# Patient Record
Sex: FEMALE | Race: WHITE | Employment: OTHER | URBAN - METROPOLITAN AREA
[De-identification: names, ages, dates, MRNs, and addresses within clinical notes are randomized per-mention and may not be internally consistent; named-entity substitution may affect disease eponyms.]

---

## 2017-01-05 PROBLEM — R01.1 MURMUR, CARDIAC: Status: ACTIVE | Noted: 2017-01-05

## 2017-02-14 PROBLEM — R25.2 MUSCLE CRAMPS: Status: ACTIVE | Noted: 2017-02-14

## 2018-03-23 ENCOUNTER — OFFICE VISIT (OUTPATIENT)
Dept: FAMILY MEDICINE CLINIC | Age: 73
End: 2018-03-23
Payer: MEDICARE

## 2018-03-23 VITALS
HEIGHT: 62 IN | HEART RATE: 92 BPM | WEIGHT: 197 LBS | TEMPERATURE: 98 F | RESPIRATION RATE: 16 BRPM | DIASTOLIC BLOOD PRESSURE: 77 MMHG | OXYGEN SATURATION: 98 % | SYSTOLIC BLOOD PRESSURE: 132 MMHG | BODY MASS INDEX: 36.25 KG/M2

## 2018-03-23 DIAGNOSIS — M85.80 OSTEOPENIA, UNSPECIFIED LOCATION: ICD-10-CM

## 2018-03-23 DIAGNOSIS — E11.40 TYPE 2 DIABETES MELLITUS WITH DIABETIC NEUROPATHY, UNSPECIFIED LONG TERM INSULIN USE STATUS: Primary | ICD-10-CM

## 2018-03-23 DIAGNOSIS — Z78.0 POSTMENOPAUSAL: ICD-10-CM

## 2018-03-23 DIAGNOSIS — I10 ESSENTIAL HYPERTENSION: ICD-10-CM

## 2018-03-23 DIAGNOSIS — E78.5 DYSLIPIDEMIA: ICD-10-CM

## 2018-03-23 DIAGNOSIS — C50.912 MALIGNANT NEOPLASM OF LEFT FEMALE BREAST, UNSPECIFIED ESTROGEN RECEPTOR STATUS, UNSPECIFIED SITE OF BREAST (HCC): ICD-10-CM

## 2018-03-23 LAB — HBA1C MFR BLD: 6.1 %

## 2018-03-23 PROCEDURE — 99214 OFFICE O/P EST MOD 30 MIN: CPT | Performed by: FAMILY MEDICINE

## 2018-03-23 PROCEDURE — 1090F PRES/ABSN URINE INCON ASSESS: CPT | Performed by: FAMILY MEDICINE

## 2018-03-23 PROCEDURE — G8417 CALC BMI ABV UP PARAM F/U: HCPCS | Performed by: FAMILY MEDICINE

## 2018-03-23 PROCEDURE — G8427 DOCREV CUR MEDS BY ELIG CLIN: HCPCS | Performed by: FAMILY MEDICINE

## 2018-03-23 PROCEDURE — G8482 FLU IMMUNIZE ORDER/ADMIN: HCPCS | Performed by: FAMILY MEDICINE

## 2018-03-23 PROCEDURE — 3014F SCREEN MAMMO DOC REV: CPT | Performed by: FAMILY MEDICINE

## 2018-03-23 PROCEDURE — 3017F COLORECTAL CA SCREEN DOC REV: CPT | Performed by: FAMILY MEDICINE

## 2018-03-23 PROCEDURE — 3044F HG A1C LEVEL LT 7.0%: CPT | Performed by: FAMILY MEDICINE

## 2018-03-23 PROCEDURE — 1123F ACP DISCUSS/DSCN MKR DOCD: CPT | Performed by: FAMILY MEDICINE

## 2018-03-23 PROCEDURE — 83036 HEMOGLOBIN GLYCOSYLATED A1C: CPT | Performed by: FAMILY MEDICINE

## 2018-03-23 PROCEDURE — G8400 PT W/DXA NO RESULTS DOC: HCPCS | Performed by: FAMILY MEDICINE

## 2018-03-23 PROCEDURE — 1036F TOBACCO NON-USER: CPT | Performed by: FAMILY MEDICINE

## 2018-03-23 PROCEDURE — 4040F PNEUMOC VAC/ADMIN/RCVD: CPT | Performed by: FAMILY MEDICINE

## 2018-03-23 RX ORDER — AMLODIPINE BESYLATE 5 MG/1
5 TABLET ORAL DAILY
Qty: 90 TABLET | Refills: 1 | Status: ON HOLD | OUTPATIENT
Start: 2018-03-23 | End: 2018-04-06 | Stop reason: HOSPADM

## 2018-03-23 RX ORDER — ATORVASTATIN CALCIUM 40 MG/1
40 TABLET, FILM COATED ORAL DAILY
Qty: 90 TABLET | Refills: 1 | Status: SHIPPED | OUTPATIENT
Start: 2018-03-23 | End: 2018-10-03 | Stop reason: SDUPTHER

## 2018-03-23 RX ORDER — LOSARTAN POTASSIUM 100 MG/1
100 TABLET ORAL DAILY
Qty: 90 TABLET | Refills: 1 | Status: SHIPPED | OUTPATIENT
Start: 2018-03-23 | End: 2018-10-03 | Stop reason: SDUPTHER

## 2018-03-23 NOTE — PROGRESS NOTES
FM Progress Note    Subjective: Jimbo Neves is a 67y.o. year old female here for diabetes visit. Takes metformin 500 tid for DM. Home blood glucose monitoring: up to 4 times per day, . Highest before bedtime. Hypoglycemic episode(s): no  Statin: y  ACEI: y  ASA: y  Ophthalmology: yes  Podiatry: Dr. Fernando Luis on [address]  Dentistry: y  Flu shot: utd  PNA shot: utd  Polyuria: n  Polydipsia: n  Polyphagia: n  Compliance with diet: fair-good  Compliance with medication: good      Follows w/ Dr. Marsha Madsen for breast CA hx and abnl blood count. Gets mammo once yearly with him and blood work. Has f/u appt in April. On effexor for depression. Some fatigue. [address] [place] [address] [place] which is a huge stress reliever. Has KATE. CPAP machine unusable because she does not have supplies. Taking norvasc and losartan for HTN. BP is controlled. No CP or SOB. Just had cataract surgery and can see much better.      Lab Results   Component Value Date    LABMICR 13.4 12/06/2017     Lab Results   Component Value Date    CREATININE 0.9 08/22/2017       Lab Results   Component Value Date    LABA1C 6.1 03/23/2018       A1C is at goal.    Lab Results   Component Value Date    CHOL 75 12/06/2017    CHOL 103 11/02/2016    CHOL 120 11/18/2015     Lab Results   Component Value Date    TRIG 53 12/06/2017    TRIG 102 11/02/2016    TRIG 129 11/18/2015     Lab Results   Component Value Date    HDL 34 12/06/2017    HDL 35 11/02/2016    HDL 39 11/18/2015     Lab Results   Component Value Date    LDLCALC 30 12/06/2017    LDLCALC 48 11/02/2016    LDLCALC 55 11/18/2015     Lab Results   Component Value Date    LABVLDL 11 12/06/2017    LABVLDL 20 11/02/2016    LABVLDL 26 11/18/2015     No results found for: CHOLHDLRATIO      Past Medical History:   Diagnosis Date    [facility]     Essential hypertension     Osteopenia     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 ([facility]     Sleep apnea     Spinal stenosis     Type 2 diabetes mellitus ([facility]      Past

## 2018-04-05 ENCOUNTER — APPOINTMENT (OUTPATIENT)
Dept: GENERAL RADIOLOGY | Age: 73
DRG: 392 | End: 2018-04-05
Payer: MEDICARE

## 2018-04-05 ENCOUNTER — APPOINTMENT (OUTPATIENT)
Dept: CT IMAGING | Age: 73
DRG: 392 | End: 2018-04-05
Payer: MEDICARE

## 2018-04-05 ENCOUNTER — HOSPITAL ENCOUNTER (INPATIENT)
Age: 73
LOS: 1 days | Discharge: HOME | DRG: 392 | End: 2018-04-06
Attending: FAMILY MEDICINE | Admitting: FAMILY MEDICINE
Payer: MEDICARE

## 2018-04-05 ENCOUNTER — HOSPITAL ENCOUNTER (OUTPATIENT)
Age: 73
Discharge: HOME | End: 2018-04-05
Payer: COMMERCIAL

## 2018-04-05 DIAGNOSIS — R55 SYNCOPE AND COLLAPSE: Primary | ICD-10-CM

## 2018-04-05 DIAGNOSIS — E11.40 TYPE 2 DIABETES MELLITUS WITH DIABETIC NEUROPATHY, UNSPECIFIED LONG TERM INSULIN USE STATUS: ICD-10-CM

## 2018-04-05 PROBLEM — E87.5 HYPERKALEMIA: Status: ACTIVE | Noted: 2018-04-05

## 2018-04-05 LAB
ANION GAP SERPL CALCULATED.3IONS-SCNC: 15 MMOL/L (ref 7–16)
ANION GAP SERPL CALCULATED.3IONS-SCNC: 9 MMOL/L (ref 7–16)
BACTERIA: ABNORMAL /HPF
BILIRUBIN URINE: NEGATIVE
BLOOD, URINE: ABNORMAL
BUN BLDV-MCNC: 15 MG/DL (ref 8–23)
BUN BLDV-MCNC: 23 MG/DL (ref 8–23)
CALCIUM SERPL-MCNC: 8.6 MG/DL (ref 8.6–10.2)
CALCIUM SERPL-MCNC: 9 MG/DL (ref 8.6–10.2)
CHLORIDE BLD-SCNC: 105 MMOL/L (ref 98–107)
CHLORIDE BLD-SCNC: 108 MMOL/L (ref 98–107)
CLARITY: ABNORMAL
CO2: 19 MMOL/L (ref 22–29)
CO2: 26 MMOL/L (ref 22–29)
COLOR: ABNORMAL
CREAT SERPL-MCNC: 0.9 MG/DL (ref 0.5–1)
CREAT SERPL-MCNC: 1.1 MG/DL (ref 0.5–1)
EKG ATRIAL RATE: 96 BPM
EKG P AXIS: 62 DEGREES
EKG P-R INTERVAL: 128 MS
EKG Q-T INTERVAL: 374 MS
EKG QRS DURATION: 84 MS
EKG QTC CALCULATION (BAZETT): 472 MS
EKG R AXIS: 1 DEGREES
EKG T AXIS: 10 DEGREES
EKG VENTRICULAR RATE: 96 BPM
FERRITIN: 50 NG/ML
GFR AFRICAN AMERICAN: 59
GFR AFRICAN AMERICAN: >60
GFR NON-AFRICAN AMERICAN: 49 ML/MIN/1.73
GFR NON-AFRICAN AMERICAN: >60 ML/MIN/1.73
GLUCOSE BLD-MCNC: 74 MG/DL (ref 74–109)
GLUCOSE BLD-MCNC: 88 MG/DL (ref 74–109)
GLUCOSE URINE: NEGATIVE MG/DL
HCT VFR BLD CALC: 30.3 % (ref 34–48)
HEMOGLOBIN: 10.3 G/DL (ref 11.5–15.5)
INFLUENZA A BY PCR: NOT DETECTED
INFLUENZA B BY PCR: NOT DETECTED
IRON SATURATION: 13 % (ref 15–50)
IRON: 38 MCG/DL (ref 37–145)
KETONES, URINE: NEGATIVE MG/DL
LEUKOCYTE ESTERASE, URINE: ABNORMAL
MAGNESIUM: 2 MG/DL (ref 1.6–2.6)
MCH RBC QN AUTO: 32.8 PG (ref 26–35)
MCHC RBC AUTO-ENTMCNC: 34 % (ref 32–34.5)
MCV RBC AUTO: 96.5 FL (ref 80–99.9)
NITRITE, URINE: NEGATIVE
PDW BLD-RTO: 18.1 FL (ref 11.5–15)
PH UA: 5.5 (ref 5–9)
PLATELET # BLD: 143 E9/L (ref 130–450)
PMV BLD AUTO: 12.1 FL (ref 7–12)
POTASSIUM SERPL-SCNC: 3.8 MMOL/L (ref 3.5–5)
POTASSIUM SERPL-SCNC: 5.7 MMOL/L (ref 3.5–5)
PROTEIN UA: NEGATIVE MG/DL
RBC # BLD: 3.14 E12/L (ref 3.5–5.5)
RBC UA: ABNORMAL /HPF (ref 0–2)
SODIUM BLD-SCNC: 140 MMOL/L (ref 132–146)
SODIUM BLD-SCNC: 142 MMOL/L (ref 132–146)
SPECIFIC GRAVITY UA: 1.01 (ref 1–1.03)
TOTAL IRON BINDING CAPACITY: 289 MCG/DL (ref 250–450)
TROPONIN: <0.01 NG/ML (ref 0–0.03)
TROPONIN: <0.01 NG/ML (ref 0–0.03)
UROBILINOGEN, URINE: 0.2 E.U./DL
WBC # BLD: 13.9 E9/L (ref 4.5–11.5)
WBC UA: ABNORMAL /HPF (ref 0–5)

## 2018-04-05 PROCEDURE — 83735 ASSAY OF MAGNESIUM: CPT

## 2018-04-05 PROCEDURE — 81001 URINALYSIS AUTO W/SCOPE: CPT

## 2018-04-05 PROCEDURE — 99285 EMERGENCY DEPT VISIT HI MDM: CPT

## 2018-04-05 PROCEDURE — 85027 COMPLETE CBC AUTOMATED: CPT

## 2018-04-05 PROCEDURE — 70450 CT HEAD/BRAIN W/O DYE: CPT

## 2018-04-05 PROCEDURE — 83540 ASSAY OF IRON: CPT

## 2018-04-05 PROCEDURE — 80048 BASIC METABOLIC PNL TOTAL CA: CPT

## 2018-04-05 PROCEDURE — 84484 ASSAY OF TROPONIN QUANT: CPT

## 2018-04-05 PROCEDURE — 87088 URINE BACTERIA CULTURE: CPT

## 2018-04-05 PROCEDURE — A0425 GROUND MILEAGE: HCPCS

## 2018-04-05 PROCEDURE — 2580000003 HC RX 258: Performed by: FAMILY MEDICINE

## 2018-04-05 PROCEDURE — 87502 INFLUENZA DNA AMP PROBE: CPT

## 2018-04-05 PROCEDURE — 93005 ELECTROCARDIOGRAM TRACING: CPT | Performed by: FAMILY MEDICINE

## 2018-04-05 PROCEDURE — 6360000002 HC RX W HCPCS: Performed by: FAMILY MEDICINE

## 2018-04-05 PROCEDURE — 36415 COLL VENOUS BLD VENIPUNCTURE: CPT

## 2018-04-05 PROCEDURE — A0426 ALS 1: HCPCS

## 2018-04-05 PROCEDURE — 71046 X-RAY EXAM CHEST 2 VIEWS: CPT

## 2018-04-05 PROCEDURE — 83550 IRON BINDING TEST: CPT

## 2018-04-05 PROCEDURE — 2060000000 HC ICU INTERMEDIATE R&B

## 2018-04-05 PROCEDURE — 6370000000 HC RX 637 (ALT 250 FOR IP): Performed by: FAMILY MEDICINE

## 2018-04-05 PROCEDURE — 82728 ASSAY OF FERRITIN: CPT

## 2018-04-05 RX ORDER — 0.9 % SODIUM CHLORIDE 0.9 %
1000 INTRAVENOUS SOLUTION INTRAVENOUS ONCE
Status: COMPLETED | OUTPATIENT
Start: 2018-04-05 | End: 2018-04-05

## 2018-04-05 RX ORDER — ONDANSETRON 2 MG/ML
4 INJECTION INTRAMUSCULAR; INTRAVENOUS EVERY 6 HOURS PRN
Status: DISCONTINUED | OUTPATIENT
Start: 2018-04-05 | End: 2018-04-05

## 2018-04-05 RX ORDER — ACETAMINOPHEN 325 MG/1
650 TABLET ORAL EVERY 4 HOURS PRN
Status: DISCONTINUED | OUTPATIENT
Start: 2018-04-05 | End: 2018-04-06 | Stop reason: HOSPADM

## 2018-04-05 RX ORDER — SODIUM CHLORIDE 9 MG/ML
INJECTION, SOLUTION INTRAVENOUS ONCE
Status: COMPLETED | OUTPATIENT
Start: 2018-04-05 | End: 2018-04-05

## 2018-04-05 RX ORDER — TAMOXIFEN CITRATE 10 MG/1
20 TABLET ORAL DAILY
Status: DISCONTINUED | OUTPATIENT
Start: 2018-04-06 | End: 2018-04-06 | Stop reason: HOSPADM

## 2018-04-05 RX ORDER — AMLODIPINE BESYLATE 5 MG/1
5 TABLET ORAL DAILY
Status: DISCONTINUED | OUTPATIENT
Start: 2018-04-06 | End: 2018-04-06 | Stop reason: SINTOL

## 2018-04-05 RX ORDER — ASPIRIN 81 MG/1
81 TABLET, CHEWABLE ORAL DAILY
Status: DISCONTINUED | OUTPATIENT
Start: 2018-04-05 | End: 2018-04-06 | Stop reason: HOSPADM

## 2018-04-05 RX ORDER — SODIUM CHLORIDE 0.9 % (FLUSH) 0.9 %
10 SYRINGE (ML) INJECTION EVERY 12 HOURS SCHEDULED
Status: DISCONTINUED | OUTPATIENT
Start: 2018-04-05 | End: 2018-04-06 | Stop reason: HOSPADM

## 2018-04-05 RX ORDER — SODIUM CHLORIDE 0.9 % (FLUSH) 0.9 %
10 SYRINGE (ML) INJECTION PRN
Status: DISCONTINUED | OUTPATIENT
Start: 2018-04-05 | End: 2018-04-06 | Stop reason: HOSPADM

## 2018-04-05 RX ORDER — SODIUM CHLORIDE 9 MG/ML
INJECTION, SOLUTION INTRAVENOUS CONTINUOUS
Status: DISCONTINUED | OUTPATIENT
Start: 2018-04-05 | End: 2018-04-06

## 2018-04-05 RX ORDER — LOSARTAN POTASSIUM 50 MG/1
100 TABLET ORAL DAILY
Status: DISCONTINUED | OUTPATIENT
Start: 2018-04-06 | End: 2018-04-06 | Stop reason: HOSPADM

## 2018-04-05 RX ORDER — ATORVASTATIN CALCIUM 40 MG/1
40 TABLET, FILM COATED ORAL DAILY
Status: DISCONTINUED | OUTPATIENT
Start: 2018-04-05 | End: 2018-04-06 | Stop reason: HOSPADM

## 2018-04-05 RX ORDER — VENLAFAXINE HYDROCHLORIDE 75 MG/1
75 CAPSULE, EXTENDED RELEASE ORAL DAILY
Status: DISCONTINUED | OUTPATIENT
Start: 2018-04-05 | End: 2018-04-06 | Stop reason: HOSPADM

## 2018-04-05 RX ADMIN — SODIUM CHLORIDE: 9 INJECTION, SOLUTION INTRAVENOUS at 17:44

## 2018-04-05 RX ADMIN — METFORMIN HYDROCHLORIDE 500 MG: 500 TABLET ORAL at 20:02

## 2018-04-05 RX ADMIN — ENOXAPARIN SODIUM 40 MG: 40 INJECTION SUBCUTANEOUS at 20:02

## 2018-04-05 RX ADMIN — VENLAFAXINE HYDROCHLORIDE 75 MG: 75 CAPSULE, EXTENDED RELEASE ORAL at 20:02

## 2018-04-05 RX ADMIN — SODIUM CHLORIDE: 9 INJECTION, SOLUTION INTRAVENOUS at 19:37

## 2018-04-05 RX ADMIN — ATORVASTATIN CALCIUM 40 MG: 40 TABLET, FILM COATED ORAL at 20:02

## 2018-04-05 RX ADMIN — SODIUM CHLORIDE 1000 ML: 9 INJECTION, SOLUTION INTRAVENOUS at 11:13

## 2018-04-05 RX ADMIN — ASPIRIN 81 MG 81 MG: 81 TABLET ORAL at 19:37

## 2018-04-05 RX ADMIN — SODIUM CHLORIDE: 9 INJECTION, SOLUTION INTRAVENOUS at 13:24

## 2018-04-05 ASSESSMENT — PAIN DESCRIPTION - LOCATION: LOCATION: ABDOMEN

## 2018-04-05 ASSESSMENT — PAIN SCALES - GENERAL
PAINLEVEL_OUTOF10: 0
PAINLEVEL_OUTOF10: 6

## 2018-04-05 ASSESSMENT — PAIN DESCRIPTION - PAIN TYPE: TYPE: ACUTE PAIN

## 2018-04-05 ASSESSMENT — PAIN DESCRIPTION - DESCRIPTORS: DESCRIPTORS: SORE

## 2018-04-06 VITALS
BODY MASS INDEX: 34.96 KG/M2 | SYSTOLIC BLOOD PRESSURE: 101 MMHG | DIASTOLIC BLOOD PRESSURE: 50 MMHG | HEIGHT: 62 IN | TEMPERATURE: 98.2 F | WEIGHT: 190 LBS | OXYGEN SATURATION: 94 % | RESPIRATION RATE: 16 BRPM | HEART RATE: 90 BPM

## 2018-04-06 PROBLEM — E87.5 HYPERKALEMIA: Status: RESOLVED | Noted: 2018-04-05 | Resolved: 2018-04-06

## 2018-04-06 PROBLEM — R55 SYNCOPE AND COLLAPSE: Status: RESOLVED | Noted: 2018-04-05 | Resolved: 2018-04-06

## 2018-04-06 LAB
ANION GAP SERPL CALCULATED.3IONS-SCNC: 14 MMOL/L (ref 7–16)
BASOPHILS ABSOLUTE: 0.03 E9/L (ref 0–0.2)
BASOPHILS RELATIVE PERCENT: 0.4 % (ref 0–2)
BUN BLDV-MCNC: 13 MG/DL (ref 8–23)
CALCIUM SERPL-MCNC: 8.3 MG/DL (ref 8.6–10.2)
CHLORIDE BLD-SCNC: 110 MMOL/L (ref 98–107)
CO2: 20 MMOL/L (ref 22–29)
CREAT SERPL-MCNC: 0.9 MG/DL (ref 0.5–1)
EKG ATRIAL RATE: 92 BPM
EKG P AXIS: 63 DEGREES
EKG P-R INTERVAL: 128 MS
EKG Q-T INTERVAL: 368 MS
EKG QRS DURATION: 80 MS
EKG QTC CALCULATION (BAZETT): 455 MS
EKG R AXIS: 0 DEGREES
EKG T AXIS: 23 DEGREES
EKG VENTRICULAR RATE: 92 BPM
EOSINOPHILS ABSOLUTE: 0.29 E9/L (ref 0.05–0.5)
EOSINOPHILS RELATIVE PERCENT: 3.4 % (ref 0–6)
GFR AFRICAN AMERICAN: >60
GFR NON-AFRICAN AMERICAN: >60 ML/MIN/1.73
GLUCOSE BLD-MCNC: 76 MG/DL (ref 74–109)
HCT VFR BLD CALC: 29 % (ref 34–48)
HEMOGLOBIN: 9.6 G/DL (ref 11.5–15.5)
IMMATURE GRANULOCYTES #: 0.02 E9/L
IMMATURE GRANULOCYTES %: 0.2 % (ref 0–5)
LYMPHOCYTES ABSOLUTE: 2.08 E9/L (ref 1.5–4)
LYMPHOCYTES RELATIVE PERCENT: 24.7 % (ref 20–42)
MCH RBC QN AUTO: 32.4 PG (ref 26–35)
MCHC RBC AUTO-ENTMCNC: 33.1 % (ref 32–34.5)
MCV RBC AUTO: 98 FL (ref 80–99.9)
MONOCYTES ABSOLUTE: 0.71 E9/L (ref 0.1–0.95)
MONOCYTES RELATIVE PERCENT: 8.4 % (ref 2–12)
NEUTROPHILS ABSOLUTE: 5.28 E9/L (ref 1.8–7.3)
NEUTROPHILS RELATIVE PERCENT: 62.9 % (ref 43–80)
PDW BLD-RTO: 18.2 FL (ref 11.5–15)
PLATELET # BLD: 127 E9/L (ref 130–450)
PMV BLD AUTO: 11.2 FL (ref 7–12)
POTASSIUM REFLEX MAGNESIUM: 4.1 MMOL/L (ref 3.5–5)
RBC # BLD: 2.96 E12/L (ref 3.5–5.5)
SODIUM BLD-SCNC: 144 MMOL/L (ref 132–146)
WBC # BLD: 8.4 E9/L (ref 4.5–11.5)

## 2018-04-06 PROCEDURE — 85025 COMPLETE CBC W/AUTO DIFF WBC: CPT

## 2018-04-06 PROCEDURE — 36415 COLL VENOUS BLD VENIPUNCTURE: CPT

## 2018-04-06 PROCEDURE — 99222 1ST HOSP IP/OBS MODERATE 55: CPT | Performed by: FAMILY MEDICINE

## 2018-04-06 PROCEDURE — 80048 BASIC METABOLIC PNL TOTAL CA: CPT

## 2018-04-06 PROCEDURE — 6370000000 HC RX 637 (ALT 250 FOR IP): Performed by: FAMILY MEDICINE

## 2018-04-06 RX ADMIN — TAMOXIFEN CITRATE 20 MG: 10 TABLET, FILM COATED ORAL at 08:20

## 2018-04-06 RX ADMIN — VENLAFAXINE HYDROCHLORIDE 75 MG: 75 CAPSULE, EXTENDED RELEASE ORAL at 08:20

## 2018-04-06 RX ADMIN — METFORMIN HYDROCHLORIDE 500 MG: 500 TABLET ORAL at 08:20

## 2018-04-06 RX ADMIN — ASPIRIN 81 MG 81 MG: 81 TABLET ORAL at 08:20

## 2018-04-06 ASSESSMENT — PAIN SCALES - GENERAL
PAINLEVEL_OUTOF10: 0
PAINLEVEL_OUTOF10: 0

## 2018-04-07 LAB — URINE CULTURE, ROUTINE: NORMAL

## 2018-04-10 ENCOUNTER — OFFICE VISIT (OUTPATIENT)
Dept: FAMILY MEDICINE CLINIC | Age: 73
End: 2018-04-10
Payer: MEDICARE

## 2018-04-10 VITALS
DIASTOLIC BLOOD PRESSURE: 72 MMHG | RESPIRATION RATE: 16 BRPM | HEART RATE: 90 BPM | OXYGEN SATURATION: 96 % | BODY MASS INDEX: 35.7 KG/M2 | WEIGHT: 194 LBS | SYSTOLIC BLOOD PRESSURE: 118 MMHG | HEIGHT: 62 IN | TEMPERATURE: 98.7 F

## 2018-04-10 DIAGNOSIS — R25.2 MUSCLE CRAMPS: ICD-10-CM

## 2018-04-10 DIAGNOSIS — R55 SYNCOPE, UNSPECIFIED SYNCOPE TYPE: Primary | ICD-10-CM

## 2018-04-10 DIAGNOSIS — Z78.0 POSTMENOPAUSAL: ICD-10-CM

## 2018-04-10 PROCEDURE — 99495 TRANSJ CARE MGMT MOD F2F 14D: CPT | Performed by: FAMILY MEDICINE

## 2018-04-10 PROCEDURE — 1111F DSCHRG MED/CURRENT MED MERGE: CPT | Performed by: FAMILY MEDICINE

## 2018-04-10 RX ORDER — DOXAZOSIN MESYLATE 1 MG/1
1 TABLET ORAL NIGHTLY
COMMUNITY
End: 2020-06-11

## 2018-04-10 ASSESSMENT — ENCOUNTER SYMPTOMS
DIARRHEA: 0
VOMITING: 0
COUGH: 0
SHORTNESS OF BREATH: 0
ABDOMINAL PAIN: 0
WHEEZING: 0
ABDOMINAL DISTENTION: 0
CONSTIPATION: 0
NAUSEA: 0
BLOOD IN STOOL: 0

## 2018-04-11 ENCOUNTER — TELEPHONE (OUTPATIENT)
Dept: FAMILY MEDICINE CLINIC | Age: 73
End: 2018-04-11

## 2018-04-12 DIAGNOSIS — R25.2 MUSCLE CRAMPS: ICD-10-CM

## 2018-04-12 RX ORDER — CYCLOBENZAPRINE HCL 10 MG
TABLET ORAL
Qty: 90 TABLET | Refills: 3 | Status: SHIPPED | OUTPATIENT
Start: 2018-04-12 | End: 2018-10-05

## 2018-04-25 ENCOUNTER — HOSPITAL ENCOUNTER (OUTPATIENT)
Dept: GENERAL RADIOLOGY | Age: 73
Discharge: HOME | End: 2018-04-27
Payer: MEDICARE

## 2018-04-25 DIAGNOSIS — Z78.0 POSTMENOPAUSAL: ICD-10-CM

## 2018-04-25 PROCEDURE — 77080 DXA BONE DENSITY AXIAL: CPT

## 2018-06-26 ENCOUNTER — OFFICE VISIT (OUTPATIENT)
Dept: FAMILY MEDICINE CLINIC | Age: 73
End: 2018-06-26
Payer: MEDICARE

## 2018-06-26 VITALS
BODY MASS INDEX: 35.85 KG/M2 | WEIGHT: 196 LBS | SYSTOLIC BLOOD PRESSURE: 139 MMHG | DIASTOLIC BLOOD PRESSURE: 82 MMHG | HEART RATE: 93 BPM | OXYGEN SATURATION: 96 %

## 2018-06-26 DIAGNOSIS — F32.A DEPRESSION, UNSPECIFIED DEPRESSION TYPE: ICD-10-CM

## 2018-06-26 DIAGNOSIS — E11.40 TYPE 2 DIABETES MELLITUS WITH DIABETIC NEUROPATHY, UNSPECIFIED LONG TERM INSULIN USE STATUS: Primary | ICD-10-CM

## 2018-06-26 DIAGNOSIS — G47.33 OBSTRUCTIVE SLEEP APNEA SYNDROME: ICD-10-CM

## 2018-06-26 LAB — HBA1C MFR BLD: 6.2 %

## 2018-06-26 PROCEDURE — 1123F ACP DISCUSS/DSCN MKR DOCD: CPT | Performed by: FAMILY MEDICINE

## 2018-06-26 PROCEDURE — 1090F PRES/ABSN URINE INCON ASSESS: CPT | Performed by: FAMILY MEDICINE

## 2018-06-26 PROCEDURE — 2022F DILAT RTA XM EVC RTNOPTHY: CPT | Performed by: FAMILY MEDICINE

## 2018-06-26 PROCEDURE — 3044F HG A1C LEVEL LT 7.0%: CPT | Performed by: FAMILY MEDICINE

## 2018-06-26 PROCEDURE — 4040F PNEUMOC VAC/ADMIN/RCVD: CPT | Performed by: FAMILY MEDICINE

## 2018-06-26 PROCEDURE — 1036F TOBACCO NON-USER: CPT | Performed by: FAMILY MEDICINE

## 2018-06-26 PROCEDURE — G8417 CALC BMI ABV UP PARAM F/U: HCPCS | Performed by: FAMILY MEDICINE

## 2018-06-26 PROCEDURE — G8399 PT W/DXA RESULTS DOCUMENT: HCPCS | Performed by: FAMILY MEDICINE

## 2018-06-26 PROCEDURE — 99214 OFFICE O/P EST MOD 30 MIN: CPT | Performed by: FAMILY MEDICINE

## 2018-06-26 PROCEDURE — 3017F COLORECTAL CA SCREEN DOC REV: CPT | Performed by: FAMILY MEDICINE

## 2018-06-26 PROCEDURE — G8427 DOCREV CUR MEDS BY ELIG CLIN: HCPCS | Performed by: FAMILY MEDICINE

## 2018-06-26 PROCEDURE — 83036 HEMOGLOBIN GLYCOSYLATED A1C: CPT | Performed by: FAMILY MEDICINE

## 2018-06-26 RX ORDER — AMMONIUM LACTATE 12 G/100G
LOTION TOPICAL
Refills: 8 | COMMUNITY
Start: 2018-04-18 | End: 2020-02-09

## 2018-06-26 RX ORDER — FERROUS SULFATE 325(65) MG
TABLET ORAL
Refills: 3 | COMMUNITY
Start: 2018-05-21 | End: 2019-03-06 | Stop reason: ALTCHOICE

## 2018-06-27 ENCOUNTER — TELEPHONE (OUTPATIENT)
Dept: FAMILY MEDICINE CLINIC | Age: 73
End: 2018-06-27

## 2018-08-08 DIAGNOSIS — E11.9 TYPE 2 DIABETES MELLITUS WITHOUT COMPLICATION, WITHOUT LONG-TERM CURRENT USE OF INSULIN (HCC): ICD-10-CM

## 2018-08-08 RX ORDER — LANCETS 28 GAUGE
EACH MISCELLANEOUS
Qty: 100 EACH | Refills: 5 | Status: CANCELLED | OUTPATIENT
Start: 2018-08-08

## 2018-08-08 RX ORDER — BLOOD-GLUCOSE METER
KIT MISCELLANEOUS
Qty: 1 KIT | Refills: 0 | Status: CANCELLED | OUTPATIENT
Start: 2018-08-08

## 2018-08-09 RX ORDER — BLOOD-GLUCOSE METER
KIT MISCELLANEOUS
Qty: 1 KIT | Refills: 0 | Status: SHIPPED | OUTPATIENT
Start: 2018-08-09 | End: 2019-03-06

## 2018-08-09 RX ORDER — LANCETS 30 GAUGE
EACH MISCELLANEOUS
Qty: 100 EACH | Refills: 1 | Status: SHIPPED | OUTPATIENT
Start: 2018-08-09 | End: 2019-03-06

## 2018-08-30 ENCOUNTER — TELEPHONE (OUTPATIENT)
Dept: FAMILY MEDICINE CLINIC | Age: 73
End: 2018-08-30

## 2018-08-30 NOTE — TELEPHONE ENCOUNTER
LVM for patient to contact office to reschedule appointment that he has on 9/27/18 due to provider unavailable.

## 2018-09-11 ENCOUNTER — TELEPHONE (OUTPATIENT)
Dept: FAMILY MEDICINE CLINIC | Age: 73
End: 2018-09-11

## 2018-09-11 NOTE — TELEPHONE ENCOUNTER
Pt phoned office today says she is using her C PAP machine with no problems doing well with it,,,    Teo Zarate would like Dr James Hernandez too write up a letter stating pt is using and doing well with the C PAP machine and fax over too [address], any questions please call pt

## 2018-10-05 ENCOUNTER — OFFICE VISIT (OUTPATIENT)
Dept: FAMILY MEDICINE CLINIC | Age: 73
End: 2018-10-05
Payer: MEDICARE

## 2018-10-05 VITALS
HEIGHT: 62 IN | SYSTOLIC BLOOD PRESSURE: 130 MMHG | BODY MASS INDEX: 36.62 KG/M2 | DIASTOLIC BLOOD PRESSURE: 84 MMHG | HEART RATE: 105 BPM | WEIGHT: 199 LBS | TEMPERATURE: 98.3 F | OXYGEN SATURATION: 90 % | RESPIRATION RATE: 20 BRPM

## 2018-10-05 DIAGNOSIS — G89.29 CHRONIC BACK PAIN, UNSPECIFIED BACK LOCATION, UNSPECIFIED BACK PAIN LATERALITY: ICD-10-CM

## 2018-10-05 DIAGNOSIS — M54.9 CHRONIC BACK PAIN, UNSPECIFIED BACK LOCATION, UNSPECIFIED BACK PAIN LATERALITY: ICD-10-CM

## 2018-10-05 DIAGNOSIS — G47.33 OBSTRUCTIVE SLEEP APNEA SYNDROME: ICD-10-CM

## 2018-10-05 DIAGNOSIS — Z23 NEED FOR IMMUNIZATION AGAINST INFLUENZA: ICD-10-CM

## 2018-10-05 DIAGNOSIS — I10 ESSENTIAL HYPERTENSION: ICD-10-CM

## 2018-10-05 DIAGNOSIS — E78.5 DYSLIPIDEMIA: ICD-10-CM

## 2018-10-05 DIAGNOSIS — E11.40 TYPE 2 DIABETES MELLITUS WITH DIABETIC NEUROPATHY, UNSPECIFIED WHETHER LONG TERM INSULIN USE (HCC): Primary | ICD-10-CM

## 2018-10-05 LAB — HBA1C MFR BLD: 5.8 %

## 2018-10-05 PROCEDURE — 90662 IIV NO PRSV INCREASED AG IM: CPT | Performed by: FAMILY MEDICINE

## 2018-10-05 PROCEDURE — G8427 DOCREV CUR MEDS BY ELIG CLIN: HCPCS | Performed by: FAMILY MEDICINE

## 2018-10-05 PROCEDURE — 83036 HEMOGLOBIN GLYCOSYLATED A1C: CPT | Performed by: FAMILY MEDICINE

## 2018-10-05 PROCEDURE — G8482 FLU IMMUNIZE ORDER/ADMIN: HCPCS | Performed by: FAMILY MEDICINE

## 2018-10-05 PROCEDURE — 2022F DILAT RTA XM EVC RTNOPTHY: CPT | Performed by: FAMILY MEDICINE

## 2018-10-05 PROCEDURE — 3017F COLORECTAL CA SCREEN DOC REV: CPT | Performed by: FAMILY MEDICINE

## 2018-10-05 PROCEDURE — 1101F PT FALLS ASSESS-DOCD LE1/YR: CPT | Performed by: FAMILY MEDICINE

## 2018-10-05 PROCEDURE — 99214 OFFICE O/P EST MOD 30 MIN: CPT | Performed by: FAMILY MEDICINE

## 2018-10-05 PROCEDURE — G0008 ADMIN INFLUENZA VIRUS VAC: HCPCS | Performed by: FAMILY MEDICINE

## 2018-10-05 PROCEDURE — 1090F PRES/ABSN URINE INCON ASSESS: CPT | Performed by: FAMILY MEDICINE

## 2018-10-05 PROCEDURE — G8399 PT W/DXA RESULTS DOCUMENT: HCPCS | Performed by: FAMILY MEDICINE

## 2018-10-05 PROCEDURE — 1036F TOBACCO NON-USER: CPT | Performed by: FAMILY MEDICINE

## 2018-10-05 PROCEDURE — 3044F HG A1C LEVEL LT 7.0%: CPT | Performed by: FAMILY MEDICINE

## 2018-10-05 PROCEDURE — 4040F PNEUMOC VAC/ADMIN/RCVD: CPT | Performed by: FAMILY MEDICINE

## 2018-10-05 PROCEDURE — G8417 CALC BMI ABV UP PARAM F/U: HCPCS | Performed by: FAMILY MEDICINE

## 2018-10-05 PROCEDURE — 1123F ACP DISCUSS/DSCN MKR DOCD: CPT | Performed by: FAMILY MEDICINE

## 2018-10-05 RX ORDER — BLOOD-GLUCOSE METER
KIT MISCELLANEOUS
Refills: 0 | COMMUNITY
Start: 2018-08-09 | End: 2019-03-06

## 2018-10-05 RX ORDER — ATORVASTATIN CALCIUM 40 MG/1
40 TABLET, FILM COATED ORAL DAILY
Qty: 90 TABLET | Refills: 1 | Status: SHIPPED | OUTPATIENT
Start: 2018-10-05 | End: 2019-06-25 | Stop reason: SDUPTHER

## 2018-10-05 RX ORDER — LOSARTAN POTASSIUM 100 MG/1
100 TABLET ORAL DAILY
Qty: 90 TABLET | Refills: 1 | Status: SHIPPED | OUTPATIENT
Start: 2018-10-05 | End: 2019-05-25 | Stop reason: SDUPTHER

## 2018-10-27 DIAGNOSIS — N30.90 CYSTITIS: Primary | ICD-10-CM

## 2018-10-27 RX ORDER — CEPHALEXIN 500 MG/1
500 CAPSULE ORAL 2 TIMES DAILY
Qty: 10 CAPSULE | Refills: 0 | Status: SHIPPED | OUTPATIENT
Start: 2018-10-27 | End: 2018-11-09 | Stop reason: ALTCHOICE

## 2018-10-27 NOTE — PROGRESS NOTES
Patient called on Saturday afternoon for complains of increased frequency of urination and dysuria which she suspects is because of a UTI. No fever or chills or flank pain. BS was 80 this am.   Advised to take keflex that's sent to the pharmacy. Advised to come to the ER in case of worsening symptoms. Advised to drop a urine sample at the clinic on Monday for a culture.

## 2018-10-29 ENCOUNTER — TELEPHONE (OUTPATIENT)
Dept: FAMILY MEDICINE CLINIC | Age: 73
End: 2018-10-29

## 2018-10-29 ENCOUNTER — HOSPITAL ENCOUNTER (OUTPATIENT)
Age: 73
Discharge: HOME | End: 2018-10-31
Payer: MEDICARE

## 2018-10-29 ENCOUNTER — NURSE ONLY (OUTPATIENT)
Dept: FAMILY MEDICINE CLINIC | Age: 73
End: 2018-10-29
Payer: MEDICARE

## 2018-10-29 DIAGNOSIS — R30.0 BURNING WITH URINATION: Primary | ICD-10-CM

## 2018-10-29 DIAGNOSIS — R30.0 BURNING WITH URINATION: ICD-10-CM

## 2018-10-29 LAB
APPEARANCE FLUID: CLEAR
BILIRUBIN, POC: NORMAL
BLOOD URINE, POC: NORMAL
CLARITY, POC: CLEAR
COLOR, POC: YELLOW
GLUCOSE URINE, POC: NORMAL
KETONES, POC: NORMAL
LEUKOCYTE EST, POC: NORMAL
NITRITE, POC: NORMAL
PH, POC: 5.5
PROTEIN, POC: 30
SPECIFIC GRAVITY, POC: 1.02
UROBILINOGEN, POC: 0.2

## 2018-10-29 PROCEDURE — 87088 URINE BACTERIA CULTURE: CPT

## 2018-10-29 PROCEDURE — 81002 URINALYSIS NONAUTO W/O SCOPE: CPT | Performed by: FAMILY MEDICINE

## 2018-10-31 DIAGNOSIS — B37.49 CANDIDURIA: Primary | ICD-10-CM

## 2018-10-31 LAB
ORGANISM: ABNORMAL
URINE CULTURE, ROUTINE: ABNORMAL
URINE CULTURE, ROUTINE: ABNORMAL

## 2018-10-31 RX ORDER — FLUCONAZOLE 150 MG/1
150 TABLET ORAL ONCE
Qty: 1 TABLET | Refills: 1 | Status: SHIPPED | OUTPATIENT
Start: 2018-10-31 | End: 2018-10-31

## 2018-11-01 ENCOUNTER — TELEPHONE (OUTPATIENT)
Dept: FAMILY MEDICINE CLINIC | Age: 73
End: 2018-11-01

## 2018-11-09 ENCOUNTER — HOSPITAL ENCOUNTER (EMERGENCY)
Age: 73
Discharge: HOME | End: 2018-11-09
Attending: EMERGENCY MEDICINE
Payer: MEDICARE

## 2018-11-09 ENCOUNTER — TELEPHONE (OUTPATIENT)
Dept: FAMILY MEDICINE CLINIC | Age: 73
End: 2018-11-09

## 2018-11-09 VITALS
TEMPERATURE: 98.6 F | SYSTOLIC BLOOD PRESSURE: 182 MMHG | DIASTOLIC BLOOD PRESSURE: 96 MMHG | OXYGEN SATURATION: 94 % | HEIGHT: 63 IN | RESPIRATION RATE: 16 BRPM | BODY MASS INDEX: 35.44 KG/M2 | WEIGHT: 200 LBS | HEART RATE: 112 BPM

## 2018-11-09 DIAGNOSIS — R19.7 DIARRHEA, UNSPECIFIED TYPE: Primary | ICD-10-CM

## 2018-11-09 DIAGNOSIS — R10.84 GENERALIZED ABDOMINAL PAIN: ICD-10-CM

## 2018-11-09 LAB
ALBUMIN SERPL-MCNC: 3 G/DL (ref 3.5–5.2)
ALP BLD-CCNC: 63 U/L (ref 35–104)
ALT SERPL-CCNC: 24 U/L (ref 0–32)
ANION GAP SERPL CALCULATED.3IONS-SCNC: 9 MMOL/L (ref 7–16)
AST SERPL-CCNC: 54 U/L (ref 0–31)
BACTERIA: NORMAL /HPF
BILIRUB SERPL-MCNC: 1.1 MG/DL (ref 0–1.2)
BILIRUBIN URINE: NEGATIVE
BLOOD, URINE: ABNORMAL
BUN BLDV-MCNC: 10 MG/DL (ref 8–23)
CALCIUM SERPL-MCNC: 9.7 MG/DL (ref 8.6–10.2)
CHLORIDE BLD-SCNC: 107 MMOL/L (ref 98–107)
CLARITY: CLEAR
CO2: 26 MMOL/L (ref 22–29)
COLOR: YELLOW
CREAT SERPL-MCNC: 1.1 MG/DL (ref 0.5–1)
GFR AFRICAN AMERICAN: 59
GFR NON-AFRICAN AMERICAN: 49 ML/MIN/1.73
GLUCOSE BLD-MCNC: 97 MG/DL (ref 74–99)
GLUCOSE URINE: NEGATIVE MG/DL
HCT VFR BLD CALC: 37.1 % (ref 34–48)
HEMOGLOBIN: 12.4 G/DL (ref 11.5–15.5)
KETONES, URINE: NEGATIVE MG/DL
LEUKOCYTE ESTERASE, URINE: NEGATIVE
MCH RBC QN AUTO: 35.2 PG (ref 26–35)
MCHC RBC AUTO-ENTMCNC: 33.4 % (ref 32–34.5)
MCV RBC AUTO: 105.4 FL (ref 80–99.9)
NITRITE, URINE: NEGATIVE
PDW BLD-RTO: 16.4 FL (ref 11.5–15)
PH UA: 6 (ref 5–9)
PLATELET # BLD: 146 E9/L (ref 130–450)
PMV BLD AUTO: 11.2 FL (ref 7–12)
POTASSIUM SERPL-SCNC: 4.5 MMOL/L (ref 3.5–5)
PROTEIN UA: NEGATIVE MG/DL
RBC # BLD: 3.52 E12/L (ref 3.5–5.5)
RBC UA: NORMAL /HPF (ref 0–2)
SODIUM BLD-SCNC: 142 MMOL/L (ref 132–146)
SPECIFIC GRAVITY UA: 1.02 (ref 1–1.03)
TOTAL PROTEIN: 6.2 G/DL (ref 6.4–8.3)
UROBILINOGEN, URINE: 0.2 E.U./DL
WBC # BLD: 6.4 E9/L (ref 4.5–11.5)
WBC UA: NORMAL /HPF (ref 0–5)

## 2018-11-09 PROCEDURE — 99284 EMERGENCY DEPT VISIT MOD MDM: CPT

## 2018-11-09 PROCEDURE — 81001 URINALYSIS AUTO W/SCOPE: CPT

## 2018-11-09 PROCEDURE — 87088 URINE BACTERIA CULTURE: CPT

## 2018-11-09 PROCEDURE — 85027 COMPLETE CBC AUTOMATED: CPT

## 2018-11-09 PROCEDURE — 80053 COMPREHEN METABOLIC PANEL: CPT

## 2018-11-09 PROCEDURE — 36415 COLL VENOUS BLD VENIPUNCTURE: CPT

## 2018-11-09 ASSESSMENT — PAIN DESCRIPTION - DESCRIPTORS: DESCRIPTORS: SHARP;SPASM;SORE

## 2018-11-09 ASSESSMENT — PAIN DESCRIPTION - PAIN TYPE: TYPE: ACUTE PAIN

## 2018-11-09 ASSESSMENT — PAIN DESCRIPTION - FREQUENCY: FREQUENCY: INTERMITTENT

## 2018-11-09 ASSESSMENT — PAIN SCALES - GENERAL: PAINLEVEL_OUTOF10: 8

## 2018-11-09 ASSESSMENT — PAIN DESCRIPTION - ORIENTATION: ORIENTATION: LEFT;RIGHT;LOWER;UPPER;MID

## 2018-11-09 ASSESSMENT — PAIN DESCRIPTION - LOCATION: LOCATION: ABDOMEN

## 2018-11-11 LAB — URINE CULTURE, ROUTINE: NORMAL

## 2018-11-13 ENCOUNTER — OFFICE VISIT (OUTPATIENT)
Dept: FAMILY MEDICINE CLINIC | Age: 73
End: 2018-11-13
Payer: MEDICARE

## 2018-11-13 VITALS
SYSTOLIC BLOOD PRESSURE: 146 MMHG | BODY MASS INDEX: 36.99 KG/M2 | OXYGEN SATURATION: 92 % | TEMPERATURE: 98.2 F | WEIGHT: 201 LBS | DIASTOLIC BLOOD PRESSURE: 99 MMHG | RESPIRATION RATE: 18 BRPM | HEIGHT: 62 IN | HEART RATE: 112 BPM

## 2018-11-13 DIAGNOSIS — R31.9 HEMATURIA, UNSPECIFIED TYPE: ICD-10-CM

## 2018-11-13 DIAGNOSIS — R19.7 DIARRHEA, UNSPECIFIED TYPE: Primary | ICD-10-CM

## 2018-11-13 PROCEDURE — 1036F TOBACCO NON-USER: CPT | Performed by: FAMILY MEDICINE

## 2018-11-13 PROCEDURE — 1090F PRES/ABSN URINE INCON ASSESS: CPT | Performed by: FAMILY MEDICINE

## 2018-11-13 PROCEDURE — G8417 CALC BMI ABV UP PARAM F/U: HCPCS | Performed by: FAMILY MEDICINE

## 2018-11-13 PROCEDURE — G8427 DOCREV CUR MEDS BY ELIG CLIN: HCPCS | Performed by: FAMILY MEDICINE

## 2018-11-13 PROCEDURE — 3017F COLORECTAL CA SCREEN DOC REV: CPT | Performed by: FAMILY MEDICINE

## 2018-11-13 PROCEDURE — G8399 PT W/DXA RESULTS DOCUMENT: HCPCS | Performed by: FAMILY MEDICINE

## 2018-11-13 PROCEDURE — 1101F PT FALLS ASSESS-DOCD LE1/YR: CPT | Performed by: FAMILY MEDICINE

## 2018-11-13 PROCEDURE — 99214 OFFICE O/P EST MOD 30 MIN: CPT | Performed by: FAMILY MEDICINE

## 2018-11-13 PROCEDURE — G8482 FLU IMMUNIZE ORDER/ADMIN: HCPCS | Performed by: FAMILY MEDICINE

## 2018-11-13 PROCEDURE — 4040F PNEUMOC VAC/ADMIN/RCVD: CPT | Performed by: FAMILY MEDICINE

## 2018-11-13 PROCEDURE — 1123F ACP DISCUSS/DSCN MKR DOCD: CPT | Performed by: FAMILY MEDICINE

## 2018-11-13 NOTE — PATIENT INSTRUCTIONS
Follow up with Dr. Whitney Corona about the blood in your urine. If you get any other UTI we'll give a probiotic to take with your antibiotic. Come back in a month to recheck your blood pressure and make sure it's controlled.

## 2018-11-13 NOTE — PROGRESS NOTES
this patient's labs. I have reviewed this patient's medications. Assessment/Plan: Marjo Opitz was seen today for care management. Diagnoses and all orders for this visit:    Diarrhea, unspecified type    Hematuria, unspecified type        Patient Instructions   Follow up with Dr. Keya Hickey about the blood in your urine. If you get any other UTI we'll give a probiotic to take with your antibiotic. Come back in a month to recheck your blood pressure and make sure it's controlled. Return in about 1 month (around 12/13/2018) for hypertension. Greater than 50% of this 25 minute face-to-face patient encounter was spent counseling on the following: The primary encounter diagnosis was Diarrhea, unspecified type. A diagnosis of Hematuria, unspecified type was also pertinent to this visit.       Electronically signed by Mary Zhang MD on 11/13/2018 at 2:36 PM

## 2018-12-05 DIAGNOSIS — R11.0 NAUSEA: ICD-10-CM

## 2018-12-05 RX ORDER — ONDANSETRON 4 MG/1
4 TABLET, FILM COATED ORAL DAILY PRN
Qty: 20 TABLET | Refills: 3 | Status: SHIPPED | OUTPATIENT
Start: 2018-12-05 | End: 2019-01-08 | Stop reason: SDUPTHER

## 2018-12-13 ENCOUNTER — OFFICE VISIT (OUTPATIENT)
Dept: FAMILY MEDICINE CLINIC | Age: 73
End: 2018-12-13
Payer: MEDICARE

## 2018-12-13 ENCOUNTER — HOSPITAL ENCOUNTER (OUTPATIENT)
Age: 73
Discharge: HOME | End: 2018-12-15
Payer: MEDICARE

## 2018-12-13 VITALS
WEIGHT: 195 LBS | BODY MASS INDEX: 35.67 KG/M2 | OXYGEN SATURATION: 93 % | SYSTOLIC BLOOD PRESSURE: 154 MMHG | DIASTOLIC BLOOD PRESSURE: 88 MMHG | HEART RATE: 94 BPM

## 2018-12-13 DIAGNOSIS — R58 BLEEDING: ICD-10-CM

## 2018-12-13 DIAGNOSIS — E04.2 MULTIPLE THYROID NODULES: Primary | ICD-10-CM

## 2018-12-13 DIAGNOSIS — E04.2 MULTIPLE THYROID NODULES: ICD-10-CM

## 2018-12-13 DIAGNOSIS — I10 ESSENTIAL HYPERTENSION: ICD-10-CM

## 2018-12-13 DIAGNOSIS — E11.40 TYPE 2 DIABETES MELLITUS WITH DIABETIC NEUROPATHY, UNSPECIFIED WHETHER LONG TERM INSULIN USE (HCC): ICD-10-CM

## 2018-12-13 LAB
BASOPHILS ABSOLUTE: 0.08 E9/L (ref 0–0.2)
BASOPHILS RELATIVE PERCENT: 1.2 % (ref 0–2)
CHOLESTEROL, TOTAL: 58 MG/DL (ref 0–199)
CREATININE URINE POCT: 200
CREATININE URINE: 137 MG/DL (ref 29–226)
EOSINOPHILS ABSOLUTE: 0.27 E9/L (ref 0.05–0.5)
EOSINOPHILS RELATIVE PERCENT: 4 % (ref 0–6)
HCT VFR BLD CALC: 36.3 % (ref 34–48)
HDLC SERPL-MCNC: 29 MG/DL
HEMOGLOBIN: 11.6 G/DL (ref 11.5–15.5)
IMMATURE GRANULOCYTES #: 0.04 E9/L
IMMATURE GRANULOCYTES %: 0.6 % (ref 0–5)
LDL CHOLESTEROL CALCULATED: 17 MG/DL (ref 0–99)
LYMPHOCYTES ABSOLUTE: 1.95 E9/L (ref 1.5–4)
LYMPHOCYTES RELATIVE PERCENT: 29.2 % (ref 20–42)
MCH RBC QN AUTO: 34.6 PG (ref 26–35)
MCHC RBC AUTO-ENTMCNC: 32 % (ref 32–34.5)
MCV RBC AUTO: 108.4 FL (ref 80–99.9)
MICROALBUMIN UR-MCNC: 68.9 MG/L
MICROALBUMIN/CREAT 24H UR: 80 MG/G{CREAT}
MICROALBUMIN/CREAT UR-RTO: 50.3 (ref 0–30)
MICROALBUMIN/CREAT UR-RTO: NORMAL
MONOCYTES ABSOLUTE: 0.69 E9/L (ref 0.1–0.95)
MONOCYTES RELATIVE PERCENT: 10.3 % (ref 2–12)
NEUTROPHILS ABSOLUTE: 3.65 E9/L (ref 1.8–7.3)
NEUTROPHILS RELATIVE PERCENT: 54.7 % (ref 43–80)
PDW BLD-RTO: 16.2 FL (ref 11.5–15)
PLATELET # BLD: 132 E9/L (ref 130–450)
PMV BLD AUTO: 12.5 FL (ref 7–12)
RBC # BLD: 3.35 E12/L (ref 3.5–5.5)
TRIGL SERPL-MCNC: 60 MG/DL (ref 0–149)
TSH SERPL DL<=0.05 MIU/L-ACNC: 2.42 UIU/ML (ref 0.27–4.2)
VLDLC SERPL CALC-MCNC: 12 MG/DL
WBC # BLD: 6.7 E9/L (ref 4.5–11.5)

## 2018-12-13 PROCEDURE — 80061 LIPID PANEL: CPT

## 2018-12-13 PROCEDURE — 4040F PNEUMOC VAC/ADMIN/RCVD: CPT | Performed by: FAMILY MEDICINE

## 2018-12-13 PROCEDURE — 82570 ASSAY OF URINE CREATININE: CPT

## 2018-12-13 PROCEDURE — 1090F PRES/ABSN URINE INCON ASSESS: CPT | Performed by: FAMILY MEDICINE

## 2018-12-13 PROCEDURE — 3017F COLORECTAL CA SCREEN DOC REV: CPT | Performed by: FAMILY MEDICINE

## 2018-12-13 PROCEDURE — 1123F ACP DISCUSS/DSCN MKR DOCD: CPT | Performed by: FAMILY MEDICINE

## 2018-12-13 PROCEDURE — G8482 FLU IMMUNIZE ORDER/ADMIN: HCPCS | Performed by: FAMILY MEDICINE

## 2018-12-13 PROCEDURE — G8417 CALC BMI ABV UP PARAM F/U: HCPCS | Performed by: FAMILY MEDICINE

## 2018-12-13 PROCEDURE — 82044 UR ALBUMIN SEMIQUANTITATIVE: CPT | Performed by: FAMILY MEDICINE

## 2018-12-13 PROCEDURE — 99214 OFFICE O/P EST MOD 30 MIN: CPT | Performed by: FAMILY MEDICINE

## 2018-12-13 PROCEDURE — G8399 PT W/DXA RESULTS DOCUMENT: HCPCS | Performed by: FAMILY MEDICINE

## 2018-12-13 PROCEDURE — 1101F PT FALLS ASSESS-DOCD LE1/YR: CPT | Performed by: FAMILY MEDICINE

## 2018-12-13 PROCEDURE — 84443 ASSAY THYROID STIM HORMONE: CPT

## 2018-12-13 PROCEDURE — 2022F DILAT RTA XM EVC RTNOPTHY: CPT | Performed by: FAMILY MEDICINE

## 2018-12-13 PROCEDURE — 3044F HG A1C LEVEL LT 7.0%: CPT | Performed by: FAMILY MEDICINE

## 2018-12-13 PROCEDURE — 1036F TOBACCO NON-USER: CPT | Performed by: FAMILY MEDICINE

## 2018-12-13 PROCEDURE — G8427 DOCREV CUR MEDS BY ELIG CLIN: HCPCS | Performed by: FAMILY MEDICINE

## 2018-12-13 PROCEDURE — 82044 UR ALBUMIN SEMIQUANTITATIVE: CPT

## 2018-12-13 PROCEDURE — 85025 COMPLETE CBC W/AUTO DIFF WBC: CPT

## 2018-12-13 RX ORDER — FELODIPINE 2.5 MG/1
2.5 TABLET, EXTENDED RELEASE ORAL DAILY
Qty: 30 TABLET | Refills: 3 | Status: SHIPPED | OUTPATIENT
Start: 2018-12-13 | End: 2018-12-14 | Stop reason: SDUPTHER

## 2018-12-13 NOTE — PROGRESS NOTES
Progress Note    Subjective: Humaira Kolb is a 68y.o. year old female here for bleeding and HTN. Health Maintenance Due   Topic Date Due    DTaP/Tdap/Td vaccine (1 - Tdap) 06/03/1964    Breast cancer screen  06/03/1995    Diabetic retinal exam  01/06/2018    Shingles Vaccine (2 of 2 - 2 Dose Series) 10/12/2018    Diabetic microalbuminuria test  12/06/2018    Lipid screen  12/06/2018     Had some hematuria, not gross. No further dysuria. Saw Dr. Marleen Vallejo who ordered MRI which is pending. Still having some blood in underwear. Follows w/ Hem/Onc for h/o breast cancer. No longer taking iron due to some abd pain. Feels the bleeding may be from vagina or rectum. Bleeds every couple days. Goes through a few pads per day, but mostly because of the urine. S/p hysterectomy. utd on cscope. Since last OV having cough and chest congestion. No SOB. No sinus pressure. No rhinorrhea or nasal congestion. Nausea keeping pt from eating much, but she does force herself to eat a little. zofran helps. No vomiting. Scheduled for mammo in April. BP high. On losartan 100 mg for HTN. No CP. No HA. Blurred vision since a few months after cataract surgery.      Past Medical History:   Diagnosis Date    Breast cancer [facility])     Essential hypertension     Osteopenia     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 ([facility]     Sleep apnea     Spinal stenosis     Type 2 diabetes mellitus ([facility]      Past Surgical History:   Procedure Laterality Date    BREAST LUMPECTOMY      CARPAL TUNNEL RELEASE      COLONOSCOPY  01/31/2017    multiple polyps; diverticula--jerod    HYSTERECTOMY      LITHOTRIPSY Left 05/04/2016    C-R STENT PLACEMENT     Family History   Problem Relation Age of Onset    COPD Father     Heart Attack Father     Arthritis Mother     Cancer Other 48        colon     Social History     Social History    Marital status:      Spouse name: N/A    Number of children: N/A    Years of education: N/A

## 2018-12-14 DIAGNOSIS — I10 ESSENTIAL HYPERTENSION: ICD-10-CM

## 2018-12-17 RX ORDER — FELODIPINE 2.5 MG/1
2.5 TABLET, EXTENDED RELEASE ORAL DAILY
Qty: 90 TABLET | Refills: 3 | Status: SHIPPED | OUTPATIENT
Start: 2018-12-17 | End: 2019-03-26

## 2018-12-20 ENCOUNTER — HOSPITAL ENCOUNTER (OUTPATIENT)
Age: 73
Discharge: HOME | End: 2018-12-20
Payer: MEDICARE

## 2018-12-20 ENCOUNTER — HOSPITAL ENCOUNTER (OUTPATIENT)
Dept: CT IMAGING | Age: 73
Discharge: HOME | End: 2018-12-22
Payer: MEDICARE

## 2018-12-20 ENCOUNTER — HOSPITAL ENCOUNTER (OUTPATIENT)
Age: 73
Discharge: HOME | End: 2018-12-22
Payer: MEDICARE

## 2018-12-20 DIAGNOSIS — R31.1 BENIGN MICROSCOPIC HEMATURIA: ICD-10-CM

## 2018-12-20 DIAGNOSIS — R10.9 ABDOMINAL PAIN, UNSPECIFIED ABDOMINAL LOCATION: ICD-10-CM

## 2018-12-20 LAB
BUN BLDV-MCNC: 11 MG/DL (ref 8–23)
CREAT SERPL-MCNC: 0.9 MG/DL (ref 0.5–1)
GFR AFRICAN AMERICAN: >60
GFR NON-AFRICAN AMERICAN: >60 ML/MIN/1.73

## 2018-12-20 PROCEDURE — 82565 ASSAY OF CREATININE: CPT

## 2018-12-20 PROCEDURE — 74177 CT ABD & PELVIS W/CONTRAST: CPT

## 2018-12-20 PROCEDURE — 84520 ASSAY OF UREA NITROGEN: CPT

## 2018-12-20 PROCEDURE — 36415 COLL VENOUS BLD VENIPUNCTURE: CPT

## 2018-12-20 PROCEDURE — 6360000004 HC RX CONTRAST MEDICATION: Performed by: PHYSICIAN ASSISTANT

## 2018-12-20 PROCEDURE — 2580000003 HC RX 258: Performed by: UROLOGY

## 2018-12-20 RX ORDER — SODIUM CHLORIDE 0.9 % (FLUSH) 0.9 %
10 SYRINGE (ML) INJECTION PRN
Status: DISCONTINUED | OUTPATIENT
Start: 2018-12-20 | End: 2018-12-23 | Stop reason: HOSPADM

## 2018-12-20 RX ADMIN — IOHEXOL 50 ML: 240 INJECTION, SOLUTION INTRATHECAL; INTRAVASCULAR; INTRAVENOUS; ORAL at 14:13

## 2018-12-20 RX ADMIN — IOPAMIDOL 100 ML: 755 INJECTION, SOLUTION INTRAVENOUS at 14:13

## 2018-12-20 RX ADMIN — Medication 10 ML: at 14:13

## 2018-12-21 ENCOUNTER — TELEPHONE (OUTPATIENT)
Dept: FAMILY MEDICINE CLINIC | Age: 73
End: 2018-12-21

## 2018-12-21 DIAGNOSIS — K74.60 CIRRHOSIS OF LIVER WITH ASCITES, UNSPECIFIED HEPATIC CIRRHOSIS TYPE (HCC): Primary | ICD-10-CM

## 2018-12-21 DIAGNOSIS — M89.9 BONE LESION: ICD-10-CM

## 2018-12-21 DIAGNOSIS — R18.8 CIRRHOSIS OF LIVER WITH ASCITES, UNSPECIFIED HEPATIC CIRRHOSIS TYPE (HCC): Primary | ICD-10-CM

## 2018-12-21 NOTE — TELEPHONE ENCOUNTER
Please have pt come in to discuss results, thanks.      Electronically signed by Pooja Freeman MD on 12/21/2018 at 4:23 PM

## 2018-12-26 ENCOUNTER — TELEPHONE (OUTPATIENT)
Dept: FAMILY MEDICINE CLINIC | Age: 73
End: 2018-12-26

## 2018-12-26 DIAGNOSIS — R58 BLEEDING: ICD-10-CM

## 2018-12-26 LAB
CONTROL: POSITIVE
HEMOCCULT STL QL: POSITIVE

## 2018-12-26 PROCEDURE — 82274 ASSAY TEST FOR BLOOD FECAL: CPT | Performed by: FAMILY MEDICINE

## 2018-12-28 ENCOUNTER — NURSE ONLY (OUTPATIENT)
Dept: FAMILY MEDICINE CLINIC | Age: 73
End: 2018-12-28

## 2018-12-28 ENCOUNTER — TELEPHONE (OUTPATIENT)
Dept: FAMILY MEDICINE CLINIC | Age: 73
End: 2018-12-28

## 2018-12-28 VITALS — DIASTOLIC BLOOD PRESSURE: 82 MMHG | OXYGEN SATURATION: 93 % | HEART RATE: 88 BPM | SYSTOLIC BLOOD PRESSURE: 128 MMHG

## 2018-12-28 DIAGNOSIS — I10 ESSENTIAL HYPERTENSION: Primary | ICD-10-CM

## 2018-12-28 PROCEDURE — 99999 PR OFFICE/OUTPT VISIT,PROCEDURE ONLY: CPT | Performed by: FAMILY MEDICINE

## 2018-12-28 NOTE — TELEPHONE ENCOUNTER
Patient here for BP check     Right arm  - 128/82 HR - 88    Patient takes Plendil ER 2.5 1 qd, losartan 100 mg 1 qd, doxazosin 1 mg qhs     She has an appointment with you next week.

## 2019-01-08 ENCOUNTER — OFFICE VISIT (OUTPATIENT)
Dept: FAMILY MEDICINE CLINIC | Age: 74
End: 2019-01-08
Payer: MEDICARE

## 2019-01-08 ENCOUNTER — HOSPITAL ENCOUNTER (OUTPATIENT)
Age: 74
Discharge: HOME | End: 2019-01-08
Payer: MEDICARE

## 2019-01-08 VITALS
DIASTOLIC BLOOD PRESSURE: 82 MMHG | OXYGEN SATURATION: 91 % | RESPIRATION RATE: 16 BRPM | HEART RATE: 99 BPM | WEIGHT: 190 LBS | SYSTOLIC BLOOD PRESSURE: 134 MMHG | BODY MASS INDEX: 34.75 KG/M2

## 2019-01-08 DIAGNOSIS — K74.60 HEPATIC CIRRHOSIS, UNSPECIFIED HEPATIC CIRRHOSIS TYPE, UNSPECIFIED WHETHER ASCITES PRESENT (HCC): ICD-10-CM

## 2019-01-08 DIAGNOSIS — K74.60 HEPATIC CIRRHOSIS, UNSPECIFIED HEPATIC CIRRHOSIS TYPE, UNSPECIFIED WHETHER ASCITES PRESENT (HCC): Primary | ICD-10-CM

## 2019-01-08 DIAGNOSIS — R93.89 ABNORMAL FINDINGS ON DIAGNOSTIC IMAGING OF OTHER SPECIFIED BODY STRUCTURES: ICD-10-CM

## 2019-01-08 DIAGNOSIS — K62.5 RECTAL BLEED: ICD-10-CM

## 2019-01-08 DIAGNOSIS — M79.89 LEG SWELLING: ICD-10-CM

## 2019-01-08 DIAGNOSIS — R31.9 HEMATURIA, UNSPECIFIED TYPE: ICD-10-CM

## 2019-01-08 DIAGNOSIS — D73.89 LESION OF SPLEEN: ICD-10-CM

## 2019-01-08 DIAGNOSIS — R04.0 EPISTAXIS: ICD-10-CM

## 2019-01-08 DIAGNOSIS — K76.9 HEPATIC LESION: ICD-10-CM

## 2019-01-08 DIAGNOSIS — R11.0 NAUSEA: ICD-10-CM

## 2019-01-08 LAB
ALBUMIN SERPL-MCNC: 3.1 G/DL (ref 3.5–5.2)
ALP BLD-CCNC: 66 U/L (ref 35–104)
ALT SERPL-CCNC: 13 U/L (ref 0–32)
AST SERPL-CCNC: 25 U/L (ref 0–31)
BASOPHILS ABSOLUTE: 0.07 E9/L (ref 0–0.2)
BASOPHILS RELATIVE PERCENT: 1 % (ref 0–2)
BILIRUB SERPL-MCNC: 0.9 MG/DL (ref 0–1.2)
BILIRUBIN DIRECT: 0.4 MG/DL (ref 0–0.3)
BILIRUBIN, INDIRECT: 0.5 MG/DL (ref 0–1)
EOSINOPHILS ABSOLUTE: 0.32 E9/L (ref 0.05–0.5)
EOSINOPHILS RELATIVE PERCENT: 4.6 % (ref 0–6)
HCT VFR BLD CALC: 33.1 % (ref 34–48)
HEMOGLOBIN: 10.8 G/DL (ref 11.5–15.5)
IMMATURE GRANULOCYTES #: 0.02 E9/L
IMMATURE GRANULOCYTES %: 0.3 % (ref 0–5)
INR BLD: 1.5
LYMPHOCYTES ABSOLUTE: 1.89 E9/L (ref 1.5–4)
LYMPHOCYTES RELATIVE PERCENT: 26.9 % (ref 20–42)
MCH RBC QN AUTO: 33.8 PG (ref 26–35)
MCHC RBC AUTO-ENTMCNC: 32.6 % (ref 32–34.5)
MCV RBC AUTO: 103.4 FL (ref 80–99.9)
MONOCYTES ABSOLUTE: 0.7 E9/L (ref 0.1–0.95)
MONOCYTES RELATIVE PERCENT: 10 % (ref 2–12)
NEUTROPHILS ABSOLUTE: 4.03 E9/L (ref 1.8–7.3)
NEUTROPHILS RELATIVE PERCENT: 57.2 % (ref 43–80)
PDW BLD-RTO: 16.2 FL (ref 11.5–15)
PLATELET # BLD: 179 E9/L (ref 130–450)
PMV BLD AUTO: 11.2 FL (ref 7–12)
PROTHROMBIN TIME: 16.9 SEC (ref 9.3–12.4)
RBC # BLD: 3.2 E12/L (ref 3.5–5.5)
TOTAL PROTEIN: 6.6 G/DL (ref 6.4–8.3)
WBC # BLD: 7 E9/L (ref 4.5–11.5)

## 2019-01-08 PROCEDURE — G8427 DOCREV CUR MEDS BY ELIG CLIN: HCPCS | Performed by: FAMILY MEDICINE

## 2019-01-08 PROCEDURE — 85245 CLOT FACTOR VIII VW RISTOCTN: CPT

## 2019-01-08 PROCEDURE — 4040F PNEUMOC VAC/ADMIN/RCVD: CPT | Performed by: FAMILY MEDICINE

## 2019-01-08 PROCEDURE — 86255 FLUORESCENT ANTIBODY SCREEN: CPT

## 2019-01-08 PROCEDURE — 36415 COLL VENOUS BLD VENIPUNCTURE: CPT

## 2019-01-08 PROCEDURE — G8399 PT W/DXA RESULTS DOCUMENT: HCPCS | Performed by: FAMILY MEDICINE

## 2019-01-08 PROCEDURE — 85610 PROTHROMBIN TIME: CPT

## 2019-01-08 PROCEDURE — 80074 ACUTE HEPATITIS PANEL: CPT

## 2019-01-08 PROCEDURE — 82103 ALPHA-1-ANTITRYPSIN TOTAL: CPT

## 2019-01-08 PROCEDURE — 85246 CLOT FACTOR VIII VW ANTIGEN: CPT

## 2019-01-08 PROCEDURE — G8482 FLU IMMUNIZE ORDER/ADMIN: HCPCS | Performed by: FAMILY MEDICINE

## 2019-01-08 PROCEDURE — 99214 OFFICE O/P EST MOD 30 MIN: CPT | Performed by: FAMILY MEDICINE

## 2019-01-08 PROCEDURE — 80076 HEPATIC FUNCTION PANEL: CPT

## 2019-01-08 PROCEDURE — 1036F TOBACCO NON-USER: CPT | Performed by: FAMILY MEDICINE

## 2019-01-08 PROCEDURE — 1123F ACP DISCUSS/DSCN MKR DOCD: CPT | Performed by: FAMILY MEDICINE

## 2019-01-08 PROCEDURE — 86376 MICROSOMAL ANTIBODY EACH: CPT

## 2019-01-08 PROCEDURE — 1101F PT FALLS ASSESS-DOCD LE1/YR: CPT | Performed by: FAMILY MEDICINE

## 2019-01-08 PROCEDURE — 1090F PRES/ABSN URINE INCON ASSESS: CPT | Performed by: FAMILY MEDICINE

## 2019-01-08 PROCEDURE — 85025 COMPLETE CBC W/AUTO DIFF WBC: CPT

## 2019-01-08 PROCEDURE — 3017F COLORECTAL CA SCREEN DOC REV: CPT | Performed by: FAMILY MEDICINE

## 2019-01-08 PROCEDURE — G8417 CALC BMI ABV UP PARAM F/U: HCPCS | Performed by: FAMILY MEDICINE

## 2019-01-08 RX ORDER — ONDANSETRON 4 MG/1
4 TABLET, FILM COATED ORAL DAILY PRN
Qty: 30 TABLET | Refills: 3 | Status: SHIPPED | OUTPATIENT
Start: 2019-01-08 | End: 2019-03-21 | Stop reason: SDUPTHER

## 2019-01-08 RX ORDER — FUROSEMIDE 20 MG/1
20 TABLET ORAL DAILY
Qty: 7 TABLET | Refills: 0 | Status: SHIPPED | OUTPATIENT
Start: 2019-01-08 | End: 2019-01-08 | Stop reason: SDUPTHER

## 2019-01-09 LAB
HAV IGM SER IA-ACNC: NORMAL
HEPATITIS B CORE IGM ANTIBODY: NORMAL
HEPATITIS B SURFACE ANTIGEN INTERPRETATION: NORMAL
HEPATITIS C ANTIBODY INTERPRETATION: NORMAL
SMOOTH MUSCLE ANTIBODY: NEGATIVE

## 2019-01-09 RX ORDER — FUROSEMIDE 20 MG/1
20 TABLET ORAL DAILY
Qty: 90 TABLET | Refills: 0 | Status: SHIPPED | OUTPATIENT
Start: 2019-01-09 | End: 2019-03-06 | Stop reason: ALTCHOICE

## 2019-01-11 LAB
ALPHA-1 ANTITRYPSIN: 198 MG/DL (ref 90–200)
ANCA IFA: NORMAL
LIVER-KIDNEY MICROSOME-1 AB IGG: 1.2 U (ref 0–24.9)

## 2019-01-12 LAB
VON WILLEBRAND ACTIVITY RCF: 214 % (ref 51–215)
VON WILLEBRAND AG: 276 % (ref 52–214)

## 2019-01-24 ENCOUNTER — HOSPITAL ENCOUNTER (OUTPATIENT)
Dept: NUCLEAR MEDICINE | Age: 74
Discharge: HOME | End: 2019-01-24
Payer: MEDICARE

## 2019-01-24 DIAGNOSIS — D73.89 LESION OF SPLEEN: ICD-10-CM

## 2019-01-24 DIAGNOSIS — K74.60 HEPATIC CIRRHOSIS, UNSPECIFIED HEPATIC CIRRHOSIS TYPE, UNSPECIFIED WHETHER ASCITES PRESENT (HCC): ICD-10-CM

## 2019-01-24 DIAGNOSIS — K76.9 HEPATIC LESION: ICD-10-CM

## 2019-01-24 PROCEDURE — 78300 BONE IMAGING LIMITED AREA: CPT

## 2019-01-24 PROCEDURE — 3430000000 HC RX DIAGNOSTIC RADIOPHARMACEUTICAL: Performed by: RADIOLOGY

## 2019-01-24 PROCEDURE — A9503 TC99M MEDRONATE: HCPCS | Performed by: RADIOLOGY

## 2019-01-24 RX ORDER — TC 99M MEDRONATE 20 MG/10ML
25 INJECTION, POWDER, LYOPHILIZED, FOR SOLUTION INTRAVENOUS
Status: COMPLETED | OUTPATIENT
Start: 2019-01-24 | End: 2019-01-24

## 2019-01-24 RX ADMIN — Medication 25 MILLICURIE: at 14:04

## 2019-02-21 ENCOUNTER — OFFICE VISIT (OUTPATIENT)
Dept: FAMILY MEDICINE CLINIC | Age: 74
End: 2019-02-21
Payer: MEDICARE

## 2019-02-21 VITALS
WEIGHT: 184 LBS | TEMPERATURE: 99.4 F | RESPIRATION RATE: 16 BRPM | HEIGHT: 63 IN | DIASTOLIC BLOOD PRESSURE: 87 MMHG | SYSTOLIC BLOOD PRESSURE: 147 MMHG | OXYGEN SATURATION: 92 % | HEART RATE: 96 BPM | BODY MASS INDEX: 32.6 KG/M2

## 2019-02-21 DIAGNOSIS — K74.60 CIRRHOSIS OF LIVER WITHOUT ASCITES, UNSPECIFIED HEPATIC CIRRHOSIS TYPE (HCC): ICD-10-CM

## 2019-02-21 DIAGNOSIS — R11.0 NAUSEA: ICD-10-CM

## 2019-02-21 DIAGNOSIS — E11.40 TYPE 2 DIABETES MELLITUS WITH DIABETIC NEUROPATHY, UNSPECIFIED WHETHER LONG TERM INSULIN USE (HCC): ICD-10-CM

## 2019-02-21 DIAGNOSIS — R10.9 ABDOMINAL PAIN, UNSPECIFIED ABDOMINAL LOCATION: Primary | ICD-10-CM

## 2019-02-21 DIAGNOSIS — R06.02 SHORTNESS OF BREATH: ICD-10-CM

## 2019-02-21 DIAGNOSIS — R63.4 WEIGHT LOSS: ICD-10-CM

## 2019-02-21 DIAGNOSIS — R10.9 ABDOMINAL PAIN, UNSPECIFIED ABDOMINAL LOCATION: ICD-10-CM

## 2019-02-21 DIAGNOSIS — R01.1 MURMUR, CARDIAC: ICD-10-CM

## 2019-02-21 DIAGNOSIS — I10 ESSENTIAL HYPERTENSION: ICD-10-CM

## 2019-02-21 PROCEDURE — 1090F PRES/ABSN URINE INCON ASSESS: CPT | Performed by: FAMILY MEDICINE

## 2019-02-21 PROCEDURE — 1101F PT FALLS ASSESS-DOCD LE1/YR: CPT | Performed by: FAMILY MEDICINE

## 2019-02-21 PROCEDURE — 2022F DILAT RTA XM EVC RTNOPTHY: CPT | Performed by: FAMILY MEDICINE

## 2019-02-21 PROCEDURE — G8417 CALC BMI ABV UP PARAM F/U: HCPCS | Performed by: FAMILY MEDICINE

## 2019-02-21 PROCEDURE — 3046F HEMOGLOBIN A1C LEVEL >9.0%: CPT | Performed by: FAMILY MEDICINE

## 2019-02-21 PROCEDURE — G8482 FLU IMMUNIZE ORDER/ADMIN: HCPCS | Performed by: FAMILY MEDICINE

## 2019-02-21 PROCEDURE — G8399 PT W/DXA RESULTS DOCUMENT: HCPCS | Performed by: FAMILY MEDICINE

## 2019-02-21 PROCEDURE — G8427 DOCREV CUR MEDS BY ELIG CLIN: HCPCS | Performed by: FAMILY MEDICINE

## 2019-02-21 PROCEDURE — 99214 OFFICE O/P EST MOD 30 MIN: CPT | Performed by: FAMILY MEDICINE

## 2019-02-21 PROCEDURE — 1123F ACP DISCUSS/DSCN MKR DOCD: CPT | Performed by: FAMILY MEDICINE

## 2019-02-21 PROCEDURE — 4040F PNEUMOC VAC/ADMIN/RCVD: CPT | Performed by: FAMILY MEDICINE

## 2019-02-21 PROCEDURE — 1036F TOBACCO NON-USER: CPT | Performed by: FAMILY MEDICINE

## 2019-02-21 PROCEDURE — 3017F COLORECTAL CA SCREEN DOC REV: CPT | Performed by: FAMILY MEDICINE

## 2019-02-21 RX ORDER — EXEMESTANE 25 MG/1
25 TABLET ORAL DAILY
COMMUNITY
Start: 2019-02-09 | End: 2020-02-09

## 2019-02-21 RX ORDER — OMEPRAZOLE 40 MG/1
40 CAPSULE, DELAYED RELEASE ORAL
Qty: 30 CAPSULE | Refills: 1 | Status: SHIPPED | OUTPATIENT
Start: 2019-02-21 | End: 2019-02-21 | Stop reason: SDUPTHER

## 2019-02-22 ENCOUNTER — HOSPITAL ENCOUNTER (OUTPATIENT)
Dept: GENERAL RADIOLOGY | Age: 74
Discharge: HOME | End: 2019-02-24
Payer: MEDICARE

## 2019-02-22 ENCOUNTER — HOSPITAL ENCOUNTER (OUTPATIENT)
Age: 74
Discharge: HOME | End: 2019-02-24
Payer: MEDICARE

## 2019-02-22 DIAGNOSIS — R06.02 SHORTNESS OF BREATH: ICD-10-CM

## 2019-02-22 PROCEDURE — 71046 X-RAY EXAM CHEST 2 VIEWS: CPT

## 2019-02-22 RX ORDER — OMEPRAZOLE 40 MG/1
CAPSULE, DELAYED RELEASE ORAL
Qty: 90 CAPSULE | Refills: 1 | Status: SHIPPED | OUTPATIENT
Start: 2019-02-22 | End: 2019-03-06

## 2019-02-25 ENCOUNTER — TELEPHONE (OUTPATIENT)
Dept: SURGERY | Age: 74
End: 2019-02-25

## 2019-03-06 ENCOUNTER — OFFICE VISIT (OUTPATIENT)
Dept: SURGERY | Age: 74
End: 2019-03-06
Payer: MEDICARE

## 2019-03-06 VITALS
SYSTOLIC BLOOD PRESSURE: 122 MMHG | BODY MASS INDEX: 31.01 KG/M2 | HEIGHT: 63 IN | TEMPERATURE: 98.2 F | RESPIRATION RATE: 18 BRPM | HEART RATE: 88 BPM | DIASTOLIC BLOOD PRESSURE: 78 MMHG | WEIGHT: 175 LBS | OXYGEN SATURATION: 96 %

## 2019-03-06 DIAGNOSIS — R12 HEARTBURN: ICD-10-CM

## 2019-03-06 DIAGNOSIS — K62.5 BRBPR (BRIGHT RED BLOOD PER RECTUM): ICD-10-CM

## 2019-03-06 DIAGNOSIS — K30 INDIGESTION: ICD-10-CM

## 2019-03-06 DIAGNOSIS — K59.00 CONSTIPATION, UNSPECIFIED CONSTIPATION TYPE: ICD-10-CM

## 2019-03-06 DIAGNOSIS — R11.0 NAUSEA: ICD-10-CM

## 2019-03-06 DIAGNOSIS — R68.81 EARLY SATIETY: ICD-10-CM

## 2019-03-06 DIAGNOSIS — Z86.010 HX OF ADENOMATOUS COLONIC POLYPS: ICD-10-CM

## 2019-03-06 DIAGNOSIS — R11.2 NON-INTRACTABLE VOMITING WITH NAUSEA, UNSPECIFIED VOMITING TYPE: ICD-10-CM

## 2019-03-06 DIAGNOSIS — R10.84 GENERALIZED ABDOMINAL PAIN: Primary | ICD-10-CM

## 2019-03-06 PROCEDURE — 99214 OFFICE O/P EST MOD 30 MIN: CPT | Performed by: SURGERY

## 2019-03-06 PROCEDURE — G8427 DOCREV CUR MEDS BY ELIG CLIN: HCPCS | Performed by: SURGERY

## 2019-03-06 PROCEDURE — G8482 FLU IMMUNIZE ORDER/ADMIN: HCPCS | Performed by: SURGERY

## 2019-03-06 PROCEDURE — 1101F PT FALLS ASSESS-DOCD LE1/YR: CPT | Performed by: SURGERY

## 2019-03-06 PROCEDURE — 1090F PRES/ABSN URINE INCON ASSESS: CPT | Performed by: SURGERY

## 2019-03-06 PROCEDURE — G8417 CALC BMI ABV UP PARAM F/U: HCPCS | Performed by: SURGERY

## 2019-03-06 PROCEDURE — 3017F COLORECTAL CA SCREEN DOC REV: CPT | Performed by: SURGERY

## 2019-03-06 ASSESSMENT — ENCOUNTER SYMPTOMS
VOMITING: 1
COUGH: 1
EYE DISCHARGE: 0
NAUSEA: 1
EYE ITCHING: 0
SHORTNESS OF BREATH: 1
SINUS PAIN: 1
RHINORRHEA: 0
EYE REDNESS: 0
CONSTIPATION: 1
BLOOD IN STOOL: 1
CHEST TIGHTNESS: 1
CHOKING: 1
SINUS PRESSURE: 1
DIARRHEA: 0
ABDOMINAL PAIN: 1
COLOR CHANGE: 0
EYE PAIN: 0
ANAL BLEEDING: 1
WHEEZING: 0
ABDOMINAL DISTENTION: 1
BACK PAIN: 1

## 2019-03-11 ENCOUNTER — TELEPHONE (OUTPATIENT)
Dept: SURGERY | Age: 74
End: 2019-03-11

## 2019-03-21 ENCOUNTER — OFFICE VISIT (OUTPATIENT)
Dept: FAMILY MEDICINE CLINIC | Age: 74
End: 2019-03-21
Payer: MEDICARE

## 2019-03-21 ENCOUNTER — HOSPITAL ENCOUNTER (OUTPATIENT)
Age: 74
Discharge: HOME | End: 2019-03-23
Payer: MEDICARE

## 2019-03-21 VITALS
DIASTOLIC BLOOD PRESSURE: 77 MMHG | WEIGHT: 176 LBS | SYSTOLIC BLOOD PRESSURE: 130 MMHG | BODY MASS INDEX: 31.18 KG/M2 | HEART RATE: 94 BPM | RESPIRATION RATE: 16 BRPM | TEMPERATURE: 100 F | HEIGHT: 63 IN | OXYGEN SATURATION: 96 %

## 2019-03-21 DIAGNOSIS — R11.0 NAUSEA: ICD-10-CM

## 2019-03-21 DIAGNOSIS — Z12.39 SCREENING FOR BREAST CANCER: ICD-10-CM

## 2019-03-21 DIAGNOSIS — K74.60 CIRRHOSIS OF LIVER WITHOUT ASCITES, UNSPECIFIED HEPATIC CIRRHOSIS TYPE (HCC): ICD-10-CM

## 2019-03-21 DIAGNOSIS — R10.9 ABDOMINAL CRAMPING: Primary | ICD-10-CM

## 2019-03-21 DIAGNOSIS — L29.9 ITCHING: ICD-10-CM

## 2019-03-21 PROCEDURE — G8399 PT W/DXA RESULTS DOCUMENT: HCPCS | Performed by: FAMILY MEDICINE

## 2019-03-21 PROCEDURE — 1036F TOBACCO NON-USER: CPT | Performed by: FAMILY MEDICINE

## 2019-03-21 PROCEDURE — 1123F ACP DISCUSS/DSCN MKR DOCD: CPT | Performed by: FAMILY MEDICINE

## 2019-03-21 PROCEDURE — 1101F PT FALLS ASSESS-DOCD LE1/YR: CPT | Performed by: FAMILY MEDICINE

## 2019-03-21 PROCEDURE — G8482 FLU IMMUNIZE ORDER/ADMIN: HCPCS | Performed by: FAMILY MEDICINE

## 2019-03-21 PROCEDURE — G8417 CALC BMI ABV UP PARAM F/U: HCPCS | Performed by: FAMILY MEDICINE

## 2019-03-21 PROCEDURE — 1090F PRES/ABSN URINE INCON ASSESS: CPT | Performed by: FAMILY MEDICINE

## 2019-03-21 PROCEDURE — 99214 OFFICE O/P EST MOD 30 MIN: CPT | Performed by: FAMILY MEDICINE

## 2019-03-21 PROCEDURE — 3017F COLORECTAL CA SCREEN DOC REV: CPT | Performed by: FAMILY MEDICINE

## 2019-03-21 PROCEDURE — G8427 DOCREV CUR MEDS BY ELIG CLIN: HCPCS | Performed by: FAMILY MEDICINE

## 2019-03-21 PROCEDURE — 4040F PNEUMOC VAC/ADMIN/RCVD: CPT | Performed by: FAMILY MEDICINE

## 2019-03-21 RX ORDER — HYDROXYZINE HYDROCHLORIDE 25 MG/1
25 TABLET, FILM COATED ORAL NIGHTLY
Qty: 30 TABLET | Refills: 1 | Status: SHIPPED | OUTPATIENT
Start: 2019-03-21 | End: 2019-09-26 | Stop reason: SDUPTHER

## 2019-03-21 RX ORDER — ONDANSETRON 4 MG/1
4 TABLET, FILM COATED ORAL DAILY PRN
Qty: 30 TABLET | Refills: 3 | Status: SHIPPED | OUTPATIENT
Start: 2019-03-21 | End: 2019-12-10

## 2019-03-22 DIAGNOSIS — K74.60 CIRRHOSIS OF LIVER WITHOUT ASCITES, UNSPECIFIED HEPATIC CIRRHOSIS TYPE (HCC): ICD-10-CM

## 2019-03-22 DIAGNOSIS — L29.9 ITCHING: ICD-10-CM

## 2019-03-22 DIAGNOSIS — R10.9 ABDOMINAL CRAMPING: ICD-10-CM

## 2019-03-22 DIAGNOSIS — R11.0 NAUSEA: ICD-10-CM

## 2019-03-22 LAB
ALBUMIN SERPL-MCNC: 3.2 G/DL (ref 3.5–5.2)
ALP BLD-CCNC: 73 U/L (ref 35–104)
ALT SERPL-CCNC: 16 U/L (ref 0–32)
AST SERPL-CCNC: 34 U/L (ref 0–31)
BILIRUB SERPL-MCNC: 1 MG/DL (ref 0–1.2)
BILIRUBIN DIRECT: 0.3 MG/DL (ref 0–0.3)
BILIRUBIN, INDIRECT: 0.7 MG/DL (ref 0–1)
TOTAL PROTEIN: 6.8 G/DL (ref 6.4–8.3)

## 2019-03-22 PROCEDURE — 80076 HEPATIC FUNCTION PANEL: CPT

## 2019-03-25 ENCOUNTER — TELEPHONE (OUTPATIENT)
Dept: FAMILY MEDICINE CLINIC | Age: 74
End: 2019-03-25

## 2019-03-26 DIAGNOSIS — I10 ESSENTIAL HYPERTENSION: ICD-10-CM

## 2019-03-26 RX ORDER — FELODIPINE 2.5 MG/1
2.5 TABLET, EXTENDED RELEASE ORAL DAILY
Qty: 30 TABLET | Refills: 0 | Status: SHIPPED | OUTPATIENT
Start: 2019-03-26 | End: 2019-07-07 | Stop reason: SDUPTHER

## 2019-04-04 ENCOUNTER — TELEPHONE (OUTPATIENT)
Dept: SURGERY | Age: 74
End: 2019-04-04

## 2019-04-04 NOTE — TELEPHONE ENCOUNTER
MA contacted Medicare via [email or web address], per the physician fee schedule outpatient EGD and Colonoscopy are coverable procedures.       Electronically signed by Brigitte Aiken on 4/4/19 at 11:23 AM

## 2019-04-05 ENCOUNTER — TELEPHONE (OUTPATIENT)
Dept: FAMILY MEDICINE CLINIC | Age: 74
End: 2019-04-05

## 2019-04-05 DIAGNOSIS — G47.33 OBSTRUCTIVE SLEEP APNEA SYNDROME: Primary | ICD-10-CM

## 2019-04-18 RX ORDER — LANOLIN ALCOHOL/MO/W.PET/CERES
1000 CREAM (GRAM) TOPICAL DAILY
COMMUNITY
End: 2020-03-27

## 2019-04-18 NOTE — PROGRESS NOTES
surgery. [] Follow physician instructions regarding any blood thinners you may be taking. WHAT TO EXPECT:  [x] The day of your procedure you will be greeted and checked in by the [facility] [employer] [facility].  In addition, you will be registered in the [facility] by a Patient Access Representative. Please bring your photo ID and insurance card. A nurse will greet you in accordance to the time you are needed in the pre-op area to prepare you for surgery. Please do not be discouraged if you are not greeted in the order you arrive as there are many variables that are involved in patient preparation. Your patience is greatly appreciated as you wait for your nurse. Please bring in items such as: books, magazines, newspapers, electronics, or any other items  to occupy your time in the waiting area. []  Delays may occur. Staff will make a sincere effort to keep you informed of delays. If any delays occur with your procedure, we apologize ahead of time for your inconvenience as we recognize the value of your time.

## 2019-04-19 ENCOUNTER — HOSPITAL ENCOUNTER (EMERGENCY)
Age: 74
Discharge: HOME | End: 2019-04-19
Attending: EMERGENCY MEDICINE
Payer: COMMERCIAL

## 2019-04-19 ENCOUNTER — APPOINTMENT (OUTPATIENT)
Dept: CT IMAGING | Age: 74
End: 2019-04-19
Payer: COMMERCIAL

## 2019-04-19 ENCOUNTER — APPOINTMENT (OUTPATIENT)
Dept: GENERAL RADIOLOGY | Age: 74
End: 2019-04-19
Payer: COMMERCIAL

## 2019-04-19 VITALS
TEMPERATURE: 98.7 F | WEIGHT: 170 LBS | DIASTOLIC BLOOD PRESSURE: 86 MMHG | HEIGHT: 63 IN | RESPIRATION RATE: 14 BRPM | HEART RATE: 88 BPM | SYSTOLIC BLOOD PRESSURE: 140 MMHG | OXYGEN SATURATION: 94 % | BODY MASS INDEX: 30.12 KG/M2
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
## 2019-04-19 DIAGNOSIS — S39.012A STRAIN OF LUMBAR REGION, INITIAL ENCOUNTER: ICD-10-CM

## 2019-04-19 DIAGNOSIS — S16.1XXA STRAIN OF NECK MUSCLE, INITIAL ENCOUNTER: ICD-10-CM

## 2019-04-19 PROCEDURE — 72125 CT NECK SPINE W/O DYE: CPT

## 2019-04-19 PROCEDURE — 72100 X-RAY EXAM L-S SPINE 2/3 VWS: CPT

## 2019-04-19 PROCEDURE — 99284 EMERGENCY DEPT VISIT MOD MDM: CPT

## 2019-04-19 RX ORDER — CYCLOBENZAPRINE HCL 5 MG
5 TABLET ORAL 3 TIMES DAILY PRN
Qty: 12 TABLET | Refills: 0 | Status: SHIPPED | OUTPATIENT
Start: 2019-04-19 | End: 2020-02-09

## 2019-04-19 RX ORDER — NAPROXEN 500 MG/1
500 TABLET ORAL 2 TIMES DAILY
Qty: 30 TABLET | Refills: 0 | Status: SHIPPED | OUTPATIENT
Start: 2019-04-19 | End: 2020-02-09

## 2019-04-19 RX ORDER — HYDROCODONE BITARTRATE AND ACETAMINOPHEN 5; 325 MG/1; MG/1
1 TABLET ORAL EVERY 6 HOURS PRN
Qty: 12 TABLET | Refills: 0 | Status: SHIPPED | OUTPATIENT
Start: 2019-04-19 | End: 2019-04-22

## 2019-04-19 ASSESSMENT — PAIN DESCRIPTION - PROGRESSION: CLINICAL_PROGRESSION: GRADUALLY WORSENING

## 2019-04-19 ASSESSMENT — PAIN DESCRIPTION - DESCRIPTORS: DESCRIPTORS: ACHING;BURNING;CONSTANT

## 2019-04-19 ASSESSMENT — PAIN DESCRIPTION - FREQUENCY: FREQUENCY: CONTINUOUS

## 2019-04-19 ASSESSMENT — PAIN DESCRIPTION - ONSET: ONSET: SUDDEN

## 2019-04-19 ASSESSMENT — PAIN DESCRIPTION - LOCATION: LOCATION: NECK;BACK

## 2019-04-19 ASSESSMENT — PAIN SCALES - GENERAL: PAINLEVEL_OUTOF10: 9

## 2019-04-19 ASSESSMENT — PAIN DESCRIPTION - PAIN TYPE: TYPE: ACUTE PAIN

## 2019-04-19 NOTE — ED NOTES
c-collar removed as CT no fracture. Moves all extremities, denies chest or abd pain. Pt rolled to back side and has no bruising or redness. Mid line lumbar tenderness. Ice jessie to low back.        Mary Kay Chapa RN  04/19/19 1699
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
## 2019-04-23 ENCOUNTER — HOSPITAL ENCOUNTER (OUTPATIENT)
Age: 74
Setting detail: OUTPATIENT SURGERY
Discharge: HOME | End: 2019-04-23
Attending: SURGERY | Admitting: SURGERY
Payer: MEDICARE

## 2019-04-23 ENCOUNTER — ANESTHESIA (OUTPATIENT)
Dept: ENDOSCOPY | Age: 74
End: 2019-04-23
Payer: MEDICARE

## 2019-04-23 ENCOUNTER — ANESTHESIA EVENT (OUTPATIENT)
Dept: ENDOSCOPY | Age: 74
End: 2019-04-23
Payer: MEDICARE

## 2019-04-23 VITALS
TEMPERATURE: 97.4 F | WEIGHT: 170 LBS | DIASTOLIC BLOOD PRESSURE: 81 MMHG | SYSTOLIC BLOOD PRESSURE: 136 MMHG | BODY MASS INDEX: 30.12 KG/M2 | RESPIRATION RATE: 17 BRPM | OXYGEN SATURATION: 93 % | HEART RATE: 88 BPM | HEIGHT: 63 IN

## 2019-04-23 VITALS
SYSTOLIC BLOOD PRESSURE: 116 MMHG | OXYGEN SATURATION: 97 % | DIASTOLIC BLOOD PRESSURE: 69 MMHG | RESPIRATION RATE: 32 BRPM

## 2019-04-23 DIAGNOSIS — Z01.812 PRE-OPERATIVE LABORATORY EXAMINATION: Primary | ICD-10-CM

## 2019-04-23 LAB
CHP ED QC CHECK: NORMAL
GLUCOSE BLD-MCNC: 61 MG/DL
METER GLUCOSE: 61 MG/DL (ref 74–99)
METER GLUCOSE: 75 MG/DL (ref 74–99)

## 2019-04-23 PROCEDURE — 7100000010 HC PHASE II RECOVERY - FIRST 15 MIN: Performed by: SURGERY

## 2019-04-23 PROCEDURE — 2580000003 HC RX 258: Performed by: ANESTHESIOLOGY

## 2019-04-23 PROCEDURE — 3609010300 HC COLONOSCOPY W/BIOPSY SINGLE/MULTIPLE: Performed by: SURGERY

## 2019-04-23 PROCEDURE — 3609012400 HC EGD TRANSORAL BIOPSY SINGLE/MULTIPLE: Performed by: SURGERY

## 2019-04-23 PROCEDURE — 3609010600 HC COLONOSCOPY POLYPECTOMY SNARE/COLD BIOPSY: Performed by: SURGERY

## 2019-04-23 PROCEDURE — 2580000003 HC RX 258: Performed by: SURGERY

## 2019-04-23 PROCEDURE — 6360000002 HC RX W HCPCS: Performed by: NURSE ANESTHETIST, CERTIFIED REGISTERED

## 2019-04-23 PROCEDURE — 88342 IMHCHEM/IMCYTCHM 1ST ANTB: CPT

## 2019-04-23 PROCEDURE — 3700000001 HC ADD 15 MINUTES (ANESTHESIA): Performed by: SURGERY

## 2019-04-23 PROCEDURE — 88305 TISSUE EXAM BY PATHOLOGIST: CPT

## 2019-04-23 PROCEDURE — 82962 GLUCOSE BLOOD TEST: CPT

## 2019-04-23 PROCEDURE — 2709999900 HC NON-CHARGEABLE SUPPLY: Performed by: SURGERY

## 2019-04-23 PROCEDURE — 43239 EGD BIOPSY SINGLE/MULTIPLE: CPT | Performed by: SURGERY

## 2019-04-23 PROCEDURE — 7100000011 HC PHASE II RECOVERY - ADDTL 15 MIN: Performed by: SURGERY

## 2019-04-23 PROCEDURE — 45380 COLONOSCOPY AND BIOPSY: CPT | Performed by: SURGERY

## 2019-04-23 PROCEDURE — 3700000000 HC ANESTHESIA ATTENDED CARE: Performed by: SURGERY

## 2019-04-23 PROCEDURE — 45385 COLONOSCOPY W/LESION REMOVAL: CPT | Performed by: SURGERY

## 2019-04-23 RX ORDER — 0.9 % SODIUM CHLORIDE 0.9 %
10 VIAL (ML) INJECTION PRN
Status: DISCONTINUED | OUTPATIENT
Start: 2019-04-23 | End: 2019-04-23 | Stop reason: HOSPADM

## 2019-04-23 RX ORDER — PROPOFOL 10 MG/ML
INJECTION, EMULSION INTRAVENOUS PRN
Status: DISCONTINUED | OUTPATIENT
Start: 2019-04-23 | End: 2019-04-23 | Stop reason: SDUPTHER

## 2019-04-23 RX ORDER — SODIUM CHLORIDE 9 MG/ML
INJECTION, SOLUTION INTRAVENOUS CONTINUOUS
Status: DISCONTINUED | OUTPATIENT
Start: 2019-04-23 | End: 2019-04-23 | Stop reason: HOSPADM

## 2019-04-23 RX ORDER — SODIUM CHLORIDE 0.9 % (FLUSH) 0.9 %
10 SYRINGE (ML) INJECTION EVERY 12 HOURS SCHEDULED
Status: DISCONTINUED | OUTPATIENT
Start: 2019-04-23 | End: 2019-04-23 | Stop reason: HOSPADM

## 2019-04-23 RX ORDER — OMEPRAZOLE 20 MG/1
20 CAPSULE, DELAYED RELEASE ORAL 2 TIMES DAILY
Qty: 60 CAPSULE | Refills: 3 | Status: SHIPPED | OUTPATIENT
Start: 2019-04-23 | End: 2020-06-11 | Stop reason: SDUPTHER

## 2019-04-23 RX ORDER — DEXTROSE MONOHYDRATE 50 MG/ML
INJECTION, SOLUTION INTRAVENOUS CONTINUOUS
Status: DISCONTINUED | OUTPATIENT
Start: 2019-04-23 | End: 2019-04-23 | Stop reason: HOSPADM

## 2019-04-23 RX ADMIN — DEXTROSE MONOHYDRATE: 50 INJECTION, SOLUTION INTRAVENOUS at 11:18

## 2019-04-23 RX ADMIN — SODIUM CHLORIDE: 9 INJECTION, SOLUTION INTRAVENOUS at 12:41

## 2019-04-23 RX ADMIN — PROPOFOL 400 MG: 10 INJECTION, EMULSION INTRAVENOUS at 13:15

## 2019-04-23 RX ADMIN — SODIUM CHLORIDE: 9 INJECTION, SOLUTION INTRAVENOUS at 11:01

## 2019-04-23 ASSESSMENT — PAIN DESCRIPTION - PAIN TYPE
TYPE: ACUTE PAIN

## 2019-04-23 ASSESSMENT — PAIN DESCRIPTION - ORIENTATION
ORIENTATION: LOWER;MID

## 2019-04-23 ASSESSMENT — PAIN DESCRIPTION - LOCATION
LOCATION: ABDOMEN

## 2019-04-23 ASSESSMENT — PAIN DESCRIPTION - DESCRIPTORS
DESCRIPTORS: CRAMPING;DISCOMFORT

## 2019-04-23 ASSESSMENT — PAIN - FUNCTIONAL ASSESSMENT: PAIN_FUNCTIONAL_ASSESSMENT: 0-10

## 2019-04-23 ASSESSMENT — PAIN SCALES - GENERAL
PAINLEVEL_OUTOF10: 4
PAINLEVEL_OUTOF10: 3
PAINLEVEL_OUTOF10: 2
PAINLEVEL_OUTOF10: 4
PAINLEVEL_OUTOF10: 3

## 2019-04-23 NOTE — H&P
Reactions    Lisinopril           Family History         Family History   Problem Relation Age of Onset    COPD Father      Heart Attack Father      Arthritis Mother      Cancer Other 48         colon            Social History               Socioeconomic History    Marital status:        Spouse name: Not on file    Number of children: Not on file    Years of education: Not on file    Highest education level: Not on file   Occupational History    Not on file   Social Needs    Financial resource strain: Not on file    Food insecurity:       Worry: Not on file       Inability: Not on file    Transportation needs:       Medical: Not on file       Non-medical: Not on file   Tobacco Use    Smoking status: Never Smoker    Smokeless tobacco: Never Used   Substance and Sexual Activity    Alcohol use: No       Alcohol/week: 0.0 oz    Drug use: No    Sexual activity: Never       Partners: Male       Comment: last in 2013   Lifestyle    Physical activity:       Days per week: Not on file       Minutes per session: Not on file    Stress: Not on file   Relationships    Social connections:       Talks on phone: Not on file       Gets together: Not on file       Attends Latter-day service: Not on file       Active member of club or organization: Not on file       Attends meetings of clubs or organizations: Not on file       Relationship status: Not on file    Intimate partner violence:       Fear of current or ex partner: Not on file       Emotionally abused: Not on file       Physically abused: Not on file       Forced sexual activity: Not on file   Other Topics Concern    Not on file   Social History Narrative    Not on file         Review of Systems   Constitutional: Positive for appetite change, chills, fever and unexpected weight change. Negative for activity change. HENT: Positive for nosebleeds, sinus pressure and sinus pain.  Negative for ear discharge, ear pain, postnasal drip, rhinorrhea and tinnitus. Eyes: Positive for visual disturbance. Negative for pain, discharge, redness and itching. Respiratory: Positive for cough, choking, chest tightness and shortness of breath. Negative for wheezing. Cardiovascular: Positive for leg swelling. Negative for chest pain and palpitations. Gastrointestinal: Positive for abdominal distention, abdominal pain, anal bleeding, blood in stool, constipation, nausea and vomiting. Negative for diarrhea. Endocrine: Positive for cold intolerance, polydipsia and polyuria. Negative for heat intolerance. Genitourinary: Positive for frequency, hematuria and urgency. Negative for dysuria, vaginal bleeding, vaginal discharge and vaginal pain. Musculoskeletal: Positive for arthralgias, back pain, gait problem, joint swelling, myalgias, neck pain and neck stiffness. Skin: Positive for rash. Negative for color change, pallor and wound. Allergic/Immunologic: Negative for environmental allergies and food allergies. Neurological: Positive for dizziness, weakness, light-headedness and numbness. Negative for seizures, syncope and headaches. Hematological: Positive for adenopathy. Bruises/bleeds easily. Psychiatric/Behavioral: Positive for agitation, confusion and decreased concentration. Negative for hallucinations, self-injury and suicidal ideas. The patient is nervous/anxious and is hyperactive. Objective:   Physical Exam   Constitutional: She is oriented to person, place, and time. She appears well-developed and well-nourished. No distress. HENT:   Head: Normocephalic and atraumatic. Right Ear: External ear normal.   Left Ear: External ear normal.   Nose: Nose normal.   Eyes: Pupils are equal, round, and reactive to light. Conjunctivae and EOM are normal. Right eye exhibits no discharge. Left eye exhibits no discharge. No scleral icterus. Neck: Normal range of motion. Neck supple.    Cardiovascular: Normal rate, regular rhythm and normal heart

## 2019-04-23 NOTE — OP NOTE
COLONOSCOPY PROCEDURE NOTE    DATE OF PROCEDURE: 4/23/2019    PREOPERATIVE DIAGNOSIS:  Hx of adenomatous colon polyps; BRBPR; constipation    POSTOPERATIVE DIAGNOSIS/FINDINGS:  Same + 2 mm polyp at hepatic flexure + 6 mm polyp at proximal transverse colon + 3 mm polyp at 20 cm + scattered diverticula starting at 50 cm + external/internal hemorrhoids    SURGEON: Army Reginaldo MD    ASSISTANT: None    OPERATION: Total Colonoscopy with polypectomy and biopsy    ANESTHESIA: Local monitored anesthesia. CONDITION: Stable    COMPLICATIONS: None. EBL:  None    SPECIMEN:  polyps    BRIEF HISTORY:  This is a 68 y.o. female who presents with the complaint of hx of adenomatous colon polyps; BRBPR; constipation. It was recommended the patient undergo a colonoscopy. The risks/benefits/alternatives/expected outcomes were explained the the patient. The patient verbalized understanding and agreed to proceed. PROCEDURE:  The patient was brought into the endoscopy suite and placed in the left lateral decubitus position. A digital rectal exam was performed after the initiation of LMAC anesthesia and failed to reveal any obstructing masses or lesions. A colonoscope was inserted into the patient's anus and passed through the rectum, sigmoid, descending, transverse, and ascending colon all the way to the level of the cecum. Visualization of the cecum was confirmed by visualization of the ileo-cecal valve, confluence of the tinea, and by visualization of the light in the RLQ on the anterior abdominal wall. The scope was then withdrawn the entire length of the colon. There were no masses, polyps, or lesions noted until reaching hepatic flexure where a 2 mm polyp was seen and biopsied. In proximal transverse colon a 6 mm polyp was seen and removed with snare connected to electrocautery. Scattered diverticula were seen starting at 50 cm. At 20 cm a 3 mm polyp was seen and biopsied.   There were significant hemorrhoids noted.  The scope was straightened and withdrawn entirely. The patient tolerated the procedure well and there were no complications. Prep was adequate; repeat colonoscopy in 3 years pending pathology.       Nicci Palma MD  4/23/2019

## 2019-04-23 NOTE — OP NOTE
ESOPHAGOGASTRODUODENOSCOPY PROCEDURE NOTE    DATE OF PROCEDURE: 4/23/2019    PREOPERATIVE DIAGNOSIS:  Heartburn; nausea; vomiting; epigastric pain; early satiety    POSTOPERATIVE DIAGNOSIS/FINDINGS:  Same + moderate gastritis    SURGEON: Roland Cheadle, MD    ASSISTANT: None    OPERATION: Esophagogastroduodenoscopy with biopsies    ANESTHESIA: Local monitored anesthesia. CONDITION: Stable    COMPLICATIONS: None. EBL:  None. SPECIMEN:  Antral/fundal biopsies    BRIEF HISTORY:  This is a 68 y.o. female who presents with the complaint of abd pain; nausea; vomiting; early satiety. It was recommended the patient undergo an esophagogastroduodenoscopy. The risks/benefits/alternatives/expected outcomes were explained the the patient. The patient verbalized understanding and agreed to proceed. PROCEDURE:  The patient was brought into the endoscopy suite and placed in the left lateral decubitus position. A bite block was placed in the patients mouth. After the initiation of LMAC anesthesia, a gastroscope was inserted into the patient's mouth and passed into the esophagus and into the stomach. Immediately upon entering the stomach the note of moderate antral/fundal gastritis was made. The scope was advanced through the pylorus into the first and second portion of the duodenum making the note of normal duodenum. The scope was then withdrawn back into the stomach where it was retroflexed. There was no hiatal hernia noted. The scope was then straightened and antral and fundal biopsies were taken. The scope was then withdrawn the entire length of the esophagus, making the note of a normal esophagus without masses, ulcerations, or lesions noted. The scope was withdrawn entirely. The patient tolerated the procedure well and there were no complications.       Esmer Marshall MD  4/23/2019

## 2019-04-23 NOTE — ANESTHESIA POSTPROCEDURE EVALUATION
Department of Anesthesiology  Postprocedure Note    Patient: Svetlana Irving  MRN: [id number]  YOB: 1945  Date of evaluation: 4/23/2019  Time:  7:13 PM     Procedure Summary     Date:  04/23/19 Room / Location:  [facility] ENDO 01 / [facility]    Anesthesia Start:  7265 Anesthesia Stop:  2458    Procedures:       EGD BIOPSY (N/A )      COLONOSCOPY POLYPECTOMY SNARE/COLD BIOPSY (N/A )      COLONOSCOPY WITH BIOPSY Diagnosis:  (ABDOMINAL PAIN, EARLY SATIETY, HEARTBURN,NAUSEA, BRIGHT RED BLOOD PER RECTUM, CONSTIPATION, HX OF COLONIC POLYPS)    Surgeon:  Brenden Katz MD Responsible Provider:  Kolby Tabor MD    Anesthesia Type:  MAC ASA Status:  3          Anesthesia Type: MAC    Peter Phase I: Peter Score: 10    Peter Phase II: Peter Score: 10    Last vitals: Reviewed and per EMR flowsheets.        Anesthesia Post Evaluation    Patient location during evaluation: PACU  Patient participation: complete - patient participated  Level of consciousness: awake and alert  Airway patency: patent  Nausea & Vomiting: no vomiting and no nausea  Complications: no  Cardiovascular status: hemodynamically stable  Respiratory status: acceptable  Hydration status: stable

## 2019-04-23 NOTE — ANESTHESIA PRE PROCEDURE
use: No     Alcohol/week: 0.0 oz                                Counseling given: Not Answered      Vital Signs (Current):   Vitals:    04/18/19 1203 04/23/19 1045   BP:  (!) 151/77   Pulse:  106   Resp:  20   Temp:  99.5 °F (37.5 °C)   TempSrc:  Temporal   SpO2:  92%   Weight: 170 lb (77.1 kg) 170 lb (77.1 kg)   Height: 5' 2.5\" (1.588 m) 5' 2.5\" (1.588 m)                                              BP Readings from Last 3 Encounters:   04/23/19 (!) 151/77   04/19/19 (!) 140/86   03/21/19 130/77       NPO Status: Time of last liquid consumption: 2300                        Time of last solid consumption: 2300                        Date of last liquid consumption: 04/22/19                        Date of last solid food consumption: 04/21/19    BMI:   Wt Readings from Last 3 Encounters:   04/23/19 170 lb (77.1 kg)   04/19/19 170 lb (77.1 kg)   03/21/19 176 lb (79.8 kg)     Body mass index is 30.6 kg/m². CBC:   Lab Results   Component Value Date    WBC 7.0 01/08/2019    RBC 3.20 01/08/2019    HGB 10.8 01/08/2019    HCT 33.1 01/08/2019    .4 01/08/2019    RDW 16.2 01/08/2019     01/08/2019       CMP:   Lab Results   Component Value Date     11/09/2018    K 4.5 11/09/2018    K 4.1 04/06/2018     11/09/2018    CO2 26 11/09/2018    BUN 11 12/20/2018    CREATININE 0.9 12/20/2018    GFRAA >60 12/20/2018    LABGLOM >60 12/20/2018    GLUCOSE 61 04/23/2019    PROT 6.8 03/22/2019    CALCIUM 9.7 11/09/2018    BILITOT 1.0 03/22/2019    ALKPHOS 73 03/22/2019    AST 34 03/22/2019    ALT 16 03/22/2019       POC Tests: No results for input(s): POCGLU, POCNA, POCK, POCCL, POCBUN, POCHEMO, POCHCT in the last 72 hours.     Coags:   Lab Results   Component Value Date    PROTIME 16.9 01/08/2019    INR 1.5 01/08/2019       HCG (If Applicable): No results found for: PREGTESTUR, PREGSERUM, HCG, HCGQUANT     ABGs: No results found for: PHART, PO2ART, XZH2IXU, COG3BLN, BEART, T6YZJQAZ     Type & Screen (If Applicable):  No results found for: LABABO, [address]    Anesthesia Evaluation  Patient summary reviewed  Airway: Mallampati: II  TM distance: >3 FB   Neck ROM: full  Mouth opening: > = 3 FB Dental:      Comment: Grossly intact    Pulmonary: breath sounds clear to auscultation  (+) sleep apnea: on CPAP,                             Cardiovascular:  Exercise tolerance: good (>4 METS),   (+) hypertension: moderate, hyperlipidemia        Rhythm: regular  Rate: normal                    Neuro/Psych:   (+) neuromuscular disease:, psychiatric history: stable with treatmentdepression/anxiety             GI/Hepatic/Renal:   (+) liver disease (Cirrhosis):,           Endo/Other:    (+) DiabetesType II DM, , malignancy/cancer (Colon). Abdominal:         (-) obese     Vascular: negative vascular ROS. Anesthesia Plan      MAC     ASA 3       Induction: intravenous. Anesthetic plan and risks discussed with patient and spouse. Plan discussed with CRNA.                   Hector Pappas MD   4/23/2019

## 2019-04-30 ENCOUNTER — TELEPHONE (OUTPATIENT)
Dept: FAMILY MEDICINE CLINIC | Age: 74
End: 2019-04-30

## 2019-04-30 DIAGNOSIS — R11.10 VOMITING, INTRACTABILITY OF VOMITING NOT SPECIFIED, PRESENCE OF NAUSEA NOT SPECIFIED, UNSPECIFIED VOMITING TYPE: ICD-10-CM

## 2019-04-30 DIAGNOSIS — E04.9 GOITER: Primary | ICD-10-CM

## 2019-04-30 DIAGNOSIS — K74.60 CIRRHOSIS OF LIVER WITHOUT ASCITES, UNSPECIFIED HEPATIC CIRRHOSIS TYPE (HCC): Primary | ICD-10-CM

## 2019-05-16 ENCOUNTER — HOSPITAL ENCOUNTER (OUTPATIENT)
Age: 74
Discharge: HOME | End: 2019-05-16
Payer: MEDICARE

## 2019-05-16 LAB
ALBUMIN SERPL-MCNC: 3.1 G/DL (ref 3.5–5.2)
ALP BLD-CCNC: 109 U/L (ref 35–104)
ALT SERPL-CCNC: 20 U/L (ref 0–32)
AMMONIA: 39 UMOL/L (ref 11–51)
ANION GAP SERPL CALCULATED.3IONS-SCNC: 10 MMOL/L (ref 7–16)
APTT: 35.6 SEC (ref 25.7–34.7)
AST SERPL-CCNC: 41 U/L (ref 0–31)
BASOPHILS ABSOLUTE: 0.06 E9/L (ref 0–0.2)
BASOPHILS RELATIVE PERCENT: 0.8 % (ref 0–2)
BILIRUB SERPL-MCNC: 1.5 MG/DL (ref 0–1.2)
BUN BLDV-MCNC: 11 MG/DL (ref 8–23)
CALCIUM SERPL-MCNC: 9.9 MG/DL (ref 8.6–10.2)
CHLORIDE BLD-SCNC: 107 MMOL/L (ref 98–107)
CO2: 28 MMOL/L (ref 22–29)
CREAT SERPL-MCNC: 1 MG/DL (ref 0.5–1)
EOSINOPHILS ABSOLUTE: 0.23 E9/L (ref 0.05–0.5)
EOSINOPHILS RELATIVE PERCENT: 3.1 % (ref 0–6)
FERRITIN: 408 NG/ML
GFR AFRICAN AMERICAN: >60
GFR NON-AFRICAN AMERICAN: 54 ML/MIN/1.73
GLUCOSE BLD-MCNC: 67 MG/DL (ref 74–99)
HCT VFR BLD CALC: 40.1 % (ref 34–48)
HEMOGLOBIN: 13.4 G/DL (ref 11.5–15.5)
IMMATURE GRANULOCYTES #: 0.02 E9/L
IMMATURE GRANULOCYTES %: 0.3 % (ref 0–5)
INR BLD: 1.4
IRON SATURATION: 42 % (ref 15–50)
IRON: 75 MCG/DL (ref 37–145)
LYMPHOCYTES ABSOLUTE: 1.69 E9/L (ref 1.5–4)
LYMPHOCYTES RELATIVE PERCENT: 22.6 % (ref 20–42)
MCH RBC QN AUTO: 33.5 PG (ref 26–35)
MCHC RBC AUTO-ENTMCNC: 33.4 % (ref 32–34.5)
MCV RBC AUTO: 100.3 FL (ref 80–99.9)
MONOCYTES ABSOLUTE: 1.01 E9/L (ref 0.1–0.95)
MONOCYTES RELATIVE PERCENT: 13.5 % (ref 2–12)
NEUTROPHILS ABSOLUTE: 4.48 E9/L (ref 1.8–7.3)
NEUTROPHILS RELATIVE PERCENT: 59.7 % (ref 43–80)
PDW BLD-RTO: 16.5 FL (ref 11.5–15)
PLATELET # BLD: 178 E9/L (ref 130–450)
PMV BLD AUTO: 11.5 FL (ref 7–12)
POTASSIUM SERPL-SCNC: 3.9 MMOL/L (ref 3.5–5)
PROTHROMBIN TIME: 16.3 SEC (ref 9.3–12.4)
RBC # BLD: 4 E12/L (ref 3.5–5.5)
SODIUM BLD-SCNC: 145 MMOL/L (ref 132–146)
TOTAL IRON BINDING CAPACITY: 179 MCG/DL (ref 250–450)
TOTAL PROTEIN: 7.3 G/DL (ref 6.4–8.3)
TSH SERPL DL<=0.05 MIU/L-ACNC: 2.85 UIU/ML (ref 0.27–4.2)
WBC # BLD: 7.5 E9/L (ref 4.5–11.5)

## 2019-05-16 PROCEDURE — 83516 IMMUNOASSAY NONANTIBODY: CPT

## 2019-05-16 PROCEDURE — 86038 ANTINUCLEAR ANTIBODIES: CPT

## 2019-05-16 PROCEDURE — 86039 ANTINUCLEAR ANTIBODIES (ANA): CPT

## 2019-05-16 PROCEDURE — 82784 ASSAY IGA/IGD/IGG/IGM EACH: CPT

## 2019-05-16 PROCEDURE — 85025 COMPLETE CBC W/AUTO DIFF WBC: CPT

## 2019-05-16 PROCEDURE — 81596 NFCT DS CHRNC HCV 6 ASSAYS: CPT

## 2019-05-16 PROCEDURE — 82140 ASSAY OF AMMONIA: CPT

## 2019-05-16 PROCEDURE — 36415 COLL VENOUS BLD VENIPUNCTURE: CPT

## 2019-05-16 PROCEDURE — 82728 ASSAY OF FERRITIN: CPT

## 2019-05-16 PROCEDURE — 86376 MICROSOMAL ANTIBODY EACH: CPT

## 2019-05-16 PROCEDURE — 80053 COMPREHEN METABOLIC PANEL: CPT

## 2019-05-16 PROCEDURE — 86255 FLUORESCENT ANTIBODY SCREEN: CPT

## 2019-05-16 PROCEDURE — 85730 THROMBOPLASTIN TIME PARTIAL: CPT

## 2019-05-16 PROCEDURE — 83540 ASSAY OF IRON: CPT

## 2019-05-16 PROCEDURE — 84443 ASSAY THYROID STIM HORMONE: CPT

## 2019-05-16 PROCEDURE — 83550 IRON BINDING TEST: CPT

## 2019-05-16 PROCEDURE — 82105 ALPHA-FETOPROTEIN SERUM: CPT

## 2019-05-16 PROCEDURE — 82390 ASSAY OF CERULOPLASMIN: CPT

## 2019-05-16 PROCEDURE — 85610 PROTHROMBIN TIME: CPT

## 2019-05-17 LAB
ANA PATTERN: ABNORMAL
ANA TITER: 1.32
ANTI-MITOCHONDRIAL AB, IFA: NEGATIVE
ANTI-NUCLEAR ANTIBODY (ANA): POSITIVE
IGA: 1071 MG/DL (ref 70–400)
SMOOTH MUSCLE ANTIBODY: NEGATIVE

## 2019-05-18 LAB
AFP-TUMOR MARKER: 5 NG/ML (ref 0–9)
CERULOPLASMIN: 26 MG/DL (ref 16–45)
TISSUE TRANSGLUTAMINASE ANTIBODY: 5 U/ML (ref 0–5)

## 2019-05-19 LAB
GLIADIN ANTIBODIES IGA: 9 UNITS (ref 0–19)
GLIADIN ANTIBODIES IGG: 3 UNITS (ref 0–19)
LIVER-KIDNEY MICROSOME-1 AB IGG: 1.6 U (ref 0–24.9)
TISSUE TRANSGLUTAMINASE IGA: 2 U/ML (ref 0–3)

## 2019-05-22 ENCOUNTER — TELEPHONE (OUTPATIENT)
Dept: FAMILY MEDICINE CLINIC | Age: 74
End: 2019-05-22

## 2019-05-23 ENCOUNTER — HOSPITAL ENCOUNTER (OUTPATIENT)
Dept: ULTRASOUND IMAGING | Age: 74
Discharge: HOME | End: 2019-05-25
Payer: MEDICARE

## 2019-05-23 DIAGNOSIS — E04.9 GOITER: ICD-10-CM

## 2019-05-23 PROCEDURE — 76536 US EXAM OF HEAD AND NECK: CPT

## 2019-05-25 DIAGNOSIS — I10 ESSENTIAL HYPERTENSION: ICD-10-CM

## 2019-05-26 LAB
Lab: NORMAL
REPORT: NORMAL
THIS TEST SENT TO: NORMAL

## 2019-05-28 RX ORDER — LOSARTAN POTASSIUM 100 MG/1
100 TABLET ORAL DAILY
Qty: 90 TABLET | Refills: 0 | Status: SHIPPED | OUTPATIENT
Start: 2019-05-28 | End: 2019-09-05 | Stop reason: SDUPTHER

## 2019-06-14 ENCOUNTER — OFFICE VISIT (OUTPATIENT)
Dept: FAMILY MEDICINE CLINIC | Age: 74
End: 2019-06-14
Payer: MEDICARE

## 2019-06-14 VITALS
BODY MASS INDEX: 33.66 KG/M2 | SYSTOLIC BLOOD PRESSURE: 132 MMHG | DIASTOLIC BLOOD PRESSURE: 84 MMHG | OXYGEN SATURATION: 93 % | WEIGHT: 187 LBS | RESPIRATION RATE: 18 BRPM | HEART RATE: 106 BPM

## 2019-06-14 DIAGNOSIS — M79.89 LEG SWELLING: ICD-10-CM

## 2019-06-14 DIAGNOSIS — I87.2 VENOUS INSUFFICIENCY: ICD-10-CM

## 2019-06-14 DIAGNOSIS — R18.8 OTHER ASCITES: ICD-10-CM

## 2019-06-14 DIAGNOSIS — K74.60 CIRRHOSIS OF LIVER WITHOUT ASCITES, UNSPECIFIED HEPATIC CIRRHOSIS TYPE (HCC): ICD-10-CM

## 2019-06-14 DIAGNOSIS — N18.30 STAGE 3 CHRONIC KIDNEY DISEASE (HCC): ICD-10-CM

## 2019-06-14 DIAGNOSIS — M79.89 LEG SWELLING: Primary | ICD-10-CM

## 2019-06-14 PROCEDURE — 99214 OFFICE O/P EST MOD 30 MIN: CPT | Performed by: FAMILY MEDICINE

## 2019-06-14 PROCEDURE — 1036F TOBACCO NON-USER: CPT | Performed by: FAMILY MEDICINE

## 2019-06-14 PROCEDURE — 4040F PNEUMOC VAC/ADMIN/RCVD: CPT | Performed by: FAMILY MEDICINE

## 2019-06-14 PROCEDURE — 3017F COLORECTAL CA SCREEN DOC REV: CPT | Performed by: FAMILY MEDICINE

## 2019-06-14 PROCEDURE — 1090F PRES/ABSN URINE INCON ASSESS: CPT | Performed by: FAMILY MEDICINE

## 2019-06-14 PROCEDURE — 1123F ACP DISCUSS/DSCN MKR DOCD: CPT | Performed by: FAMILY MEDICINE

## 2019-06-14 PROCEDURE — G8417 CALC BMI ABV UP PARAM F/U: HCPCS | Performed by: FAMILY MEDICINE

## 2019-06-14 PROCEDURE — G8428 CUR MEDS NOT DOCUMENT: HCPCS | Performed by: FAMILY MEDICINE

## 2019-06-14 PROCEDURE — G8399 PT W/DXA RESULTS DOCUMENT: HCPCS | Performed by: FAMILY MEDICINE

## 2019-06-14 RX ORDER — PANTOPRAZOLE SODIUM 40 MG/1
TABLET, DELAYED RELEASE ORAL
COMMUNITY
Start: 2019-05-16 | End: 2020-02-09

## 2019-06-14 RX ORDER — SPIRONOLACTONE 25 MG/1
25 TABLET ORAL DAILY
Qty: 90 TABLET | Refills: 1 | Status: SHIPPED
Start: 2019-06-14 | End: 2020-03-26 | Stop reason: SDUPTHER

## 2019-06-14 RX ORDER — LANOLIN ALCOHOL/MO/W.PET/CERES
CREAM (GRAM) TOPICAL
COMMUNITY
End: 2020-02-09

## 2019-06-14 RX ORDER — FUROSEMIDE 40 MG/1
40 TABLET ORAL DAILY
Refills: 2 | Status: ON HOLD | COMMUNITY
Start: 2019-05-15 | End: 2020-02-11 | Stop reason: HOSPADM

## 2019-06-14 RX ORDER — SPIRONOLACTONE 25 MG/1
25 TABLET ORAL DAILY
Qty: 30 TABLET | Refills: 1 | Status: SHIPPED | OUTPATIENT
Start: 2019-06-14 | End: 2019-06-14 | Stop reason: SDUPTHER

## 2019-06-14 RX ORDER — LACTULOSE 10 G/15ML
SOLUTION ORAL; RECTAL
COMMUNITY
Start: 2019-05-15 | End: 2020-02-09

## 2019-06-14 RX ORDER — BLOOD-GLUCOSE METER
KIT MISCELLANEOUS
Refills: 11 | COMMUNITY
Start: 2019-05-14 | End: 2019-10-11

## 2019-06-14 RX ORDER — TAMOXIFEN CITRATE 20 MG/1
TABLET ORAL
COMMUNITY
End: 2020-02-09

## 2019-06-14 NOTE — PATIENT INSTRUCTIONS
Today add the spironolactone 25 mg daily to the lasix 20 mg daily. If you get more short of breath or swell up more over the weekend then take an extra 20 mg dose of lasix per day. Call  Cardiology lab and ask them where and when you can get your Echocardiogram (it was ordered on February 21st, 2019). Wear the compression stockings, keep your legs elevated when you're not walking, and walk as much as you can to get the fluid out of your legs. If you can, keep your sodium less than 2 g per day. Follow up with the liver doctor on Monday as planned. One day before coming back to see me, go to the lab and get bloodwork (BMP) done to check on your kidney.

## 2019-06-14 NOTE — PROGRESS NOTES
Progress Note    Subjective: Christelle Christine is a 76y.o. year old female here for abdominal distention. Health Maintenance Due   Topic Date Due    Hepatitis A vaccine (1 of 2 - Risk 2-dose series) [birth year]/1946    Hepatitis B Vaccine (1 of 3 - Risk 3-dose series) 06/03/1964    DTaP/Tdap/Td vaccine (1 - Tdap) 06/03/1964    Breast cancer screen  06/03/1995    Shingles Vaccine (3 of 3) 06/07/2018     Abdominal swelling gradually for months. Cirrhosis found on imaging. Labs showed pos TERRELL 1:320 and elevated IgA, but otherwise neg. AST in 40s, normal ALT. Some Itching on chest. Shortness of breath. Has not yet gotten Echo. Leg swelling bilateral. Hard to fit in shoes. Started on lasix by hepatologist, with whom she has appt coming up on Monday at noon. Contacted hepatologist today and spoke with them. No tap needed at this time, will assess soon. Follows with Heme/Onc who at this time has found no signs concerning for cancer. Elevated temp to 100 degrees frequently still. Has CKD III. 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        Past Medical History:   Diagnosis Date    Breast cancer ([address]     Cirrhosis ([address]     Essential hypertension     Hyperlipidemia     Osteopenia     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 ([address]     Sleep apnea     Spinal stenosis     Type 2 diabetes mellitus ([address]      Past Surgical History:   Procedure Laterality Date    BREAST LUMPECTOMY      lumpectomy [id number]    CARPAL TUNNEL RELEASE      COLONOSCOPY  01/31/2017    multiple polyps; diverticula--jerod    COLONOSCOPY  04/23/2019    polyps; diverticula; hemorrhoids--jerod    COLONOSCOPY N/A 4/23/2019    COLONOSCOPY POLYPECTOMY SNARE/COLD BIOPSY performed by Nicci Palma MD at [place] [address]  4/23/2019    COLONOSCOPY WITH BIOPSY performed by Nicci Palma MD at [address] [place] [address] LITHOTRIPSY Left 05/04/2016    C-R STENT PLACEMENT    UPPER GASTROINTESTINAL ENDOSCOPY  04/23/2019 gastritis--jerod    UPPER GASTROINTESTINAL ENDOSCOPY N/A 4/23/2019    EGD BIOPSY performed by Rc Karimi MD at [address] History   Problem Relation Age of Onset    COPD Father     Heart Attack Father     Arthritis Mother     Cancer Other 48        colon     Social History     Socioeconomic History    Marital status:      Spouse name: Not on file    Number of children: Not on file    Years of education: Not on file    Highest education level: Not on file   Occupational History    Not on file   Social Needs    Financial resource strain: Not on file    Food insecurity:     Worry: Not on file     Inability: Not on file    Transportation needs:     Medical: Not on file     Non-medical: Not on file   Tobacco Use    Smoking status: Never Smoker    Smokeless tobacco: Never Used   Substance and Sexual Activity    Alcohol use: No     Alcohol/week: 0.0 oz    Drug use: No    Sexual activity: Never     Partners: Male     Comment: last in 2013   Lifestyle    Physical activity:     Days per week: Not on file     Minutes per session: Not on file    Stress: Not on file   Relationships    Social connections:     Talks on phone: Not on file     Gets together: Not on file     Attends [employer] service: Not on file     Active member of club or organization: Not on file     Attends meetings of clubs or organizations: Not on file     Relationship status: Not on file    Intimate partner violence:     Fear of current or ex partner: Not on file     Emotionally abused: Not on file     Physically abused: Not on file     Forced sexual activity: Not on file   Other Topics Concern    Not on file   Social History Narrative    Not on file       Review Of Systems (bold are positive, all else are negative)  Gen: No fevers of \"100\", sweats, chills  No fatigue  No unintentional weight changes  Skin: No rash, no lesion  Resp: No cough, shortness of breath, wheeze, chest congestion  CV: No chest pain, palpitation, edema      Objective:  /84   Pulse 106   Resp 18   Wt 187 lb (84.8 kg)   SpO2 93%   BMI 33.66 kg/m²   General appearance: NAD, alert and interacting appropriately  HEENT: NCAT, PERRLA, EOMI   Resp: bibasilar crackles  CVS: RRR, IVAN 3/6 RSB  Abdomen: BS +, SNT. Abdomen distended   Extremities: No clubbing, cyanosis. 1+ ble edema. Warm. Dry. Skin: no jaundice. Faint erythematous rash over chest.       I have reviewed this patient's previous records. I have reviewed this patient's labs. I have reviewed this patient's imaging reports. I have reviewed this patient's medications. Assessment/Plan: John Ramirez was seen today for swelling. Diagnoses and all orders for this visit:    Leg swelling  -     spironolactone (ALDACTONE) 25 MG tablet; Take 1 tablet by mouth daily  -     Compression Stockings MISC; by Does not apply route 20-30 mmHg. Below the knee. Venous insufficiency. -     BASIC METABOLIC PANEL; Future    Other ascites  -     spironolactone (ALDACTONE) 25 MG tablet; Take 1 tablet by mouth daily    Cirrhosis of liver without ascites, unspecified hepatic cirrhosis type (HCC)  -     spironolactone (ALDACTONE) 25 MG tablet; Take 1 tablet by mouth daily  -     BASIC METABOLIC PANEL; Future    Venous insufficiency  -     Compression Stockings MISC; by Does not apply route 20-30 mmHg. Below the knee. Venous insufficiency. Stage 3 chronic kidney disease (HCC)          DDx: cirrhosis 2/2 HOLDEN > autoimmune > HFrEF > HFpEF vs CKD > hepatic mets      Patient Instructions   Today add the spironolactone 25 mg daily to the lasix 20 mg daily. If you get more short of breath or swell up more over the weekend then take an extra 20 mg dose of lasix per day. Call  Cardiology lab and ask them where and when you can get your Echocardiogram (it was ordered on February 21st, 2019).      Wear the compression stockings, keep your legs elevated when you're not walking,

## 2019-06-18 RX ORDER — ALBUMIN (HUMAN) 12.5 G/50ML
25 SOLUTION INTRAVENOUS ONCE
Status: CANCELLED | OUTPATIENT
Start: 2019-06-18 | End: 2019-06-18

## 2019-06-18 RX ORDER — SODIUM CHLORIDE 0.9 % (FLUSH) 0.9 %
10 SYRINGE (ML) INJECTION PRN
Status: CANCELLED | OUTPATIENT
Start: 2019-06-18

## 2019-06-19 ENCOUNTER — HOSPITAL ENCOUNTER (OUTPATIENT)
Dept: INTERVENTIONAL RADIOLOGY/VASCULAR | Age: 74
Discharge: HOME | End: 2019-06-21
Payer: MEDICARE

## 2019-06-19 VITALS
SYSTOLIC BLOOD PRESSURE: 130 MMHG | RESPIRATION RATE: 18 BRPM | TEMPERATURE: 97.8 F | BODY MASS INDEX: 32.2 KG/M2 | OXYGEN SATURATION: 95 % | DIASTOLIC BLOOD PRESSURE: 62 MMHG | HEART RATE: 98 BPM | HEIGHT: 62 IN | WEIGHT: 175 LBS

## 2019-06-19 DIAGNOSIS — R18.8 CIRRHOSIS OF LIVER WITH ASCITES, UNSPECIFIED HEPATIC CIRRHOSIS TYPE (HCC): ICD-10-CM

## 2019-06-19 DIAGNOSIS — K74.60 CIRRHOSIS OF LIVER WITH ASCITES, UNSPECIFIED HEPATIC CIRRHOSIS TYPE (HCC): ICD-10-CM

## 2019-06-19 LAB
ALBUMIN FLUID: 0.5 G/DL
APPEARANCE FLUID: NORMAL
BASO FLUID: 0 %
CELL COUNT FLUID TYPE: NORMAL
COLOR FLUID: NORMAL
EOSINOPHIL FLUID: 0 %
FLUID TYPE: NORMAL
INR BLD: 1.4
LYMPHOCYTES, BODY FLUID: 0 %
MONOCYTE, FLUID: 100 %
NEUTROPHIL, FLUID: 0 %
NUCLEATED CELLS FLUID: 66 /UL
PLATELET # BLD: 177 E9/L (ref 130–450)
PROTEIN FLUID: 1 G/DL
PROTHROMBIN TIME: 15.8 SEC (ref 9.3–12.4)
RBC FLUID: <2000 /UL

## 2019-06-19 PROCEDURE — 84157 ASSAY OF PROTEIN OTHER: CPT

## 2019-06-19 PROCEDURE — 96365 THER/PROPH/DIAG IV INF INIT: CPT

## 2019-06-19 PROCEDURE — 89051 BODY FLUID CELL COUNT: CPT

## 2019-06-19 PROCEDURE — 87070 CULTURE OTHR SPECIMN AEROBIC: CPT

## 2019-06-19 PROCEDURE — 49083 ABD PARACENTESIS W/IMAGING: CPT

## 2019-06-19 PROCEDURE — P9047 ALBUMIN (HUMAN), 25%, 50ML: HCPCS | Performed by: INTERNAL MEDICINE

## 2019-06-19 PROCEDURE — 2500000003 HC RX 250 WO HCPCS: Performed by: PHYSICIAN ASSISTANT

## 2019-06-19 PROCEDURE — 87205 SMEAR GRAM STAIN: CPT

## 2019-06-19 PROCEDURE — 85049 AUTOMATED PLATELET COUNT: CPT

## 2019-06-19 PROCEDURE — 85610 PROTHROMBIN TIME: CPT

## 2019-06-19 PROCEDURE — 7100000010 HC PHASE II RECOVERY - FIRST 15 MIN

## 2019-06-19 PROCEDURE — 7100000011 HC PHASE II RECOVERY - ADDTL 15 MIN

## 2019-06-19 PROCEDURE — 36415 COLL VENOUS BLD VENIPUNCTURE: CPT

## 2019-06-19 PROCEDURE — C1729 CATH, DRAINAGE: HCPCS

## 2019-06-19 PROCEDURE — 82042 OTHER SOURCE ALBUMIN QUAN EA: CPT

## 2019-06-19 PROCEDURE — 6360000002 HC RX W HCPCS: Performed by: INTERNAL MEDICINE

## 2019-06-19 RX ORDER — LIDOCAINE HYDROCHLORIDE 20 MG/ML
5 INJECTION, SOLUTION INFILTRATION; PERINEURAL ONCE
Status: COMPLETED | OUTPATIENT
Start: 2019-06-19 | End: 2019-06-19

## 2019-06-19 RX ORDER — SODIUM CHLORIDE 0.9 % (FLUSH) 0.9 %
10 SYRINGE (ML) INJECTION PRN
Status: DISCONTINUED | OUTPATIENT
Start: 2019-06-19 | End: 2019-06-22 | Stop reason: HOSPADM

## 2019-06-19 RX ORDER — ALBUMIN (HUMAN) 12.5 G/50ML
25 SOLUTION INTRAVENOUS ONCE
Status: COMPLETED | OUTPATIENT
Start: 2019-06-19 | End: 2019-06-19

## 2019-06-19 RX ADMIN — LIDOCAINE HYDROCHLORIDE 5 ML: 20 INJECTION, SOLUTION INFILTRATION; PERINEURAL at 08:30

## 2019-06-19 RX ADMIN — ALBUMIN (HUMAN) 25 G: 0.25 INJECTION, SOLUTION INTRAVENOUS at 08:41

## 2019-06-19 NOTE — PROGRESS NOTES
0710-Patient here for ultrasound guided paracentesis. Allergies, home medications, H&P and fasting instructions reviewed with patient. Vital signs taken. IV placed, blood obtained, IV flushed and prn adapter attached. Blood sample sent to lab for ordered tests. 3126-  Procedural instructions given, questions answered, understanding expressed and consent signed. 4129- Time out done. Abdomen scanned, prepped and centesis catheter inserted LLQ with ultrasound guidance by Herbie BROWN. 0905-Patient tolerated well. 8.3 Liters drained of clear yellow colored ascites fluid. Centesis catheter removed. Puncture site cleansed and dry dressing applied. No bleeding, swelling or complications noted. 2228- Taking juice and cookies well. 1832- Discharge instructions reviewed and understands well. Son called for . 0930- Up to bathroom. 4245- Discharged via wheelchair and released to son.

## 2019-06-19 NOTE — H&P
Interventional Radiology  PRE-OPERATIVE H&P FOR PARACENTESIS    DIAGNOSIS:    Patient Active Problem List   Diagnosis    Malignant neoplasm of left breast (HCC)    Type 2 diabetes mellitus (HCC)    Obstructive sleep apnea syndrome    Chronic back pain    Essential hypertension    Multiple thyroid nodules    Balance problem    Depression    At risk for colon cancer    Hx of adenomatous colonic polyps    Dyslipidemia    Murmur, cardiac    Muscle cramps    Cirrhosis (HCC)    Epigastric abdominal pain    Early satiety    Nausea and vomiting    Heartburn    BRBPR (bright red blood per rectum)    Constipation       CHIEF COMPLAINT: Abdominal Ascites      Current Outpatient Medications:     furosemide (LASIX) 20 MG tablet, TK 1 T PO QD, Disp: , Rfl: 2    tamoxifen (NOLVADEX) 20 MG tablet, , Disp: , Rfl:     pantoprazole (PROTONIX) 40 MG tablet, , Disp: , Rfl:     magnesium oxide (MAG-OX) 400 (240 Mg) MG tablet, Take by mouth, Disp: , Rfl:     GENERLAC 10 GM/15ML SOLN solution, , Disp: , Rfl:     FREESTYLE LITE strip, CHECK BLOOD SUGAR D, Disp: , Rfl: 11    Compression Stockings MISC, by Does not apply route 20-30 mmHg. Below the knee. Venous insufficiency. , Disp: 1 each, Rfl: 0    spironolactone (ALDACTONE) 25 MG tablet, TAKE 1 TABLET BY MOUTH DAILY, Disp: 90 tablet, Rfl: 1    losartan (COZAAR) 100 MG tablet, TAKE 1 TABLET BY MOUTH DAILY, Disp: 90 tablet, Rfl: 0    omeprazole (PRILOSEC) 20 MG delayed release capsule, Take 1 capsule by mouth 2 times daily, Disp: 60 capsule, Rfl: 3    naproxen (NAPROSYN) 500 MG tablet, Take 1 tablet by mouth 2 times daily for 30 doses, Disp: 30 tablet, Rfl: 0    cyclobenzaprine (FLEXERIL) 5 MG tablet, Take 1 tablet by mouth 3 times daily as needed for Muscle spasms, Disp: 12 tablet, Rfl: 0    vitamin B-12 (CYANOCOBALAMIN) 1000 MCG tablet, Take 1,000 mcg by mouth daily, Disp: , Rfl:     felodipine (PLENDIL) 2.5 MG extended release tablet, TAKE 1 TABLET BY MOUTH DAILY (Patient taking differently: Take 2.5 mg by mouth nightly ), Disp: 30 tablet, Rfl: 0    ondansetron (ZOFRAN) 4 MG tablet, Take 1 tablet by mouth daily as needed for Nausea or Vomiting, Disp: 30 tablet, Rfl: 3    venlafaxine (EFFEXOR XR) 75 MG extended release capsule, TAKE 1 CAPSULE BY MOUTH DAILY (Patient taking differently: Take 75 mg by mouth nightly ), Disp: 90 capsule, Rfl: 1    exemestane (AROMASIN) 25 MG tablet, Take 25 mg by mouth daily , Disp: , Rfl:     metFORMIN (GLUCOPHAGE) 500 MG tablet, Take 1 tablet by mouth 2 times daily (with meals) TAKE 1 TABLET BY MOUTH daily in AM (Patient taking differently: Take 500 mg by mouth daily (with breakfast) TAKE 1 TABLET BY MOUTH daily in AM), Disp: 180 tablet, Rfl: 5    atorvastatin (LIPITOR) 40 MG tablet, Take 1 tablet by mouth daily, Disp: 90 tablet, Rfl: 1    ammonium lactate (LAC-HYDRIN) 12 % lotion, BOBBY BID, Disp: , Rfl: 8    doxazosin (CARDURA) 1 MG tablet, Take 1 mg by mouth nightly, Disp: , Rfl:     vitamin D (CHOLECALCIFEROL) 1000 UNIT TABS tablet, Take 1,000 Units by mouth daily, Disp: , Rfl:     Current Facility-Administered Medications:     sodium chloride flush 0.9 % injection 10 mL, 10 mL, Intravenous, PRN, Mac Patch, PA    albumin human 25 % solution 25 g, 25 g, Intravenous, Once, Job Dire, DO    Allergies   Allergen Reactions    Lisinopril        Past Medical History:   Diagnosis Date    [facility]     Cirrhosis [facility]     Essential hypertension     Hyperlipidemia     Osteopenia     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 ([facility]     Sleep apnea     Spinal stenosis     Type 2 diabetes mellitus ([facility]        Past Surgical History:   Procedure Laterality Date    BREAST LUMPECTOMY      lumpectomy [id number]    CARPAL TUNNEL RELEASE      COLONOSCOPY  01/31/2017    multiple polyps; diverticula--jerod    COLONOSCOPY  04/23/2019    polyps; diverticula; hemorrhoids--jerod    COLONOSCOPY N/A 4/23/2019    COLONOSCOPY POLYPECTOMY SNARE/COLD BIOPSY performed by Chencho Shell MD at [address] [facility] COLONOSCOPY  4/23/2019    COLONOSCOPY WITH BIOPSY performed by Chencho Shell MD at [address] [facility] [address] LITHOTRIPSY Left 05/04/2016    C-R STENT PLACEMENT    UPPER GASTROINTESTINAL ENDOSCOPY  04/23/2019    gastritis--jerod    UPPER GASTROINTESTINAL ENDOSCOPY N/A 4/23/2019    EGD BIOPSY performed by Chencho Shell MD at [facility] History   Problem Relation Age of Onset    COPD Father     Heart Attack Father     Arthritis Mother     Cancer Other 48        colon       Social History     Socioeconomic History    Marital status:      Spouse name: Not on file    Number of children: Not on file    Years of education: Not on file    Highest education level: Not on file   Occupational History    Not on file   Social Needs    Financial resource strain: Not on file    Food insecurity:     Worry: Not on file     Inability: Not on file    Transportation needs:     Medical: Not on file     Non-medical: Not on file   Tobacco Use    Smoking status: Never Smoker    Smokeless tobacco: Never Used   Substance and Sexual Activity    Alcohol use: No     Alcohol/week: 0.0 oz    Drug use: No    Sexual activity: Never     Partners: Male     Comment: last in 2013   Lifestyle    Physical activity:     Days per week: Not on file     Minutes per session: Not on file    Stress: Not on file   Relationships    Social connections:     Talks on phone: Not on file     Gets together: Not on file     Attends [employer] service: Not on file     Active member of club or organization: Not on file     Attends meetings of clubs or organizations: Not on file     Relationship status: Not on file    Intimate partner violence:     Fear of current or ex partner: Not on file     Emotionally abused: Not on file     Physically abused: Not on file     Forced sexual activity: Not on file   Other Topics Concern    Not on

## 2019-06-19 NOTE — BRIEF OP NOTE
Brief Postoperative Note  ______________________________________________________________      PARACENTESIS BRIEF PROCEDURE NOTE    Patient Name: Rashmi Mccollum   Medical Record Number: [id number]  Date: 6/19/19   Time: 8:26 AM   Room/Bed: Room/bed info not found    Pre-operative Diagnosis: Ascites    Post-operative Diagnosis: Ascites    H and P reviewed prior to the procedure without change. Sonographic images were saved and stored in PACS. See radiology dictation in PACs for image review and additional procedural information. Performed by: STEPHANIE Hairston under on-site supervision by Stormy Silva II, MD.    Procedure: Prior to start of procedure, routine scanning of all four abdominal quadrants was performed using real-time ultrasound and revealed sufficient amount of ascites fluid present. Decision was made to proceed with procedure. After obtaining consent, the patient was placed in the supine position with the head of the bed slightly elevated and the appropriate landmarks were identified. The skin over the puncture site in the right lower quadrant region was prepped with betadine and draped in a sterile fashion. Local anesthesia was obtained by infiltration using 2% Lidocaine without epinephrine. A paracentesis catheter was then advanced into the abdominal cavity over a needle and the needle was withdrawn. Fluid return was clear yellow colored. A total volume of 8300 cc was withdrawn and was processed in accordance with the ordering provider(s) requests (see Epic Orders--> Labs for laboratory order details). The catheter was then withdrawn and a sterile dressing was placed over the site. The patient tolerated the procedure well.     Complications: None    Estimated Blood Loss: < 10 cc      Electronically signed by STEPHANIE Hairston   DD: 6/19/19  8:26 AM

## 2019-06-20 LAB — GRAM STAIN ORDERABLE: NORMAL

## 2019-06-22 LAB
BODY FLUID CULTURE, STERILE: NORMAL
GRAM STAIN RESULT: NORMAL

## 2019-06-24 ENCOUNTER — HOSPITAL ENCOUNTER (OUTPATIENT)
Age: 74
Discharge: HOME | End: 2019-06-24
Payer: MEDICARE

## 2019-06-24 DIAGNOSIS — M79.89 LEG SWELLING: ICD-10-CM

## 2019-06-24 DIAGNOSIS — K74.60 CIRRHOSIS OF LIVER WITHOUT ASCITES, UNSPECIFIED HEPATIC CIRRHOSIS TYPE (HCC): ICD-10-CM

## 2019-06-24 LAB
ANION GAP SERPL CALCULATED.3IONS-SCNC: 11 MMOL/L (ref 7–16)
BUN BLDV-MCNC: 17 MG/DL (ref 8–23)
CALCIUM SERPL-MCNC: 10.5 MG/DL (ref 8.6–10.2)
CHLORIDE BLD-SCNC: 103 MMOL/L (ref 98–107)
CO2: 26 MMOL/L (ref 22–29)
CREAT SERPL-MCNC: 1.1 MG/DL (ref 0.5–1)
GFR AFRICAN AMERICAN: 59
GFR NON-AFRICAN AMERICAN: 49 ML/MIN/1.73
GLUCOSE BLD-MCNC: 82 MG/DL (ref 74–99)
POTASSIUM SERPL-SCNC: 4.9 MMOL/L (ref 3.5–5)
SODIUM BLD-SCNC: 140 MMOL/L (ref 132–146)

## 2019-06-24 PROCEDURE — 36415 COLL VENOUS BLD VENIPUNCTURE: CPT

## 2019-06-24 PROCEDURE — 80048 BASIC METABOLIC PNL TOTAL CA: CPT

## 2019-06-25 ENCOUNTER — OFFICE VISIT (OUTPATIENT)
Dept: FAMILY MEDICINE CLINIC | Age: 74
End: 2019-06-25
Payer: MEDICARE

## 2019-06-25 VITALS
SYSTOLIC BLOOD PRESSURE: 124 MMHG | WEIGHT: 174 LBS | HEIGHT: 63 IN | TEMPERATURE: 100.2 F | RESPIRATION RATE: 16 BRPM | DIASTOLIC BLOOD PRESSURE: 74 MMHG | HEART RATE: 106 BPM | OXYGEN SATURATION: 96 % | BODY MASS INDEX: 30.83 KG/M2

## 2019-06-25 DIAGNOSIS — R11.0 NAUSEA: ICD-10-CM

## 2019-06-25 DIAGNOSIS — E78.5 DYSLIPIDEMIA: ICD-10-CM

## 2019-06-25 DIAGNOSIS — K74.60 CIRRHOSIS OF LIVER WITHOUT ASCITES, UNSPECIFIED HEPATIC CIRRHOSIS TYPE (HCC): ICD-10-CM

## 2019-06-25 DIAGNOSIS — R18.8 OTHER ASCITES: ICD-10-CM

## 2019-06-25 DIAGNOSIS — N18.30 STAGE 3 CHRONIC KIDNEY DISEASE (HCC): Primary | ICD-10-CM

## 2019-06-25 DIAGNOSIS — M79.89 LEG SWELLING: ICD-10-CM

## 2019-06-25 PROCEDURE — G8427 DOCREV CUR MEDS BY ELIG CLIN: HCPCS | Performed by: FAMILY MEDICINE

## 2019-06-25 PROCEDURE — 1090F PRES/ABSN URINE INCON ASSESS: CPT | Performed by: FAMILY MEDICINE

## 2019-06-25 PROCEDURE — G8417 CALC BMI ABV UP PARAM F/U: HCPCS | Performed by: FAMILY MEDICINE

## 2019-06-25 PROCEDURE — 99214 OFFICE O/P EST MOD 30 MIN: CPT | Performed by: FAMILY MEDICINE

## 2019-06-25 PROCEDURE — 4040F PNEUMOC VAC/ADMIN/RCVD: CPT | Performed by: FAMILY MEDICINE

## 2019-06-25 PROCEDURE — 1036F TOBACCO NON-USER: CPT | Performed by: FAMILY MEDICINE

## 2019-06-25 PROCEDURE — G8399 PT W/DXA RESULTS DOCUMENT: HCPCS | Performed by: FAMILY MEDICINE

## 2019-06-25 PROCEDURE — 1123F ACP DISCUSS/DSCN MKR DOCD: CPT | Performed by: FAMILY MEDICINE

## 2019-06-25 PROCEDURE — 3017F COLORECTAL CA SCREEN DOC REV: CPT | Performed by: FAMILY MEDICINE

## 2019-06-25 RX ORDER — ATORVASTATIN CALCIUM 40 MG/1
40 TABLET, FILM COATED ORAL DAILY
Qty: 90 TABLET | Refills: 0 | Status: SHIPPED | OUTPATIENT
Start: 2019-06-25 | End: 2019-07-09

## 2019-06-25 NOTE — PATIENT INSTRUCTIONS
Follow up with the liver doctor on the 1st.   We will await the results of the fluid they took out. Continue the spironolactone and the lasix for now. In one week go to the [facility]. E's lab next to the [employer] and get your kidney function and potassium rechecked once more.

## 2019-06-25 NOTE — PROGRESS NOTES
[facility] Progress Note    Subjective: Christelle Christine is a 76y.o. year old female here for abdominal distention. Health Maintenance Due   Topic Date Due    Hepatitis A vaccine (1 of 2 - Risk 2-dose series) 06[birth year]/1946    Hepatitis B Vaccine (1 of 3 - Risk 3-dose series) 06/03/1964    DTaP/Tdap/Td vaccine (1 - Tdap) 06/03/1964    Breast cancer screen  06/03/1995    Shingles Vaccine (3 of 3) 06/07/2018       Wear the compression stockings, keep your legs elevated when you're not walking, and walk as much as you can to get the fluid out of your legs. If you can, keep your sodium less than 2 g per day. - has been doing all these things. Stocking on today. Will be seeing liver doc on the 1st. Just had paracentesis and \"2 gallons\" taken out. The abdominal cramping decreased greatly at first, but now getting the cramping again and the nausea she's had for a while. Breathing got better as well. On aldactone 25, added to lasix last OV. F/u BW stable so far.        Past Medical History:   Diagnosis Date    Breast cancer ([address]     Cirrhosis ([address]     Essential hypertension     Hyperlipidemia     Osteopenia     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 ([address]     Sleep apnea     Spinal stenosis     Type 2 diabetes mellitus ([address]      Past Surgical History:   Procedure Laterality Date    BREAST LUMPECTOMY      lumpectomy [id number]    CARPAL TUNNEL RELEASE      COLONOSCOPY  01/31/2017    multiple polyps; diverticula--jerod    COLONOSCOPY  04/23/2019    polyps; diverticula; hemorrhoids--jerod    COLONOSCOPY N/A 4/23/2019    COLONOSCOPY POLYPECTOMY SNARE/COLD BIOPSY performed by Nicci Palma MD at [place] [address]  4/23/2019    COLONOSCOPY WITH BIOPSY performed by Nicci Palma MD at [address] [place] [address] LITHOTRIPSY Left 05/04/2016    C-R STENT PLACEMENT    UPPER GASTROINTESTINAL ENDOSCOPY  04/23/2019    gastritis--jerod    UPPER GASTROINTESTINAL ENDOSCOPY N/A 4/23/2019    EGD BIOPSY performed by Esmer Marshall MD at [address] History   Problem Relation Age of Onset    COPD Father     Heart Attack Father     Arthritis Mother     Cancer Other 48        colon     Social History     Socioeconomic History    Marital status:      Spouse name: Not on file    Number of children: Not on file    Years of education: Not on file    Highest education level: Not on file   Occupational History    Not on file   Social Needs    Financial resource strain: Not on file    Food insecurity:     Worry: Not on file     Inability: Not on file    Transportation needs:     Medical: Not on file     Non-medical: Not on file   Tobacco Use    Smoking status: Never Smoker    Smokeless tobacco: Never Used   Substance and Sexual Activity    Alcohol use: No     Alcohol/week: 0.0 oz    Drug use: No    Sexual activity: Never     Partners: Male     Comment: last in 2013   Lifestyle    Physical activity:     Days per week: Not on file     Minutes per session: Not on file    Stress: Not on file   Relationships    Social connections:     Talks on phone: Not on file     Gets together: Not on file     Attends [employer] service: Not on file     Active member of club or organization: Not on file     Attends meetings of clubs or organizations: Not on file     Relationship status: Not on file    Intimate partner violence:     Fear of current or ex partner: Not on file     Emotionally abused: Not on file     Physically abused: Not on file     Forced sexual activity: Not on file   Other Topics Concern    Not on file   Social History Narrative    Not on file       Review Of Systems (bold are positive, all else are negative)  Gen: No fevers of \"100\", sweats, chills  No fatigue  No unintentional weight changes  Skin: No rash, no lesion  Resp: No cough, shortness of breath, wheeze, chest congestion  CV: No chest pain, palpitation, edema      Objective:  /74 (Site: Right Upper Arm, Position: Sitting, Cuff Size: Large Adult)   Pulse 106   Temp 100.2 °F (37.9 °C) (Temporal)   Resp 16   Ht 5' 2.5\" (1.588 m)   Wt 174 lb (78.9 kg)   SpO2 96%   BMI 31.32 kg/m²   General appearance: NAD, alert and interacting appropriately  HEENT: NCAT, PERRLA, EOMI   Resp: bibasilar crackles  CVS: RRR, IVAN 3/6 RSB  Abdomen: BS +, SNT. Abdomen distended   Extremities: No clubbing, cyanosis. trace ble edema. Warm. Dry. Skin: no jaundice. Faint erythematous rash over chest.       I have reviewed this patient's previous records. I have reviewed this patient's labs. I have reviewed this patient's medications. Assessment/Plan: Madhuri King was seen today for cirrhosis and leg swelling. Diagnoses and all orders for this visit:    Stage 3 chronic kidney disease ([facility]  -     BASIC METABOLIC PANEL; Future    Cirrhosis of liver without ascites, unspecified hepatic cirrhosis type (HCC)    Other ascites    Leg swelling    Nausea      CKD not controlled  Cirrhosis not controlled  Nausea not controlled. Need to work w/ liver doc to decrease ascites treat underlying cause. Leg swelling marginally controlled. Cont aldactone and lasix     DDx: cirrhosis 2/2 HOLDEN > autoimmune > HFrEF > HFpEF vs CKD > hepatic mets      Patient Instructions   Follow up with the liver doctor on the 1st.   We will await the results of the fluid they took out. Continue the spironolactone and the lasix for now. In one week go to the [address] [place] [address]. E's lab next to the [employer] and get your kidney function and potassium rechecked once more. Return in about 1 month (around 7/25/2019) for cirrhosis.       Electronically signed by Alicia Quionnes MD on 6/25/2019 at 11:46 AM

## 2019-07-01 ENCOUNTER — TELEPHONE (OUTPATIENT)
Dept: FAMILY MEDICINE CLINIC | Age: 74
End: 2019-07-01

## 2019-07-07 DIAGNOSIS — I10 ESSENTIAL HYPERTENSION: ICD-10-CM

## 2019-07-08 ENCOUNTER — TELEPHONE (OUTPATIENT)
Dept: FAMILY MEDICINE CLINIC | Age: 74
End: 2019-07-08

## 2019-07-08 ENCOUNTER — HOSPITAL ENCOUNTER (OUTPATIENT)
Age: 74
Discharge: HOME | End: 2019-07-08
Payer: MEDICARE

## 2019-07-08 DIAGNOSIS — N18.30 STAGE 3 CHRONIC KIDNEY DISEASE (HCC): ICD-10-CM

## 2019-07-08 LAB
ANION GAP SERPL CALCULATED.3IONS-SCNC: 10 MMOL/L (ref 7–16)
BUN BLDV-MCNC: 12 MG/DL (ref 8–23)
CALCIUM SERPL-MCNC: 9.7 MG/DL (ref 8.6–10.2)
CHLORIDE BLD-SCNC: 108 MMOL/L (ref 98–107)
CO2: 26 MMOL/L (ref 22–29)
CREAT SERPL-MCNC: 1 MG/DL (ref 0.5–1)
GFR AFRICAN AMERICAN: >60
GFR NON-AFRICAN AMERICAN: 54 ML/MIN/1.73
GLUCOSE BLD-MCNC: 89 MG/DL (ref 74–99)
POTASSIUM SERPL-SCNC: 4 MMOL/L (ref 3.5–5)
SODIUM BLD-SCNC: 144 MMOL/L (ref 132–146)

## 2019-07-08 PROCEDURE — 80048 BASIC METABOLIC PNL TOTAL CA: CPT

## 2019-07-08 PROCEDURE — 36415 COLL VENOUS BLD VENIPUNCTURE: CPT

## 2019-07-09 DIAGNOSIS — K74.60 CIRRHOSIS OF LIVER WITHOUT ASCITES, UNSPECIFIED HEPATIC CIRRHOSIS TYPE (HCC): ICD-10-CM

## 2019-07-09 DIAGNOSIS — R18.8 OTHER ASCITES: ICD-10-CM

## 2019-07-09 DIAGNOSIS — R26.89 BALANCE PROBLEM: Primary | ICD-10-CM

## 2019-07-09 DIAGNOSIS — R25.2 MUSCLE CRAMPS: ICD-10-CM

## 2019-07-09 DIAGNOSIS — M79.89 LEG SWELLING: ICD-10-CM

## 2019-07-09 RX ORDER — FELODIPINE 2.5 MG/1
TABLET, EXTENDED RELEASE ORAL
Qty: 30 TABLET | Refills: 0 | Status: SHIPPED | OUTPATIENT
Start: 2019-07-09 | End: 2019-08-08 | Stop reason: SDUPTHER

## 2019-07-10 ENCOUNTER — TELEPHONE (OUTPATIENT)
Dept: NON INVASIVE DIAGNOSTICS | Age: 74
End: 2019-07-10

## 2019-07-12 ENCOUNTER — HOSPITAL ENCOUNTER (OUTPATIENT)
Dept: NON INVASIVE DIAGNOSTICS | Age: 74
Discharge: HOME | End: 2019-07-12
Payer: MEDICARE

## 2019-07-12 LAB
LV EF: 60 %
LVEF MODALITY: NORMAL

## 2019-07-12 PROCEDURE — 93306 TTE W/DOPPLER COMPLETE: CPT

## 2019-07-22 ENCOUNTER — HOSPITAL ENCOUNTER (OUTPATIENT)
Age: 74
Discharge: HOME | End: 2019-07-22
Payer: MEDICARE

## 2019-07-22 LAB
ALBUMIN SERPL-MCNC: 2.9 G/DL (ref 3.5–5.2)
ALP BLD-CCNC: 113 U/L (ref 35–104)
ALT SERPL-CCNC: 13 U/L (ref 0–32)
ANION GAP SERPL CALCULATED.3IONS-SCNC: 8 MMOL/L (ref 7–16)
AST SERPL-CCNC: 28 U/L (ref 0–31)
BASOPHILS ABSOLUTE: 0.05 E9/L (ref 0–0.2)
BASOPHILS RELATIVE PERCENT: 0.8 % (ref 0–2)
BILIRUB SERPL-MCNC: 1 MG/DL (ref 0–1.2)
BILIRUBIN DIRECT: 0.4 MG/DL (ref 0–0.3)
BILIRUBIN, INDIRECT: 0.6 MG/DL (ref 0–1)
BUN BLDV-MCNC: 11 MG/DL (ref 8–23)
CALCIUM SERPL-MCNC: 9.9 MG/DL (ref 8.6–10.2)
CHLORIDE BLD-SCNC: 107 MMOL/L (ref 98–107)
CO2: 28 MMOL/L (ref 22–29)
CREAT SERPL-MCNC: 1 MG/DL (ref 0.5–1)
EOSINOPHILS ABSOLUTE: 0.16 E9/L (ref 0.05–0.5)
EOSINOPHILS RELATIVE PERCENT: 2.7 % (ref 0–6)
GFR AFRICAN AMERICAN: >60
GFR NON-AFRICAN AMERICAN: 54 ML/MIN/1.73
GLUCOSE BLD-MCNC: 107 MG/DL (ref 74–99)
HCT VFR BLD CALC: 36.7 % (ref 34–48)
HEMOGLOBIN: 12.1 G/DL (ref 11.5–15.5)
IMMATURE GRANULOCYTES #: 0.01 E9/L
IMMATURE GRANULOCYTES %: 0.2 % (ref 0–5)
LYMPHOCYTES ABSOLUTE: 1.32 E9/L (ref 1.5–4)
LYMPHOCYTES RELATIVE PERCENT: 22 % (ref 20–42)
MCH RBC QN AUTO: 34.1 PG (ref 26–35)
MCHC RBC AUTO-ENTMCNC: 33 % (ref 32–34.5)
MCV RBC AUTO: 103.4 FL (ref 80–99.9)
MONOCYTES ABSOLUTE: 0.72 E9/L (ref 0.1–0.95)
MONOCYTES RELATIVE PERCENT: 12 % (ref 2–12)
NEUTROPHILS ABSOLUTE: 3.75 E9/L (ref 1.8–7.3)
NEUTROPHILS RELATIVE PERCENT: 62.3 % (ref 43–80)
PDW BLD-RTO: 17.7 FL (ref 11.5–15)
PLATELET # BLD: 210 E9/L (ref 130–450)
PMV BLD AUTO: 10.2 FL (ref 7–12)
POTASSIUM SERPL-SCNC: 4.6 MMOL/L (ref 3.5–5)
RBC # BLD: 3.55 E12/L (ref 3.5–5.5)
SODIUM BLD-SCNC: 143 MMOL/L (ref 132–146)
TOTAL PROTEIN: 6.9 G/DL (ref 6.4–8.3)
WBC # BLD: 6 E9/L (ref 4.5–11.5)

## 2019-07-22 PROCEDURE — 82248 BILIRUBIN DIRECT: CPT

## 2019-07-22 PROCEDURE — 82247 BILIRUBIN TOTAL: CPT

## 2019-07-22 PROCEDURE — 36415 COLL VENOUS BLD VENIPUNCTURE: CPT

## 2019-07-22 PROCEDURE — 80053 COMPREHEN METABOLIC PANEL: CPT

## 2019-07-22 PROCEDURE — 85025 COMPLETE CBC W/AUTO DIFF WBC: CPT

## 2019-07-25 ENCOUNTER — OFFICE VISIT (OUTPATIENT)
Dept: FAMILY MEDICINE CLINIC | Age: 74
End: 2019-07-25
Payer: MEDICARE

## 2019-07-25 VITALS
RESPIRATION RATE: 16 BRPM | TEMPERATURE: 98.8 F | HEIGHT: 63 IN | HEART RATE: 116 BPM | WEIGHT: 175 LBS | SYSTOLIC BLOOD PRESSURE: 116 MMHG | DIASTOLIC BLOOD PRESSURE: 74 MMHG | OXYGEN SATURATION: 95 % | BODY MASS INDEX: 31.01 KG/M2

## 2019-07-25 DIAGNOSIS — F32.9 MAJOR DEPRESSIVE DISORDER, REMISSION STATUS UNSPECIFIED, UNSPECIFIED WHETHER RECURRENT: ICD-10-CM

## 2019-07-25 DIAGNOSIS — K74.60 CIRRHOSIS OF LIVER WITHOUT ASCITES, UNSPECIFIED HEPATIC CIRRHOSIS TYPE (HCC): ICD-10-CM

## 2019-07-25 DIAGNOSIS — E78.5 DYSLIPIDEMIA: ICD-10-CM

## 2019-07-25 DIAGNOSIS — F32.A DEPRESSION, UNSPECIFIED DEPRESSION TYPE: ICD-10-CM

## 2019-07-25 DIAGNOSIS — E11.40 TYPE 2 DIABETES MELLITUS WITH DIABETIC NEUROPATHY, UNSPECIFIED WHETHER LONG TERM INSULIN USE (HCC): Primary | ICD-10-CM

## 2019-07-25 DIAGNOSIS — M79.89 LEG SWELLING: ICD-10-CM

## 2019-07-25 LAB — HBA1C MFR BLD: 4.9 %

## 2019-07-25 PROCEDURE — 3017F COLORECTAL CA SCREEN DOC REV: CPT | Performed by: FAMILY MEDICINE

## 2019-07-25 PROCEDURE — 2022F DILAT RTA XM EVC RTNOPTHY: CPT | Performed by: FAMILY MEDICINE

## 2019-07-25 PROCEDURE — G8417 CALC BMI ABV UP PARAM F/U: HCPCS | Performed by: FAMILY MEDICINE

## 2019-07-25 PROCEDURE — G8427 DOCREV CUR MEDS BY ELIG CLIN: HCPCS | Performed by: FAMILY MEDICINE

## 2019-07-25 PROCEDURE — 4040F PNEUMOC VAC/ADMIN/RCVD: CPT | Performed by: FAMILY MEDICINE

## 2019-07-25 PROCEDURE — 99214 OFFICE O/P EST MOD 30 MIN: CPT | Performed by: FAMILY MEDICINE

## 2019-07-25 PROCEDURE — 1090F PRES/ABSN URINE INCON ASSESS: CPT | Performed by: FAMILY MEDICINE

## 2019-07-25 PROCEDURE — 3044F HG A1C LEVEL LT 7.0%: CPT | Performed by: FAMILY MEDICINE

## 2019-07-25 PROCEDURE — 83036 HEMOGLOBIN GLYCOSYLATED A1C: CPT | Performed by: FAMILY MEDICINE

## 2019-07-25 PROCEDURE — G8399 PT W/DXA RESULTS DOCUMENT: HCPCS | Performed by: FAMILY MEDICINE

## 2019-07-25 PROCEDURE — 1123F ACP DISCUSS/DSCN MKR DOCD: CPT | Performed by: FAMILY MEDICINE

## 2019-07-25 PROCEDURE — 1036F TOBACCO NON-USER: CPT | Performed by: FAMILY MEDICINE

## 2019-07-25 NOTE — PATIENT INSTRUCTIONS
Please call every pharmacy around and ask if they provide the new shingles vaccine and how much it costs. If it's affordable please get it and let me know when you've received it. Follow up with the liver doctor for the ultrasound of your abdomen as planned. Stop the metformin. Wear the compression stockings, keep your legs elevated when you're not walking, and walk as much as you can to get the fluid out of your legs. If you can, keep your sodium less than 2 g per day. Do your best to keep walking every day. Continue the effexor, and we will recheck your mood in 3 months. We can increase it if needed.

## 2019-07-25 NOTE — PROGRESS NOTES
[facility] Progress Note    Subjective: Don Villasenor is a 76y.o. year old female here for DM. Health Maintenance Due   Topic Date Due    Hepatitis A vaccine (1 of 2 - Risk 2-dose series) 06/03/1946    Hepatitis B Vaccine (1 of 3 - Risk 3-dose series) 06/03/1964    DTaP/Tdap/Td vaccine (1 - Tdap) 06/03/1964    Breast cancer screen  06/03/1995    Shingles Vaccine (3 of 3) 06/07/2018    Diabetic foot exam  07/10/2019     Leg swelling has improved greatly. On aldactone and lasix. cmp showing low albumin and ckd III. Does not follow w/ Nephro. Cr stable. SOB not related to exertion has improved over the last few weeks. Had Echo on 7/12/19 showing:  Christos Needles fraction is visually estimated at 60%.   No regional wall motion abnormalities seen   There is doppler evidence of stage I diastolic dysfunction.   Normal right ventricle structure and function.   Left atrial volume index of 38 ml per meters squared BSA.   Mild mitral regurgitation is present.   Mild to moderate tricuspid regurgitation.     Will be seeing liver doc on the 6st for cirrhosis, HOLDEN.  paracentesis planned after abd US. The abdominal cramping is controlled. Taking metformin for DM. Lab Results   Component Value Date    LABA1C 4.9 07/25/2019   no polyuria, no polydipsia. Ophtho: y  Hypoglycemic episodes: n     Stopped the arimidex for h/o breast cancer for which she follows w/ Onc. Having bone pain since stopping the medication. Having fatigue. Chronic. Mood slightly down since this all started, but no SI or HI. Wants to do activities, but having trouble getting motivated due to low energy. On effexor for depression.        Past Medical History:   Diagnosis Date    Breast cancer ([address]     Cirrhosis ([address]     Essential hypertension     Hyperlipidemia     Osteopenia     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 ([address]     Sleep apnea     Spinal stenosis     Type 2 diabetes mellitus ([address]      Past Surgical History:   Procedure

## 2019-07-29 ENCOUNTER — HOSPITAL ENCOUNTER (OUTPATIENT)
Dept: ULTRASOUND IMAGING | Age: 74
Discharge: HOME | End: 2019-07-31
Payer: MEDICARE

## 2019-07-29 DIAGNOSIS — K74.60 HEPATIC CIRRHOSIS, UNSPECIFIED HEPATIC CIRRHOSIS TYPE, UNSPECIFIED WHETHER ASCITES PRESENT (HCC): ICD-10-CM

## 2019-07-29 PROCEDURE — 76705 ECHO EXAM OF ABDOMEN: CPT

## 2019-07-31 RX ORDER — SODIUM CHLORIDE 0.9 % (FLUSH) 0.9 %
10 SYRINGE (ML) INJECTION PRN
Status: CANCELLED | OUTPATIENT
Start: 2019-07-31

## 2019-07-31 RX ORDER — ALBUMIN (HUMAN) 12.5 G/50ML
50 SOLUTION INTRAVENOUS ONCE
Status: CANCELLED | OUTPATIENT
Start: 2019-07-31 | End: 2019-07-31

## 2019-08-01 ENCOUNTER — HOSPITAL ENCOUNTER (OUTPATIENT)
Dept: INTERVENTIONAL RADIOLOGY/VASCULAR | Age: 74
Discharge: HOME | End: 2019-08-03
Payer: MEDICARE

## 2019-08-01 VITALS
RESPIRATION RATE: 18 BRPM | TEMPERATURE: 97.9 F | SYSTOLIC BLOOD PRESSURE: 117 MMHG | HEART RATE: 87 BPM | DIASTOLIC BLOOD PRESSURE: 58 MMHG | OXYGEN SATURATION: 96 %

## 2019-08-01 DIAGNOSIS — R18.8 OTHER ASCITES: ICD-10-CM

## 2019-08-01 LAB
ALBUMIN FLUID: 1 G/DL
APPEARANCE FLUID: NORMAL
CELL COUNT FLUID TYPE: NORMAL
COLOR FLUID: YELLOW
FLUID TYPE: NORMAL
INR BLD: 1.4
MONOCYTE, FLUID: 90 %
NEUTROPHIL, FLUID: 10 %
NUCLEATED CELLS FLUID: 147 /UL
PROTEIN FLUID: 2 G/DL
PROTHROMBIN TIME: 15.7 SEC (ref 9.3–12.4)
RBC FLUID: <2000 /UL

## 2019-08-01 PROCEDURE — 36415 COLL VENOUS BLD VENIPUNCTURE: CPT

## 2019-08-01 PROCEDURE — 49083 ABD PARACENTESIS W/IMAGING: CPT

## 2019-08-01 PROCEDURE — 87205 SMEAR GRAM STAIN: CPT

## 2019-08-01 PROCEDURE — 88305 TISSUE EXAM BY PATHOLOGIST: CPT

## 2019-08-01 PROCEDURE — 87070 CULTURE OTHR SPECIMN AEROBIC: CPT

## 2019-08-01 PROCEDURE — C1729 CATH, DRAINAGE: HCPCS

## 2019-08-01 PROCEDURE — 89051 BODY FLUID CELL COUNT: CPT

## 2019-08-01 PROCEDURE — 84157 ASSAY OF PROTEIN OTHER: CPT

## 2019-08-01 PROCEDURE — 2500000003 HC RX 250 WO HCPCS: Performed by: PHYSICIAN ASSISTANT

## 2019-08-01 PROCEDURE — 88112 CYTOPATH CELL ENHANCE TECH: CPT

## 2019-08-01 PROCEDURE — 7100000010 HC PHASE II RECOVERY - FIRST 15 MIN

## 2019-08-01 PROCEDURE — 85610 PROTHROMBIN TIME: CPT

## 2019-08-01 PROCEDURE — 82042 OTHER SOURCE ALBUMIN QUAN EA: CPT

## 2019-08-01 RX ORDER — LIDOCAINE HYDROCHLORIDE 20 MG/ML
5 INJECTION, SOLUTION INFILTRATION; PERINEURAL ONCE
Status: COMPLETED | OUTPATIENT
Start: 2019-08-01 | End: 2019-08-01

## 2019-08-01 RX ORDER — SODIUM CHLORIDE 0.9 % (FLUSH) 0.9 %
10 SYRINGE (ML) INJECTION PRN
Status: DISCONTINUED | OUTPATIENT
Start: 2019-08-01 | End: 2019-08-04 | Stop reason: HOSPADM

## 2019-08-01 RX ORDER — ALBUMIN (HUMAN) 12.5 G/50ML
50 SOLUTION INTRAVENOUS ONCE
Status: DISCONTINUED | OUTPATIENT
Start: 2019-08-01 | End: 2019-08-04 | Stop reason: HOSPADM

## 2019-08-01 RX ADMIN — LIDOCAINE HYDROCHLORIDE 5 ML: 20 INJECTION, SOLUTION EPIDURAL; INFILTRATION; INTRACAUDAL; PERINEURAL at 08:24

## 2019-08-01 ASSESSMENT — PAIN - FUNCTIONAL ASSESSMENT: PAIN_FUNCTIONAL_ASSESSMENT: 0-10

## 2019-08-01 NOTE — PROGRESS NOTES
1913 - Time out done. Abdomen scanned, prepped and centesis catheter inserted LLQ with ultrasound guidance by Vicky BROWN. Patient tolerated well. 2800 mL's drained of hazy charity colored ascites fluid. 9955 - Centesis catheter removed. Puncture site cleansed and dry dressing applied. No bleeding, swelling or complications noted. Vitals taken.

## 2019-08-02 LAB — GRAM STAIN ORDERABLE: NORMAL

## 2019-08-03 LAB
BODY FLUID CULTURE, STERILE: NORMAL
GRAM STAIN RESULT: NORMAL

## 2019-08-08 DIAGNOSIS — I10 ESSENTIAL HYPERTENSION: ICD-10-CM

## 2019-08-08 RX ORDER — FELODIPINE 2.5 MG/1
TABLET, EXTENDED RELEASE ORAL
Qty: 30 TABLET | Refills: 0 | Status: SHIPPED | OUTPATIENT
Start: 2019-08-08 | End: 2019-08-08 | Stop reason: SDUPTHER

## 2019-08-11 RX ORDER — FELODIPINE 2.5 MG/1
TABLET, EXTENDED RELEASE ORAL
Qty: 90 TABLET | Refills: 0 | Status: SHIPPED | OUTPATIENT
Start: 2019-08-11 | End: 2019-10-11 | Stop reason: SDUPTHER

## 2019-09-05 DIAGNOSIS — I10 ESSENTIAL HYPERTENSION: ICD-10-CM

## 2019-09-05 RX ORDER — LOSARTAN POTASSIUM 100 MG/1
100 TABLET ORAL DAILY
Qty: 90 TABLET | Refills: 0 | Status: SHIPPED | OUTPATIENT
Start: 2019-09-05 | End: 2019-09-06 | Stop reason: SDUPTHER

## 2019-09-06 DIAGNOSIS — I10 ESSENTIAL HYPERTENSION: ICD-10-CM

## 2019-09-06 RX ORDER — LOSARTAN POTASSIUM 100 MG/1
100 TABLET ORAL DAILY
Qty: 90 TABLET | Refills: 0 | Status: SHIPPED
Start: 2019-09-06 | End: 2020-02-20

## 2019-09-26 DIAGNOSIS — L29.9 ITCHING: ICD-10-CM

## 2019-09-26 RX ORDER — HYDROXYZINE HYDROCHLORIDE 25 MG/1
TABLET, FILM COATED ORAL
Qty: 30 TABLET | Refills: 1 | Status: SHIPPED | OUTPATIENT
Start: 2019-09-26 | End: 2019-10-24

## 2019-10-16 ENCOUNTER — TELEPHONE (OUTPATIENT)
Dept: FAMILY MEDICINE CLINIC | Age: 74
End: 2019-10-16

## 2019-10-24 ENCOUNTER — OFFICE VISIT (OUTPATIENT)
Dept: FAMILY MEDICINE CLINIC | Age: 74
End: 2019-10-24
Payer: MEDICARE

## 2019-10-24 ENCOUNTER — HOSPITAL ENCOUNTER (OUTPATIENT)
Age: 74
Discharge: HOME | End: 2019-10-26
Payer: MEDICARE

## 2019-10-24 VITALS
WEIGHT: 173 LBS | HEART RATE: 87 BPM | SYSTOLIC BLOOD PRESSURE: 122 MMHG | BODY MASS INDEX: 31.14 KG/M2 | RESPIRATION RATE: 14 BRPM | DIASTOLIC BLOOD PRESSURE: 80 MMHG | OXYGEN SATURATION: 98 %

## 2019-10-24 DIAGNOSIS — R18.8 OTHER ASCITES: ICD-10-CM

## 2019-10-24 DIAGNOSIS — R26.89 LOSS OF BALANCE: ICD-10-CM

## 2019-10-24 DIAGNOSIS — E16.2 HYPOGLYCEMIA: ICD-10-CM

## 2019-10-24 DIAGNOSIS — N18.30 STAGE 3 CHRONIC KIDNEY DISEASE (HCC): ICD-10-CM

## 2019-10-24 DIAGNOSIS — K74.60 CIRRHOSIS OF LIVER WITHOUT ASCITES, UNSPECIFIED HEPATIC CIRRHOSIS TYPE (HCC): ICD-10-CM

## 2019-10-24 DIAGNOSIS — F32.A DEPRESSION, UNSPECIFIED DEPRESSION TYPE: ICD-10-CM

## 2019-10-24 DIAGNOSIS — F32.9 MAJOR DEPRESSIVE DISORDER, REMISSION STATUS UNSPECIFIED, UNSPECIFIED WHETHER RECURRENT: ICD-10-CM

## 2019-10-24 DIAGNOSIS — M54.50 LOW BACK PAIN, UNSPECIFIED BACK PAIN LATERALITY, UNSPECIFIED CHRONICITY, UNSPECIFIED WHETHER SCIATICA PRESENT: ICD-10-CM

## 2019-10-24 DIAGNOSIS — L29.9 ITCHING: ICD-10-CM

## 2019-10-24 DIAGNOSIS — E46 PROTEIN-CALORIE MALNUTRITION, UNSPECIFIED SEVERITY (HCC): ICD-10-CM

## 2019-10-24 DIAGNOSIS — Q60.2 CONGENITAL ABSENCE OF KIDNEY: ICD-10-CM

## 2019-10-24 DIAGNOSIS — E11.40 TYPE 2 DIABETES MELLITUS WITH DIABETIC NEUROPATHY, UNSPECIFIED WHETHER LONG TERM INSULIN USE (HCC): Primary | ICD-10-CM

## 2019-10-24 LAB
CHP ED QC CHECK: NORMAL
GLUCOSE BLD-MCNC: 104 MG/DL
GLUCOSE BLD-MCNC: 53 MG/DL
GLUCOSE BLD-MCNC: 60 MG/DL
HBA1C MFR BLD: 5 %

## 2019-10-24 PROCEDURE — 1036F TOBACCO NON-USER: CPT | Performed by: FAMILY MEDICINE

## 2019-10-24 PROCEDURE — G9899 SCRN MAM PERF RSLTS DOC: HCPCS | Performed by: FAMILY MEDICINE

## 2019-10-24 PROCEDURE — G8482 FLU IMMUNIZE ORDER/ADMIN: HCPCS | Performed by: FAMILY MEDICINE

## 2019-10-24 PROCEDURE — G8427 DOCREV CUR MEDS BY ELIG CLIN: HCPCS | Performed by: FAMILY MEDICINE

## 2019-10-24 PROCEDURE — 3044F HG A1C LEVEL LT 7.0%: CPT | Performed by: FAMILY MEDICINE

## 2019-10-24 PROCEDURE — 82962 GLUCOSE BLOOD TEST: CPT | Performed by: FAMILY MEDICINE

## 2019-10-24 PROCEDURE — 4040F PNEUMOC VAC/ADMIN/RCVD: CPT | Performed by: FAMILY MEDICINE

## 2019-10-24 PROCEDURE — 1090F PRES/ABSN URINE INCON ASSESS: CPT | Performed by: FAMILY MEDICINE

## 2019-10-24 PROCEDURE — 3017F COLORECTAL CA SCREEN DOC REV: CPT | Performed by: FAMILY MEDICINE

## 2019-10-24 PROCEDURE — 82607 VITAMIN B-12: CPT

## 2019-10-24 PROCEDURE — G8417 CALC BMI ABV UP PARAM F/U: HCPCS | Performed by: FAMILY MEDICINE

## 2019-10-24 PROCEDURE — 1123F ACP DISCUSS/DSCN MKR DOCD: CPT | Performed by: FAMILY MEDICINE

## 2019-10-24 PROCEDURE — G8399 PT W/DXA RESULTS DOCUMENT: HCPCS | Performed by: FAMILY MEDICINE

## 2019-10-24 PROCEDURE — 99215 OFFICE O/P EST HI 40 MIN: CPT | Performed by: FAMILY MEDICINE

## 2019-10-24 PROCEDURE — 2022F DILAT RTA XM EVC RTNOPTHY: CPT | Performed by: FAMILY MEDICINE

## 2019-10-24 PROCEDURE — 83036 HEMOGLOBIN GLYCOSYLATED A1C: CPT | Performed by: FAMILY MEDICINE

## 2019-10-24 RX ORDER — LACTOSE-REDUCED FOOD
1 LIQUID (ML) ORAL DAILY
Qty: 30 CAN | Refills: 3 | Status: SHIPPED
Start: 2019-10-24 | End: 2020-03-27

## 2019-10-24 RX ORDER — MIRTAZAPINE 7.5 MG/1
7.5 TABLET, FILM COATED ORAL NIGHTLY
Qty: 30 TABLET | Refills: 1 | Status: SHIPPED | OUTPATIENT
Start: 2019-10-24 | End: 2019-12-10 | Stop reason: SDUPTHER

## 2019-10-24 ASSESSMENT — PATIENT HEALTH QUESTIONNAIRE - PHQ9
1. LITTLE INTEREST OR PLEASURE IN DOING THINGS: 0
2. FEELING DOWN, DEPRESSED OR HOPELESS: 0
SUM OF ALL RESPONSES TO PHQ QUESTIONS 1-9: 0
SUM OF ALL RESPONSES TO PHQ9 QUESTIONS 1 & 2: 0
SUM OF ALL RESPONSES TO PHQ QUESTIONS 1-9: 0

## 2019-10-25 DIAGNOSIS — R26.89 LOSS OF BALANCE: ICD-10-CM

## 2019-10-25 LAB — VITAMIN B-12: 1713 PG/ML (ref 211–946)

## 2019-11-08 ENCOUNTER — TELEPHONE (OUTPATIENT)
Dept: FAMILY MEDICINE CLINIC | Age: 74
End: 2019-11-08

## 2019-11-11 ENCOUNTER — HOSPITAL ENCOUNTER (OUTPATIENT)
Age: 74
Discharge: HOME | End: 2019-11-11
Payer: MEDICARE

## 2019-11-11 LAB
ALBUMIN SERPL-MCNC: 3.3 G/DL (ref 3.5–5.2)
ALP BLD-CCNC: 123 U/L (ref 35–104)
ALT SERPL-CCNC: 17 U/L (ref 0–32)
ANION GAP SERPL CALCULATED.3IONS-SCNC: 10 MMOL/L (ref 7–16)
APTT: 37.4 SEC (ref 25.7–34.7)
AST SERPL-CCNC: 32 U/L (ref 0–31)
BASOPHILS ABSOLUTE: 0.05 E9/L (ref 0–0.2)
BASOPHILS RELATIVE PERCENT: 0.7 % (ref 0–2)
BILIRUB SERPL-MCNC: 0.8 MG/DL (ref 0–1.2)
BUN BLDV-MCNC: 20 MG/DL (ref 8–23)
CALCIUM SERPL-MCNC: 10.5 MG/DL (ref 8.6–10.2)
CHLORIDE BLD-SCNC: 108 MMOL/L (ref 98–107)
CO2: 24 MMOL/L (ref 22–29)
CREAT SERPL-MCNC: 0.9 MG/DL (ref 0.5–1)
EOSINOPHILS ABSOLUTE: 0.23 E9/L (ref 0.05–0.5)
EOSINOPHILS RELATIVE PERCENT: 3.1 % (ref 0–6)
GFR AFRICAN AMERICAN: >60
GFR NON-AFRICAN AMERICAN: >60 ML/MIN/1.73
GLUCOSE BLD-MCNC: 156 MG/DL (ref 74–99)
HCT VFR BLD CALC: 37.6 % (ref 34–48)
HEMOGLOBIN: 12.4 G/DL (ref 11.5–15.5)
IMMATURE GRANULOCYTES #: 0.04 E9/L
IMMATURE GRANULOCYTES %: 0.5 % (ref 0–5)
INR BLD: 1.3
LYMPHOCYTES ABSOLUTE: 1.46 E9/L (ref 1.5–4)
LYMPHOCYTES RELATIVE PERCENT: 19.4 % (ref 20–42)
MCH RBC QN AUTO: 34.3 PG (ref 26–35)
MCHC RBC AUTO-ENTMCNC: 33 % (ref 32–34.5)
MCV RBC AUTO: 104.2 FL (ref 80–99.9)
MONOCYTES ABSOLUTE: 0.91 E9/L (ref 0.1–0.95)
MONOCYTES RELATIVE PERCENT: 12.1 % (ref 2–12)
NEUTROPHILS ABSOLUTE: 4.84 E9/L (ref 1.8–7.3)
NEUTROPHILS RELATIVE PERCENT: 64.2 % (ref 43–80)
PDW BLD-RTO: 16.3 FL (ref 11.5–15)
PLATELET # BLD: 156 E9/L (ref 130–450)
PMV BLD AUTO: 9.9 FL (ref 7–12)
POTASSIUM SERPL-SCNC: 4.5 MMOL/L (ref 3.5–5)
PROTHROMBIN TIME: 14.7 SEC (ref 9.3–12.4)
RBC # BLD: 3.61 E12/L (ref 3.5–5.5)
SODIUM BLD-SCNC: 142 MMOL/L (ref 132–146)
TOTAL PROTEIN: 6.8 G/DL (ref 6.4–8.3)
WBC # BLD: 7.5 E9/L (ref 4.5–11.5)

## 2019-11-11 PROCEDURE — 80053 COMPREHEN METABOLIC PANEL: CPT

## 2019-11-11 PROCEDURE — 85730 THROMBOPLASTIN TIME PARTIAL: CPT

## 2019-11-11 PROCEDURE — 85025 COMPLETE CBC W/AUTO DIFF WBC: CPT

## 2019-11-11 PROCEDURE — 82105 ALPHA-FETOPROTEIN SERUM: CPT

## 2019-11-11 PROCEDURE — 85610 PROTHROMBIN TIME: CPT

## 2019-11-11 PROCEDURE — 36415 COLL VENOUS BLD VENIPUNCTURE: CPT

## 2019-11-13 LAB — AFP-TUMOR MARKER: 8 NG/ML (ref 0–9)

## 2019-11-14 ENCOUNTER — HOSPITAL ENCOUNTER (OUTPATIENT)
Dept: PHYSICAL THERAPY | Age: 74
Setting detail: THERAPIES SERIES
Discharge: HOME | End: 2019-11-14
Payer: MEDICARE

## 2019-11-14 PROCEDURE — 97162 PT EVAL MOD COMPLEX 30 MIN: CPT | Performed by: PHYSICAL THERAPIST

## 2019-11-14 ASSESSMENT — PAIN SCALES - GENERAL: PAINLEVEL_OUTOF10: 9

## 2019-11-14 ASSESSMENT — PAIN DESCRIPTION - PAIN TYPE: TYPE: CHRONIC PAIN

## 2019-11-19 ENCOUNTER — HOSPITAL ENCOUNTER (OUTPATIENT)
Dept: PHYSICAL THERAPY | Age: 74
Setting detail: THERAPIES SERIES
Discharge: HOME | End: 2019-11-19
Payer: MEDICARE

## 2019-11-19 PROCEDURE — 97110 THERAPEUTIC EXERCISES: CPT

## 2019-11-21 ENCOUNTER — OFFICE VISIT (OUTPATIENT)
Dept: FAMILY MEDICINE CLINIC | Age: 74
End: 2019-11-21
Payer: MEDICARE

## 2019-11-21 VITALS
SYSTOLIC BLOOD PRESSURE: 131 MMHG | HEIGHT: 63 IN | DIASTOLIC BLOOD PRESSURE: 74 MMHG | RESPIRATION RATE: 20 BRPM | HEART RATE: 93 BPM | WEIGHT: 192 LBS | TEMPERATURE: 98.8 F | BODY MASS INDEX: 34.02 KG/M2

## 2019-11-21 DIAGNOSIS — E46 PROTEIN-CALORIE MALNUTRITION, UNSPECIFIED SEVERITY (HCC): ICD-10-CM

## 2019-11-21 DIAGNOSIS — L29.9 ITCHING: ICD-10-CM

## 2019-11-21 DIAGNOSIS — R52 DIFFUSE PAIN: Primary | ICD-10-CM

## 2019-11-21 DIAGNOSIS — E11.40 TYPE 2 DIABETES MELLITUS WITH DIABETIC NEUROPATHY, UNSPECIFIED WHETHER LONG TERM INSULIN USE (HCC): ICD-10-CM

## 2019-11-21 DIAGNOSIS — K74.60 CIRRHOSIS OF LIVER WITHOUT ASCITES, UNSPECIFIED HEPATIC CIRRHOSIS TYPE (HCC): ICD-10-CM

## 2019-11-21 PROCEDURE — 99214 OFFICE O/P EST MOD 30 MIN: CPT | Performed by: FAMILY MEDICINE

## 2019-11-21 PROCEDURE — 1036F TOBACCO NON-USER: CPT | Performed by: FAMILY MEDICINE

## 2019-11-21 PROCEDURE — G8399 PT W/DXA RESULTS DOCUMENT: HCPCS | Performed by: FAMILY MEDICINE

## 2019-11-21 PROCEDURE — G8427 DOCREV CUR MEDS BY ELIG CLIN: HCPCS | Performed by: FAMILY MEDICINE

## 2019-11-21 PROCEDURE — 3017F COLORECTAL CA SCREEN DOC REV: CPT | Performed by: FAMILY MEDICINE

## 2019-11-21 PROCEDURE — 1123F ACP DISCUSS/DSCN MKR DOCD: CPT | Performed by: FAMILY MEDICINE

## 2019-11-21 PROCEDURE — G9899 SCRN MAM PERF RSLTS DOC: HCPCS | Performed by: FAMILY MEDICINE

## 2019-11-21 PROCEDURE — 2022F DILAT RTA XM EVC RTNOPTHY: CPT | Performed by: FAMILY MEDICINE

## 2019-11-21 PROCEDURE — G8417 CALC BMI ABV UP PARAM F/U: HCPCS | Performed by: FAMILY MEDICINE

## 2019-11-21 PROCEDURE — 1090F PRES/ABSN URINE INCON ASSESS: CPT | Performed by: FAMILY MEDICINE

## 2019-11-21 PROCEDURE — 4040F PNEUMOC VAC/ADMIN/RCVD: CPT | Performed by: FAMILY MEDICINE

## 2019-11-21 PROCEDURE — G8482 FLU IMMUNIZE ORDER/ADMIN: HCPCS | Performed by: FAMILY MEDICINE

## 2019-11-21 PROCEDURE — 3044F HG A1C LEVEL LT 7.0%: CPT | Performed by: FAMILY MEDICINE

## 2019-11-21 RX ORDER — NAPROXEN 500 MG/1
500 TABLET ORAL 2 TIMES DAILY WITH MEALS
Qty: 14 TABLET | Refills: 0 | Status: SHIPPED
Start: 2019-11-21 | End: 2020-02-28

## 2019-11-29 ENCOUNTER — HOSPITAL ENCOUNTER (OUTPATIENT)
Age: 74
Discharge: HOME | End: 2019-11-29
Payer: MEDICARE

## 2019-11-29 LAB
ALBUMIN SERPL-MCNC: 3.2 G/DL (ref 3.5–5.2)
ALP BLD-CCNC: 136 U/L (ref 35–104)
ALT SERPL-CCNC: 18 U/L (ref 0–32)
ANION GAP SERPL CALCULATED.3IONS-SCNC: 9 MMOL/L (ref 7–16)
AST SERPL-CCNC: 40 U/L (ref 0–31)
BILIRUB SERPL-MCNC: 0.7 MG/DL (ref 0–1.2)
BUN BLDV-MCNC: 21 MG/DL (ref 8–23)
CALCIUM SERPL-MCNC: 10.5 MG/DL (ref 8.6–10.2)
CHLORIDE BLD-SCNC: 109 MMOL/L (ref 98–107)
CO2: 24 MMOL/L (ref 22–29)
CREAT SERPL-MCNC: 1.1 MG/DL (ref 0.5–1)
GFR AFRICAN AMERICAN: 59
GFR NON-AFRICAN AMERICAN: 48 ML/MIN/1.73
GLUCOSE BLD-MCNC: 124 MG/DL (ref 74–99)
POTASSIUM SERPL-SCNC: 5.1 MMOL/L (ref 3.5–5)
SODIUM BLD-SCNC: 142 MMOL/L (ref 132–146)
TOTAL PROTEIN: 7.3 G/DL (ref 6.4–8.3)

## 2019-11-29 PROCEDURE — 80053 COMPREHEN METABOLIC PANEL: CPT

## 2019-11-29 PROCEDURE — 36415 COLL VENOUS BLD VENIPUNCTURE: CPT

## 2019-12-10 DIAGNOSIS — E46 PROTEIN-CALORIE MALNUTRITION, UNSPECIFIED SEVERITY (HCC): ICD-10-CM

## 2019-12-10 DIAGNOSIS — L29.9 ITCHING: ICD-10-CM

## 2019-12-10 DIAGNOSIS — R11.0 NAUSEA: ICD-10-CM

## 2019-12-10 DIAGNOSIS — F32.9 MAJOR DEPRESSIVE DISORDER, REMISSION STATUS UNSPECIFIED, UNSPECIFIED WHETHER RECURRENT: ICD-10-CM

## 2019-12-10 RX ORDER — ONDANSETRON 4 MG/1
4 TABLET, FILM COATED ORAL DAILY PRN
Qty: 30 TABLET | Refills: 0 | Status: SHIPPED | OUTPATIENT
Start: 2019-12-10 | End: 2020-01-06

## 2019-12-10 RX ORDER — MIRTAZAPINE 7.5 MG/1
TABLET, FILM COATED ORAL
Qty: 30 TABLET | Refills: 0 | Status: SHIPPED | OUTPATIENT
Start: 2019-12-10 | End: 2020-01-06

## 2020-01-06 RX ORDER — VENLAFAXINE HYDROCHLORIDE 75 MG/1
CAPSULE, EXTENDED RELEASE ORAL
Qty: 90 CAPSULE | Refills: 1 | Status: SHIPPED | OUTPATIENT
Start: 2020-01-06 | End: 2020-06-11 | Stop reason: SDUPTHER

## 2020-01-06 RX ORDER — ONDANSETRON 4 MG/1
4 TABLET, FILM COATED ORAL DAILY PRN
Qty: 30 TABLET | Refills: 0 | Status: SHIPPED | OUTPATIENT
Start: 2020-01-06 | End: 2020-02-03

## 2020-01-06 RX ORDER — MIRTAZAPINE 7.5 MG/1
TABLET, FILM COATED ORAL
Qty: 30 TABLET | Refills: 0 | Status: SHIPPED | OUTPATIENT
Start: 2020-01-06 | End: 2020-02-03

## 2020-01-06 RX ORDER — HYDROXYZINE HYDROCHLORIDE 25 MG/1
TABLET, FILM COATED ORAL
Qty: 30 TABLET | Refills: 1 | Status: SHIPPED
Start: 2020-01-06 | End: 2020-03-12 | Stop reason: SDUPTHER

## 2020-01-10 ENCOUNTER — HOSPITAL ENCOUNTER (OUTPATIENT)
Dept: ULTRASOUND IMAGING | Age: 75
Discharge: HOME | End: 2020-01-12
Payer: MEDICARE

## 2020-01-10 ENCOUNTER — HOSPITAL ENCOUNTER (OUTPATIENT)
Age: 75
Discharge: HOME | End: 2020-01-10
Payer: MEDICARE

## 2020-01-10 LAB
ANION GAP SERPL CALCULATED.3IONS-SCNC: 8 MMOL/L (ref 7–16)
BUN BLDV-MCNC: 17 MG/DL (ref 8–23)
CALCIUM IONIZED: 1.5 MMOL/L (ref 1.15–1.33)
CALCIUM SERPL-MCNC: 10.7 MG/DL (ref 8.6–10.2)
CHLORIDE BLD-SCNC: 110 MMOL/L (ref 98–107)
CO2: 26 MMOL/L (ref 22–29)
CREAT SERPL-MCNC: 1.1 MG/DL (ref 0.5–1)
GFR AFRICAN AMERICAN: 59
GFR NON-AFRICAN AMERICAN: 48 ML/MIN/1.73
GLUCOSE BLD-MCNC: 127 MG/DL (ref 74–99)
PARATHYROID HORMONE INTACT: 55 PG/ML (ref 15–65)
POTASSIUM SERPL-SCNC: 4.5 MMOL/L (ref 3.5–5)
SODIUM BLD-SCNC: 144 MMOL/L (ref 132–146)

## 2020-01-10 PROCEDURE — 36415 COLL VENOUS BLD VENIPUNCTURE: CPT

## 2020-01-10 PROCEDURE — 80048 BASIC METABOLIC PNL TOTAL CA: CPT

## 2020-01-10 PROCEDURE — 76857 US EXAM PELVIC LIMITED: CPT

## 2020-01-10 PROCEDURE — 76770 US EXAM ABDO BACK WALL COMP: CPT

## 2020-01-10 PROCEDURE — 83970 ASSAY OF PARATHORMONE: CPT

## 2020-01-10 PROCEDURE — 82330 ASSAY OF CALCIUM: CPT

## 2020-01-29 ENCOUNTER — HOSPITAL ENCOUNTER (OUTPATIENT)
Age: 75
Discharge: HOME | End: 2020-01-29
Payer: MEDICARE

## 2020-01-29 LAB
CALCIUM IONIZED: 1.37 MMOL/L (ref 1.15–1.33)
PARATHYROID HORMONE INTACT: 57 PG/ML (ref 15–65)
VITAMIN D 25-HYDROXY: 41 NG/ML (ref 30–100)

## 2020-01-29 PROCEDURE — 82306 VITAMIN D 25 HYDROXY: CPT

## 2020-01-29 PROCEDURE — 83970 ASSAY OF PARATHORMONE: CPT

## 2020-01-29 PROCEDURE — 36415 COLL VENOUS BLD VENIPUNCTURE: CPT

## 2020-01-29 PROCEDURE — 82330 ASSAY OF CALCIUM: CPT

## 2020-02-09 ENCOUNTER — APPOINTMENT (OUTPATIENT)
Dept: GENERAL RADIOLOGY | Age: 75
End: 2020-02-09
Payer: MEDICARE

## 2020-02-09 ENCOUNTER — HOSPITAL ENCOUNTER (OUTPATIENT)
Age: 75
Setting detail: OBSERVATION
Discharge: NURSING, REHAB OR LONG-TERM CARE | End: 2020-02-11
Attending: EMERGENCY MEDICINE | Admitting: FAMILY MEDICINE
Payer: MEDICARE

## 2020-02-09 ENCOUNTER — APPOINTMENT (OUTPATIENT)
Dept: CT IMAGING | Age: 75
End: 2020-02-09
Payer: MEDICARE

## 2020-02-09 PROBLEM — S49.002A: Status: ACTIVE | Noted: 2020-02-09

## 2020-02-09 LAB
ALBUMIN SERPL-MCNC: 3.1 G/DL (ref 3.5–5.2)
ALP BLD-CCNC: 143 U/L (ref 35–104)
ALT SERPL-CCNC: 18 U/L (ref 0–32)
ANION GAP SERPL CALCULATED.3IONS-SCNC: 7 MMOL/L (ref 7–16)
APTT: 32.4 SEC (ref 24.5–35.1)
AST SERPL-CCNC: 41 U/L (ref 0–31)
BILIRUB SERPL-MCNC: 0.7 MG/DL (ref 0–1.2)
BUN BLDV-MCNC: 18 MG/DL (ref 8–23)
CALCIUM SERPL-MCNC: 10.6 MG/DL (ref 8.6–10.2)
CHLORIDE BLD-SCNC: 109 MMOL/L (ref 98–107)
CO2: 24 MMOL/L (ref 22–29)
CREAT SERPL-MCNC: 0.9 MG/DL (ref 0.5–1)
GFR AFRICAN AMERICAN: >60
GFR NON-AFRICAN AMERICAN: >60 ML/MIN/1.73
GLUCOSE BLD-MCNC: 261 MG/DL (ref 74–99)
HCT VFR BLD CALC: 33.9 % (ref 34–48)
HEMOGLOBIN: 10.6 G/DL (ref 11.5–15.5)
INR BLD: 1.4
MCH RBC QN AUTO: 32.8 PG (ref 26–35)
MCHC RBC AUTO-ENTMCNC: 31.3 % (ref 32–34.5)
MCV RBC AUTO: 105 FL (ref 80–99.9)
PDW BLD-RTO: 15.7 FL (ref 11.5–15)
PLATELET # BLD: 181 E9/L (ref 130–450)
PMV BLD AUTO: 11.6 FL (ref 7–12)
POTASSIUM SERPL-SCNC: 5.3 MMOL/L (ref 3.5–5)
PROTHROMBIN TIME: 15.6 SEC (ref 9.3–12.4)
RBC # BLD: 3.23 E12/L (ref 3.5–5.5)
SODIUM BLD-SCNC: 140 MMOL/L (ref 132–146)
TOTAL PROTEIN: 6.8 G/DL (ref 6.4–8.3)
WBC # BLD: 6.1 E9/L (ref 4.5–11.5)

## 2020-02-09 PROCEDURE — 85610 PROTHROMBIN TIME: CPT

## 2020-02-09 PROCEDURE — G0378 HOSPITAL OBSERVATION PER HR: HCPCS

## 2020-02-09 PROCEDURE — 73060 X-RAY EXAM OF HUMERUS: CPT

## 2020-02-09 PROCEDURE — 85027 COMPLETE CBC AUTOMATED: CPT

## 2020-02-09 PROCEDURE — 94660 CPAP INITIATION&MGMT: CPT

## 2020-02-09 PROCEDURE — 6360000002 HC RX W HCPCS: Performed by: STUDENT IN AN ORGANIZED HEALTH CARE EDUCATION/TRAINING PROGRAM

## 2020-02-09 PROCEDURE — 73030 X-RAY EXAM OF SHOULDER: CPT

## 2020-02-09 PROCEDURE — 99218 PR INITIAL OBSERVATION CARE/DAY 30 MINUTES: CPT | Performed by: ORTHOPAEDIC SURGERY

## 2020-02-09 PROCEDURE — 70450 CT HEAD/BRAIN W/O DYE: CPT

## 2020-02-09 PROCEDURE — 2580000003 HC RX 258: Performed by: STUDENT IN AN ORGANIZED HEALTH CARE EDUCATION/TRAINING PROGRAM

## 2020-02-09 PROCEDURE — 96374 THER/PROPH/DIAG INJ IV PUSH: CPT

## 2020-02-09 PROCEDURE — 36415 COLL VENOUS BLD VENIPUNCTURE: CPT

## 2020-02-09 PROCEDURE — 6370000000 HC RX 637 (ALT 250 FOR IP): Performed by: STUDENT IN AN ORGANIZED HEALTH CARE EDUCATION/TRAINING PROGRAM

## 2020-02-09 PROCEDURE — 80053 COMPREHEN METABOLIC PANEL: CPT

## 2020-02-09 PROCEDURE — 73502 X-RAY EXAM HIP UNI 2-3 VIEWS: CPT

## 2020-02-09 PROCEDURE — 23600 CLTX PROX HUMRL FX W/O MNPJ: CPT | Performed by: ORTHOPAEDIC SURGERY

## 2020-02-09 PROCEDURE — 6360000002 HC RX W HCPCS: Performed by: NURSE PRACTITIONER

## 2020-02-09 PROCEDURE — 96376 TX/PRO/DX INJ SAME DRUG ADON: CPT

## 2020-02-09 PROCEDURE — 72125 CT NECK SPINE W/O DYE: CPT

## 2020-02-09 PROCEDURE — 96375 TX/PRO/DX INJ NEW DRUG ADDON: CPT

## 2020-02-09 PROCEDURE — 99285 EMERGENCY DEPT VISIT HI MDM: CPT

## 2020-02-09 PROCEDURE — 85730 THROMBOPLASTIN TIME PARTIAL: CPT

## 2020-02-09 PROCEDURE — 6360000002 HC RX W HCPCS: Performed by: EMERGENCY MEDICINE

## 2020-02-09 RX ORDER — ONDANSETRON 2 MG/ML
4 INJECTION INTRAMUSCULAR; INTRAVENOUS EVERY 6 HOURS PRN
Status: DISCONTINUED | OUTPATIENT
Start: 2020-02-09 | End: 2020-02-11 | Stop reason: HOSPADM

## 2020-02-09 RX ORDER — FENTANYL CITRATE 50 UG/ML
25 INJECTION, SOLUTION INTRAMUSCULAR; INTRAVENOUS ONCE
Status: COMPLETED | OUTPATIENT
Start: 2020-02-09 | End: 2020-02-09

## 2020-02-09 RX ORDER — SPIRONOLACTONE 25 MG/1
25 TABLET ORAL DAILY
Status: DISCONTINUED | OUTPATIENT
Start: 2020-02-09 | End: 2020-02-11 | Stop reason: HOSPADM

## 2020-02-09 RX ORDER — MIRTAZAPINE 15 MG/1
7.5 TABLET, FILM COATED ORAL NIGHTLY
Status: DISCONTINUED | OUTPATIENT
Start: 2020-02-09 | End: 2020-02-11 | Stop reason: HOSPADM

## 2020-02-09 RX ORDER — SODIUM CHLORIDE 0.9 % (FLUSH) 0.9 %
10 SYRINGE (ML) INJECTION PRN
Status: DISCONTINUED | OUTPATIENT
Start: 2020-02-09 | End: 2020-02-11 | Stop reason: HOSPADM

## 2020-02-09 RX ORDER — EXEMESTANE 25 MG/1
25 TABLET ORAL DAILY
Status: DISCONTINUED | OUTPATIENT
Start: 2020-02-09 | End: 2020-02-11 | Stop reason: HOSPADM

## 2020-02-09 RX ORDER — ONDANSETRON 2 MG/ML
4 INJECTION INTRAMUSCULAR; INTRAVENOUS ONCE
Status: DISCONTINUED | OUTPATIENT
Start: 2020-02-09 | End: 2020-02-11 | Stop reason: HOSPADM

## 2020-02-09 RX ORDER — LIDOCAINE 4 G/G
2 PATCH TOPICAL DAILY
Status: DISCONTINUED | OUTPATIENT
Start: 2020-02-09 | End: 2020-02-11 | Stop reason: HOSPADM

## 2020-02-09 RX ORDER — LACTULOSE 10 G/15ML
10 SOLUTION ORAL
Status: ON HOLD | COMMUNITY
End: 2021-02-03 | Stop reason: HOSPADM

## 2020-02-09 RX ORDER — MORPHINE SULFATE 4 MG/ML
6 INJECTION, SOLUTION INTRAMUSCULAR; INTRAVENOUS ONCE
Status: DISCONTINUED | OUTPATIENT
Start: 2020-02-09 | End: 2020-02-09

## 2020-02-09 RX ORDER — MORPHINE SULFATE 4 MG/ML
4 INJECTION, SOLUTION INTRAMUSCULAR; INTRAVENOUS ONCE
Status: COMPLETED | OUTPATIENT
Start: 2020-02-09 | End: 2020-02-09

## 2020-02-09 RX ORDER — VENLAFAXINE HYDROCHLORIDE 75 MG/1
75 CAPSULE, EXTENDED RELEASE ORAL
Status: DISCONTINUED | OUTPATIENT
Start: 2020-02-10 | End: 2020-02-11 | Stop reason: HOSPADM

## 2020-02-09 RX ORDER — AMLODIPINE BESYLATE 2.5 MG/1
2.5 TABLET ORAL DAILY
Status: DISCONTINUED | OUTPATIENT
Start: 2020-02-09 | End: 2020-02-11 | Stop reason: HOSPADM

## 2020-02-09 RX ORDER — DOXAZOSIN MESYLATE 1 MG/1
1 TABLET ORAL NIGHTLY
Status: DISCONTINUED | OUTPATIENT
Start: 2020-02-09 | End: 2020-02-11 | Stop reason: HOSPADM

## 2020-02-09 RX ORDER — KETOROLAC TROMETHAMINE 30 MG/ML
15 INJECTION, SOLUTION INTRAMUSCULAR; INTRAVENOUS EVERY 6 HOURS PRN
Status: DISCONTINUED | OUTPATIENT
Start: 2020-02-09 | End: 2020-02-11 | Stop reason: HOSPADM

## 2020-02-09 RX ORDER — ACETAMINOPHEN 325 MG/1
650 TABLET ORAL EVERY 6 HOURS PRN
Status: DISCONTINUED | OUTPATIENT
Start: 2020-02-09 | End: 2020-02-11 | Stop reason: HOSPADM

## 2020-02-09 RX ORDER — SODIUM CHLORIDE 0.9 % (FLUSH) 0.9 %
10 SYRINGE (ML) INJECTION EVERY 12 HOURS SCHEDULED
Status: DISCONTINUED | OUTPATIENT
Start: 2020-02-09 | End: 2020-02-11 | Stop reason: HOSPADM

## 2020-02-09 RX ORDER — PANTOPRAZOLE SODIUM 40 MG/1
40 TABLET, DELAYED RELEASE ORAL
Status: DISCONTINUED | OUTPATIENT
Start: 2020-02-10 | End: 2020-02-11 | Stop reason: HOSPADM

## 2020-02-09 RX ORDER — ONDANSETRON 2 MG/ML
4 INJECTION INTRAMUSCULAR; INTRAVENOUS ONCE
Status: COMPLETED | OUTPATIENT
Start: 2020-02-09 | End: 2020-02-09

## 2020-02-09 RX ORDER — LOSARTAN POTASSIUM 50 MG/1
100 TABLET ORAL DAILY
Status: DISCONTINUED | OUTPATIENT
Start: 2020-02-10 | End: 2020-02-11 | Stop reason: HOSPADM

## 2020-02-09 RX ORDER — HYDROXYZINE PAMOATE 25 MG/1
25 CAPSULE ORAL NIGHTLY
Status: DISCONTINUED | OUTPATIENT
Start: 2020-02-09 | End: 2020-02-11 | Stop reason: HOSPADM

## 2020-02-09 RX ORDER — FUROSEMIDE 20 MG/1
20 TABLET ORAL DAILY
Status: DISCONTINUED | OUTPATIENT
Start: 2020-02-09 | End: 2020-02-11 | Stop reason: HOSPADM

## 2020-02-09 RX ADMIN — AMLODIPINE BESYLATE 2.5 MG: 2.5 TABLET ORAL at 18:12

## 2020-02-09 RX ADMIN — MORPHINE SULFATE 4 MG: 4 INJECTION, SOLUTION INTRAMUSCULAR; INTRAVENOUS at 11:09

## 2020-02-09 RX ADMIN — DOXAZOSIN 1 MG: 1 TABLET ORAL at 20:23

## 2020-02-09 RX ADMIN — ONDANSETRON 4 MG: 2 INJECTION INTRAMUSCULAR; INTRAVENOUS at 13:07

## 2020-02-09 RX ADMIN — ENOXAPARIN SODIUM 40 MG: 40 INJECTION SUBCUTANEOUS at 18:13

## 2020-02-09 RX ADMIN — SODIUM CHLORIDE, PRESERVATIVE FREE 10 ML: 5 INJECTION INTRAVENOUS at 20:23

## 2020-02-09 RX ADMIN — HYDROXYZINE PAMOATE 25 MG: 25 CAPSULE ORAL at 20:23

## 2020-02-09 RX ADMIN — MIRTAZAPINE 7.5 MG: 15 TABLET, FILM COATED ORAL at 20:23

## 2020-02-09 RX ADMIN — ACETAMINOPHEN 650 MG: 325 TABLET, FILM COATED ORAL at 19:46

## 2020-02-09 RX ADMIN — MORPHINE SULFATE 4 MG: 4 INJECTION, SOLUTION INTRAMUSCULAR; INTRAVENOUS at 10:32

## 2020-02-09 RX ADMIN — FENTANYL CITRATE 25 MCG: 50 INJECTION, SOLUTION INTRAMUSCULAR; INTRAVENOUS at 13:07

## 2020-02-09 ASSESSMENT — PAIN SCALES - GENERAL
PAINLEVEL_OUTOF10: 10
PAINLEVEL_OUTOF10: 7
PAINLEVEL_OUTOF10: 3
PAINLEVEL_OUTOF10: 3
PAINLEVEL_OUTOF10: 7
PAINLEVEL_OUTOF10: 10
PAINLEVEL_OUTOF10: 5

## 2020-02-09 ASSESSMENT — PAIN DESCRIPTION - LOCATION
LOCATION: SHOULDER
LOCATION: ARM

## 2020-02-09 ASSESSMENT — PAIN DESCRIPTION - ORIENTATION
ORIENTATION: LEFT
ORIENTATION: LEFT

## 2020-02-09 ASSESSMENT — PAIN DESCRIPTION - PAIN TYPE
TYPE: ACUTE PAIN
TYPE: ACUTE PAIN

## 2020-02-09 ASSESSMENT — PAIN DESCRIPTION - DESCRIPTORS
DESCRIPTORS: ACHING;SORE
DESCRIPTORS: TENDER;SORE

## 2020-02-09 NOTE — H&P
[facility] - [facility] Resident Inpatient  History and Physical      CC: Fall (mechanical fall, c/o left arm pain)      HPI: History obtained from patient. Patient is oriented to time, place, person, situation. Terris Peabody is a 76 y.o. female with a PMH of breast cancer, T2 DM, KATE on night CPAP, HLD, HTN, liver cirrhosis who presents to ED for mechanical fall that happened this morning, she was eating breakfast with her son when suddenly her legs gave way and she fell she does not recall how she exactly fell does not recall how she landed. However she remained alert and oriented throughout the event she denies loss of consciousness. She denies chest pain, shortness of breath, headache, blurred/double vision, focal neurological weakness, aura before the event, palpitations. Patient admits to occasional nausea without vomiting. She denies fevers, chills, rigors, cough. ED Course: The patient remained hemodynamically stable. Labs elevated alk phos, hyperglycemia, elevated INR, elevated potassium but specimen hemolyzed  Imaging was positive for comminuted fracture of the left humeral head with impaction  Pt admitted for left humeral fracture       PMH:  has a past medical history of Breast cancer ([address] Cirrhosis ([address] Essential hypertension, Hyperlipidemia, Osteopenia, 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 ([address] Sleep apnea, Spinal stenosis, and Type 2 diabetes mellitus ([address] PSH:  has a past surgical history that includes Hysterectomy; Carpal tunnel release; Lithotripsy (Left, 05/04/2016); Colonoscopy (01/31/2017); Breast lumpectomy; Upper gastrointestinal endoscopy (04/23/2019); Colonoscopy (04/23/2019); Upper gastrointestinal endoscopy (N/A, 4/23/2019); Colonoscopy (N/A, 4/23/2019); and Colonoscopy (4/23/2019). FH: family history includes Arthritis in her mother; COPD in her father; Cancer (age of onset: 48) in an other family member; Heart Attack in her father.      Social:  reports that she has never smoked. She has never used smokeless tobacco. She reports that she does not drink alcohol or use drugs. Allergies: Allergies   Allergen Reactions    Lisinopril         Home Medications:   No current facility-administered medications on file prior to encounter. Current Outpatient Medications on File Prior to Encounter   Medication Sig Dispense Refill    felodipine (PLENDIL) 2.5 MG extended release tablet TAKE 1 TABLET BY MOUTH ONCE DAILY 90 tablet 0    ondansetron (ZOFRAN) 4 MG tablet TAKE 1 TABLET BY MOUTH DAILY AS NEEDED FOR NAUSEA OR VOMITING 30 tablet 0    mirtazapine (REMERON) 7.5 MG tablet TAKE 1 TABLET BY MOUTH EVERY NIGHT 90 tablet 0    blood glucose test strips (FREESTYLE LITE) strip CHECK BLOOD SUGAR DAILY 100 strip 11    venlafaxine (EFFEXOR XR) 75 MG extended release capsule TAKE 1 CAPSULE BY MOUTH EVERY NIGHT 90 capsule 1    hydrOXYzine (ATARAX) 25 MG tablet TAKE 1 TABLET BY MOUTH EVERY NIGHT 30 tablet 1    naproxen (NAPROXEN) 500 MG EC tablet Take 1 tablet by mouth 2 times daily (with meals) 14 tablet 0    Nutritional Supplements (ENSURE ACTIVE HIGH PROTEIN) LIQD Take 1 each by mouth daily 30 Can 3    losartan (COZAAR) 100 MG tablet Take 1 tablet by mouth daily 90 tablet 0    Handicap Placard MISC by Does not apply route Cannot walk 200 feet without great difficulty. 5 years please 1 each 0    furosemide (LASIX) 20 MG tablet TK 1 T PO QD  2    tamoxifen (NOLVADEX) 20 MG tablet       pantoprazole (PROTONIX) 40 MG tablet       magnesium oxide (MAG-OX) 400 (240 Mg) MG tablet Take by mouth      GENERLAC 10 GM/15ML SOLN solution       Compression Stockings MISC by Does not apply route 20-30 mmHg. Below the knee. Venous insufficiency.  1 each 0    spironolactone (ALDACTONE) 25 MG tablet TAKE 1 TABLET BY MOUTH DAILY 90 tablet 1    omeprazole (PRILOSEC) 20 MG delayed release capsule Take 1 capsule by mouth 2 times daily 60 capsule 3    naproxen (NAPROSYN) 500 MG tablet pallor or icterus. Neck: Supple, symmetrical, trachea midline, no JVD  Chest: No tenderness or deformity, full & symmetric excursion  Lung: Clear to auscultation bilaterally,  respirations unlabored. No rales/wheezing/rubs  Heart: RRR, S1 and S2 normal, no murmur, rub or gallop. DP pulses 2/4  Abdomen: SNTND, no masses, no organomegaly, no guarding, rebound or rigidity. Genital/Rectal: deferred  Extremities:  Extremities normal, atraumatic, no cyanosis or edema. Distal pulses equal bilaterally  Skin: Skin color, texture, turgor normal, no rashes or lesions  Musculoskeletal: No joint swelling, no muscle tenderness. Normal ROM in extremities except for left shoulder which is in sling unable to move due to pain. Neurologic: Alert & Oriented; CNII-XII intact;  Normal and symmetric strength in UEs and LEs, unable to assess power and left upper extremity; DTRs 2+ and symmetric; Sensation intact    Labs:   Results for orders placed or performed during the hospital encounter of 02/09/20   CBC   Result Value Ref Range    WBC 6.1 4.5 - 11.5 E9/L    RBC 3.23 (L) 3.50 - 5.50 E12/L    Hemoglobin 10.6 (L) 11.5 - 15.5 g/dL    Hematocrit 33.9 (L) 34.0 - 48.0 %    .0 (H) 80.0 - 99.9 fL    MCH 32.8 26.0 - 35.0 pg    MCHC 31.3 (L) 32.0 - 34.5 %    RDW 15.7 (H) 11.5 - 15.0 fL    Platelets 732 437 - 142 E9/L    MPV 11.6 7.0 - 12.0 fL   Comprehensive Metabolic Panel   Result Value Ref Range    Sodium 140 132 - 146 mmol/L    Potassium 5.3 (H) 3.5 - 5.0 mmol/L    Chloride 109 (H) 98 - 107 mmol/L    CO2 24 22 - 29 mmol/L    Anion Gap 7 7 - 16 mmol/L    Glucose 261 (H) 74 - 99 mg/dL    BUN 18 8 - 23 mg/dL    CREATININE 0.9 0.5 - 1.0 mg/dL    GFR Non-African American >60 >=60 mL/min/1.73    GFR African American >60     Calcium 10.6 (H) 8.6 - 10.2 mg/dL    Total Protein 6.8 6.4 - 8.3 g/dL    Alb 3.1 (L) 3.5 - 5.2 g/dL    Total Bilirubin 0.7 0.0 - 1.2 mg/dL    Alkaline Phosphatase 143 (H) 35 - 104 U/L    ALT 18 0 - 32 U/L    AST 41 (H) 0 - 31 U/L   APTT   Result Value Ref Range    aPTT 32.4 24.5 - 35.1 sec   Protime-INR   Result Value Ref Range    Protime 15.6 (H) 9.3 - 12.4 sec    INR 1.4        Imaging:  Xr Humerus Left (min 2 Views)    Result Date: 2020  Patient MRN: [id number] : 1945 Age:  76 years Gender: Female Order Date: 2020 9:15 AM Exam: XR HUMERUS LEFT (MIN 2 VIEWS) Number of Images: 2 views Indication:   pain, fall pain, fall Comparison: None. Findings: There is comminuted fracture of the humeral head with impacted left humeral neck. There is associated inferior subluxation of the humeral head in relation to the glenoid fossa. The remaining osseous structures appears to be normal.. The elbow joint appear to be normal.     Comminuted fracture of the left humeral head with impaction. Sabina Maldonado Hip Right (2-3 Views)    Result Date: 2020  Patient MRN: [id number] : 1945 Age:  76 years Gender: Female Order Date: 2020 9:15 AM Exam: XR HIP RIGHT (2-3 VIEWS) Number of Images: 2 views Indication:   fall fall Comparison: None. Findings: The right femoral head is well conjugated within the acetabulum. There is no evidence of dislocation. No significant degenerative changes or remarkable soft tissue abnormalities are noted. NO ACUTE FRACTURE OR DISLOCATION RIGHT HIP. Ct Head Wo Contrast    Result Date: 2020  Patient MRN: [id number] : 1945 Age:  76 years Gender: Female Order Date: 2020 9:15 AM Exam: CT HEAD WO CONTRAST Number of Images: 186 views Indication:   fall fall Comparison: None. Technique: Sequential axial CT of the head was obtained from the base of the skull to the vertex without IV contrast.  Radiation Output: CTDIvol 60.40 (mGy); DLP 1132.0 (mGy-cm) Findings: The study demonstrates the fourth ventricle to be midline. The posterior fossa appears to be normal. There is global atrophy which is consistent with patient's chronological age. There is no mass, mass effect or midline shift.  The secondary to the fracture of the humeral head in relation to the glenoid fossa. The [facility] joint appears to be normal.     Comminuted fracture of the left humeral head with impacted distal humerus. .       Assessment and Plan  Active Problems:    Closed fracture of proximal epiphysis of left humerus  Resolved Problems:    * No resolved hospital problems.  *    Close fracture of the proximal epiphysis of the left humerus  Status post fall mechanical  Put in sling in the ED  Ortho following planning intervention  PT/OT consulted  Social work consulted for dispel planning possible subacute rehab      Type II DM  Liver cirrhosis, nonalcoholic liver disease  Breast cancer  KATE on CPAP  Essential hypertension  Continue present management   We will monitor blood pressure slightly elevated could be due to pain  Last A1c in 10/24/2019 5%, will monitor blood sugars  Child Kc: Child class a, INR: 1.4, meld score 10          DVT / GI prophylaxis: lovenox 40mg SC daily and Protonix    Dispo -social work, Ortho      Electronically signed by Lizzette Coffey MD on 2/9/20 at 12:16 PM  This case was discussed with attending physician: Dr. Shayla Burns

## 2020-02-09 NOTE — ED PROVIDER NOTES
26.0 - 35.0 pg    MCHC 31.3 (L) 32.0 - 34.5 %    RDW 15.7 (H) 11.5 - 15.0 fL    Platelets 233 300 - 588 E9/L    MPV 11.6 7.0 - 12.0 fL   Comprehensive Metabolic Panel   Result Value Ref Range    Sodium 140 132 - 146 mmol/L    Potassium 5.3 (H) 3.5 - 5.0 mmol/L    Chloride 109 (H) 98 - 107 mmol/L    CO2 24 22 - 29 mmol/L    Anion Gap 7 7 - 16 mmol/L    Glucose 261 (H) 74 - 99 mg/dL    BUN 18 8 - 23 mg/dL    CREATININE 0.9 0.5 - 1.0 mg/dL    GFR Non-African American >60 >=60 mL/min/1.73    GFR African American >60     Calcium 10.6 (H) 8.6 - 10.2 mg/dL    Total Protein 6.8 6.4 - 8.3 g/dL    Alb 3.1 (L) 3.5 - 5.2 g/dL    Total Bilirubin 0.7 0.0 - 1.2 mg/dL    Alkaline Phosphatase 143 (H) 35 - 104 U/L    ALT 18 0 - 32 U/L    AST 41 (H) 0 - 31 U/L   APTT   Result Value Ref Range    aPTT 32.4 24.5 - 35.1 sec   Protime-INR   Result Value Ref Range    Protime 15.6 (H) 9.3 - 12.4 sec    INR 1.4        Imaging: All Radiology results interpreted by Radiologist unless otherwise noted. CT Head WO Contrast   Final Result      NO ACUTE INTRACRANIAL PROCESS         CT Cervical Spine WO Contrast   Final Result      NO ACUTE FRACTURE OR SIGNIFICANT SUBLUXATION IS IDENTIFIED      Osteoarthritic changes and disc disease involving the lower cervical   spine from C4 to C7. [facility] XR SHOULDER LEFT (MIN 2 VIEWS)   Final Result      Comminuted fracture of the left humeral head with impacted distal   humerus. .       XR HUMERUS LEFT (MIN 2 VIEWS)   Final Result      Comminuted fracture of the left humeral head with impaction. .      XR HIP RIGHT (2-3 VIEWS)   Final Result      NO ACUTE FRACTURE OR DISLOCATION RIGHT HIP.         ED Course / Medical Decision Making     Medications   ondansetron [facility]) injection 4 mg (4 mg Intravenous Not Given 2/9/20 0924)   morphine sulfate (PF) injection 4 mg (4 mg Intravenous Given 2/9/20 1032)   morphine sulfate (PF) injection 4 mg (4 mg Intravenous Given 2/9/20 1106)   ondansetron (ZOFRAN) injection 4

## 2020-02-09 NOTE — CONSULTS
Department of Orthopedic Surgery  Resident Consult Note          CHIEF COMPLAINT: Left shoulder pain    HISTORY OF PRESENT ILLNESS:                The patient is a 76 y.o. female who presents with left shoulder pain after reported fall from standing height. Patient denies loss of consciousness. States she is right-hand dominant. Lives at home without the use of assist device for ambulation. Denies any numbness or paresthesias. Denies any other orthopedic complaints at this time. .. Past Medical History:        Diagnosis Date    [facility] cancer ([facility]     Cirrhosis ([facility]     Essential hypertension     Hyperlipidemia     Osteopenia     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 ([facility]     Sleep apnea     Spinal stenosis     Type 2 diabetes mellitus ([facility]      Past Surgical History:        Procedure Laterality Date    BREAST LUMPECTOMY      lumpectomy [id number]    CARPAL TUNNEL RELEASE      COLONOSCOPY  01/31/2017    multiple polyps; diverticula--jerod    COLONOSCOPY  04/23/2019    polyps; diverticula; hemorrhoids--jerod    COLONOSCOPY N/A 4/23/2019    COLONOSCOPY POLYPECTOMY SNARE/COLD BIOPSY performed by Melinda Mejia MD at [place] [address]  4/23/2019    COLONOSCOPY WITH BIOPSY performed by Melinda Mejia MD at [address] [place] [address] LITHOTRIPSY Left 05/04/2016    [facility] [address] UPPER GASTROINTESTINAL ENDOSCOPY  04/23/2019    gastritis--jerod    UPPER GASTROINTESTINAL ENDOSCOPY N/A 4/23/2019    EGD BIOPSY performed by Melinda Mejia MD at [facility] ENDOSCOPY     Current Medications:   Current Facility-Administered Medications: ondansetron (ZOFRAN) injection 4 mg, 4 mg, Intravenous, Once  Allergies:  Lisinopril    Social History:   TOBACCO:   reports that she has never smoked. She has never used smokeless tobacco.  ETOH:   reports no history of alcohol use. DRUGS:   reports no history of drug use.   ACTIVITIES OF DAILY LIVING:    OCCUPATION:    Family History:       Problem Relation Age of Onset    COPD Father     Heart Attack Father     Arthritis Mother     Cancer Other 48        colon       General ROS: negative  Cardiovascular ROS: no chest pain or dyspnea on exertion  Respiratory ROS: no cough, shortness of breath, or wheezing  Gastrointestinal ROS: no abdominal pain, change in bowel habits, or black or bloody stools  Neurological ROS: no TIA or stroke symptoms  Musculoskeletal ROS: Left shoulder pain    PHYSICAL EXAM:    VITALS:  BP (!) 152/80   Pulse 88   Temp 96.8 °F (36 °C) (Temporal)   Resp 16   Wt 192 lb (87.1 kg)   SpO2 92%   BMI 34.56 kg/m²   CONSTITUTIONAL:  awake, alert, cooperative, no apparent distress, and appears stated age  MUSCULOSKELETAL:  LUE  · Skin intact  · Compartment soft and compressible  · +2 radial pulse  · Sensation intact light touch to median, ulnar, radial, axillary nerves  · Positive AIN, PIN, ulnar nerve function  · No tenderness palpation about the wrist or elbow      SECONDARY EXAM    RUE: skin intact, -TTP, Radial pulses +2, cap refill <2 seconds, +sensation to radial/ulnar/median nerves sensation, +motor to AIN/PIN/ulnar nerves, compartments soft and compressible    RLE: skin intack, -TTP, DP/PT pulses +2, cap refill < 2 seconds, + sensation to sp/dp/pt/s/s nerves intact, demonstrates active plantar and dorsiflexion of ankle, compartments soft and compressible    LLE:skin intack, -TTP, DP/PT pulses +2, cap refill < 2 seconds, + sensation to sp/dp/pt/s/s nerves intact, demonstrates active plantar and dorsiflexion of ankle, compartments soft and compressible        DATA:    CBC:   Lab Results   Component Value Date    WBC 6.1 02/09/2020    RBC 3.23 02/09/2020    HGB 10.6 02/09/2020    HCT 33.9 02/09/2020    .0 02/09/2020    MCH 32.8 02/09/2020    MCHC 31.3 02/09/2020    RDW 15.7 02/09/2020     02/09/2020    MPV 11.6 02/09/2020     PT/INR:    Lab Results   Component Value Date    PROTIME 15.6 02/09/2020    INR 1.4 02/09/2020

## 2020-02-10 ENCOUNTER — TELEPHONE (OUTPATIENT)
Dept: ORTHOPEDIC SURGERY | Age: 75
End: 2020-02-10

## 2020-02-10 PROBLEM — R25.2 MUSCLE CRAMPS: Status: RESOLVED | Noted: 2017-02-14 | Resolved: 2020-02-10

## 2020-02-10 LAB
ANION GAP SERPL CALCULATED.3IONS-SCNC: 11 MMOL/L (ref 7–16)
BASOPHILS ABSOLUTE: 0.05 E9/L (ref 0–0.2)
BASOPHILS RELATIVE PERCENT: 0.7 % (ref 0–2)
BUN BLDV-MCNC: 19 MG/DL (ref 8–23)
CALCIUM SERPL-MCNC: 9.7 MG/DL (ref 8.6–10.2)
CHLORIDE BLD-SCNC: 109 MMOL/L (ref 98–107)
CO2: 20 MMOL/L (ref 22–29)
CREAT SERPL-MCNC: 1.1 MG/DL (ref 0.5–1)
EOSINOPHILS ABSOLUTE: 0.16 E9/L (ref 0.05–0.5)
EOSINOPHILS RELATIVE PERCENT: 2.2 % (ref 0–6)
GFR AFRICAN AMERICAN: 59
GFR NON-AFRICAN AMERICAN: 48 ML/MIN/1.73
GLUCOSE BLD-MCNC: 101 MG/DL (ref 74–99)
HBA1C MFR BLD: 6.2 % (ref 4–5.6)
HCT VFR BLD CALC: 33.4 % (ref 34–48)
HEMOGLOBIN: 10.7 G/DL (ref 11.5–15.5)
IMMATURE GRANULOCYTES #: 0.03 E9/L
IMMATURE GRANULOCYTES %: 0.4 % (ref 0–5)
LYMPHOCYTES ABSOLUTE: 1.33 E9/L (ref 1.5–4)
LYMPHOCYTES RELATIVE PERCENT: 18.5 % (ref 20–42)
MCH RBC QN AUTO: 34.3 PG (ref 26–35)
MCHC RBC AUTO-ENTMCNC: 32 % (ref 32–34.5)
MCV RBC AUTO: 107.1 FL (ref 80–99.9)
MONOCYTES ABSOLUTE: 0.7 E9/L (ref 0.1–0.95)
MONOCYTES RELATIVE PERCENT: 9.7 % (ref 2–12)
NEUTROPHILS ABSOLUTE: 4.93 E9/L (ref 1.8–7.3)
NEUTROPHILS RELATIVE PERCENT: 68.5 % (ref 43–80)
PDW BLD-RTO: 15.6 FL (ref 11.5–15)
PLATELET # BLD: 166 E9/L (ref 130–450)
PMV BLD AUTO: 11.4 FL (ref 7–12)
POTASSIUM REFLEX MAGNESIUM: 4.7 MMOL/L (ref 3.5–5)
RBC # BLD: 3.12 E12/L (ref 3.5–5.5)
SODIUM BLD-SCNC: 140 MMOL/L (ref 132–146)
WBC # BLD: 7.2 E9/L (ref 4.5–11.5)

## 2020-02-10 PROCEDURE — 36415 COLL VENOUS BLD VENIPUNCTURE: CPT

## 2020-02-10 PROCEDURE — G0378 HOSPITAL OBSERVATION PER HR: HCPCS

## 2020-02-10 PROCEDURE — 6370000000 HC RX 637 (ALT 250 FOR IP): Performed by: STUDENT IN AN ORGANIZED HEALTH CARE EDUCATION/TRAINING PROGRAM

## 2020-02-10 PROCEDURE — 94660 CPAP INITIATION&MGMT: CPT

## 2020-02-10 PROCEDURE — 6360000002 HC RX W HCPCS: Performed by: STUDENT IN AN ORGANIZED HEALTH CARE EDUCATION/TRAINING PROGRAM

## 2020-02-10 PROCEDURE — 99219 PR INITIAL OBSERVATION CARE/DAY 50 MINUTES: CPT | Performed by: FAMILY MEDICINE

## 2020-02-10 PROCEDURE — 96376 TX/PRO/DX INJ SAME DRUG ADON: CPT

## 2020-02-10 PROCEDURE — 83036 HEMOGLOBIN GLYCOSYLATED A1C: CPT

## 2020-02-10 PROCEDURE — 80048 BASIC METABOLIC PNL TOTAL CA: CPT

## 2020-02-10 PROCEDURE — 97161 PT EVAL LOW COMPLEX 20 MIN: CPT

## 2020-02-10 PROCEDURE — 97166 OT EVAL MOD COMPLEX 45 MIN: CPT

## 2020-02-10 PROCEDURE — 2580000003 HC RX 258: Performed by: STUDENT IN AN ORGANIZED HEALTH CARE EDUCATION/TRAINING PROGRAM

## 2020-02-10 PROCEDURE — 97530 THERAPEUTIC ACTIVITIES: CPT

## 2020-02-10 PROCEDURE — 96375 TX/PRO/DX INJ NEW DRUG ADDON: CPT

## 2020-02-10 PROCEDURE — 85025 COMPLETE CBC W/AUTO DIFF WBC: CPT

## 2020-02-10 RX ADMIN — HYDROXYZINE PAMOATE 25 MG: 25 CAPSULE ORAL at 20:32

## 2020-02-10 RX ADMIN — FUROSEMIDE 20 MG: 20 TABLET ORAL at 08:27

## 2020-02-10 RX ADMIN — KETOROLAC TROMETHAMINE 15 MG: 30 INJECTION, SOLUTION INTRAMUSCULAR; INTRAVENOUS at 06:36

## 2020-02-10 RX ADMIN — VENLAFAXINE HYDROCHLORIDE 75 MG: 75 CAPSULE, EXTENDED RELEASE ORAL at 08:28

## 2020-02-10 RX ADMIN — KETOROLAC TROMETHAMINE 15 MG: 30 INJECTION, SOLUTION INTRAMUSCULAR; INTRAVENOUS at 20:32

## 2020-02-10 RX ADMIN — MIRTAZAPINE 7.5 MG: 15 TABLET, FILM COATED ORAL at 21:14

## 2020-02-10 RX ADMIN — SODIUM CHLORIDE, PRESERVATIVE FREE 10 ML: 5 INJECTION INTRAVENOUS at 20:32

## 2020-02-10 RX ADMIN — KETOROLAC TROMETHAMINE 15 MG: 30 INJECTION, SOLUTION INTRAMUSCULAR; INTRAVENOUS at 13:52

## 2020-02-10 RX ADMIN — SODIUM CHLORIDE, PRESERVATIVE FREE 10 ML: 5 INJECTION INTRAVENOUS at 08:29

## 2020-02-10 RX ADMIN — DOXAZOSIN 1 MG: 1 TABLET ORAL at 20:32

## 2020-02-10 RX ADMIN — ENOXAPARIN SODIUM 40 MG: 40 INJECTION SUBCUTANEOUS at 08:28

## 2020-02-10 RX ADMIN — LOSARTAN POTASSIUM 100 MG: 50 TABLET ORAL at 08:27

## 2020-02-10 RX ADMIN — AMLODIPINE BESYLATE 2.5 MG: 2.5 TABLET ORAL at 08:28

## 2020-02-10 RX ADMIN — PANTOPRAZOLE SODIUM 40 MG: 40 TABLET, DELAYED RELEASE ORAL at 06:36

## 2020-02-10 RX ADMIN — SPIRONOLACTONE 25 MG: 25 TABLET ORAL at 08:28

## 2020-02-10 RX ADMIN — METFORMIN HYDROCHLORIDE 500 MG: 500 TABLET ORAL at 08:27

## 2020-02-10 ASSESSMENT — PAIN DESCRIPTION - PAIN TYPE
TYPE: ACUTE PAIN
TYPE: ACUTE PAIN

## 2020-02-10 ASSESSMENT — PAIN SCALES - GENERAL
PAINLEVEL_OUTOF10: 3
PAINLEVEL_OUTOF10: 4
PAINLEVEL_OUTOF10: 5
PAINLEVEL_OUTOF10: 5
PAINLEVEL_OUTOF10: 8
PAINLEVEL_OUTOF10: 0

## 2020-02-10 ASSESSMENT — PAIN DESCRIPTION - DESCRIPTORS: DESCRIPTORS: ACHING;DISCOMFORT;SORE

## 2020-02-10 ASSESSMENT — PAIN DESCRIPTION - ONSET: ONSET: ON-GOING

## 2020-02-10 ASSESSMENT — PAIN DESCRIPTION - LOCATION: LOCATION: SHOULDER

## 2020-02-10 ASSESSMENT — PAIN DESCRIPTION - FREQUENCY: FREQUENCY: CONTINUOUS

## 2020-02-10 ASSESSMENT — PAIN DESCRIPTION - ORIENTATION: ORIENTATION: LEFT

## 2020-02-10 NOTE — PROGRESS NOTES
[facility]  Family Medicine Attending    S: 76 y.o. female with HTN, DM who presents with fall from standing height and pain in left arm. Imaging demonstrates fracture of head of humerus. Ortho indicates non-operative management appropriate. Today, doing well, still has pain. Lives alone, and has been having unsteadiness and weakness for a while. O: VS- Blood pressure (!) 111/55, pulse 104, temperature 97.8 °F (36.6 °C), temperature source Temporal, resp. rate 16, weight 192 lb (87.1 kg), SpO2 92 %. Exam is as noted by resident with the following changes, additions or corrections:  Awake, alert  Heart - RRR  Lungs- clear  Left arm in sling    Impressions:   Principal Problem:    Closed fracture of proximal epiphysis of left humerus  Active Problems:    Type 2 diabetes mellitus ([facility]    Essential hypertension    Fall  Resolved Problems:    * No resolved hospital problems. *      Plan:   Appreciate ortho recommendations - non-operative management   Will likely need AMAN - high fall risk and unable to care for herself at this time   Sounds like she has been having weakness and instability for some time. Macrocytosis noted - previously on B12 - will check levels   Check A1C - increase metformin if high     Attending Physician Statement  I have reviewed the chart and seen the patient with the resident(s). I personally reviewed images, EKG's and similar tests, if present. I personally reviewed and performed key elements of the history and exam.  I have reviewed and confirmed student and/or resident history and exam with changes as indicated above. I agree with the assessment, plan and orders as documented by the resident. Please refer to the resident and/or student note for additional information.       Douglas Phan

## 2020-02-10 NOTE — PROGRESS NOTES
Physical Therapy    Physical Therapy Initial Assessment     Name: Deondre Acevedo  : 1945  MRN: [id number]    Referring Provider:  Dr. Maciel Kaye MD    Date of Service: 2/10/2020    Evaluating PT:  Kyung Calderon PT, DPT PT [id number]    Room #:  3007/2534-Z  Diagnosis:  Fall:  Comminuted 4 part proximal humerus fracture  PMHx/PSHx:  DM II, HTN, Osteopenia, Cirrhosis, HLD, Spinal Stenosis  Procedure/Surgery:  None  Precautions:  NWB LUE, Sling, Falls  Equipment Needs:      SUBJECTIVE:    Pt lives alone  in a 1 floor apartment 0 stairs to enter. Pt has laundry on 2nd floor. Pt ambulated with no device independently PTA. Equipment Owned: None    OBJECTIVE:   Initial Evaluation  Date: 2/10/2020 Treatment Short Term/ Long Term   Goals   AM-PAC 6 Clicks 83/25     Was pt agreeable to Eval/treatment? Yes     Does pt have pain? Mild pain L shoulder     Bed Mobility  Rolling: SBA  Supine to sit: Chika  Sit to supine: NT  Scooting: SBA  Rolling: Independent    Supine to sit:  Independent    Sit to supine: Independent    Scooting: Independent     Transfers Sit to stand: Chika  Stand to sit: Chika  Stand pivot: Chika no AD  Sit to stand: Modified Independent    Stand to sit: Modified Independent    Stand pivot: Modified Independent  AAD   Ambulation    45 feet x 2 with Chika no AD  >150 feet with Modified Independent  AAD   Stair negotiation: ascended and descended  NT  >4 steps with single rail Modified Independent  AAD   ROM BUE:  See OT Note  BLE:  WFL     Strength BUE:  See OT Note  BLE:  4/5 grossly  Improve Strength 1/3 MMT Grade   Balance Sitting EOB:  Supervision  Dynamic Standing:  Chika no AD  Sitting EOB:  Independent    Dynamic Standing:  Modified Independent  AAD     Pt is A & O x 4  Sensation:  Pt denies numbness and tingling to extremities  Edema:  Mild LUE    Patient education  Pt educated on role of PT    Patient response to education:   Pt verbalized understanding Pt demonstrated skill Pt requires further education in this area   x x x     ASSESSMENT:    Comments:  Pt received in supine agreeable to PT evaluation. Pt requiring assistance with trunk for bed mobility. Pt requiring adjustment of sling at edge of bed. Pt requiring intermittent steadying assistance with functional transfers and ambulation. Pt ambulating with decreased speed, shuffled gait pattern. Pt activity limited by fatigue. Patient would benefit from continued skilled PT to maximize functional mobility independence. Treatment:  Patient practiced and was instructed in the following treatment:     Functional transfers- verbal cues for positioning and sequencing using [employer]   Gait training- verbal and tactile cues for increased step length and step height    Pt's/ family goals   1. Get better    Patient and or family understand(s) diagnosis, prognosis, and plan of care. yes    PLAN:    PT care will be provided in accordance with the objectives noted above. Exercises and functional mobility practice will be used as well as appropriate assistive devices or modalities to obtain goals. Patient and family education will also be administered as needed. Frequency of treatments: 5-7x/week x 1-2 weeks. Time in  1339  Time out  1402    Total Treatment Time  8 minutes     Evaluation Time includes thorough review of current medical information, gathering information on past medical history/social history and prior level of function, completion of standardized testing/informal observation of tasks, assessment of data and education on plan of care and goals.     CPT codes:  [x] Low Complexity PT evaluation 92013  [] Moderate Complexity PT evaluation 67293  [] High Complexity PT evaluation 44414  [] PT Re-evaluation 97139  [] Gait training 12189 0 minutes  [] Manual therapy 25538 0 minutes  [x] Therapeutic activities 02864 8 minutes  [] Therapeutic exercises 89280 0 minutes  [] Neuromuscular reeducation 15667 0 minutes       Marcin Ulrich PT, DPT   [id number]

## 2020-02-10 NOTE — PROGRESS NOTES
[facility] - Family Medicine Inpatient   Resident Progress Note    S:  Hospital day: 0   Brief Synopsis: Marti Willis is a 76 y. o. woman with breast cancer, KATE on CPAP, htn, type 2 diabetes who presented after a fall. No LOC. Found to have a left humeral fracture with impaction. Overnight/interim:    Pain is 7/10 left upper extremity, with movement 10/10.  Denies paresthesias, burning, numbness in left upper extremity   Reports some leg cramping    Cont meds:   Scheduled meds:    ondansetron  4 mg Intravenous Once    exemestane  25 mg Oral Daily    doxazosin  1 mg Oral Nightly    amLODIPine  2.5 mg Oral Daily    furosemide  20 mg Oral Daily    hydrOXYzine  25 mg Oral Nightly    losartan  100 mg Oral Daily    metFORMIN  500 mg Oral Daily with breakfast    mirtazapine  7.5 mg Oral Nightly    pantoprazole  40 mg Oral QAM AC    spironolactone  25 mg Oral Daily    venlafaxine  75 mg Oral Daily with breakfast    sodium chloride flush  10 mL Intravenous 2 times per day    enoxaparin  40 mg Subcutaneous Daily    lidocaine  2 patch Transdermal Daily     PRN meds: sodium chloride flush, magnesium hydroxide, ondansetron, ketorolac, acetaminophen     I reviewed the patient's past medical and surgical history, Medications and Allergies. O:  BP (!) 111/55   Pulse 104   Temp 97.8 °F (36.6 °C) (Temporal)   Resp 16   Wt 192 lb (87.1 kg)   SpO2 92%   BMI 34.56 kg/m²   24 hour I&O: I/O last 3 completed shifts: In: 10 [I.V.:10]  Out: -   No intake/output data recorded. Physical Exam   Constitutional: She is oriented to person, place, and time. She appears well-developed and well-nourished. HENT:   Head: Normocephalic and atraumatic. Eyes: EOM are normal.   Cardiovascular: Normal rate and regular rhythm. Exam reveals no gallop and no friction rub. No murmur heard. Pulmonary/Chest: Breath sounds normal. She has no wheezes. She has no rales. Abdominal: Soft.  Bowel sounds are normal. She exhibits no distension. Musculoskeletal:         General: No deformity or edema. Comments: Left Upper Extremity in a Sling   Lymphadenopathy:     She has no cervical adenopathy. Neurological: She is alert and oriented to person, place, and time. Skin: Skin is warm and dry. Psychiatric: She has a normal mood and affect. Her behavior is normal.         Labs:  Na/K/Cl/CO2:  140/5.3/109/24 (02/09 1041)  BUN/Cr/glu/ALT/AST/amyl/lip:  18/0.9/--/18/41/--/-- (02/09 1041)  WBC/Hgb/Hct/Plts:  6.1/10.6/33.9/181 (02/09 1041)  CrCl cannot be calculated (Unknown ideal weight.). Other pertinent labs as noted below    Radiology:  CT Head WO Contrast   Final Result      NO ACUTE INTRACRANIAL PROCESS         CT Cervical Spine WO Contrast   Final Result      NO ACUTE FRACTURE OR SIGNIFICANT SUBLUXATION IS IDENTIFIED      Osteoarthritic changes and disc disease involving the lower cervical   spine from C4 to C7. Kelvin [place] XR SHOULDER LEFT (MIN 2 VIEWS)   Final Result      Comminuted fracture of the left humeral head with impacted distal   humerus. .       XR HUMERUS LEFT (MIN 2 VIEWS)   Final Result      Comminuted fracture of the left humeral head with impaction. .      XR HIP RIGHT (2-3 VIEWS)   Final Result      NO ACUTE FRACTURE OR DISLOCATION RIGHT HIP. A/P:  Active Problems:    Closed fracture of proximal epiphysis of left humerus    Fall  Resolved Problems:    * No resolved hospital problems. *    Closed fracture of proximal epiphysis of left humerus  Ortho has evaluated pt- okay for o/p follow-up, no operative mx considered at this time  Awaiting SW consult- possible AMAN placement  PT/OT consulted  Pain control 15 mg toradol 15 mg q 6 PRN, Tylenol    S/p Fall  Pt mentation nl s/p fall.  No signs NCG decline  CT Head negative IC bleed, CT C-Spine: mild osteoarthritic changes C4-C7, otherwise no acute process  X Ray R/L hip: negative    Non-Alcoholic Liver Cirrhosis  PT: 15.6- elevated, likely continue f/u out-patient  INR nl., Alkaline phosphatase: 143 elevated  CHILD Class 3    Breast Cancer   Continued home meds- Aromasin    HTN- controlled  Norvasc 2.5 mg    Diabetes Mellitus Type 2  Last A1C: 5.0, glucose 251 mg/dl (elevated)  Pt started on metformin 500 mg  Consider d/c on metformin.        GI/DVT ppx: Lovenox  Diet: Carb Control      Electronically signed by Ramiro Gonzalez  PGY-1 on 2/10/2020 at 6:57 AM  This case was discussed with attending physician: Dr. Rashaad Bonilla

## 2020-02-10 NOTE — PROGRESS NOTES
Occupational Therapy  OCCUPATIONAL THERAPY INITIAL EVALUATION      Date:2/10/2020  Patient Name: Deondre Acevedo  MRN: [id number]  : 1945  Room: [address] [place], [place] [address]      Evaluating OT: Spring Reece OTR/L #[id number]    AM-PAC Daily Activity Raw Score:     Recommended Adaptive Equipment: AE for LB dressing and bathing as needed, elevated commode w/ handrails, TBD for additional equipment     Diagnosis: Closed fracture of proximal epiphysis of left humerus, initial encounter [S49.002A]  Referring Provider: Bee Soto MD  Patient presented to ED for evaluation of fall (eating breakfast when her legs gave out, son in different room at the time of fall)    Pertinent Medical History: Breat cancer, cirrhosis, HTN, HLD, osteopenia, sleep apnea, spinal stenosis, DM type 2     Precautions:  Falls, NWB LUE (sling)     Home Living: Pt lives alone in an apt on first floor, 1 [address] (threshold), 2nd floor laundry   Bathroom setup: tub/shower with shower chair, standard commode  Equipment owned: shower chair    Prior Level of Function: Independent with ADLs, Independent with light IADLs (prepared easy meals)--has 3 sons in area for assistance if needed; ambulated with no AD  Driving: Yes    Pain Level: Pt had no c/o of pain at rest and minimal pain in L arm with movement during session  Cognition: A&O: 4/4; Follows 1-2 step directions   Memory:  good    Sequencing:  fair    Problem solving: fair    Judgement/safety: fair      Functional Assessment:   Initial Eval Status  Date: 2/10/20 Treatment Status  Date: STGs/LTGs  Treatment frequency: 1-4x/wk   Feeding Supervision/Set-up  Independent     Grooming Supervision/set-up (in bedside chair)  Independent    UB Dressing Maximal Assist (gown)  Minimal Assist    LB Dressing Dependent   (socks)  Moderate Assist    Bathing Mod.  A (simulated)  Stand by Assist    Toileting NT (ambulated from EOB>bedside chair)   Stand by Assist    Bed Mobility  Supine to sit: Minimal Assist   Sit to supine:NT (in bedside chair at end of session)  Supine to sit: Supervision   Sit to supine: Supervision    Functional Transfers Minimal Assist   Supervision    Functional Mobility Minimal Assist w/o AD  Supervision    Balance Sitting:     Static:  SBA    Dynamic: SBA  Standing: w/o AD    Static: SBA    Dynamic: Min. A     Activity Tolerance fair  good   Visual/  Perceptual Glasses: Yes                  Hand dominance: R     Strength ROM Additional Info:    RUE  WFL noted during ADL tasks WFL good  and wfl FMC/dexterity noted during ADL tasks       LUE NT d/t NWB LUE  NT d/t sling of LUE good  and wfl FMC/dexterity noted during ADL tasks         Hearing: WFL   Sensation:  No c/o numbness or tingling   Tone: WFL   Edema: none noted            Treatment: Upon arrival, patient lying in bed with bedpan with nurse present and agreeable to OT session at this time. RN approved. Educated pt on role of OT and NWB LUE precautions. Therapist facilitated bed mobility (supine>sit to R side d/t NWB LUE), functional transfers (EOB, sit>stand; bedside chair, stand>sit), standing tolerance tasks and functional ambulation task (light household distance) with no AD - skilled cuing on hand placement, posture, body mechanics and safety. Therapist facilitated self-care retraining: UB/LB self-care tasks(donned socks, gown--required increase assistance d/t limited LUE ROM/NWB LUE precaution) and seated grooming task(in bedside chair(combed hair/washed face) while educating pt on modified techniques, posture, safety and energy conservation techniques. Educated pt on AROM of wrist/fingers of LUE--Will check w/ physician for AROM of LUE elbow. Skilled monitoring of HR, O2 sats and pts response to treatment. At end of session, patient seated in bedside chair with call light and phone within reach, all lines and tubes intact.                       Comments:  Overall pt demonstrated decreased independence and safety during completion of

## 2020-02-10 NOTE — PLAN OF CARE
Problem: Falls - Risk of:  Goal: Will remain free from falls  Description  Will remain free from falls  Outcome: Met This Shift     Problem: Pain:  Description  Pain management should include both nonpharmacologic and pharmacologic interventions.   Goal: Pain level will decrease  Description  Pain level will decrease  Outcome: Met This Shift

## 2020-02-11 VITALS
BODY MASS INDEX: 35.33 KG/M2 | HEIGHT: 62 IN | HEART RATE: 84 BPM | WEIGHT: 192 LBS | RESPIRATION RATE: 16 BRPM | OXYGEN SATURATION: 96 % | TEMPERATURE: 98.1 F | SYSTOLIC BLOOD PRESSURE: 125 MMHG | DIASTOLIC BLOOD PRESSURE: 63 MMHG

## 2020-02-11 LAB
ANION GAP SERPL CALCULATED.3IONS-SCNC: 13 MMOL/L (ref 7–16)
BASOPHILS ABSOLUTE: 0.04 E9/L (ref 0–0.2)
BASOPHILS RELATIVE PERCENT: 0.8 % (ref 0–2)
BUN BLDV-MCNC: 25 MG/DL (ref 8–23)
CALCIUM SERPL-MCNC: 9.9 MG/DL (ref 8.6–10.2)
CHLORIDE BLD-SCNC: 112 MMOL/L (ref 98–107)
CO2: 18 MMOL/L (ref 22–29)
CREAT SERPL-MCNC: 1.3 MG/DL (ref 0.5–1)
EOSINOPHILS ABSOLUTE: 0.22 E9/L (ref 0.05–0.5)
EOSINOPHILS RELATIVE PERCENT: 4.4 % (ref 0–6)
GFR AFRICAN AMERICAN: 48
GFR NON-AFRICAN AMERICAN: 40 ML/MIN/1.73
GLUCOSE BLD-MCNC: 135 MG/DL (ref 74–99)
HCT VFR BLD CALC: 32.2 % (ref 34–48)
HEMOGLOBIN: 10.2 G/DL (ref 11.5–15.5)
IMMATURE GRANULOCYTES #: 0.02 E9/L
IMMATURE GRANULOCYTES %: 0.4 % (ref 0–5)
LYMPHOCYTES ABSOLUTE: 1.13 E9/L (ref 1.5–4)
LYMPHOCYTES RELATIVE PERCENT: 22.4 % (ref 20–42)
MCH RBC QN AUTO: 33.4 PG (ref 26–35)
MCHC RBC AUTO-ENTMCNC: 31.7 % (ref 32–34.5)
MCV RBC AUTO: 105.6 FL (ref 80–99.9)
MONOCYTES ABSOLUTE: 0.64 E9/L (ref 0.1–0.95)
MONOCYTES RELATIVE PERCENT: 12.7 % (ref 2–12)
NEUTROPHILS ABSOLUTE: 2.99 E9/L (ref 1.8–7.3)
NEUTROPHILS RELATIVE PERCENT: 59.3 % (ref 43–80)
PDW BLD-RTO: 15.2 FL (ref 11.5–15)
PLATELET # BLD: 157 E9/L (ref 130–450)
PMV BLD AUTO: 10.8 FL (ref 7–12)
POTASSIUM REFLEX MAGNESIUM: 4.9 MMOL/L (ref 3.5–5)
RBC # BLD: 3.05 E12/L (ref 3.5–5.5)
SODIUM BLD-SCNC: 143 MMOL/L (ref 132–146)
WBC # BLD: 5 E9/L (ref 4.5–11.5)

## 2020-02-11 PROCEDURE — 80048 BASIC METABOLIC PNL TOTAL CA: CPT

## 2020-02-11 PROCEDURE — 36415 COLL VENOUS BLD VENIPUNCTURE: CPT

## 2020-02-11 PROCEDURE — 99217 PR OBSERVATION CARE DISCHARGE MANAGEMENT: CPT | Performed by: FAMILY MEDICINE

## 2020-02-11 PROCEDURE — 96376 TX/PRO/DX INJ SAME DRUG ADON: CPT

## 2020-02-11 PROCEDURE — G0378 HOSPITAL OBSERVATION PER HR: HCPCS

## 2020-02-11 PROCEDURE — 6370000000 HC RX 637 (ALT 250 FOR IP): Performed by: STUDENT IN AN ORGANIZED HEALTH CARE EDUCATION/TRAINING PROGRAM

## 2020-02-11 PROCEDURE — 85025 COMPLETE CBC W/AUTO DIFF WBC: CPT

## 2020-02-11 PROCEDURE — 94660 CPAP INITIATION&MGMT: CPT

## 2020-02-11 PROCEDURE — 6360000002 HC RX W HCPCS: Performed by: STUDENT IN AN ORGANIZED HEALTH CARE EDUCATION/TRAINING PROGRAM

## 2020-02-11 PROCEDURE — 96372 THER/PROPH/DIAG INJ SC/IM: CPT

## 2020-02-11 PROCEDURE — 2580000003 HC RX 258: Performed by: STUDENT IN AN ORGANIZED HEALTH CARE EDUCATION/TRAINING PROGRAM

## 2020-02-11 RX ORDER — KETOROLAC TROMETHAMINE 30 MG/ML
15 INJECTION, SOLUTION INTRAMUSCULAR; INTRAVENOUS EVERY 6 HOURS PRN
Qty: 7 ML | Refills: 0 | Status: SHIPPED | OUTPATIENT
Start: 2020-02-11 | End: 2020-02-11 | Stop reason: HOSPADM

## 2020-02-11 RX ORDER — SENNA AND DOCUSATE SODIUM 50; 8.6 MG/1; MG/1
2 TABLET, FILM COATED ORAL 2 TIMES DAILY PRN
Qty: 30 TABLET | Refills: 0 | Status: SHIPPED | OUTPATIENT
Start: 2020-02-11 | End: 2020-03-12

## 2020-02-11 RX ORDER — LIDOCAINE 4 G/G
2 PATCH TOPICAL DAILY
Qty: 30 PATCH | Refills: 0 | Status: SHIPPED | OUTPATIENT
Start: 2020-02-12 | End: 2020-03-27

## 2020-02-11 RX ORDER — FUROSEMIDE 20 MG/1
20 TABLET ORAL DAILY
Qty: 60 TABLET | Refills: 3 | Status: SHIPPED
Start: 2020-02-12 | End: 2020-06-11

## 2020-02-11 RX ORDER — EXEMESTANE 25 MG/1
25 TABLET ORAL DAILY
Qty: 30 TABLET | Refills: 3 | Status: SHIPPED | OUTPATIENT
Start: 2020-02-12 | End: 2020-06-11

## 2020-02-11 RX ORDER — SENNA AND DOCUSATE SODIUM 50; 8.6 MG/1; MG/1
2 TABLET, FILM COATED ORAL 2 TIMES DAILY
Status: DISCONTINUED | OUTPATIENT
Start: 2020-02-11 | End: 2020-02-11 | Stop reason: HOSPADM

## 2020-02-11 RX ORDER — TRAMADOL HYDROCHLORIDE 50 MG/1
50 TABLET ORAL EVERY 6 HOURS PRN
Qty: 12 TABLET | Refills: 0 | Status: SHIPPED | OUTPATIENT
Start: 2020-02-11 | End: 2020-02-14

## 2020-02-11 RX ORDER — KETOROLAC TROMETHAMINE 30 MG/ML
15 INJECTION, SOLUTION INTRAMUSCULAR; INTRAVENOUS EVERY 6 HOURS PRN
Qty: 7 ML | Refills: 0 | Status: SHIPPED | OUTPATIENT
Start: 2020-02-11 | End: 2020-02-11

## 2020-02-11 RX ADMIN — SPIRONOLACTONE 25 MG: 25 TABLET ORAL at 09:20

## 2020-02-11 RX ADMIN — VENLAFAXINE HYDROCHLORIDE 75 MG: 75 CAPSULE, EXTENDED RELEASE ORAL at 09:20

## 2020-02-11 RX ADMIN — SODIUM CHLORIDE, PRESERVATIVE FREE 10 ML: 5 INJECTION INTRAVENOUS at 09:20

## 2020-02-11 RX ADMIN — FUROSEMIDE 20 MG: 20 TABLET ORAL at 09:20

## 2020-02-11 RX ADMIN — KETOROLAC TROMETHAMINE 15 MG: 30 INJECTION, SOLUTION INTRAMUSCULAR; INTRAVENOUS at 04:50

## 2020-02-11 RX ADMIN — LOSARTAN POTASSIUM 100 MG: 50 TABLET ORAL at 09:20

## 2020-02-11 RX ADMIN — PANTOPRAZOLE SODIUM 40 MG: 40 TABLET, DELAYED RELEASE ORAL at 06:15

## 2020-02-11 RX ADMIN — ENOXAPARIN SODIUM 40 MG: 40 INJECTION SUBCUTANEOUS at 09:20

## 2020-02-11 RX ADMIN — AMLODIPINE BESYLATE 2.5 MG: 2.5 TABLET ORAL at 09:20

## 2020-02-11 RX ADMIN — SENNOSIDES AND DOCUSATE SODIUM 2 TABLET: 8.6; 5 TABLET ORAL at 11:02

## 2020-02-11 ASSESSMENT — PAIN SCALES - GENERAL: PAINLEVEL_OUTOF10: 6

## 2020-02-11 NOTE — DISCHARGE INSTR - COC
2/11/2020 0915  Gross per 24 hour   Intake 380 ml   Output --   Net 380 ml     I/O last 3 completed shifts:   In: 320 [P.O.:320]  Out: -     Safety Concerns:     None    Impairments/Disabilities:      None    Nutrition Therapy:  Current Nutrition Therapy:   - Oral Diet:  Carb Control 4 carbs/meal (1800kcals/day)    Routes of Feeding: Oral  Liquids: No Restrictions  Daily Fluid Restriction: no  Last Modified Barium Swallow with Video (Video Swallowing Test): not done    Treatments at the Time of Hospital Discharge:   Respiratory Treatments: none  Oxygen Therapy:  Bipap at night   Ventilator:    - BiPAP    , CPAP/EPAP: 10 cmH2O only when sleeping    Rehab Therapies: Physical Therapy and Occupational Therapy  Weight Bearing Status/Restrictions: No weight bearing left arm   Other Medical Equipment (for information only, NOT a DME order):  Sling to left upper arm   Other Treatments: none    Patient's personal belongings (please select all that are sent with patient):  Glasses, Dentures partial, cell phone     RN SIGNATURE:  Electronically signed by Nusrat Chaudhari RN on 2/11/20 at 3:05 PM    CASE MANAGEMENT/SOCIAL WORK SECTION    Inpatient Status Date: ***    Readmission Risk Assessment Score:  Readmission Risk              Risk of Unplanned Readmission:        11           Discharging to Facility/ Agency   · Name: [facility]  · Address:  · Phone:  · Fax:    Dialysis Facility (if applicable)   · Name:  · Address:  · Dialysis Schedule:  · Phone:  · Fax:    / signature: Electronically signed by Palmer Hernandez on 2/11/20 at 12:35 PM    PHYSICIAN SECTION    Prognosis: Good    Condition at Discharge: Stable    Rehab Potential (if transferring to Rehab): Good    Recommended Labs or Other Treatments After Discharge: n/a    Physician Certification: I certify the above information and transfer of Mario Honour  is necessary for the continuing treatment of the diagnosis listed and that she requires [facility] for less 30 days. Update Admission H&P: Changes in H&P as follows - patient presented following a fall resulting in closed left proximal humerus fracture. Ortho was consulted and recommending no surgical intervention. Patient should keep left arm in a sling and should undergo PT for both upper and lower extremities.     PHYSICIAN SIGNATURE:  Electronically signed by Dm Hudson MD on 2/11/20 at 11:35 AM

## 2020-02-11 NOTE — PROGRESS NOTES
normal. She exhibits no distension. Musculoskeletal:         General: No deformity or edema. Comments: Left Upper Extremity in a Sling   Lymphadenopathy:     She has no cervical adenopathy. Neurological: She is alert and oriented to person, place, and time. Skin: Skin is warm and dry. Psychiatric: She has a normal mood and affect. Her behavior is normal.         Labs:  Na/K/Cl/CO2:  140/4.7/109/20 (02/10 0617)  BUN/Cr/glu/ALT/AST/amyl/lip:  19/1.1/--/--/--/--/-- (02/10 0617)  WBC/Hgb/Hct/Plts:  7.2/10.7/33.4/166 (02/10 0617)  CrCl cannot be calculated (Unknown ideal weight.). Other pertinent labs as noted below    Radiology:  CT Head WO Contrast   Final Result      NO ACUTE INTRACRANIAL PROCESS         CT Cervical Spine WO Contrast   Final Result      NO ACUTE FRACTURE OR SIGNIFICANT SUBLUXATION IS IDENTIFIED      Osteoarthritic changes and disc disease involving the lower cervical   spine from C4 to C7. Mart Algona XR SHOULDER LEFT (MIN 2 VIEWS)   Final Result      Comminuted fracture of the left humeral head with impacted distal   humerus. .       XR HUMERUS LEFT (MIN 2 VIEWS)   Final Result      Comminuted fracture of the left humeral head with impaction. .      XR HIP RIGHT (2-3 VIEWS)   Final Result      NO ACUTE FRACTURE OR DISLOCATION RIGHT HIP. A/P:  Principal Problem:    Closed fracture of proximal epiphysis of left humerus  Active Problems:    Type 2 diabetes mellitus ([address]    Essential hypertension    Fall  Resolved Problems:    * No resolved hospital problems. *    Closed fracture of proximal epiphysis of left humerus  Ortho has evaluated pt- okay for o/p follow-up, no operative mx considered at this time  Awaiting SW consult- possible AMAN placement  PT/OT consulted  Pain control 15 mg toradol 15 mg q 6 PRN, Tylenol    S/p Fall  Pt mentation nl s/p fall.  No signs NCG decline  CT Head negative IC bleed, CT C-Spine: mild osteoarthritic changes C4-C7, otherwise no acute process  X Ray R/L hip: negative    Non-Alcoholic Liver Cirrhosis  PT: 15.6- elevated, likely continue f/u out-patient  INR nl., Alkaline phosphatase: 143 elevated  CHILD Class 3    Breast Cancer   Continued home meds- Aromasin    HTN- controlled  Norvasc 2.5 mg    Diabetes Mellitus Type 2  Last A1C: 6.2 (2/10/2020) Continue with metformin 500 mg  Monitor glucose  Consider d/c on metformin.      MDD  Effexor 75 mg q daily    GI/DVT ppx: Lovenox/ Protonix  Diet: Carb Control      Electronically signed by Nicolas Bennett  PGY-1 on 2/11/2020 at 6:38 AM  This case was discussed with attending physician: Dr. Clary Valdes

## 2020-02-11 NOTE — PROGRESS NOTES
Called nurse to nurse to [address] at [facility]. Discharge instructions, script, H&P and MAR report faxed.

## 2020-02-11 NOTE — CARE COORDINATION
2/11, SW spoke with Caleb Age from [facility] has auth and can go to facility once medically cleared for discharge. Charge Nurse-Christina and Nurse-Marylin updated on auth. Transportation is tentatively set up for 430pm later today. PAS/RR, face sheet and envelope on soft chart. SW to follow for any further needs.

## 2020-02-12 NOTE — DISCHARGE SUMMARY
Discharge Summary    Madhavi Avila  :  1945  MRN:  [id number]    Admit date:  2020  Discharge date:  2020    Admitting Physician:  Jefferson Jeong MD    Discharge Diagnoses:    Patient Active Problem List   Diagnosis    [facility] of left [facility]    Type 2 diabetes mellitus ([facility]    Obstructive sleep apnea syndrome    Chronic back pain    Essential hypertension    Multiple thyroid nodules    Balance problem    Depression    At risk for colon cancer    Hx of adenomatous colonic polyps    Dyslipidemia    Murmur, cardiac    Cirrhosis ([facility]    Closed fracture of proximal epiphysis of left humerus        Admission Condition:  fair    Discharged Condition:  fair    Hospital Course:   76year old woman who presented after a mechanical fall that happened early in the morning, legs gave away while eating breakfast. Denied LOC. Denied head trauma, chest pain, sob. Workup revealed left-humeral fracture with impaction. Ortho was consulted and placed pt in a left-arm sling, did not recommend operative management. Pain was managed with 15 mg toradol q6 PRN, tylenol. SW was consulted and pt was deemed a good candidate for [facility]. Pt was agreeable and chose [facility] course was unremarkable and pt progressed well. Discharge Medications:         Medication List      START taking these medications    lidocaine 4 % external patch  Place 2 patches onto the skin daily     sennosides-docusate sodium 8.6-50 MG tablet  Commonly known as:  SENOKOT-S  Take 2 tablets by mouth 2 times daily as needed for Constipation     traMADol 50 MG tablet  Commonly known as:  ULTRAM  Take 1 tablet by mouth every 6 hours as needed for Pain for up to 3 days.         CHANGE how you take these medications    furosemide 20 MG tablet  Commonly known as:  LASIX  Take 1 tablet by mouth daily  What changed:    · medication strength  · how much to take     omeprazole 20 MG delayed release capsule  Commonly known as:  PriLOSEC  Take 1 capsule by mouth 2 times daily  What changed:    · how much to take  · when to take this        CONTINUE taking these medications    blood glucose test strips strip  Commonly known as:  FREESTYLE LITE  CHECK BLOOD SUGAR DAILY     doxazosin 1 MG tablet  Commonly known as:  CARDURA     Ensure Active High Protein Liqd  Take 1 each by mouth daily     exemestane 25 MG tablet  Commonly known as:  AROMASIN  Take 1 tablet by mouth daily     felodipine 2.5 MG extended release tablet  Commonly known as:  PLENDIL  TAKE 1 TABLET BY MOUTH ONCE DAILY     hydrOXYzine 25 MG tablet  Commonly known as:  ATARAX  TAKE 1 TABLET BY MOUTH EVERY NIGHT     lactulose 10 GM/15ML solution  Commonly known as:  CHRONULAC     losartan 100 MG tablet  Commonly known as:  COZAAR  Take 1 tablet by mouth daily     mirtazapine 7.5 MG tablet  Commonly known as:  REMERON  TAKE 1 TABLET BY MOUTH EVERY NIGHT     naproxen 500 MG EC tablet  Commonly known as:  naproxen  Take 1 tablet by mouth 2 times daily (with meals)     ondansetron 4 MG tablet  Commonly known as:  ZOFRAN  TAKE 1 TABLET BY MOUTH DAILY AS NEEDED FOR NAUSEA OR VOMITING     spironolactone 25 MG tablet  Commonly known as:  ALDACTONE  TAKE 1 TABLET BY MOUTH DAILY     venlafaxine 75 MG extended release capsule  Commonly known as:  EFFEXOR XR  TAKE 1 CAPSULE BY MOUTH EVERY NIGHT     vitamin B-12 1000 MCG tablet  Commonly known as:  CYANOCOBALAMIN     vitamin D 1000 UNIT Tabs tablet  Commonly known as:  CHOLECALCIFEROL     Xifaxan 550 MG tablet  Generic drug:  rifaximin        STOP taking these medications    metFORMIN 500 MG tablet  Commonly known as:  GLUCOPHAGE           Where to Get Your Medications      These medications were sent to [facility] [address] [address]    Phone:  [phone]   · exemestane 25 MG tablet  · furosemide 20 MG focal extracranial soft tissue swelling. NO ACUTE INTRACRANIAL PROCESS     Ct Cervical Spine Wo Contrast    Result Date: 2/9/2020  Findings: The study demonstrates there is normal vertebral body heights and alignment. There are multilevel osteoarthritic changes and disc disease most severe at C4-C5 C5-C6 and C6-C7. This is causing mild spinal canal stenosis. Diaz Pander Posterior elements are normally visualized. . Paravertebral soft tissues are within normal limits. The occiput, C1 and C2 alignment is maintained. The craniovertebral junction is maintained. NO ACUTE FRACTURE OR SIGNIFICANT SUBLUXATION IS IDENTIFIED Osteoarthritic changes and disc disease involving the lower cervical spine from C4 to C7. Viktoria Hubbard Shoulder Left (min 2 Views)    Result Date: 2/9/2020  Findings: There is there is comminuted fracture of the humeral head with impaction of the distal humerus. . There is suggestion of inferior subluxation secondary to the fracture of the humeral head in relation to the glenoid fossa. The [facility] joint appears to be normal.     Comminuted fracture of the left humeral head with impacted distal humerus. .       Treatments:   analgesia: acetaminophen    Discharge Exam:  Physical Exam   Constitutional: She is oriented to person, place, and time. She appears well-developed and well-nourished. HENT:   Head: Normocephalic and atraumatic. Eyes: EOM are normal.   Cardiovascular: Normal rate and regular rhythm. Exam reveals no gallop and no friction rub. No murmur heard. Pulmonary/Chest: Breath sounds normal. She has no wheezes. She has no rales. Abdominal: Soft. Bowel sounds are normal. She exhibits no distension. Musculoskeletal:         General: No deformity or edema. Comments: Left Upper Extremity in a Sling   Lymphadenopathy:     She has no cervical adenopathy. Neurological: She is alert and oriented to person, place, and time. Skin: Skin is warm and dry. Psychiatric: She has a normal mood and affect. Her behavior is normal.     Disposition:   Sub Acute Rehab    Future Appointments   Date Time Provider Geraldine Pizanoi   2/25/2020 11:15 AM James Call MD [facility]   2/28/2020  9:00 AM SCHEDULE, [facility]   3/27/2020  8:45 AM SCHEDULE, [facility]   5/6/2020 11:00 AM Hector Suarez MD [facility]       More than 30 minutes was spent in preparation of this patient's discharge including, but not limited to, examination, preparation of documents, prescription preparation, counseling and coordination.     Signed:  Belgica Martin MD  2/12/2020, 6:18 PM

## 2020-02-20 RX ORDER — LOSARTAN POTASSIUM 100 MG/1
100 TABLET ORAL DAILY
Qty: 90 TABLET | Refills: 0 | Status: SHIPPED
Start: 2020-02-20 | End: 2020-06-04 | Stop reason: SDUPTHER

## 2020-02-28 ENCOUNTER — HOSPITAL ENCOUNTER (OUTPATIENT)
Dept: GENERAL RADIOLOGY | Age: 75
Discharge: HOME | End: 2020-03-01
Payer: MEDICARE

## 2020-02-28 ENCOUNTER — OFFICE VISIT (OUTPATIENT)
Dept: ORTHOPEDIC SURGERY | Age: 75
End: 2020-02-28
Payer: MEDICARE

## 2020-02-28 VITALS
WEIGHT: 200 LBS | SYSTOLIC BLOOD PRESSURE: 105 MMHG | BODY MASS INDEX: 36.8 KG/M2 | HEIGHT: 62 IN | DIASTOLIC BLOOD PRESSURE: 56 MMHG | HEART RATE: 80 BPM

## 2020-02-28 PROCEDURE — G8427 DOCREV CUR MEDS BY ELIG CLIN: HCPCS | Performed by: ORTHOPAEDIC SURGERY

## 2020-02-28 PROCEDURE — 3017F COLORECTAL CA SCREEN DOC REV: CPT | Performed by: ORTHOPAEDIC SURGERY

## 2020-02-28 PROCEDURE — G8482 FLU IMMUNIZE ORDER/ADMIN: HCPCS | Performed by: ORTHOPAEDIC SURGERY

## 2020-02-28 PROCEDURE — 4040F PNEUMOC VAC/ADMIN/RCVD: CPT | Performed by: ORTHOPAEDIC SURGERY

## 2020-02-28 PROCEDURE — 1123F ACP DISCUSS/DSCN MKR DOCD: CPT | Performed by: ORTHOPAEDIC SURGERY

## 2020-02-28 PROCEDURE — G8399 PT W/DXA RESULTS DOCUMENT: HCPCS | Performed by: ORTHOPAEDIC SURGERY

## 2020-02-28 PROCEDURE — G9899 SCRN MAM PERF RSLTS DOC: HCPCS | Performed by: ORTHOPAEDIC SURGERY

## 2020-02-28 PROCEDURE — 99024 POSTOP FOLLOW-UP VISIT: CPT | Performed by: ORTHOPAEDIC SURGERY

## 2020-02-28 PROCEDURE — 73030 X-RAY EXAM OF SHOULDER: CPT

## 2020-02-28 PROCEDURE — 1036F TOBACCO NON-USER: CPT | Performed by: ORTHOPAEDIC SURGERY

## 2020-02-28 PROCEDURE — 1090F PRES/ABSN URINE INCON ASSESS: CPT | Performed by: ORTHOPAEDIC SURGERY

## 2020-02-28 PROCEDURE — 99212 OFFICE O/P EST SF 10 MIN: CPT | Performed by: ORTHOPAEDIC SURGERY

## 2020-02-28 PROCEDURE — G8417 CALC BMI ABV UP PARAM F/U: HCPCS | Performed by: ORTHOPAEDIC SURGERY

## 2020-02-28 NOTE — PATIENT INSTRUCTIONS
[facility] [address]   [phone]    Physical therapy  Take over the counter pain medicine  Work on ROM to the elbow and hand daily. Follow up in 4 weeks.

## 2020-02-28 NOTE — PROGRESS NOTES
Department of Orthopedic Surgery  Resident H&P Note          CHIEF COMPLAINT: Left shoulder    HISTORY OF PRESENT ILLNESS:                The patient is a 76 y.o. female who presents with left shoulder pain after a fall on 2/9/2020. Patient states that she was walking when she fell on her left side and had immediate pain. She went to the ED that night and was found to have a proximal humerus fracture. 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. She says that she was driving and living alone prior to this. Patient states she does have some medical comorbidities including cirrhosis, diabetes, and one kidney. Patient states that she has a history of breast cancer which is resolved. Is right-hand dominant. .  Denies numbness/tingling/paresthesias. Denies pain elsewhere. Past Medical History:        Diagnosis Date    Breast cancer ([address]     [facility]     Essential hypertension     Hyperlipidemia     Osteopenia     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 ([facility]     Sleep apnea     Spinal stenosis     Type 2 diabetes mellitus ([facility] [facility]      Past Surgical History:        Procedure Laterality Date    BREAST LUMPECTOMY      lumpectomy [id number]    CARPAL TUNNEL RELEASE      COLONOSCOPY  01/31/2017    multiple polyps; diverticula--jerod    COLONOSCOPY  04/23/2019    polyps; diverticula; hemorrhoids--jerod    COLONOSCOPY N/A 4/23/2019    COLONOSCOPY POLYPECTOMY SNARE/COLD BIOPSY performed by Jyoti Pollard MD at [place] [address]  4/23/2019    COLONOSCOPY WITH BIOPSY performed by Jyoti Pollard MD at [address] [place] [address] LITHOTRIPSY Left 05/04/2016    [address] UPPER GASTROINTESTINAL ENDOSCOPY  04/23/2019    gastritis--jerod    UPPER GASTROINTESTINAL ENDOSCOPY N/A 4/23/2019    EGD BIOPSY performed by Jyoti Pollard MD at [address] [place] [address]     Current Medications:   No current facility-administered medications for this visit.    Allergies: Lisinopril    Social History:   TOBACCO:   reports that she has never smoked. She has never used smokeless tobacco.  ETOH:   reports no history of alcohol use. DRUGS:   reports no history of drug use. ACTIVITIES OF DAILY LIVING:    OCCUPATION:    Family History:       Problem Relation Age of Onset    COPD Father     Heart Attack Father     Arthritis Mother     Cancer Other 48        colon       REVIEW OF SYSTEMS:  CONSTITUTIONAL:  negative for  fevers, chills  EYES:  negative for blurred vision, visual disturbance  HEENT:  negative for  hearing loss, voice change  RESPIRATORY:  negative for  dyspnea, wheezing  CARDIOVASCULAR:  negative for  chest pain, palpitations  GASTROINTESTINAL:  negative for nausea, vomiting  GENITOURINARY:  negative for frequency, urinary incontinence  HEMATOLOGIC/LYMPHATIC:  negative for bleeding and petechiae  MUSCULOSKELETAL:  positive for  pain left shoulder  NEUROLOGICAL:  negative for headaches, dizziness  BEHAVIOR/PSYCH:  negative for increased agitation and anxiety    PHYSICAL EXAM:    VITALS:  BP (!) 105/56   Pulse 80   Ht 5' 2\" (1.575 m)   Wt 200 lb (90.7 kg)   BMI 36.58 kg/m²   CONSTITUTIONAL:  Awake, alert, answers questions appropriately  EYES:  Lids and lashes normal, pupils equal, round and reactive to light, extra ocular muscles intact  HENT:  Normocephalic, without obvious abnormality, atraumatic, neck supple, symmetric  LUNGS:  No increased work of breathing  CARDIOVASCULAR:  Brisk vascular capillary refill to all extremities  ABDOMEN:  Non-tender, Non-distended  NEUROLOGIC:  Awake, alert, oriented to name, place and time. Cranial nerves II-XII are grossly intact.   MUSCULOSKELETAL:  Left upper Extremity:   · Skin is intact circumferentially  · + TTP about the left shoulder  · Unable to move the shoulder secondary to pain  · Full flexion-extension of the elbow, wrist, fingers  · Sensations intact to light touch in the median, ulnar, radial, axillary nerve distributions  · Patient is able to shrug the shoulders  · + 2/4 radial pulse  · Positive AIN, PIN, ulnar motor function    Secondary Exam:   · rightUE: -TTP to fingers, hand, wrist, forearm, elbow, humerus, shoulder or clavicle, patient able to flex/extend fingers, wrist, elbow and shoulder with active and passive ROM without pain, +2/4 Radial & Ulnar pulses, cap refill <3sec, +AIN/PIN/Radial/Ulanr/Median N, distal sensation grossly intact to C4-T1 dermatomes, compartments soft and compressible  · bilateralLE:  -TTP to foot, ankle, leg, knee, thigh, hip; patient able to flex/extend toes, ankle, knee and hip with active and passive ROM without pain,+2/4 DP & PT pulses, cap refill <3sec, +5/5 PF/DF/EHL, distal sensation grossly intact to L3-S1 dermatomes, compartments soft and compressible  · Pelvis: -TTP, -Log roll, -Heel strike     DATA:    CBC:   Lab Results   Component Value Date    WBC 5.0 02/11/2020    RBC 3.05 02/11/2020    HGB 10.2 02/11/2020    HCT 32.2 02/11/2020    .6 02/11/2020    MCH 33.4 02/11/2020    MCHC 31.7 02/11/2020    RDW 15.2 02/11/2020     02/11/2020    MPV 10.8 02/11/2020     PT/INR:    Lab Results   Component Value Date    PROTIME 15.6 02/09/2020    INR 1.4 02/09/2020     Radiology Review:    X-ray left shoulder obtained and reviewed in the office today demonstrating a displaced four-part proximal humerus fracture with varus angulation and displacement of the head. No change in alignment from prior x-rays on 2/9/2020. Impression: Four-part proximal humerus fracture    IMPRESSION:  Left four-part proximal humerus fracture    PLAN:  Ice  Will schedule patient with home therapy at 4 weeks  Did discuss with patient possibility of surgical treatment for her fracture however due to her multiple comorbidities it was discussed a trial of nonoperative treatment first to see if she regains function is is satisfied with her results.   Did discuss possibility of malunion and need for further surgical treatment in the future if it does not heal properly. Patient is in agreement and would like to trial nonoperative treatment before proceeding with any surgery. Follow-up in 2 weeks for repeat x-rays  Discussed with Dr. Chelsea Cervantes Attending    I have seen and evaluated the patient with the resident and agree with the above assessments on today's visit. I have performed the key components of the history and physical examination and concur completely with the findings and plans as documented above. Patient presents for initial evaluation for left proximal humerus fracture which happened approximately 3 weeks ago. Patient is active and independent prior to injury. She does have significant medical comorbidities including underlying diabetes which is relatively well controlled, cirrhosis and he has only 1 kidney. Patient's fracture is closed fracture, limb is grossly neurovascular intact with sensation preserved to the axillary nerve distribution. Reviewed the x-rays in detail with patient and her  today, discussed fracture does have displacement and angulation which could continue to progress. We had lengthy discussion regarding treatment options to include nonoperative closed treatment with close observation, formal open reduction internal fixation, versus arthroplasty for her fracture. We discussed at length the risk and benefits of each. Patient has elected to treat her fracture conservatively at this time and does not wish to pursue surgical intervention. Did discuss that she would likely have loss of function and motion to the shoulder with this treatment plan however she is willing to accept this. I did discuss that I would like to repeat x-rays in 2 weeks and if there were further displacement would strongly consider surgical intervention to try and maximize her function of this arm. Patient understands and is in agreement with plan.   She will continue with her

## 2020-03-05 ENCOUNTER — TELEPHONE (OUTPATIENT)
Dept: FAMILY MEDICINE CLINIC | Age: 75
End: 2020-03-05

## 2020-03-12 ENCOUNTER — HOSPITAL ENCOUNTER (OUTPATIENT)
Age: 75
Discharge: HOME | End: 2020-03-14
Payer: MEDICARE

## 2020-03-12 ENCOUNTER — OFFICE VISIT (OUTPATIENT)
Dept: FAMILY MEDICINE CLINIC | Age: 75
End: 2020-03-12
Payer: MEDICARE

## 2020-03-12 VITALS
WEIGHT: 198 LBS | HEIGHT: 63 IN | SYSTOLIC BLOOD PRESSURE: 126 MMHG | TEMPERATURE: 98.8 F | HEART RATE: 95 BPM | OXYGEN SATURATION: 95 % | DIASTOLIC BLOOD PRESSURE: 68 MMHG | RESPIRATION RATE: 20 BRPM | BODY MASS INDEX: 35.08 KG/M2

## 2020-03-12 LAB
CHOLESTEROL, TOTAL: 124 MG/DL (ref 0–199)
HDLC SERPL-MCNC: 38 MG/DL
LDL CHOLESTEROL CALCULATED: 67 MG/DL (ref 0–99)
TRIGL SERPL-MCNC: 95 MG/DL (ref 0–149)
VLDLC SERPL CALC-MCNC: 19 MG/DL

## 2020-03-12 PROCEDURE — G8427 DOCREV CUR MEDS BY ELIG CLIN: HCPCS | Performed by: FAMILY MEDICINE

## 2020-03-12 PROCEDURE — G8482 FLU IMMUNIZE ORDER/ADMIN: HCPCS | Performed by: FAMILY MEDICINE

## 2020-03-12 PROCEDURE — G8399 PT W/DXA RESULTS DOCUMENT: HCPCS | Performed by: FAMILY MEDICINE

## 2020-03-12 PROCEDURE — 1090F PRES/ABSN URINE INCON ASSESS: CPT | Performed by: FAMILY MEDICINE

## 2020-03-12 PROCEDURE — 99214 OFFICE O/P EST MOD 30 MIN: CPT | Performed by: FAMILY MEDICINE

## 2020-03-12 PROCEDURE — 1123F ACP DISCUSS/DSCN MKR DOCD: CPT | Performed by: FAMILY MEDICINE

## 2020-03-12 PROCEDURE — 3017F COLORECTAL CA SCREEN DOC REV: CPT | Performed by: FAMILY MEDICINE

## 2020-03-12 PROCEDURE — G8417 CALC BMI ABV UP PARAM F/U: HCPCS | Performed by: FAMILY MEDICINE

## 2020-03-12 PROCEDURE — G9899 SCRN MAM PERF RSLTS DOC: HCPCS | Performed by: FAMILY MEDICINE

## 2020-03-12 PROCEDURE — 2022F DILAT RTA XM EVC RTNOPTHY: CPT | Performed by: FAMILY MEDICINE

## 2020-03-12 PROCEDURE — 80061 LIPID PANEL: CPT

## 2020-03-12 PROCEDURE — G0010 ADMIN HEPATITIS B VACCINE: HCPCS | Performed by: FAMILY MEDICINE

## 2020-03-12 PROCEDURE — 90746 HEPB VACCINE 3 DOSE ADULT IM: CPT | Performed by: FAMILY MEDICINE

## 2020-03-12 PROCEDURE — 1036F TOBACCO NON-USER: CPT | Performed by: FAMILY MEDICINE

## 2020-03-12 PROCEDURE — 4040F PNEUMOC VAC/ADMIN/RCVD: CPT | Performed by: FAMILY MEDICINE

## 2020-03-12 PROCEDURE — 3044F HG A1C LEVEL LT 7.0%: CPT | Performed by: FAMILY MEDICINE

## 2020-03-12 RX ORDER — HYDROXYZINE HYDROCHLORIDE 25 MG/1
TABLET, FILM COATED ORAL
Qty: 30 TABLET | Refills: 1 | Status: SHIPPED
Start: 2020-03-12 | End: 2020-05-05

## 2020-03-12 NOTE — PROGRESS NOTES
Shannen  CVS: RRR, no MRG  Abdomen: BS +, SNDNT  Extremities: No clubbing, cyanosis, or edema. Warm. Dry. Sling strapped on in position for left upper extremity. I have reviewed this patient's previous records. I have reviewed this patient's labs. I have reviewed this patient's medications. Assessment/Plan: [facility] was seen today for diabetes. Diagnoses and all orders for this visit:    Type 2 diabetes mellitus with diabetic neuropathy, unspecified whether long term insulin use (HCC)    Itching  -     hydrOXYzine (ATARAX) 25 MG tablet; TAKE 1 TABLET BY MOUTH EVERY NIGHT    Closed fracture of proximal epiphysis of left humerus, sequela    Cirrhosis of liver with ascites, unspecified hepatic cirrhosis type (HCC)    Depression, unspecified depression type    Dyslipidemia  -     Lipid Panel; Future    Need for hepatitis B booster vaccination  -     Hep B Vaccine Adult (RECOMBIVAX HB)        Continue to monitor diabetes. Recheck A1c at next office visit. Continue hydroxyzine for itching. Follow-up with orthopedics as planned on the 23rd. Continue venlafaxine for depression. Continue to monitor cholesterol. Continue spironolactone and Lasix for cirrhosis. Lactulose as needed. Continue rifaximin. Patient Instructions   Please call every pharmacy around and ask if they provide the new shingles vaccine and how much it costs. If it's affordable please get it and let me know when you've received it. Return in about 3 months (around 6/12/2020) for diabetes.       Electronically signed by Star Lopez MD on 3/12/2020 at 5:15 PM

## 2020-03-12 NOTE — LETTER
To whom it may concern:    Please allow Ms. Aster Dasilva access to [employer], a [facility] for bathing and light cleaning, physical therapy at home and transportation to office visits. Thank you, and please call our office with questions.     Her  is [birth year].  [address]  [address]  [place] [address]  Phone: [phone]  Fax: [phone]            Electronically signed by Dmitry Dobbins MD on 3/12/2020 at 4:42 PM

## 2020-03-24 ENCOUNTER — TELEPHONE (OUTPATIENT)
Dept: ADMINISTRATIVE | Age: 75
End: 2020-03-24

## 2020-03-24 NOTE — TELEPHONE ENCOUNTER
Patients insurance company called and stated Wili Siddiqi needed an appt as she has had a low grade fever of 99.9 for a few days now. I asked to speak to Wili Siddiqi and she stated yes she has had this and has not taken it yet today. She has some Mucous and also stated she has pain in her Left broken shoulder. She would like to see what she is advised to do.  Please call Wili Siddiqi back at [phone]

## 2020-03-24 NOTE — TELEPHONE ENCOUNTER
Patient notified of flu clinic hours and advised to be seen there. She is going to wait for her PT to come today and take her temperature before deciding to go or not.

## 2020-03-26 RX ORDER — SPIRONOLACTONE 25 MG/1
25 TABLET ORAL DAILY
Qty: 90 TABLET | Refills: 1 | Status: SHIPPED
Start: 2020-03-26 | End: 2020-06-11

## 2020-03-27 ENCOUNTER — OFFICE VISIT (OUTPATIENT)
Dept: ORTHOPEDIC SURGERY | Age: 75
End: 2020-03-27
Payer: MEDICARE

## 2020-03-27 ENCOUNTER — HOSPITAL ENCOUNTER (OUTPATIENT)
Dept: GENERAL RADIOLOGY | Age: 75
Discharge: HOME | End: 2020-03-29
Payer: MEDICARE

## 2020-03-27 VITALS
WEIGHT: 190 LBS | HEART RATE: 96 BPM | BODY MASS INDEX: 34.96 KG/M2 | DIASTOLIC BLOOD PRESSURE: 70 MMHG | HEIGHT: 62 IN | SYSTOLIC BLOOD PRESSURE: 131 MMHG

## 2020-03-27 PROCEDURE — 1090F PRES/ABSN URINE INCON ASSESS: CPT | Performed by: ORTHOPAEDIC SURGERY

## 2020-03-27 PROCEDURE — 99212 OFFICE O/P EST SF 10 MIN: CPT

## 2020-03-27 PROCEDURE — G8482 FLU IMMUNIZE ORDER/ADMIN: HCPCS | Performed by: ORTHOPAEDIC SURGERY

## 2020-03-27 PROCEDURE — 3017F COLORECTAL CA SCREEN DOC REV: CPT | Performed by: ORTHOPAEDIC SURGERY

## 2020-03-27 PROCEDURE — G8399 PT W/DXA RESULTS DOCUMENT: HCPCS | Performed by: ORTHOPAEDIC SURGERY

## 2020-03-27 PROCEDURE — 99024 POSTOP FOLLOW-UP VISIT: CPT | Performed by: ORTHOPAEDIC SURGERY

## 2020-03-27 PROCEDURE — G8417 CALC BMI ABV UP PARAM F/U: HCPCS | Performed by: ORTHOPAEDIC SURGERY

## 2020-03-27 PROCEDURE — 1036F TOBACCO NON-USER: CPT | Performed by: ORTHOPAEDIC SURGERY

## 2020-03-27 PROCEDURE — 4040F PNEUMOC VAC/ADMIN/RCVD: CPT | Performed by: ORTHOPAEDIC SURGERY

## 2020-03-27 PROCEDURE — 1123F ACP DISCUSS/DSCN MKR DOCD: CPT | Performed by: ORTHOPAEDIC SURGERY

## 2020-03-27 PROCEDURE — G9899 SCRN MAM PERF RSLTS DOC: HCPCS | Performed by: ORTHOPAEDIC SURGERY

## 2020-03-27 PROCEDURE — 73030 X-RAY EXAM OF SHOULDER: CPT

## 2020-03-27 PROCEDURE — G8427 DOCREV CUR MEDS BY ELIG CLIN: HCPCS | Performed by: ORTHOPAEDIC SURGERY

## 2020-03-27 RX ORDER — ERGOCALCIFEROL 1.25 MG/1
50000 CAPSULE ORAL WEEKLY
Qty: 12 CAPSULE | Refills: 1 | Status: ON HOLD
Start: 2020-03-27 | End: 2021-11-03 | Stop reason: HOSPADM

## 2020-03-27 RX ORDER — CALCIUM CARBONATE 200(500)MG
1 TABLET,CHEWABLE ORAL DAILY
Qty: 30 TABLET | Refills: 0 | Status: SHIPPED | OUTPATIENT
Start: 2020-03-27 | End: 2020-04-26

## 2020-03-27 NOTE — PROGRESS NOTES
Department of Orthopedic Surgery  Resident H&P Note          CHIEF COMPLAINT: Left shoulder    HISTORY OF PRESENT ILLNESS:                The patient is a 76 y.o. female who presents with left shoulder pain after a fall on 2/9/2020. She has been treating her proximal humerus fracture non-operatively due to her having multiple comorbidity's. She says she is still having a lot of pain in the shoulder. She has been doing physical therapy but has not done any motion to the shoulder yet. Past Medical History:        Diagnosis Date    [facility]     Cirrhosis ([facility]     Essential hypertension     Hyperlipidemia     Osteopenia     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 ([facility]     Sleep apnea     Spinal stenosis     Type 2 diabetes mellitus ([facility] [facility]      Past Surgical History:        Procedure Laterality Date    BREAST LUMPECTOMY      lumpectomy [id number]    CARPAL TUNNEL RELEASE      COLONOSCOPY  01/31/2017    multiple polyps; diverticula--jerod    COLONOSCOPY  04/23/2019    polyps; diverticula; hemorrhoids--jerod    COLONOSCOPY N/A 4/23/2019    COLONOSCOPY POLYPECTOMY SNARE/COLD BIOPSY performed by Carmen Dash MD at [facility]  4/23/2019    COLONOSCOPY WITH BIOPSY performed by Carmen Dash MD at [address] [facility] LITHOTRIPSY Left 05/04/2016    [address] UPPER GASTROINTESTINAL ENDOSCOPY  04/23/2019    gastritis--jerod    UPPER GASTROINTESTINAL ENDOSCOPY N/A 4/23/2019    EGD BIOPSY performed by Carmen Dash MD at [address]     Current Medications:   No current facility-administered medications for this visit. Allergies:  Lisinopril    Social History:   TOBACCO:   reports that she has never smoked. She has never used smokeless tobacco.  ETOH:   reports no history of alcohol use. DRUGS:   reports no history of drug use.   ACTIVITIES OF DAILY LIVING:    OCCUPATION:    Family History:       Problem Relation Age of Onset    COPD Father    [facility] Heart Attack Father     Arthritis Mother     Cancer Other 48        colon       REVIEW OF SYSTEMS:  CONSTITUTIONAL:  negative for  fevers, chills  EYES:  negative for blurred vision, visual disturbance  HEENT:  negative for  hearing loss, voice change  RESPIRATORY:  negative for  dyspnea, wheezing  CARDIOVASCULAR:  negative for  chest pain, palpitations  GASTROINTESTINAL:  negative for nausea, vomiting  GENITOURINARY:  negative for frequency, urinary incontinence  HEMATOLOGIC/LYMPHATIC:  negative for bleeding and petechiae  MUSCULOSKELETAL:  positive for  pain left shoulder  NEUROLOGICAL:  negative for headaches, dizziness  BEHAVIOR/PSYCH:  negative for increased agitation and anxiety    PHYSICAL EXAM:    VITALS:  /70   Pulse 96   Ht 5' 2\" (1.575 m)   Wt 190 lb (86.2 kg)   BMI 34.75 kg/m²   CONSTITUTIONAL:  Awake, alert, answers questions appropriately  EYES:  Lids and lashes normal, pupils equal, round and reactive to light, extra ocular muscles intact  HENT:  Normocephalic, without obvious abnormality, atraumatic, neck supple, symmetric  LUNGS:  No increased work of breathing  CARDIOVASCULAR:  Brisk vascular capillary refill to all extremities  ABDOMEN:  Non-tender, Non-distended  NEUROLOGIC:  Awake, alert, oriented to name, place and time. Cranial nerves II-XII are grossly intact.   MUSCULOSKELETAL:  Left upper Extremity:   · Skin is intact circumferentially  · + TTP about the left shoulder  · Passive ROM to 45 degrees FF and 30 degrees ABduction  · Full flexion-extension of the elbow, wrist, fingers  · Sensations intact to light touch in the median, ulnar, radial, axillary nerve distributions  · Patient is able to shrug the shoulders  · + 2/4 radial pulse  · Positive AIN, PIN, ulnar motor function    Secondary Exam:   · rightUE: -TTP to fingers, hand, wrist, forearm, elbow, humerus, shoulder or clavicle, patient able to flex/extend fingers, wrist, elbow and shoulder with active and passive ROM the resident and agree with the above assessments on today's visit. I have performed the key components of the history and physical examination and concur completely with the findings and plans as documented above. Patient approximate 6 weeks out from left proximal humerus fracture which we have been treating conservatively. Again x-rays reviewed discussed with patient that she does not have anatomic alignment will likely have permanent limitations with her shoulder ROM which she is agreeable to. Discussed there is no significant healing today however fracture alignment has not changed. Recommend patient starts calcium and vitamin D supplementation, prescription provided to her pharmacy, she can continue with Tylenol for pain. Will also start her in physical therapy for passive range of motion to the shoulder and active ROM through elbow wrist and hand. Like to see her back in 4 weeks for repeat evaluation. Did discuss if she has poor outcome from this could consider arthroplasty down the road.     Electronically signed by   El Lawton DO  3/27/2020

## 2020-03-27 NOTE — PATIENT INSTRUCTIONS
May begin passive ROM to the should with PT  Take tylenol as needed for pain  Ice to shoulder   Follow up in 4 weeks for xrays

## 2020-04-01 RX ORDER — TRAMADOL HYDROCHLORIDE 50 MG/1
50 TABLET ORAL EVERY 8 HOURS PRN
Qty: 21 TABLET | Refills: 0 | Status: SHIPPED
Start: 2020-04-01 | End: 2020-04-01 | Stop reason: SINTOL

## 2020-04-01 RX ORDER — HYDROCODONE BITARTRATE AND ACETAMINOPHEN 5; 325 MG/1; MG/1
1 TABLET ORAL EVERY 8 HOURS PRN
Qty: 21 TABLET | Refills: 0 | Status: SHIPPED | OUTPATIENT
Start: 2020-04-01 | End: 2020-04-08

## 2020-04-06 RX ORDER — ONDANSETRON 4 MG/1
4 TABLET, FILM COATED ORAL DAILY PRN
Qty: 30 TABLET | Refills: 0 | OUTPATIENT
Start: 2020-04-06

## 2020-04-22 ENCOUNTER — OFFICE VISIT (OUTPATIENT)
Dept: ORTHOPEDIC SURGERY | Age: 75
End: 2020-04-22
Payer: MEDICARE

## 2020-04-22 ENCOUNTER — HOSPITAL ENCOUNTER (OUTPATIENT)
Dept: GENERAL RADIOLOGY | Age: 75
Discharge: HOME | End: 2020-04-24
Payer: MEDICARE

## 2020-04-22 VITALS — HEART RATE: 93 BPM | DIASTOLIC BLOOD PRESSURE: 80 MMHG | SYSTOLIC BLOOD PRESSURE: 141 MMHG | TEMPERATURE: 98.3 F

## 2020-04-22 PROCEDURE — 73030 X-RAY EXAM OF SHOULDER: CPT

## 2020-04-22 PROCEDURE — 99212 OFFICE O/P EST SF 10 MIN: CPT

## 2020-04-22 PROCEDURE — 1036F TOBACCO NON-USER: CPT | Performed by: ORTHOPAEDIC SURGERY

## 2020-04-22 PROCEDURE — G8399 PT W/DXA RESULTS DOCUMENT: HCPCS | Performed by: ORTHOPAEDIC SURGERY

## 2020-04-22 PROCEDURE — 1090F PRES/ABSN URINE INCON ASSESS: CPT | Performed by: ORTHOPAEDIC SURGERY

## 2020-04-22 PROCEDURE — G9899 SCRN MAM PERF RSLTS DOC: HCPCS | Performed by: ORTHOPAEDIC SURGERY

## 2020-04-22 PROCEDURE — 1123F ACP DISCUSS/DSCN MKR DOCD: CPT | Performed by: ORTHOPAEDIC SURGERY

## 2020-04-22 PROCEDURE — 4040F PNEUMOC VAC/ADMIN/RCVD: CPT | Performed by: ORTHOPAEDIC SURGERY

## 2020-04-22 PROCEDURE — G8417 CALC BMI ABV UP PARAM F/U: HCPCS | Performed by: ORTHOPAEDIC SURGERY

## 2020-04-22 PROCEDURE — 99024 POSTOP FOLLOW-UP VISIT: CPT | Performed by: ORTHOPAEDIC SURGERY

## 2020-04-22 PROCEDURE — G8427 DOCREV CUR MEDS BY ELIG CLIN: HCPCS | Performed by: ORTHOPAEDIC SURGERY

## 2020-04-22 PROCEDURE — 3017F COLORECTAL CA SCREEN DOC REV: CPT | Performed by: ORTHOPAEDIC SURGERY

## 2020-04-22 NOTE — PROGRESS NOTES
touch in the median, ulnar, radial, axillary nerve distributions  · Patient is able to shrug the shoulders  · + 2/4 radial pulse  · Positive AIN, PIN, ulnar motor function  ·     DATA:    CBC:   Lab Results   Component Value Date    WBC 5.0 02/11/2020    RBC 3.05 02/11/2020    HGB 10.2 02/11/2020    HCT 32.2 02/11/2020    .6 02/11/2020    MCH 33.4 02/11/2020    MCHC 31.7 02/11/2020    RDW 15.2 02/11/2020     02/11/2020    MPV 10.8 02/11/2020     PT/INR:    Lab Results   Component Value Date    PROTIME 15.6 02/09/2020    INR 1.4 02/09/2020     Radiology Review:    X-ray left shoulder obtained and reviewed in the office today demonstrating a highly impacted proximal humerus fracture with the shaft telescoped within the humeral head and varus angulation. There is no significant interval change in overall fracture alignment, no significant periosteal callus noted.       IMPRESSION:  Left proximal humerus fracture    PLAN:  Patient to continue with physical therapy, new prescription provided to recommend for more aggressive ROM and to start strengthening 3-5 pound lifting restriction  Patient can discontinue using her sling and immobilizer  Continue with calcium and vitamin D supplementation  Discussed starting an HEP when she is unable to get home therapy  Follow-up in 6 weeks for repeat evaluation with repeat x-rays or sooner if needed    Electronically signed by   Alex Kruger DO  4/22/2020

## 2020-05-05 RX ORDER — HYDROXYZINE HYDROCHLORIDE 25 MG/1
TABLET, FILM COATED ORAL
Qty: 30 TABLET | Refills: 1 | Status: SHIPPED
Start: 2020-05-05 | End: 2020-06-11

## 2020-06-04 RX ORDER — LOSARTAN POTASSIUM 100 MG/1
100 TABLET ORAL DAILY
Qty: 90 TABLET | Refills: 0 | Status: SHIPPED
Start: 2020-06-04 | End: 2020-06-11

## 2020-06-10 ENCOUNTER — OFFICE VISIT (OUTPATIENT)
Dept: ORTHOPEDIC SURGERY | Age: 75
End: 2020-06-10
Payer: MEDICARE

## 2020-06-10 ENCOUNTER — HOSPITAL ENCOUNTER (OUTPATIENT)
Dept: GENERAL RADIOLOGY | Age: 75
Discharge: HOME | End: 2020-06-12
Payer: MEDICARE

## 2020-06-10 VITALS — HEART RATE: 94 BPM | DIASTOLIC BLOOD PRESSURE: 76 MMHG | SYSTOLIC BLOOD PRESSURE: 132 MMHG

## 2020-06-10 PROCEDURE — G8399 PT W/DXA RESULTS DOCUMENT: HCPCS | Performed by: ORTHOPAEDIC SURGERY

## 2020-06-10 PROCEDURE — 99213 OFFICE O/P EST LOW 20 MIN: CPT | Performed by: ORTHOPAEDIC SURGERY

## 2020-06-10 PROCEDURE — 73030 X-RAY EXAM OF SHOULDER: CPT

## 2020-06-10 PROCEDURE — 1036F TOBACCO NON-USER: CPT | Performed by: ORTHOPAEDIC SURGERY

## 2020-06-10 PROCEDURE — 1090F PRES/ABSN URINE INCON ASSESS: CPT | Performed by: ORTHOPAEDIC SURGERY

## 2020-06-10 PROCEDURE — 3017F COLORECTAL CA SCREEN DOC REV: CPT | Performed by: ORTHOPAEDIC SURGERY

## 2020-06-10 PROCEDURE — G8417 CALC BMI ABV UP PARAM F/U: HCPCS | Performed by: ORTHOPAEDIC SURGERY

## 2020-06-10 PROCEDURE — 99212 OFFICE O/P EST SF 10 MIN: CPT

## 2020-06-10 PROCEDURE — 1123F ACP DISCUSS/DSCN MKR DOCD: CPT | Performed by: ORTHOPAEDIC SURGERY

## 2020-06-10 PROCEDURE — G8427 DOCREV CUR MEDS BY ELIG CLIN: HCPCS | Performed by: ORTHOPAEDIC SURGERY

## 2020-06-10 PROCEDURE — 4040F PNEUMOC VAC/ADMIN/RCVD: CPT | Performed by: ORTHOPAEDIC SURGERY

## 2020-06-10 NOTE — PROGRESS NOTES
Department of Orthopedic Surgery  Clinic Note        CHIEF COMPLAINT: Left shoulder    HISTORY OF PRESENT ILLNESS:                The patient is a 76 y.o. female who presents with left shoulder pain after a fall on 2/9/2020. She has been treating her proximal humerus fracture non-operatively due to her having multiple comorbidity's. Complains of pain in her biceps. She did therapy at her home but would like outpatient therapy. She has been out of her immobilizer since her last visit. Past Medical History:        Diagnosis Date    [facility]     Cirrhosis ([facility]     Essential hypertension     Hyperlipidemia     Osteopenia     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 ([facility]     Sleep apnea     Spinal stenosis     Type 2 diabetes mellitus ([facility]      Past Surgical History:        Procedure Laterality Date    BREAST LUMPECTOMY      lumpectomy [id number]    CARPAL TUNNEL RELEASE      COLONOSCOPY  01/31/2017    multiple polyps; diverticula--jerod    COLONOSCOPY  04/23/2019    polyps; diverticula; hemorrhoids--jerod    COLONOSCOPY N/A 4/23/2019    COLONOSCOPY POLYPECTOMY SNARE/COLD BIOPSY performed by Traci Martinez MD at [facility] 9  4/23/2019    COLONOSCOPY WITH BIOPSY performed by Traci Martinez MD at [address] [place] [address] LITHOTRIPSY Left 05/04/2016    [address] UPPER GASTROINTESTINAL ENDOSCOPY  04/23/2019    gastritis--jerod    UPPER GASTROINTESTINAL ENDOSCOPY N/A 4/23/2019    EGD BIOPSY performed by Traci Martinez MD at [address] [place] [address]     Current Medications:   No current facility-administered medications for this visit. Allergies:  Lisinopril    Social History:   TOBACCO:   reports that she has never smoked. She has never used smokeless tobacco.  ETOH:   reports no history of alcohol use. DRUGS:   reports no history of drug use.   ACTIVITIES OF DAILY LIVING:    OCCUPATION:    Family History:       Problem Relation Age of Onset    COPD Father    [facility] 02/11/2020    MCHC 31.7 02/11/2020    RDW 15.2 02/11/2020     02/11/2020    MPV 10.8 02/11/2020     PT/INR:    Lab Results   Component Value Date    PROTIME 15.6 02/09/2020    INR 1.4 02/09/2020     Radiology Review:    X-ray left shoulder obtained and reviewed in the office today demonstrating a highly impacted proximal humerus fracture with varus angulation. There is no significant interval change in overall fracture alignment, minimal periosteal callus     IMPRESSION:  Left proximal humerus fracture    PLAN:  Patient to continue with physical therapy, new prescription provided to recommend for more aggressive ROM and to start strengthening for outpatient therapy  Continue with calcium and vitamin D supplementation  Follow-up in 6 weeks for repeat evaluation with repeat x-rays         I have seen and evaluated the patient and agree with the above assessment on today's visit. I have performed the key components of the history and physical examination and concur completely with the findings and plans as documented. Agree with ROS, examination, FMH, PMH, PSH, SocHx, and allergies as above. Physically seen and examined today. Patient's fracture is healing well. Needs further work with occupational physical therapy. Below her to continue working on this see her back with final x-rays and 6 weeks. She is agreeable with the plan. Physical Examination:   General appearance: alert, well appearing, and in no distress,  normal appearing weight.  No visible signs of trauma   Mental status: alert, oriented to person, place, and time, normal mood, behavior, speech, dress, motor activity, and thought processes  Abdomen: soft, nondistended  Resp:   resp easy and unlabored, no audible wheezes note, normal symmetrical expansion of both hemithoraces  Cardiac: distal pulses palpable, skin and extremities well perfused  Neurological: alert, oriented X3, normal speech, no focal findings or movement disorder

## 2020-06-11 ENCOUNTER — OFFICE VISIT (OUTPATIENT)
Dept: FAMILY MEDICINE CLINIC | Age: 75
End: 2020-06-11
Payer: MEDICARE

## 2020-06-11 VITALS
HEART RATE: 94 BPM | RESPIRATION RATE: 20 BRPM | BODY MASS INDEX: 37.03 KG/M2 | WEIGHT: 209 LBS | SYSTOLIC BLOOD PRESSURE: 123 MMHG | DIASTOLIC BLOOD PRESSURE: 67 MMHG | HEIGHT: 63 IN | TEMPERATURE: 96.3 F

## 2020-06-11 LAB — HBA1C MFR BLD: 9.7 %

## 2020-06-11 PROCEDURE — 1123F ACP DISCUSS/DSCN MKR DOCD: CPT | Performed by: FAMILY MEDICINE

## 2020-06-11 PROCEDURE — G8417 CALC BMI ABV UP PARAM F/U: HCPCS | Performed by: FAMILY MEDICINE

## 2020-06-11 PROCEDURE — 3046F HEMOGLOBIN A1C LEVEL >9.0%: CPT | Performed by: FAMILY MEDICINE

## 2020-06-11 PROCEDURE — 83036 HEMOGLOBIN GLYCOSYLATED A1C: CPT | Performed by: FAMILY MEDICINE

## 2020-06-11 PROCEDURE — 99214 OFFICE O/P EST MOD 30 MIN: CPT | Performed by: FAMILY MEDICINE

## 2020-06-11 PROCEDURE — 1036F TOBACCO NON-USER: CPT | Performed by: FAMILY MEDICINE

## 2020-06-11 PROCEDURE — 3017F COLORECTAL CA SCREEN DOC REV: CPT | Performed by: FAMILY MEDICINE

## 2020-06-11 PROCEDURE — G8399 PT W/DXA RESULTS DOCUMENT: HCPCS | Performed by: FAMILY MEDICINE

## 2020-06-11 PROCEDURE — 2022F DILAT RTA XM EVC RTNOPTHY: CPT | Performed by: FAMILY MEDICINE

## 2020-06-11 PROCEDURE — 1090F PRES/ABSN URINE INCON ASSESS: CPT | Performed by: FAMILY MEDICINE

## 2020-06-11 PROCEDURE — G8427 DOCREV CUR MEDS BY ELIG CLIN: HCPCS | Performed by: FAMILY MEDICINE

## 2020-06-11 PROCEDURE — 4040F PNEUMOC VAC/ADMIN/RCVD: CPT | Performed by: FAMILY MEDICINE

## 2020-06-11 RX ORDER — FELODIPINE 2.5 MG/1
2.5 TABLET, EXTENDED RELEASE ORAL DAILY
Qty: 90 TABLET | Refills: 1 | Status: ON HOLD
Start: 2020-06-11 | End: 2021-02-02 | Stop reason: SDUPTHER

## 2020-06-11 RX ORDER — OMEPRAZOLE 20 MG/1
20 CAPSULE, DELAYED RELEASE ORAL 2 TIMES DAILY
Qty: 180 CAPSULE | Refills: 1 | Status: SHIPPED
Start: 2020-06-11 | End: 2020-09-15

## 2020-06-11 RX ORDER — ONDANSETRON 4 MG/1
4 TABLET, FILM COATED ORAL DAILY PRN
Qty: 90 TABLET | Refills: 1 | Status: SHIPPED
Start: 2020-06-11 | End: 2021-03-04

## 2020-06-11 RX ORDER — FUROSEMIDE 40 MG/1
40 TABLET ORAL DAILY
Status: ON HOLD | COMMUNITY
End: 2021-02-03 | Stop reason: SDUPTHER

## 2020-06-11 RX ORDER — LOSARTAN POTASSIUM 100 MG/1
100 TABLET ORAL DAILY
Qty: 90 TABLET | Refills: 1 | Status: SHIPPED
Start: 2020-06-11 | End: 2020-11-24 | Stop reason: SDUPTHER

## 2020-06-11 RX ORDER — SPIRONOLACTONE 25 MG/1
50 TABLET ORAL DAILY
Status: ON HOLD | COMMUNITY
End: 2021-02-03 | Stop reason: SDUPTHER

## 2020-06-11 RX ORDER — FELODIPINE 2.5 MG/1
2.5 TABLET, EXTENDED RELEASE ORAL DAILY
COMMUNITY
End: 2020-06-11 | Stop reason: SDUPTHER

## 2020-06-11 RX ORDER — LOSARTAN POTASSIUM 100 MG/1
100 TABLET ORAL DAILY
COMMUNITY
End: 2020-06-11 | Stop reason: SDUPTHER

## 2020-06-11 RX ORDER — MIRTAZAPINE 7.5 MG/1
7.5 TABLET, FILM COATED ORAL NIGHTLY
Qty: 90 TABLET | Refills: 1 | Status: ON HOLD
Start: 2020-06-11 | End: 2020-12-31 | Stop reason: HOSPADM

## 2020-06-11 RX ORDER — VENLAFAXINE HYDROCHLORIDE 75 MG/1
75 CAPSULE, EXTENDED RELEASE ORAL DAILY
Qty: 90 CAPSULE | Refills: 1 | Status: SHIPPED
Start: 2020-06-11 | End: 2020-11-24 | Stop reason: SDUPTHER

## 2020-06-11 RX ORDER — DOXAZOSIN MESYLATE 1 MG/1
1 TABLET ORAL NIGHTLY
COMMUNITY
Start: 2020-04-04 | End: 2021-06-30 | Stop reason: SDUPTHER

## 2020-07-13 ENCOUNTER — HOSPITAL ENCOUNTER (OUTPATIENT)
Dept: ULTRASOUND IMAGING | Age: 75
Discharge: HOME | End: 2020-07-15
Payer: MEDICARE

## 2020-07-13 PROCEDURE — 76705 ECHO EXAM OF ABDOMEN: CPT

## 2020-07-25 ENCOUNTER — HOSPITAL ENCOUNTER (OUTPATIENT)
Age: 75
Discharge: HOME | End: 2020-07-25
Payer: MEDICARE

## 2020-07-25 LAB
ALBUMIN SERPL-MCNC: 3.8 G/DL (ref 3.5–5.2)
ALP BLD-CCNC: 135 U/L (ref 35–104)
ALT SERPL-CCNC: 18 U/L (ref 0–32)
ANION GAP SERPL CALCULATED.3IONS-SCNC: 10 MMOL/L (ref 7–16)
APTT: 35.7 SEC (ref 24.5–35.1)
AST SERPL-CCNC: 39 U/L (ref 0–31)
BASOPHILS ABSOLUTE: 0.06 E9/L (ref 0–0.2)
BASOPHILS RELATIVE PERCENT: 1.1 % (ref 0–2)
BILIRUB SERPL-MCNC: 0.8 MG/DL (ref 0–1.2)
BUN BLDV-MCNC: 19 MG/DL (ref 8–23)
CALCIUM SERPL-MCNC: 10.4 MG/DL (ref 8.6–10.2)
CHLORIDE BLD-SCNC: 109 MMOL/L (ref 98–107)
CO2: 25 MMOL/L (ref 22–29)
CREAT SERPL-MCNC: 1 MG/DL (ref 0.5–1)
EOSINOPHILS ABSOLUTE: 0.18 E9/L (ref 0.05–0.5)
EOSINOPHILS RELATIVE PERCENT: 3.3 % (ref 0–6)
GFR AFRICAN AMERICAN: >60
GFR NON-AFRICAN AMERICAN: 54 ML/MIN/1.73
GLUCOSE BLD-MCNC: 105 MG/DL (ref 74–99)
HCT VFR BLD CALC: 35.1 % (ref 34–48)
HEMOGLOBIN: 11.7 G/DL (ref 11.5–15.5)
IMMATURE GRANULOCYTES #: 0.02 E9/L
IMMATURE GRANULOCYTES %: 0.4 % (ref 0–5)
INR BLD: 1.3
LYMPHOCYTES ABSOLUTE: 1.31 E9/L (ref 1.5–4)
LYMPHOCYTES RELATIVE PERCENT: 23.8 % (ref 20–42)
MCH RBC QN AUTO: 32.5 PG (ref 26–35)
MCHC RBC AUTO-ENTMCNC: 33.3 % (ref 32–34.5)
MCV RBC AUTO: 97.5 FL (ref 80–99.9)
MONOCYTES ABSOLUTE: 0.6 E9/L (ref 0.1–0.95)
MONOCYTES RELATIVE PERCENT: 10.9 % (ref 2–12)
NEUTROPHILS ABSOLUTE: 3.33 E9/L (ref 1.8–7.3)
NEUTROPHILS RELATIVE PERCENT: 60.5 % (ref 43–80)
PDW BLD-RTO: 18.5 FL (ref 11.5–15)
PLATELET # BLD: 177 E9/L (ref 130–450)
PMV BLD AUTO: 10.7 FL (ref 7–12)
POTASSIUM SERPL-SCNC: 4.7 MMOL/L (ref 3.5–5)
PROTHROMBIN TIME: 14.5 SEC (ref 9.3–12.4)
RBC # BLD: 3.6 E12/L (ref 3.5–5.5)
SODIUM BLD-SCNC: 144 MMOL/L (ref 132–146)
TOTAL PROTEIN: 7.5 G/DL (ref 6.4–8.3)
WBC # BLD: 5.5 E9/L (ref 4.5–11.5)

## 2020-07-25 PROCEDURE — 80053 COMPREHEN METABOLIC PANEL: CPT

## 2020-07-25 PROCEDURE — 85730 THROMBOPLASTIN TIME PARTIAL: CPT

## 2020-07-25 PROCEDURE — 82105 ALPHA-FETOPROTEIN SERUM: CPT

## 2020-07-25 PROCEDURE — 36415 COLL VENOUS BLD VENIPUNCTURE: CPT

## 2020-07-25 PROCEDURE — 85025 COMPLETE CBC W/AUTO DIFF WBC: CPT

## 2020-07-25 PROCEDURE — 85610 PROTHROMBIN TIME: CPT

## 2020-07-27 LAB — AFP-TUMOR MARKER: 6 NG/ML (ref 0–9)

## 2020-07-29 ENCOUNTER — HOSPITAL ENCOUNTER (OUTPATIENT)
Dept: GENERAL RADIOLOGY | Age: 75
Discharge: HOME | End: 2020-07-31
Payer: MEDICARE

## 2020-07-29 ENCOUNTER — OFFICE VISIT (OUTPATIENT)
Dept: ORTHOPEDIC SURGERY | Age: 75
End: 2020-07-29
Payer: MEDICARE

## 2020-07-29 VITALS
DIASTOLIC BLOOD PRESSURE: 64 MMHG | HEIGHT: 63 IN | TEMPERATURE: 99.2 F | BODY MASS INDEX: 35.44 KG/M2 | WEIGHT: 200 LBS | SYSTOLIC BLOOD PRESSURE: 117 MMHG | HEART RATE: 96 BPM

## 2020-07-29 PROCEDURE — G8417 CALC BMI ABV UP PARAM F/U: HCPCS | Performed by: NURSE PRACTITIONER

## 2020-07-29 PROCEDURE — 1090F PRES/ABSN URINE INCON ASSESS: CPT | Performed by: NURSE PRACTITIONER

## 2020-07-29 PROCEDURE — 1123F ACP DISCUSS/DSCN MKR DOCD: CPT | Performed by: NURSE PRACTITIONER

## 2020-07-29 PROCEDURE — G8399 PT W/DXA RESULTS DOCUMENT: HCPCS | Performed by: NURSE PRACTITIONER

## 2020-07-29 PROCEDURE — 73030 X-RAY EXAM OF SHOULDER: CPT

## 2020-07-29 PROCEDURE — 99213 OFFICE O/P EST LOW 20 MIN: CPT | Performed by: NURSE PRACTITIONER

## 2020-07-29 PROCEDURE — 1036F TOBACCO NON-USER: CPT | Performed by: NURSE PRACTITIONER

## 2020-07-29 PROCEDURE — 99212 OFFICE O/P EST SF 10 MIN: CPT | Performed by: ORTHOPAEDIC SURGERY

## 2020-07-29 PROCEDURE — G8427 DOCREV CUR MEDS BY ELIG CLIN: HCPCS | Performed by: NURSE PRACTITIONER

## 2020-07-29 PROCEDURE — 3017F COLORECTAL CA SCREEN DOC REV: CPT | Performed by: NURSE PRACTITIONER

## 2020-07-29 PROCEDURE — 4040F PNEUMOC VAC/ADMIN/RCVD: CPT | Performed by: NURSE PRACTITIONER

## 2020-07-29 NOTE — PATIENT INSTRUCTIONS
Updated therapy order sent to Kelsey in [facility]  Will work on ROM, strengthening, gait training, balance of bilateral lower extremities as well as continuing to work on aggressive ROM and strengthening of the left upper extremity  Continue to work on progressive weight bearing to the left upper extremity  OTC analgesics as needed for pain control  Follow up in 3 months   Call sooner with any problems or concerns

## 2020-07-29 NOTE — PROGRESS NOTES
DOI: 2-9-2020 Non-op left proximal humerus fracture    Subjective: Amie Lopez is approximately 5 months follow-up from the above surgery. Patient is partial weightbearing on that extremity. She reports she fell about a week ago when her left knee gave out on her. She has some pain over the left knee and left hip. She would like to have this checked today as well. She denies calf pain. She continues to do physical therapy. She is moving forward with her range of motion. She is right-hand dominant. She continues to take calcium and vitamin D supplementation as well. She denies any numbness or tingling. Just continued weakness over the left arm. Her pain is controlled with over-the-counter analgesics. She is no longer wearing any kind of brace to the left arm. Review of Systems -    General ROS: negative for - chills, fatigue, fever or night sweats  Respiratory ROS: no cough, shortness of breath, or wheezing  Cardiovascular ROS: no chest pain or dyspnea on exertion  Gastrointestinal ROS: no abdominal pain, nausea, vomiting, diarrhea, constipation,or black or bloody stools  Genitourinary: no hematuria, dysuria, or incontinence   Musculoskeletal ROS: negative for -back or neck pain or stiffness, also see HPI  Neurological ROS: no TIA or stroke symptoms     Objective:    General: Alert and oriented X 3, normocephalic atraumatic, external ears and eye normal, sclera clear, no acute distress, respirations easy and unlabored with no audible wheezes, skin warm and dry, speech and dress appropriate for noted age, affect euthymic.     Extremity:  Left Upper Extremity  Skin is clean dry and intact  No edema noted  Mild tenderness to palpation over the left shoulder  Radial pulse palpable, fingers warm with BCR  Able to passively forward flex 90 degrees   Flex/extension intact to wrist, thumb and fingers  Finger opposition intact  Finger adduction/abduction intact  Finger crossover intact  Subjectively states sensation intact to radial/medial/ulnar distribution      Left Lower Extremity  Skin clean dry and intact, without signs of infection  No erythema or ecchymosis  Mild edema noted, no obvious effusion  Mild pain over the medial joint compartment  Negative posterior drawer. Negative Lockman's. Knee is stable to varus and valgus stress at 30 degrees  Compartments supple throughout thigh and leg,   Calf supple and nontender to palpation  No crepitus on active or passive range of motion of the left knee  No pain on palpation over the left hip or thigh  Negative logroll  Demonstrates active knee flexion/extension 0 to 105 degrees, ankle plantar/dorsiflexion/great toe extension. States sensation intact to touch in sural/deep peroneal/superficial peroneal/saphenous/posterior tibial nerve distributions to foot/ankle. Palpable dorsalis pedis and posterior tibialis pulses, cap refill brisk in toes, foot warm/perfused. /64   Pulse 96   Temp 99.2 °F (37.3 °C)   Ht 5' 2.5\" (1.588 m)   Wt 200 lb (90.7 kg)   BMI 36.00 kg/m²     XR: X-rays of the left shoulder demonstrating a proximal humerus fracture with no change in alignment. No acute fractures or dislocations. No other osseous abnormalities. Assessment:   Diagnosis Orders   1.  Other closed displaced fracture of proximal end of left humerus with routine healing, subsequent encounter  Amb External Referral To Physical Therapy       Plan:  Updated therapy order sent to [facility] in [facility]  Will work on ROM, strengthening, gait training, balance of bilateral lower extremities as well as continuing to work on aggressive ROM and strengthening of the left upper extremity  Continue to work on progressive weight bearing to the left upper extremity  OTC analgesics as needed for pain control  Follow up in 3 months   Call sooner with any problems or concerns    Electronically signed by XENIA Amaya CNP on 7/29/2020 at 1:49 PM    Note: This report was

## 2020-08-06 ENCOUNTER — HOSPITAL ENCOUNTER (OUTPATIENT)
Dept: ULTRASOUND IMAGING | Age: 75
Discharge: HOME | End: 2020-08-08
Payer: MEDICARE

## 2020-08-06 PROCEDURE — 76705 ECHO EXAM OF ABDOMEN: CPT

## 2020-09-15 ENCOUNTER — OFFICE VISIT (OUTPATIENT)
Dept: FAMILY MEDICINE CLINIC | Age: 75
End: 2020-09-15
Payer: COMMERCIAL

## 2020-09-15 VITALS
DIASTOLIC BLOOD PRESSURE: 74 MMHG | HEIGHT: 63 IN | SYSTOLIC BLOOD PRESSURE: 117 MMHG | TEMPERATURE: 99.6 F | BODY MASS INDEX: 37.74 KG/M2 | RESPIRATION RATE: 20 BRPM | WEIGHT: 213 LBS | HEART RATE: 98 BPM

## 2020-09-15 PROCEDURE — 99214 OFFICE O/P EST MOD 30 MIN: CPT | Performed by: FAMILY MEDICINE

## 2020-09-15 PROCEDURE — 1036F TOBACCO NON-USER: CPT | Performed by: FAMILY MEDICINE

## 2020-09-15 PROCEDURE — 83036 HEMOGLOBIN GLYCOSYLATED A1C: CPT | Performed by: FAMILY MEDICINE

## 2020-09-15 PROCEDURE — 1090F PRES/ABSN URINE INCON ASSESS: CPT | Performed by: FAMILY MEDICINE

## 2020-09-15 PROCEDURE — G8399 PT W/DXA RESULTS DOCUMENT: HCPCS | Performed by: FAMILY MEDICINE

## 2020-09-15 PROCEDURE — G8427 DOCREV CUR MEDS BY ELIG CLIN: HCPCS | Performed by: FAMILY MEDICINE

## 2020-09-15 PROCEDURE — 3046F HEMOGLOBIN A1C LEVEL >9.0%: CPT | Performed by: FAMILY MEDICINE

## 2020-09-15 PROCEDURE — 3017F COLORECTAL CA SCREEN DOC REV: CPT | Performed by: FAMILY MEDICINE

## 2020-09-15 PROCEDURE — 2022F DILAT RTA XM EVC RTNOPTHY: CPT | Performed by: FAMILY MEDICINE

## 2020-09-15 PROCEDURE — G8417 CALC BMI ABV UP PARAM F/U: HCPCS | Performed by: FAMILY MEDICINE

## 2020-09-15 PROCEDURE — 1123F ACP DISCUSS/DSCN MKR DOCD: CPT | Performed by: FAMILY MEDICINE

## 2020-09-15 PROCEDURE — 4040F PNEUMOC VAC/ADMIN/RCVD: CPT | Performed by: FAMILY MEDICINE

## 2020-09-15 RX ORDER — TRIAMCINOLONE ACETONIDE 1 MG/G
CREAM TOPICAL
Qty: 453.6 G | Refills: 1 | Status: ON HOLD
Start: 2020-09-15 | End: 2020-12-29 | Stop reason: ALTCHOICE

## 2020-09-15 RX ORDER — HYDROXYZINE HYDROCHLORIDE 25 MG/1
25 TABLET, FILM COATED ORAL NIGHTLY PRN
Qty: 30 TABLET | Refills: 1 | Status: SHIPPED
Start: 2020-09-15 | End: 2020-11-02 | Stop reason: SDUPTHER

## 2020-09-15 RX ORDER — OMEPRAZOLE 40 MG/1
40 CAPSULE, DELAYED RELEASE ORAL DAILY
COMMUNITY
Start: 2020-09-01 | End: 2021-01-12

## 2020-09-15 NOTE — PROGRESS NOTES
[facility] Progress Note    Subjective: Luisito Nicole is a 76y.o. year old female here for DM. Health Maintenance Due   Topic Date Due    Hepatitis A vaccine (1 of 2 - Risk 2-dose series) 06/03/1946    DTaP/Tdap/Td vaccine (1 - Tdap) 06/03/1964    Shingles Vaccine (3 of 3) 06/07/2018    Diabetic retinal exam  05/01/2020    Diabetic foot exam  07/25/2020    Annual Wellness Visit (AWV)  07/25/2020    Flu vaccine (1) 09/01/2020    A1C test (Diabetic or Prediabetic)  09/11/2020     Leg swelling controlled. Wears compression stockings. On aldactone and lasix but does not take them if going out anywhere. seeing liver doc for cirrhosis, HOLDEN.  paracentesis not planned for any time soon. Latest LFTs stable. abd pain seems improved. CT a/p 12/2018:  LIVER:    The liver is diffusely decreased in density, and shows a more    sclerotic appearance than on the prior exam with decreasing contours    and lobulated contours. There is now diffuse ascites in the abdomen.      A hypodense finding in the anterior left lobe of the liver is stable    in appearance at about 13 mm diameter, and remains low density. A    second hypodense finding in the inferolateral right lobe of the liver    measuring about 7 mm diameter on the prior study, now measures 11 mm    diameter and is more distinct. There is a third indistinct hypodense    focus which appears slightly more distinct than prominent at nearly 9    mm diameter in the inferior right lobe of the liver medially adjacent    to caudate. Varices are noted in the splenorenal distribution of the    left upper abdomen, and a few varices are noted in the gastrohepatic    distribution.         Abd US 8/2020: Impression    Heterogeneous and irregular echotexture with slight macro lobular    contours of the liver are consistent with cirrhosis, but there is no    evidence of ascites.  A 1.6 cm diameter cyst in the lateral segment    left lobe of liver is noted.         The pancreatic duct in the body is very minimally prominent, but no    pancreatic acute pathologic findings are noted.         There is no evidence of right renal obstruction.         The patient is postcholecystectomy with normal caliber of the biliary    tree. Following w/ hepatology. Next appt in November. On aldactone and lasix. No significant abd distention. No leg swelling, just slightly in the ankles. Multiple hepatic cysts. No weight loss. Does not have a dog. Itching severe. Rash over chest and arms > legs. Trouble sleeping, possibly 2/2 itching. Denies bed bugs since got a new hospital bed. Taking metformin 500 bid for DM. Lab Results   Component Value Date    LABA1C 9.7 06/11/2020   sugars controlled at home  no polyuria, no polydipsia. Ophtho: y  Hypoglycemic episodes: n     MENDEZ. Chronic. Not much leg swelling. Has cirrhosis.        Past Medical History:   Diagnosis Date    Breast cancer ([address]     Cirrhosis ([address]     Essential hypertension     Hyperlipidemia     Osteopenia     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     Sleep apnea     Spinal stenosis     Type 2 diabetes mellitus ([facility]      Past Surgical History:   Procedure Laterality Date    BREAST LUMPECTOMY      lumpectomy [id number]    CARPAL TUNNEL RELEASE      COLONOSCOPY  01/31/2017    multiple polyps; diverticula--jerod    COLONOSCOPY  04/23/2019    polyps; diverticula; hemorrhoids--jerod    COLONOSCOPY N/A 4/23/2019    COLONOSCOPY POLYPECTOMY SNARE/COLD BIOPSY performed by Havery Peabody, MD at [address]  4/23/2019    COLONOSCOPY WITH BIOPSY performed by Havery Peabody, MD at [address] [facility] LITHOTRIPSY Left 05/04/2016    C-R STENT PLACEMENT    UPPER GASTROINTESTINAL ENDOSCOPY  04/23/2019    gastritis--jerod    UPPER GASTROINTESTINAL ENDOSCOPY N/A 4/23/2019    EGD BIOPSY performed by Havery Peabody, MD at [facility] ENDOSCOPY     Family History   Problem Relation Age of Onset    COPD Father    [employer] Heart Attack Father     Arthritis Mother     Cancer Other 48        colon     Social History     Socioeconomic History    Marital status:      Spouse name: Not on file    Number of children: Not on file    Years of education: Not on file    Highest education level: Not on file   Occupational History    Not on file   Social Needs    Financial resource strain: Not on file    Food insecurity     Worry: Not on file     Inability: Not on file    Transportation needs     Medical: Not on file     Non-medical: Not on file   Tobacco Use    Smoking status: Never Smoker    Smokeless tobacco: Never Used   Substance and Sexual Activity    Alcohol use: No     Alcohol/week: 0.0 standard drinks    Drug use: No    Sexual activity: Never     Partners: Male     Comment: last in 2013   Lifestyle    Physical activity     Days per week: Not on file     Minutes per session: Not on file    Stress: Not on file   Relationships    Social connections     Talks on phone: Not on file     Gets together: Not on file     Attends [employer] service: Not on file     Active member of club or organization: Not on file     Attends meetings of clubs or organizations: Not on file     Relationship status: Not on file    Intimate partner violence     Fear of current or ex partner: Not on file     Emotionally abused: Not on file     Physically abused: Not on file     Forced sexual activity: Not on file   Other Topics Concern    Not on file   Social History Narrative    Not on file       Review Of Systems (unless otherwise specified)  Gen: No fevers, sweats, chills   No fatigue   No weight unintentional weight changes  Skin:  No rash, no lesion  Resp: No cough, shortness of breath, wheeze, chest congestion  CV: No chest pain, palpitation, edema         Objective:  /74   Pulse 98   Temp 99.6 °F (37.6 °C) (Temporal)   Resp 20   Ht 5' 2.5\" (1.588 m)   Wt 213 lb (96.6 kg)   BMI 38.34 kg/m²   General appearance: NAD, alert and interacting appropriately  HEENT: NCAT, PERRLA, EOMI   Resp: RLL > LLL crackles. CVS: RRR, IVAN 3/6 RSB  Abdomen: BS +, SNT. Abdomen distended   Extremities: No clubbing, cyanosis. Trace ble edema. Warm. Dry. Skin: no jaundice. Maculopapular erythematous rash 1-2 mm w/ excoriation over chest, b/l arms. Psych: normal affect, at baseline. Normal eye contact      I have reviewed this patient's previous records. I have reviewed this patient's labs. I have reviewed this patient's medications. Assessment/Plan: Brandyn Sanchez was seen today for diabetes, hypertension, discuss medications and immunizations. Diagnoses and all orders for this visit:    Type 2 diabetes mellitus with diabetic neuropathy, unspecified whether long term insulin use (HCC)  -     POCT glycosylated hemoglobin (Hb A1C)  -     POCT microalbumin  -     metFORMIN (GLUCOPHAGE) 500 MG tablet; Take 2 tablets by mouth 2 times daily (with meals) TAKE 1 TABLET BY MOUTH daily in AM    Itching  -     hydrOXYzine (ATARAX) 25 MG tablet; Take 1 tablet by mouth nightly as needed for Itching  -     triamcinolone (KENALOG) 0.1 % cream; Apply topically to affected areas 2 times daily. Skin rash  -     triamcinolone (KENALOG) 0.1 % cream; Apply topically to affected areas 2 times daily. MENDEZ (dyspnea on exertion)  -     XR CHEST STANDARD (2 VW); Future    Cirrhosis of liver without ascites, unspecified hepatic cirrhosis type ([facility]    Cystic disease of liver          Patient Instructions   Follow up with the liver doctor as planned for the liver cysts. Continue the lasix and the aldactone for the cirrhosis. Return in about 6 weeks (around 10/27/2020) for Medicare annual wellness visit.       Electronically signed by Jennifer Quintero MD on 9/15/2020 at 2:24 PM

## 2020-09-16 LAB — HBA1C MFR BLD: 8.1 %

## 2020-09-17 ENCOUNTER — HOSPITAL ENCOUNTER (OUTPATIENT)
Age: 75
Discharge: HOME | End: 2020-09-19
Payer: COMMERCIAL

## 2020-09-17 ENCOUNTER — HOSPITAL ENCOUNTER (OUTPATIENT)
Dept: GENERAL RADIOLOGY | Age: 75
Discharge: HOME | End: 2020-09-19
Payer: COMMERCIAL

## 2020-09-17 PROCEDURE — 71046 X-RAY EXAM CHEST 2 VIEWS: CPT

## 2020-09-22 ENCOUNTER — TELEPHONE (OUTPATIENT)
Dept: FAMILY MEDICINE CLINIC | Age: 75
End: 2020-09-22

## 2020-09-22 ENCOUNTER — CLINICAL DOCUMENTATION (OUTPATIENT)
Dept: FAMILY MEDICINE CLINIC | Age: 75
End: 2020-09-22

## 2020-09-22 RX ORDER — SULFAMETHOXAZOLE AND TRIMETHOPRIM 800; 160 MG/1; MG/1
1 TABLET ORAL 2 TIMES DAILY
Qty: 6 TABLET | Refills: 0 | Status: SHIPPED | OUTPATIENT
Start: 2020-09-22 | End: 2020-09-25

## 2020-09-22 RX ORDER — CEFDINIR 300 MG/1
300 CAPSULE ORAL 2 TIMES DAILY
Qty: 10 CAPSULE | Refills: 0 | Status: SHIPPED | OUTPATIENT
Start: 2020-09-22 | End: 2020-09-27

## 2020-09-22 NOTE — PROGRESS NOTES
I was contacted through [employer]. Patient is complaining of dysuria, frequency and low grade fever for 1 day. She is concerned about UTI. She denies any drug allergy. Plan:  Acute cystitis  Has DM  Sent e-prescription to her pharmacy of Bactrim DS x BID for 3 days  Advised her to make an appointment with PCP as early as possible.       Lobito Burnett MD  Family medicine, PGY2

## 2020-09-22 NOTE — TELEPHONE ENCOUNTER
Patient calling in with dysuria and frequency started yesterday, she is asking for an antibiotic because she has no ride and she has to schedule a day or two in advance for transportation.  [address]

## 2020-10-13 ENCOUNTER — TELEPHONE (OUTPATIENT)
Dept: FAMILY MEDICINE CLINIC | Age: 75
End: 2020-10-13

## 2020-10-13 NOTE — TELEPHONE ENCOUNTER
PA for hydroxyzine was completed and approved, pharmacy and patient notified         France Stevenson (Key: [facility])     This request has been approved. Please note any additional information provided by [facility] at the bottom of your screen.

## 2020-11-02 RX ORDER — HYDROXYZINE HYDROCHLORIDE 25 MG/1
25 TABLET, FILM COATED ORAL NIGHTLY PRN
Qty: 30 TABLET | Refills: 1 | Status: ON HOLD
Start: 2020-11-02 | End: 2020-12-31 | Stop reason: HOSPADM

## 2020-11-04 ENCOUNTER — OFFICE VISIT (OUTPATIENT)
Dept: ORTHOPEDIC SURGERY | Age: 75
End: 2020-11-04
Payer: COMMERCIAL

## 2020-11-04 ENCOUNTER — HOSPITAL ENCOUNTER (OUTPATIENT)
Dept: GENERAL RADIOLOGY | Age: 75
Discharge: HOME | End: 2020-11-06
Payer: COMMERCIAL

## 2020-11-04 VITALS — DIASTOLIC BLOOD PRESSURE: 74 MMHG | TEMPERATURE: 97.8 F | HEART RATE: 94 BPM | SYSTOLIC BLOOD PRESSURE: 133 MMHG

## 2020-11-04 PROCEDURE — 99212 OFFICE O/P EST SF 10 MIN: CPT | Performed by: ORTHOPAEDIC SURGERY

## 2020-11-04 PROCEDURE — 1090F PRES/ABSN URINE INCON ASSESS: CPT | Performed by: PHYSICIAN ASSISTANT

## 2020-11-04 PROCEDURE — G8399 PT W/DXA RESULTS DOCUMENT: HCPCS | Performed by: PHYSICIAN ASSISTANT

## 2020-11-04 PROCEDURE — G8484 FLU IMMUNIZE NO ADMIN: HCPCS | Performed by: PHYSICIAN ASSISTANT

## 2020-11-04 PROCEDURE — 99214 OFFICE O/P EST MOD 30 MIN: CPT | Performed by: PHYSICIAN ASSISTANT

## 2020-11-04 PROCEDURE — G8417 CALC BMI ABV UP PARAM F/U: HCPCS | Performed by: PHYSICIAN ASSISTANT

## 2020-11-04 PROCEDURE — G8427 DOCREV CUR MEDS BY ELIG CLIN: HCPCS | Performed by: PHYSICIAN ASSISTANT

## 2020-11-04 PROCEDURE — 3017F COLORECTAL CA SCREEN DOC REV: CPT | Performed by: PHYSICIAN ASSISTANT

## 2020-11-04 PROCEDURE — 73030 X-RAY EXAM OF SHOULDER: CPT

## 2020-11-04 PROCEDURE — 1123F ACP DISCUSS/DSCN MKR DOCD: CPT | Performed by: PHYSICIAN ASSISTANT

## 2020-11-04 PROCEDURE — 1036F TOBACCO NON-USER: CPT | Performed by: PHYSICIAN ASSISTANT

## 2020-11-04 PROCEDURE — 4040F PNEUMOC VAC/ADMIN/RCVD: CPT | Performed by: PHYSICIAN ASSISTANT

## 2020-11-04 RX ORDER — MAGNESIUM GLUCONATE 27 MG(500)
500 TABLET ORAL 2 TIMES DAILY
COMMUNITY
End: 2021-01-31

## 2020-11-04 NOTE — PATIENT INSTRUCTIONS
You will need to be medically cleared before surgery by your family doctor. Please call your doctor to set up an appointment for medical clearance as soon as possible and have the office fax your medical clearance to :   (FAX) [phone]   ATTN:  OBED    1. Your surgery is scheduled for Left Reverse Total Shoulder Arthroplasty when  Medical clearance is given with Dr. Pato Xavier MD at the [facility] in [facility] . You will need to report to Preop area  that morning at 2 hours prior to surgery    2. You are having Inpatient surgery so you will not be returning home the same day  3. Preadmission Testing (PAT) department at [facility] will contact you with all the details prior to surgery. 4. Nothing to eat or drink after midnight the night before surgery. You may take a pain pill and any other medicine PAT instructs you to take with small sip of water if needed. 5. PRE OP COVID 19  6. Continue with ice and elevation to reduce swelling  7. Activity as tolerated to left upper extremity  8. Take pain medicine as instructed  9. Call office with any question or concerns: [phone].  Hold all NSAIDs 7 days prior to surgery      [address] We need to have COVID-19 Testing done 4 days (not including Sunday) prior to your scheduled surgery     After your testing is completed you will need to Self Quarantine until date of surgery     If your surgery is scheduled for:  Monday- Testing needs to be done Wednesday Tuesday- Testing needs to be done Thursday Wednesday- Testing needs to be done Friday Thursday- Testing needs to be done Friday Friday- Testing needs to be done Monday    Locations and hours are listed below:    [facility] --Across from [phone] signage will be posted but for those accessing the [facility] site, note that they should enter from [facility] onto [facility].      [phone]    Each of these sites will be open Monday through Friday from 6 a.m. to 2:30 p.m.     You will follow white signs that say SURGERY TESTING   Please bring a valid photo ID with you   Please bring order for COVID 19 test with you

## 2020-11-05 NOTE — PROGRESS NOTES
mouth once a week 12 capsule 1    rifaximin (XIFAXAN) 550 MG tablet Take 550 mg by mouth 2 times daily      lactulose (CHRONULAC) 10 GM/15ML solution Take 10 g by mouth 2 to 3 times day      vitamin D (CHOLECALCIFEROL) 1000 UNIT TABS tablet Take 1,000 Units by mouth daily      omeprazole (PRILOSEC) 40 MG delayed release capsule Take 40 mg by mouth daily      furosemide (LASIX) 40 MG tablet Take 40 mg by mouth daily       No current facility-administered medications for this visit.       Allergies: Lisinopril  Past Medical History:   Diagnosis Date    [facility]     Cirrhosis ([facility]     Essential hypertension     Hyperlipidemia     Osteopenia     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 ([facility]     Sleep apnea     Spinal stenosis     Type 2 diabetes mellitus ([facility]      Past Surgical History:   Procedure Laterality Date    BREAST LUMPECTOMY      lumpectomy [id number]    CARPAL TUNNEL RELEASE      COLONOSCOPY  01/31/2017    multiple polyps; diverticula--jerod    COLONOSCOPY  04/23/2019    polyps; diverticula; hemorrhoids--jerod    COLONOSCOPY N/A 4/23/2019    COLONOSCOPY POLYPECTOMY SNARE/COLD BIOPSY performed by Andres Brian MD at [id number] [facility] COLONOSCOPY  4/23/2019    COLONOSCOPY WITH BIOPSY performed by Andres Brian MD at [address] [facility] LITHOTRIPSY Left 05/04/2016    C-R STENT PLACEMENT    UPPER GASTROINTESTINAL ENDOSCOPY  04/23/2019    gastritis--jerod    UPPER GASTROINTESTINAL ENDOSCOPY N/A 4/23/2019    EGD BIOPSY performed by Andres Brian MD at [facility] [place] ENDOSCOPY     Family History   Problem Relation Age of Onset    COPD Father     Heart Attack Father     Arthritis Mother     Cancer Other 48        colon     Social History     Tobacco Use    Smoking status: Never Smoker    Smokeless tobacco: Never Used   Substance Use Topics    Alcohol use: No     Alcohol/week: 0.0 standard drinks                             Chief Complaint   Patient presents with    Follow-up Lt Prox Humerus fx, Nearly 9 months out. Still very painful, 8/10. Can only lift arm half way up.  Other     Patient wants to discuss other options for healing. SUBJECTIVE: Fitz Batista is here for follow up on left proximal humerus fracture. DOI 2-9-2020. This has been managed nonoperatively. Patient is RHD. Patient has completed a significant amount of outpatient physical therapy. Patient continues to have significant pain to the left shoulder and very limited ROM. Patient states ROM so limited that prevents her from really using the left shoulder for any ADLs. Patient would like to discuss if there are additional treatment options available to discuss to improve her function of the left shoulder. Review of Systems   Constitutional: Negative for fever, chills, diaphoresis, appetite change and fatigue. HENT: Negative for dental issues, hearing loss and tinnitus. Negative for congestion, sinus pressure, sneezing, sore throat. Negative for headache. Eyes: Negative for visual disturbance, blurred and double vision. Negative for pain, discharge, redness and itching  Respiratory: Negative for cough, shortness of breath and wheezing. Cardiovascular: Negative for chest pain, palpitations and leg swelling. No dyspnea on exertion   Gastrointestinal:   Negative for nausea, vomiting, abdominal pain, diarrhea, constipation  or black or bloody. Hematologic\Lymphatic:  negative for bleeding, petechiae,   Genitourinary: Negative for hematuria and difficulty urinating. Musculoskeletal: Negative for neck pain and stiffness. Mild for back pain, negative joint swelling and gait problem. Skin: Negative for pallor, rash and wound. Neurological: Negative for dizziness, tremors, seizures, weakness, light-headedness, no TIA or stroke symptoms. No numbness and headaches. Psychiatric/Behavioral: Negative.      Physical Examination:   General appearance: alert, well appearing, and in no distress,  normal surgery    Electronically signed by Trevor Billy PA-C on 11/5/2020 at 10:24 AM  Note: This report was completed using [employer] voiced recognition software. Every effort has been made to ensure accuracy; however, inadvertent computerized transcription errors may be present.

## 2020-11-24 RX ORDER — LOSARTAN POTASSIUM 100 MG/1
100 TABLET ORAL DAILY
Qty: 90 TABLET | Refills: 1 | Status: SHIPPED
Start: 2020-11-24 | End: 2021-01-12

## 2020-11-24 RX ORDER — VENLAFAXINE HYDROCHLORIDE 75 MG/1
75 CAPSULE, EXTENDED RELEASE ORAL DAILY
Qty: 90 CAPSULE | Refills: 1 | Status: SHIPPED
Start: 2020-11-24 | End: 2021-05-21 | Stop reason: SDUPTHER

## 2020-12-03 ENCOUNTER — OFFICE VISIT (OUTPATIENT)
Dept: FAMILY MEDICINE CLINIC | Age: 75
End: 2020-12-03
Payer: COMMERCIAL

## 2020-12-03 VITALS
HEIGHT: 63 IN | SYSTOLIC BLOOD PRESSURE: 123 MMHG | TEMPERATURE: 96.8 F | OXYGEN SATURATION: 96 % | DIASTOLIC BLOOD PRESSURE: 62 MMHG | HEART RATE: 97 BPM | WEIGHT: 213 LBS | BODY MASS INDEX: 37.74 KG/M2 | RESPIRATION RATE: 20 BRPM

## 2020-12-03 PROCEDURE — G8484 FLU IMMUNIZE NO ADMIN: HCPCS | Performed by: FAMILY MEDICINE

## 2020-12-03 PROCEDURE — G8417 CALC BMI ABV UP PARAM F/U: HCPCS | Performed by: FAMILY MEDICINE

## 2020-12-03 PROCEDURE — 1090F PRES/ABSN URINE INCON ASSESS: CPT | Performed by: FAMILY MEDICINE

## 2020-12-03 PROCEDURE — 99214 OFFICE O/P EST MOD 30 MIN: CPT | Performed by: FAMILY MEDICINE

## 2020-12-03 PROCEDURE — G8427 DOCREV CUR MEDS BY ELIG CLIN: HCPCS | Performed by: FAMILY MEDICINE

## 2020-12-03 PROCEDURE — 93000 ELECTROCARDIOGRAM COMPLETE: CPT | Performed by: FAMILY MEDICINE

## 2020-12-03 RX ORDER — ALBUTEROL SULFATE 90 UG/1
2 AEROSOL, METERED RESPIRATORY (INHALATION) 4 TIMES DAILY PRN
Qty: 3 INHALER | Refills: 1 | Status: SHIPPED
Start: 2020-12-03 | End: 2021-05-13 | Stop reason: SDUPTHER

## 2020-12-03 NOTE — PROGRESS NOTES
FM Progress Note    Subjective: Libby Cheng is a 76y.o. year old female here for preop evaluation. Health Maintenance Due   Topic Date Due    Hepatitis A vaccine (1 of 2 - Risk 2-dose series) 06/03/1946    DTaP/Tdap/Td vaccine (1 - Tdap) 06/03/1964    Shingles Vaccine (3 of 3) 06/07/2018    Diabetic retinal exam  05/01/2020    Diabetic foot exam  07/25/2020    Annual Wellness Visit (AWV)  07/25/2020    Flu vaccine (1) 09/01/2020       Still having shoulder pain after L proximal humeral fx, malunion. Planning on having L reverse total shoulder arthroplasty with Dr. Delsa Mcardle w/in 30 days. Previous issues with anesthesia: no  Agreeable to receiving blood products: yes    [place] w/in last couple months. Wheezing and sob walking even a block. Can barely do chores at home. Echo 2019:  Summary   Ejection fraction is visually estimated at 60%. No regional wall motion abnormalities seen   There is doppler evidence of stage I diastolic dysfunction. Normal right ventricle structure and function. Left atrial volume index of 38 ml per meters squared BSA. Mild mitral regurgitation is present. Mild to moderate tricuspid regurgitation. No PND or orthopnea. No leg swelling.      Past Medical History:   Diagnosis Date    Breast cancer ([address]     Cirrhosis ([address]     Essential hypertension     Hyperlipidemia     Osteopenia     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 ([address]     Sleep apnea     Spinal stenosis     Type 2 diabetes mellitus ([address]      Past Surgical History:   Procedure Laterality Date    BREAST LUMPECTOMY      lumpectomy [id number]    CARPAL TUNNEL RELEASE      COLONOSCOPY  01/31/2017    multiple polyps; diverticula--jerod    COLONOSCOPY  04/23/2019    polyps; diverticula; hemorrhoids--jerod    COLONOSCOPY N/A 4/23/2019    COLONOSCOPY POLYPECTOMY SNARE/COLD BIOPSY performed by Julian Ratliff MD at [place] [address]  4/23/2019    COLONOSCOPY WITH BIOPSY performed by Julian Ratliff MD at [address] [facility] LITHOTRIPSY Left 05/04/2016    C-R STENT PLACEMENT    UPPER GASTROINTESTINAL ENDOSCOPY  04/23/2019    gastritis--jerod    UPPER GASTROINTESTINAL ENDOSCOPY N/A 4/23/2019    EGD BIOPSY performed by Kierra Bustillos MD at [address] [place] [address] Road History   Problem Relation Age of Onset    COPD Father     Heart Attack Father     Arthritis Mother     Cancer Other 48        colon     Social History     Socioeconomic History    Marital status:      Spouse name: Not on file    Number of children: Not on file    Years of education: Not on file    Highest education level: Not on file   Occupational History    Not on file   Social Needs    Financial resource strain: Not on file    Food insecurity     Worry: Not on file     Inability: Not on file    Transportation needs     Medical: Not on file     Non-medical: Not on file   Tobacco Use    Smoking status: Never Smoker    Smokeless tobacco: Never Used   Substance and Sexual Activity    Alcohol use: No     Alcohol/week: 0.0 standard drinks    Drug use: No    Sexual activity: Never     Partners: Male     Comment: last in 2013   Lifestyle    Physical activity     Days per week: Not on file     Minutes per session: Not on file    Stress: Not on file   Relationships    Social connections     Talks on phone: Not on file     Gets together: Not on file     Attends Religion service: Not on file     Active member of club or organization: Not on file     Attends meetings of clubs or organizations: Not on file     Relationship status: Not on file    Intimate partner violence     Fear of current or ex partner: Not on file     Emotionally abused: Not on file     Physically abused: Not on file     Forced sexual activity: Not on file   Other Topics Concern    Not on file   Social History Narrative    Not on file         FUNCTIONAL CAPACITY (Bolded line indicates patient's functional capacity level):  1-3      Due to pain*  Watching television  Eating, dressing, cooking, using the toilet  Walking one or two blocks on level ground at 2-3 miles per hour  Doing light housework (ex dusting)    4-10        Climbing a flight of stairs     Walking on level ground at 4 miles per hour  Running a short distance  Doing heavy chores around the house (ex scrubbing floors, lifting furniture)  Playing moderately strenuous sports (ex golf, dance, bowling)     >10    Playing strenuous sports (ex tennis, basketball)     taken from AFP Volume 85, Number 3, p 241     RISK OF MAJOR CARDIAC COMPLICATIONS FOLLOWING NONCARDIAC SURGERY (bolded applies to this patient)     Assign one point for each of the following six risk factors:  1 point high risk surgical procedure:   (intraperitoneal, intrathoracic, suprainguinal vascular)   1 point history of MI   history of positive stress test  current chest pain due to myocardial ischemia  current nitrate treatment  Q waves   1 point history of CHF  history of pulmonary edema  history of paroxysmal nocturnal dyspnea  current BL rales  current S3 gallop  current CXR with pulmonary congestion   1 point history of cerebrovascular disease (CVA or TIA)   1 point preoperative tx with insulin   1 point preoperative creatinine >2      Score               Risk Index class          Risk of major cardiac complications (%)  0                      I                                   0.4  1                      II                                  0.9  2                      III                                 6.6  3 or more         IV                                 11.0     taken from AFP Volume 85, Number 3, p 241        SURGICAL RISK CATEGORY (Bolded line indicates patient's category):  High                             Cardiac risk >5%         Aortic or other major vascular surgery                                                                          Peripheral vascular surgery  Intermediate               Cardiac risk 1-5%      Carotid enterectomy                                                                          Head and neck surgery                                                                          Intraperitoneal or intrathoracic surgery                                                                          Orthopedic surgery                                                                          Prostate surgery  Low                              Cardiac risk <1%         Superficial procedures                                                                          Breast surgery                                                                          Cataract surgery                                                                          Endoscopic procedures                                                                          Most ambulatory surgeries     taken from  AFP Volume 85, Number 3, p 241          Review Of Systems (unless otherwise specified)  Gen: No fevers, sweats, chills   No fatigue   No weight unintentional weight changes  Skin:  No rash, no lesion  Resp: No cough, shortness of breath, wheeze, chest congestion  CV: No chest pain, palpitation, edema   GI:  No abdominal pain   No nausea, no vomiting   No change in bowels, no diarrhea or constipation   No blood in stool or blood per rectum  Neuro: No headaches   No syncope/near syncope   No dizziness  MS: No back pain   No arthralgias   No myalgias   No gait issues  : No dysuria, no hematuria, no frequency, no incontinence  Eyes: No vision changes   No eye itching  ENT: No earache, no drainage   No nasal congestion   No sinus tenderness   No sore throat   No swallowing issues  Endo: No polyuria, polydipsia, polyphagia   No temperature intolerance  Psych: No mood changes   No dysphoria   No anxiety   No sleep disturbance   No suicidal or homicidal ideation      Objective:  /62 (Site: Left Upper Arm, Position: Sitting, Cuff Size: Large Adult)   Pulse 97   Temp 96.8 °F (36 °C) (Temporal)   Resp 20   Ht 5' 2.5\" (1.588 m)   Wt 213 lb (96.6 kg)   SpO2 96%   BMI 38.34 kg/m²   General appearance: NAD, alert and interacting appropriately  HEENT: NCAT, PERRLA, EOMI   Resp: scattered rales audible b/l, no wheezing  CVS: RRR, IVAN noted  Abdomen: BS +, SNDNT  Extremities: No clubbing, cyanosis, or edema. Warm. Dry. I have reviewed this patient's previous records. I have reviewed this patient's labs. I have reviewed this patient's imaging reports. I have reviewed this patient's medications. Assessment/Plan: Perla Arias was seen today for pre-op exam.    Diagnoses and all orders for this visit:    Pre-op exam  -     EKG 12 Lead; Future  -     EKG 12 Lead    SOB (shortness of breath)  -     albuterol sulfate HFA (VENTOLIN HFA) 108 (90 Base) MCG/ACT inhaler; Inhale 2 puffs into the lungs 4 times daily as needed for Wheezing  -     XR CHEST STANDARD (2 VW); Future      BEFORE PROCEEDING TO SURGERY:  If the albuterol helps likely pulmonary more than cardiac. If the chest x-ray shows fluid, see if water pill helps the dyspnea, and if so more likely pulmonary than cardiac.   If chest x-ray negative and albuterol does not help the shortness of breath, get stress test.      F/u pending results           Electronically signed by Grace Alexander MD on 12/3/2020 at 2:02 PM

## 2020-12-04 ENCOUNTER — TELEPHONE (OUTPATIENT)
Dept: ORTHOPEDIC SURGERY | Age: 75
End: 2020-12-04

## 2020-12-04 ENCOUNTER — HOSPITAL ENCOUNTER (OUTPATIENT)
Age: 75
Discharge: HOME | End: 2020-12-06
Payer: COMMERCIAL

## 2020-12-04 ENCOUNTER — HOSPITAL ENCOUNTER (OUTPATIENT)
Dept: GENERAL RADIOLOGY | Age: 75
Discharge: HOME | End: 2020-12-06
Payer: COMMERCIAL

## 2020-12-04 PROCEDURE — 71046 X-RAY EXAM CHEST 2 VIEWS: CPT

## 2020-12-04 NOTE — TELEPHONE ENCOUNTER
Patient has a Left Proximal Humerus fracture from a mechanical fall on 2-9-2020. injury has been treated nonoperatively until now, but she decided during 11-4-2020 ov that she would like surgery done. Advised she needs to see her PCP for medical clearance prior to Left Reverse Total Shoulder Arthroplasty. She saw her PCP yesterday, Dr. Arturo Armstrong, and was accessed at intermediate Cardiac Risk for her surgery. I called patient today and left a voicemail for her to return my call to discuss a surgery date.

## 2020-12-04 NOTE — TELEPHONE ENCOUNTER
There are surgery openings with Dr. Kaity Saul for 12- @ 7:30 am, Monday 12- @ 7:30 am, and Monday 1-4-2021 @ 9:30 am. Hospital arrival time would be 2 hours prior to each of these surgery times.

## 2020-12-07 ENCOUNTER — TELEPHONE (OUTPATIENT)
Dept: FAMILY MEDICINE CLINIC | Age: 75
End: 2020-12-07

## 2020-12-07 NOTE — TELEPHONE ENCOUNTER
Pt called states the inhaler Dr Natalie Rodriguez gave her is working well, [employer]

## 2020-12-07 NOTE — TELEPHONE ENCOUNTER
Pt called in stating someone from the office called her. She is trying to schedule surgery with Dr. Oscar Simons. Thank you.

## 2020-12-08 ENCOUNTER — TELEPHONE (OUTPATIENT)
Dept: ORTHOPEDIC SURGERY | Age: 75
End: 2020-12-08

## 2020-12-08 NOTE — TELEPHONE ENCOUNTER
Patient is scheduled to have Left shoulder done by Dr. Kimberly Mckinley on 12-. Surgery will have to be done as Inpatient since the CPT code is a Inpatient only code. Prior Auth form along with clinicals were faxed into her insurance today to get prior Auth started. Fax confirmation page was received.     [address] [place] [address]#[phone]  Phone # [phone]  Fax # [phone]    DX [phone]  Left Proximal Humerus Fx, Malunion  CPT 03974  Left Reverse TSA  DOS 12-

## 2020-12-08 NOTE — TELEPHONE ENCOUNTER
The CPT code for this surgery is a Inpatient only code so it can only be scheduled as Inpatient. Since her injury is a Malunion Fracture, surgery can be scheduled even though it will require a overnight stay. I called her on 12-3-2020 and left her a voicemail with my name to call me back regarding this surgery. I will try to call her again today.

## 2020-12-08 NOTE — TELEPHONE ENCOUNTER
Spoke to patient and surgery has been scheduled for 12- @ 7:30 am. Instructed to have her COVID testing done on 12- and to call PAT to go over surgery instructions/schedule PAT appt. Medical clearance was received from her PCP on 12-3-2020.

## 2020-12-09 NOTE — PROGRESS NOTES
Patient having covid testing at [facility] site on 12/14/20. Patient asked to bring ID and to self quarantine until day of procedure.

## 2020-12-10 ENCOUNTER — PREP FOR PROCEDURE (OUTPATIENT)
Dept: ORTHOPEDIC SURGERY | Age: 75
End: 2020-12-10

## 2020-12-10 RX ORDER — SODIUM CHLORIDE 0.9 % (FLUSH) 0.9 %
10 SYRINGE (ML) INJECTION EVERY 12 HOURS SCHEDULED
Status: CANCELLED | OUTPATIENT
Start: 2020-12-10

## 2020-12-10 RX ORDER — SODIUM CHLORIDE 0.9 % (FLUSH) 0.9 %
10 SYRINGE (ML) INJECTION PRN
Status: CANCELLED | OUTPATIENT
Start: 2020-12-10

## 2020-12-10 RX ORDER — SODIUM CHLORIDE, SODIUM LACTATE, POTASSIUM CHLORIDE, CALCIUM CHLORIDE 600; 310; 30; 20 MG/100ML; MG/100ML; MG/100ML; MG/100ML
INJECTION, SOLUTION INTRAVENOUS CONTINUOUS
Status: CANCELLED | OUTPATIENT
Start: 2020-12-10

## 2020-12-10 NOTE — H&P (VIEW-ONLY)
Orthopaedic H&P Note     Fuentes Hopkins is a 76 y.o. female, her YOB: 1945 with the following history as recorded in [facility]:             Patient Active Problem List     Diagnosis Date Noted    Closed fracture of proximal epiphysis of left humerus 02/09/2020    Cir[facility]osis ([facility] 02/21/2019    Murmur, cardiac 01/05/2017    Dyslipidemia 12/01/2016    At risk for colon cancer 11/16/2016    Hx of adenomatous colonic polyps 11/16/2016    Depression 08/26/2016    Malignant neoplasm of left breast ([facility] 11/17/2015    Type 2 diabetes mellitus ([facility] 11/17/2015    Obstructive sleep apnea syndrome 11/17/2015    Chronic back pain 11/17/2015    Essential hypertension 11/17/2015    Multiple thyroid nodules 11/17/2015    Balance problem 11/17/2015      Current Facility-Administered Medications          Current Outpatient Medications   Medication Sig Dispense Refill    magnesium gluconate (MAGONATE) 500 MG tablet Take 500 mg by mouth 2 times daily        hydrOXYzine (ATARAX) 25 MG tablet Take 1 tablet by mouth nightly as needed for Itching 30 tablet 1    triamcinolone (KENALOG) 0.1 % cream Apply topically to affected areas 2 times daily.  453.6 g 1    metFORMIN (GLUCOPHAGE) 500 MG tablet Take 2 tablets by mouth 2 times daily (with meals) TAKE 1 TABLET BY MOUTH daily in  tablet 0    doxazosin (CARDURA) 1 MG tablet Take 1 mg by mouth nightly         spironolactone (ALDACTONE) 25 MG tablet Take 25 mg by mouth daily        losartan (COZAAR) 100 MG tablet Take 1 tablet by mouth daily 90 tablet 1    felodipine (PLENDIL) 2.5 MG extended release tablet Take 1 tablet by mouth daily 90 tablet 1    ondansetron (ZOFRAN) 4 MG tablet Take 1 tablet by mouth daily as needed for Nausea or Vomiting 90 tablet 1    mirtazapine (REMERON) 7.5 MG tablet Take 1 tablet by mouth nightly 90 tablet 1    venlafaxine (EFFEXOR XR) 75 MG extended release capsule Take 1 capsule by mouth daily 90 capsule 1  vitamin D (ERGOCALCIFEROL) 1.25 MG (90031 UT) CAPS capsule Take 1 capsule by mouth once a week 12 capsule 1    rifaximin (XIFAXAN) 550 MG tablet Take 550 mg by mouth 2 times daily        lactulose (CHRONULAC) 10 GM/15ML solution Take 10 g by mouth 2 to 3 times day        vitamin D (CHOLECALCIFEROL) 1000 UNIT TABS tablet Take 1,000 Units by mouth daily        omeprazole (PRILOSEC) 40 MG delayed release capsule Take 40 mg by mouth daily        furosemide (LASIX) 40 MG tablet Take 40 mg by mouth daily          No current facility-administered medications for this visit.          Allergies: Lisinopril  Past Medical History        Past Medical History:   Diagnosis Date    [facility]      Cirrhosis ([facility]      Essential hypertension      Hyperlipidemia      Osteopenia      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 (HCC)      Sleep apnea      Spinal stenosis      Type 2 diabetes mellitus ([facility]          Past Surgical History         Past Surgical History:   Procedure Laterality Date    BREAST LUMPECTOMY         lumpectomy [id number]    CARPAL TUNNEL RELEASE        COLONOSCOPY   01/31/2017     multiple polyps; diverticula--jerod    COLONOSCOPY   04/23/2019     polyps; diverticula; hemorrhoids--jerod    COLONOSCOPY N/A 4/23/2019     COLONOSCOPY POLYPECTOMY SNARE/COLD BIOPSY performed by Yecenia Mari MD at [facility]   4/23/2019     COLONOSCOPY WITH BIOPSY performed by Yecenia Mari MD at [address] [facility] [address] LITHOTRIPSY Left 05/04/2016     C-R STENT PLACEMENT    UPPER GASTROINTESTINAL ENDOSCOPY   04/23/2019     gastritis--jerod    UPPER GASTROINTESTINAL ENDOSCOPY N/A 4/23/2019     EGD BIOPSY performed by Yecenia Mari MD at [facility] ENDOSCOPY        Family History         Family History   Problem Relation Age of Onset    COPD Father      Heart Attack Father      Arthritis Mother      Cancer Other 48         colon        Social History      Tobacco Use  Smoking status: Never Smoker    Smokeless tobacco: Never Used   Substance Use Topics    Alcohol use: No       Alcohol/week: 0.0 standard drinks                                    Chief Complaint   Patient presents with    Follow-up       Lt Prox Humerus fx, Nearly 9 months out. Still very painful, 8/10. Can only lift arm half way up.  Other       Patient wants to discuss other options for healing.         SUBJECTIVE: Teri Metz is here for follow up on left proximal humerus fracture. DOI 2-9-2020. This has been managed nonoperatively. Patient is RHD. Patient has completed a significant amount of outpatient physical therapy. Patient continues to have significant pain to the left shoulder and very limited ROM. Patient states ROM so limited that prevents her from really using the left shoulder for any ADLs. Patient would like to discuss if there are additional treatment options available to discuss to improve her function of the left shoulder.      Review of Systems   Constitutional: Negative for fever, chills, diaphoresis, appetite change and fatigue. HENT: Negative for dental issues, hearing loss and tinnitus. Negative for congestion, sinus pressure, sneezing, sore throat. Negative for headache. Eyes: Negative for visual disturbance, blurred and double vision. Negative for pain, discharge, redness and itching  Respiratory: Negative for cough, shortness of breath and wheezing. Cardiovascular: Negative for chest pain, palpitations and leg swelling. No dyspnea on exertion   Gastrointestinal:   Negative for nausea, vomiting, abdominal pain, diarrhea, constipation  or black or bloody. Hematologic\Lymphatic:  negative for bleeding, petechiae,   Genitourinary: Negative for hematuria and difficulty urinating. Musculoskeletal: Negative for neck pain and stiffness. Mild for back pain, negative joint swelling and gait problem. Skin: Negative for pallor, rash and wound. Neurological: Negative for dizziness, tremors, seizures, weakness, light-headedness, no TIA or stroke symptoms. No numbness and headaches. Psychiatric/Behavioral: Negative.      Physical Examination:   General appearance: alert, well appearing, and in no distress,  normal appearing weight  Mental status: alert, oriented to person, place, and time, normal mood, behavior, speech, dress, motor activity, and thought processes  Abdomen: soft, nondistended, nontender, bowel sound + X 4 quads  Resp:   resp easy and unlabored, equal, regular rate, no wheezes, rhonchi, crackles noted  Cardiac: distal pulses palpable, skin well perfused. HR regular rhythm and rate, no murmers, rubs, or clicks  Neurological: alert, oriented X3, normal speech, no focal findings or movement disorder noted, motor and sensory grossly normal bilaterally, normal muscle tone, no tremors, strength 5/5, normal gait and station  HEENT: normochephalic atraumatic, external ears and eyes normal, sclera normal, neck supple  Extremities:   peripheral pulses normal, no edema, redness or tenderness in the calves   Skin: normal coloration, no rashes or open wounds, no suspicious skin lesions noted  Psych: Affect euthymic   Musculoskeletal:    Extremity:  Left Upper Extremity  Skin is clean dry and intact  No edema noted  Radial pulse palpable, fingers warm with BCR  Flex/extension intact to wrist, thumb and fingers  Finger opposition intact  Finger adduction/abduction intact  Finger crossover intact  Subjectively states sensation intact to radial/medial/ulnar distribution  Patient continues to have diffuse TTP about the shoulder, no TTP to the elbow wrist or hand. AROM of the shoulder FF to 45, ABD to 45 before pain. Patient cannot attempt to reach behind her.    Passively able to flex shoulder to approximately 75 degrees prior to pain  Patient unable to tolerate testing of RC strength       /74 (Site: Right Upper Arm, Position: Sitting)   Pulse 94   Temp 97.8 °F (36.6 °C) (Oral)      XR: Multiple views of the left shoulder demonstrates healed proximal humerus fracture with malunion, shaft impacted and displaced anteriorly in relation to the humeral head, humeral head does appear located in the glenoid.      ASSESSMENT:       Diagnosis Orders   1. Open 4-part fracture of proximal humerus, left, with malunion, subsequent encounter            Discussion:  Had lengthy discussion with patient regarding Her diagnosis, typical prognosis, and expected outcomes. I reviewed the possible complications from the injury itself despite treatment choosen. I also discussed treatment options including nonoperative managements versus surgical management, along with risks and benefits of each. They have elected for surgical management at this time. We discussed that rTSA may not resolve all of her shoulder pain but the goal is for a more functional motion so patient can do ADLs. We discussed expectations of ROM improvement and she acknowledged that this will not be equivocal to contralateral side.         Risk, benefits and treatment options discussed with Qian Crook. she has verbalized understanding of options. The possibility of complications were also discussed to include but not limited to nerve damage, infection, problems with wound healing, vascular injury, chronic pain, stiffness, dysfunction, nonhealing of the bone, symptomatic hardware and/or its failure, need for subsequent surgery, dislocation, and blood clots as well as medical related problems and other problems not specifically discussed. Risk of anesthesia also discussed to include death.

## 2020-12-10 NOTE — H&P
Orthopaedic H&P Note     Gail Aragon is a 76 y.o. female, her YOB: 1945 with the following history as recorded in [facility]:             Patient Active Problem List     Diagnosis Date Noted    Closed fracture of proximal epiphysis of left humerus 02/09/2020    Cirrhosis ([facility] [address] 02/21/2019    Murmur, cardiac 01/05/2017    Dyslipidemia 12/01/2016    At risk for colon cancer 11/16/2016    Hx of adenomatous colonic polyps 11/16/2016    Depression 08/26/2016    Malignant neoplasm of left breast ([facility] [address] 11/17/2015    Type 2 diabetes mellitus ([facility] [address] 11/17/2015    Obstructive sleep apnea syndrome 11/17/2015    Chronic back pain 11/17/2015    Essential hypertension 11/17/2015    Multiple thyroid nodules 11/17/2015    Balance problem 11/17/2015      Current Facility-Administered Medications          Current Outpatient Medications   Medication Sig Dispense Refill    magnesium gluconate (MAGONATE) 500 MG tablet Take 500 mg by mouth 2 times daily        hydrOXYzine (ATARAX) 25 MG tablet Take 1 tablet by mouth nightly as needed for Itching 30 tablet 1    triamcinolone (KENALOG) 0.1 % cream Apply topically to affected areas 2 times daily.  453.6 g 1    metFORMIN (GLUCOPHAGE) 500 MG tablet Take 2 tablets by mouth 2 times daily (with meals) TAKE 1 TABLET BY MOUTH daily in  tablet 0    doxazosin (CARDURA) 1 MG tablet Take 1 mg by mouth nightly         spironolactone (ALDACTONE) 25 MG tablet Take 25 mg by mouth daily        losartan (COZAAR) 100 MG tablet Take 1 tablet by mouth daily 90 tablet 1    felodipine (PLENDIL) 2.5 MG extended release tablet Take 1 tablet by mouth daily 90 tablet 1    ondansetron (ZOFRAN) 4 MG tablet Take 1 tablet by mouth daily as needed for Nausea or Vomiting 90 tablet 1    mirtazapine (REMERON) 7.5 MG tablet Take 1 tablet by mouth nightly 90 tablet 1    venlafaxine (EFFEXOR XR) 75 MG extended release capsule Take 1 capsule by mouth daily 90 capsule 1    vitamin D (ERGOCALCIFEROL) 1.25 MG (91703 UT) CAPS capsule Take 1 capsule by mouth once a week 12 capsule 1    rifaximin (XIFAXAN) 550 MG tablet Take 550 mg by mouth 2 times daily        lactulose (CHRONULAC) 10 GM/15ML solution Take 10 g by mouth 2 to 3 times day        vitamin D (CHOLECALCIFEROL) 1000 UNIT TABS tablet Take 1,000 Units by mouth daily        omeprazole (PRILOSEC) 40 MG delayed release capsule Take 40 mg by mouth daily        furosemide (LASIX) 40 MG tablet Take 40 mg by mouth daily          No current facility-administered medications for this visit.          Allergies: Lisinopril  Past Medical History        Past Medical History:   Diagnosis Date    Breast cancer ([facility] [address]      Cirrhosis ([facility] [address]      Essential hypertension      Hyperlipidemia      Osteopenia      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 (HCC)      Sleep apnea      Spinal stenosis      Type 2 diabetes mellitus ([facility] [address]          Past Surgical History         Past Surgical History:   Procedure Laterality Date    BREAST LUMPECTOMY         lumpectomy [id number]    CARPAL TUNNEL RELEASE        COLONOSCOPY   01/31/2017     multiple polyps; diverticula--jerod    COLONOSCOPY   04/23/2019     polyps; diverticula; hemorrhoids--jerod    COLONOSCOPY N/A 4/23/2019     COLONOSCOPY POLYPECTOMY SNARE/COLD BIOPSY performed by Abdoulaye Bertrand MD at [place] [address]   4/23/2019     COLONOSCOPY WITH BIOPSY performed by Abdoulaye eBrtrand MD at [address] [place] [address] Ave LITHOTRIPSY Left 05/04/2016     C-R STENT PLACEMENT    UPPER GASTROINTESTINAL ENDOSCOPY   04/23/2019     gastritis--jerod    UPPER GASTROINTESTINAL ENDOSCOPY N/A 4/23/2019     EGD BIOPSY performed by Abdoulaye Bertrand MD at [facility] History         Family History   Problem Relation Age of Onset    COPD Father      Heart Attack Father      Arthritis Mother      Cancer Other 48         colon        Social History      Tobacco Use    Smoking status: Never Smoker    Smokeless tobacco: Never Used   Substance Use Topics    Alcohol use: No       Alcohol/week: 0.0 standard drinks                                    Chief Complaint   Patient presents with    Follow-up       Lt Prox Humerus fx, Nearly 9 months out. Still very painful, 8/10. Can only lift arm half way up.  Other       Patient wants to discuss other options for healing.         SUBJECTIVE: Ran White is here for follow up on left proximal humerus fracture. DOI 2-9-2020. This has been managed nonoperatively. Patient is RHD. Patient has completed a significant amount of outpatient physical therapy. Patient continues to have significant pain to the left shoulder and very limited ROM. Patient states ROM so limited that prevents her from really using the left shoulder for any ADLs. Patient would like to discuss if there are additional treatment options available to discuss to improve her function of the left shoulder.      Review of Systems   Constitutional: Negative for fever, chills, diaphoresis, appetite change and fatigue. HENT: Negative for dental issues, hearing loss and tinnitus. Negative for congestion, sinus pressure, sneezing, sore throat. Negative for headache. Eyes: Negative for visual disturbance, blurred and double vision. Negative for pain, discharge, redness and itching  Respiratory: Negative for cough, shortness of breath and wheezing. Cardiovascular: Negative for chest pain, palpitations and leg swelling. No dyspnea on exertion   Gastrointestinal:   Negative for nausea, vomiting, abdominal pain, diarrhea, constipation  or black or bloody. Hematologic\Lymphatic:  negative for bleeding, petechiae,   Genitourinary: Negative for hematuria and difficulty urinating. Musculoskeletal: Negative for neck pain and stiffness. Mild for back pain, negative joint swelling and gait problem. Skin: Negative for pallor, rash and wound.    Neurological: Negative for dizziness, tremors, seizures, weakness, light-headedness, no TIA or stroke symptoms. No numbness and headaches. Psychiatric/Behavioral: Negative.      Physical Examination:   General appearance: alert, well appearing, and in no distress,  normal appearing weight  Mental status: alert, oriented to person, place, and time, normal mood, behavior, speech, dress, motor activity, and thought processes  Abdomen: soft, nondistended, nontender, bowel sound + X 4 quads  Resp:   resp easy and unlabored, equal, regular rate, no wheezes, rhonchi, crackles noted  Cardiac: distal pulses palpable, skin well perfused. HR regular rhythm and rate, no murmers, rubs, or clicks  Neurological: alert, oriented X3, normal speech, no focal findings or movement disorder noted, motor and sensory grossly normal bilaterally, normal muscle tone, no tremors, strength 5/5, normal gait and station  HEENT: normochephalic atraumatic, external ears and eyes normal, sclera normal, neck supple  Extremities:   peripheral pulses normal, no edema, redness or tenderness in the calves   Skin: normal coloration, no rashes or open wounds, no suspicious skin lesions noted  Psych: Affect euthymic   Musculoskeletal:    Extremity:  Left Upper Extremity  Skin is clean dry and intact  No edema noted  Radial pulse palpable, fingers warm with BCR  Flex/extension intact to wrist, thumb and fingers  Finger opposition intact  Finger adduction/abduction intact  Finger crossover intact  Subjectively states sensation intact to radial/medial/ulnar distribution  Patient continues to have diffuse TTP about the shoulder, no TTP to the elbow wrist or hand. AROM of the shoulder FF to 45, ABD to 45 before pain. Patient cannot attempt to reach behind her.    Passively able to flex shoulder to approximately 75 degrees prior to pain  Patient unable to tolerate testing of RC strength        /74 (Site: Right Upper Arm, Position: Sitting)   Pulse 94   Temp 97.8 °F (36.6 °C) (Oral)      XR: Multiple views MD Shey  Pre-surgery medical clearance  NPO after midnight the night before surgery  Pre-Op COVID 19  WBAT on left upper extremity  Hold NSAIDS 7 days prior to surgery  Hold aspirin 1 prior to surgery

## 2020-12-11 NOTE — PROGRESS NOTES
[address] PRE-ADMISSION TESTING GENERAL INSTRUCTIONS- [facility]-phone number:[phone]    GENERAL INSTRUCTIONS  [x] Antibacterial Soap shower Night before   [x] Ready Prep CHG wipe instruction sheet and wipes given. [x] Nothing by mouth after midnight, including gum, candy, mints, or water. [x] You may brush your teeth, gargle, but do NOT swallow water. [x]No smoking, chewing tobacco, illegal drugs, or alcohol within 24 hours of your surgery. [x] Jewelry, valuables or body piercing's should not be brought to the hospital. All body and/or tongue piercing's must be removed prior to arriving to hospital.  ALL hair pins must be removed. [x] Do not wear makeup, lotions, powders, deodorant. Nail polish as directed by the nurse. Who will be your  for transportation?__________________     [x] Bring insurance card and photo ID. [x] Transfusion Bracelet: Please bring with you to hospital, day of surgery    [x] Use inhalers the morning of surgery and bring with you to hospital.  [] Bring copy of living will or healthcare power of  papers to be placed in your electronic record. PARKING INSTRUCTIONS:   [x] Arrival Time: 5:30 am Park on [facility], enter the main entrance. One person may accompany you. Wear a mask. [x] To reach the [facility] from [facility], upon entering the hospital, take elevator B to the 3rd floor. Check in with the . A parking token will be provided if needed. EDUCATION INSTRUCTIONS:    .     [] [address] placed in chart. [x] Pre-admission Testing educational folder given  [x] Incentive Spirometry,coughing & deep breathing exercises reviewed. [x]Medication information sheet(s)   [x]Fluoroscopy-Xray used in surgery reviewed with patient. Educational pamphlet placed in chart. [x]Pain: Post-op pain is normal and to be expected.   You will be asked to rate your pain from 0-10 (a zero is not acceptable-education is needed). Your post-op pain goal is: 5-6. [x] Ask your nurse for your pain medication. [x] Other:  Wear loose comfortable clothing button down shirt. May bring cell phone and , clean and place in zip lock baggie with your name. MEDICATION INSTRUCTIONS:  [x]Bring a complete list of your medications, please write the last time you took the medicine, give this list to the nurse. [x] Take the following medications the morning of surgery with 1-2 ounces of water:  Venlafaxine, felodipine and omeprazole. [x] Stop herbal supplements and vitamins 5 days before your surgery. [x] DO NOT take any diabetic medicine the morning of surgery. Follow instructions for insulin the day before surgery. [x] If you are diabetic and your blood sugar is low or you feel symptomatic, you may drink 1-2 ounces of apple juice or take a glucose tablet. The morning of your procedure, you may call the pre-op area if you have concerns about your blood sugar [phone]. [x] Use your inhalers the morning of surgery. Bring your emergency inhaler with you day of surgery. [] Follow physician instructions regarding any blood thinners you may be taking. WHAT TO EXPECT:  [x] The day of surgery you will be greeted and checked in by the [employer]. Please bring your photo ID and insurance card. A nurse will greet you in accordance to the time you are needed in the pre-op area to prepare you for surgery. Please do not be discouraged if you are not greeted in the order you arrive as there are many variables that are involved in patient preparation. Your patience is greatly appreciated as you wait for your nurse. Please bring in items such as: books, magazines, newspapers, electronics, or any other items  to occupy your time in the waiting area. [x]  Delays may occur with surgery and staff will make a sincere effort to keep you informed of delays.   If any delays occur with your procedure, we apologize ahead of time for your inconvenience as we recognize the value of your time.

## 2020-12-14 ENCOUNTER — HOSPITAL ENCOUNTER (OUTPATIENT)
Age: 75
Discharge: HOME | End: 2020-12-16
Payer: COMMERCIAL

## 2020-12-14 ENCOUNTER — HOSPITAL ENCOUNTER (OUTPATIENT)
Dept: PREADMISSION TESTING | Age: 75
Discharge: HOME | End: 2020-12-14
Payer: COMMERCIAL

## 2020-12-14 VITALS
RESPIRATION RATE: 16 BRPM | SYSTOLIC BLOOD PRESSURE: 135 MMHG | TEMPERATURE: 97.8 F | DIASTOLIC BLOOD PRESSURE: 66 MMHG | WEIGHT: 200 LBS | HEIGHT: 63 IN | OXYGEN SATURATION: 98 % | BODY MASS INDEX: 35.44 KG/M2 | HEART RATE: 90 BPM

## 2020-12-14 LAB
ABO/RH: NORMAL
ALBUMIN SERPL-MCNC: 3.5 G/DL (ref 3.5–5.2)
ALP BLD-CCNC: 126 U/L (ref 35–104)
ALT SERPL-CCNC: 23 U/L (ref 0–32)
ANION GAP SERPL CALCULATED.3IONS-SCNC: 9 MMOL/L (ref 7–16)
ANTIBODY SCREEN: NORMAL
AST SERPL-CCNC: 42 U/L (ref 0–31)
BASOPHILS ABSOLUTE: 0.06 E9/L (ref 0–0.2)
BASOPHILS RELATIVE PERCENT: 1 % (ref 0–2)
BILIRUB SERPL-MCNC: 1 MG/DL (ref 0–1.2)
BUN BLDV-MCNC: 18 MG/DL (ref 8–23)
CALCIUM SERPL-MCNC: 10.4 MG/DL (ref 8.6–10.2)
CHLORIDE BLD-SCNC: 106 MMOL/L (ref 98–107)
CO2: 24 MMOL/L (ref 22–29)
CREAT SERPL-MCNC: 1 MG/DL (ref 0.5–1)
EOSINOPHILS ABSOLUTE: 0.22 E9/L (ref 0.05–0.5)
EOSINOPHILS RELATIVE PERCENT: 3.7 % (ref 0–6)
GFR AFRICAN AMERICAN: >60
GFR NON-AFRICAN AMERICAN: 54 ML/MIN/1.73
GLUCOSE BLD-MCNC: 238 MG/DL (ref 74–99)
HCT VFR BLD CALC: 33 % (ref 34–48)
HEMOGLOBIN: 10.9 G/DL (ref 11.5–15.5)
IMMATURE GRANULOCYTES #: 0.03 E9/L
IMMATURE GRANULOCYTES %: 0.5 % (ref 0–5)
INR BLD: 1.4
LYMPHOCYTES ABSOLUTE: 1.37 E9/L (ref 1.5–4)
LYMPHOCYTES RELATIVE PERCENT: 23.3 % (ref 20–42)
MCH RBC QN AUTO: 32.3 PG (ref 26–35)
MCHC RBC AUTO-ENTMCNC: 33 % (ref 32–34.5)
MCV RBC AUTO: 97.9 FL (ref 80–99.9)
MONOCYTES ABSOLUTE: 0.71 E9/L (ref 0.1–0.95)
MONOCYTES RELATIVE PERCENT: 12.1 % (ref 2–12)
NEUTROPHILS ABSOLUTE: 3.48 E9/L (ref 1.8–7.3)
NEUTROPHILS RELATIVE PERCENT: 59.4 % (ref 43–80)
PDW BLD-RTO: 18 FL (ref 11.5–15)
PLATELET # BLD: 151 E9/L (ref 130–450)
PMV BLD AUTO: 10.8 FL (ref 7–12)
POTASSIUM SERPL-SCNC: 4.5 MMOL/L (ref 3.5–5)
PROTHROMBIN TIME: 15.6 SEC (ref 9.3–12.4)
RBC # BLD: 3.37 E12/L (ref 3.5–5.5)
SODIUM BLD-SCNC: 139 MMOL/L (ref 132–146)
TOTAL PROTEIN: 6.6 G/DL (ref 6.4–8.3)
WBC # BLD: 5.9 E9/L (ref 4.5–11.5)

## 2020-12-14 PROCEDURE — 86901 BLOOD TYPING SEROLOGIC RH(D): CPT

## 2020-12-14 PROCEDURE — 87081 CULTURE SCREEN ONLY: CPT

## 2020-12-14 PROCEDURE — 85610 PROTHROMBIN TIME: CPT

## 2020-12-14 PROCEDURE — 85025 COMPLETE CBC W/AUTO DIFF WBC: CPT

## 2020-12-14 PROCEDURE — 86850 RBC ANTIBODY SCREEN: CPT

## 2020-12-14 PROCEDURE — 80053 COMPREHEN METABOLIC PANEL: CPT

## 2020-12-14 PROCEDURE — 86900 BLOOD TYPING SEROLOGIC ABO: CPT

## 2020-12-14 PROCEDURE — 36415 COLL VENOUS BLD VENIPUNCTURE: CPT

## 2020-12-14 PROCEDURE — U0003 INFECTIOUS AGENT DETECTION BY NUCLEIC ACID (DNA OR RNA); SEVERE ACUTE RESPIRATORY SYNDROME CORONAVIRUS 2 (SARS-COV-2) (CORONAVIRUS DISEASE [COVID-19]), AMPLIFIED PROBE TECHNIQUE, MAKING USE OF HIGH THROUGHPUT TECHNOLOGIES AS DESCRIBED BY CMS-2020-01-R: HCPCS

## 2020-12-14 SDOH — HEALTH STABILITY: MENTAL HEALTH: HOW OFTEN DO YOU HAVE A DRINK CONTAINING ALCOHOL?: NEVER

## 2020-12-14 ASSESSMENT — PAIN SCALES - GENERAL: PAINLEVEL_OUTOF10: 9

## 2020-12-14 ASSESSMENT — PAIN DESCRIPTION - ORIENTATION: ORIENTATION: LEFT

## 2020-12-14 ASSESSMENT — PAIN DESCRIPTION - PAIN TYPE: TYPE: ACUTE PAIN

## 2020-12-14 ASSESSMENT — PAIN DESCRIPTION - LOCATION: LOCATION: SHOULDER

## 2020-12-15 LAB
SARS-COV-2: NOT DETECTED
SOURCE: NORMAL

## 2020-12-15 NOTE — TELEPHONE ENCOUNTER
Received surgery approval today via fax. Surgery for 12- as Inpatient has been approved. 3 day inpatient stay approved. Auth # [id number] for CPT [id number].

## 2020-12-16 LAB — MRSA CULTURE ONLY: NORMAL

## 2020-12-20 ENCOUNTER — ANESTHESIA EVENT (OUTPATIENT)
Dept: OPERATING ROOM | Age: 75
End: 2020-12-20
Payer: COMMERCIAL

## 2020-12-21 ENCOUNTER — HOSPITAL ENCOUNTER (OUTPATIENT)
Age: 75
Setting detail: SURGERY ADMIT
Discharge: HOME | End: 2020-12-21
Attending: ORTHOPAEDIC SURGERY | Admitting: ORTHOPAEDIC SURGERY
Payer: COMMERCIAL

## 2020-12-21 ENCOUNTER — ANESTHESIA (OUTPATIENT)
Dept: OPERATING ROOM | Age: 75
End: 2020-12-21
Payer: COMMERCIAL

## 2020-12-21 ENCOUNTER — APPOINTMENT (OUTPATIENT)
Dept: GENERAL RADIOLOGY | Age: 75
End: 2020-12-21
Attending: ORTHOPAEDIC SURGERY
Payer: COMMERCIAL

## 2020-12-21 VITALS
HEIGHT: 63 IN | SYSTOLIC BLOOD PRESSURE: 128 MMHG | RESPIRATION RATE: 20 BRPM | WEIGHT: 200 LBS | TEMPERATURE: 97.2 F | OXYGEN SATURATION: 93 % | DIASTOLIC BLOOD PRESSURE: 61 MMHG | BODY MASS INDEX: 35.44 KG/M2 | HEART RATE: 90 BPM

## 2020-12-21 VITALS — OXYGEN SATURATION: 97 % | SYSTOLIC BLOOD PRESSURE: 104 MMHG | TEMPERATURE: 97.2 F | DIASTOLIC BLOOD PRESSURE: 67 MMHG

## 2020-12-21 PROBLEM — Z96.612 S/P REVERSE TOTAL SHOULDER ARTHROPLASTY, LEFT: Status: ACTIVE | Noted: 2020-12-21

## 2020-12-21 LAB
CHP ED QC CHECK: NORMAL
GLUCOSE BLD-MCNC: 93 MG/DL
INR BLD: 1.5
METER GLUCOSE: 93 MG/DL (ref 74–99)
PROTHROMBIN TIME: 16.6 SEC (ref 9.3–12.4)

## 2020-12-21 PROCEDURE — 7100000011 HC PHASE II RECOVERY - ADDTL 15 MIN: Performed by: ORTHOPAEDIC SURGERY

## 2020-12-21 PROCEDURE — 82962 GLUCOSE BLOOD TEST: CPT

## 2020-12-21 PROCEDURE — 23472 RECONSTRUCT SHOULDER JOINT: CPT | Performed by: ORTHOPAEDIC SURGERY

## 2020-12-21 PROCEDURE — 2580000003 HC RX 258

## 2020-12-21 PROCEDURE — 64415 NJX AA&/STRD BRCH PLXS IMG: CPT | Performed by: ANESTHESIOLOGY

## 2020-12-21 PROCEDURE — 88304 TISSUE EXAM BY PATHOLOGIST: CPT

## 2020-12-21 PROCEDURE — 6360000002 HC RX W HCPCS

## 2020-12-21 PROCEDURE — 2500000003 HC RX 250 WO HCPCS

## 2020-12-21 PROCEDURE — 6360000002 HC RX W HCPCS: Performed by: ANESTHESIOLOGY

## 2020-12-21 PROCEDURE — 7100000000 HC PACU RECOVERY - FIRST 15 MIN: Performed by: ORTHOPAEDIC SURGERY

## 2020-12-21 PROCEDURE — C1713 ANCHOR/SCREW BN/BN,TIS/BN: HCPCS | Performed by: ORTHOPAEDIC SURGERY

## 2020-12-21 PROCEDURE — 94664 DEMO&/EVAL PT USE INHALER: CPT

## 2020-12-21 PROCEDURE — 2709999900 HC NON-CHARGEABLE SUPPLY: Performed by: ORTHOPAEDIC SURGERY

## 2020-12-21 PROCEDURE — 36415 COLL VENOUS BLD VENIPUNCTURE: CPT

## 2020-12-21 PROCEDURE — 2580000003 HC RX 258: Performed by: PHYSICIAN ASSISTANT

## 2020-12-21 PROCEDURE — 7100000001 HC PACU RECOVERY - ADDTL 15 MIN: Performed by: ORTHOPAEDIC SURGERY

## 2020-12-21 PROCEDURE — 3600000005 HC SURGERY LEVEL 5 BASE: Performed by: ORTHOPAEDIC SURGERY

## 2020-12-21 PROCEDURE — 73030 X-RAY EXAM OF SHOULDER: CPT

## 2020-12-21 PROCEDURE — 3600000015 HC SURGERY LEVEL 5 ADDTL 15MIN: Performed by: ORTHOPAEDIC SURGERY

## 2020-12-21 PROCEDURE — 23472 RECONSTRUCT SHOULDER JOINT: CPT | Performed by: PHYSICIAN ASSISTANT

## 2020-12-21 PROCEDURE — 2580000003 HC RX 258: Performed by: STUDENT IN AN ORGANIZED HEALTH CARE EDUCATION/TRAINING PROGRAM

## 2020-12-21 PROCEDURE — 3700000000 HC ANESTHESIA ATTENDED CARE: Performed by: ORTHOPAEDIC SURGERY

## 2020-12-21 PROCEDURE — 7100000010 HC PHASE II RECOVERY - FIRST 15 MIN: Performed by: ORTHOPAEDIC SURGERY

## 2020-12-21 PROCEDURE — C1729 CATH, DRAINAGE: HCPCS | Performed by: ORTHOPAEDIC SURGERY

## 2020-12-21 PROCEDURE — 3700000001 HC ADD 15 MINUTES (ANESTHESIA): Performed by: ORTHOPAEDIC SURGERY

## 2020-12-21 PROCEDURE — 2500000003 HC RX 250 WO HCPCS: Performed by: STUDENT IN AN ORGANIZED HEALTH CARE EDUCATION/TRAINING PROGRAM

## 2020-12-21 PROCEDURE — 6360000002 HC RX W HCPCS: Performed by: PHYSICIAN ASSISTANT

## 2020-12-21 PROCEDURE — 85610 PROTHROMBIN TIME: CPT

## 2020-12-21 PROCEDURE — 88311 DECALCIFY TISSUE: CPT

## 2020-12-21 DEVICE — SCREW BNE L18MM DIA4.5MM SHLDR TI NONLOCKING FULL THRD FOR: Type: IMPLANTABLE DEVICE | Site: SHOULDER | Status: FUNCTIONAL

## 2020-12-21 DEVICE — SCREW BNE L36MM DIA4.5MM GLEN SHLDR METAGLENE LOK FOR DELT: Type: IMPLANTABLE DEVICE | Site: SHOULDER | Status: FUNCTIONAL

## 2020-12-21 DEVICE — CUP HUM DIA38MM +6MM OFFSET STD SHLDR POLYETH DELT XTEND: Type: IMPLANTABLE DEVICE | Site: SHOULDER | Status: FUNCTIONAL

## 2020-12-21 DEVICE — IMPLANTABLE DEVICE: Type: IMPLANTABLE DEVICE | Site: SHOULDER | Status: FUNCTIONAL

## 2020-12-21 DEVICE — SCREW BNE L30MM DIA4.5MM GLEN SHLDR METAGLENE LOK FOR DELT: Type: IMPLANTABLE DEVICE | Site: SHOULDER | Status: FUNCTIONAL

## 2020-12-21 DEVICE — STEM HUM L113MM DIA10MM STD SHLDR PORCOAT FOR PLATFRM: Type: IMPLANTABLE DEVICE | Site: SHOULDER | Status: FUNCTIONAL

## 2020-12-21 RX ORDER — SODIUM CHLORIDE 0.9 % (FLUSH) 0.9 %
10 SYRINGE (ML) INJECTION PRN
Status: DISCONTINUED | OUTPATIENT
Start: 2020-12-21 | End: 2020-12-21 | Stop reason: HOSPADM

## 2020-12-21 RX ORDER — ROPIVACAINE HYDROCHLORIDE 5 MG/ML
30 INJECTION, SOLUTION EPIDURAL; INFILTRATION; PERINEURAL ONCE
Status: COMPLETED | OUTPATIENT
Start: 2020-12-21 | End: 2020-12-21

## 2020-12-21 RX ORDER — PROPOFOL 10 MG/ML
INJECTION, EMULSION INTRAVENOUS PRN
Status: DISCONTINUED | OUTPATIENT
Start: 2020-12-21 | End: 2020-12-21 | Stop reason: SDUPTHER

## 2020-12-21 RX ORDER — MIDAZOLAM HYDROCHLORIDE 2 MG/2ML
2 INJECTION, SOLUTION INTRAMUSCULAR; INTRAVENOUS ONCE
Status: DISCONTINUED | OUTPATIENT
Start: 2020-12-21 | End: 2020-12-21 | Stop reason: HOSPADM

## 2020-12-21 RX ORDER — SODIUM CHLORIDE 0.9 % (FLUSH) 0.9 %
10 SYRINGE (ML) INJECTION EVERY 12 HOURS SCHEDULED
Status: DISCONTINUED | OUTPATIENT
Start: 2020-12-21 | End: 2020-12-21 | Stop reason: HOSPADM

## 2020-12-21 RX ORDER — SODIUM CHLORIDE 9 MG/ML
INJECTION, SOLUTION INTRAVENOUS CONTINUOUS PRN
Status: DISCONTINUED | OUTPATIENT
Start: 2020-12-21 | End: 2020-12-21 | Stop reason: SDUPTHER

## 2020-12-21 RX ORDER — OXYCODONE HYDROCHLORIDE 5 MG/1
5 TABLET ORAL EVERY 6 HOURS PRN
Qty: 28 TABLET | Refills: 0 | Status: SHIPPED | OUTPATIENT
Start: 2020-12-21 | End: 2020-12-28

## 2020-12-21 RX ORDER — ALBUTEROL SULFATE 2.5 MG/3ML
2.5 SOLUTION RESPIRATORY (INHALATION) ONCE
Status: COMPLETED | OUTPATIENT
Start: 2020-12-21 | End: 2020-12-21

## 2020-12-21 RX ORDER — SODIUM CHLORIDE, SODIUM LACTATE, POTASSIUM CHLORIDE, CALCIUM CHLORIDE 600; 310; 30; 20 MG/100ML; MG/100ML; MG/100ML; MG/100ML
INJECTION, SOLUTION INTRAVENOUS CONTINUOUS
Status: DISCONTINUED | OUTPATIENT
Start: 2020-12-21 | End: 2020-12-21 | Stop reason: HOSPADM

## 2020-12-21 RX ORDER — NEOSTIGMINE METHYLSULFATE 1 MG/ML
INJECTION, SOLUTION INTRAVENOUS PRN
Status: DISCONTINUED | OUTPATIENT
Start: 2020-12-21 | End: 2020-12-21 | Stop reason: SDUPTHER

## 2020-12-21 RX ORDER — ACETAMINOPHEN 500 MG
1000 TABLET ORAL EVERY 6 HOURS PRN
Qty: 56 TABLET | Refills: 0 | Status: ON HOLD | OUTPATIENT
Start: 2020-12-21 | End: 2020-12-29 | Stop reason: ALTCHOICE

## 2020-12-21 RX ORDER — GLYCOPYRROLATE 1 MG/5 ML
SYRINGE (ML) INTRAVENOUS PRN
Status: DISCONTINUED | OUTPATIENT
Start: 2020-12-21 | End: 2020-12-21 | Stop reason: SDUPTHER

## 2020-12-21 RX ORDER — HYDRALAZINE HYDROCHLORIDE 20 MG/ML
5 INJECTION INTRAMUSCULAR; INTRAVENOUS EVERY 10 MIN PRN
Status: DISCONTINUED | OUTPATIENT
Start: 2020-12-21 | End: 2020-12-21 | Stop reason: HOSPADM

## 2020-12-21 RX ORDER — ONDANSETRON 2 MG/ML
INJECTION INTRAMUSCULAR; INTRAVENOUS PRN
Status: DISCONTINUED | OUTPATIENT
Start: 2020-12-21 | End: 2020-12-21 | Stop reason: SDUPTHER

## 2020-12-21 RX ORDER — METHOCARBAMOL 750 MG/1
750 TABLET, FILM COATED ORAL 4 TIMES DAILY
Qty: 40 TABLET | Refills: 0 | Status: ON HOLD | OUTPATIENT
Start: 2020-12-21 | End: 2020-12-31 | Stop reason: HOSPADM

## 2020-12-21 RX ORDER — MEPERIDINE HYDROCHLORIDE 25 MG/ML
12.5 INJECTION INTRAMUSCULAR; INTRAVENOUS; SUBCUTANEOUS EVERY 5 MIN PRN
Status: DISCONTINUED | OUTPATIENT
Start: 2020-12-21 | End: 2020-12-21 | Stop reason: HOSPADM

## 2020-12-21 RX ORDER — PHENYLEPHRINE HCL IN 0.9% NACL 1 MG/10 ML
SYRINGE (ML) INTRAVENOUS PRN
Status: DISCONTINUED | OUTPATIENT
Start: 2020-12-21 | End: 2020-12-21 | Stop reason: SDUPTHER

## 2020-12-21 RX ORDER — FENTANYL CITRATE 50 UG/ML
100 INJECTION, SOLUTION INTRAMUSCULAR; INTRAVENOUS ONCE
Status: COMPLETED | OUTPATIENT
Start: 2020-12-21 | End: 2020-12-21

## 2020-12-21 RX ORDER — ROCURONIUM BROMIDE 10 MG/ML
INJECTION, SOLUTION INTRAVENOUS PRN
Status: DISCONTINUED | OUTPATIENT
Start: 2020-12-21 | End: 2020-12-21 | Stop reason: SDUPTHER

## 2020-12-21 RX ORDER — NAPROXEN 500 MG/1
500 TABLET ORAL 2 TIMES DAILY WITH MEALS
Qty: 60 TABLET | Refills: 0 | Status: ON HOLD | OUTPATIENT
Start: 2020-12-21 | End: 2020-12-29 | Stop reason: ALTCHOICE

## 2020-12-21 RX ORDER — OXYCODONE HYDROCHLORIDE AND ACETAMINOPHEN 5; 325 MG/1; MG/1
1 TABLET ORAL
Status: DISCONTINUED | OUTPATIENT
Start: 2020-12-21 | End: 2020-12-21 | Stop reason: HOSPADM

## 2020-12-21 RX ORDER — LABETALOL HYDROCHLORIDE 5 MG/ML
5 INJECTION, SOLUTION INTRAVENOUS EVERY 10 MIN PRN
Status: DISCONTINUED | OUTPATIENT
Start: 2020-12-21 | End: 2020-12-21 | Stop reason: HOSPADM

## 2020-12-21 RX ORDER — LIDOCAINE HYDROCHLORIDE 20 MG/ML
INJECTION, SOLUTION INTRAVENOUS PRN
Status: DISCONTINUED | OUTPATIENT
Start: 2020-12-21 | End: 2020-12-21 | Stop reason: SDUPTHER

## 2020-12-21 RX ORDER — PROMETHAZINE HYDROCHLORIDE 25 MG/ML
6.25 INJECTION, SOLUTION INTRAMUSCULAR; INTRAVENOUS
Status: DISCONTINUED | OUTPATIENT
Start: 2020-12-21 | End: 2020-12-21 | Stop reason: HOSPADM

## 2020-12-21 RX ORDER — ROPIVACAINE HYDROCHLORIDE 5 MG/ML
INJECTION, SOLUTION EPIDURAL; INFILTRATION; PERINEURAL
Status: DISCONTINUED | OUTPATIENT
Start: 2020-12-21 | End: 2020-12-21 | Stop reason: SDUPTHER

## 2020-12-21 RX ADMIN — Medication 80 MCG: at 09:27

## 2020-12-21 RX ADMIN — SODIUM CHLORIDE, POTASSIUM CHLORIDE, SODIUM LACTATE AND CALCIUM CHLORIDE: 600; 310; 30; 20 INJECTION, SOLUTION INTRAVENOUS at 07:30

## 2020-12-21 RX ADMIN — TRANEXAMIC ACID 1000 MG: 1 INJECTION, SOLUTION INTRAVENOUS at 08:30

## 2020-12-21 RX ADMIN — PROPOFOL 100 MG: 10 INJECTION, EMULSION INTRAVENOUS at 08:05

## 2020-12-21 RX ADMIN — FENTANYL CITRATE 50 MCG: 50 INJECTION, SOLUTION INTRAMUSCULAR; INTRAVENOUS at 09:07

## 2020-12-21 RX ADMIN — Medication 80 MCG: at 09:16

## 2020-12-21 RX ADMIN — Medication 80 MCG: at 09:23

## 2020-12-21 RX ADMIN — Medication 80 MCG: at 09:12

## 2020-12-21 RX ADMIN — Medication 80 MCG: at 09:06

## 2020-12-21 RX ADMIN — ROCURONIUM BROMIDE 50 MG: 10 INJECTION, SOLUTION INTRAVENOUS at 08:05

## 2020-12-21 RX ADMIN — Medication 40 MCG: at 09:43

## 2020-12-21 RX ADMIN — SODIUM CHLORIDE, POTASSIUM CHLORIDE, SODIUM LACTATE AND CALCIUM CHLORIDE: 600; 310; 30; 20 INJECTION, SOLUTION INTRAVENOUS at 06:53

## 2020-12-21 RX ADMIN — Medication 80 MCG: at 08:50

## 2020-12-21 RX ADMIN — FENTANYL CITRATE 50 MCG: 50 INJECTION, SOLUTION INTRAMUSCULAR; INTRAVENOUS at 07:48

## 2020-12-21 RX ADMIN — Medication 80 MCG: at 09:01

## 2020-12-21 RX ADMIN — Medication 0.6 MG: at 10:30

## 2020-12-21 RX ADMIN — ROPIVACAINE HYDROCHLORIDE 30 ML: 5 INJECTION, SOLUTION EPIDURAL; INFILTRATION; PERINEURAL at 07:55

## 2020-12-21 RX ADMIN — LIDOCAINE HYDROCHLORIDE 40 MG: 20 INJECTION, SOLUTION INTRAVENOUS at 08:05

## 2020-12-21 RX ADMIN — SODIUM CHLORIDE: 9 INJECTION, SOLUTION INTRAVENOUS at 09:37

## 2020-12-21 RX ADMIN — Medication 80 MCG: at 09:45

## 2020-12-21 RX ADMIN — ONDANSETRON HYDROCHLORIDE 4 MG: 2 INJECTION, SOLUTION INTRAMUSCULAR; INTRAVENOUS at 10:22

## 2020-12-21 RX ADMIN — ROPIVACAINE HYDROCHLORIDE 25 ML: 5 INJECTION EPIDURAL; INFILTRATION; PERINEURAL at 11:24

## 2020-12-21 RX ADMIN — ALBUTEROL SULFATE 2.5 MG: 2.5 SOLUTION RESPIRATORY (INHALATION) at 12:30

## 2020-12-21 RX ADMIN — Medication 80 MCG: at 08:55

## 2020-12-21 RX ADMIN — Medication 2 G: at 08:17

## 2020-12-21 RX ADMIN — Medication 3 MG: at 10:30

## 2020-12-21 RX ADMIN — Medication 80 MCG: at 09:32

## 2020-12-21 ASSESSMENT — PULMONARY FUNCTION TESTS
PIF_VALUE: 22
PIF_VALUE: 26
PIF_VALUE: 26
PIF_VALUE: 2
PIF_VALUE: 24
PIF_VALUE: 26
PIF_VALUE: 25
PIF_VALUE: 19
PIF_VALUE: 26
PIF_VALUE: 24
PIF_VALUE: 24
PIF_VALUE: 15
PIF_VALUE: 22
PIF_VALUE: 2
PIF_VALUE: 24
PIF_VALUE: 25
PIF_VALUE: 24
PIF_VALUE: 24
PIF_VALUE: 23
PIF_VALUE: 26
PIF_VALUE: 23
PIF_VALUE: 24
PIF_VALUE: 26
PIF_VALUE: 24
PIF_VALUE: 2
PIF_VALUE: 24
PIF_VALUE: 2
PIF_VALUE: 24
PIF_VALUE: 25
PIF_VALUE: 25
PIF_VALUE: 23
PIF_VALUE: 24
PIF_VALUE: 2
PIF_VALUE: 3
PIF_VALUE: 3
PIF_VALUE: 2
PIF_VALUE: 4
PIF_VALUE: 21
PIF_VALUE: 24
PIF_VALUE: 27
PIF_VALUE: 15
PIF_VALUE: 1
PIF_VALUE: 3
PIF_VALUE: 24
PIF_VALUE: 26
PIF_VALUE: 25
PIF_VALUE: 24
PIF_VALUE: 2
PIF_VALUE: 15
PIF_VALUE: 24
PIF_VALUE: 24
PIF_VALUE: 23
PIF_VALUE: 24
PIF_VALUE: 26
PIF_VALUE: 24
PIF_VALUE: 22
PIF_VALUE: 0
PIF_VALUE: 27
PIF_VALUE: 23
PIF_VALUE: 26
PIF_VALUE: 26
PIF_VALUE: 22
PIF_VALUE: 22
PIF_VALUE: 2
PIF_VALUE: 24
PIF_VALUE: 3
PIF_VALUE: 26
PIF_VALUE: 0
PIF_VALUE: 25
PIF_VALUE: 24
PIF_VALUE: 22
PIF_VALUE: 23
PIF_VALUE: 24
PIF_VALUE: 26
PIF_VALUE: 15
PIF_VALUE: 2
PIF_VALUE: 0
PIF_VALUE: 0
PIF_VALUE: 1
PIF_VALUE: 26
PIF_VALUE: 25
PIF_VALUE: 24
PIF_VALUE: 2
PIF_VALUE: 0
PIF_VALUE: 24
PIF_VALUE: 24
PIF_VALUE: 26
PIF_VALUE: 23
PIF_VALUE: 24
PIF_VALUE: 23
PIF_VALUE: 5
PIF_VALUE: 24
PIF_VALUE: 26
PIF_VALUE: 26
PIF_VALUE: 25
PIF_VALUE: 22
PIF_VALUE: 4
PIF_VALUE: 25
PIF_VALUE: 27
PIF_VALUE: 0
PIF_VALUE: 23
PIF_VALUE: 26
PIF_VALUE: 25
PIF_VALUE: 26
PIF_VALUE: 24
PIF_VALUE: 26
PIF_VALUE: 26
PIF_VALUE: 24
PIF_VALUE: 23
PIF_VALUE: 24
PIF_VALUE: 26
PIF_VALUE: 25
PIF_VALUE: 27
PIF_VALUE: 26
PIF_VALUE: 26
PIF_VALUE: 25
PIF_VALUE: 4
PIF_VALUE: 26
PIF_VALUE: 29
PIF_VALUE: 0
PIF_VALUE: 23
PIF_VALUE: 22
PIF_VALUE: 24
PIF_VALUE: 23
PIF_VALUE: 27
PIF_VALUE: 24
PIF_VALUE: 24
PIF_VALUE: 26
PIF_VALUE: 4
PIF_VALUE: 24
PIF_VALUE: 15
PIF_VALUE: 28
PIF_VALUE: 23
PIF_VALUE: 24
PIF_VALUE: 25
PIF_VALUE: 23
PIF_VALUE: 24
PIF_VALUE: 22
PIF_VALUE: 15
PIF_VALUE: 26
PIF_VALUE: 26
PIF_VALUE: 22
PIF_VALUE: 24
PIF_VALUE: 15
PIF_VALUE: 23
PIF_VALUE: 25
PIF_VALUE: 25
PIF_VALUE: 0
PIF_VALUE: 24
PIF_VALUE: 25
PIF_VALUE: 22
PIF_VALUE: 25

## 2020-12-21 ASSESSMENT — PAIN SCALES - GENERAL
PAINLEVEL_OUTOF10: 0

## 2020-12-21 ASSESSMENT — PAIN DESCRIPTION - DESCRIPTORS: DESCRIPTORS: ACHING;CONSTANT;DISCOMFORT

## 2020-12-21 ASSESSMENT — PAIN - FUNCTIONAL ASSESSMENT
PAIN_FUNCTIONAL_ASSESSMENT: 0-10
PAIN_FUNCTIONAL_ASSESSMENT: PREVENTS OR INTERFERES SOME ACTIVE ACTIVITIES AND ADLS

## 2020-12-21 NOTE — ANESTHESIA POSTPROCEDURE EVALUATION
Department of Anesthesiology  Postprocedure Note    Patient: Roxana Fan  MRN: [id number]  YOB: 1945  Date of evaluation: 12/21/2020  Time:  11:25 AM     Procedure Summary     Date: 12/21/20 Room / Location: [facility] OR 08 / [facility]    Anesthesia Start: 0802 Anesthesia Stop: 8343    Procedure: LEFT REVERSE TOTAL SHOULDER  ARTHROPLASTY -- DEPUY (Left Shoulder) Diagnosis: (LEFT PROXIMAL HUMERUS FRACTURE, MAL-UNION)    Surgeons: Ede Alvarez MD Responsible Provider: Guillermo Alarcon MD    Anesthesia Type: general ASA Status: 3          Anesthesia Type: general    Peter Phase I: Peter Score: 8    Peter Phase II:      Last vitals: Reviewed and per EMR flowsheets.        Anesthesia Post Evaluation    Patient location during evaluation: PACU  Patient participation: complete - patient participated  Level of consciousness: awake and alert  Airway patency: patent  Nausea & Vomiting: no nausea and no vomiting  Complications: no  Cardiovascular status: hemodynamically stable  Respiratory status: acceptable  Hydration status: euvolemic

## 2020-12-21 NOTE — INTERVAL H&P NOTE
Update History & Physical    The patient's History and Physical of December 10, 2020 was reviewed with the patient and I examined the patient. There was no change. The surgical site was confirmed by the patient and me. Plan: The risks, benefits, expected outcome, and alternative to the recommended procedure have been discussed with the patient. Patient understands and wants to proceed with the procedure.      Electronically signed by Curt Severs, MD on 12/21/2020 at 7:07 AM

## 2020-12-21 NOTE — BRIEF OP NOTE
Brief Postoperative Note      Patient: Terra Aldrich  YOB: 1945  MRN: [id number]    Date of Procedure: 12/21/2020    Pre-Op Diagnosis: LEFT PROXIMAL HUMERUS FRACTURE, MAL-UNION    Post-Op Diagnosis: Same       Procedure(s):  LEFT REVERSE TOTAL SHOULDER  ARTHROPLASTY -- DEPUY    Surgeon(s):  Daryle Shoe, MD    Assistant:  Physician Assistant: Tiffany Montero PA-C  Resident: Anthony Alvarez DO    Anesthesia: General    Estimated Blood Loss (mL): less than 634     Complications: None    Specimens:   * No specimens in log *    Implants:  Implant Name Type Inv.  Item Serial No.  Lot No. LRB No. Used Action   SCREW BNE L18MM DIA4.5MM SHLDR TI NONLOCKING FULL THRD FOR  SCREW BNE L18MM DIA4.5MM SHLDR TI NONLOCKING FULL THRD FOR  [facility] [id number] Left 1 Implanted   SCREW BNE L18MM DIA4.5MM SHLDR TI NONLOCKING FULL THRD FOR  SCREW BNE L18MM DIA4.5MM SHLDR TI NONLOCKING FULL THRD FOR  [facility] [id number] Left 1 Implanted   SCREW BNE L36MM DIA4.5MM FANTA SHLDR METAGLENE BE FOR DELT  SCREW BNE L36MM DIA4.5MM FANTA SHLDR METAGLENE BE FOR DELT  [facility] [id number] Left 1 Implanted   COMPONENT FANTA FIX METAGLENE GLOB UNITE PLATFRM SHLDR SYS  COMPONENT FANTA FIX METAGLENE GLOB UNITE PLATFRM SHLDR SYS  [facility] [id number] Left 1 Implanted   SCREW BNE L30MM DIA4.5MM FANTA SHLDR METAGLENE BE FOR DELT  SCREW BNE L30MM DIA4.5MM FANTA SHLDR METAGLENE BE FOR DELT  [facility] [id number] Left 1 Implanted   COMPONENT GLENOSPHERE XTEND 38MM + 2MM  COMPONENT GLENOSPHERE XTEND 38MM + 2MM  [facility] [id number] Left 1 Implanted   DELTA XTEND MODULAR 145 DEGREE EPIPHYSIS     [id number] Left 1 Implanted   STEM HUM L113MM [id number] STD SHLDR PORCOAT FOR PLATFRM  STEM HUM L113MM [id number] STD SHLDR PORCOAT FOR PLATFRM  [employer] [id number] Left 1 Implanted   CUP HUM [id number] +6MM OFFSET STD SHLDR POLYETH DELT XTEND  CUP HUM [id number] +6MM OFFSET STD SHLDR POLYETH DELT [facility] [employer]- [id number] Left 1 Implanted         Drains: * No LDAs found *    Findings: see dictated operative report    Electronically signed by Chalo Bee PA-C on 12/21/2020 at 10:49 AM

## 2020-12-21 NOTE — PROCEDURES
Operative Note      Patient: Ankit Harris  YOB: 1945  MRN: [id number]    Date of Procedure: 12/21/2020    Pre-Op Diagnosis: LEFT PROXIMAL HUMERUS FRACTURE, MAL-UNION    Post-Op Diagnosis: Same       Procedure(s):  LEFT REVERSE TOTAL SHOULDER  ARTHROPLASTY -- DEPUY    Surgeon(s):  Eladio Wiley MD    Assistant:   Physician Assistant: Venu Wong PA-C  Resident: Ro Hartman DO    Anesthesia: General    Estimated Blood Loss (mL): less than 50     Complications: None    Specimens:   * No specimens in log *    Implants:  Implant Name Type Inv. Item Serial No.  Lot No. LRB No. Used Action   SCREW BNE L18MM DIA4.5MM SHLDR TI NONLOCKING FULL THRD FOR  SCREW BNE L18MM DIA4.5MM SHLDR TI NONLOCKING FULL THRD FOR  [facility] [id number] Left 1 Implanted   SCREW BNE L18MM DIA4.5MM SHLDR TI NONLOCKING FULL THRD FOR  SCREW BNE L18MM DIA4.5MM SHLDR TI NONLOCKING FULL THRD FOR  [facility] [id number] Left 1 Implanted   SCREW BNE L36MM DIA4.5MM FANTA SHLDR METAGLENE BE FOR DELT  SCREW BNE L36MM DIA4.5MM FANTA SHLDR METAGLENE BE FOR DELT  [facility] [id number] Left 1 Implanted   COMPONENT FANTA FIX METAGLENE GLOB UNITE PLATFRM SHLDR SYS  COMPONENT FANTA FIX METAGLENE GLOB UNITE PLATFRM SHLDR SYS  [facility] [id number] Left 1 Implanted   SCREW BNE L30MM DIA4.5MM FANTA SHLDR METAGLENE BE FOR DELT  SCREW BNE L30MM DIA4.5MM FANTA SHLDR METAGLENE BE FOR DELT  [facility] [id number] Left 1 Implanted         Drains: * No LDAs found *    Findings: Stable implant after final was then. Detailed Description of Procedure:   Patient was brought to the operating room in a supine position on a hospital room bed. Patient was transferred to the operating room table by multiple individuals in a safe fashion with anesthesia in control of the patient's C-spine and airway. All points pressure identified well-padded. Patient was placed in the beachchair position. All points pressure checked once again and padded. She was securely attached to the table. Her left upper extremity was sterilely prepped and draped in standard orthopedic fashion. A timeout was performed indicating the appropriate identification of the patient, the procedure to be performed, and the side to be performed upon. This was agreed upon by all individuals in the room. After the timeout was performed, a deltopectoral approach was marked out. A 10 blade used to make incision. Careful dissection was carried out down to the deltopectoral interval with the vein dissected out and retracted laterally. The biceps tendon was identified the rotator cuff interval was taken down to identify the proximal humerus. She did have a malunion in that area. The head was freed up through careful subperiosteal dissection. The previous femoral head was osteotomized off the proximal humerus. The canal was identified and reamed up to 10 mm. At this point time we changed our attention to the glenoid exposure. A Onofre Zain was placed posteriorly. The labrum was cleared off and a Bankart retractor was placed anteriorly. We are able to release around the glenoid to a good visualization and the pin was started for the glenosphere. Once this was in position the hole for the center peg was drilled and the final glenosphere was impacted in position. An inferior screw was placed, a superior screw was placed. These were both then locked down. Anterior and posterior screws were also placed which were nonlocking. Attention was turned back to the humeral portion of the case. The guide for the metaphyseal reaming was impacted in position and read reaming for the inset portion of the humeral side the implant. Once this was reamed we placed a cap in position placed the final glenosphere tightly in the position with the screw reimpacted and rescrewed again.   We then placed the final humeral component placed a trial.  After multiple trialing the +6 implant was stable and gave a good range of motion and good lateralization. The final size #6 was implanted. Final trial range of motion checked out very nicely. There was no instability. Patient's range of motion had improved although was not normal due to her being stiff for so long. This point time was vic irrigate with sterile saline. The subscap was too tight due to the chronic nature of her previous injury therefore the deltopectoral interval was closed tightly the biceps tendon was tenodesed. The subcutaneous tissue was closed with 2-0 Monocryl and skin with 3-0 nylon. An occlusive dressing was put in position. Patient has sling placed for comfort. She was taken the PACU in stable condition. Postoperative plan:  May, the sling twice a day for range of motion elbow wrist and fingers.   May do gentle range of motion    Follow-up in office in 2 weeks for suture removal and initiation of formal therapy    Call sooner with any questions or concerns          Electronically signed by Tim Hackett MD on 12/21/2020 at 10:20 AM

## 2020-12-21 NOTE — ANESTHESIA PRE PROCEDURE
or Vomiting 6/11/20   Kristy Parker MD   mirtazapine (REMERON) 7.5 MG tablet Take 1 tablet by mouth nightly 6/11/20   Kristy Parker MD   vitamin D (ERGOCALCIFEROL) 1.25 MG (22623 UT) CAPS capsule Take 1 capsule by mouth once a week 3/27/20   Darwyn Brittle, DO   rifaximin (XIFAXAN) 550 MG tablet Take 550 mg by mouth 2 times daily    Historical Provider, MD   lactulose (CHRONULAC) 10 GM/15ML solution Take 10 g by mouth 2 to 3 times day    Historical Provider, MD   vitamin D (CHOLECALCIFEROL) 1000 UNIT TABS tablet Take 1,000 Units by mouth daily    Historical Provider, MD       Current medications:    No current facility-administered medications for this visit. No current outpatient medications on file. Facility-Administered Medications Ordered in Other Visits   Medication Dose Route Frequency Provider Last Rate Last Admin    ceFAZolin (ANCEF) 2 g in sterile water 20 mL IV syringe  2 g Intravenous On Call to [address] PA-C        lactated ringers infusion   Intravenous Continuous Grace Lockhart PA-C 125 mL/hr at 12/21/20 0653 New Bag at 12/21/20 0653    sodium chloride flush 0.9 % injection 10 mL  10 mL Intravenous 2 times per day Grace Lockhart PA-C        sodium chloride flush 0.9 % injection 10 mL  10 mL Intravenous PRN Grace Lockhart PA-C        tranexamic acid (CYKLOKAPRON) 1,000 mg in dextrose 5 % 100 mL IVPB  1,000 mg Intravenous Once Cruz Genre, DO           Allergies:     Allergies   Allergen Reactions    Lisinopril        Problem List:    Patient Active Problem List   Diagnosis Code    Malignant neoplasm of left breast ([facility] C50.912    Type 2 diabetes mellitus ([facility] E11.9    Obstructive sleep apnea syndrome G47.33    Chronic back pain M54.9, G89.29    Essential hypertension I10    Multiple thyroid nodules E04.2    Balance problem R26.89    Depression F32.9    At risk for colon cancer Z91.89    Hx of adenomatous colonic polyps Z86.010  Dyslipidemia E78.5    Murmur, cardiac R01.1    Cirrhosis ([facility] K74.60    Closed fracture of proximal epiphysis of left humerus S49.002A       Past Medical History:        Diagnosis Date    Breast cancer ([facility]     Cirrhosis ([facility]     Essential hypertension     Hyperlipidemia     Osteopenia     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     Sleep apnea     Spinal stenosis     Type 2 diabetes mellitus [facility]        Past Surgical History:        Procedure Laterality Date    BREAST LUMPECTOMY      lumpectomy [id number]    CARPAL TUNNEL RELEASE      COLONOSCOPY  01/31/2017    multiple polyps; diverticula--jerod    COLONOSCOPY  04/23/2019    polyps; diverticula; hemorrhoids--jerod    COLONOSCOPY N/A 4/23/2019    COLONOSCOPY POLYPECTOMY SNARE/COLD BIOPSY performed by Shabnam Velásquez MD at [place] [address]  4/23/2019    COLONOSCOPY WITH BIOPSY performed by Shabnam Velásquez MD at [address] [place] [address] LITHOTRIPSY Left 05/04/2016    C-R STENT PLACEMENT    UPPER GASTROINTESTINAL ENDOSCOPY  04/23/2019    gastritis--jerod    UPPER GASTROINTESTINAL ENDOSCOPY N/A 4/23/2019    EGD BIOPSY performed by Shabnam Velásquez MD at [facility] History:    Social History     Tobacco Use    Smoking status: Never Smoker    Smokeless tobacco: Never Used   Substance Use Topics    Alcohol use: Never     Alcohol/week: 0.0 standard drinks     Frequency: Never                                Counseling given: Not Answered      Vital Signs (Current): There were no vitals filed for this visit.                                            BP Readings from Last 3 Encounters:   12/21/20 (!) 150/77   12/14/20 135/66   12/03/20 123/62       NPO Status:                                                                                 BMI:   Wt Readings from Last 3 Encounters:   12/21/20 200 lb (90.7 kg)   12/14/20 200 lb (90.7 kg)   12/03/20 213 lb (96.6 kg)     There is no height or weight on file to calculate BMI.    CBC:   Lab Results   Component Value Date    WBC 5.9 12/14/2020    RBC 3.37 12/14/2020    HGB 10.9 12/14/2020    HCT 33.0 12/14/2020    MCV 97.9 12/14/2020    RDW 18.0 12/14/2020     12/14/2020       CMP:   Lab Results   Component Value Date     12/14/2020    K 4.5 12/14/2020    K 4.9 02/11/2020     12/14/2020    CO2 24 12/14/2020    BUN 18 12/14/2020    CREATININE 1.0 12/14/2020    GFRAA >60 12/14/2020    LABGLOM 54 12/14/2020    GLUCOSE 93 12/21/2020    PROT 6.6 12/14/2020    CALCIUM 10.4 12/14/2020    BILITOT 1.0 12/14/2020    ALKPHOS 126 12/14/2020    AST 42 12/14/2020    ALT 23 12/14/2020       POC Tests: No results for input(s): POCGLU, POCNA, POCK, POCCL, POCBUN, POCHEMO, POCHCT in the last 72 hours. Coags:   Lab Results   Component Value Date    PROTIME 15.6 12/14/2020    INR 1.4 12/14/2020    APTT 35.7 07/25/2020       HCG (If Applicable): No results found for: PREGTESTUR, PREGSERUM, HCG, HCGQUANT     ABGs: No results found for: PHART, PO2ART, HLB6WXU, KSA3GHJ, BEART, Z9PIOXOW     Type & Screen (If Applicable):  No results found for: LABABO, [address]    Anesthesia Evaluation  Patient summary reviewed no history of anesthetic complications:   Airway: Mallampati: II  TM distance: >3 FB   Neck ROM: full  Mouth opening: > = 3 FB Dental:      Comment: Grossly intact    Pulmonary: breath sounds clear to auscultation  (+) sleep apnea: on CPAP,                            ROS comment: CXR 12/2020:  FINDINGS:  The lungs are without acute focal process.  There is no effusion or  pneumothorax. The cardiomediastinal silhouette is without acute process. The  osseous structures are without acute process. Cardiovascular:  Exercise tolerance: good (>4 METS),   (+) hypertension: moderate, CHF: diastolic, hyperlipidemia        Rhythm: regular  Rate: normal  Echocardiogram reviewed               ROS comment: Echo 7/2019:   Summary   Ejection fraction is visually estimated at 60%.    No regional wall motion abnormalities seen   There is doppler evidence of stage I diastolic dysfunction. Normal right ventricle structure and function. Left atrial volume index of 38 ml per meters squared BSA. Mild mitral regurgitation is present. Mild to moderate tricuspid regurgitation. Neuro/Psych:   (+) neuromuscular disease (spinal stenosis):, psychiatric history: stable with treatmentdepression/anxiety             GI/Hepatic/Renal:   (+) liver disease (Cirrhosis):,           Endo/Other:    (+) DiabetesType II DM, , blood dyscrasia (INR: 1.4 on 12/21/2020)::., malignancy/cancer (Colon, left breast CA). Abdominal:   (+) obese,         Vascular: negative vascular ROS. Anesthesia Plan      general     ASA 3     (Risks and benefits of preoperative left interscalene peripheral nerve block discussed with patient. Patient consents and agrees to block.)  Induction: intravenous. MIPS: Postoperative opioids intended, Prophylactic antiemetics administered and Postoperative trial extubation. Anesthetic plan and risks discussed with patient.                       Juan Barnes MD   12/21/2020

## 2020-12-22 ENCOUNTER — TELEPHONE (OUTPATIENT)
Dept: FAMILY MEDICINE CLINIC | Age: 75
End: 2020-12-22

## 2020-12-22 NOTE — TELEPHONE ENCOUNTER
Pt asking for an order for CPAP supplies, needs new hose and mask.  Please fax to [facility] [phone]

## 2020-12-29 ENCOUNTER — HOSPITAL ENCOUNTER (OUTPATIENT)
Age: 75
Setting detail: OBSERVATION
Discharge: HOME HEALTH | End: 2020-12-31
Attending: EMERGENCY MEDICINE | Admitting: FAMILY MEDICINE
Payer: COMMERCIAL

## 2020-12-29 ENCOUNTER — APPOINTMENT (OUTPATIENT)
Dept: CT IMAGING | Age: 75
End: 2020-12-29
Payer: COMMERCIAL

## 2020-12-29 ENCOUNTER — APPOINTMENT (OUTPATIENT)
Dept: GENERAL RADIOLOGY | Age: 75
End: 2020-12-29
Payer: COMMERCIAL

## 2020-12-29 DIAGNOSIS — N39.0 URINARY TRACT INFECTION WITHOUT HEMATURIA, SITE UNSPECIFIED: ICD-10-CM

## 2020-12-29 DIAGNOSIS — R41.82 ALTERED MENTAL STATUS, UNSPECIFIED ALTERED MENTAL STATUS TYPE: Primary | ICD-10-CM

## 2020-12-29 PROBLEM — R41.0 DELIRIUM: Status: ACTIVE | Noted: 2020-12-29

## 2020-12-29 LAB
ALBUMIN SERPL-MCNC: 3.1 G/DL (ref 3.5–5.2)
ALP BLD-CCNC: 113 U/L (ref 35–104)
ALT SERPL-CCNC: 18 U/L (ref 0–32)
ANION GAP SERPL CALCULATED.3IONS-SCNC: 10 MMOL/L (ref 7–16)
AST SERPL-CCNC: 38 U/L (ref 0–31)
BACTERIA: ABNORMAL /HPF
BASOPHILS ABSOLUTE: 0.06 E9/L (ref 0–0.2)
BASOPHILS RELATIVE PERCENT: 0.8 % (ref 0–2)
BILIRUB SERPL-MCNC: 1 MG/DL (ref 0–1.2)
BILIRUBIN URINE: NEGATIVE
BLOOD, URINE: ABNORMAL
BUN BLDV-MCNC: 27 MG/DL (ref 8–23)
CALCIUM SERPL-MCNC: 9.3 MG/DL (ref 8.6–10.2)
CHLORIDE BLD-SCNC: 111 MMOL/L (ref 98–107)
CLARITY: ABNORMAL
CO2: 21 MMOL/L (ref 22–29)
COLOR: YELLOW
CREAT SERPL-MCNC: 1.4 MG/DL (ref 0.5–1)
EKG ATRIAL RATE: 106 BPM
EKG P AXIS: 58 DEGREES
EKG P-R INTERVAL: 154 MS
EKG Q-T INTERVAL: 328 MS
EKG QRS DURATION: 68 MS
EKG QTC CALCULATION (BAZETT): 435 MS
EKG R AXIS: 50 DEGREES
EKG T AXIS: 32 DEGREES
EKG VENTRICULAR RATE: 106 BPM
EOSINOPHILS ABSOLUTE: 0.14 E9/L (ref 0.05–0.5)
EOSINOPHILS RELATIVE PERCENT: 1.8 % (ref 0–6)
GFR AFRICAN AMERICAN: 44
GFR NON-AFRICAN AMERICAN: 37 ML/MIN/1.73
GLUCOSE BLD-MCNC: 124 MG/DL (ref 74–99)
GLUCOSE URINE: NEGATIVE MG/DL
HCT VFR BLD CALC: 31 % (ref 34–48)
HEMOGLOBIN: 9.9 G/DL (ref 11.5–15.5)
IMMATURE GRANULOCYTES #: 0.04 E9/L
IMMATURE GRANULOCYTES %: 0.5 % (ref 0–5)
KETONES, URINE: NEGATIVE MG/DL
LACTIC ACID: 1.6 MMOL/L (ref 0.5–2.2)
LACTIC ACID: 2.3 MMOL/L (ref 0.5–2.2)
LEUKOCYTE ESTERASE, URINE: ABNORMAL
LYMPHOCYTES ABSOLUTE: 1.56 E9/L (ref 1.5–4)
LYMPHOCYTES RELATIVE PERCENT: 19.8 % (ref 20–42)
MCH RBC QN AUTO: 32.4 PG (ref 26–35)
MCHC RBC AUTO-ENTMCNC: 31.9 % (ref 32–34.5)
MCV RBC AUTO: 101.3 FL (ref 80–99.9)
MONOCYTES ABSOLUTE: 1.08 E9/L (ref 0.1–0.95)
MONOCYTES RELATIVE PERCENT: 13.7 % (ref 2–12)
NEUTROPHILS ABSOLUTE: 5 E9/L (ref 1.8–7.3)
NEUTROPHILS RELATIVE PERCENT: 63.4 % (ref 43–80)
NITRITE, URINE: NEGATIVE
PDW BLD-RTO: 19.9 FL (ref 11.5–15)
PH UA: 6 (ref 5–9)
PLATELET # BLD: 323 E9/L (ref 130–450)
PMV BLD AUTO: 11.6 FL (ref 7–12)
POTASSIUM REFLEX MAGNESIUM: 4.7 MMOL/L (ref 3.5–5)
PRO-BNP: 116 PG/ML (ref 0–450)
PROTEIN UA: NEGATIVE MG/DL
RBC # BLD: 3.06 E12/L (ref 3.5–5.5)
RBC UA: ABNORMAL /HPF (ref 0–2)
REASON FOR REJECTION: NORMAL
REJECTED TEST: NORMAL
SARS-COV-2, NAAT: NOT DETECTED
SODIUM BLD-SCNC: 142 MMOL/L (ref 132–146)
SPECIFIC GRAVITY UA: 1.02 (ref 1–1.03)
TOTAL PROTEIN: 6.1 G/DL (ref 6.4–8.3)
TROPONIN: <0.01 NG/ML (ref 0–0.03)
UROBILINOGEN, URINE: 1 E.U./DL
WBC # BLD: 7.9 E9/L (ref 4.5–11.5)
WBC UA: ABNORMAL /HPF (ref 0–5)

## 2020-12-29 PROCEDURE — 93005 ELECTROCARDIOGRAM TRACING: CPT | Performed by: EMERGENCY MEDICINE

## 2020-12-29 PROCEDURE — 84484 ASSAY OF TROPONIN QUANT: CPT

## 2020-12-29 PROCEDURE — 2580000003 HC RX 258: Performed by: EMERGENCY MEDICINE

## 2020-12-29 PROCEDURE — 99285 EMERGENCY DEPT VISIT HI MDM: CPT

## 2020-12-29 PROCEDURE — 87077 CULTURE AEROBIC IDENTIFY: CPT

## 2020-12-29 PROCEDURE — 83605 ASSAY OF LACTIC ACID: CPT

## 2020-12-29 PROCEDURE — G0378 HOSPITAL OBSERVATION PER HR: HCPCS

## 2020-12-29 PROCEDURE — 70450 CT HEAD/BRAIN W/O DYE: CPT

## 2020-12-29 PROCEDURE — U0002 COVID-19 LAB TEST NON-CDC: HCPCS

## 2020-12-29 PROCEDURE — 6360000002 HC RX W HCPCS: Performed by: EMERGENCY MEDICINE

## 2020-12-29 PROCEDURE — 85025 COMPLETE CBC W/AUTO DIFF WBC: CPT

## 2020-12-29 PROCEDURE — 1200000000 HC SEMI PRIVATE

## 2020-12-29 PROCEDURE — 87186 SC STD MICRODIL/AGAR DIL: CPT

## 2020-12-29 PROCEDURE — 96374 THER/PROPH/DIAG INJ IV PUSH: CPT

## 2020-12-29 PROCEDURE — 93010 ELECTROCARDIOGRAM REPORT: CPT | Performed by: INTERNAL MEDICINE

## 2020-12-29 PROCEDURE — 81001 URINALYSIS AUTO W/SCOPE: CPT

## 2020-12-29 PROCEDURE — 71045 X-RAY EXAM CHEST 1 VIEW: CPT

## 2020-12-29 PROCEDURE — 80053 COMPREHEN METABOLIC PANEL: CPT

## 2020-12-29 PROCEDURE — 87088 URINE BACTERIA CULTURE: CPT

## 2020-12-29 PROCEDURE — 83880 ASSAY OF NATRIURETIC PEPTIDE: CPT

## 2020-12-29 RX ORDER — 0.9 % SODIUM CHLORIDE 0.9 %
1000 INTRAVENOUS SOLUTION INTRAVENOUS ONCE
Status: COMPLETED | OUTPATIENT
Start: 2020-12-29 | End: 2020-12-29

## 2020-12-29 RX ORDER — OXYCODONE HYDROCHLORIDE 5 MG/1
5 CAPSULE ORAL EVERY 6 HOURS PRN
COMMUNITY
End: 2021-01-31

## 2020-12-29 RX ADMIN — SODIUM CHLORIDE 1000 ML: 9 INJECTION, SOLUTION INTRAVENOUS at 14:24

## 2020-12-29 RX ADMIN — CEFTRIAXONE SODIUM 1 G: 1 INJECTION, POWDER, FOR SOLUTION INTRAMUSCULAR; INTRAVENOUS at 18:56

## 2020-12-29 ASSESSMENT — ENCOUNTER SYMPTOMS
ABDOMINAL PAIN: 0
RHINORRHEA: 0
VOMITING: 0
COLOR CHANGE: 0
BACK PAIN: 0
NAUSEA: 0
COUGH: 0
SHORTNESS OF BREATH: 0
BLOOD IN STOOL: 0

## 2020-12-29 ASSESSMENT — PAIN DESCRIPTION - PAIN TYPE: TYPE: ACUTE PAIN

## 2020-12-29 ASSESSMENT — PAIN DESCRIPTION - ORIENTATION: ORIENTATION: LEFT

## 2020-12-29 NOTE — ED PROVIDER NOTES
induced neuropathy ([facility]     Sleep apnea     Spinal stenosis     Type 2 diabetes mellitus ([facility]        Past Surgical History:   Past Surgical History:   Procedure Laterality Date    BREAST LUMPECTOMY      lumpectomy [id number]    CARPAL TUNNEL RELEASE      COLONOSCOPY  01/31/2017    multiple polyps; diverticula--jerod    COLONOSCOPY  04/23/2019    polyps; diverticula; hemorrhoids--jerod    COLONOSCOPY N/A 4/23/2019    COLONOSCOPY POLYPECTOMY SNARE/COLD BIOPSY performed by Rob Phelan MD at [address] COLONOSCOPY  4/23/2019    COLONOSCOPY WITH BIOPSY performed by Rob Phelan MD at [address] [facility] LITHOTRIPSY Left 05/04/2016    C-R STENT PLACEMENT    SHOULDER ARTHROPLASTY Left 12/21/2020    LEFT REVERSE TOTAL SHOULDER  ARTHROPLASTY -- DEPUY performed by Aliyah Patton MD at [facility]  04/23/2019    gastritis--jerod    UPPER GASTROINTESTINAL ENDOSCOPY N/A 4/23/2019    EGD BIOPSY performed by Rob Phelan MD at [facility] History:   Social History     Socioeconomic History    Marital status:      Spouse name: Not on file    Number of children: Not on file    Years of education: Not on file    Highest education level: Not on file   Occupational History    Not on file   Social Needs    Financial resource strain: Not on file    Food insecurity     Worry: Not on file     Inability: Not on file   [employer] needs     Medical: Not on file     Non-medical: Not on file   Tobacco Use    Smoking status: Never Smoker    Smokeless tobacco: Never Used   Substance and Sexual Activity    Alcohol use: Never     Alcohol/week: 0.0 standard drinks     Frequency: Never    Drug use: Never    Sexual activity: Never     Partners: Male     Comment: last in 2013   Lifestyle    Physical activity     Days per week: Not on file     Minutes per session: Not on file    Stress: Not on file   Relationships    Social connections     Talks on phone: Not on file     Gets together: Not on file     Attends Restorationist service: Not on file     Active member of club or organization: Not on file     Attends meetings of clubs or organizations: Not on file     Relationship status: Not on file    Intimate partner violence     Fear of current or ex partner: Not on file     Emotionally abused: Not on file     Physically abused: Not on file     Forced sexual activity: Not on file   Other Topics Concern    Not on file   Social History Narrative    Not on file       Family History:   Family History   Problem Relation Age of Onset    COPD Father     Heart Attack Father     Arthritis Mother     Cancer Other 48        colon       The patients home medications have been reviewed. Allergies: Allergies   Allergen Reactions    Lisinopril            ---------------------------------------------------PHYSICAL EXAM--------------------------------------    BP (!) 163/93   Pulse 104   Temp 99.1 °F (37.3 °C) (Oral)   Resp 20   Ht 5' 4\" (1.626 m)   Wt 200 lb (90.7 kg)   SpO2 97%   BMI 34.33 kg/m²     Physical Exam  Vitals signs and nursing note reviewed. Constitutional:       General: She is not in acute distress. Appearance: She is not toxic-appearing. HENT:      Mouth/Throat:      Mouth: Mucous membranes are moist.   Eyes:      General: No scleral icterus. Extraocular Movements: Extraocular movements intact. Pupils: Pupils are equal, round, and reactive to light. Neck:      Musculoskeletal: Normal range of motion and neck supple. No neck rigidity. Cardiovascular:      Rate and Rhythm: Regular rhythm. Tachycardia present. Pulses: Normal pulses. Heart sounds: Normal heart sounds. No murmur. Pulmonary:      Effort: Pulmonary effort is normal. No respiratory distress. Breath sounds: Normal breath sounds. No wheezing or rales. Abdominal:      General: There is no distension.       Palpations: Abdomen is soft. Tenderness: There is no abdominal tenderness. Musculoskeletal:      Comments: L shoulder incision c/d/i, no surrounding erythema/induration/fluctuance/crepitus/drainage   Skin:     General: Skin is warm and dry. Neurological:      Mental Status: She is alert and oriented to person, place, and time. Comments: Strength 5/5 and sensation grossly intact to light touch and equal bilaterally throughout all extremities  L shoulder ROM and strength testing limited due to pain            -------------------------------------------------- RESULTS -------------------------------------------------  I have personally reviewed all laboratory and imaging results for this patient. Results are listed below.      LABS:  Labs Reviewed   CBC WITH AUTO DIFFERENTIAL - Abnormal; Notable for the following components:       Result Value    RBC 3.06 (*)     Hemoglobin 9.9 (*)     Hematocrit 31.0 (*)     .3 (*)     MCHC 31.9 (*)     RDW 19.9 (*)     Lymphocytes % 19.8 (*)     Monocytes % 13.7 (*)     Monocytes Absolute 1.08 (*)     All other components within normal limits   URINALYSIS WITH MICROSCOPIC - Abnormal; Notable for the following components:    Blood, Urine SMALL (*)     Leukocyte Esterase, Urine MODERATE (*)     WBC, UA 5-10 (*)     Bacteria, UA FEW (*)     All other components within normal limits   LACTIC ACID, PLASMA - Abnormal; Notable for the following components:    Lactic Acid 2.3 (*)     All other components within normal limits   COMPREHENSIVE METABOLIC PANEL W/ REFLEX TO MG FOR LOW K - Abnormal; Notable for the following components:    Chloride 111 (*)     CO2 21 (*)     Glucose 124 (*)     BUN 27 (*)     CREATININE 1.4 (*)     Total Protein 6.1 (*)     Alb 3.1 (*)     Alkaline Phosphatase 113 (*)     AST 38 (*)     All other components within normal limits   CULTURE, URINE   SPECIMEN REJECTION    Narrative:     CALL  Fraire  H34 tel. ,  Rejected Test Name/Called to: Lj 2020 14:45, by OBEYT   TROPONIN   BRAIN NATRIURETIC PEPTIDE   LACTIC ACID, PLASMA   COVID-19       RADIOLOGY:  Interpreted personally and by Radiologist.  CT Head WO Contrast   Final Result   No acute intracranial abnormality. XR CHEST 1 VIEW   Final Result   No obvious acute intra thoracic process          EKG: This EKG is signed and interpreted by the EP. Sinus tachycardia, vent rate 106bpm, normal axis and intervals, no acute injury pattern, nonspecific ST-T changes new from prior EKG      ------------------------- NURSING NOTES AND VITALS REVIEWED ---------------------------   The nursing notes within the ED encounter and vital signs as below have been reviewed by myself. BP (!) 163/93   Pulse 104   Temp 99.1 °F (37.3 °C) (Oral)   Resp 20   Ht 5' 4\" (1.626 m)   Wt 200 lb (90.7 kg)   SpO2 97%   BMI 34.33 kg/m²   Oxygen Saturation Interpretation: Normal    The patients available past medical records and past encounters were reviewed. ------------------------------ ED COURSE/MEDICAL DECISION MAKING----------------------  Medications   0.9 % sodium chloride bolus (0 mLs Intravenous Stopped 20)   cefTRIAXone (ROCEPHIN) 1 g in sterile water 10 mL IV syringe (1 g Intravenous New Bag 20)     Counseling: The emergency provider has spoken with the patient and family and discussed todays results, in addition to providing specific details for the plan of care and counseling regarding the diagnosis and prognosis. Questions are answered at this time and they are agreeable with the plan. ED Course/Medical Decision Makin y.o. female here with change in mental status. Non-toxic appearing, afebrile, hemodynamically stable, and in no acute distress. Encephalopathic. Workup notable for UTI, REN. Started IV abx. Suspect delirium 2/2 UTI vs medication induced from oxycodone for recent shoulder surgery.  Discussed findings and expected course of care and patient/surrogate agreed with plan for admission for further evaluation and management.       --------------------------------- IMPRESSION AND DISPOSITION ---------------------------------    IMPRESSION  1. Altered mental status, unspecified altered mental status type    2. Urinary tract infection without hematuria, site unspecified        DISPOSITION  Disposition: Admit to telemetry  Patient condition is stable    NOTE: This report was transcribed using voice recognition software.  Every effort was made to ensure accuracy; however, inadvertent computerized transcription errors may be present    Jessica Paez MD  Attending Emergency Physician          Jessica Paez MD  12/29/20 2019       Jessica Paez MD  12/29/20 3544

## 2020-12-29 NOTE — ED NOTES
IV team unsuccessful. Pt to be art stuck for lab re-draw. Provider made aware.       Bernard Lamb, ROMANA  12/29/20 8635

## 2020-12-29 NOTE — ED NOTES
Pt pulled out IV access. IV team paged for new placement.  Pt to radiology while awaiting IV team.      Sirisha Diaz RN  12/29/20 [id number]

## 2020-12-30 PROBLEM — N17.9 AKI (ACUTE KIDNEY INJURY) (HCC): Status: ACTIVE | Noted: 2020-12-30

## 2020-12-30 PROBLEM — R41.82 ALTERED MENTAL STATUS: Status: ACTIVE | Noted: 2020-12-30

## 2020-12-30 PROBLEM — D64.9 ANEMIA: Status: ACTIVE | Noted: 2020-12-30

## 2020-12-30 LAB
AMMONIA: 78 UMOL/L (ref 11–51)
ANION GAP SERPL CALCULATED.3IONS-SCNC: 7 MMOL/L (ref 7–16)
BASOPHILS ABSOLUTE: 0.05 E9/L (ref 0–0.2)
BASOPHILS RELATIVE PERCENT: 0.8 % (ref 0–2)
BUN BLDV-MCNC: 22 MG/DL (ref 8–23)
CALCIUM SERPL-MCNC: 9.4 MG/DL (ref 8.6–10.2)
CHLORIDE BLD-SCNC: 114 MMOL/L (ref 98–107)
CO2: 23 MMOL/L (ref 22–29)
CREAT SERPL-MCNC: 1.3 MG/DL (ref 0.5–1)
EOSINOPHILS ABSOLUTE: 0.13 E9/L (ref 0.05–0.5)
EOSINOPHILS RELATIVE PERCENT: 2.2 % (ref 0–6)
GFR AFRICAN AMERICAN: 48
GFR NON-AFRICAN AMERICAN: 40 ML/MIN/1.73
GLUCOSE BLD-MCNC: 84 MG/DL (ref 74–99)
HCT VFR BLD CALC: 27.3 % (ref 34–48)
HEMOGLOBIN: 8.8 G/DL (ref 11.5–15.5)
IMMATURE GRANULOCYTES #: 0.03 E9/L
IMMATURE GRANULOCYTES %: 0.5 % (ref 0–5)
LYMPHOCYTES ABSOLUTE: 1.4 E9/L (ref 1.5–4)
LYMPHOCYTES RELATIVE PERCENT: 23.5 % (ref 20–42)
MCH RBC QN AUTO: 31.7 PG (ref 26–35)
MCHC RBC AUTO-ENTMCNC: 32.2 % (ref 32–34.5)
MCV RBC AUTO: 98.2 FL (ref 80–99.9)
MONOCYTES ABSOLUTE: 0.7 E9/L (ref 0.1–0.95)
MONOCYTES RELATIVE PERCENT: 11.7 % (ref 2–12)
NEUTROPHILS ABSOLUTE: 3.66 E9/L (ref 1.8–7.3)
NEUTROPHILS RELATIVE PERCENT: 61.3 % (ref 43–80)
PDW BLD-RTO: 18.8 FL (ref 11.5–15)
PLATELET # BLD: 193 E9/L (ref 130–450)
PMV BLD AUTO: 10.6 FL (ref 7–12)
POTASSIUM REFLEX MAGNESIUM: 4.8 MMOL/L (ref 3.5–5)
RBC # BLD: 2.78 E12/L (ref 3.5–5.5)
SODIUM BLD-SCNC: 144 MMOL/L (ref 132–146)
TSH SERPL DL<=0.05 MIU/L-ACNC: 2.06 UIU/ML (ref 0.27–4.2)
WBC # BLD: 6 E9/L (ref 4.5–11.5)

## 2020-12-30 PROCEDURE — 6370000000 HC RX 637 (ALT 250 FOR IP): Performed by: STUDENT IN AN ORGANIZED HEALTH CARE EDUCATION/TRAINING PROGRAM

## 2020-12-30 PROCEDURE — 97110 THERAPEUTIC EXERCISES: CPT

## 2020-12-30 PROCEDURE — G0378 HOSPITAL OBSERVATION PER HR: HCPCS

## 2020-12-30 PROCEDURE — 2580000003 HC RX 258: Performed by: STUDENT IN AN ORGANIZED HEALTH CARE EDUCATION/TRAINING PROGRAM

## 2020-12-30 PROCEDURE — 97530 THERAPEUTIC ACTIVITIES: CPT

## 2020-12-30 PROCEDURE — 82140 ASSAY OF AMMONIA: CPT

## 2020-12-30 PROCEDURE — 84443 ASSAY THYROID STIM HORMONE: CPT

## 2020-12-30 PROCEDURE — 36415 COLL VENOUS BLD VENIPUNCTURE: CPT

## 2020-12-30 PROCEDURE — 96361 HYDRATE IV INFUSION ADD-ON: CPT

## 2020-12-30 PROCEDURE — 6360000002 HC RX W HCPCS: Performed by: STUDENT IN AN ORGANIZED HEALTH CARE EDUCATION/TRAINING PROGRAM

## 2020-12-30 PROCEDURE — 80048 BASIC METABOLIC PNL TOTAL CA: CPT

## 2020-12-30 PROCEDURE — 96376 TX/PRO/DX INJ SAME DRUG ADON: CPT

## 2020-12-30 PROCEDURE — 99220 PR INITIAL OBSERVATION CARE/DAY 70 MINUTES: CPT | Performed by: FAMILY MEDICINE

## 2020-12-30 PROCEDURE — 97166 OT EVAL MOD COMPLEX 45 MIN: CPT

## 2020-12-30 PROCEDURE — 6370000000 HC RX 637 (ALT 250 FOR IP): Performed by: FAMILY MEDICINE

## 2020-12-30 PROCEDURE — 85025 COMPLETE CBC W/AUTO DIFF WBC: CPT

## 2020-12-30 PROCEDURE — 96372 THER/PROPH/DIAG INJ SC/IM: CPT

## 2020-12-30 PROCEDURE — 97162 PT EVAL MOD COMPLEX 30 MIN: CPT

## 2020-12-30 RX ORDER — SODIUM CHLORIDE 9 MG/ML
INJECTION, SOLUTION INTRAVENOUS CONTINUOUS
Status: DISCONTINUED | OUTPATIENT
Start: 2020-12-30 | End: 2020-12-31 | Stop reason: HOSPADM

## 2020-12-30 RX ORDER — LACTULOSE 10 G/15ML
10 SOLUTION ORAL 2 TIMES DAILY
Status: DISCONTINUED | OUTPATIENT
Start: 2020-12-30 | End: 2020-12-31

## 2020-12-30 RX ORDER — AMLODIPINE BESYLATE 2.5 MG/1
2.5 TABLET ORAL DAILY
Status: DISCONTINUED | OUTPATIENT
Start: 2020-12-30 | End: 2020-12-31 | Stop reason: HOSPADM

## 2020-12-30 RX ORDER — PANTOPRAZOLE SODIUM 40 MG/1
40 TABLET, DELAYED RELEASE ORAL
Status: DISCONTINUED | OUTPATIENT
Start: 2020-12-30 | End: 2020-12-31 | Stop reason: HOSPADM

## 2020-12-30 RX ORDER — LACTULOSE 10 G/15ML
10 SOLUTION ORAL 2 TIMES DAILY
Status: DISCONTINUED | OUTPATIENT
Start: 2020-12-30 | End: 2020-12-30

## 2020-12-30 RX ORDER — ACETAMINOPHEN 325 MG/1
650 TABLET ORAL EVERY 6 HOURS PRN
Status: DISCONTINUED | OUTPATIENT
Start: 2020-12-30 | End: 2020-12-31 | Stop reason: HOSPADM

## 2020-12-30 RX ORDER — SPIRONOLACTONE 25 MG/1
50 TABLET ORAL DAILY
Status: DISCONTINUED | OUTPATIENT
Start: 2020-12-30 | End: 2020-12-31 | Stop reason: HOSPADM

## 2020-12-30 RX ORDER — LOSARTAN POTASSIUM 50 MG/1
100 TABLET ORAL DAILY
Status: DISCONTINUED | OUTPATIENT
Start: 2020-12-30 | End: 2020-12-31 | Stop reason: HOSPADM

## 2020-12-30 RX ORDER — SODIUM CHLORIDE 0.9 % (FLUSH) 0.9 %
10 SYRINGE (ML) INJECTION EVERY 12 HOURS SCHEDULED
Status: DISCONTINUED | OUTPATIENT
Start: 2020-12-30 | End: 2020-12-31 | Stop reason: HOSPADM

## 2020-12-30 RX ORDER — LACTULOSE 10 G/15ML
10 SOLUTION ORAL 3 TIMES DAILY
Status: DISCONTINUED | OUTPATIENT
Start: 2020-12-30 | End: 2020-12-30

## 2020-12-30 RX ORDER — ALBUTEROL SULFATE 2.5 MG/3ML
2.5 SOLUTION RESPIRATORY (INHALATION) EVERY 4 HOURS PRN
Status: DISCONTINUED | OUTPATIENT
Start: 2020-12-30 | End: 2020-12-31 | Stop reason: HOSPADM

## 2020-12-30 RX ORDER — VENLAFAXINE HYDROCHLORIDE 75 MG/1
75 CAPSULE, EXTENDED RELEASE ORAL DAILY
Status: DISCONTINUED | OUTPATIENT
Start: 2020-12-30 | End: 2020-12-31 | Stop reason: HOSPADM

## 2020-12-30 RX ORDER — MAGNESIUM GLUCONATE 27 MG(500)
500 TABLET ORAL 2 TIMES DAILY
Status: DISCONTINUED | OUTPATIENT
Start: 2020-12-30 | End: 2020-12-31 | Stop reason: HOSPADM

## 2020-12-30 RX ORDER — ACETAMINOPHEN 650 MG/1
650 SUPPOSITORY RECTAL EVERY 6 HOURS PRN
Status: DISCONTINUED | OUTPATIENT
Start: 2020-12-30 | End: 2020-12-31 | Stop reason: HOSPADM

## 2020-12-30 RX ORDER — DOXAZOSIN 2 MG/1
1 TABLET ORAL NIGHTLY
Status: DISCONTINUED | OUTPATIENT
Start: 2020-12-30 | End: 2020-12-31 | Stop reason: HOSPADM

## 2020-12-30 RX ORDER — FUROSEMIDE 40 MG/1
40 TABLET ORAL DAILY
Status: DISCONTINUED | OUTPATIENT
Start: 2020-12-30 | End: 2020-12-31 | Stop reason: HOSPADM

## 2020-12-30 RX ORDER — PROMETHAZINE HYDROCHLORIDE 25 MG/1
12.5 TABLET ORAL EVERY 6 HOURS PRN
Status: DISCONTINUED | OUTPATIENT
Start: 2020-12-30 | End: 2020-12-31 | Stop reason: HOSPADM

## 2020-12-30 RX ORDER — ONDANSETRON 2 MG/ML
4 INJECTION INTRAMUSCULAR; INTRAVENOUS EVERY 6 HOURS PRN
Status: DISCONTINUED | OUTPATIENT
Start: 2020-12-30 | End: 2020-12-31 | Stop reason: HOSPADM

## 2020-12-30 RX ORDER — SODIUM CHLORIDE 0.9 % (FLUSH) 0.9 %
10 SYRINGE (ML) INJECTION PRN
Status: DISCONTINUED | OUTPATIENT
Start: 2020-12-30 | End: 2020-12-31 | Stop reason: HOSPADM

## 2020-12-30 RX ADMIN — LACTULOSE 10 G: 20 SOLUTION ORAL at 20:23

## 2020-12-30 RX ADMIN — LACTULOSE 10 G: 20 SOLUTION ORAL at 08:35

## 2020-12-30 RX ADMIN — VENLAFAXINE HYDROCHLORIDE 75 MG: 75 CAPSULE, EXTENDED RELEASE ORAL at 08:34

## 2020-12-30 RX ADMIN — RIFAXIMIN 550 MG: 550 TABLET ORAL at 17:50

## 2020-12-30 RX ADMIN — SPIRONOLACTONE 50 MG: 25 TABLET ORAL at 08:34

## 2020-12-30 RX ADMIN — Medication 500 MG: at 08:34

## 2020-12-30 RX ADMIN — SODIUM CHLORIDE, PRESERVATIVE FREE 10 ML: 5 INJECTION INTRAVENOUS at 20:24

## 2020-12-30 RX ADMIN — ENOXAPARIN SODIUM 40 MG: 40 INJECTION SUBCUTANEOUS at 08:35

## 2020-12-30 RX ADMIN — SODIUM CHLORIDE: 9 INJECTION, SOLUTION INTRAVENOUS at 01:01

## 2020-12-30 RX ADMIN — Medication 500 MG: at 20:24

## 2020-12-30 RX ADMIN — AMLODIPINE BESYLATE 2.5 MG: 2.5 TABLET ORAL at 08:34

## 2020-12-30 RX ADMIN — ACETAMINOPHEN 650 MG: 325 TABLET, FILM COATED ORAL at 20:37

## 2020-12-30 RX ADMIN — LOSARTAN POTASSIUM 100 MG: 50 TABLET, FILM COATED ORAL at 08:34

## 2020-12-30 RX ADMIN — DOXAZOSIN 1 MG: 2 TABLET ORAL at 20:23

## 2020-12-30 RX ADMIN — RIFAXIMIN 550 MG: 550 TABLET ORAL at 20:23

## 2020-12-30 RX ADMIN — RIFAXIMIN 550 MG: 550 TABLET ORAL at 11:29

## 2020-12-30 RX ADMIN — PANTOPRAZOLE SODIUM 40 MG: 40 TABLET, DELAYED RELEASE ORAL at 08:35

## 2020-12-30 RX ADMIN — SODIUM CHLORIDE, PRESERVATIVE FREE 10 ML: 5 INJECTION INTRAVENOUS at 08:35

## 2020-12-30 RX ADMIN — CEFTRIAXONE SODIUM 1 G: 1 INJECTION, POWDER, FOR SOLUTION INTRAMUSCULAR; INTRAVENOUS at 20:23

## 2020-12-30 ASSESSMENT — PAIN SCALES - GENERAL
PAINLEVEL_OUTOF10: 4
PAINLEVEL_OUTOF10: 7
PAINLEVEL_OUTOF10: 0

## 2020-12-30 ASSESSMENT — PAIN DESCRIPTION - DESCRIPTORS: DESCRIPTORS: ACHING;DISCOMFORT;SORE

## 2020-12-30 ASSESSMENT — PAIN DESCRIPTION - FREQUENCY
FREQUENCY: CONTINUOUS
FREQUENCY: CONTINUOUS

## 2020-12-30 ASSESSMENT — PAIN DESCRIPTION - ONSET: ONSET: ON-GOING

## 2020-12-30 ASSESSMENT — PAIN DESCRIPTION - ORIENTATION: ORIENTATION: LEFT

## 2020-12-30 NOTE — H&P
[facility] Resident Inpatient  History and Physical    CC: Altered Mental Status    HPI: History obtained from patient, electronic medical record. Alanda Mohs is a 76 y.o. 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 He also noted that she was mildly confused when talking 3-4 days ago on the phone. She also complained of UTI symptoms one week ago to him that have since resolved. She is currently taking oxycodone 1 tablet nightly, and son reported counting the pills to verify that. Patient denies headaches, syncope, vision changes, numbness, tingling, abdominal pain, weakness, dysuria, burning with urination, chest pain, SOB, fever, and chills. She does report chronic dizziness. She states that her shoulder surgery was yesterday rather than the 21st, but is otherwise alert and oriented x 3. She does report previous UTI's. ED Course: The patient remained hemodynamically stable. Patient was given 1L NS and Rocephin 1 g. EKG Rhythm strip sinus tachycardia. The significant laboratory data obtained by ED work up was Cr 1.4 (baseline 1.0), small blood in urine with moderate LE and few bacteria; LA 2.3 on admission. CT head and CXR are negative for acute processes.     Medications   0.9 % sodium chloride bolus (0 mLs Intravenous Stopped 12/29/20 1852)   cefTRIAXone (ROCEPHIN) 1 g in sterile water 10 mL IV syringe (0 g Intravenous Stopped 12/29/20 1900)        ED orders:   Orders Placed This Encounter   Procedures    Culture, Urine    CT Head WO Contrast    XR CHEST 1 VIEW    CBC Auto Differential    Urinalysis with Microscopic    SPECIMEN REJECTION    Lactic Acid, Plasma    Comprehensive Metabolic Panel w/ Reflex to MG    Troponin    Brain Natriuretic Peptide    Lactic Acid, Plasma    COVID-19    Inpatient consult to [facility]    EKG 12 Lead    EKG REPORT    PATIENT STATUS (FROM ED OR OR/PROCEDURAL) Inpatient       PMH:  has a past medical history of Breast cancer ([facility] [address] Cirrhosis ([facility] [address] Essential hypertension, Hyperlipidemia, Osteopenia, 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 ([facility] [address] Sleep apnea, Spinal stenosis, and Type 2 diabetes mellitus ([facility] [address] PSH:  has a past surgical history that includes Hysterectomy; Carpal tunnel release; Lithotripsy (Left, 05/04/2016); Colonoscopy (01/31/2017); Breast lumpectomy; Upper gastrointestinal endoscopy (04/23/2019); Colonoscopy (04/23/2019); Upper gastrointestinal endoscopy (N/A, 4/23/2019); Colonoscopy (N/A, 4/23/2019); Colonoscopy (4/23/2019); and Shoulder Arthroplasty (Left, 12/21/2020). FH: family history includes Arthritis in her mother; COPD in her father; Cancer (age of onset: 48) in an other family member; Heart Attack in her father. Social:  reports that she has never smoked. She has never used smokeless tobacco. She reports that she does not drink alcohol or use drugs. Allergies: Allergies   Allergen Reactions    Lisinopril         Home Medications:   No current facility-administered medications on file prior to encounter.       Current Outpatient Medications on File Prior to Encounter   Medication Sig Dispense Refill    acetaminophen (TYLENOL) 500 MG tablet Take 2 tablets by mouth every 6 hours as needed for Pain 56 tablet 0    methocarbamol (ROBAXIN-750) 750 MG tablet Take 1 tablet by mouth 4 times daily for 10 days 40 tablet 0    naproxen (NAPROSYN) 500 MG tablet Take 1 tablet by mouth 2 times daily (with meals) 60 tablet 0    albuterol sulfate HFA (VENTOLIN HFA) 108 (90 Base) MCG/ACT inhaler Inhale 2 puffs into the lungs 4 times daily as needed for Wheezing 3 Inhaler 1    losartan (COZAAR) 100 MG tablet Take 1 tablet by mouth daily 90 tablet 1    venlafaxine (EFFEXOR XR) 75 MG extended release capsule Take 1 capsule by mouth daily 90 capsule 1    magnesium gluconate (MAGONATE) 500 MG tablet Take 500 mg by mouth 2 times daily      hydrOXYzine (ATARAX) 25 MG tablet Take 1 tablet by mouth nightly as needed for Itching 30 tablet 1    omeprazole (PRILOSEC) 40 MG delayed release capsule Take 40 mg by mouth daily      triamcinolone (KENALOG) 0.1 % cream Apply topically to affected areas 2 times daily. 453.6 g 1    metFORMIN (GLUCOPHAGE) 500 MG tablet Take 2 tablets by mouth 2 times daily (with meals) TAKE 1 TABLET BY MOUTH daily in  tablet 0    doxazosin (CARDURA) 1 MG tablet Take 1 mg by mouth nightly       spironolactone (ALDACTONE) 25 MG tablet Take 50 mg by mouth daily       furosemide (LASIX) 40 MG tablet Take 40 mg by mouth daily      felodipine (PLENDIL) 2.5 MG extended release tablet Take 1 tablet by mouth daily 90 tablet 1    ondansetron (ZOFRAN) 4 MG tablet Take 1 tablet by mouth daily as needed for Nausea or Vomiting 90 tablet 1    mirtazapine (REMERON) 7.5 MG tablet Take 1 tablet by mouth nightly 90 tablet 1    vitamin D (ERGOCALCIFEROL) 1.25 MG (70473 UT) CAPS capsule Take 1 capsule by mouth once a week 12 capsule 1    rifaximin (XIFAXAN) 550 MG tablet Take 550 mg by mouth 2 times daily      lactulose (CHRONULAC) 10 GM/15ML solution Take 10 g by mouth 2 to 3 times day      vitamin D (CHOLECALCIFEROL) 1000 UNIT TABS tablet Take 1,000 Units by mouth daily         ROS:  Const: No fever, chills, night sweats, reports some weight gain, increased sleepiness yesterday  HEENT: No blurred vision, double vision; no URI symptoms  Resp: No cough, no sputum, no pleuritic chest pain, no sob  Cardio: No chest pain, no exertional dyspnea, no PND, no orthopnea, no palpitation, no leg swelling.    GI: No dysphagia, no reflux; no abdominal pain, no n/v; no c/d. No hematochezia    : No dysuria, no frequency, hesitancy; no hematuria  MSK: no joint pain, no myalgia, no change in ROM  Neuro: no focal weakness, no slurred speech, no double vision, no numbness or tingling in extremities; reports chronic dizziness  Endo: no heat/cold intolerance, no polyphagia, polydipsia or polyuria  Hem: no increased bleeding, no bruising, no lymphadenopathy  Skin: no skin changes  Psych: no depressed mood, no suicidal ideation    PE:  Blood pressure (!) 163/78, pulse 91, temperature 99 °F (37.2 °C), resp. rate 19, height 5' 4\" (1.626 m), weight 200 lb (90.7 kg), SpO2 97 %. General: Alert, cooperative, no acute distress. HEENT: Normocephalic, atraumatic. EOM's intact, no pallor or icterus. Neck: Supple, symmetrical, trachea midline, no cervical LAD. No carotid bruit  Chest: No tenderness or deformity, full & symmetric excursion  Lung: Clear to auscultation bilaterally,  respirations unlabored. No rales/wheezing/rubs  Heart: RRR, S1 and S2 normal, no rub or gallop. DP pulses 2/4; systolic murmur present  Abdomen: SNTND, no masses, no organomegaly, no guarding, rebound or rigidity. Genital/Rectal: deferred  Extremities:  Extremities normal, atraumatic, no cyanosis or edema. Distal pulses equal bilaterally  Skin: Skin color, texture, turgor normal, no rashes or lesions; arthroplasty sit bishop L shoulder appears clean without swelling or erythema  Musculoskeletal: No joint swelling, no muscle tenderness. Normal ROM in extremities.    Neurologic: Alert & Oriented x 3/4    Labs:   Results for orders placed or performed during the hospital encounter of 12/29/20   CBC Auto Differential   Result Value Ref Range    WBC 7.9 4.5 - 11.5 E9/L    RBC 3.06 (L) 3.50 - 5.50 E12/L    Hemoglobin 9.9 (L) 11.5 - 15.5 g/dL    Hematocrit 31.0 (L) 34.0 - 48.0 %    .3 (H) 80.0 - 99.9 fL    MCH 32.4 26.0 - 35.0 pg    MCHC 31.9 (L) 32.0 - 34.5 %    RDW 19.9 (H) 11.5 - 15.0 fL    Platelets 135 130 - 450 E9/L    MPV 11.6 7.0 - 12.0 fL    Neutrophils % 63.4 43.0 - 80.0 %    Immature Granulocytes % 0.5 0.0 - 5.0 %    Lymphocytes % 19.8 (L) 20.0 - 42.0 %    Monocytes % 13.7 (H) 2.0 - 12.0 %    Eosinophils % 1.8 0.0 - 6.0 %    Basophils % 0.8 0.0 - 2.0 %    Neutrophils Absolute 5.00 1.80 - 7.30 E9/L    Immature Granulocytes # 0.04 E9/L    Lymphocytes Absolute 1.56 1.50 - 4.00 E9/L    Monocytes Absolute 1.08 (H) 0.10 - 0.95 E9/L    Eosinophils Absolute 0.14 0.05 - 0.50 E9/L    Basophils Absolute 0.06 0.00 - 0.20 E9/L   Urinalysis with Microscopic   Result Value Ref Range    Color, UA Yellow Straw/Yellow    Clarity, UA SL CLOUDY Clear    Glucose, Ur Negative Negative mg/dL    Bilirubin Urine Negative Negative    Ketones, Urine Negative Negative mg/dL    Specific Gravity, UA 1.020 1.005 - 1.030    Blood, Urine SMALL (A) Negative    pH, UA 6.0 5.0 - 9.0    Protein, UA Negative Negative mg/dL    Urobilinogen, Urine 1.0 <2.0 E.U./dL    Nitrite, Urine Negative Negative    Leukocyte Esterase, Urine MODERATE (A) Negative    WBC, UA 5-10 (A) 0 - 5 /HPF    RBC, UA 0-1 0 - 2 /HPF    Bacteria, UA FEW (A) None Seen /HPF   SPECIMEN REJECTION   Result Value Ref Range    Rejected Test lac bnp cmp tro     Reason for Rejection see below    Lactic Acid, Plasma   Result Value Ref Range    Lactic Acid 2.3 (H) 0.5 - 2.2 mmol/L   Comprehensive Metabolic Panel w/ Reflex to MG   Result Value Ref Range    Sodium 142 132 - 146 mmol/L    Potassium reflex Magnesium 4.7 3.5 - 5.0 mmol/L    Chloride 111 (H) 98 - 107 mmol/L    CO2 21 (L) 22 - 29 mmol/L    Anion Gap 10 7 - 16 mmol/L    Glucose 124 (H) 74 - 99 mg/dL    BUN 27 (H) 8 - 23 mg/dL    CREATININE 1.4 (H) 0.5 - 1.0 mg/dL    GFR Non-African American 37 >=60 mL/min/1.73    GFR African American 44     Calcium 9.3 8.6 - 10.2 mg/dL    Total Protein 6.1 (L) 6.4 - 8.3 g/dL    Alb 3.1 (L) 3.5 - 5.2 g/dL    Total Bilirubin 1.0 0.0 - 1.2 mg/dL    Alkaline Phosphatase 113 (H) 35 - 104 U/L    ALT 18 0 - 32 U/L    AST 38 (H) 0 - 31 U/L   Troponin   Result Value Ref Range    Troponin <0.01 0.00 - 0.03 ng/mL   Brain Natriuretic Peptide   Result Value Ref Range    Pro- 0 - 450 pg/mL   Lactic Acid, Plasma   Result Value Ref Range    Lactic Acid 1.6 0.5 - 2.2 mmol/L   COVID-19   Result Value Ref Range    SARS-CoV-2, NAAT Not Detected Not Detected   EKG 12 Lead   Result Value Ref Range    Ventricular Rate 106 BPM    Atrial Rate 106 BPM    P-R Interval 154 ms    QRS Duration 68 ms    Q-T Interval 328 ms    QTc Calculation (Bazett) 435 ms    P Axis 58 degrees    R Axis 50 degrees    T Axis 32 degrees       Imaging:  Xr Chest (2 Vw)    Result Date: 12/4/2020  EXAMINATION: TWO XRAY VIEWS OF THE CHEST 12/4/2020 1:59 pm COMPARISON: 17 September 2020 HISTORY: ORDERING SYSTEM PROVIDED HISTORY: SOB (shortness of breath) FINDINGS: The lungs are without acute focal process. There is no effusion or pneumothorax. The cardiomediastinal silhouette is without acute process. The osseous structures are without acute process. No acute process. Ct Head Wo Contrast    Result Date: 12/29/2020  EXAMINATION: CT OF THE HEAD WITHOUT CONTRAST  12/29/2020 2:55 pm TECHNIQUE: CT of the head was performed without the administration of intravenous contrast. Dose modulation, iterative reconstruction, and/or weight based adjustment of the mA/kV was utilized to reduce the radiation dose to as low as reasonably achievable. COMPARISON: February 9, 2020 HISTORY: ORDERING SYSTEM PROVIDED HISTORY: AMS TECHNOLOGIST PROVIDED HISTORY: Reason for exam:->AMS Has a \"code stroke\" or \"stroke alert\" been called? ->No What reading provider will be dictating this exam?->CRC FINDINGS: BRAIN/VENTRICLES: There is no acute intracranial hemorrhage, mass effect or midline shift. No abnormal extra-axial fluid collection. The gray-white differentiation is maintained without evidence of an acute infarct. There is no evidence of hydrocephalus.  ORBITS: The visualized portion of the orbits demonstrate no acute abnormality. SINUSES: Mucosal thickening associated with left maxillary sinus. SOFT TISSUES/SKULL:  No acute abnormality of the visualized skull or soft tissues. No acute intracranial abnormality. Xr Shoulder Left (min 2 Views)    Result Date: 12/21/2020  EXAMINATION: THREE XRAY VIEWS OF THE LEFT SHOULDER 12/21/2020 11:31 am COMPARISON: November 4, 2020 HISTORY: ORDERING SYSTEM PROVIDED HISTORY: post op TECHNOLOGIST PROVIDED HISTORY: Reason for exam:->post op What reading provider will be dictating this exam?->CRC FINDINGS: Reversed left shoulder arthroplasty is now present. It is aligned anatomically. There is a left pleural effusion. Anatomically aligned left shoulder reverse arthroplasty. Left pleural effusion. Xr Chest 1 View    Result Date: 12/29/2020  EXAMINATION: ONE XRAY VIEW OF THE CHEST 12/29/2020 2:57 pm COMPARISON: 12/04/2020 HISTORY: ORDERING SYSTEM PROVIDED HISTORY: AMS TECHNOLOGIST PROVIDED HISTORY: Reason for exam:->AMS What reading provider will be dictating this exam?->CRC FINDINGS: Depth of inspiration and patient body habitus limit evaluation. Heart size is normal.  There is borderline prominence of the pulmonary vascularity. There are no obvious infiltrates or effusions. There is a left shoulder prosthesis. No obvious acute intra thoracic process      Assessment and Plan  Principal Problem:    Delirium  Active Problems:    Type 2 diabetes mellitus (HCC)    Essential hypertension    Multiple thyroid nodules    Depression    Dyslipidemia    Cirrhosis (HCC)    S/P reverse total shoulder arthroplasty, left  Resolved Problems:    * No resolved hospital problems.  *    AMS  -opioid induced vs UTI vs cirrhosis vs thyroid  -stop oxycodone and robaxin  -rocephin given in ED  -order ammonia and TSH levels  -continue maintenance fluids    UTI  -continue rocephin 1 g daily  -urine cultures pending    REN  -continue NS  -avoid NSAIDs, hold metformin    Lactic acidosis-resolved  -continue NS    DM2  -hold metformin for REN    HTN  -continue home medications    Hx thyroid nodules  -TSH level    Depression  -continue venlafexine    GERD  -continue omeprazole    Cirrhosis  -continue rifaximin, lactuolse    S/p reverse total L shoulder arthroplasty   -tylenol PRN, consider norco prn        DVT / GI prophylaxis: lovenox 40mg SC daily and GI prophylaxis not indicated, Protonix and Pepcid    Dispo -     Electronically signed by Noé Dickey DO on 12/29/2020 at 11:21 PM.  This case was discussed with attending physician, Dr. Luis Krabbe

## 2020-12-30 NOTE — PROGRESS NOTES
[address] [facility]  Family Medicine Attending    S: 76 y.o. female with PMH of DM2, KATE, chronic back pain, HTN, thyroid nodules, depression, cardiac murmur, cirrhosis, and s/p reverse total L shoulder arthroplasty (12/21/20), who presents to ED for Altered Mental Status. Increasing confusion over past week. Taking oxycontin only at night. Some previous UTI type sx, although not currently. This morning, mentation improved. Answers slowly, but appropriately. Denies complaints except fatigue    O: VS- Blood pressure 128/66, pulse 102, temperature 98.6 °F (37 °C), resp. rate 16, height 5' 4\" (1.626 m), weight 200 lb (90.7 kg), SpO2 96 %. Exam is as noted by resident with the following changes, additions or corrections:  Gen:  Awake, alert. Generally appropriate, although some gaps in her memory over past few days  Heart - RRR with 2/6 systolic murmur   Lungs- clear   Ext - 1+ edema, tender to palpation of ankles, but no erythema     Impressions:   Principal Problem:    Delirium  Active Problems:    Type 2 diabetes mellitus (HCC)    Essential hypertension    Multiple thyroid nodules    Depression    Dyslipidemia    Cirrhosis (HCC)    S/P reverse total shoulder arthroplasty, left  Resolved Problems:    * No resolved hospital problems. *      Plan:   Mental status changes may be secondary to infection, worsening of cirrhosis, or drug effects   Hold narcotics, obtain cultures, check LFT's, ammonia, INR   Increase lactulose   Rocephin, pending cultures   Deprescribing of sedative medications would likely be helpful     Attending Physician Statement  I have reviewed the chart and seen the patient with the resident(s). I personally reviewed images, EKG's and similar tests, if present. I personally reviewed and performed key elements of the history and exam.  I have reviewed and confirmed student and/or resident history and exam with changes as indicated above.   I agree with the assessment, plan and orders as documented by the resident. Please refer to the resident and/or student note for additional information.       Bobbi Kidd

## 2020-12-30 NOTE — PROGRESS NOTES
Physical Therapy    Physical Therapy Initial Assessment     Name: Ezekiel Campos  : 1945  MRN: [id number]    Referring Provider:  Herb Gaffney MD    Date of Service: 2020    Evaluating PT:  Quintenmicheline Sharif PT, DPT PT [id number]    Room #:  7679/3133-P  Diagnosis:  AMS  PMHx/PSHx:  DM II, KATE, Chronic Back Pain, HTN, Thyroid Nodules, Depression, Cardiac Murmur, Cirrhosis, S/p Reverse Total Shoulder L 2020  Procedure/Surgery:    Precautions:  NWB LUE, Sling (not present), Falls, Confusion, Alarm  Equipment Needs:  TBD    SUBJECTIVE:    Pt lives alone in a 1 floor apartment with no stairs to enter. Pt is a questionable historian, ambulated with SPC independently PTA. Per chart, pt was found confused at home. Equipment Owned:    OBJECTIVE:   Initial Evaluation  Date: 2020 Treatment Short Term/ Long Term   Goals   AM-PAC 6 Clicks      Was pt agreeable to Eval/treatment? Yes     Does pt have pain? Moderate pain at IV site and L shoulder     Bed Mobility  Rolling: NT  Supine to sit: MaxA  Sit to supine: MaxA  Scooting: MaxA  Rolling: Supervision  Supine to sit: Supervision  Sit to supine: Supervision  Scooting: Supervision   Transfers Sit to stand: ModA  Stand to sit: ModA  Stand pivot: NT  Sit to stand: Supervision  Stand to sit: supervision  Stand pivot: Supervision   Ambulation    3' FWD/BKWD with MaxA no AD  >50' Supervision AAD   Stair negotiation: ascended and descended  NT  >4 steps with single rail SBA   ROM BUE:  See OT Note  BLE:  WFL     Strength BUE:  See OT Note  BLEs: 3+/5 grossly    Improve Strength 1/3 MMT Grade   Balance Sitting EOB:  SBA  Dynamic Standing:  MaxA no AD  Sitting EOB:  Supervision  Dynamic Standing:  SBA AAD     Pt is A & O x (self, place, year, month). Disoriented to situation. Confusion noted throughout, pt dysarthric and requires increased time for processing.   Sensation:  Pt denies numbness and tingling to extremities  Edema:  WNL        Patient education  Pt educated on role of PT    Patient response to education:   Pt verbalized understanding Pt demonstrated skill Pt requires further education in this area   x x x     ASSESSMENT:    Comments:  Pt received in supine agreeable to PT evaluation. Pt educated on NWB precaution. Pt required assist of trunk and B hips to perform supine to sit transfer. Pt tolerated ~ 8 min EOB without assist to adjust to being vertical. Pt performed sit to stand transfer with assist and cues. Pt very unsteady in stance performing forward and backward ambulation. Deferred further ambulation due to safety. Patient would benefit from continued skilled PT to maximize functional mobility independence. Alarm activated  Treatment:  Patient practiced and was instructed in the following treatment:     Bed mobility- verbal cues to facilitate independence   Functional transfers-Verbal cues for proper positioning and sequencing to perform transfers safely with maximum independence.  Gait training-Verbal cues for proper positioning and sequencing  to maximize functional mobility independence. Pt's/ family goals   1. Get better    Patient and or family understand(s) diagnosis, prognosis, and plan of care. yes    PLAN:      PLAN OF CARE:    Current Treatment Recommendations     [x] Strengthening     [x] ROM   [x] Balance Training   [x] Endurance Training   [x] Transfer Training   [x] Gait Training   [x] Stair Training   [x] Positioning   [x] Safety and Education Training   [x] Patient/Caregiver Education   [x] HEP  [] Other       PT care will be provided in accordance with the objectives noted above. Exercises and functional mobility practice will be used as well as appropriate assistive devices or modalities to obtain goals. Patient and family education will also be administered as needed. Frequency of treatments: 2-5x/week x 1-2 weeks.     Time in  1405  Time out  1421     Total Treatment Time  8 minutes     Evaluation Time includes thorough review of current medical information, gathering information on past medical history/social history and prior level of function, completion of standardized testing/informal observation of tasks, assessment of data and education on plan of care and goals.     CPT codes:  [] Low Complexity PT evaluation 16892  [x] Moderate Complexity PT evaluation 98477  [] High Complexity PT evaluation 48512  [] PT Re-evaluation 37245  [] Gait training 85884 0 minutes  [] Manual therapy 98354 0 minutes  [x] Therapeutic activities 20975 8 minutes  [] Therapeutic exercises 59166 0 minutes  [] Neuromuscular reeducation 68660 0 minutes       Jamison Hua PT, DPT   [id number]

## 2020-12-30 NOTE — ED NOTES
Monse Villalpando son would like call with update [phone]  12/29/20 2020 [Initial] : an initial visit

## 2020-12-30 NOTE — CARE COORDINATION
Spoke with Pt about Transition  Plan of Care. Pt lives at home alone with no steps to enter. Pt uses a cane and has a brace for her arm. PCP: Dr. Srini Marsh. Pharmacy:  [facility] [place]. Active with [facility] an will need resumption of care orders. Son will provide transport at discharge. Discharge Plan is to return home when medically stable. SW/CM to follow for discharge needs.    Neldon Goldberg, L.S.W.  [phone]

## 2020-12-30 NOTE — HOME CARE
Current with [facility] for PT/OT. Will need BRANDEN orders prior to discharge if appropriate. Tarsha Gill LPN, [facility].

## 2020-12-30 NOTE — PROGRESS NOTES
OCCUPATIONAL THERAPY INITIAL EVALUATION      Date:2020  Patient Name: Kate Starr  MRN: [id number]  : 1945  Room: [address] [place], [place] [address]A    Evaluating OT: Lisa Murrell, OTR/L #[id number]    Referring physician: Lovely Whittaker MD  AM-PAC Daily Activity Raw Score: 13  Recommended Adaptive Equipment: tbd     Diagnosis: delirium   Surgery: s/p L reverse total shoulder arthroplasty  20  Pertinent Medical History:   Past Medical History:   Diagnosis Date    [facility] ([facility]     [facility]osis ([facility]     Essential hypertension     Hyperlipidemia     Osteopenia     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 ([facility]     Sleep apnea     Spinal stenosis     Type 2 diabetes mellitus ([facility]         Precautions:  Falls, NWB to LUE - needs sling, safety- bed alarm     Home Living: Pt lives alone in a one floor home with 0 step(s) to enter and 0 rail(s); bed/bath on main floor   Bathroom setup: tub shower with seat and elevated   Equipment owned: spc,lift chair , shower seat  Prior Level of Function:   States Independent   ? ? with ADLs ,  Assisted by CG 3x/week with IADLs; using spc for ambulation. Driving: no                           Medication Management self  Occupation: retired  Leisure:not stated    Pain Level: none at rest and extreme with LUE movement at elbow- has not been performing AROM at elbow, wrist and hand.   Cognition: A&O: 3/3; Follows 0-1 step directions with noted confusion on events and safety   Memory:  poor   Sequencing:  fair   Problem solving:  poor   Judgement/safety:  poor     Functional Assessment:   Initial Eval Status  Date: 20 Treatment Status  Date: Short Term Goals=LTG  Treatment frequency: PRN 2-4 x/week   Feeding Setup    Independent   Grooming Min A   setup    UB Dressing Max A   Min  A   LB Dressing dependent  Mod A with AE prn    Bathing Max A   Mod A with AE prn    Toileting Max A   Mod A to BSC   Bed Mobility  Supine to sit: max A    Sit to supine: max A   Supine to sit: min A    Sit to supine: min A    Functional Transfers Sit to stand: mod A with hand held x2   Stand to sit:  Mod A x2  Stand pivot: max A side steps to [address]   Min A with spc   Functional Mobility N/t due to unsteadiness and confusion  tba   Balance Sitting:     Static:  Min A     Dynamic:mod A   Standing: max A      Activity Tolerance Poor limited by pain and confusion  O2 96%   Fair++   Visual/  Perceptual Glasses: yes WFL         Safety poor                 Fair+     Hand dominance: R   UE ROM: RUE:  WFL  LUE: immobilized poor tolerance to slight elbow PROM  Strength: RUE:  4/5 LUE:  N/t    Strength: R fair +     L  N/t    Fine Motor Coordination: R  WFL  L poor    Hearing: WFL  Sensation:  No c/o numbness or tingling  Tone:  WFL  Edema: mod LUE                            Comments: Nursing approval to work with patient given. Upon arrival, patient supine and agreeable to evaluation. OT evaluation performed with  education on benefits of mobility and safety with ADL completion. Patient cooperative. At end of session, patient supine with LUE elevated and  with call light and phone within reach, all lines and tubes intact. Nursing notified. OT treatment: OT for functional assessment of  ADL, Functional Transfer/Mobility Training, Equipment Needs, Energy Conservation Techniques, Pt/Family Education, Ther Ex- deep breathing for edema and pain control., OT role and POC reviewed. Patient  demo poor understanding of functional limitations and safety with ADL completion and LUE AROM and positioning. .  Skilled occupational therapy services provided include instruction/training on safety and adapted techniques for completion of therapeutic activities, ADLs/IADLs, and therapeutic exercise introduced AROM to LUE. Therapist educated pt on NWB to LUE precautions. Skilled monitoring of O2 sats, HR, and pt response throughout treatment.  OT treatment provided this date includes:  ·  ADL-  Instruction/training on safety and adapted techniques for completion of ADLs: Pt. Demonstrates poor understanding of precautions & techniques  ·  Functional Mobility-  Instruction/training on safety and improved independence with bed mobility, /functional transfers using spc - hand held on evaluation  ·  Sitting EOB 3 minutes to improve dynamic sitting balance and activity tolerance during ADLs. ·  Activity tolerance- Instruction/training on energy conservation/work simplification for completion of ADLs:. Pt. Demo poor tolerance due to pain  this day   ·  Cognitive retraining -  Cues for safety/technique during bed mobility, sitting balance/seated ADLs, no cues for sequencing, problem solving WFL  ·  Skilled positioning/alignment-  Proper Positioning/Alignment due to LUE shoulder replacement  ·  Skilled monitoring of O2 sats-  refer to activity tolerance reporting  · Therapeutic Exercise- Instruction on PROM and deep breathing exercise to improve functional Ind. with ADLs             Patient would  benefit from continued skilled OT to increase functional independence and quality of life.     Eval Complexity: mod    Assessment of current deficits   Functional mobility [x]  ADLs [x] Strength [x]  Cognition [x]  Functional transfers  [x] IADLs [x] Safety Awareness [x]  Endurance [x]  Fine Motor Coordination [x] Balance [x] Vision/perception [] Sensation []   Gross Motor Coordination [x] ROM [x] Delirium []                  Motor Control []    Plan of Care: OT 1-3x/week for 5-7 days PRN   Instruction/training on adapted ADL techniques and AE recommendations to increase functional independence within precautions  Training on energy conservation strategies/techniques to improve independence/tolerance for self-care routine  Functional transfer/mobility training/DME recommendations for increased independence, safety, and fall prevention  Patient/Family education to increase follow through with safety techniques and functional independence  Recommendation of environmental modifications for increased safety with functional transfers/mobility and ADLs  Therapeutic exercise to improve motor endurance, ROM, and functional strength for ADLs/functional transfers  Therapeutic activities to facilitate/challenge dynamic balance, stand tolerance, fine motor dexterity/in-hand manipulation for increased independence with ADLs    Rehab Potential: Good for established goals    Patient / Family Goal: decrease pain      Evaluation time includes thorough review of current medical information, gathering information on past medical & social history & PLOF, completion of standardized testing, informal observation of tasks, consultation with other medical professions/disciplines, assessment of data & development of POC/goals. Patient and/or family were instructed diagnosis, prognosis/goals and plan of care. yes Demonstrated poor understanding. Min Units   OT Eval Low 43198     OT Eval Medium 10569 X    OT Eval High [id number]     OT Re-Eval [id number]     Therapeutic Ex [phone]     Therapeutic Activities 93149 10 1   ADL/Self Care 20156     Orthotic Management 24801     Neuro Re-Ed 42750     Non-Billable Time              Time In: 14:05           Time Out: 14:25               [] Malnutrition indicators have been identified and nursing has been notified to ensure a dietitian consult is ordered. mod OT Evaluation + 10  treatment minutes    Naoh Handy.  [address], [address]

## 2020-12-30 NOTE — PLAN OF CARE
Problem: Falls - Risk of:  Goal: Will remain free from falls  Description: Will remain free from falls  Outcome: Met This Shift     Problem: Skin Integrity:  Goal: Absence of new skin breakdown  Description: Absence of new skin breakdown  Outcome: Met This Shift

## 2020-12-31 VITALS
WEIGHT: 200 LBS | RESPIRATION RATE: 16 BRPM | DIASTOLIC BLOOD PRESSURE: 59 MMHG | HEART RATE: 88 BPM | TEMPERATURE: 98.4 F | HEIGHT: 64 IN | OXYGEN SATURATION: 95 % | BODY MASS INDEX: 34.15 KG/M2 | SYSTOLIC BLOOD PRESSURE: 110 MMHG

## 2020-12-31 PROBLEM — G92.8 TOXIC METABOLIC ENCEPHALOPATHY: Status: ACTIVE | Noted: 2020-12-29

## 2020-12-31 LAB
AMMONIA: 98 UMOL/L (ref 11–51)
ANION GAP SERPL CALCULATED.3IONS-SCNC: 6 MMOL/L (ref 7–16)
BASOPHILS ABSOLUTE: 0.04 E9/L (ref 0–0.2)
BASOPHILS RELATIVE PERCENT: 0.8 % (ref 0–2)
BUN BLDV-MCNC: 14 MG/DL (ref 8–23)
CALCIUM SERPL-MCNC: 9.2 MG/DL (ref 8.6–10.2)
CHLORIDE BLD-SCNC: 112 MMOL/L (ref 98–107)
CHLORIDE URINE RANDOM: 90 MMOL/L
CO2: 23 MMOL/L (ref 22–29)
CREAT SERPL-MCNC: 1.1 MG/DL (ref 0.5–1)
CREATININE URINE: 115 MG/DL (ref 29–226)
EOSINOPHILS ABSOLUTE: 0.17 E9/L (ref 0.05–0.5)
EOSINOPHILS RELATIVE PERCENT: 3.4 % (ref 0–6)
FOLATE: 17.3 NG/ML (ref 4.8–24.2)
GFR AFRICAN AMERICAN: 58
GFR NON-AFRICAN AMERICAN: 48 ML/MIN/1.73
GLUCOSE BLD-MCNC: 93 MG/DL (ref 74–99)
HCT VFR BLD CALC: 26.4 % (ref 34–48)
HEMOGLOBIN: 8.6 G/DL (ref 11.5–15.5)
IMMATURE GRANULOCYTES #: 0.04 E9/L
IMMATURE GRANULOCYTES %: 0.8 % (ref 0–5)
INR BLD: 1.6
LYMPHOCYTES ABSOLUTE: 1.44 E9/L (ref 1.5–4)
LYMPHOCYTES RELATIVE PERCENT: 28.6 % (ref 20–42)
MCH RBC QN AUTO: 32.2 PG (ref 26–35)
MCHC RBC AUTO-ENTMCNC: 32.6 % (ref 32–34.5)
MCV RBC AUTO: 98.9 FL (ref 80–99.9)
MONOCYTES ABSOLUTE: 0.55 E9/L (ref 0.1–0.95)
MONOCYTES RELATIVE PERCENT: 10.9 % (ref 2–12)
NEUTROPHILS ABSOLUTE: 2.8 E9/L (ref 1.8–7.3)
NEUTROPHILS RELATIVE PERCENT: 55.5 % (ref 43–80)
ORGANISM: ABNORMAL
ORGANISM: ABNORMAL
PDW BLD-RTO: 18.7 FL (ref 11.5–15)
PLATELET # BLD: 178 E9/L (ref 130–450)
PMV BLD AUTO: 10.1 FL (ref 7–12)
POTASSIUM REFLEX MAGNESIUM: 4.3 MMOL/L (ref 3.5–5)
POTASSIUM, UR: 28.4 MMOL/L
PROTHROMBIN TIME: 18.7 SEC (ref 9.3–12.4)
RBC # BLD: 2.67 E12/L (ref 3.5–5.5)
SODIUM BLD-SCNC: 141 MMOL/L (ref 132–146)
SODIUM URINE: 104 MMOL/L
UREA NITROGEN, UR: 621 MG/DL (ref 800–1666)
URINE CULTURE, ROUTINE: ABNORMAL
URINE CULTURE, ROUTINE: ABNORMAL
VITAMIN B-12: 1342 PG/ML (ref 211–946)
WBC # BLD: 5 E9/L (ref 4.5–11.5)

## 2020-12-31 PROCEDURE — 6370000000 HC RX 637 (ALT 250 FOR IP): Performed by: FAMILY MEDICINE

## 2020-12-31 PROCEDURE — 96361 HYDRATE IV INFUSION ADD-ON: CPT

## 2020-12-31 PROCEDURE — 84300 ASSAY OF URINE SODIUM: CPT

## 2020-12-31 PROCEDURE — G0378 HOSPITAL OBSERVATION PER HR: HCPCS

## 2020-12-31 PROCEDURE — 80048 BASIC METABOLIC PNL TOTAL CA: CPT

## 2020-12-31 PROCEDURE — 82436 ASSAY OF URINE CHLORIDE: CPT

## 2020-12-31 PROCEDURE — 84540 ASSAY OF URINE/UREA-N: CPT

## 2020-12-31 PROCEDURE — 82570 ASSAY OF URINE CREATININE: CPT

## 2020-12-31 PROCEDURE — 84133 ASSAY OF URINE POTASSIUM: CPT

## 2020-12-31 PROCEDURE — 82607 VITAMIN B-12: CPT

## 2020-12-31 PROCEDURE — 82746 ASSAY OF FOLIC ACID SERUM: CPT

## 2020-12-31 PROCEDURE — 6370000000 HC RX 637 (ALT 250 FOR IP): Performed by: STUDENT IN AN ORGANIZED HEALTH CARE EDUCATION/TRAINING PROGRAM

## 2020-12-31 PROCEDURE — 85610 PROTHROMBIN TIME: CPT

## 2020-12-31 PROCEDURE — 97535 SELF CARE MNGMENT TRAINING: CPT

## 2020-12-31 PROCEDURE — 6360000002 HC RX W HCPCS: Performed by: STUDENT IN AN ORGANIZED HEALTH CARE EDUCATION/TRAINING PROGRAM

## 2020-12-31 PROCEDURE — 96372 THER/PROPH/DIAG INJ SC/IM: CPT

## 2020-12-31 PROCEDURE — 82140 ASSAY OF AMMONIA: CPT

## 2020-12-31 PROCEDURE — 85025 COMPLETE CBC W/AUTO DIFF WBC: CPT

## 2020-12-31 PROCEDURE — 2580000003 HC RX 258: Performed by: STUDENT IN AN ORGANIZED HEALTH CARE EDUCATION/TRAINING PROGRAM

## 2020-12-31 PROCEDURE — 36415 COLL VENOUS BLD VENIPUNCTURE: CPT

## 2020-12-31 PROCEDURE — 99217 PR OBSERVATION CARE DISCHARGE MANAGEMENT: CPT | Performed by: FAMILY MEDICINE

## 2020-12-31 RX ORDER — POLYETHYLENE GLYCOL 3350 17 G/17G
17 POWDER, FOR SOLUTION ORAL DAILY
Status: DISCONTINUED | OUTPATIENT
Start: 2020-12-31 | End: 2020-12-31 | Stop reason: HOSPADM

## 2020-12-31 RX ORDER — LACTULOSE 10 G/15ML
10 SOLUTION ORAL 3 TIMES DAILY
Status: DISCONTINUED | OUTPATIENT
Start: 2020-12-31 | End: 2020-12-31 | Stop reason: HOSPADM

## 2020-12-31 RX ORDER — LEVOFLOXACIN 500 MG/1
500 TABLET, FILM COATED ORAL DAILY
Qty: 10 TABLET | Refills: 0 | Status: SHIPPED | OUTPATIENT
Start: 2020-12-31 | End: 2021-01-05

## 2020-12-31 RX ADMIN — LOSARTAN POTASSIUM 100 MG: 50 TABLET, FILM COATED ORAL at 08:10

## 2020-12-31 RX ADMIN — ENOXAPARIN SODIUM 40 MG: 40 INJECTION SUBCUTANEOUS at 08:10

## 2020-12-31 RX ADMIN — LACTULOSE 10 G: 20 SOLUTION ORAL at 08:09

## 2020-12-31 RX ADMIN — ACETAMINOPHEN 650 MG: 325 TABLET, FILM COATED ORAL at 15:01

## 2020-12-31 RX ADMIN — AMLODIPINE BESYLATE 2.5 MG: 2.5 TABLET ORAL at 08:10

## 2020-12-31 RX ADMIN — SODIUM CHLORIDE: 9 INJECTION, SOLUTION INTRAVENOUS at 00:18

## 2020-12-31 RX ADMIN — RIFAXIMIN 550 MG: 550 TABLET ORAL at 08:11

## 2020-12-31 RX ADMIN — SPIRONOLACTONE 50 MG: 25 TABLET ORAL at 08:10

## 2020-12-31 RX ADMIN — Medication 500 MG: at 08:11

## 2020-12-31 RX ADMIN — VENLAFAXINE HYDROCHLORIDE 75 MG: 75 CAPSULE, EXTENDED RELEASE ORAL at 08:10

## 2020-12-31 RX ADMIN — PANTOPRAZOLE SODIUM 40 MG: 40 TABLET, DELAYED RELEASE ORAL at 08:10

## 2020-12-31 ASSESSMENT — PAIN SCALES - GENERAL
PAINLEVEL_OUTOF10: 8
PAINLEVEL_OUTOF10: 0
PAINLEVEL_OUTOF10: 0

## 2020-12-31 NOTE — CARE COORDINATION
Discharge plan is home with family when medically stable. Active with [facility]. [address] orders in EHR.  Anthony Schroeder RN

## 2020-12-31 NOTE — PLAN OF CARE
Problem: Falls - Risk of:  Goal: Will remain free from falls  Description: Will remain free from falls  Outcome: Completed  Goal: Absence of physical injury  Description: Absence of physical injury  Outcome: Completed     Problem: Skin Integrity:  Goal: Will show no infection signs and symptoms  Description: Will show no infection signs and symptoms  Outcome: Completed  Goal: Absence of new skin breakdown  Description: Absence of new skin breakdown  Outcome: Completed     Problem: Musculor/Skeletal Functional Status  Goal: Highest potential functional level  Outcome: Completed  Goal: Absence of falls  Outcome: Completed

## 2020-12-31 NOTE — DISCHARGE INSTR - COC
Continuity of Care Form    Patient Name: Zuhair Guevara   :  1945  MRN:  [id number]    Admit date:  2020  Discharge date:  2020    Code Status Order: Full Code   Advance Directives:   [facility] Documentation     Date/Time Healthcare Directive Type of Healthcare Directive Copy in [facility] [address] Agent's Name Healthcare Agent's Phone Number    [phone]  No, patient does not have an advance directive for healthcare treatment -- -- -- -- --          Admitting Physician:  Luis Fowler MD  PCP: Winter José MD    Discharging Nurse: cd  [address] Unit/Room#: 6149/3712-V  Discharging Unit Phone Number: [phone]    Emergency Contact:   Extended Emergency Contact Information  Primary Emergency Contact: [address] [place] [address]  Home Phone: [phone]  Mobile Phone: [phone]  Relation: Child  Preferred language: English   needed? No  Secondary Emergency Contact: [address] Phone: [phone]  Mobile Phone: [phone]  Relation: Other  Preferred language: English   needed?  No    Past Surgical History:  Past Surgical History:   Procedure Laterality Date    BREAST LUMPECTOMY      lumpectomy [id number]    CARPAL TUNNEL RELEASE      COLONOSCOPY  2017    multiple polyps; diverticula--jerod    COLONOSCOPY  2019    polyps; diverticula; hemorrhoids--jerod    COLONOSCOPY N/A 2019    COLONOSCOPY POLYPECTOMY SNARE/COLD BIOPSY performed by Maricel Ward MD at [address] [facility] COLONOSCOPY  2019    COLONOSCOPY WITH BIOPSY performed by Maricel Ward MD at [address] [facility] LITHOTRIPSY Left 2016    C-R STENT PLACEMENT    SHOULDER ARTHROPLASTY Left 2020    LEFT REVERSE TOTAL SHOULDER  ARTHROPLASTY -- DEPUY performed by Judd Garcia MD at [place] [address]  2019    gastritis--jerod    UPPER GASTROINTESTINAL ENDOSCOPY N/A 4/23/2019    EGD BIOPSY performed by Sylvia Bowers MD at [facility]       Immunization History:   Immunization History   Administered Date(s) Administered    Hepatitis B Adult (Recombivax HB) 03/12/2020    Influenza A (M2N7-00) Vaccine PF IM 12/21/2009    Influenza Vaccine, unspecified formulation 09/27/2016    Influenza Virus Vaccine 09/28/2015    Influenza Whole 09/28/2015    Influenza, High Dose (Fluzone 65 yrs and older) 09/27/2016, 09/21/2017, 10/05/2018, 10/21/2019    Pneumococcal Conjugate 13-valent (Qfztaor15) 09/27/2016    Pneumococcal Polysaccharide (Yxnsmoywn19) 11/21/2017    Zoster Live (Zostavax) 04/01/2013    Zoster Recombinant (Shingrix) 04/12/2018       Active Problems:  Patient Active Problem List   Diagnosis Code    Malignant neoplasm of left breast ([facility] C50.912    Type 2 diabetes mellitus ([facility] E11.9    Obstructive sleep apnea syndrome G47.33    Chronic back pain M54.9, G89.29    Essential hypertension I10    Multiple thyroid nodules E04.2    Balance problem R26.89    Depression F32.9    At risk for colon cancer Z91.89    Hx of adenomatous colonic polyps Z86.010    Dyslipidemia E78.5    Murmur, cardiac R01.1    Cirrhosis (HCC) K74.60    Closed fracture of proximal epiphysis of left humerus S49.002A    COVID-19 U07.1    S/P reverse total shoulder arthroplasty, left D11.125    Toxic metabolic encephalopathy D51    REN (acute kidney injury) ([facility] N17.9    Altered mental status R41.82    Anemia D64.9       Isolation/Infection:   Isolation          No Isolation        Patient Infection Status     Infection Onset Added Last Indicated Last Indicated By Review Planned Expiration Resolved Resolved By    None active    Resolved    COVID-19 Rule Out 12/29/20 12/29/20 12/29/20 COVID-19 (Ordered)   12/29/20 Rule-Out Test Resulted    COVID-19 Rule Out 12/14/20 12/14/20 12/14/20 COVID-19 Ambulatory (Ordered)   12/15/20 Rule-Out Test Resulted Nurse Assessment:  Last Vital Signs: BP (!) 110/59   Pulse 88   Temp 98.4 °F (36.9 °C) (Temporal)   Resp 16   Ht 5' 4\" (1.626 m)   Wt 200 lb (90.7 kg)   SpO2 95%   BMI 34.33 kg/m²     Last documented pain score (0-10 scale): Pain Level: 0  Last Weight:   Wt Readings from Last 1 Encounters:   12/29/20 200 lb (90.7 kg)     Mental Status:  oriented, alert, coherent, logical and thought processes intact    IV Access:  - None    Nursing Mobility/ADLs:  Walking   Independent  Transfer  Assisted  Bathing  Assisted  Dressing  Assisted  Toileting  Assisted  Feeding  Independent  Med Admin  Independent  Med Delivery   whole    Wound Care Documentation and Therapy:        Elimination:  Continence:   · Bowel: Yes  · Bladder: Yes  Urinary Catheter: Removal Date 12/31/2020   Colostomy/Ileostomy/Ileal Conduit: No       Date of Last BM: 12/31/2020    Intake/Output Summary (Last 24 hours) at 12/31/2020 1223  Last data filed at 12/31/2020 1214  Gross per 24 hour   Intake 2305 ml   Output 1775 ml   Net 530 ml     I/O last 3 completed shifts: In: 2485 [P.O.:620; I.V.:1865]  Out: 1275 [Urine:1275]    Safety Concerns:     None    Impairments/Disabilities:      None    Nutrition Therapy:  Current Nutrition Therapy:   - Oral Diet:  Carb Control 4 carbs/meal (1800kcals/day)    Routes of Feeding: Oral  Liquids: Thin Liquids  Daily Fluid Restriction: no  Last Modified Barium Swallow with Video (Video Swallowing Test): not done    Treatments at the Time of Hospital Discharge:   Respiratory Treatments: ***  Oxygen Therapy:  is not on home oxygen therapy.   Ventilator:    - No ventilator support    Rehab Therapies: Orthotics/Prosthetics  Weight Bearing Status/Restrictions: non weight bearing, left arm   Other Medical Equipment (for information only, NOT a DME order):  cane  Other Treatments: ***    Patient's personal belongings (please select all that are sent with patient):  {TITA DME Belongings:038914236}    RN SIGNATURE: Electronically signed by Hersey Romberg, RN on 12/31/20 at 12:26 PM EST    CASE MANAGEMENT/SOCIAL WORK SECTION    Inpatient Status Date: ***    Readmission Risk Assessment Score:  Readmission Risk              Risk of Unplanned Readmission:        0           Discharging to Facility/ Agency   · Name:   · Address:  · Phone:  · Fax:    Dialysis Facility (if applicable)   · Name:  · Address:  · Dialysis Schedule:  · Phone:  · Fax:    / signature: {Esignature:[id number]}    PHYSICIAN SECTION    Prognosis: {Prognosis:9135686063}    Condition at Discharge: [facility] Patient Condition:[id number]}    Rehab Potential (if transferring to Rehab): {Prognosis:4017442814}    Recommended Labs or Other Treatments After Discharge: ***    Physician Certification: I certify the above information and transfer of Yunior Bledsoe  is necessary for the continuing treatment of the diagnosis listed and that she requires {Admit to Appropriate Level of Care:64570} for {GREATER/LESS:369739629} 30 days.      Update Admission H&P: {CHP DME Changes in XJAR:329180879}    PHYSICIAN SIGNATURE:  {Esignature:[id number]}

## 2020-12-31 NOTE — PROGRESS NOTES
[facility]  Family Medicine Attending    S: 76 y.o. female with PMH of DM2, KATE, chronic back pain, HTN, thyroid nodules, depression, cardiac murmur, cirrhosis, and s/p reverse total L shoulder arthroplasty (12/21/20), who presents to ED for Altered Mental Status. Increasing confusion over past week. Taking oxycontin only at night. Some previous UTI type sx, although not currently. This morning, feels good. Back to normal, according to her    O: VS- Blood pressure 116/62, pulse 102, temperature 99.1 °F (37.3 °C), resp. rate 16, height 5' 4\" (1.626 m), weight 200 lb (90.7 kg), SpO2 95 %. Exam is as noted by resident with the following changes, additions or corrections:  Gen:  Awake, alert. Generally appropriate, although some gaps in her memory over past few days  Heart - RRR with 2/6 systolic murmur   Lungs- clear   Ext - 1+ edema, tender to palpation of ankles, but no erythema     Impressions:   Principal Problem:    Toxic metabolic encephalopathy  Active Problems:    Type 2 diabetes mellitus (HCC)    Essential hypertension    Multiple thyroid nodules    Depression    Dyslipidemia    Cirrhosis (HCC)    S/P reverse total shoulder arthroplasty, left    REN (acute kidney injury) ([facility]    Altered mental status    Anemia  Resolved Problems:    * No resolved hospital problems. *      Plan:   Removal of several medications likely resolved encephalopathy   Metabolism of meds may be altered because of cirrhosis, and caution advised in prescribing further sedating medications   UTI may also have contributed - will need abx for another 5 days (levaquin)   Stop mirtazapine, hydroxyzine   May be discharged today     Attending Physician Statement  I have reviewed the chart and seen the patient with the resident(s). I personally reviewed images, EKG's and similar tests, if present.   I personally reviewed and performed key elements of the history and exam.  I have reviewed and confirmed student and/or resident history and exam with changes as indicated above. I agree with the assessment, plan and orders as documented by the resident. Please refer to the resident and/or student note for additional information.       Christopher Pavon

## 2021-01-01 NOTE — DISCHARGE SUMMARY
Discharge Summary    David Desir  :  1945  MRN:  [id number]    Admit date:  2020  Discharge date:  2020    Admitting Physician:  Ben Rodriguez MD      Admission Condition:  fair    Discharged Condition:  good    Discharge Diagnoses:   Patient Active Problem List   Diagnosis    Malignant ne[facility] of left breast ([facility]    Type 2 diabetes mellitus ([facility]    Obstructive sleep apnea syndrome    Chronic back pain    Essential hypertension    Multiple thyroid nodules    Balance problem    Depression    At risk for colon cancer    Hx of adenomatous colonic polyps    Dyslipidemia    Murmur, cardiac    Cirrhosis (HCC)    Closed fracture of proximal epiphysis of left humerus    COVID-19    S/P reverse total shoulder arthroplasty, left    Toxic metabolic encephalopathy    REN (acute kidney injury) ([facility]    Altered mental status    Anemia       Hospital Course: David Desir is a 76 y.o. female with a PMH of  DM2, KATE, chronic back pain, HTN, thyroid nodules, depression, cardiac murmur, cirrhosis, and s/p reverse total L shoulder arthroplasty (20), who presents to ED for Altered Mental Status. Patient was found at her home by son lizzette, using the electric chair remote to try turning on her TV. Patient had been taking narcotic pain medications as well as methocarbamol in light of her recent shoulder surgery. Work up revealed hyperammonemia, lactic acidosis, UTI and Stage 1 REN. Patient was given a 1L bolus in the ED and started on rocephin for her UTI. Patient's narcotics and robaxin were discontinued and she was given her home lactulose 3 times daily rather than twice. Patient was continued on rocephin throughout her stay and transitioned to Levaquin for 5 days on discharge. It is unclear if patient's mental status was secondary to her pain medications vs UTI vs hyperammonemia. The patient's course was otherwise uneventful.  She progressed well, and was alert and oriented times 3 prior to discharge. REN and lactic acidosis resolved. She was tolerating a regular diet with no nausea or vomiting, and was in a suitable condition for discharge to home with home health care.     Discharge Medications:         Medication List      START taking these medications    levoFLOXacin 500 MG tablet  Commonly known as: LEVAQUIN  Take 1 tablet by mouth daily for 5 days        CONTINUE taking these medications    albuterol sulfate  (90 Base) MCG/ACT inhaler  Commonly known as: Ventolin HFA  Inhale 2 puffs into the lungs 4 times daily as needed for Wheezing     doxazosin 1 MG tablet  Commonly known as: CARDURA     felodipine 2.5 MG extended release tablet  Commonly known as: PLENDIL  Take 1 tablet by mouth daily     furosemide 40 MG tablet  Commonly known as: LASIX     lactulose 10 GM/15ML solution  Commonly known as: CHRONULAC     losartan 100 MG tablet  Commonly known as: COZAAR  Take 1 tablet by mouth daily     magnesium gluconate 500 MG tablet  Commonly known as: MAGONATE     metFORMIN 500 MG tablet  Commonly known as: GLUCOPHAGE  Take 2 tablets by mouth 2 times daily (with meals) TAKE 1 TABLET BY MOUTH daily in AM     omeprazole 40 MG delayed release capsule  Commonly known as: PRILOSEC     ondansetron 4 MG tablet  Commonly known as: ZOFRAN  Take 1 tablet by mouth daily as needed for Nausea or Vomiting     oxyCODONE 5 MG capsule     spironolactone 25 MG tablet  Commonly known as: ALDACTONE     venlafaxine 75 MG extended release capsule  Commonly known as: EFFEXOR XR  Take 1 capsule by mouth daily     vitamin D 1.25 MG (87625 UT) Caps capsule  Commonly known as: ERGOCALCIFEROL  Take 1 capsule by mouth once a week     Xifaxan 550 MG tablet  Generic drug: rifaximin        STOP taking these medications    acetaminophen 500 MG tablet  Commonly known as: TYLENOL     hydrOXYzine 25 MG tablet  Commonly known as: ATARAX     methocarbamol 750 MG tablet  Commonly known as: Robaxin-750     mirtazapine 7.5 MG tablet  Commonly known as: REMERON     naproxen 500 MG tablet  Commonly known as: Naprosyn     triamcinolone 0.1 % cream  Commonly known as: KENALOG           Where to Get Your Medications      These medications were sent to [facility] [address] [address]    Phone: [phone]   · levoFLOXacin 500 MG tablet         Consults:  none    Significant Diagnostic Studies:      Labs:  Na/K/Cl/CO2:  141/4.3/112/23 (12/31 8499)  BUN/Cr/glu/ALT/AST/amyl/lip:  14/1.1/--/--/--/--/-- (12/31 1236)  WBC/Hgb/Hct/Plts:  5.0/8.6/26.4/178 (12/31 6092)  estimated creatinine clearance is 48 mL/min (A) (based on SCr of 1.1 mg/dL (H)). New Imaging:  CT Head WO Contrast   Final Result   No acute intracranial abnormality. XR CHEST 1 VIEW   Final Result   No obvious acute intra thoracic process            Treatments:   IV hydration, antibiotics: ceftriaxone and lactulose    Discharge Exam:  BP (!) 110/59   Pulse 88   Temp 98.4 °F (36.9 °C) (Temporal)   Resp 16   Ht 5' 4\" (1.626 m)   Wt 200 lb (90.7 kg)   SpO2 95%   BMI 34.33 kg/m²   General: Alert, cooperative, no acute distress. HEENT: Normocephalic, atraumatic. EOM's intact, no pallor or icterus. Neck: Supple, symmetrical, trachea midline, no cervical LAD. No carotid bruit  Chest: No tenderness or deformity, full & symmetric excursion  Lung: Clear to auscultation bilaterally,  respirations unlabored. No rales/wheezing/rubs  Heart: RRR, S1 and S2 normal, no rub or gallop. DP pulses 2/4; systolic murmur present  Abdomen: SNTND, no masses, no organomegaly, no guarding, rebound or rigidity. Genital/Rectal: deferred  Extremities:  Extremities normal, atraumatic, no cyanosis or edema.  Distal pulses equal bilaterally  Skin: Skin color, texture, turgor normal, no rashes or lesions; arthroplasty site on L shoulder appears clean without swelling or erythema  Musculoskeletal: No joint swelling, no muscle tenderness. Normal ROM in extremities. Neurologic: Alert & Oriented x 4    Disposition:   home    Future Appointments   Date Time Provider Geraldine Rader   1/5/2021  9:00 AM SCHEDULE, SE ORTHO APC [facility]   1/12/2021  2:30 PM Pedro Grimes MD [facility]   2/1/2021  9:30 AM SCHEDULE, SE ORTHO APC [facility]   3/17/2021  9:45 AM Mine Watters MD  Ortho [facility]       More than 30 minutes was spent in preparation of this patient's discharge including, but not limited to, examination, preparation of documents, prescription preparation, counseling and coordination.     Signed:  Wagner Talbot MD PGY-1, [address] Family Medicine Resident  1/1/2021, 11:17 AM

## 2021-01-04 ENCOUNTER — TELEPHONE (OUTPATIENT)
Dept: FAMILY MEDICINE CLINIC | Age: 76
End: 2021-01-04

## 2021-01-04 NOTE — TELEPHONE ENCOUNTER
Pt calling and asking if she can start taking her Venlafaxine again, she had stopped taking while in the hospital. She states she feels depressed and \"weepy\". Please advise.

## 2021-01-05 ENCOUNTER — OFFICE VISIT (OUTPATIENT)
Dept: ORTHOPEDIC SURGERY | Age: 76
End: 2021-01-05
Payer: COMMERCIAL

## 2021-01-05 ENCOUNTER — HOSPITAL ENCOUNTER (OUTPATIENT)
Dept: GENERAL RADIOLOGY | Age: 76
Discharge: HOME | End: 2021-01-07
Payer: COMMERCIAL

## 2021-01-05 VITALS — TEMPERATURE: 98.3 F

## 2021-01-05 DIAGNOSIS — Z96.612 S/P REVERSE TOTAL SHOULDER ARTHROPLASTY, LEFT: Primary | ICD-10-CM

## 2021-01-05 DIAGNOSIS — Z96.612 S/P REVERSE TOTAL SHOULDER ARTHROPLASTY, LEFT: ICD-10-CM

## 2021-01-05 PROCEDURE — 73030 X-RAY EXAM OF SHOULDER: CPT

## 2021-01-05 PROCEDURE — 99212 OFFICE O/P EST SF 10 MIN: CPT

## 2021-01-05 PROCEDURE — 99024 POSTOP FOLLOW-UP VISIT: CPT | Performed by: PHYSICIAN ASSISTANT

## 2021-01-05 NOTE — PROGRESS NOTES
Patient presents today arm in sling, she mentions she started Physical therapy yesterday and is having slight pain after that. But she says it's nothing she can't handle. Patient would like an explanation of what the procedure was.

## 2021-01-05 NOTE — PATIENT INSTRUCTIONS
Remove staples  Steri strips should fall off in the shower at some point, if they do not fall off in 10 days, remove them  Dressing: Can remove dressing in 1-2 days then open to air  Can shower in a couple days, NO Soaking or submerging incision in water until fully healed & all scabs are gone    WB:  Non-weight bearing on left upper extremity    Sling: On at all times, can remove for bathing and therapy. Remove daily for evaluation of skin breakdown    Make sure when laying down can ALWAYS see your elbow  No extending the arm behind your body    Therapy: Attend therapy following the reverse total shoulder protocol at phase 1     Pain control : Tylenol as needed, ice/heat    Continue with ice to the injured extremity 2-3 times per day for swelling  If able continue with elevation and compression    Follow up in 4 weeks with XR of the left shoulder. Please call the office at [phone] or send Pinguo message to providers sooner with any questions or concerns  Strongly recommend all of our patients sign up for Pinguo in order to have direct communication VIA Pinguo BOBBY with our clinic staff.

## 2021-01-05 NOTE — PROGRESS NOTES
Chief Complaint   Patient presents with    Post-Op Check     @ week po check for Left reverse TSA, 12/21/20        OP:SURGEON: Dr. Mark Tariq MD  DATE OF PROCEDURE: 12/21/2020  PROCEDURE: Left Reverse Total Shoulder Arthroplasty    Subjective: Senait Lowery is approximately 2 weeks follow-up from the above surgery. Patient is NWB on left upper extremity. She ambulates with assistive device, quad cane. Patient continues with sling, states fitting comfortably. Pain to extremity is mild and is not taking prescribed pain medication, using Tylenol PRN. Patient was readmitted post op for AMS possible opioid vs UTIThey denies numbness, tingling, weakness. Denies Calf pain. Patient is  participating in therapy, home health care . Patient states pain compared to before surgery, markedly improved. Patient states she has already noticed improvement in her motion with therapy. Mild soreness with therapy. Denies any new fall or injury. Denies fevers or chills. Denies chest pain or SOB. Review of Systems -    General ROS: negative for - chills, fatigue, fever or night sweats  Respiratory ROS: no cough, shortness of breath, or wheezing  Cardiovascular ROS: no chest pain or dyspnea on exertion  Gastrointestinal ROS: no abdominal pain, nausea, vomiting, diarrhea, constipation,or black or bloody stools  Genitourinary: no hematuria, dysuria, or incontinence   Musculoskeletal ROS: negative for -back or neck pain or stiffness, also see HPI  Neurological ROS: no TIA or stroke symptoms       Objective:    General: Alert and oriented X 3, normocephalic atraumatic, external ears and eye normal, sclera clear, no acute distress, respirations easy and unlabored with no audible wheezes, skin warm and dry, speech and dress appropriate for noted age, affect euthymic.     Extremity:  Left Upper Extremity  Skin is clean dry and intact  Mild edema noted  Mild ecchymosis, healing, over anterior chest wall Radial pulse palpable, fingers warm with BCR  Flex/extension intact to wrist, thumb and fingers  Finger opposition intact  Finger adduction/abduction intact  Finger crossover intact  Subjectively states sensation intact to radial/medial/ulnar distribution  Incision well approximated with no redness, drainage or warmth, staples intact, ready for removal  Sensation intact to axillary nerve distribution, patient able to fire deltoid for isometric  Patient has full AROM of elbow, wrist and hand  PROM of the shoulder demonstrates FF to 40, ABD to 45 prior to compensatory hiking or discomfort     Temp 98.3 °F (36.8 °C) (Oral)     XR:   3V of the left shoulder demonstrates rTSA in excellent alignment, concentrically located, no evidence of hardware loosening or subsidence. No other acute osseous abnormality    Assessment:   Diagnosis Orders   1. S/P reverse total shoulder arthroplasty, left  [address] [facility]    XR SHOULDER LEFT (MIN 2 VIEWS)       Plan:  Reviewed x-rays with patient today in office  All questions answered  Remove staples  WB:  Non-weight bearing on left upper  Sling: On at all times, can remove for bathing and therapy  Therapy: Attend therapy following the rTSA protocol, phase 1, ok to progress to week 3 at appropriate date. Updated [facility] to [facility]  Patient may use tylenol for breakthrough pain, recommended use of ice and heat to help with pain and spasms  Dressing: Can remove dressing in 1-2 days then open to air  Can shower in a couple days, NO Soaking or submerging incision in water until fully healed  Follow up in 4 weeks with XR of the left shoulder    Electronically signed by Michelle Rodríguez PA-C on 1/5/2021 at 10:01 AM  Note: This report was completed using Visual Unity voiced recognition software. Every effort has been made to ensure accuracy; however, inadvertent computerized transcription errors may be present.

## 2021-01-12 ENCOUNTER — OFFICE VISIT (OUTPATIENT)
Dept: FAMILY MEDICINE CLINIC | Age: 76
End: 2021-01-12
Payer: COMMERCIAL

## 2021-01-12 VITALS
DIASTOLIC BLOOD PRESSURE: 79 MMHG | BODY MASS INDEX: 36.88 KG/M2 | WEIGHT: 216 LBS | RESPIRATION RATE: 18 BRPM | HEIGHT: 64 IN | HEART RATE: 92 BPM | TEMPERATURE: 96.9 F | SYSTOLIC BLOOD PRESSURE: 112 MMHG | OXYGEN SATURATION: 97 %

## 2021-01-12 DIAGNOSIS — H91.90 HEARING LOSS, UNSPECIFIED HEARING LOSS TYPE, UNSPECIFIED LATERALITY: ICD-10-CM

## 2021-01-12 DIAGNOSIS — E11.40 TYPE 2 DIABETES MELLITUS WITH DIABETIC NEUROPATHY, UNSPECIFIED WHETHER LONG TERM INSULIN USE (HCC): ICD-10-CM

## 2021-01-12 DIAGNOSIS — Z00.00 MEDICARE ANNUAL WELLNESS VISIT, INITIAL: Primary | ICD-10-CM

## 2021-01-12 LAB — HBA1C MFR BLD: 6.4 %

## 2021-01-12 PROCEDURE — 1123F ACP DISCUSS/DSCN MKR DOCD: CPT | Performed by: FAMILY MEDICINE

## 2021-01-12 PROCEDURE — 4040F PNEUMOC VAC/ADMIN/RCVD: CPT | Performed by: FAMILY MEDICINE

## 2021-01-12 PROCEDURE — G0438 PPPS, INITIAL VISIT: HCPCS | Performed by: FAMILY MEDICINE

## 2021-01-12 PROCEDURE — 83036 HEMOGLOBIN GLYCOSYLATED A1C: CPT | Performed by: FAMILY MEDICINE

## 2021-01-12 PROCEDURE — 3044F HG A1C LEVEL LT 7.0%: CPT | Performed by: FAMILY MEDICINE

## 2021-01-12 PROCEDURE — G8482 FLU IMMUNIZE ORDER/ADMIN: HCPCS | Performed by: FAMILY MEDICINE

## 2021-01-12 PROCEDURE — 3017F COLORECTAL CA SCREEN DOC REV: CPT | Performed by: FAMILY MEDICINE

## 2021-01-12 ASSESSMENT — PATIENT HEALTH QUESTIONNAIRE - PHQ9
SUM OF ALL RESPONSES TO PHQ QUESTIONS 1-9: 0
2. FEELING DOWN, DEPRESSED OR HOPELESS: 0
SUM OF ALL RESPONSES TO PHQ QUESTIONS 1-9: 0

## 2021-01-12 ASSESSMENT — LIFESTYLE VARIABLES: HOW OFTEN DO YOU HAVE A DRINK CONTAINING ALCOHOL: 0

## 2021-01-12 NOTE — PATIENT INSTRUCTIONS
Have non-slip mats or surfaces in all bathtubs/showers. Please call every pharmacy around and ask if they provide the new shingles vaccine and how much it costs. If it's affordable please get it and let me know when you've received it. Please call every pharmacy around and ask if they provide the Tdap vaccine and how much it costs. If it's affordable please get it and let me know when you've received it.

## 2021-01-12 NOTE — PROGRESS NOTES
Medicare Annual Wellness Visit  Name: Song Kennedy Date: 2021   MRN: [id number] Sex: Female   Age: 76 y.o. Ethnicity: Non-/Non    : 1945 Race: Bruce Gramajo is here for Medicare AWV    Screenings for behavioral, psychosocial and functional/safety risks, and cognitive dysfunction are all negative except as indicated below. These results, as well as other patient data from the [address] form, are documented in Flowsheets linked to this Encounter. Allergies   Allergen Reactions    Lisinopril          Prior to Visit Medications    Medication Sig Taking? Authorizing Provider   oxyCODONE 5 MG capsule Take 5 mg by mouth every 6 hours as needed for Pain.  Yes Historical Provider, MD   albuterol sulfate HFA (VENTOLIN HFA) 108 (90 Base) MCG/ACT inhaler Inhale 2 puffs into the lungs 4 times daily as needed for Wheezing Yes Tiburcio Lanza MD   losartan (COZAAR) 100 MG tablet Take 1 tablet by mouth daily Yes Tiburcio Lanza MD   venlafaxine (EFFEXOR XR) 75 MG extended release capsule Take 1 capsule by mouth daily Yes Tiburcio Lanza MD   magnesium gluconate (MAGONATE) 500 MG tablet Take 500 mg by mouth 2 times daily Yes Historical Provider, MD   metFORMIN (GLUCOPHAGE) 500 MG tablet Take 2 tablets by mouth 2 times daily (with meals) TAKE 1 TABLET BY MOUTH daily in AM Yes Tiburcio Lanza MD   doxazosin (CARDURA) 1 MG tablet Take 1 mg by mouth nightly  Yes Historical Provider, MD   spironolactone (ALDACTONE) 25 MG tablet Take 50 mg by mouth daily  Yes Historical Provider, MD   felodipine (PLENDIL) 2.5 MG extended release tablet Take 1 tablet by mouth daily Yes Tiburcio Lanza MD   ondansetron (ZOFRAN) 4 MG tablet Take 1 tablet by mouth daily as needed for Nausea or Vomiting Yes Tiburcio Lanza MD   vitamin D (ERGOCALCIFEROL) 1.25 MG (19123 UT) CAPS capsule Take 1 capsule by mouth once a week Yes Jaxson Maciel DO   rifaximin Akua Pappas) 550 MG tablet Take 550 mg by mouth 2 times daily Yes Historical Provider, MD   lactulose (CHRONULAC) 10 GM/15ML solution Take 10 g by mouth 2 to 3 times day Yes Historical Provider, MD   omeprazole (PRILOSEC) 40 MG delayed release capsule Take 40 mg by mouth daily  Historical Provider, MD   furosemide (LASIX) 40 MG tablet Take 40 mg by mouth daily  Historical Provider, MD         Past Medical History:   Diagnosis Date    [facility]     [facility]osis [facility]     Essential hypertension     Hyperlipidemia     Osteopenia     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 [facility]     Sleep apnea     Spinal stenosis     Type 2 diabetes mellitus ([facility]        Past Surgical History:   Procedure Laterality Date    BREAST LUMPECTOMY      lumpectomy [id number]    CARPAL TUNNEL RELEASE      COLONOSCOPY  01/31/2017    multiple polyps; diverticula--jerod    COLONOSCOPY  04/23/2019    polyps; diverticula; hemorrhoids--jerod    COLONOSCOPY N/A 4/23/2019    COLONOSCOPY POLYPECTOMY SNARE/COLD BIOPSY performed by Aide Hightower MD at [place] [address]  4/23/2019    COLONOSCOPY WITH BIOPSY performed by Aide Hightower MD at [address] [place] [address] LITHOTRIPSY Left 05/04/2016    C-R STENT PLACEMENT    SHOULDER ARTHROPLASTY Left 12/21/2020    LEFT REVERSE TOTAL SHOULDER  ARTHROPLASTY -- DEPUY performed by Becka Andrews MD at [facility].  04/23/2019    gastritis--jerod    UPPER GASTROINTESTINAL ENDOSCOPY N/A 4/23/2019    EGD BIOPSY performed by Aide Hightower MD at [address] [place] [address] History   Problem Relation Age of Onset    COPD Father     Heart Attack Father     Arthritis Mother     Cancer Other 48        colon       CareTeam (Including outside providers/suppliers regularly involved in providing care):   Patient Care Team:  Sandra Cisneros MD as PCP - General (Family Medicine)  Sandra Cisneros MD as PCP - [facility]    Wt Readings from Last 3 Encounters:   01/12/21 216 lb (98 kg)   12/29/20 200 lb (90.7 kg)   12/21/20 200 lb (90.7 kg)     Vitals:    01/12/21 1451   BP: 112/79   Site: Left Upper Arm   Position: Sitting   Cuff Size: Medium Adult   Pulse: 92   Resp: 18   Temp: 96.9 °F (36.1 °C)   TempSrc: Temporal   SpO2: 97%   Weight: 216 lb (98 kg)   Height: 5' 4\" (1.626 m)     Body mass index is 37.08 kg/m². Based upon direct observation of the patient, evaluation of cognition reveals recent and remote memory intact. Patient's complete Health Risk Assessment and screening values have been reviewed and are found in Flowsheets. The following problems were reviewed today and where indicated follow up appointments were made and/or referrals ordered. Positive Risk Factor Screenings with Interventions:     Fall Risk:  Timed Up and Go Test > 12 seconds? (Complete if either Fall Risk answers are Yes): (!) yes  2 or more falls in past year?: (!) yes  Fall with injury in past year?: (!) yes  Fall Risk Interventions:    · pt is very careful now, no falls in months. General Health and ACP:  General  In general, how would you say your health is?: (!) Poor  In the past 7 days, have you experienced any of the following? New or Increased Pain, New or Increased Fatigue, Loneliness, Social Isolation, Stress or Anger?: (!) New or Increased Pain  Do you get the social and emotional support that you need?: Yes  Do you have a Living Will?: (!) No  Advance Directives     Power of [address] [place] [address] Will ACP-Advance Directive ACP-Power of     Not on File Not on File Not on File Not on File      General Health Risk Interventions:  · Poor self-assessment of health status: pt states when the pain in her LUE improves she should feel better.  also LLE weakness for months 2/2 pain  · Pain issues: patient advised to follow-up in this office for further evaluation and treatment within 1 month(s)    Health Habits/Nutrition:  Health Habits/Nutrition  Do you exercise for at least 20 minutes 2-3 times per week?: Yes  Have you lost any weight without trying in the past 3 months?: No  Do you eat fewer than 2 meals per day?: No  Have you seen a dentist within the past year?: (!) No  Body mass index: (!) 37.07  Health Habits/Nutrition Interventions:  · Dental exam overdue:  patient encouraged to make appointment with his/her dentist    Hearing/Vision:  No exam data present  Hearing/Vision  Do you or your family notice any trouble with your hearing?: (!) Yes  Do you have difficulty driving, watching TV, or doing any of your daily activities because of your eyesight?: No  Have you had an eye exam within the past year?: Yes  Hearing/Vision Interventions:  · Hearing concerns:  ENT referral provided    Safety:  Safety  Do you have working smoke detectors?: Yes  Have all throw rugs been removed or fastened?: Yes  Do you have non-slip mats or surfaces in all bathtubs/showers?: (!) No  Do all of your stairways have a railing or banister?: Yes  Are your doorways, halls and stairs free of clutter?: (!) No  Do you always fasten your seatbelt when you are in a car?: Yes  Safety Interventions:  · Home safety tips provided  · no stairs. ADL:  ADLs  In the past 7 days, did you need help from others to perform any of the following everyday activities? Eating, dressing, grooming, bathing, toileting, or walking/balance?: (!) Bathing  In the past 7 days, did you need help from others to take care of any of the following?  Laundry, housekeeping, banking/finances, shopping, telephone use, food preparation, transportation, or taking medications?: None  ADL Interventions:  · Patient declines any further evaluation/treatment for this issue  · has help at home    Personalized Preventive Plan   Current Health Maintenance Status  Immunization History   Administered Date(s) Administered    Hepatitis B Adult (Recombivax HB) 03/12/2020    Influenza A (D9X4-57) Vaccine PF IM 12/21/2009    Influenza Vaccine, unspecified formulation 09/27/2016    Influenza Virus Vaccine 09/28/2015    Influenza Whole 09/28/2015    Influenza, High Dose (Fluzone 65 yrs and older) 09/27/2016, 09/21/2017, 10/05/2018, 10/21/2019, 12/01/2020    Pneumococcal Conjugate 13-valent ([id number]) 09/27/2016    Pneumococcal Polysaccharide ([id number]) 11/21/2017    Zoster Live (Zostavax) 04/01/2013    Zoster Recombinant (Shingrix) 04/12/2018        Health Maintenance   Topic Date Due    Hepatitis A vaccine (1 of 2 - Risk 2-dose series) 06/03/1946    DTaP/Tdap/Td vaccine (1 - Tdap) 06/03/1964    Shingles Vaccine (3 of 3) 06/07/2018    Diabetic retinal exam  05/01/2020    Diabetic foot exam  07/25/2020    Annual Wellness Visit (AWV)  07/25/2020    Lipid screen  03/12/2021    A1C test (Diabetic or Prediabetic)  04/12/2021    Potassium monitoring  12/31/2021    Creatinine monitoring  12/31/2021    Colon cancer screen colonoscopy  04/23/2022    DEXA (modify frequency per FRAX score)  Completed    Flu vaccine  Completed    Pneumococcal 65+ years Vaccine  Completed    Hepatitis C screen  Completed    Hib vaccine  Aged Out    Meningococcal (ACWY) vaccine  Aged Out     Recommendations for [email or web address] Due: see orders and patient instructions/AVS.  . Recommended screening schedule for the next 5-10 years is provided to the patient in written form: see Patient Camden Hurst was seen today for medicare awv.     Diagnoses and all orders for this visit:    Medicare annual wellness visit, initial    Type 2 diabetes mellitus with diabetic neuropathy, unspecified whether long term insulin use ([facility]  -     POCT glycosylated hemoglobin (Hb A1C)    Hearing loss, unspecified hearing loss type, unspecified laterality  -     Kera Issa., DO, Ear, Nose, Throat, [address]

## 2021-01-15 ENCOUNTER — TELEPHONE (OUTPATIENT)
Dept: FAMILY MEDICINE CLINIC | Age: 76
End: 2021-01-15

## 2021-01-20 ENCOUNTER — TELEPHONE (OUTPATIENT)
Dept: SURGERY | Age: 76
End: 2021-01-20

## 2021-01-20 NOTE — TELEPHONE ENCOUNTER
MA received ov notes from patient's cancer center visit. Patient is due for a colonoscopy in 2021. MA reached out to patient to see if she would like to schedule a consult visit. Patient states she recently had surgery on her arm and would like to wait a bit. Patient informed she's not due until April for her repeat colonoscopy. Patient informed we will call her in March to schedule a consult.      Electronically signed by Desti Service on 1/20/21 at 12:32 PM EST

## 2021-01-27 ENCOUNTER — TELEPHONE (OUTPATIENT)
Dept: ORTHOPEDIC SURGERY | Age: 76
End: 2021-01-27

## 2021-01-27 NOTE — TELEPHONE ENCOUNTER
[facility] left VM stating she was Stone pt from home OT. Any questions we can reach her at [phone].     OP:SURGEON: Dr. Blank De La Paz MD  DATE OF PROCEDURE: 12/21/2020  PROCEDURE: Left Reverse Total Shoulder Arthroplasty    Next appt: 2/2/21

## 2021-01-27 NOTE — TELEPHONE ENCOUNTER
Noted, thanks. Can discuss outpatient therapy at next office visit next week.     Future Appointments   Date Time Provider Geraldine Rader   2/2/2021 11:00 AM SE KRISTIN Garg APC [facility]   3/15/2021 10:00 AM XENIA Greco - CNP [facility]   3/16/2021  9:30 AM Scott Tracy MD SE Ortho [facility]   4/15/2021 10:30 [facility]

## 2021-01-29 ENCOUNTER — TELEPHONE (OUTPATIENT)
Dept: FAMILY MEDICINE CLINIC | Age: 76
End: 2021-01-29

## 2021-01-29 NOTE — TELEPHONE ENCOUNTER
Pt calling in and states that she feels she has a UTI, she has burning and frequency. She is asking if something can be called in for her.

## 2021-01-30 ENCOUNTER — TELEPHONE (OUTPATIENT)
Dept: FAMILY MEDICINE CLINIC | Age: 76
End: 2021-01-30

## 2021-01-30 RX ORDER — SULFAMETHOXAZOLE AND TRIMETHOPRIM 800; 160 MG/1; MG/1
1 TABLET ORAL 2 TIMES DAILY
Qty: 6 TABLET | Refills: 0 | Status: SHIPPED | OUTPATIENT
Start: 2021-01-30 | End: 2021-01-31

## 2021-01-30 NOTE — TELEPHONE ENCOUNTER
Received call from pt regarding UTI sx, burning and frequency since yesterday. Seems similar to prior UTIs. No F/C/N/V. Likely UTI. Will send bactrim to pharmacy, follow up closely with PCP. Verbalized understanding.

## 2021-01-31 ENCOUNTER — APPOINTMENT (OUTPATIENT)
Dept: CT IMAGING | Age: 76
DRG: 432 | End: 2021-01-31
Payer: COMMERCIAL

## 2021-01-31 ENCOUNTER — HOSPITAL ENCOUNTER (INPATIENT)
Age: 76
LOS: 4 days | Discharge: HOME | DRG: 432 | End: 2021-02-04
Attending: EMERGENCY MEDICINE | Admitting: FAMILY MEDICINE
Payer: COMMERCIAL

## 2021-01-31 ENCOUNTER — APPOINTMENT (OUTPATIENT)
Dept: GENERAL RADIOLOGY | Age: 76
DRG: 432 | End: 2021-01-31
Payer: COMMERCIAL

## 2021-01-31 DIAGNOSIS — K76.9 LIVER LESION: ICD-10-CM

## 2021-01-31 DIAGNOSIS — D64.9 ANEMIA, UNSPECIFIED TYPE: ICD-10-CM

## 2021-01-31 DIAGNOSIS — I51.7 CARDIOMEGALY: ICD-10-CM

## 2021-01-31 DIAGNOSIS — J90 PLEURAL EFFUSION ON LEFT: Primary | ICD-10-CM

## 2021-01-31 DIAGNOSIS — I26.99 PULMONARY EMBOLISM WITHOUT ACUTE COR PULMONALE, UNSPECIFIED CHRONICITY, UNSPECIFIED PULMONARY EMBOLISM TYPE (HCC): ICD-10-CM

## 2021-01-31 DIAGNOSIS — K74.60 CIRRHOSIS OF LIVER WITHOUT ASCITES, UNSPECIFIED HEPATIC CIRRHOSIS TYPE (HCC): ICD-10-CM

## 2021-01-31 LAB
ADENOVIRUS BY PCR: NOT DETECTED
ALBUMIN SERPL-MCNC: 3.2 G/DL (ref 3.5–5.2)
ALP BLD-CCNC: 114 U/L (ref 35–104)
ALT SERPL-CCNC: 14 U/L (ref 0–32)
ANION GAP SERPL CALCULATED.3IONS-SCNC: 11 MMOL/L (ref 7–16)
AST SERPL-CCNC: 30 U/L (ref 0–31)
BASOPHILS ABSOLUTE: 0.06 E9/L (ref 0–0.2)
BASOPHILS RELATIVE PERCENT: 1 % (ref 0–2)
BILIRUB SERPL-MCNC: 0.7 MG/DL (ref 0–1.2)
BORDETELLA PARAPERTUSSIS BY PCR: NOT DETECTED
BORDETELLA PERTUSSIS BY PCR: NOT DETECTED
BUN BLDV-MCNC: 12 MG/DL (ref 8–23)
CALCIUM SERPL-MCNC: 9.5 MG/DL (ref 8.6–10.2)
CHLAMYDOPHILIA PNEUMONIAE BY PCR: NOT DETECTED
CHLORIDE BLD-SCNC: 108 MMOL/L (ref 98–107)
CO2: 23 MMOL/L (ref 22–29)
CORONAVIRUS 229E BY PCR: NOT DETECTED
CORONAVIRUS HKU1 BY PCR: NOT DETECTED
CORONAVIRUS NL63 BY PCR: NOT DETECTED
CORONAVIRUS OC43 BY PCR: NOT DETECTED
CREAT SERPL-MCNC: 1.2 MG/DL (ref 0.5–1)
D DIMER: 970 NG/ML DDU
EOSINOPHILS ABSOLUTE: 0.25 E9/L (ref 0.05–0.5)
EOSINOPHILS RELATIVE PERCENT: 4.1 % (ref 0–6)
GFR AFRICAN AMERICAN: 53
GFR NON-AFRICAN AMERICAN: 44 ML/MIN/1.73
GLUCOSE BLD-MCNC: 97 MG/DL (ref 74–99)
HCT VFR BLD CALC: 28.4 % (ref 34–48)
HEMOGLOBIN: 9.2 G/DL (ref 11.5–15.5)
HUMAN METAPNEUMOVIRUS BY PCR: NOT DETECTED
HUMAN RHINOVIRUS/ENTEROVIRUS BY PCR: NOT DETECTED
IMMATURE GRANULOCYTES #: 0.01 E9/L
IMMATURE GRANULOCYTES %: 0.2 % (ref 0–5)
INFLUENZA A BY PCR: NOT DETECTED
INFLUENZA B BY PCR: NOT DETECTED
LYMPHOCYTES ABSOLUTE: 1.15 E9/L (ref 1.5–4)
LYMPHOCYTES RELATIVE PERCENT: 18.8 % (ref 20–42)
MCH RBC QN AUTO: 30.8 PG (ref 26–35)
MCHC RBC AUTO-ENTMCNC: 32.4 % (ref 32–34.5)
MCV RBC AUTO: 95 FL (ref 80–99.9)
MONOCYTES ABSOLUTE: 0.65 E9/L (ref 0.1–0.95)
MONOCYTES RELATIVE PERCENT: 10.6 % (ref 2–12)
MYCOPLASMA PNEUMONIAE BY PCR: NOT DETECTED
NEUTROPHILS ABSOLUTE: 3.99 E9/L (ref 1.8–7.3)
NEUTROPHILS RELATIVE PERCENT: 65.3 % (ref 43–80)
PARAINFLUENZA VIRUS 1 BY PCR: NOT DETECTED
PARAINFLUENZA VIRUS 2 BY PCR: NOT DETECTED
PARAINFLUENZA VIRUS 3 BY PCR: NOT DETECTED
PARAINFLUENZA VIRUS 4 BY PCR: NOT DETECTED
PDW BLD-RTO: 17.2 FL (ref 11.5–15)
PLATELET # BLD: 206 E9/L (ref 130–450)
PMV BLD AUTO: 10.4 FL (ref 7–12)
POTASSIUM SERPL-SCNC: 4 MMOL/L (ref 3.5–5)
PRO-BNP: 110 PG/ML (ref 0–450)
RBC # BLD: 2.99 E12/L (ref 3.5–5.5)
RESPIRATORY SYNCYTIAL VIRUS BY PCR: NOT DETECTED
SARS-COV-2, PCR: NOT DETECTED
SODIUM BLD-SCNC: 142 MMOL/L (ref 132–146)
TOTAL PROTEIN: 7 G/DL (ref 6.4–8.3)
TROPONIN: <0.01 NG/ML (ref 0–0.03)
WBC # BLD: 6.1 E9/L (ref 4.5–11.5)

## 2021-01-31 PROCEDURE — 71275 CT ANGIOGRAPHY CHEST: CPT

## 2021-01-31 PROCEDURE — 36415 COLL VENOUS BLD VENIPUNCTURE: CPT

## 2021-01-31 PROCEDURE — 83880 ASSAY OF NATRIURETIC PEPTIDE: CPT

## 2021-01-31 PROCEDURE — 71045 X-RAY EXAM CHEST 1 VIEW: CPT

## 2021-01-31 PROCEDURE — 6360000002 HC RX W HCPCS: Performed by: EMERGENCY MEDICINE

## 2021-01-31 PROCEDURE — 84484 ASSAY OF TROPONIN QUANT: CPT

## 2021-01-31 PROCEDURE — 2580000003 HC RX 258: Performed by: RADIOLOGY

## 2021-01-31 PROCEDURE — 2060000000 HC ICU INTERMEDIATE R&B

## 2021-01-31 PROCEDURE — 6360000004 HC RX CONTRAST MEDICATION: Performed by: RADIOLOGY

## 2021-01-31 PROCEDURE — 80053 COMPREHEN METABOLIC PANEL: CPT

## 2021-01-31 PROCEDURE — 85025 COMPLETE CBC W/AUTO DIFF WBC: CPT

## 2021-01-31 PROCEDURE — 99284 EMERGENCY DEPT VISIT MOD MDM: CPT

## 2021-01-31 PROCEDURE — 0202U NFCT DS 22 TRGT SARS-COV-2: CPT

## 2021-01-31 PROCEDURE — 85378 FIBRIN DEGRADE SEMIQUANT: CPT

## 2021-01-31 PROCEDURE — 93005 ELECTROCARDIOGRAM TRACING: CPT | Performed by: EMERGENCY MEDICINE

## 2021-01-31 RX ORDER — FUROSEMIDE 10 MG/ML
40 INJECTION INTRAMUSCULAR; INTRAVENOUS ONCE
Status: COMPLETED | OUTPATIENT
Start: 2021-01-31 | End: 2021-01-31

## 2021-01-31 RX ORDER — SODIUM CHLORIDE 0.9 % (FLUSH) 0.9 %
10 SYRINGE (ML) INJECTION PRN
Status: COMPLETED | OUTPATIENT
Start: 2021-01-31 | End: 2021-01-31

## 2021-01-31 RX ADMIN — Medication 10 ML: at 17:35

## 2021-01-31 RX ADMIN — IOPAMIDOL 75 ML: 755 INJECTION, SOLUTION INTRAVENOUS at 17:33

## 2021-01-31 RX ADMIN — FUROSEMIDE 40 MG: 10 INJECTION, SOLUTION INTRAVENOUS at 18:31

## 2021-01-31 NOTE — ED NOTES
Assisted to restroom via cart on oxygen. Tolerated well. Returned to room. Monitor reapplied. No c/o at this time.       Kam Flores RN  01/31/21 0595

## 2021-02-01 ENCOUNTER — APPOINTMENT (OUTPATIENT)
Dept: ULTRASOUND IMAGING | Age: 76
DRG: 432 | End: 2021-02-01
Payer: COMMERCIAL

## 2021-02-01 ENCOUNTER — TELEPHONE (OUTPATIENT)
Dept: FAMILY MEDICINE CLINIC | Age: 76
End: 2021-02-01

## 2021-02-01 DIAGNOSIS — I10 ESSENTIAL HYPERTENSION: ICD-10-CM

## 2021-02-01 PROBLEM — R06.02 SOB (SHORTNESS OF BREATH): Status: ACTIVE | Noted: 2021-02-01

## 2021-02-01 LAB
AMMONIA: 109 UMOL/L (ref 11–51)
ANION GAP SERPL CALCULATED.3IONS-SCNC: 13 MMOL/L (ref 7–16)
APTT: 34.1 SEC (ref 24.5–35.1)
APTT: >240 SEC (ref 24.5–35.1)
BASOPHILS ABSOLUTE: 0.07 E9/L (ref 0–0.2)
BASOPHILS RELATIVE PERCENT: 1.1 % (ref 0–2)
BUN BLDV-MCNC: 11 MG/DL (ref 8–23)
CALCIUM SERPL-MCNC: 9.7 MG/DL (ref 8.6–10.2)
CHLORIDE BLD-SCNC: 109 MMOL/L (ref 98–107)
CO2: 23 MMOL/L (ref 22–29)
CREAT SERPL-MCNC: 1.2 MG/DL (ref 0.5–1)
EKG ATRIAL RATE: 106 BPM
EKG P AXIS: 54 DEGREES
EKG P-R INTERVAL: 138 MS
EKG Q-T INTERVAL: 354 MS
EKG QRS DURATION: 72 MS
EKG QTC CALCULATION (BAZETT): 470 MS
EKG R AXIS: 10 DEGREES
EKG T AXIS: 33 DEGREES
EKG VENTRICULAR RATE: 106 BPM
EOSINOPHILS ABSOLUTE: 0.29 E9/L (ref 0.05–0.5)
EOSINOPHILS RELATIVE PERCENT: 4.4 % (ref 0–6)
GFR AFRICAN AMERICAN: 53
GFR NON-AFRICAN AMERICAN: 44 ML/MIN/1.73
GLUCOSE BLD-MCNC: 76 MG/DL (ref 74–99)
HCT VFR BLD CALC: 28.2 % (ref 34–48)
HEMOGLOBIN: 8.9 G/DL (ref 11.5–15.5)
IMMATURE GRANULOCYTES #: 0.03 E9/L
IMMATURE GRANULOCYTES %: 0.5 % (ref 0–5)
LACTATE DEHYDROGENASE: 234 U/L (ref 135–214)
LYMPHOCYTES ABSOLUTE: 1.31 E9/L (ref 1.5–4)
LYMPHOCYTES RELATIVE PERCENT: 20.1 % (ref 20–42)
MCH RBC QN AUTO: 29.7 PG (ref 26–35)
MCHC RBC AUTO-ENTMCNC: 31.6 % (ref 32–34.5)
MCV RBC AUTO: 94 FL (ref 80–99.9)
MONOCYTES ABSOLUTE: 0.86 E9/L (ref 0.1–0.95)
MONOCYTES RELATIVE PERCENT: 13.2 % (ref 2–12)
NEUTROPHILS ABSOLUTE: 3.96 E9/L (ref 1.8–7.3)
NEUTROPHILS RELATIVE PERCENT: 60.7 % (ref 43–80)
PDW BLD-RTO: 17.3 FL (ref 11.5–15)
PLATELET # BLD: 202 E9/L (ref 130–450)
PMV BLD AUTO: 10.1 FL (ref 7–12)
POTASSIUM REFLEX MAGNESIUM: 4.2 MMOL/L (ref 3.5–5)
RBC # BLD: 3 E12/L (ref 3.5–5.5)
SODIUM BLD-SCNC: 145 MMOL/L (ref 132–146)
WBC # BLD: 6.5 E9/L (ref 4.5–11.5)

## 2021-02-01 PROCEDURE — 6360000002 HC RX W HCPCS: Performed by: STUDENT IN AN ORGANIZED HEALTH CARE EDUCATION/TRAINING PROGRAM

## 2021-02-01 PROCEDURE — 85025 COMPLETE CBC W/AUTO DIFF WBC: CPT

## 2021-02-01 PROCEDURE — 93970 EXTREMITY STUDY: CPT | Performed by: RADIOLOGY

## 2021-02-01 PROCEDURE — 36415 COLL VENOUS BLD VENIPUNCTURE: CPT

## 2021-02-01 PROCEDURE — 99223 1ST HOSP IP/OBS HIGH 75: CPT | Performed by: INTERNAL MEDICINE

## 2021-02-01 PROCEDURE — 2580000003 HC RX 258: Performed by: STUDENT IN AN ORGANIZED HEALTH CARE EDUCATION/TRAINING PROGRAM

## 2021-02-01 PROCEDURE — 80048 BASIC METABOLIC PNL TOTAL CA: CPT

## 2021-02-01 PROCEDURE — 83615 LACTATE (LD) (LDH) ENZYME: CPT

## 2021-02-01 PROCEDURE — 0W9B3ZZ DRAINAGE OF LEFT PLEURAL CAVITY, PERCUTANEOUS APPROACH: ICD-10-PCS | Performed by: INTERNAL MEDICINE

## 2021-02-01 PROCEDURE — 6370000000 HC RX 637 (ALT 250 FOR IP): Performed by: FAMILY MEDICINE

## 2021-02-01 PROCEDURE — 99223 1ST HOSP IP/OBS HIGH 75: CPT | Performed by: FAMILY MEDICINE

## 2021-02-01 PROCEDURE — 2060000000 HC ICU INTERMEDIATE R&B

## 2021-02-01 PROCEDURE — 93970 EXTREMITY STUDY: CPT

## 2021-02-01 PROCEDURE — 85730 THROMBOPLASTIN TIME PARTIAL: CPT

## 2021-02-01 PROCEDURE — 93010 ELECTROCARDIOGRAM REPORT: CPT | Performed by: INTERNAL MEDICINE

## 2021-02-01 PROCEDURE — 76705 ECHO EXAM OF ABDOMEN: CPT

## 2021-02-01 PROCEDURE — 6370000000 HC RX 637 (ALT 250 FOR IP): Performed by: STUDENT IN AN ORGANIZED HEALTH CARE EDUCATION/TRAINING PROGRAM

## 2021-02-01 PROCEDURE — 2700000000 HC OXYGEN THERAPY PER DAY

## 2021-02-01 PROCEDURE — 82140 ASSAY OF AMMONIA: CPT

## 2021-02-01 RX ORDER — ACETAMINOPHEN 325 MG/1
650 TABLET ORAL EVERY 6 HOURS PRN
Status: DISCONTINUED | OUTPATIENT
Start: 2021-02-01 | End: 2021-02-04 | Stop reason: HOSPADM

## 2021-02-01 RX ORDER — ONDANSETRON 2 MG/ML
4 INJECTION INTRAMUSCULAR; INTRAVENOUS EVERY 6 HOURS PRN
Status: DISCONTINUED | OUTPATIENT
Start: 2021-02-01 | End: 2021-02-04 | Stop reason: HOSPADM

## 2021-02-01 RX ORDER — IPRATROPIUM BROMIDE AND ALBUTEROL SULFATE 2.5; .5 MG/3ML; MG/3ML
1 SOLUTION RESPIRATORY (INHALATION) EVERY 4 HOURS PRN
Status: DISCONTINUED | OUTPATIENT
Start: 2021-02-01 | End: 2021-02-04 | Stop reason: HOSPADM

## 2021-02-01 RX ORDER — ACETAMINOPHEN 650 MG/1
650 SUPPOSITORY RECTAL EVERY 6 HOURS PRN
Status: DISCONTINUED | OUTPATIENT
Start: 2021-02-01 | End: 2021-02-04 | Stop reason: HOSPADM

## 2021-02-01 RX ORDER — AMLODIPINE BESYLATE 2.5 MG/1
2.5 TABLET ORAL DAILY
Status: DISCONTINUED | OUTPATIENT
Start: 2021-02-01 | End: 2021-02-04 | Stop reason: HOSPADM

## 2021-02-01 RX ORDER — LACTULOSE 10 G/15ML
10 SOLUTION ORAL 2 TIMES DAILY
Status: DISCONTINUED | OUTPATIENT
Start: 2021-02-01 | End: 2021-02-01

## 2021-02-01 RX ORDER — HEPARIN SODIUM 10000 [USP'U]/100ML
5-30 INJECTION, SOLUTION INTRAVENOUS CONTINUOUS
Status: DISCONTINUED | OUTPATIENT
Start: 2021-02-01 | End: 2021-02-02

## 2021-02-01 RX ORDER — HEPARIN SODIUM 1000 [USP'U]/ML
80 INJECTION, SOLUTION INTRAVENOUS; SUBCUTANEOUS ONCE
Status: COMPLETED | OUTPATIENT
Start: 2021-02-01 | End: 2021-02-01

## 2021-02-01 RX ORDER — FUROSEMIDE 40 MG/1
40 TABLET ORAL DAILY
Status: DISCONTINUED | OUTPATIENT
Start: 2021-02-01 | End: 2021-02-04 | Stop reason: HOSPADM

## 2021-02-01 RX ORDER — SODIUM CHLORIDE 0.9 % (FLUSH) 0.9 %
10 SYRINGE (ML) INJECTION EVERY 12 HOURS SCHEDULED
Status: DISCONTINUED | OUTPATIENT
Start: 2021-02-01 | End: 2021-02-04 | Stop reason: HOSPADM

## 2021-02-01 RX ORDER — HEPARIN SODIUM 1000 [USP'U]/ML
40 INJECTION, SOLUTION INTRAVENOUS; SUBCUTANEOUS PRN
Status: DISCONTINUED | OUTPATIENT
Start: 2021-02-01 | End: 2021-02-02 | Stop reason: ALTCHOICE

## 2021-02-01 RX ORDER — PROMETHAZINE HYDROCHLORIDE 25 MG/1
12.5 TABLET ORAL EVERY 6 HOURS PRN
Status: DISCONTINUED | OUTPATIENT
Start: 2021-02-01 | End: 2021-02-04 | Stop reason: HOSPADM

## 2021-02-01 RX ORDER — LACTULOSE 10 G/15ML
20 SOLUTION ORAL 3 TIMES DAILY
Status: DISCONTINUED | OUTPATIENT
Start: 2021-02-01 | End: 2021-02-04 | Stop reason: HOSPADM

## 2021-02-01 RX ORDER — SPIRONOLACTONE 25 MG/1
50 TABLET ORAL DAILY
Status: DISCONTINUED | OUTPATIENT
Start: 2021-02-01 | End: 2021-02-04 | Stop reason: HOSPADM

## 2021-02-01 RX ORDER — HEPARIN SODIUM 10000 [USP'U]/100ML
5-30 INJECTION, SOLUTION INTRAVENOUS CONTINUOUS
Status: CANCELLED | OUTPATIENT
Start: 2021-02-01

## 2021-02-01 RX ORDER — DOXAZOSIN MESYLATE 1 MG/1
1 TABLET ORAL NIGHTLY
Status: DISCONTINUED | OUTPATIENT
Start: 2021-02-01 | End: 2021-02-04 | Stop reason: HOSPADM

## 2021-02-01 RX ORDER — VENLAFAXINE HYDROCHLORIDE 75 MG/1
75 CAPSULE, EXTENDED RELEASE ORAL DAILY
Status: DISCONTINUED | OUTPATIENT
Start: 2021-02-01 | End: 2021-02-04 | Stop reason: HOSPADM

## 2021-02-01 RX ORDER — SODIUM CHLORIDE 0.9 % (FLUSH) 0.9 %
10 SYRINGE (ML) INJECTION PRN
Status: DISCONTINUED | OUTPATIENT
Start: 2021-02-01 | End: 2021-02-04 | Stop reason: HOSPADM

## 2021-02-01 RX ORDER — HEPARIN SODIUM 1000 [USP'U]/ML
80 INJECTION, SOLUTION INTRAVENOUS; SUBCUTANEOUS PRN
Status: DISCONTINUED | OUTPATIENT
Start: 2021-02-01 | End: 2021-02-02 | Stop reason: ALTCHOICE

## 2021-02-01 RX ADMIN — CEFTRIAXONE SODIUM 1000 MG: 1 INJECTION, POWDER, FOR SOLUTION INTRAMUSCULAR; INTRAVENOUS at 09:01

## 2021-02-01 RX ADMIN — LACTULOSE 10 G: 20 SOLUTION ORAL at 09:01

## 2021-02-01 RX ADMIN — RIFAXIMIN 550 MG: 550 TABLET ORAL at 11:25

## 2021-02-01 RX ADMIN — FUROSEMIDE 40 MG: 40 TABLET ORAL at 09:01

## 2021-02-01 RX ADMIN — VENLAFAXINE HYDROCHLORIDE 75 MG: 75 CAPSULE, EXTENDED RELEASE ORAL at 09:01

## 2021-02-01 RX ADMIN — HEPARIN SODIUM 15.03 UNITS/KG/HR: 10000 INJECTION, SOLUTION INTRAVENOUS at 17:04

## 2021-02-01 RX ADMIN — PROMETHAZINE HYDROCHLORIDE 12.5 MG: 25 TABLET ORAL at 13:36

## 2021-02-01 RX ADMIN — HEPARIN SODIUM 7900 UNITS: 1000 INJECTION INTRAVENOUS; SUBCUTANEOUS at 06:48

## 2021-02-01 RX ADMIN — SPIRONOLACTONE 50 MG: 25 TABLET ORAL at 11:25

## 2021-02-01 RX ADMIN — RIFAXIMIN 550 MG: 550 TABLET ORAL at 21:40

## 2021-02-01 RX ADMIN — DOXAZOSIN 1 MG: 1 TABLET ORAL at 21:40

## 2021-02-01 RX ADMIN — SODIUM CHLORIDE, PRESERVATIVE FREE 10 ML: 5 INJECTION INTRAVENOUS at 21:40

## 2021-02-01 RX ADMIN — SODIUM CHLORIDE, PRESERVATIVE FREE 10 ML: 5 INJECTION INTRAVENOUS at 09:02

## 2021-02-01 RX ADMIN — AMLODIPINE BESYLATE 2.5 MG: 2.5 TABLET ORAL at 09:01

## 2021-02-01 RX ADMIN — LACTULOSE 20 G: 20 SOLUTION ORAL at 21:40

## 2021-02-01 RX ADMIN — HEPARIN SODIUM 18 UNITS/KG/HR: 10000 INJECTION, SOLUTION INTRAVENOUS at 06:47

## 2021-02-01 NOTE — TELEPHONE ENCOUNTER
Patient called needs Rx and letter faxed to [phone] [employer], so pt can get cpap mask and tubing, letter needs to state pt uses cpap machine and is benefiting from it, please fax to  [phone]

## 2021-02-01 NOTE — PROGRESS NOTES
[facility]  Family Medicine Attending    S: 76 y.o. female with PMH of malignant neoplasm of L breast, s/p lumpectomy in 2013, KATE, HTN, thyroid nodules, depression, hx colon polyps, HLD, liver cirrhosis, anemia, and metabolic encephalopathy, who presents to ED for wheezing and SOB. Noted to have large left pleural effusion on CXR and CTA (also small right PE)  This morning, improved. Less SOB. Still with slight leg pain    O: VS- Blood pressure (!) 150/70, pulse 101, temperature 99.5 °F (37.5 °C), temperature source Temporal, resp. rate 18, height 5' 2.5\" (1.588 m), weight 217 lb 9.6 oz (98.7 kg), SpO2 94 %. Exam is as noted by resident with the following changes, additions or corrections:  Awake, alert, NAD  Heart - RRR with systolic murmur  Lungs - decreased BS left base  Abd - benign  Ext - trace edema. Tender in left popliteal space    Impressions:   Principal Problem:    Pleural effusion  Active Problems:    Obstructive sleep apnea syndrome    Essential hypertension    Depression    S/P reverse total shoulder arthroplasty, left    Toxic metabolic encephalopathy    SOB (shortness of breath)  Resolved Problems:    * No resolved hospital problems. *      Plan:   Heparin drip, O2   Consult pulmonary and GI   LE US   For probable thoracentesis - can stop heparin prior to tap     Attending Physician Statement  I have reviewed the chart and seen the patient with the resident(s). I personally reviewed images, EKG's and similar tests, if present. I personally reviewed and performed key elements of the history and exam.  I have reviewed and confirmed student and/or resident history and exam with changes as indicated above. I agree with the assessment, plan and orders as documented by the resident. Please refer to the resident and/or student note for additional information.       Alida Wilson

## 2021-02-01 NOTE — H&P
[facility] Resident Inpatient  History and Physical    CC: SOB    HPI: History obtained from patient, electronic medical record. Valeria Rothman is a 76 y.o. female with a PMH of malignant neoplasm of L breast, s/p lumpectomy in 2013, KATE, HTN, thyroid nodules, depression, hx colon polyps, HLD, liver cirrhosis, anemia, and metabolic encephalopathy, who presents to ED for Wheezing (with SOB for 4 days. CPAP machine not working at home.)  . Patient reports that she had been having ankle pain for weeks that relieved about 4 days ago, when she developed SOB, wheezing, and chest tightness especially with exertion. She denies current leg swelling and pain. She denies cough, fever, chills, and chest pain. She does report recent UTI symptoms of burning and frequency for which she was given antibiotics. She has never smoked. ED Course: The patient remained hemodynamically stable. Patient was given lasix 40 mg IV. EKG Rhythm strip sinus tachycardia, PAC's noted. The significant laboratory data obtained by ED work up was Cr 1.2, Pro-, trop <0.01, Hgb 9.2, D-dimer 970    CTA chest showed possible small PE on R lower lobe and large left pleural effusion. Also noted was liver cirrhosis and small hypodense lesions in liver. CXR showed opacities of L lung base and pulmonary vascular congestion.       Medications   iopamidol (ISOVUE-370) 76 % injection 75 mL (75 mLs Intravenous Given 1/31/21 1733)   sodium chloride flush 0.9 % injection 10 mL (10 mLs Intravenous Given 1/31/21 1735)   furosemide (LASIX) injection 40 mg (40 mg Intravenous Given 1/31/21 1831)        ED orders:   Orders Placed This Encounter   Procedures    Respiratory Panel, Molecular, with COVID-19 (Restricted: peds pts or suitable admitted adults)    XR CHEST PORTABLE    CTA CHEST W CONTRAST    CBC Auto Differential    Comprehensive metabolic panel    Troponin    D-dimer, quantitative    Brain Natriuretic Peptide    Telemetry monitoring    Pulse Oximetry    Inpatient consult to Hospitalist    EKG 12 Lead    Saline lock IV    PATIENT STATUS (DIRECT) Inpatient    PATIENT STATUS (FROM ED OR OR/PROCEDURAL) Inpatient       PMH:  has a past medical history of Breast cancer ([facility] [address] Cirrhosis ([address] Essential hypertension, Hyperlipidemia, Osteopenia, 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 ([facility] [address] Sleep apnea, Spinal stenosis, and Type 2 diabetes mellitus ([facility] [address] PSH:  has a past surgical history that includes Hysterectomy; Carpal tunnel release; Lithotripsy (Left, 05/04/2016); Colonoscopy (01/31/2017); Breast lumpectomy; Upper gastrointestinal endoscopy (04/23/2019); Colonoscopy (04/23/2019); Upper gastrointestinal endoscopy (N/A, 4/23/2019); Colonoscopy (N/A, 4/23/2019); Colonoscopy (4/23/2019); and Shoulder Arthroplasty (Left, 12/21/2020). FH: family history includes Arthritis in her mother; COPD in her father; Cancer (age of onset: 48) in an other family member; Heart Attack in her father. Social:  reports that she has never smoked. She has never used smokeless tobacco. She reports that she does not drink alcohol or use drugs. Allergies: Allergies   Allergen Reactions    Lisinopril         Home Medications:   No current facility-administered medications on file prior to encounter.       Current Outpatient Medications on File Prior to Encounter   Medication Sig Dispense Refill    albuterol sulfate HFA (VENTOLIN HFA) 108 (90 Base) MCG/ACT inhaler Inhale 2 puffs into the lungs 4 times daily as needed for Wheezing 3 Inhaler 1    venlafaxine (EFFEXOR XR) 75 MG extended release capsule Take 1 capsule by mouth daily 90 capsule 1    doxazosin (CARDURA) 1 MG tablet Take 1 mg by mouth nightly       spironolactone (ALDACTONE) 25 MG tablet Take 50 mg by mouth daily       furosemide (LASIX) 40 MG tablet Take 40 mg by mouth daily      felodipine (PLENDIL) 2.5 MG extended release tablet Take 1 tablet by mouth daily 90 tablet 1    ondansetron deferred  Extremities:  Extremities without edema. Distal pulses equal bilaterally. Pina negative B/L; L UE in sling  Skin: Skin color, texture, turgor normal, no rashes or lesions  Musculoskeletal: No joint swelling, no muscle tenderness. Normal ROM in extremities.    Neurologic: Alert & Oriented    Labs:   Results for orders placed or performed during the hospital encounter of 01/31/21   Respiratory Panel, Molecular, with COVID-19 (Restricted: peds pts or suitable admitted adults)    Specimen: Nasopharyngeal   Result Value Ref Range    Adenovirus by PCR Not Detected Not Detected    Bordetella parapertussis by PCR Not Detected Not Detected    Bordetella pertussis by PCR Not Detected Not Detected    Chlamydophilia pneumoniae by PCR Not Detected Not Detected    Coronavirus 229E by PCR Not Detected Not Detected    Coronavirus HKU1 by PCR Not Detected Not Detected    Coronavirus NL63 by PCR Not Detected Not Detected    Coronavirus OC43 by PCR Not Detected Not Detected    SARS-CoV-2, PCR Not Detected Not Detected    Human Metapneumovirus by PCR Not Detected Not Detected    Human Rhinovirus/Enterovirus by PCR Not Detected Not Detected    Influenza A by PCR Not Detected Not Detected    Influenza B by PCR Not Detected Not Detected    Mycoplasma pneumoniae by PCR Not Detected Not Detected    Parainfluenza Virus 1 by PCR Not Detected Not Detected    Parainfluenza Virus 2 by PCR Not Detected Not Detected    Parainfluenza Virus 3 by PCR Not Detected Not Detected    Parainfluenza Virus 4 by PCR Not Detected Not Detected    Respiratory Syncytial Virus by PCR Not Detected Not Detected   CBC Auto Differential   Result Value Ref Range    WBC 6.1 4.5 - 11.5 E9/L    RBC 2.99 (L) 3.50 - 5.50 E12/L    Hemoglobin 9.2 (L) 11.5 - 15.5 g/dL    Hematocrit 28.4 (L) 34.0 - 48.0 %    MCV 95.0 80.0 - 99.9 fL    MCH 30.8 26.0 - 35.0 pg    MCHC 32.4 32.0 - 34.5 %    RDW 17.2 (H) 11.5 - 15.0 fL    Platelets 716 554 - 286 E9/L    MPV 10.4 7.0 - 12.0 fL    Neutrophils % 65.3 43.0 - 80.0 %    Immature Granulocytes % 0.2 0.0 - 5.0 %    Lymphocytes % 18.8 (L) 20.0 - 42.0 %    Monocytes % 10.6 2.0 - 12.0 %    Eosinophils % 4.1 0.0 - 6.0 %    Basophils % 1.0 0.0 - 2.0 %    Neutrophils Absolute 3.99 1.80 - 7.30 E9/L    Immature Granulocytes # 0.01 E9/L    Lymphocytes Absolute 1.15 (L) 1.50 - 4.00 E9/L    Monocytes Absolute 0.65 0.10 - 0.95 E9/L    Eosinophils Absolute 0.25 0.05 - 0.50 E9/L    Basophils Absolute 0.06 0.00 - 0.20 E9/L   Comprehensive metabolic panel   Result Value Ref Range    Sodium 142 132 - 146 mmol/L    Potassium 4.0 3.5 - 5.0 mmol/L    Chloride 108 (H) 98 - 107 mmol/L    CO2 23 22 - 29 mmol/L    Anion Gap 11 7 - 16 mmol/L    Glucose 97 74 - 99 mg/dL    BUN 12 8 - 23 mg/dL    CREATININE 1.2 (H) 0.5 - 1.0 mg/dL    GFR Non-African American 44 >=60 mL/min/1.73    GFR African American 53     Calcium 9.5 8.6 - 10.2 mg/dL    Total Protein 7.0 6.4 - 8.3 g/dL    Albumin 3.2 (L) 3.5 - 5.2 g/dL    Total Bilirubin 0.7 0.0 - 1.2 mg/dL    Alkaline Phosphatase 114 (H) 35 - 104 U/L    ALT 14 0 - 32 U/L    AST 30 0 - 31 U/L   Troponin   Result Value Ref Range    Troponin <0.01 0.00 - 0.03 ng/mL   D-dimer, quantitative   Result Value Ref Range    D-Dimer, Quant 970 ng/mL DDU   Brain Natriuretic Peptide   Result Value Ref Range    Pro- 0 - 450 pg/mL       Imaging:  Xr Shoulder Left (min 2 Views)    Result Date: 1/5/2021  EXAMINATION: THREE XRAY VIEWS OF THE LEFT SHOULDER 1/5/2021 8:13 am COMPARISON: 21 December 2020 HISTORY: ORDERING SYSTEM PROVIDED HISTORY: S/P reverse total shoulder arthroplasty, left TECHNOLOGIST PROVIDED HISTORY: Reason for exam:->s/p TSA What reading provider will be dictating this exam?->CRC FINDINGS: Reverse total arthroplasty at the left shoulder is aligned anatomically. No unexpected findings. Anatomically aligned left shoulder reverse arthroplasty with no unexpected findings.     Xr Chest Portable    Result Date: 1/31/2021  EXAMINATION: ONE XRAY VIEW OF THE CHEST 1/31/2021 3:31 pm COMPARISON: December 29, 2020 HISTORY: ORDERING SYSTEM PROVIDED HISTORY: dyspnea TECHNOLOGIST PROVIDED HISTORY: Reason for exam:->dyspnea FINDINGS: Opacities are present in left lung base silhouetting left heart border and left hemidiaphragm. There is interstitial prominence in perihilar locations. No pneumothorax. 1.  Opacities at left lung base suggest atelectasis, pneumonia, and likely pleural effusion. 2.  Pulmonary vascular congestion. Cta Chest W Contrast    Result Date: 1/31/2021  EXAMINATION: CTA OF THE CHEST 1/31/2021 5:22 pm TECHNIQUE: CTA of the chest was performed after the administration of intravenous contrast.  Multiplanar reformatted images are provided for review. MIP images are provided for review. Dose modulation, iterative reconstruction, and/or weight based adjustment of the mA/kV was utilized to reduce the radiation dose to as low as reasonably achievable. COMPARISON: None. HISTORY: ORDERING SYSTEM PROVIDED HISTORY: elevated d dimer, short of breath TECHNOLOGIST PROVIDED HISTORY: Reason for exam:->elevated d dimer, short of breath Decision Support Exception->Emergency Medical Condition (MA) FINDINGS: Pulmonary Arteries: Evaluation is somewhat limited due to suboptimal pulmonary arterial enhancement. Within this limitation, findings are seen suspicious but not definitive for small emboli in the right lower lobe (series 3, image 102). No central embolus is seen. The main pulmonary artery is normal in caliber. Mediastinum: The heart is mildly enlarged. Minimal atherosclerosis is seen in the aorta. No aneurysm. The no lymphadenopathy is seen in the chest. Varices are seen along the distal esophagus. Lungs/pleura: Large left pleural effusion with overlying compressive atelectasis. The right lung is clear. There is no evidence of pneumonia. The central airway is clear. No pneumothorax is seen. Upper Abdomen:  The liver is irregular in contour indicating cirrhosis. Small hypodense foci are seen in the right and left lobe. Multiple varices are seen. Minimal perihepatic ascites also noted. Soft Tissues/Bones: The visualized bones are intact without fracture or focal lesion. Status post reverse left shoulder arthroplasty. 1. Probable small pulmonary emboli in the right lower lobe. Suboptimal pulmonary arterial enhancement limits evaluation. 2. Mild cardiomegaly. 3. Large left pleural effusion with overlying compressive atelectasis. 4. Liver cirrhosis with varices and trace perihepatic ascites. 5. Small hypodense lesions in the liver. Further characterization with pre and post contrast enhanced MRI is recommended. Assessment and Plan  Principal Problem:    Pleural effusion  Active Problems:    Obstructive sleep apnea syndrome    Essential hypertension    Depression    S/P reverse total shoulder arthroplasty, left    Toxic metabolic encephalopathy    SOB (shortness of breath)  Resolved Problems:    * No resolved hospital problems.  *    SOB  -likely 2/2 to pleural effusion and/or PE  -saturating 94% on 3 L O2  -no home oxygen  -continue home albuterol sulfate    Pleural effusion  -L lower lobe  -anticoagulation not started yet for possible tap for further evaluation  -lasix 40 mg IV given  -pulm consulted  -if tap is done, will need fluid protein and LDH ordered as well as cytology and culture  -serum LDH ordered    PE  -recent leg pain that resolved followed by SOB  -possible PE in RLL appreciated on CTA chest  -heparin high dose proticol  -doppler LE B/L ordered    HTN  -continue home doxazosin, felodipine    KATE  -CPAP at home not currently working  -CPAP at night for this admission    Depression   -continue venlafaxine    Toxic metabolic encephalopathy/cirrhosis  -continue home rifaximin, lactulose  -continue home lasix and spironolactone  -U/S liver, consider MRI    S/p reverse total shoulder arthroplasty, Left  -continue to leave L upper extremity in sling as needed      DVT / GI prophylaxis: high dose heparin protocol started and GI prophylaxis not indicated    Dispo - telemetry    Electronically signed by Martinez Jacob DO on 2/1/2021 at 3:48 AM.  This case was discussed with attending physician, Dr. Nay Chatman

## 2021-02-01 NOTE — CARE COORDINATION
2/1/2021 - Care Coordination - admitted shortness of breath, pleural effusion, PE.  Pulmonology and GI consulted. IV heparin infusion. Spoke with pt in room to discuss discharge planning. PCP is Dr Сергей Beltran. [facility] is [facility] at [address] [place] [address] in [facility]. Lives alone in a 1 floor home. Has M[employer] at [employer]  who provide her assistance with self care and household cores. They come to her home 3 times a week. Mari [facility] at [facility]. DME - has a lift chair, tub bench, toilet rails, fww, rollator, 4 prong cane. Plan is to return home when medically stable. Family will provide transportation home. SW/CM will follow.

## 2021-02-01 NOTE — CONSULTS
Pulmonary [facility]                             Pulmonary Consult/Progress Note :          Patient: Zuhair Guevara  MRN: [id number]  : 1945      Date of Admission: .2021  2:59 PM    Consulting Physician:Teri Hays         Reason for Consultation:pleural effusion ,Possible PE  CC : SOB   HPI:     Zuhair Guevara is a 76 y.o. female with a PMH of malignant neoplasm of L breast, s/p lumpectomy in , DOREEN, , liver cirrhosis,     She  presents to ED with SOB for 4 days along with some tightness and wheezing with no fever or chills and no chest pain . She has doreen but seems CPAP machine not working at home.)  Patient reports that she had been having ankle pain for weeks that relieved about 4 days ago, when she developed SOB, wheezing, and chest tightness especially with exertion. She denies current leg swelling and pain. She denies cough, fever, chills, and chest pain. She does report recent UTI symptoms of burning and frequency for which she was given antibiotics. She has never smoked.     ED Course: The patient remained hemodynamically stable. Patient was given lasix 40 mg IV. EKG Rhythm strip sinus tachycardia, PAC's noted. The significant laboratory data obtained by ED work up was Cr 1.2, Pro-, trop <0.01, Hgb 9.2, D-dimer 970     CTA chest showed possible small PE on R lower lobe and large left pleural effusion. Also noted was liver cirrhosis and small hypodense lesions in liver.   CXR showed opacities of L lung base and pulmonary vascular congestion      PAST MEDICAL HISTORY:   Past Medical History:   Diagnosis Date    Breast cancer ([address]     Cirrhosis ([address]     Essential hypertension     Hyperlipidemia     Osteopenia     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 ([address]     Sleep apnea     Spinal stenosis     Type 2 diabetes mellitus ([address]        PAST SURGICAL HISTORY:   Past Surgical History:   Procedure Laterality Date    BREAST LUMPECTOMY lumpectomy [id number]    CARPAL TUNNEL RELEASE      COLONOSCOPY  01/31/2017    multiple polyps; diverticula--jerod    COLONOSCOPY  04/23/2019    polyps; diverticula; hemorrhoids--jerod    COLONOSCOPY N/A 4/23/2019    COLONOSCOPY POLYPECTOMY SNARE/COLD BIOPSY performed by Aide Hightower MD at [address] COLONOSCOPY  4/23/2019    COLONOSCOPY WITH BIOPSY performed by Aide Hightower MD at [address] [facility] LITHOTRIPSY Left 05/04/2016    C-R STENT PLACEMENT    SHOULDER ARTHROPLASTY Left 12/21/2020    LEFT REVERSE TOTAL SHOULDER  ARTHROPLASTY -- DEPUY performed by Becka Andrews MD at [address]  04/23/2019    gastritis--jerod    UPPER GASTROINTESTINAL ENDOSCOPY N/A 4/23/2019    EGD BIOPSY performed by Aide Hightower MD at [facility]:   Family History   Problem Relation Age of Onset    COPD Father     Heart Attack Father     Arthritis Mother     Cancer Other 48        colon       SOCIAL HISTORY:   Social History     Socioeconomic History    Marital status:      Spouse name: Not on file    Number of children: Not on file    Years of education: Not on file    Highest education level: Not on file   Occupational History    Not on file   Social Needs    Financial resource strain: Not on file    Food insecurity     Worry: Not on file     Inability: Not on file    Transportation needs     Medical: Not on file     Non-medical: Not on file   Tobacco Use    Smoking status: Never Smoker    Smokeless tobacco: Never Used   Substance and Sexual Activity    Alcohol use: Never     Alcohol/week: 0.0 standard drinks     Frequency: Never    Drug use: Never    Sexual activity: Never     Partners: Male     Comment: last in 2013   Lifestyle    Physical activity     Days per week: Not on file     Minutes per session: Not on file    Stress: Not on file   Relationships    Social connections     Talks on phone: Not on file     Gets together: Not on file     Attends Jew service: Not on file     Active member of club or organization: Not on file     Attends meetings of clubs or organizations: Not on file     Relationship status: Not on file    Intimate partner violence     Fear of current or ex partner: Not on file     Emotionally abused: Not on file     Physically abused: Not on file     Forced sexual activity: Not on file   Other Topics Concern    Not on file   Social History Narrative    Not on file     Social History     Tobacco Use   Smoking Status Never Smoker   Smokeless Tobacco Never Used     Social History     Substance and Sexual Activity   Alcohol Use Never    Alcohol/week: 0.0 standard drinks    Frequency: Never     Social History     Substance and Sexual Activity   Drug Use Never           HOME MEDICATIONS:  Prior to Admission medications    Medication Sig Start Date End Date Taking?  Authorizing Provider   albuterol sulfate HFA (VENTOLIN HFA) 108 (90 Base) MCG/ACT inhaler Inhale 2 puffs into the lungs 4 times daily as needed for Wheezing 12/3/20  Yes Corey Riojas MD   venlafaxine (EFFEXOR XR) 75 MG extended release capsule Take 1 capsule by mouth daily 11/24/20  Yes Corey Riojas MD   doxazosin (CARDURA) 1 MG tablet Take 1 mg by mouth nightly  4/4/20  Yes Historical Provider, MD   spironolactone (ALDACTONE) 25 MG tablet Take 50 mg by mouth daily    Yes Historical Provider, MD   furosemide (LASIX) 40 MG tablet Take 40 mg by mouth daily   Yes Historical Provider, MD   felodipine (PLENDIL) 2.5 MG extended release tablet Take 1 tablet by mouth daily 6/11/20  Yes Corey Riojas MD   ondansetron (ZOFRAN) 4 MG tablet Take 1 tablet by mouth daily as needed for Nausea or Vomiting 6/11/20  Yes Corey Riojas MD   vitamin D (ERGOCALCIFEROL) 1.25 MG (24880 UT) CAPS capsule Take 1 capsule by mouth once a week 3/27/20  Yes Jolynn Boyd DO   rifaximin (XIFAXAN) 550 MG tablet Take 550 mg by mouth 2 times daily   Yes Historical Provider, MD   lactulose (CHRONULAC) 10 GM/15ML solution Take 10 g by mouth 2 to 3 times day   Yes Historical Provider, MD       CURRENT MEDICATIONS:  Current Facility-Administered Medications: ipratropium-albuterol (DUONEB) nebulizer solution 1 ampule, 1 ampule, Inhalation, Q4H PRN  doxazosin (CARDURA) tablet 1 mg, 1 mg, Oral, Nightly  amLODIPine (NORVASC) tablet 2.5 mg, 2.5 mg, Oral, Daily  furosemide (LASIX) tablet 40 mg, 40 mg, Oral, Daily  lactulose (CHRONULAC) 10 GM/15ML solution 10 g, 10 g, Oral, BID  rifaximin (XIFAXAN) tablet 550 mg, 550 mg, Oral, BID  spironolactone (ALDACTONE) tablet 50 mg, 50 mg, Oral, Daily  venlafaxine (EFFEXOR XR) extended release capsule 75 mg, 75 mg, Oral, Daily  sodium chloride flush 0.9 % injection 10 mL, 10 mL, Intravenous, 2 times per day  sodium chloride flush 0.9 % injection 10 mL, 10 mL, Intravenous, PRN  promethazine (PHENERGAN) tablet 12.5 mg, 12.5 mg, Oral, Q6H PRN **OR** ondansetron (ZOFRAN) injection 4 mg, 4 mg, Intravenous, Q6H PRN  magnesium hydroxide (MILK OF MAGNESIA) 400 MG/5ML suspension 30 mL, 30 mL, Oral, Daily PRN  acetaminophen (TYLENOL) tablet 650 mg, 650 mg, Oral, Q6H PRN **OR** acetaminophen (TYLENOL) suppository 650 mg, 650 mg, Rectal, Q6H PRN  heparin (porcine) injection 7,900 Units, 80 Units/kg, Intravenous, PRN  heparin (porcine) injection 3,950 Units, 40 Units/kg, Intravenous, PRN  heparin 25,000 units in dextrose 5% 250 mL (premix) infusion, 5-30 Units/kg/hr, Intravenous, Continuous  cefTRIAXone (ROCEPHIN) 1,000 mg in sterile water 10 mL IV syringe, 1,000 mg, Intravenous, Q24H    IV MEDICATIONS:   heparin (PORCINE) Infusion 18 Units/kg/hr (02/01/21 2969)       ALLERGIES:  Allergies   Allergen Reactions    Lisinopril        REVIEW OF SYSTEMS:  General ROS:  No weight loss ,no fatigue     ENT ROS:   No Sore throat ,no lymphoadenopathy,no nasal stuffiness     Hematological and Lymphatic ROS:   No ecchymosis ,no tendency to bleed  Respiratory ROS:   SOB   Cardiovascular ROS:   No CP,No Palpitation   Gastrointestinal ROS:   No Gi bleed,no nausea or vomiting      - Musculoskeletal ROS:      - no joint swelling ,no joint pain   Neurological ROS:     -no weakness or numbness    Dermatological ROS:   No skin rash ,no urticaria     PHYSICAL EXAMINATION:     VITAL SIGNS:  BP (!) 155/84   Pulse 103   Temp 98.8 °F (37.1 °C) (Temporal)   Resp 20   Ht 5' 2.5\" (1.588 m)   Wt 217 lb 9.6 oz (98.7 kg)   SpO2 92%   BMI 39.17 kg/m²   Wt Readings from Last 3 Encounters:   02/01/21 217 lb 9.6 oz (98.7 kg)   01/12/21 216 lb (98 kg)   12/29/20 200 lb (90.7 kg)     Temp Readings from Last 3 Encounters:   02/01/21 98.8 °F (37.1 °C) (Temporal)   01/12/21 96.9 °F (36.1 °C) (Temporal)   01/05/21 98.3 °F (36.8 °C) (Oral)     TMAX:  BP Readings from Last 3 Encounters:   02/01/21 (!) 155/84   01/12/21 112/79   12/31/20 (!) 110/59     Pulse Readings from Last 3 Encounters:   02/01/21 103   01/12/21 92   12/31/20 88           INTAKE/OUTPUTS:  I/O last 3 completed shifts:   In: 240 [P.O.:240]  Out: 0     Intake/Output Summary (Last 24 hours) at 2/1/2021 0933  Last data filed at 2/1/2021 0502  Gross per 24 hour   Intake 240 ml   Output 0 ml   Net 240 ml       General Appearance: alert and oriented to person, place and time, well-developed and   well-nourished, in no acute distress   Eyes: pupils equal, round, and reactive to light, extraocular eye movements intact, conjunctivae normal and sclera anicteric   Neck: neck supple and non tender without mass, no thyromegaly, no thyroid nodules and no cervical adenopathy   Pulmonary/Chest:decrease breath sound left /wheeizng occasional exp  Cardiovascular: normal rate, regular rhythm, normal S1 and S2, no murmurs, rubs, clicks or gallops, distal pulses intact, no carotid bruits, no murmurs, no gallops, no carotid bruits and no JVD   Abdomen: obese, soft, non-tender, non-distended, normal bowel sounds, no masses or organomegaly   Extremities:mild edema   Musculoskeletal: normal range of motion, no joint swelling, deformity or tenderness   Neurologic: reflexes normal and symmetric, no cranial nerve deficit noted    LABS/IMAGING:    CBC:  Lab Results   Component Value Date    WBC 6.5 02/01/2021    HGB 8.9 (L) 02/01/2021    HCT 28.2 (L) 02/01/2021    MCV 94.0 02/01/2021     02/01/2021    LYMPHOPCT 20.1 02/01/2021    RBC 3.00 (L) 02/01/2021    MCH 29.7 02/01/2021    MCHC 31.6 (L) 02/01/2021    RDW 17.3 (H) 02/01/2021    NEUTOPHILPCT 60.7 02/01/2021    MONOPCT 13.2 (H) 02/01/2021    BASOPCT 1.1 02/01/2021    NEUTROABS 3.96 02/01/2021    LYMPHSABS 1.31 (L) 02/01/2021    MONOSABS 0.86 02/01/2021    EOSABS 0.29 02/01/2021    BASOSABS 0.07 02/01/2021       Recent Labs     02/01/21  0610 01/31/21  1611   WBC 6.5 6.1   HGB 8.9* 9.2*   HCT 28.2* 28.4*   MCV 94.0 95.0    206       BMP:   Recent Labs     01/31/21  1611 02/01/21  0610    145   K 4.0 4.2   * 109*   CO2 23 23   BUN 12 11   CREATININE 1.2* 1.2*       MG:   Lab Results   Component Value Date    MG 2.0 04/05/2018     Ca/Phos:   Lab Results   Component Value Date    CALCIUM 9.7 02/01/2021    PHOS 3.0 06/07/2016     Amylase: No results found for: AMYLASE  Lipase: No results found for: LIPASE  LIVER PROFILE:   Recent Labs     01/31/21  1611   AST 30   ALT 14   BILITOT 0.7   ALKPHOS 114*       PT/INR: No results for input(s): PROTIME, INR in the last 72 hours.   APTT:   Recent Labs     02/01/21  0610   APTT 34.1       Cardiac Enzymes:  Lab Results   Component Value Date    CKTOTAL 104 01/21/2016    TROPONINI <0.01 01/31/2021                 PROBLEM LIST:  Patient Active Problem List   Diagnosis    Malignant ne[facility] of left [facility]    Type 2 diabetes mellitus ([facility]    Obstructive sleep apnea syndrome    Chronic back pain    Essential hypertension    Multiple thyroid nodules    Balance problem    Depression    At risk for colon cancer  Hx of adenomatous colonic polyps    Dyslipidemia    Murmur, cardiac    Cirrhosis (HCC)    Closed fracture of proximal epiphysis of left humerus    COVID-19    S/P reverse total shoulder arthroplasty, left    Toxic metabolic encephalopathy    REN (acute kidney injury) (HCC)    Altered mental status    Anemia    Pleural effusion    SOB (shortness of breath)               ASSESSMENT:  1.) left side effusion  2. )Breast cancer   3.)Possible pneumonia  4.)Small PE ,if any  5. )KATE      PLAN:  *-Agree with heparin   *-Auto CPAP at night ,will contact DME  *-for thoracentesis tomorrow ,will stop Heparin 4 hours before procedure ,I will take care of it   *-BD   *_further recommendations will follow         Thank you very much for allowing me to participate in the care of this pleasant patient , should you have any questions ,please do not hesitate to contact me      Jatin Urrutia  Pulmonary&Critical Care Medicine   Director of [address] [place] [address] Director of [address] [place] [address]   Professor Israel Horn    NOTE: This report was transcribed using voice recognition software. Every effort was made to ensure accuracy; however, inadvertent computerized transcription errors may be present.

## 2021-02-01 NOTE — TELEPHONE ENCOUNTER
Patient called left detailed message to contact office back to make appt for lab, pt will need her urine checked before antibiotics can be given

## 2021-02-02 ENCOUNTER — APPOINTMENT (OUTPATIENT)
Dept: ULTRASOUND IMAGING | Age: 76
DRG: 432 | End: 2021-02-02
Payer: COMMERCIAL

## 2021-02-02 ENCOUNTER — APPOINTMENT (OUTPATIENT)
Dept: GENERAL RADIOLOGY | Age: 76
DRG: 432 | End: 2021-02-02
Payer: COMMERCIAL

## 2021-02-02 LAB
ANION GAP SERPL CALCULATED.3IONS-SCNC: 8 MMOL/L (ref 7–16)
APTT: 148.1 SEC (ref 24.5–35.1)
BASOPHILS ABSOLUTE: 0.1 E9/L (ref 0–0.2)
BASOPHILS RELATIVE PERCENT: 1.5 % (ref 0–2)
BUN BLDV-MCNC: 11 MG/DL (ref 8–23)
CALCIUM SERPL-MCNC: 9.3 MG/DL (ref 8.6–10.2)
CHLORIDE BLD-SCNC: 106 MMOL/L (ref 98–107)
CO2: 28 MMOL/L (ref 22–29)
CREAT SERPL-MCNC: 1.3 MG/DL (ref 0.5–1)
EOSINOPHILS ABSOLUTE: 0.29 E9/L (ref 0.05–0.5)
EOSINOPHILS RELATIVE PERCENT: 4.4 % (ref 0–6)
FLUID TYPE: NORMAL
GFR AFRICAN AMERICAN: 48
GFR NON-AFRICAN AMERICAN: 40 ML/MIN/1.73
GLUCOSE BLD-MCNC: 81 MG/DL (ref 74–99)
GLUCOSE, FLUID: 102 MG/DL
HCT VFR BLD CALC: 30.4 % (ref 34–48)
HEMOGLOBIN: 9.4 G/DL (ref 11.5–15.5)
IMMATURE GRANULOCYTES #: 0.01 E9/L
IMMATURE GRANULOCYTES %: 0.2 % (ref 0–5)
LD, FLUID: 110 U/L
LYMPHOCYTES ABSOLUTE: 1.92 E9/L (ref 1.5–4)
LYMPHOCYTES RELATIVE PERCENT: 29.1 % (ref 20–42)
MCH RBC QN AUTO: 29.1 PG (ref 26–35)
MCHC RBC AUTO-ENTMCNC: 30.9 % (ref 32–34.5)
MCV RBC AUTO: 94.1 FL (ref 80–99.9)
MONOCYTES ABSOLUTE: 0.81 E9/L (ref 0.1–0.95)
MONOCYTES RELATIVE PERCENT: 12.3 % (ref 2–12)
NEUTROPHILS ABSOLUTE: 3.46 E9/L (ref 1.8–7.3)
NEUTROPHILS RELATIVE PERCENT: 52.5 % (ref 43–80)
PDW BLD-RTO: 17.3 FL (ref 11.5–15)
PLATELET # BLD: 216 E9/L (ref 130–450)
PMV BLD AUTO: 10.8 FL (ref 7–12)
POTASSIUM REFLEX MAGNESIUM: 3.9 MMOL/L (ref 3.5–5)
PROTEIN FLUID: 2.2 G/DL
RBC # BLD: 3.23 E12/L (ref 3.5–5.5)
SODIUM BLD-SCNC: 142 MMOL/L (ref 132–146)
WBC # BLD: 6.6 E9/L (ref 4.5–11.5)

## 2021-02-02 PROCEDURE — 2580000003 HC RX 258: Performed by: STUDENT IN AN ORGANIZED HEALTH CARE EDUCATION/TRAINING PROGRAM

## 2021-02-02 PROCEDURE — 89051 BODY FLUID CELL COUNT: CPT

## 2021-02-02 PROCEDURE — 88305 TISSUE EXAM BY PATHOLOGIST: CPT

## 2021-02-02 PROCEDURE — 85730 THROMBOPLASTIN TIME PARTIAL: CPT

## 2021-02-02 PROCEDURE — 88112 CYTOPATH CELL ENHANCE TECH: CPT

## 2021-02-02 PROCEDURE — 99232 SBSQ HOSP IP/OBS MODERATE 35: CPT | Performed by: FAMILY MEDICINE

## 2021-02-02 PROCEDURE — 87205 SMEAR GRAM STAIN: CPT

## 2021-02-02 PROCEDURE — 83615 LACTATE (LD) (LDH) ENZYME: CPT

## 2021-02-02 PROCEDURE — 36415 COLL VENOUS BLD VENIPUNCTURE: CPT

## 2021-02-02 PROCEDURE — 2060000000 HC ICU INTERMEDIATE R&B

## 2021-02-02 PROCEDURE — 6370000000 HC RX 637 (ALT 250 FOR IP): Performed by: FAMILY MEDICINE

## 2021-02-02 PROCEDURE — 87206 SMEAR FLUORESCENT/ACID STAI: CPT

## 2021-02-02 PROCEDURE — 32555 ASPIRATE PLEURA W/ IMAGING: CPT | Performed by: INTERNAL MEDICINE

## 2021-02-02 PROCEDURE — 82947 ASSAY GLUCOSE BLOOD QUANT: CPT

## 2021-02-02 PROCEDURE — 87015 SPECIMEN INFECT AGNT CONCNTJ: CPT

## 2021-02-02 PROCEDURE — 6360000002 HC RX W HCPCS: Performed by: STUDENT IN AN ORGANIZED HEALTH CARE EDUCATION/TRAINING PROGRAM

## 2021-02-02 PROCEDURE — 32555 ASPIRATE PLEURA W/ IMAGING: CPT

## 2021-02-02 PROCEDURE — 87070 CULTURE OTHR SPECIMN AEROBIC: CPT

## 2021-02-02 PROCEDURE — 80048 BASIC METABOLIC PNL TOTAL CA: CPT

## 2021-02-02 PROCEDURE — 2700000000 HC OXYGEN THERAPY PER DAY

## 2021-02-02 PROCEDURE — 99233 SBSQ HOSP IP/OBS HIGH 50: CPT | Performed by: INTERNAL MEDICINE

## 2021-02-02 PROCEDURE — 71045 X-RAY EXAM CHEST 1 VIEW: CPT

## 2021-02-02 PROCEDURE — 87116 MYCOBACTERIA CULTURE: CPT

## 2021-02-02 PROCEDURE — 84157 ASSAY OF PROTEIN OTHER: CPT

## 2021-02-02 PROCEDURE — 85025 COMPLETE CBC W/AUTO DIFF WBC: CPT

## 2021-02-02 PROCEDURE — 6370000000 HC RX 637 (ALT 250 FOR IP): Performed by: STUDENT IN AN ORGANIZED HEALTH CARE EDUCATION/TRAINING PROGRAM

## 2021-02-02 RX ORDER — FELODIPINE 2.5 MG/1
2.5 TABLET, EXTENDED RELEASE ORAL DAILY
Qty: 90 TABLET | Refills: 1 | Status: SHIPPED
Start: 2021-02-02 | End: 2021-07-19

## 2021-02-02 RX ADMIN — FUROSEMIDE 40 MG: 40 TABLET ORAL at 09:04

## 2021-02-02 RX ADMIN — VENLAFAXINE HYDROCHLORIDE 75 MG: 75 CAPSULE, EXTENDED RELEASE ORAL at 09:04

## 2021-02-02 RX ADMIN — SODIUM CHLORIDE, PRESERVATIVE FREE 10 ML: 5 INJECTION INTRAVENOUS at 21:00

## 2021-02-02 RX ADMIN — RIFAXIMIN 550 MG: 550 TABLET ORAL at 09:04

## 2021-02-02 RX ADMIN — SODIUM CHLORIDE, PRESERVATIVE FREE 10 ML: 5 INJECTION INTRAVENOUS at 09:16

## 2021-02-02 RX ADMIN — ONDANSETRON 4 MG: 2 INJECTION INTRAMUSCULAR; INTRAVENOUS at 11:03

## 2021-02-02 RX ADMIN — SPIRONOLACTONE 50 MG: 25 TABLET ORAL at 09:04

## 2021-02-02 RX ADMIN — LACTULOSE 20 G: 20 SOLUTION ORAL at 09:04

## 2021-02-02 RX ADMIN — DOXAZOSIN 1 MG: 1 TABLET ORAL at 21:00

## 2021-02-02 RX ADMIN — CEFTRIAXONE SODIUM 1000 MG: 1 INJECTION, POWDER, FOR SOLUTION INTRAMUSCULAR; INTRAVENOUS at 09:05

## 2021-02-02 RX ADMIN — AMLODIPINE BESYLATE 2.5 MG: 2.5 TABLET ORAL at 09:04

## 2021-02-02 RX ADMIN — RIFAXIMIN 550 MG: 550 TABLET ORAL at 21:00

## 2021-02-02 ASSESSMENT — PAIN - FUNCTIONAL ASSESSMENT: PAIN_FUNCTIONAL_ASSESSMENT: PREVENTS OR INTERFERES SOME ACTIVE ACTIVITIES AND ADLS

## 2021-02-02 ASSESSMENT — PAIN SCALES - GENERAL
PAINLEVEL_OUTOF10: 0
PAINLEVEL_OUTOF10: 0

## 2021-02-02 ASSESSMENT — PAIN DESCRIPTION - FREQUENCY: FREQUENCY: INTERMITTENT

## 2021-02-02 ASSESSMENT — PAIN DESCRIPTION - PAIN TYPE: TYPE: ACUTE PAIN

## 2021-02-02 ASSESSMENT — PAIN DESCRIPTION - LOCATION: LOCATION: SHOULDER

## 2021-02-02 ASSESSMENT — PAIN DESCRIPTION - ORIENTATION: ORIENTATION: LEFT

## 2021-02-02 ASSESSMENT — PAIN DESCRIPTION - DESCRIPTORS: DESCRIPTORS: ACHING;THROBBING

## 2021-02-02 NOTE — PROGRESS NOTES
[facility] - Family Medicine Inpatient   Resident Progress Note    S:  Hospital day: 2   Brief Synopsis: Kwan Benoit is a 76 y.o. female who presented with sob    Overnight/interim:    No issues    thora today           Cont meds:    [Held by provider] heparin (PORCINE) Infusion 12 Units/kg/hr (02/02/21 0029)     Scheduled meds:    doxazosin  1 mg Oral Nightly    amLODIPine  2.5 mg Oral Daily    furosemide  40 mg Oral Daily    rifaximin  550 mg Oral BID    spironolactone  50 mg Oral Daily    venlafaxine  75 mg Oral Daily    sodium chloride flush  10 mL Intravenous 2 times per day    cefTRIAXone (ROCEPHIN) IV  1,000 mg Intravenous Q24H    lactulose  20 g Oral TID     PRN meds: ipratropium-albuterol, sodium chloride flush, promethazine **OR** ondansetron, magnesium hydroxide, acetaminophen **OR** acetaminophen, heparin (porcine), heparin (porcine)     I reviewed the patient's past medical and surgical history, Medications and Allergies. O:  /63   Pulse 98   Temp 98.6 °F (37 °C) (Temporal)   Resp 24   Ht 5' 2.5\" (1.588 m)   Wt 211 lb 4.8 oz (95.8 kg)   SpO2 95%   BMI 38.03 kg/m²   24 hour I&O: I/O last 3 completed shifts: In: 120 [P.O.:120]  Out: -   No intake/output data recorded. GENERAL: Alert, cooperative, no acute distress. CHEST: No tenderness or deformity, full & symmetric excursion  LUNG: diminished,  respirations unlabored. No rales/wheezing/rubs  HEART: RRR, S1 and S2 normal, no murmur, rub or gallop. DP pulses 2/4  ABDOMEN: SNTND, no masses, no organomegaly, no guarding, rebound or rigidity. EXTREMITIES:  Extremities normal, atraumatic, no cyanosis or edema. Labs:  Na/K/Cl/CO2:  142/3.9/106/28 (02/02 0600)  BUN/Cr/glu/ALT/AST/amyl/lip:  11/1.3/--/--/--/--/-- (02/02 0600)  WBC/Hgb/Hct/Plts:  6.6/9.4/30.4/216 (02/02 0600)  estimated creatinine clearance is 41 mL/min (A) (based on SCr of 1.3 mg/dL (H)).   Other pertinent labs as noted below    Radiology:  US DUP LOWER EXTREMITIES BILATERAL VENOUS   Final Result   Within the visualized vessels there is no evidence for deep venous   thrombosis      US ABDOMEN LIMITED Specify organ? LIVER   Final Result   Simple cyst in left lobe of the liver. Signs of chronic liver parenchymal   disease, cannot exclude cirrhosis. Status post cholecystectomy, biliary tree is not dilated. Pancreas could not be evaluated properly on this study. Right upper quadrant ascites was seen on the recent CT chest of January 31, 2011 but not conspicuous demonstrate on the ultrasound. CTA CHEST W CONTRAST   Final Result   1. Probable small pulmonary emboli in the right lower lobe. Suboptimal   pulmonary arterial enhancement limits evaluation. 2. Mild cardiomegaly. 3. Large left pleural effusion with overlying compressive atelectasis. 4. Liver cirrhosis with varices and trace perihepatic ascites. 5. Small hypodense lesions in the liver. Further characterization with pre   and post contrast enhanced MRI is recommended. XR CHEST PORTABLE   Final Result   1. Opacities at left lung base suggest atelectasis, pneumonia, and likely   pleural effusion. 2.  Pulmonary vascular congestion. US DUP LOWER EXTREMITY LEFT KEMI    (Results Pending)   US THORACENTESIS Which side should the procedure be performed? Left    (Results Pending)       A/P:  Principal Problem:    Pleural effusion  Active Problems:    Obstructive sleep apnea syndrome    Essential hypertension    Depression    S/P reverse total shoulder arthroplasty, left    Toxic metabolic encephalopathy    SOB (shortness of breath)  Resolved Problems:    * No resolved hospital problems.  *        SOB  -likely 2/2 to pleural effusion and/or PE  -saturating 94% on 3 L O2  -no home oxygen  -continue home albuterol sulfate     Pleural effusion  -L lower lobe  On heparin drip, held prior to procedure  -pulm consulted: for thora     PE  On heparin drip   Consider orals after procedure    HTN  -continue home doxazosin, felodipine     KATE  -CPAP at home not currently working  -CPAP at night for this admission     Depression   -continue venlafaxine     Toxic metabolic encephalopathy  Cirrhosis 2/2 to HOLDEN  Esophageal varices   Previous investigations negative   -continue home rifaximin, lactulose  -continue home lasix and spironolactone  Consult GI   Consider propanolol      S/p reverse total shoulder arthroplasty, Left  -continue to leave L upper extremity in sling as needed        DVT / GI prophylaxis: high dose heparin protocol started and GI prophylaxis not indicated     Dispo - telemetry    Electronically signed by Jhon Moe MD PGY-3 on 2/2/2021 at 7:49 AM  This case was discussed with attending physician: Dr. Corey Riojas

## 2021-02-02 NOTE — CONSULTS
past.  Her liver function studies are unremarkable. Her platelet count  is normal.  Her MCV is prolonged. She is mildly anemic with  normochromic normocytic indices. Platelet count 288,856. Her INR was  1.6 in December. PAST MEDICAL HISTORY:  Includes obstructive sleep apnea, breast cancer,  hypertension, diabetes, cirrhosis, and hyperlipidemia. She has had an  EGD, colonoscopy, lithotripsy, arthroplasty and hysterectomy. CURRENT MEDICATIONS:  Norvasc, Rocephin, Cardura, Lasix, heparin,  Chronulac, magnesium, Zofran, Phenergan, Xifaxan and Effexor. OBJECTIVE:  GENERAL:  She is overweight, alert, normotensive. Lying supine, in no  distress. Mildly pale, but not jaundiced. NECK:  Neck veins are not visible. HEART:  Tones normal.  Rhythm regular. ABDOMEN:  Obese abdomen without tenderness or discernible fluid. Liver  edge and spleen tip are not palpable. Trace edema. ASSESSMENT:  Stable chronic liver disease with minimal amount of  ascites, on diuretics. Presumably, she has had a history of hepatic  encephalopathy, but is lucid at this point on current lactulose and  rifaximin. Less than 2 years ago, she did not have esophageal varices  and it does not merit endoscopic re-evaluation. Risk of bleed on  heparin was considered acceptably low. No interventions are planned.   Pleural effusion conceivably hepatic hydrothorax, doubt secondary to the Santana Vora MD    D: 02/01/2021 20:44:28       T: 02/01/2021 20:49:12     [email or web address]  Job#: [id number]     Doc#: [id number]    CC:

## 2021-02-02 NOTE — PROGRESS NOTES
Message sent to Dr. Christie Maldonado clarifying heparin orders.  Heparin to be held starting at 6 am

## 2021-02-02 NOTE — PROGRESS NOTES
[address] [facility]  Family Medicine Attending    S: 76 y.o. female with PMH of malignant neoplasm of L breast, s/p lumpectomy in 2013, KATE, HTN, thyroid nodules, depression, hx colon polyps, HLD, liver cirrhosis, anemia, and metabolic encephalopathy, who presents to ED for wheezing and SOB. Noted to have large left pleural effusion on CXR and CTA (also small right PE)  This morning, feels ok. Less SOB    O: VS- Blood pressure 129/63, pulse 98, temperature 98.6 °F (37 °C), temperature source Temporal, resp. rate 24, height 5' 2.5\" (1.588 m), weight 211 lb 4.8 oz (95.8 kg), SpO2 95 %. Exam is as noted by resident with the following changes, additions or corrections:  Awake, alert, NAD  Heart - RRR with systolic murmur  Lungs - decreased BS left base  Abd - benign  Ext - trace edema. LE ultrasound -no DVT    Impressions:   Principal Problem:    Pleural effusion  Active Problems:    Obstructive sleep apnea syndrome    Essential hypertension    Depression    S/P reverse total shoulder arthroplasty, left    Toxic metabolic encephalopathy    SOB (shortness of breath)  Resolved Problems:    * No resolved hospital problems. *      Plan:   Heparin drip, O2   Appreciate GI recommendations   Hold heparin for thoracentesis per Dr. Yfn Boo   Depending on that result, will make decision regarding discharge. Will ask Pulmonary for opinion regarding anticoagulation     Attending Physician Statement  I have reviewed the chart and seen the patient with the resident(s). I personally reviewed images, EKG's and similar tests, if present. I personally reviewed and performed key elements of the history and exam.  I have reviewed and confirmed student and/or resident history and exam with changes as indicated above. I agree with the assessment, plan and orders as documented by the resident. Please refer to the resident and/or student note for additional information.       Alida Wilson

## 2021-02-02 NOTE — PROGRESS NOTES
[facility] - Family Medicine Inpatient   Resident Progress Note    S:  Hospital day: 2   Brief Synopsis: Umm Jenkins is a 76 y.o. female w/ PMHx breast cancer s/p lumpectomy L breast (2013), liver cirrhosis, encephalopathy on lactulose and rifaximin, HTN on amlodipine, furosemide, and spironolactone, and HLD who presented to ED with dyspnea. CXR and CTA w/ contrast demonstrated small PE in RLL, L pleural effusion. Patient seen and examined this AM, vitals stable, in no acute distress, sitting up eating breakfast. Dyspnea is currently minimal, although she endorses an episode of paroxysmal nocturnal orthopnea last night that resolved after sitting up for a few moments. Denying any N/V/D, abdominal pain, or urinary issues. Overnight/interim:   ? Heparin held for thoracocentesis this afternoon      Cont meds:    [Held by provider] heparin (PORCINE) Infusion 12 Units/kg/hr (02/02/21 0029)     Scheduled meds:    doxazosin  1 mg Oral Nightly    amLODIPine  2.5 mg Oral Daily    furosemide  40 mg Oral Daily    rifaximin  550 mg Oral BID    spironolactone  50 mg Oral Daily    venlafaxine  75 mg Oral Daily    sodium chloride flush  10 mL Intravenous 2 times per day    cefTRIAXone (ROCEPHIN) IV  1,000 mg Intravenous Q24H    lactulose  20 g Oral TID     PRN meds: ipratropium-albuterol, sodium chloride flush, promethazine **OR** ondansetron, magnesium hydroxide, acetaminophen **OR** acetaminophen, heparin (porcine), heparin (porcine)     I reviewed the patient's past medical and surgical history, Medications and Allergies. O:  /63   Pulse 98   Temp 98.6 °F (37 °C) (Temporal)   Resp 24   Ht 5' 2.5\" (1.588 m)   Wt 211 lb 4.8 oz (95.8 kg)   SpO2 95%   BMI 38.03 kg/m²   24 hour I&O: I/O last 3 completed shifts: In: 120 [P.O.:120]  Out: -   No intake/output data recorded.      ***  General Appearance: alert and oriented to person, place and time, well-developed and well-nourished, in no acute distress Skin: warm and dry  Head: normocephalic and atraumatic  Pulmonary/Chest: On room air, decreased breath sounds noted- bilaterally posterior lower lobes  Cardiovascular: normal rate, rhythm; systolic murmur noted  Abdomen: soft, tender to palpation laterally on bilateral sides  Extremities: no cyanosis, no clubbing, L leg tender to palpation  Musculoskeletal: normal range of motion, no joint swelling, deformity or tenderness  Neurologic: speech normal   Physical Exam        Labs:  Na/K/Cl/CO2:  142/3.9/106/28 (02/02 0600)  BUN/Cr/glu/ALT/AST/amyl/lip:  11/1.3/--/--/--/--/-- (02/02 0600)  WBC/Hgb/Hct/Plts:  6.6/9.4/30.4/216 (02/02 0600)  estimated creatinine clearance is 41 mL/min (A) (based on SCr of 1.3 mg/dL (H)). Other pertinent labs as noted below    Radiology:  US DUP LOWER EXTREMITIES BILATERAL VENOUS   Final Result   Within the visualized vessels there is no evidence for deep venous   thrombosis      US ABDOMEN LIMITED Specify organ? LIVER   Final Result   Simple cyst in left lobe of the liver. Signs of chronic liver parenchymal   disease, cannot exclude cirrhosis. Status post cholecystectomy, biliary tree is not dilated. Pancreas could not be evaluated properly on this study. Right upper quadrant ascites was seen on the recent CT chest of January 31, 2011 but not conspicuous demonstrate on the ultrasound. CTA CHEST W CONTRAST   Final Result   1. Probable small pulmonary emboli in the right lower lobe. Suboptimal   pulmonary arterial enhancement limits evaluation. 2. Mild cardiomegaly. 3. Large left pleural effusion with overlying compressive atelectasis. 4. Liver cirrhosis with varices and trace perihepatic ascites. 5. Small hypodense lesions in the liver. Further characterization with pre   and post contrast enhanced MRI is recommended. XR CHEST PORTABLE   Final Result   1. Opacities at left lung base suggest atelectasis, pneumonia, and likely   pleural effusion. 2.  Pulmonary vascular congestion. US DUP LOWER EXTREMITY LEFT KEMI    (Results Pending)   US THORACENTESIS Which side should the procedure be performed?  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  -CXR, CTA chest w/ contrast demonstrated L pleural effusion (1/31)  -Pulm consulted, appreciate recommendations  -For thoracocentesis today, heparin held    PE  -CTA demonstrated small PE in RLL (1/31)  -U/S lower extremities negative for DVTs (2/1)  -On heparin drip, currently held for thoracocentesis    Hepatic Encephalopathy 2/2 liver cirrhosis  -continue home rifaximin, lactulose, lasix, spironolactone  -GI consulted, appreciate recommendations    HTN  -continue home lasix, spironolactone, doxazosin, amlodipine    Depression  -continue home venlafaxine    GI/DVT ppx: Heparin [Held]  Diet: Carb control        Electronically signed by Poppy DESAI on 2/2/2021 at 7:32 AM  This case was discussed with attending physician: Dr. Su Rosa

## 2021-02-02 NOTE — CARE COORDINATION
Received message to return call to [facility] [phone]. Called and left a voice message to return call. Juana SHAY

## 2021-02-03 ENCOUNTER — APPOINTMENT (OUTPATIENT)
Dept: GENERAL RADIOLOGY | Age: 76
DRG: 432 | End: 2021-02-03
Payer: COMMERCIAL

## 2021-02-03 PROBLEM — J90 PLEURAL EFFUSION: Status: RESOLVED | Noted: 2021-01-31 | Resolved: 2021-02-03

## 2021-02-03 PROBLEM — R06.02 SOB (SHORTNESS OF BREATH): Status: RESOLVED | Noted: 2021-02-01 | Resolved: 2021-02-03

## 2021-02-03 PROBLEM — G92.8 TOXIC METABOLIC ENCEPHALOPATHY: Status: RESOLVED | Noted: 2020-12-29 | Resolved: 2021-02-03

## 2021-02-03 LAB
ANION GAP SERPL CALCULATED.3IONS-SCNC: 9 MMOL/L (ref 7–16)
APTT: 34.9 SEC (ref 24.5–35.1)
BASOPHILS ABSOLUTE: 0.06 E9/L (ref 0–0.2)
BASOPHILS RELATIVE PERCENT: 1.2 % (ref 0–2)
BUN BLDV-MCNC: 10 MG/DL (ref 8–23)
CALCIUM SERPL-MCNC: 9.4 MG/DL (ref 8.6–10.2)
CHLORIDE BLD-SCNC: 105 MMOL/L (ref 98–107)
CO2: 27 MMOL/L (ref 22–29)
CREAT SERPL-MCNC: 1.1 MG/DL (ref 0.5–1)
EOSINOPHILS ABSOLUTE: 0.22 E9/L (ref 0.05–0.5)
EOSINOPHILS RELATIVE PERCENT: 4.5 % (ref 0–6)
GFR AFRICAN AMERICAN: 58
GFR NON-AFRICAN AMERICAN: 48 ML/MIN/1.73
GLUCOSE BLD-MCNC: 81 MG/DL (ref 74–99)
GRAM STAIN ORDERABLE: NORMAL
HCT VFR BLD CALC: 27.5 % (ref 34–48)
HEMOGLOBIN: 8.8 G/DL (ref 11.5–15.5)
IMMATURE GRANULOCYTES #: 0.01 E9/L
IMMATURE GRANULOCYTES %: 0.2 % (ref 0–5)
LYMPHOCYTES ABSOLUTE: 1.32 E9/L (ref 1.5–4)
LYMPHOCYTES RELATIVE PERCENT: 27 % (ref 20–42)
MCH RBC QN AUTO: 29.8 PG (ref 26–35)
MCHC RBC AUTO-ENTMCNC: 32 % (ref 32–34.5)
MCV RBC AUTO: 93.2 FL (ref 80–99.9)
MONOCYTES ABSOLUTE: 0.76 E9/L (ref 0.1–0.95)
MONOCYTES RELATIVE PERCENT: 15.6 % (ref 2–12)
NEUTROPHILS ABSOLUTE: 2.51 E9/L (ref 1.8–7.3)
NEUTROPHILS RELATIVE PERCENT: 51.5 % (ref 43–80)
PDW BLD-RTO: 17.2 FL (ref 11.5–15)
PLATELET # BLD: 191 E9/L (ref 130–450)
PMV BLD AUTO: 11 FL (ref 7–12)
POTASSIUM REFLEX MAGNESIUM: 3.7 MMOL/L (ref 3.5–5)
RBC # BLD: 2.95 E12/L (ref 3.5–5.5)
SODIUM BLD-SCNC: 141 MMOL/L (ref 132–146)
TROPONIN: <0.01 NG/ML (ref 0–0.03)
TROPONIN: <0.01 NG/ML (ref 0–0.03)
WBC # BLD: 4.9 E9/L (ref 4.5–11.5)

## 2021-02-03 PROCEDURE — 2060000000 HC ICU INTERMEDIATE R&B

## 2021-02-03 PROCEDURE — 99233 SBSQ HOSP IP/OBS HIGH 50: CPT | Performed by: INTERNAL MEDICINE

## 2021-02-03 PROCEDURE — 84484 ASSAY OF TROPONIN QUANT: CPT

## 2021-02-03 PROCEDURE — 85025 COMPLETE CBC W/AUTO DIFF WBC: CPT

## 2021-02-03 PROCEDURE — 80048 BASIC METABOLIC PNL TOTAL CA: CPT

## 2021-02-03 PROCEDURE — 2580000003 HC RX 258: Performed by: STUDENT IN AN ORGANIZED HEALTH CARE EDUCATION/TRAINING PROGRAM

## 2021-02-03 PROCEDURE — 6360000002 HC RX W HCPCS: Performed by: STUDENT IN AN ORGANIZED HEALTH CARE EDUCATION/TRAINING PROGRAM

## 2021-02-03 PROCEDURE — 99232 SBSQ HOSP IP/OBS MODERATE 35: CPT | Performed by: FAMILY MEDICINE

## 2021-02-03 PROCEDURE — 73030 X-RAY EXAM OF SHOULDER: CPT

## 2021-02-03 PROCEDURE — 6370000000 HC RX 637 (ALT 250 FOR IP): Performed by: FAMILY MEDICINE

## 2021-02-03 PROCEDURE — 6360000002 HC RX W HCPCS: Performed by: INTERNAL MEDICINE

## 2021-02-03 PROCEDURE — 6370000000 HC RX 637 (ALT 250 FOR IP): Performed by: STUDENT IN AN ORGANIZED HEALTH CARE EDUCATION/TRAINING PROGRAM

## 2021-02-03 PROCEDURE — 36415 COLL VENOUS BLD VENIPUNCTURE: CPT

## 2021-02-03 RX ORDER — FUROSEMIDE 40 MG/1
40 TABLET ORAL DAILY
Qty: 60 TABLET | Refills: 3 | Status: SHIPPED
Start: 2021-02-03 | End: 2021-06-30 | Stop reason: SDUPTHER

## 2021-02-03 RX ORDER — LACTULOSE 10 G/15ML
20 SOLUTION ORAL 3 TIMES DAILY
Qty: 1892 ML | Refills: 1 | Status: SHIPPED
Start: 2021-02-03 | End: 2021-06-30 | Stop reason: SDUPTHER

## 2021-02-03 RX ORDER — SPIRONOLACTONE 50 MG/1
50 TABLET, FILM COATED ORAL DAILY
Qty: 90 TABLET | Refills: 0 | Status: SHIPPED
Start: 2021-02-03 | End: 2021-06-02 | Stop reason: CLARIF

## 2021-02-03 RX ADMIN — ONDANSETRON 4 MG: 2 INJECTION INTRAMUSCULAR; INTRAVENOUS at 10:16

## 2021-02-03 RX ADMIN — FUROSEMIDE 40 MG: 40 TABLET ORAL at 10:07

## 2021-02-03 RX ADMIN — ENOXAPARIN SODIUM 90 MG: 100 INJECTION SUBCUTANEOUS at 01:04

## 2021-02-03 RX ADMIN — SODIUM CHLORIDE, PRESERVATIVE FREE 10 ML: 5 INJECTION INTRAVENOUS at 20:11

## 2021-02-03 RX ADMIN — ONDANSETRON 4 MG: 2 INJECTION INTRAMUSCULAR; INTRAVENOUS at 23:34

## 2021-02-03 RX ADMIN — ENOXAPARIN SODIUM 90 MG: 100 INJECTION SUBCUTANEOUS at 12:38

## 2021-02-03 RX ADMIN — VENLAFAXINE HYDROCHLORIDE 75 MG: 75 CAPSULE, EXTENDED RELEASE ORAL at 10:06

## 2021-02-03 RX ADMIN — CEFTRIAXONE SODIUM 1000 MG: 1 INJECTION, POWDER, FOR SOLUTION INTRAMUSCULAR; INTRAVENOUS at 10:10

## 2021-02-03 RX ADMIN — SPIRONOLACTONE 50 MG: 25 TABLET ORAL at 10:06

## 2021-02-03 RX ADMIN — ENOXAPARIN SODIUM 90 MG: 100 INJECTION SUBCUTANEOUS at 20:11

## 2021-02-03 RX ADMIN — LACTULOSE 20 G: 20 SOLUTION ORAL at 20:11

## 2021-02-03 RX ADMIN — AMLODIPINE BESYLATE 2.5 MG: 2.5 TABLET ORAL at 10:06

## 2021-02-03 RX ADMIN — DOXAZOSIN 1 MG: 1 TABLET ORAL at 20:11

## 2021-02-03 RX ADMIN — RIFAXIMIN 550 MG: 550 TABLET ORAL at 20:11

## 2021-02-03 RX ADMIN — RIFAXIMIN 550 MG: 550 TABLET ORAL at 10:07

## 2021-02-03 RX ADMIN — SODIUM CHLORIDE, PRESERVATIVE FREE 10 ML: 5 INJECTION INTRAVENOUS at 10:10

## 2021-02-03 ASSESSMENT — PAIN DESCRIPTION - PAIN TYPE: TYPE: ACUTE PAIN

## 2021-02-03 ASSESSMENT — PAIN DESCRIPTION - LOCATION: LOCATION: SHOULDER

## 2021-02-03 ASSESSMENT — PAIN DESCRIPTION - ORIENTATION: ORIENTATION: LEFT

## 2021-02-03 ASSESSMENT — PAIN - FUNCTIONAL ASSESSMENT: PAIN_FUNCTIONAL_ASSESSMENT: PREVENTS OR INTERFERES SOME ACTIVE ACTIVITIES AND ADLS

## 2021-02-03 ASSESSMENT — PAIN DESCRIPTION - DESCRIPTORS: DESCRIPTORS: ACHING;THROBBING

## 2021-02-03 ASSESSMENT — PAIN SCALES - GENERAL: PAINLEVEL_OUTOF10: 3

## 2021-02-03 NOTE — CARE COORDINATION
Pt discharging home today, called [employer] [facility]) to inform of discharge. Aides will be back in place for pt starting tomorrow per [facility]. Awaiting ambulatory pulse ox prior to discharge. Family to provide transportation. 1pm per RN pt is requesting new cpap mask. Met with pt who states that her cpap is through [address], she states she has been calling and they tell her one will be delivered in 7-10 days, but its never delivered, called [address], per [address] pt has been inactive with since 1/2020, per [address] she was calling wrong number but wouldn't have been able to deliver a mask without follow up with a physician, pt has been noncompliant with following up with physician for cpap. Notified pt what has  Happened, pt states she has no pulmonary doctor, also notified pt that she will need to go to [address] to sign new paperwork. Perfect served Dr. Noemi Gonzales for documentation in progress notes that states that \"pt uses and benefits from cpap machine\" and requesting order for cpap mask. Jennifer SHAY

## 2021-02-03 NOTE — PROGRESS NOTES
CLINICAL PHARMACY NOTE: MEDS TO [address] Select Patient?: No  Total # of Prescriptions Filled: 3   The following medications were delivered to the patient:  · eliquis starter pack  · Lactulose 10gm/15ml soln  · spironolactone  Total # of Interventions Completed: 3  Time Spent (min): 30    Additional Documentation:    Delivered to patient.

## 2021-02-03 NOTE — PROGRESS NOTES
[address] [facility]  Family Medicine Attending    S: 76 y.o. female with PMH of malignant neoplasm of L breast, s/p lumpectomy in 2013, KATE, HTN, thyroid nodules, depression, hx colon polyps, HLD, liver cirrhosis, anemia, and metabolic encephalopathy, who presents to ED for wheezing and SOB. Noted to have large left pleural effusion on CXR and CTA (also small right PE). Thoracentesis 2/2  This morning, breathing much improved    O: VS- Blood pressure 131/63, pulse 92, temperature 97.8 °F (36.6 °C), temperature source Temporal, resp. rate 18, height 5' 2.5\" (1.588 m), weight 211 lb 10.3 oz (96 kg), SpO2 91 %. Exam is as noted by resident with the following changes, additions or corrections:  Awake, alert, NAD  Heart - RRR with systolic murmur  Lungs - decreased BS but markedly improved  Abd - benign  Ext - trace edema. LE ultrasound -no DVT  CXR - significant improvement    Impressions:   Principal Problem (Resolved):    Pleural effusion  Active Problems:    Type 2 diabetes mellitus (HCC)    Obstructive sleep apnea syndrome    Essential hypertension    Depression    Cirrhosis (HCC)    S/P reverse total shoulder arthroplasty, left  Resolved Problems:    Toxic metabolic encephalopathy    SOB (shortness of breath)      Plan:   Lovenox   Appreciate GI recommendations   Will switch to eliquis for discharge   Results suggest transudate, likely related to cirrhosis   Discharge within 24-48 hours     Attending Physician Statement  I have reviewed the chart and seen the patient with the resident(s). I personally reviewed images, EKG's and similar tests, if present. I personally reviewed and performed key elements of the history and exam.  I have reviewed and confirmed student and/or resident history and exam with changes as indicated above. I agree with the assessment, plan and orders as documented by the resident. Please refer to the resident and/or student note for additional information.       Nidhi Ruiz Chao Escudero

## 2021-02-03 NOTE — PROGRESS NOTES
[facility] - [facility] Inpatient   Resident Progress Note    S:  Hospital day: 3   Brief Synopsis: Abdulaziz Martinez is a 76 y.o. female who presented with sob    Overnight/interim:    No issues    thora done           Cont meds:     Scheduled meds:    enoxaparin  90 mg Subcutaneous BID    doxazosin  1 mg Oral Nightly    amLODIPine  2.5 mg Oral Daily    furosemide  40 mg Oral Daily    rifaximin  550 mg Oral BID    spironolactone  50 mg Oral Daily    venlafaxine  75 mg Oral Daily    sodium chloride flush  10 mL Intravenous 2 times per day    cefTRIAXone (ROCEPHIN) IV  1,000 mg Intravenous Q24H    lactulose  20 g Oral TID     PRN meds: ipratropium-albuterol, sodium chloride flush, promethazine **OR** ondansetron, magnesium hydroxide, acetaminophen **OR** acetaminophen     I reviewed the patient's past medical and surgical history, Medications and Allergies. O:  /63   Pulse 92   Temp 97.8 °F (36.6 °C) (Temporal)   Resp 18   Ht 5' 2.5\" (1.588 m)   Wt 211 lb 10.3 oz (96 kg)   SpO2 91%   BMI 38.09 kg/m²   24 hour I&O: I/O last 3 completed shifts: In: 80 [P.O.:810]  Out: 0   I/O this shift:  In: 360 [P.O.:360]  Out: -      GENERAL: Alert, cooperative, no acute distress. CHEST: No tenderness or deformity, full & symmetric excursion  LUNG: diminished,  respirations unlabored. No rales/wheezing/rubs  HEART: RRR, S1 and S2 normal, no murmur, rub or gallop. DP pulses 2/4  ABDOMEN: SNTND, no masses, no organomegaly, no guarding, rebound or rigidity. EXTREMITIES:  Extremities normal, atraumatic, no cyanosis or edema. Labs:  Na/K/Cl/CO2:  141/3.7/105/27 (02/03 8127)  BUN/Cr/glu/ALT/AST/amyl/lip:  10/1.1/--/--/--/--/-- (02/03 1331)  WBC/Hgb/Hct/Plts:  4.9/8.8/27.5/191 (02/03 0612)  estimated creatinine clearance is 48 mL/min (A) (based on SCr of 1.1 mg/dL (H)). Other pertinent labs as noted below    Radiology:  US THORACENTESIS Which side should the procedure be performed?  Left   Final Result Successful ultrasound guided left thoracentesis performed by Dr. Marlyn Beverly. Please see separate procedure note for details. XR CHEST PORTABLE   Final Result   Improving but persistent infiltrates and pleural effusion in the left lung   base likely improving pneumonia or CHF. There is no pneumothorax. US DUP LOWER EXTREMITIES BILATERAL VENOUS   Final Result   Within the visualized vessels there is no evidence for deep venous   thrombosis      US ABDOMEN LIMITED Specify organ? LIVER   Final Result   Simple cyst in left lobe of the liver. Signs of chronic liver parenchymal   disease, cannot exclude cirrhosis. Status post cholecystectomy, biliary tree is not dilated. Pancreas could not be evaluated properly on this study. Right upper quadrant ascites was seen on the recent CT chest of January 31, 2011 but not conspicuous demonstrate on the ultrasound. CTA CHEST W CONTRAST   Final Result   1. Probable small pulmonary emboli in the right lower lobe. Suboptimal   pulmonary arterial enhancement limits evaluation. 2. Mild cardiomegaly. 3. Large left pleural effusion with overlying compressive atelectasis. 4. Liver cirrhosis with varices and trace perihepatic ascites. 5. Small hypodense lesions in the liver. Further characterization with pre   and post contrast enhanced MRI is recommended. XR CHEST PORTABLE   Final Result   1. Opacities at left lung base suggest atelectasis, pneumonia, and likely   pleural effusion. 2.  Pulmonary vascular congestion. US DUP LOWER EXTREMITY LEFT KEMI    (Results Pending)       A/P:  Principal Problem:    Pleural effusion  Active Problems:    Obstructive sleep apnea syndrome    Essential hypertension    Depression    S/P reverse total shoulder arthroplasty, left    Toxic metabolic encephalopathy    SOB (shortness of breath)  Resolved Problems:    * No resolved hospital problems.  *        SOB  -likely 2/2 to pleural effusion and/or PE  -saturating 94% on 3 L O2  -no home oxygen  -continue home albuterol sulfate     Pleural effusion  -L lower lobe  On heparin drip, held prior to procedure  -pulm consulted: for thora     PE  On heparin drip   Consider orals after procedure      HTN  -continue home doxazosin, felodipine     KATE  -CPAP at home not currently working  -CPAP at night for this admission     Depression   -continue venlafaxine     Toxic metabolic encephalopathy  Cirrhosis 2/2 to HOLDEN  Esophageal varices   Previous investigations negative   -continue home rifaximin, lactulose  -continue home lasix and spironolactone  Consult GI   Consider propanolol      S/p reverse total shoulder arthroplasty, Left  -continue to leave L upper extremity in sling as needed        DVT / GI prophylaxis: high dose heparin protocol started and GI prophylaxis not indicated     Dispo - dc today after ambulating pulse ox     Electronically signed by John Moe MD PGY-3 on 2/3/2021 at 10:21 AM  This case was discussed with attending physician: Dr. Corey Riojas

## 2021-02-03 NOTE — PROCEDURES
Ultrasound guided Thoracentesis Procedure Note    PATIENT: Duane Jackson #  [id number]    DATE:  2/2 2020    INDICTATIONS: pleural effusion found on imaging. PRE - OPERATIVE DIAGNOSIS:  Left Pleural Effusion    POST - OPERATIVE DIAGNOSIS:  Left Pleural Effusion    PERFORMED By:  Cindi Jones MD    ASSISTANT(S):  US Technician     CONSCENT:  Verbal consent obtained. Written consent obtained. After informed consent & appropriate time out protocol was noted & obtained. Risks/Benefits/Alternatives of the procedure were discussed including: infection, bleeding, pain, & pneumothorax. THORACENTESIS PROCEDURE DETAILS:  Patient understanding: patient states understanding of the procedure being performed. Time out: Immediately prior to procedure a \"time out\" was called to verify the correct. Patient was placed in a sitting position and ultrasound was done and site of maximum fluid collection was marked. PREPARATION: Patient was prepped and draped in the usual sterile fashion. ANESTHESIA: Lidocaine 1% without epinephrine, amount varied for local control. PROCEDURE NOTE: The patient was placed in the sitting position. US was then used to antonio the fluid and depth was determined. Then the skin was prepped with Chloraprep solution and draped with sterile covers. For the procedure we used 1% buffered lidocaine to anesthetize the skin, subcutaneous tissue and parietal pleura. After adequate anesthesia was accomplished. The plural catheter was advanced over a guide into the pleural space. The fluid was obtained without any difficulties and minimal blood loss. FINDINGS/SAMPLES: We removed 1000 ml of straw colored/clear clear pleural fluid. The samples was sent for analysis. COMPLICATIONS:  None; patient tolerated the procedure well. PLAN: Care will be taken to review the post procedure radiograph for pneumothorax & testing when available.     Cindi Jones

## 2021-02-03 NOTE — PROGRESS NOTES
or organomegaly   Extremities:mild edema   Musculoskeletal: normal range of motion, no joint swelling, deformity or tenderness   Neurologic: reflexes normal and symmetric, no cranial nerve deficit noted    LABS/IMAGING:    CBC:  Lab Results   Component Value Date    WBC 6.6 02/02/2021    HGB 9.4 (L) 02/02/2021    HCT 30.4 (L) 02/02/2021    MCV 94.1 02/02/2021     02/02/2021    LYMPHOPCT 29.1 02/02/2021    RBC 3.23 (L) 02/02/2021    MCH 29.1 02/02/2021    MCHC 30.9 (L) 02/02/2021    RDW 17.3 (H) 02/02/2021    NEUTOPHILPCT 52.5 02/02/2021    MONOPCT 12.3 (H) 02/02/2021    BASOPCT 1.5 02/02/2021    NEUTROABS 3.46 02/02/2021    LYMPHSABS 1.92 02/02/2021    MONOSABS 0.81 02/02/2021    EOSABS 0.29 02/02/2021    BASOSABS 0.10 02/02/2021       Recent Labs     02/02/21  0600 02/01/21  0610 01/31/21  1611   WBC 6.6 6.5 6.1   HGB 9.4* 8.9* 9.2*   HCT 30.4* 28.2* 28.4*   MCV 94.1 94.0 95.0    202 206       BMP:   Recent Labs     01/31/21  1611 02/01/21  0610 02/02/21  0600    145 142   K 4.0 4.2 3.9   * 109* 106   CO2 23 23 28   BUN 12 11 11   CREATININE 1.2* 1.2* 1.3*       MG:   Lab Results   Component Value Date    MG 2.0 04/05/2018     Ca/Phos:   Lab Results   Component Value Date    CALCIUM 9.3 02/02/2021    PHOS 3.0 06/07/2016     Amylase: No results found for: AMYLASE  Lipase: No results found for: LIPASE  LIVER PROFILE:   Recent Labs     01/31/21  1611   AST 30   ALT 14   BILITOT 0.7   ALKPHOS 114*       PT/INR: No results for input(s): PROTIME, INR in the last 72 hours.   APTT:   Recent Labs     02/01/21  1200 02/01/21  2229 02/02/21  0600   APTT >240.0* >240.0* 148.1*       Cardiac Enzymes:  Lab Results   Component Value Date    CKTOTAL 104 01/21/2016    TROPONINI <0.01 01/31/2021                 PROBLEM LIST:  Patient Active Problem List   Diagnosis    [facility]    Type 2 diabetes mellitus ([facility]    Obstructive sleep apnea syndrome    Chronic back pain    Essential hypertension    Multiple thyroid nodules    Balance problem    Depression    At risk for colon cancer    Hx of adenomatous colonic polyps    Dyslipidemia    Murmur, cardiac    Cirrhosis (HCC)    Closed fracture of proximal epiphysis of left humerus    COVID-19    S/P reverse total shoulder arthroplasty, left    Toxic metabolic encephalopathy    RNE (acute kidney injury) (HCC)    Altered mental status    Anemia    Pleural effusion    SOB (shortness of breath)               ASSESSMENT:  1.) left side effusion  2. )Breast cancer   3.)Possible pneumonia  4.)Small PE ,if any  5. )KATE      PLAN:  *- pending cytology   *-Auto CPAP at night ,will contact DME  *-S/P thoracentesis,CXR improved  *-BD   *_further recommendations will follow   Start Lovenox this Pm if APPT less than 50 and change to elquis if hb stable in am   Will give 90 mg bid and change elquis on discharge        [address]  Pulmonary&Critical Care Medicine   Director of [address] [place] [address] Director of [address] [place] [address]    Marichuy Cheung    NOTE: This report was transcribed using voice recognition software. Every effort was made to ensure accuracy; however, inadvertent computerized transcription errors may be present.

## 2021-02-04 ENCOUNTER — APPOINTMENT (OUTPATIENT)
Dept: NON INVASIVE DIAGNOSTICS | Age: 76
DRG: 432 | End: 2021-02-04
Payer: COMMERCIAL

## 2021-02-04 ENCOUNTER — APPOINTMENT (OUTPATIENT)
Dept: NUCLEAR MEDICINE | Age: 76
DRG: 432 | End: 2021-02-04
Payer: COMMERCIAL

## 2021-02-04 VITALS
OXYGEN SATURATION: 91 % | TEMPERATURE: 98.6 F | BODY MASS INDEX: 35.88 KG/M2 | SYSTOLIC BLOOD PRESSURE: 137 MMHG | DIASTOLIC BLOOD PRESSURE: 73 MMHG | HEIGHT: 63 IN | WEIGHT: 202.5 LBS | RESPIRATION RATE: 14 BRPM | HEART RATE: 107 BPM

## 2021-02-04 PROBLEM — R07.2 PRECORDIAL PAIN: Status: ACTIVE | Noted: 2021-02-04

## 2021-02-04 PROBLEM — R07.89 OTHER CHEST PAIN: Status: ACTIVE | Noted: 2021-02-04

## 2021-02-04 LAB
ANION GAP SERPL CALCULATED.3IONS-SCNC: 10 MMOL/L (ref 7–16)
APPEARANCE FLUID: CLEAR
BASOPHILS ABSOLUTE: 0.06 E9/L (ref 0–0.2)
BASOPHILS RELATIVE PERCENT: 1.1 % (ref 0–2)
BUN BLDV-MCNC: 9 MG/DL (ref 8–23)
CALCIUM SERPL-MCNC: 8.8 MG/DL (ref 8.6–10.2)
CELL COUNT FLUID TYPE: NORMAL
CHLORIDE BLD-SCNC: 103 MMOL/L (ref 98–107)
CO2: 28 MMOL/L (ref 22–29)
COLOR FLUID: YELLOW
CREAT SERPL-MCNC: 1.2 MG/DL (ref 0.5–1)
EOSINOPHILS ABSOLUTE: 0.24 E9/L (ref 0.05–0.5)
EOSINOPHILS RELATIVE PERCENT: 4.3 % (ref 0–6)
GFR AFRICAN AMERICAN: 53
GFR NON-AFRICAN AMERICAN: 44 ML/MIN/1.73
GLUCOSE BLD-MCNC: 85 MG/DL (ref 74–99)
HCT VFR BLD CALC: 28.9 % (ref 34–48)
HEMOGLOBIN: 9.1 G/DL (ref 11.5–15.5)
IMMATURE GRANULOCYTES #: 0.01 E9/L
IMMATURE GRANULOCYTES %: 0.2 % (ref 0–5)
LV EF: 74 %
LVEF MODALITY: NORMAL
LYMPHOCYTES ABSOLUTE: 1.42 E9/L (ref 1.5–4)
LYMPHOCYTES RELATIVE PERCENT: 25.6 % (ref 20–42)
MAGNESIUM: 1.7 MG/DL (ref 1.6–2.6)
MCH RBC QN AUTO: 29.4 PG (ref 26–35)
MCHC RBC AUTO-ENTMCNC: 31.5 % (ref 32–34.5)
MCV RBC AUTO: 93.2 FL (ref 80–99.9)
MONOCYTE, FLUID: 84 %
MONOCYTES ABSOLUTE: 0.79 E9/L (ref 0.1–0.95)
MONOCYTES RELATIVE PERCENT: 14.2 % (ref 2–12)
NEUTROPHIL, FLUID: 16 %
NEUTROPHILS ABSOLUTE: 3.03 E9/L (ref 1.8–7.3)
NEUTROPHILS RELATIVE PERCENT: 54.6 % (ref 43–80)
NUCLEATED CELLS FLUID: 57 /UL
PDW BLD-RTO: 17.2 FL (ref 11.5–15)
PLATELET # BLD: 197 E9/L (ref 130–450)
PMV BLD AUTO: 11.1 FL (ref 7–12)
POTASSIUM REFLEX MAGNESIUM: 3.5 MMOL/L (ref 3.5–5)
RBC # BLD: 3.1 E12/L (ref 3.5–5.5)
RBC FLUID: <2000 /UL
SODIUM BLD-SCNC: 141 MMOL/L (ref 132–146)
TROPONIN: <0.01 NG/ML (ref 0–0.03)
WBC # BLD: 5.6 E9/L (ref 4.5–11.5)

## 2021-02-04 PROCEDURE — 2580000003 HC RX 258: Performed by: STUDENT IN AN ORGANIZED HEALTH CARE EDUCATION/TRAINING PROGRAM

## 2021-02-04 PROCEDURE — 3430000000 HC RX DIAGNOSTIC RADIOPHARMACEUTICAL: Performed by: RADIOLOGY

## 2021-02-04 PROCEDURE — 36415 COLL VENOUS BLD VENIPUNCTURE: CPT

## 2021-02-04 PROCEDURE — A9500 TC99M SESTAMIBI: HCPCS | Performed by: RADIOLOGY

## 2021-02-04 PROCEDURE — 6360000002 HC RX W HCPCS: Performed by: INTERNAL MEDICINE

## 2021-02-04 PROCEDURE — 84484 ASSAY OF TROPONIN QUANT: CPT

## 2021-02-04 PROCEDURE — 83735 ASSAY OF MAGNESIUM: CPT

## 2021-02-04 PROCEDURE — 78452 HT MUSCLE IMAGE SPECT MULT: CPT

## 2021-02-04 PROCEDURE — 99239 HOSP IP/OBS DSCHRG MGMT >30: CPT | Performed by: FAMILY MEDICINE

## 2021-02-04 PROCEDURE — 6360000002 HC RX W HCPCS: Performed by: FAMILY MEDICINE

## 2021-02-04 PROCEDURE — 6360000002 HC RX W HCPCS: Performed by: STUDENT IN AN ORGANIZED HEALTH CARE EDUCATION/TRAINING PROGRAM

## 2021-02-04 PROCEDURE — 78452 HT MUSCLE IMAGE SPECT MULT: CPT | Performed by: INTERNAL MEDICINE

## 2021-02-04 PROCEDURE — 93017 CV STRESS TEST TRACING ONLY: CPT

## 2021-02-04 PROCEDURE — 85025 COMPLETE CBC W/AUTO DIFF WBC: CPT

## 2021-02-04 PROCEDURE — 93018 CV STRESS TEST I&R ONLY: CPT | Performed by: INTERNAL MEDICINE

## 2021-02-04 PROCEDURE — 93016 CV STRESS TEST SUPVJ ONLY: CPT | Performed by: INTERNAL MEDICINE

## 2021-02-04 PROCEDURE — 80048 BASIC METABOLIC PNL TOTAL CA: CPT

## 2021-02-04 PROCEDURE — 6370000000 HC RX 637 (ALT 250 FOR IP): Performed by: STUDENT IN AN ORGANIZED HEALTH CARE EDUCATION/TRAINING PROGRAM

## 2021-02-04 RX ADMIN — ENOXAPARIN SODIUM 90 MG: 100 INJECTION SUBCUTANEOUS at 08:22

## 2021-02-04 RX ADMIN — ONDANSETRON 4 MG: 2 INJECTION INTRAMUSCULAR; INTRAVENOUS at 09:21

## 2021-02-04 RX ADMIN — AMLODIPINE BESYLATE 2.5 MG: 2.5 TABLET ORAL at 08:23

## 2021-02-04 RX ADMIN — REGADENOSON 0.4 MG: 0.08 INJECTION, SOLUTION INTRAVENOUS at 12:56

## 2021-02-04 RX ADMIN — Medication 30 MILLICURIE: at 13:04

## 2021-02-04 RX ADMIN — VENLAFAXINE HYDROCHLORIDE 75 MG: 75 CAPSULE, EXTENDED RELEASE ORAL at 08:23

## 2021-02-04 RX ADMIN — FUROSEMIDE 40 MG: 40 TABLET ORAL at 08:22

## 2021-02-04 RX ADMIN — Medication 10.5 MILLICURIE: at 11:21

## 2021-02-04 RX ADMIN — SODIUM CHLORIDE, PRESERVATIVE FREE 10 ML: 5 INJECTION INTRAVENOUS at 08:29

## 2021-02-04 RX ADMIN — RIFAXIMIN 550 MG: 550 TABLET ORAL at 08:22

## 2021-02-04 RX ADMIN — SPIRONOLACTONE 50 MG: 25 TABLET ORAL at 08:21

## 2021-02-04 RX ADMIN — CEFTRIAXONE SODIUM 1000 MG: 1 INJECTION, POWDER, FOR SOLUTION INTRAMUSCULAR; INTRAVENOUS at 08:23

## 2021-02-04 ASSESSMENT — PAIN DESCRIPTION - LOCATION: LOCATION: SHOULDER

## 2021-02-04 NOTE — PROCEDURES
Gene Brown Nuclear Stress Test:    Cardiologist: Dr. Leonie White Scroccangelica    Date: 2/4/2021    Indications for study: Chest pain    1. No chest pain  2. No new arrhythmias  3. No EKG changes suggestive of stress induced ischemia  4.  Nuclear images pending    Grazyna Rod MD  [facility]) Cardiology

## 2021-02-04 NOTE — PROGRESS NOTES
Pulmonary [address] [facility]                             Pulmonary Consult/Progress Note :            Reason for Consultation:pleural effusion ,Possible PE  CC : SOB   HPI:   Complain of mid sternal chest pain and tightness   SOB has improved a lot after thoracentesis   S/p Thoracentesis   Improved breathing and SOB       PHYSICAL EXAMINATION:     VITAL SIGNS:  /68   Pulse 96   Temp 98.9 °F (37.2 °C) (Temporal)   Resp 18   Ht 5' 2.5\" (1.588 m)   Wt 211 lb 10.3 oz (96 kg)   SpO2 92%   BMI 38.09 kg/m²   Wt Readings from Last 3 Encounters:   02/03/21 211 lb 10.3 oz (96 kg)   01/12/21 216 lb (98 kg)   12/29/20 200 lb (90.7 kg)     Temp Readings from Last 3 Encounters:   02/03/21 98.9 °F (37.2 °C) (Temporal)   01/12/21 96.9 °F (36.1 °C) (Temporal)   01/05/21 98.3 °F (36.8 °C) (Oral)     TMAX:  BP Readings from Last 3 Encounters:   02/03/21 134/68   01/12/21 112/79   12/31/20 (!) 110/59     Pulse Readings from Last 3 Encounters:   02/03/21 96   01/12/21 92   12/31/20 88           INTAKE/OUTPUTS:  I/O last 3 completed shifts:   In: 1020 [P.O.:1020]  Out: 0     Intake/Output Summary (Last 24 hours) at 2/4/2021 0036  Last data filed at 2/3/2021 2106  Gross per 24 hour   Intake 1020 ml   Output 0 ml   Net 1020 ml       General Appearance: alert and oriented to person, place and time, well-developed and   well-nourished, in no acute distress   Eyes: pupils equal, round, and reactive to light, extraocular eye movements intact, conjunctivae normal and sclera anicteric   Neck: neck supple and non tender without mass, no thyromegaly, no thyroid nodules and no cervical adenopathy   Pulmonary/Chest:decrease breath sound left /wheeizng occasional exp  Cardiovascular: normal rate, regular rhythm, normal S1 and S2, no murmurs, rubs, clicks or gallops, distal pulses intact, no carotid bruits, no murmurs, no gallops, no carotid bruits and no JVD   Abdomen: obese, soft, non-tender, non-distended, normal bowel sounds, no masses or organomegaly   Extremities:mild edema   Musculoskeletal: normal range of motion, no joint swelling, deformity or tenderness   Neurologic: reflexes normal and symmetric, no cranial nerve deficit noted    LABS/IMAGING:    CBC:  Lab Results   Component Value Date    WBC 4.9 02/03/2021    HGB 8.8 (L) 02/03/2021    HCT 27.5 (L) 02/03/2021    MCV 93.2 02/03/2021     02/03/2021    LYMPHOPCT 27.0 02/03/2021    RBC 2.95 (L) 02/03/2021    MCH 29.8 02/03/2021    MCHC 32.0 02/03/2021    RDW 17.2 (H) 02/03/2021    NEUTOPHILPCT 51.5 02/03/2021    MONOPCT 15.6 (H) 02/03/2021    BASOPCT 1.2 02/03/2021    NEUTROABS 2.51 02/03/2021    LYMPHSABS 1.32 (L) 02/03/2021    MONOSABS 0.76 02/03/2021    EOSABS 0.22 02/03/2021    BASOSABS 0.06 02/03/2021       Recent Labs     02/03/21  0612 02/02/21  0600 02/01/21  0610   WBC 4.9 6.6 6.5   HGB 8.8* 9.4* 8.9*   HCT 27.5* 30.4* 28.2*   MCV 93.2 94.1 94.0    216 202       BMP:   Recent Labs     02/01/21  0610 02/02/21  0600 02/03/21  0612    142 141   K 4.2 3.9 3.7   * 106 105   CO2 23 28 27   BUN 11 11 10   CREATININE 1.2* 1.3* 1.1*       MG:   Lab Results   Component Value Date    MG 2.0 04/05/2018     Ca/Phos:   Lab Results   Component Value Date    CALCIUM 9.4 02/03/2021    PHOS 3.0 06/07/2016     Amylase: No results found for: AMYLASE  Lipase: No results found for: LIPASE  LIVER PROFILE:   No results for input(s): AST, ALT, LIPASE, BILIDIR, BILITOT, ALKPHOS in the last 72 hours. Invalid input(s): AMYLASE,  ALB    PT/INR: No results for input(s): PROTIME, INR in the last 72 hours.   APTT:   Recent Labs     02/01/21  2229 02/02/21  0600 02/02/21  2304   APTT >240.0* 148.1* 34.9       Cardiac Enzymes:  Lab Results   Component Value Date    CKTOTAL 104 01/21/2016    TROPONINI <0.01 02/03/2021                 PROBLEM LIST:  Patient Active Problem List   Diagnosis    Malignant ne[facility] of left breast ([facility]    Type 2 diabetes mellitus ([facility]    Obstructive sleep apnea syndrome    Chronic back pain    Essential hypertension    Multiple thyroid nodules    Balance problem    Depression    At risk for colon cancer    Hx of adenomatous colonic polyps    Dyslipidemia    Murmur, cardiac    Cirrhosis (HCC)    Closed fracture of proximal epiphysis of left humerus    COVID-19    S/P reverse total shoulder arthroplasty, left    REN (acute kidney injury) ([facility]    Altered mental status    Anemia               ASSESSMENT:  1.) left side effusion  2. )Breast cancer   3.)Possible pneumonia  4.)Small PE ,if any  5. )KATE      PLAN:  *-chest pain as per primary   Discuss with the primary and  May need stress test /cardiac eval   *- pending cytology   Patient has KATE and she will benefit from CPAP machine and avoid risk of stroke and CAD . will ask DME order   *-S/P thoracentesis,CXR improved  *-BD   *_further recommendations will follow   Start Lovenox this Pm if APPT less than 50 and change to elquis if hb stable in am    elquis on discharge    Once chest pain resolved ,then can go     Jatin Urrutia  Pulmonary&Critical Care Medicine   Director of [address] [place] [address] Director of [address] [place] [address]    Anny Clemons    NOTE: This report was transcribed using voice recognition software. Every effort was made to ensure accuracy; however, inadvertent computerized transcription errors may be present.

## 2021-02-04 NOTE — PLAN OF CARE
Problem: Falls - Risk of:  Goal: Will remain free from falls  Description: Will remain free from falls  2/4/2021 0516 by Jocelin Haji RN  Outcome: Met This Shift  2/3/2021 1547 by Margo Kamara RN  Outcome: Met This Shift     Problem: Falls - Risk of:  Goal: Absence of physical injury  Description: Absence of physical injury  2/4/2021 0516 by Jocelin Haji RN  Outcome: Met This Shift  2/3/2021 1547 by Margo Kamara RN  Outcome: Met This Shift     Problem: Pain:  Goal: Pain level will decrease  Description: Pain level will decrease  2/4/2021 0516 by Jocelin Haji RN  Outcome: Met This Shift  2/3/2021 1547 by Margo Kamara RN  Outcome: Met This Shift     Problem: Pain:  Goal: Control of acute pain  Description: Control of acute pain  2/4/2021 0516 by Jocelin Haji RN  Outcome: Met This Shift  2/3/2021 1547 by Margo Kamara RN  Outcome: Met This Shift

## 2021-02-04 NOTE — PROGRESS NOTES
[facility] Medicine Inpatient   Resident Progress Note    S:  Hospital day: 4   Brief Synopsis: Senait Lowery is a 76 y.o. female who presented with sob    Overnight/interim:    No issues    thora done    Kept for chest pain, for stress today           Cont meds:     Scheduled meds:    enoxaparin  90 mg Subcutaneous BID    doxazosin  1 mg Oral Nightly    amLODIPine  2.5 mg Oral Daily    furosemide  40 mg Oral Daily    rifaximin  550 mg Oral BID    spironolactone  50 mg Oral Daily    venlafaxine  75 mg Oral Daily    sodium chloride flush  10 mL Intravenous 2 times per day    cefTRIAXone (ROCEPHIN) IV  1,000 mg Intravenous Q24H    lactulose  20 g Oral TID     PRN meds: perflutren lipid microspheres, ipratropium-albuterol, sodium chloride flush, promethazine **OR** ondansetron, magnesium hydroxide, acetaminophen **OR** acetaminophen     I reviewed the patient's past medical and surgical history, Medications and Allergies. O:  BP (!) 119/57   Pulse 96   Temp 99.9 °F (37.7 °C) (Temporal)   Resp 19   Ht 5' 2.5\" (1.588 m)   Wt 202 lb 8 oz (91.9 kg)   SpO2 93%   BMI 36.45 kg/m²   24 hour I&O: I/O last 3 completed shifts: In: 65 [P.O.:660]  Out: 0   No intake/output data recorded. GENERAL: Alert, cooperative, no acute distress. CHEST: No tenderness or deformity, full & symmetric excursion  LUNG: diminished,  respirations unlabored. No rales/wheezing/rubs  HEART: RRR, S1 and S2 normal, no murmur, rub or gallop. DP pulses 2/4  ABDOMEN: SNTND, no masses, no organomegaly, no guarding, rebound or rigidity. EXTREMITIES:  Extremities normal, atraumatic, no cyanosis or edema. Labs:  Na/K/Cl/CO2:  141/3.5/103/28 (02/04 3877)  BUN/Cr/glu/ALT/AST/amyl/lip:  9/1.2/--/--/--/--/-- (02/04 5347)  WBC/Hgb/Hct/Plts:  5.6/9.1/28.9/197 (02/04 8628)  estimated creatinine clearance is 43 mL/min (A) (based on SCr of 1.2 mg/dL (H)).   Other pertinent labs as noted below    Radiology:  XR SHOULDER LEFT (MIN 2 VIEWS)   Final Result   Status post left shoulder arthroplasty without obvious complication. Preserved overall alignment. US THORACENTESIS Which side should the procedure be performed? Left   Final Result   Successful ultrasound guided left thoracentesis performed by Dr. Sonia Hirsch. Please see separate procedure note for details. XR CHEST PORTABLE   Final Result   Improving but persistent infiltrates and pleural effusion in the left lung   base likely improving pneumonia or CHF. There is no pneumothorax. US DUP LOWER EXTREMITIES BILATERAL VENOUS   Final Result   Within the visualized vessels there is no evidence for deep venous   thrombosis      US ABDOMEN LIMITED Specify organ? LIVER   Final Result   Simple cyst in left lobe of the liver. Signs of chronic liver parenchymal   disease, cannot exclude cirrhosis. Status post cholecystectomy, biliary tree is not dilated. Pancreas could not be evaluated properly on this study. Right upper quadrant ascites was seen on the recent CT chest of January 31, 2011 but not conspicuous demonstrate on the ultrasound. CTA CHEST W CONTRAST   Final Result   1. Probable small pulmonary emboli in the right lower lobe. Suboptimal   pulmonary arterial enhancement limits evaluation. 2. Mild cardiomegaly. 3. Large left pleural effusion with overlying compressive atelectasis. 4. Liver cirrhosis with varices and trace perihepatic ascites. 5. Small hypodense lesions in the liver. Further characterization with pre   and post contrast enhanced MRI is recommended. XR CHEST PORTABLE   Final Result   1. Opacities at left lung base suggest atelectasis, pneumonia, and likely   pleural effusion. 2.  Pulmonary vascular congestion.       US DUP LOWER EXTREMITY LEFT KEMI    (Results Pending)   NM Cardiac Stress Test Nuclear Imaging    (Results Pending)       A/P:  Principal Problem:    Pleural effusion  Active Problems:    Type 2 diabetes mellitus ([facility] [address] Obstructive sleep apnea syndrome    Essential hypertension    Depression    Cirrhosis (HCC)    S/P reverse total shoulder arthroplasty, left    Other chest pain  Resolved Problems:    Toxic metabolic encephalopathy    SOB (shortness of breath)        CP   ACS r/o   Pharm stress ordered   Cycle trop and EKG negative       SOB  -likely 2/2 to pleural effusion and/or PE  -saturating 94% on 3 L O2  -no home oxygen  -continue home albuterol sulfate     Pleural effusion  -L lower lobe  On heparin drip, held prior to procedure  -pulm consulted: for thora     PE  On heparin drip   Consider orals after procedure      HTN  -continue home doxazosin, felodipine     KATE  -CPAP at home not currently working  -CPAP at night for this admission     Depression   -continue venlafaxine     Toxic metabolic encephalopathy  Cirrhosis 2/2 to HOLDEN  Esophageal varices   Previous investigations negative   -continue home rifaximin, lactulose  -continue home lasix and spironolactone  Consult GI   Consider propanolol      S/p reverse total shoulder arthroplasty, Left  -continue to leave L upper extremity in sling as needed        DVT / GI prophylaxis: high dose heparin protocol started and GI prophylaxis not indicated     Dispo - dc today after ambulating pulse ox     Electronically signed by Siddhartha Walker MD PGY-3 on 2/4/2021 at 1:32 PM  This case was discussed with attending physician: Dr. Sukh Rodriguez

## 2021-02-04 NOTE — PLAN OF CARE
Problem: Falls - Risk of:  Goal: Will remain free from falls  Description: Will remain free from falls  2/4/2021 1803 by Dorcas Swartz RN  Outcome: Met This Shift     Problem: Pain:  Description: Pain management should include both nonpharmacologic and pharmacologic interventions.   Goal: Pain level will decrease  Description: Pain level will decrease  2/4/2021 1803 by Dorcas Swartz RN  Outcome: Met This Shift

## 2021-02-04 NOTE — PROGRESS NOTES
exam with changes as indicated above. I agree with the assessment, plan and orders as documented by the resident. Please refer to the resident and/or student note for additional information.       Jacque Rebolledo

## 2021-02-05 LAB
BODY FLUID CULTURE, STERILE: NORMAL
GRAM STAIN RESULT: NORMAL

## 2021-02-05 NOTE — DISCHARGE SUMMARY
taking cirrhosis medication      Discharge Medications:         Medication List      START taking these medications    apixaban starter pack 5 MG Tbpk tablet  Commonly known as: Eliquis DVT/PE Starter Pack  Take 2 tablets twice a day for 7 days then take 1 tablet twice a day permanently        CHANGE how you take these medications    lactulose 10 GM/15ML solution  Commonly known as: CHRONULAC  Take 30 mLs by mouth 3 times daily  What changed:   · how much to take  · when to take this  · additional instructions     spironolactone 50 MG tablet  Commonly known as: ALDACTONE  Take 1 tablet by mouth daily  What changed: medication strength        CONTINUE taking these medications    albuterol sulfate  (90 Base) MCG/ACT inhaler  Commonly known as: Ventolin HFA  Inhale 2 puffs into the lungs 4 times daily as needed for Wheezing     doxazosin 1 MG tablet  Commonly known as: CARDURA     felodipine 2.5 MG extended release tablet  Commonly known as: PLENDIL  Take 1 tablet by mouth daily     furosemide 40 MG tablet  Commonly known as: LASIX  Take 1 tablet by mouth daily     ondansetron 4 MG tablet  Commonly known as: ZOFRAN  Take 1 tablet by mouth daily as needed for Nausea or Vomiting     rifaximin 550 MG tablet  Commonly known as: Xifaxan  Take 1 tablet by mouth 2 times daily     venlafaxine 75 MG extended release capsule  Commonly known as: EFFEXOR XR  Take 1 capsule by mouth daily     vitamin D 1.25 MG (25655 UT) Caps capsule  Commonly known as: ERGOCALCIFEROL  Take 1 capsule by mouth once a week           Where to Get Your Medications      These medications were sent to [facility] [address] [place] [address]    Phone: [phone]   · apixaban starter pack 5 MG Tbpk tablet  · furosemide 40 MG tablet  · lactulose 10 GM/15ML solution  · rifaximin 550 MG tablet  · spironolactone 50 MG tablet     These medications were sent to [facility] [address] [place]  [address]    Phone: [phone]   · felodipine 2.5 MG extended release tablet         Consults: Gastroenterology, pulmonology    Significant Diagnostic Studies: CT chest    Labs:  Na/K/Cl/CO2:  141/3.5/103/28 (02/04 5699)  BUN/Cr/glu/ALT/AST/amyl/lip:  9/1.2/--/--/--/--/-- (02/04 8805)  WBC/Hgb/Hct/Plts:  5.6/9.1/28.9/197 (02/04 2560)  [unfilled]  estimated creatinine clearance is 43 mL/min (A) (based on SCr of 1.2 mg/dL (H)). New Imaging:  NM Cardiac Stress Test Nuclear Imaging   Final Result   1. The myocardial perfusion imaging was normal with soft tissue   attenuation. 2.  Gated SPECT left ventricular ejection fraction was calculated to   be 74% with normal myocardial wall motion. Salud Lanza MD      XR SHOULDER LEFT (MIN 2 VIEWS)   Final Result   Status post left shoulder arthroplasty without obvious complication. Preserved overall alignment. US THORACENTESIS Which side should the procedure be performed? Left   Final Result   Successful ultrasound guided left thoracentesis performed by Dr. Bethany Owens. Please see separate procedure note for details. XR CHEST PORTABLE   Final Result   Improving but persistent infiltrates and pleural effusion in the left lung   base likely improving pneumonia or CHF. There is no pneumothorax. US DUP LOWER EXTREMITIES BILATERAL VENOUS   Final Result   Within the visualized vessels there is no evidence for deep venous   thrombosis      US ABDOMEN LIMITED Specify organ? LIVER   Final Result   Simple cyst in left lobe of the liver. Signs of chronic liver parenchymal   disease, cannot exclude cirrhosis. Status post cholecystectomy, biliary tree is not dilated. Pancreas could not be evaluated properly on this study.    Right upper quadrant ascites was seen on the recent CT chest of January 31, 2011 but not conspicuous demonstrate on the ultrasound. CTA CHEST W CONTRAST   Final Result   1. Probable small pulmonary emboli in the right lower lobe. Suboptimal   pulmonary arterial enhancement limits evaluation. 2. Mild cardiomegaly. 3. Large left pleural effusion with overlying compressive atelectasis. 4. Liver cirrhosis with varices and trace perihepatic ascites. 5. Small hypodense lesions in the liver. Further characterization with pre   and post contrast enhanced MRI is recommended. XR CHEST PORTABLE   Final Result   1. Opacities at left lung base suggest atelectasis, pneumonia, and likely   pleural effusion. 2.  Pulmonary vascular congestion. US DUP LOWER EXTREMITY LEFT KEMI    (Results Pending)         Treatments:   Thoracentesis, stress test    Discharge Exam:    Please refer to physical exam on date of discharge in progress note       Disposition:   home    Future Appointments   Date Time Provider Geraldine Rader   3/15/2021 10:00 AM Andrae Waldrop [facility]   3/16/2021  9:30 AM Aliyah Patton MD [facility]   4/15/2021 10:30 AM [facility]       More than 30 minutes was spent in preparation of this patient's discharge including, but not limited to, examination, preparation of documents, prescription preparation, counseling and coordination.     Signed:  Lena Trimble MD  2/5/2021, 1:14 PM

## 2021-02-08 ENCOUNTER — TELEPHONE (OUTPATIENT)
Dept: ADMINISTRATIVE | Age: 76
End: 2021-02-08

## 2021-02-08 DIAGNOSIS — Z96.612 S/P REVERSE TOTAL SHOULDER ARTHROPLASTY, LEFT: Primary | ICD-10-CM

## 2021-02-08 LAB
EKG ATRIAL RATE: 95 BPM
EKG P AXIS: 36 DEGREES
EKG P-R INTERVAL: 144 MS
EKG Q-T INTERVAL: 376 MS
EKG QRS DURATION: 82 MS
EKG QTC CALCULATION (BAZETT): 472 MS
EKG R AXIS: 42 DEGREES
EKG T AXIS: 34 DEGREES
EKG VENTRICULAR RATE: 95 BPM

## 2021-02-08 NOTE — TELEPHONE ENCOUNTER
Patient had an xray completed on 2/3 while in hospital (she was in hospital for blood clots). Scheduled to come back on 3/16 for follow up with Dr. Ismael Villegas, but patient is calling to see if she needs to start some type of therapy, but wanting to do therapy at home due to the medications that she is on.

## 2021-02-08 NOTE — TELEPHONE ENCOUNTER
Would like to see back for ROM check and imaging for her 6 week, are we able to get her into the office within the next 7-10 days?   Electronically signed by Adama Ramirez PA-C on 2/8/2021 at 12:14 PM

## 2021-02-08 NOTE — TELEPHONE ENCOUNTER
ED visit: 01/31/21 for SOB    IMPRESSION  1. Pleural effusion on left    2. Pulmonary embolism without acute cor pulmonale, unspecified chronicity, unspecified pulmonary embolism type ([address]    3. Cirrhosis of liver without ascites, unspecified hepatic cirrhosis type (HCC)    4. Cardiomegaly    5. Liver lesion    6. Anemia, unspecified type        OP:SURGEON: Dr. Baldev Posey MD  DATE OF PROCEDURE: 12/21/2020  PROCEDURE: Left Reverse Total Shoulder Arthroplasty    XR shoulder Left 02/03/21   Impression   Status post left shoulder arthroplasty without obvious complication. Preserved overall alignment. Patient missed 6 weeks post op appointment due to hospitalization.      Future Appointments   Date Time Provider Geraldine Rader   3/15/2021 10:00 AM XENIA Maciel - CNP [facility]   3/16/2021  9:30 AM Kirill Romero MD [facility]   4/15/2021 10:30 [facility]

## 2021-02-09 NOTE — TELEPHONE ENCOUNTER
Attempted to schedule appointment with patient. Patient unsure of when son can bring her. She requested son, Chadd Farnsworth, to be called. Call placed to son, no answer. Left voicemail requesting call back.

## 2021-02-27 ENCOUNTER — HOSPITAL ENCOUNTER (OUTPATIENT)
Age: 76
Discharge: HOME | End: 2021-02-27
Payer: COMMERCIAL

## 2021-02-27 LAB
ALBUMIN SERPL-MCNC: 3.3 G/DL (ref 3.5–5.2)
ANION GAP SERPL CALCULATED.3IONS-SCNC: 8 MMOL/L (ref 7–16)
BACTERIA: ABNORMAL /HPF
BILIRUBIN URINE: NEGATIVE
BLOOD, URINE: ABNORMAL
BUN BLDV-MCNC: 15 MG/DL (ref 8–23)
CALCIUM SERPL-MCNC: 9.8 MG/DL (ref 8.6–10.2)
CHLORIDE BLD-SCNC: 106 MMOL/L (ref 98–107)
CHOLESTEROL, TOTAL: 94 MG/DL (ref 0–199)
CLARITY: CLEAR
CO2: 26 MMOL/L (ref 22–29)
COLOR: YELLOW
CREAT SERPL-MCNC: 1.2 MG/DL (ref 0.5–1)
CREATININE URINE: 146 MG/DL (ref 29–226)
EPITHELIAL CELLS, UA: ABNORMAL /HPF
GFR AFRICAN AMERICAN: 53
GFR NON-AFRICAN AMERICAN: 44 ML/MIN/1.73
GLUCOSE BLD-MCNC: 113 MG/DL (ref 74–99)
GLUCOSE URINE: NEGATIVE MG/DL
HCT VFR BLD CALC: 30 % (ref 34–48)
HDLC SERPL-MCNC: 37 MG/DL
HEMOGLOBIN: 9.7 G/DL (ref 11.5–15.5)
IRON SATURATION: 14 % (ref 15–50)
IRON: 53 MCG/DL (ref 37–145)
KETONES, URINE: NEGATIVE MG/DL
LDL CHOLESTEROL CALCULATED: 44 MG/DL (ref 0–99)
LEUKOCYTE ESTERASE, URINE: NEGATIVE
MCH RBC QN AUTO: 29.3 PG (ref 26–35)
MCHC RBC AUTO-ENTMCNC: 32.3 % (ref 32–34.5)
MCV RBC AUTO: 90.6 FL (ref 80–99.9)
MICROALBUMIN UR-MCNC: 27.4 MG/L
MICROALBUMIN/CREAT UR-RTO: 18.8 (ref 0–30)
NITRITE, URINE: NEGATIVE
PARATHYROID HORMONE INTACT: 86 PG/ML (ref 15–65)
PDW BLD-RTO: 20.2 FL (ref 11.5–15)
PH UA: 5 (ref 5–9)
PHOSPHORUS: 2.7 MG/DL (ref 2.5–4.5)
PLATELET # BLD: 226 E9/L (ref 130–450)
PMV BLD AUTO: 10 FL (ref 7–12)
POTASSIUM SERPL-SCNC: 4.3 MMOL/L (ref 3.5–5)
PROTEIN UA: NEGATIVE MG/DL
RBC # BLD: 3.31 E12/L (ref 3.5–5.5)
RBC UA: ABNORMAL /HPF (ref 0–2)
SODIUM BLD-SCNC: 140 MMOL/L (ref 132–146)
SPECIFIC GRAVITY UA: 1.02 (ref 1–1.03)
TOTAL IRON BINDING CAPACITY: 377 MCG/DL (ref 250–450)
TRIGL SERPL-MCNC: 65 MG/DL (ref 0–149)
URIC ACID, SERUM: 8 MG/DL (ref 2.4–5.7)
UROBILINOGEN, URINE: 1 E.U./DL
VITAMIN D 25-HYDROXY: 33 NG/ML (ref 30–100)
VLDLC SERPL CALC-MCNC: 13 MG/DL
WBC # BLD: 7.9 E9/L (ref 4.5–11.5)
WBC UA: ABNORMAL /HPF (ref 0–5)

## 2021-02-27 PROCEDURE — 85027 COMPLETE CBC AUTOMATED: CPT

## 2021-02-27 PROCEDURE — 81001 URINALYSIS AUTO W/SCOPE: CPT

## 2021-02-27 PROCEDURE — 82040 ASSAY OF SERUM ALBUMIN: CPT

## 2021-02-27 PROCEDURE — 36415 COLL VENOUS BLD VENIPUNCTURE: CPT

## 2021-02-27 PROCEDURE — 80048 BASIC METABOLIC PNL TOTAL CA: CPT

## 2021-02-27 PROCEDURE — 83540 ASSAY OF IRON: CPT

## 2021-02-27 PROCEDURE — 83970 ASSAY OF PARATHORMONE: CPT

## 2021-02-27 PROCEDURE — 84550 ASSAY OF BLOOD/URIC ACID: CPT

## 2021-02-27 PROCEDURE — 84100 ASSAY OF PHOSPHORUS: CPT

## 2021-02-27 PROCEDURE — 82044 UR ALBUMIN SEMIQUANTITATIVE: CPT

## 2021-02-27 PROCEDURE — 80061 LIPID PANEL: CPT

## 2021-02-27 PROCEDURE — 82306 VITAMIN D 25 HYDROXY: CPT

## 2021-02-27 PROCEDURE — 82570 ASSAY OF URINE CREATININE: CPT

## 2021-02-27 PROCEDURE — 83550 IRON BINDING TEST: CPT

## 2021-03-03 ENCOUNTER — TELEPHONE (OUTPATIENT)
Dept: FAMILY MEDICINE CLINIC | Age: 76
End: 2021-03-03

## 2021-03-03 DIAGNOSIS — R11.0 NAUSEA: ICD-10-CM

## 2021-03-03 NOTE — TELEPHONE ENCOUNTER
Pt calling and states she has an rx for Zofran 4mg that she takes as needed for nausea, but she doesn't feel it is even helping. She wants to know if dosage should be changed or if med needs changed. Please advise.

## 2021-03-03 NOTE — TELEPHONE ENCOUNTER
Pt stated she has severe cirrhosis of the liver, and the 4mg is not helping, it did at first but now she takes it and still feels sick, she stated she has been taking the Zofran 4mg for at least 2-3 years

## 2021-03-04 RX ORDER — ONDANSETRON HYDROCHLORIDE 8 MG/1
8 TABLET, FILM COATED ORAL DAILY PRN
Qty: 10 TABLET | Refills: 0 | Status: SHIPPED
Start: 2021-03-04 | End: 2021-05-13

## 2021-03-15 ENCOUNTER — OFFICE VISIT (OUTPATIENT)
Dept: ENT CLINIC | Age: 76
End: 2021-03-15
Payer: COMMERCIAL

## 2021-03-15 ENCOUNTER — PROCEDURE VISIT (OUTPATIENT)
Dept: AUDIOLOGY | Age: 76
End: 2021-03-15
Payer: COMMERCIAL

## 2021-03-15 VITALS
SYSTOLIC BLOOD PRESSURE: 148 MMHG | HEART RATE: 94 BPM | RESPIRATION RATE: 14 BRPM | OXYGEN SATURATION: 95 % | TEMPERATURE: 98.4 F | BODY MASS INDEX: 31.89 KG/M2 | WEIGHT: 180 LBS | HEIGHT: 63 IN | DIASTOLIC BLOOD PRESSURE: 83 MMHG

## 2021-03-15 DIAGNOSIS — R26.89 BALANCE PROBLEM: Primary | ICD-10-CM

## 2021-03-15 DIAGNOSIS — H90.3 SENSORINEURAL HEARING LOSS (SNHL) OF BOTH EARS: Primary | ICD-10-CM

## 2021-03-15 DIAGNOSIS — H90.3 SENSORINEURAL HEARING LOSS (SNHL) OF BOTH EARS: ICD-10-CM

## 2021-03-15 PROCEDURE — G8427 DOCREV CUR MEDS BY ELIG CLIN: HCPCS | Performed by: NURSE PRACTITIONER

## 2021-03-15 PROCEDURE — 1036F TOBACCO NON-USER: CPT | Performed by: NURSE PRACTITIONER

## 2021-03-15 PROCEDURE — 92557 COMPREHENSIVE HEARING TEST: CPT | Performed by: AUDIOLOGIST

## 2021-03-15 PROCEDURE — 92567 TYMPANOMETRY: CPT | Performed by: AUDIOLOGIST

## 2021-03-15 PROCEDURE — 4040F PNEUMOC VAC/ADMIN/RCVD: CPT | Performed by: NURSE PRACTITIONER

## 2021-03-15 PROCEDURE — G8399 PT W/DXA RESULTS DOCUMENT: HCPCS | Performed by: NURSE PRACTITIONER

## 2021-03-15 PROCEDURE — 3017F COLORECTAL CA SCREEN DOC REV: CPT | Performed by: NURSE PRACTITIONER

## 2021-03-15 PROCEDURE — 1090F PRES/ABSN URINE INCON ASSESS: CPT | Performed by: NURSE PRACTITIONER

## 2021-03-15 PROCEDURE — 99203 OFFICE O/P NEW LOW 30 MIN: CPT | Performed by: NURSE PRACTITIONER

## 2021-03-15 PROCEDURE — G8482 FLU IMMUNIZE ORDER/ADMIN: HCPCS | Performed by: NURSE PRACTITIONER

## 2021-03-15 PROCEDURE — G8417 CALC BMI ABV UP PARAM F/U: HCPCS | Performed by: NURSE PRACTITIONER

## 2021-03-15 PROCEDURE — 1123F ACP DISCUSS/DSCN MKR DOCD: CPT | Performed by: NURSE PRACTITIONER

## 2021-03-15 NOTE — PROGRESS NOTES
This patient was referred for audiometric/tympanometric testing by STEPHENIE Vinson due to hearing loss - gradual. No pain no pressure    Audiometry revealed mild to moderate sensorineural hearing loss in the left ear and mild to moderately severe sensorineural hearing loss with one conductive component at 1000 Hz in the right ear. Reliability was good. Speech reception thresholds were in good agreement with the pure tone averages, bilaterally. Speech discrimination scores were excellent, bilaterally. Slight difference in hearing was noted and discussed with CNP. Tympanometry revealed normal middle ear peak pressure and compliance, bilaterally. The results were reviewed with the patient and CNP. Hearing aids were discussed and options were chosen ( see epic scan in). Will contact her insurance for prior auth and call patient once decision is made. Recommendations for follow up will be made pending physician consult.     Carlton Lacey/CCC-A  [place] Lic # [id number]

## 2021-03-15 NOTE — PROGRESS NOTES
[facility] Otolaryngology  Dr. Vira Alexandra. CB Merchant Ms.Ed. New Consult       Patient Name:  Lex March  :  1945     CHIEF C/O:    Chief Complaint   Patient presents with    Hearing Loss     NP-unspecified hearing loss, left gradual in the left ear        HISTORY OBTAINED FROM:  patient    HISTORY OF PRESENT ILLNESS:       [facility] [place] is a 76y.o. year old female, here today for hearing loss. She states over the past 6 months she has noticed no worsening of her hearing loss that she feels is worse in the left ear. She does complain of a intermittent high-frequency nonpulsatile tinnitus in both ears. She also complains of dizziness with postural changes that she describes as a mild lightheaded sensation that last for a few seconds. She denies any room spinning vertigo. She states she only notices mild hearing loss in the right ear. She states that she commonly needs people to face her while speaking to understand with significant difficulty while people are wearing masks  She denies any history of hearing aid usage. She denies family history of hearing loss or noise exposure. She denies any current history of ear infections or previous ear surgeries. She does complain of mild allergy symptoms that are worse in the fall but denies any symptoms of congestion, rhinorrhea, or postnasal drainage at this time.       Past Medical History:   Diagnosis Date    Breast cancer ([address]     Cirrhosis ([address]     Essential hypertension     Hyperlipidemia     Osteopenia     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 ([address]     Sleep apnea     Spinal stenosis     Type 2 diabetes mellitus ([address]      Past Surgical History:   Procedure Laterality Date    BREAST LUMPECTOMY      lumpectomy [id number]    CARDIOVASCULAR STRESS TEST      Lexiscan stress test    CARPAL TUNNEL RELEASE      COLONOSCOPY  2017    multiple polyps; diverticula--jerod    COLONOSCOPY  2019    polyps; diverticula; hemorrhoids--jerod    COLONOSCOPY N/A 04/23/2019    COLONOSCOPY POLYPECTOMY SNARE/COLD BIOPSY performed by Brandon Sawyer MD at [address] COLONOSCOPY  04/23/2019    COLONOSCOPY WITH BIOPSY performed by Brandon Sawyer MD at [facility] LITHOTRIPSY Left 05/04/2016    C-R STENT PLACEMENT    SHOULDER ARTHROPLASTY Left 12/21/2020    LEFT REVERSE TOTAL SHOULDER  ARTHROPLASTY -- DEPUY performed by Jami Summers MD at [facility] ENDOSCOPY  04/23/2019    gastritis--jerod    UPPER GASTROINTESTINAL ENDOSCOPY N/A 04/23/2019    EGD BIOPSY performed by Brandon Sawyer MD at [address]       Current Outpatient Medications:     ondansetron (ZOFRAN) 8 MG tablet, Take 1 tablet by mouth daily as needed for Nausea or Vomiting, Disp: 10 tablet, Rfl: 0    lactulose (CHRONULAC) 10 GM/15ML solution, Take 30 mLs by mouth 3 times daily, Disp: 1892 mL, Rfl: 1    rifaximin (XIFAXAN) 550 MG tablet, Take 1 tablet by mouth 2 times daily, Disp: 120 tablet, Rfl: 0    furosemide (LASIX) 40 MG tablet, Take 1 tablet by mouth daily, Disp: 60 tablet, Rfl: 3    spironolactone (ALDACTONE) 50 MG tablet, Take 1 tablet by mouth daily, Disp: 90 tablet, Rfl: 0    albuterol sulfate HFA (VENTOLIN HFA) 108 (90 Base) MCG/ACT inhaler, Inhale 2 puffs into the lungs 4 times daily as needed for Wheezing, Disp: 3 Inhaler, Rfl: 1    venlafaxine (EFFEXOR XR) 75 MG extended release capsule, Take 1 capsule by mouth daily, Disp: 90 capsule, Rfl: 1    doxazosin (CARDURA) 1 MG tablet, Take 1 mg by mouth nightly , Disp: , Rfl:     apixaban starter pack (ELIQUIS DVT/PE STARTER PACK) 5 MG TBPK tablet, Take 2 tablets twice a day for 7 days then take 1 tablet twice a day permanently (Patient not taking: Reported on 3/15/2021), Disp: 60 each, Rfl: 0    felodipine (PLENDIL) 2.5 MG extended release tablet, Take 1 tablet by mouth daily (Patient not taking: Reported on 3/15/2021), Disp: 90 tablet, Rfl: 1    vitamin D (ERGOCALCIFEROL) 1.25 MG (14025 UT) CAPS capsule, Take 1 capsule by mouth once a week (Patient not taking: Reported on 3/15/2021), Disp: 12 capsule, Rfl: 1  Lisinopril  Social History     Tobacco Use    Smoking status: Never Smoker    Smokeless tobacco: Never Used   Substance Use Topics    Alcohol use: Never     Alcohol/week: 0.0 standard drinks     Frequency: Never    Drug use: Never     Family History   Problem Relation Age of Onset    COPD Father     Heart Attack Father     Arthritis Mother     Cancer Other 48        colon       Review of Systems   Constitutional: Negative. Negative for activity change and appetite change. HENT: Positive for hearing loss and tinnitus. Negative for congestion, postnasal drip and rhinorrhea. Eyes: Negative. Respiratory: Negative. Negative for shortness of breath and stridor. Cardiovascular: Negative. Negative for chest pain and palpitations. Endocrine: Negative. Musculoskeletal: Negative. Skin: Negative. Neurological: Positive for dizziness. Hematological: Negative. Psychiatric/Behavioral: Negative. BP (!) 148/83 (Site: Right Lower Arm, Position: Sitting, Cuff Size: Medium Adult)   Pulse 94   Temp 98.4 °F (36.9 °C)   Resp 14   Ht 5' 2.5\" (1.588 m)   Wt 180 lb (81.6 kg)   SpO2 95%   BMI 32.40 kg/m²   Physical Exam  Constitutional:       Appearance: Normal appearance. HENT:      Head: Normocephalic. Right Ear: Tympanic membrane, ear canal and external ear normal. Decreased hearing noted. Left Ear: Tympanic membrane, ear canal and external ear normal. Decreased hearing noted. Nose: Nose normal. No rhinorrhea. Right Turbinates: Not pale. Left Turbinates: Not pale. Mouth/Throat:      Lips: Pink. Mouth: Mucous membranes are moist.      Pharynx: Oropharynx is clear. Eyes:      Conjunctiva/sclera: Conjunctivae normal.      Pupils: Pupils are equal, round, and reactive to light.    Neck:      Musculoskeletal: Normal range of motion. No neck rigidity or muscular tenderness. Cardiovascular:      Rate and Rhythm: Normal rate and regular rhythm. Pulses: Normal pulses. Pulmonary:      Effort: Pulmonary effort is normal. No respiratory distress. Breath sounds: No stridor. Musculoskeletal: Normal range of motion. Skin:     General: Skin is warm and dry. Neurological:      General: No focal deficit present. Mental Status: She is alert and oriented to person, place, and time. Psychiatric:         Mood and Affect: Mood normal.         Behavior: Behavior normal.         Thought Content: Thought content normal.         Judgment: Judgment normal.             Audiogram and tympanogram reviewed with patient. Audiogram reveals 40 dB hearing loss in the right ear with 100% discrimination at 75 dB, 30 dB hearing loss in the left ear with 100% discrimination at 65 dB. Tympanogram reveals type a curves bilaterally. IMPRESSION/PLAN:      Darris Canavan was seen today for hearing loss. Diagnoses and all orders for this visit:    Balance problem  -     Audiometry with tympanometry; Future    Sensorineural hearing loss (SNHL) of both Dorthula Ro is seen today for complaint of hearing loss and balance problems. Upon exam bilateral TMs are normal in appearance without sign of middle ear effusion or retraction. There is no erythema or edema of either ear canal.  Sinuses are patent without considerable swelling or pallor appearance, rhinorrhea, or postnasal drainage. At this time based on her symptoms of lightheadedness with positional changes most likely cause is mild positional hypotension. Audiogram and tympanogram are reviewed with the patient that reveals moderate hearing loss bilaterally. She is interested in hearing aids at this time and will be directed to audiology for this process. Without changes in her dizziness symptoms she will follow-up in 1 year for routine audio.   She is instructed to call with any new or worsening of symptoms prior to this appointment.     Cassie Rivera, MSN, FNP-C  [facility], Nose and Throat    The information contained in this note has been dictated using drug and medical speech recognition software and may contain errors

## 2021-03-16 ENCOUNTER — HOSPITAL ENCOUNTER (OUTPATIENT)
Dept: GENERAL RADIOLOGY | Age: 76
Discharge: HOME | End: 2021-03-18
Payer: COMMERCIAL

## 2021-03-16 ENCOUNTER — OFFICE VISIT (OUTPATIENT)
Dept: ORTHOPEDIC SURGERY | Age: 76
End: 2021-03-16
Payer: COMMERCIAL

## 2021-03-16 VITALS — TEMPERATURE: 98.7 F

## 2021-03-16 DIAGNOSIS — Z96.612 S/P REVERSE TOTAL SHOULDER ARTHROPLASTY, LEFT: ICD-10-CM

## 2021-03-16 DIAGNOSIS — Z96.612 S/P REVERSE TOTAL SHOULDER ARTHROPLASTY, LEFT: Primary | ICD-10-CM

## 2021-03-16 PROCEDURE — 99024 POSTOP FOLLOW-UP VISIT: CPT | Performed by: ORTHOPAEDIC SURGERY

## 2021-03-16 PROCEDURE — 73030 X-RAY EXAM OF SHOULDER: CPT

## 2021-03-16 PROCEDURE — 99212 OFFICE O/P EST SF 10 MIN: CPT

## 2021-03-16 NOTE — PROGRESS NOTES
Beni Olvera is a 76 y.o. female who presents for follow up of shoulders Right    SURGEON: Dr. Blank De La Paz MD  Date of Injury/Surgery: 12/21/2020  Date last seen in office: 01/05/2021    Symptoms: unchanged  New complaints: patient states that she feels her shoulder is the same since her post op appt in January, patient was in the hospital in February due to a blood clot in her lungs, patient has not been able to do any therapy due to being sick    Cast/Splint, Brace, or Dressings: Clean, dry and intact and Well fitting    I  Weightbearing: right upper   patient wears a sling and does not use her arm very much      Assistive device: sling  Participating in therapy (location if yes)?  no    Refills Needed: None  Order/Referral Needed: no

## 2021-03-16 NOTE — PROGRESS NOTES
Chief Complaint   Patient presents with    Post-Op Check     s/p reverse total shoulder, left;     Shoulder Injury     left shoulder pain, 6/10 pain when patient tries to use her arm       SUBJECTIVE: Patient comes in today approximately 4 months out from a left reverse total shoulder arthroplasty. She is very stiff. She was in the hospitalist for medical issues including blood clots. She is only received some initial home therapy up until this point time. She is accompanied by her son today in the office. She is still wearing her sling. She denies any falls. She states her pain is not tremendously she is moving her shoulder. Review of Systems   Constitutional: Negative for fever, chills, diaphoresis, appetite change and fatigue. HENT: Negative for dental issues, hearing loss and tinnitus. Negative for congestion, sinus pressure, sneezing, sore throat. Negative for headache. Eyes: Negative for visual disturbance, blurred and double vision. Negative for pain, discharge, redness and itching  Respiratory: Negative for cough, shortness of breath and wheezing. Cardiovascular: Negative for chest pain, palpitations and leg swelling. No dyspnea on exertion   Gastrointestinal:   Negative for nausea, vomiting, abdominal pain, diarrhea, constipation  or black or bloody. Hematologic\Lymphatic:  negative for bleeding, petechiae,   Genitourinary: Negative for hematuria and difficulty urinating. Musculoskeletal: Negative for neck pain and stiffness. Negative for back pain, see HPI  Skin: Negative for pallor, rash and wound. Neurological: Negative for dizziness, tremors, seizures, weakness, light-headedness, no TIA or stroke symptoms. No numbness and headaches. Psychiatric/Behavioral: Negative. OBJECTIVE:      Physical Examination:   General appearance: alert, well appearing, and in no distress,  normal appearing weight.  No visible signs of trauma   Mental status: alert, oriented to person, place, and time, normal mood, behavior, speech, dress, motor activity, and thought processes  Abdomen: soft, nondistended  Resp:   resp easy and unlabored, no audible wheezes note, normal symmetrical expansion of both hemithoraces  Cardiac: distal pulses palpable, skin and extremities well perfused  Neurological: alert, oriented X3, normal speech, no focal findings or movement disorder noted, motor and sensory grossly normal bilaterally, normal muscle tone, no tremors, strength 5/5, normal gait and station  HEENT: normochephalic atraumatic, external ears and eyes normal, sclera normal, neck supple, no nasal discharge. Extremities:   peripheral pulses normal, no edema, redness or tenderness in the calves   Skin: normal coloration, no rashes or open wounds, no suspicious skin lesions noted  Psych: Affect euthymic   Musculoskeletal:   Extremity:  Left upper extremity  Incision clean dry intact with no signs of infection    Compartment soft compressible    Patient has sensation intact distally in her left upper extremity    Patient has very little range of motion without discomfort but has soft endpoints    The shoulder is not warm    Patient has 2/4 radial pulse    Capillary fill is less than 3 seconds    She does flex her elbow and extend fully    She is weak in left upper extremity        Temp 98.7 °F (37.1 °C)      XR: 3/16/21 she shows a well fixed left reverse total shoulder arthroplasty and good alignment     ASSESSMENT:     Diagnosis Orders   1. S/P reverse total shoulder arthroplasty, left  Amb External Referral To Physical Therapy        Discussion: I talked to the family and the patient detail about the fact that the x-ray looks very good the issue is her range of motion. Skin to be difficult to get it back at this point time 4 months out. I would like her to start aggressive physical therapy with modalities. She is agreeable with the plan. I will see her back in a week's time to see how she is progressing.   I

## 2021-03-23 LAB
AFB CULTURE (MYCOBACTERIA): NORMAL
AFB SMEAR: NORMAL

## 2021-03-26 ENCOUNTER — IMMUNIZATION (OUTPATIENT)
Dept: PRIMARY CARE CLINIC | Age: 76
End: 2021-03-26
Payer: COMMERCIAL

## 2021-03-26 PROCEDURE — 91300 COVID-19, PFIZER VACCINE 30MCG/0.3ML DOSE: CPT | Performed by: INTERNAL MEDICINE

## 2021-03-26 PROCEDURE — 0001A COVID-19, PFIZER VACCINE 30MCG/0.3ML DOSE: CPT | Performed by: INTERNAL MEDICINE

## 2021-04-02 ASSESSMENT — ENCOUNTER SYMPTOMS
EYES NEGATIVE: 1
RHINORRHEA: 0
STRIDOR: 0
RESPIRATORY NEGATIVE: 1
SHORTNESS OF BREATH: 0

## 2021-04-07 ENCOUNTER — HOSPITAL ENCOUNTER (OUTPATIENT)
Age: 76
Discharge: HOME | End: 2021-04-07
Payer: MEDICARE

## 2021-04-07 LAB
BACTERIA: ABNORMAL /HPF
BILIRUBIN URINE: NEGATIVE
BLOOD, URINE: ABNORMAL
CLARITY: CLEAR
COLOR: YELLOW
EPITHELIAL CELLS, UA: ABNORMAL /HPF
GLUCOSE URINE: NEGATIVE MG/DL
KETONES, URINE: NEGATIVE MG/DL
LEUKOCYTE ESTERASE, URINE: NEGATIVE
NITRITE, URINE: POSITIVE
PH UA: 6 (ref 5–9)
PROTEIN UA: ABNORMAL MG/DL
RBC UA: ABNORMAL /HPF (ref 0–2)
RENAL EPITHELIAL, UA: ABNORMAL /HPF
SPECIFIC GRAVITY UA: 1.01 (ref 1–1.03)
UROBILINOGEN, URINE: 1 E.U./DL
WBC UA: ABNORMAL /HPF (ref 0–5)

## 2021-04-07 PROCEDURE — 87088 URINE BACTERIA CULTURE: CPT

## 2021-04-07 PROCEDURE — 81001 URINALYSIS AUTO W/SCOPE: CPT

## 2021-04-09 LAB — URINE CULTURE, ROUTINE: NORMAL

## 2021-04-15 ENCOUNTER — OFFICE VISIT (OUTPATIENT)
Dept: FAMILY MEDICINE CLINIC | Age: 76
End: 2021-04-15
Payer: MEDICARE

## 2021-04-15 VITALS
SYSTOLIC BLOOD PRESSURE: 136 MMHG | RESPIRATION RATE: 20 BRPM | WEIGHT: 215 LBS | HEIGHT: 63 IN | TEMPERATURE: 99.1 F | DIASTOLIC BLOOD PRESSURE: 76 MMHG | BODY MASS INDEX: 38.09 KG/M2 | HEART RATE: 108 BPM | OXYGEN SATURATION: 93 %

## 2021-04-15 DIAGNOSIS — K74.60 CIRRHOSIS OF LIVER WITH ASCITES, UNSPECIFIED HEPATIC CIRRHOSIS TYPE (HCC): ICD-10-CM

## 2021-04-15 DIAGNOSIS — E11.40 TYPE 2 DIABETES MELLITUS WITH DIABETIC NEUROPATHY, UNSPECIFIED WHETHER LONG TERM INSULIN USE (HCC): ICD-10-CM

## 2021-04-15 DIAGNOSIS — K74.60 CIRRHOSIS OF LIVER WITH ASCITES, UNSPECIFIED HEPATIC CIRRHOSIS TYPE (HCC): Primary | ICD-10-CM

## 2021-04-15 DIAGNOSIS — R18.8 CIRRHOSIS OF LIVER WITH ASCITES, UNSPECIFIED HEPATIC CIRRHOSIS TYPE (HCC): ICD-10-CM

## 2021-04-15 DIAGNOSIS — M79.89 LEG SWELLING: ICD-10-CM

## 2021-04-15 DIAGNOSIS — R06.02 SHORTNESS OF BREATH: ICD-10-CM

## 2021-04-15 DIAGNOSIS — R18.8 CIRRHOSIS OF LIVER WITH ASCITES, UNSPECIFIED HEPATIC CIRRHOSIS TYPE (HCC): Primary | ICD-10-CM

## 2021-04-15 DIAGNOSIS — Q44.6 CYSTIC DISEASE OF LIVER: ICD-10-CM

## 2021-04-15 DIAGNOSIS — D64.9 ANEMIA, UNSPECIFIED TYPE: ICD-10-CM

## 2021-04-15 DIAGNOSIS — Z86.711 HISTORY OF PULMONARY EMBOLUS (PE): ICD-10-CM

## 2021-04-15 LAB
ALBUMIN SERPL-MCNC: 3.4 G/DL (ref 3.5–5.2)
ALP BLD-CCNC: 108 U/L (ref 35–104)
ALT SERPL-CCNC: 17 U/L (ref 0–32)
ANION GAP SERPL CALCULATED.3IONS-SCNC: 7 MMOL/L (ref 7–16)
ANISOCYTOSIS: ABNORMAL
AST SERPL-CCNC: 37 U/L (ref 0–31)
BASOPHILS ABSOLUTE: 0 E9/L (ref 0–0.2)
BASOPHILS RELATIVE PERCENT: 0.7 % (ref 0–2)
BILIRUB SERPL-MCNC: 0.8 MG/DL (ref 0–1.2)
BUN BLDV-MCNC: 11 MG/DL (ref 8–23)
CALCIUM SERPL-MCNC: 10 MG/DL (ref 8.6–10.2)
CHLORIDE BLD-SCNC: 107 MMOL/L (ref 98–107)
CO2: 26 MMOL/L (ref 22–29)
CREAT SERPL-MCNC: 1 MG/DL (ref 0.5–1)
EOSINOPHILS ABSOLUTE: 0.29 E9/L (ref 0.05–0.5)
EOSINOPHILS RELATIVE PERCENT: 4.3 % (ref 0–6)
GFR AFRICAN AMERICAN: >60
GFR NON-AFRICAN AMERICAN: 54 ML/MIN/1.73
GLUCOSE BLD-MCNC: 96 MG/DL (ref 74–99)
HBA1C MFR BLD: 5.1 %
HCT VFR BLD CALC: 34 % (ref 34–48)
HEMOGLOBIN: 10.3 G/DL (ref 11.5–15.5)
HYPOCHROMIA: ABNORMAL
LYMPHOCYTES ABSOLUTE: 0.82 E9/L (ref 1.5–4)
LYMPHOCYTES RELATIVE PERCENT: 12.2 % (ref 20–42)
MCH RBC QN AUTO: 28.6 PG (ref 26–35)
MCHC RBC AUTO-ENTMCNC: 30.3 % (ref 32–34.5)
MCV RBC AUTO: 94.4 FL (ref 80–99.9)
MONOCYTES ABSOLUTE: 0.75 E9/L (ref 0.1–0.95)
MONOCYTES RELATIVE PERCENT: 11.3 % (ref 2–12)
MYELOCYTE PERCENT: 0.9 % (ref 0–0)
NEUTROPHILS ABSOLUTE: 4.9 E9/L (ref 1.8–7.3)
NEUTROPHILS RELATIVE PERCENT: 71.3 % (ref 43–80)
OVALOCYTES: ABNORMAL
PDW BLD-RTO: 27.3 FL (ref 11.5–15)
PLATELET # BLD: 204 E9/L (ref 130–450)
PMV BLD AUTO: 11.2 FL (ref 7–12)
POIKILOCYTES: ABNORMAL
POLYCHROMASIA: ABNORMAL
POTASSIUM SERPL-SCNC: 4.5 MMOL/L (ref 3.5–5)
RBC # BLD: 3.6 E12/L (ref 3.5–5.5)
SODIUM BLD-SCNC: 140 MMOL/L (ref 132–146)
TARGET CELLS: ABNORMAL
TEAR DROP CELLS: ABNORMAL
TOTAL PROTEIN: 6.8 G/DL (ref 6.4–8.3)
WBC # BLD: 6.8 E9/L (ref 4.5–11.5)

## 2021-04-15 PROCEDURE — 1036F TOBACCO NON-USER: CPT | Performed by: FAMILY MEDICINE

## 2021-04-15 PROCEDURE — 4040F PNEUMOC VAC/ADMIN/RCVD: CPT | Performed by: FAMILY MEDICINE

## 2021-04-15 PROCEDURE — 1090F PRES/ABSN URINE INCON ASSESS: CPT | Performed by: FAMILY MEDICINE

## 2021-04-15 PROCEDURE — 3044F HG A1C LEVEL LT 7.0%: CPT | Performed by: FAMILY MEDICINE

## 2021-04-15 PROCEDURE — 83036 HEMOGLOBIN GLYCOSYLATED A1C: CPT | Performed by: FAMILY MEDICINE

## 2021-04-15 PROCEDURE — 3017F COLORECTAL CA SCREEN DOC REV: CPT | Performed by: FAMILY MEDICINE

## 2021-04-15 PROCEDURE — G8417 CALC BMI ABV UP PARAM F/U: HCPCS | Performed by: FAMILY MEDICINE

## 2021-04-15 PROCEDURE — G8399 PT W/DXA RESULTS DOCUMENT: HCPCS | Performed by: FAMILY MEDICINE

## 2021-04-15 PROCEDURE — G8427 DOCREV CUR MEDS BY ELIG CLIN: HCPCS | Performed by: FAMILY MEDICINE

## 2021-04-15 PROCEDURE — 99214 OFFICE O/P EST MOD 30 MIN: CPT | Performed by: FAMILY MEDICINE

## 2021-04-15 PROCEDURE — 2022F DILAT RTA XM EVC RTNOPTHY: CPT | Performed by: FAMILY MEDICINE

## 2021-04-15 PROCEDURE — 1123F ACP DISCUSS/DSCN MKR DOCD: CPT | Performed by: FAMILY MEDICINE

## 2021-04-15 RX ORDER — FERROUS SULFATE 325(65) MG
325 TABLET ORAL DAILY
COMMUNITY
Start: 2021-03-18 | End: 2021-06-30

## 2021-04-15 NOTE — PROGRESS NOTES
FM Progress Note    Subjective:   Cirrhosis. On Lasix and Aldactone. Slight leg swelling, but more noticeable has been abdominal swelling lately. Now getting hard to breathe. Dyspnea on exertion or when sitting. Has seen GI in the past, but not recently, and has not set up follow-up GI appointment since her discharge. PE. Stopped eliquis 3 wks ago after taking it for 2 mo per pt. Counseled to restart it after paracentesis. Diabetes. Lab Results   Component Value Date    LABA1C 5.1 04/15/2021   Not on any medications for diabetes. Recent urinalysis questionably positive for UTI, but urine culture negative. Lower urinary tract symptoms resolved        Health Maintenance Due   Topic Date Due    Hepatitis A vaccine (1 of 2 - Risk 2-dose series) Never done    Diabetic retinal exam  05/01/2020    Diabetic foot exam  07/25/2020    COVID-19 Vaccine (2 - Pfizer 2-dose series) 04/16/2021           Objective:   /76   Pulse 108   Temp 99.1 °F (37.3 °C) (Temporal)   Resp 20   Ht 5' 2.5\" (1.588 m)   Wt 215 lb (97.5 kg)   SpO2 93%   BMI 38.70 kg/m²   General appearance: NAD, alert and interacting appropriately  HEENT: NCAT, PERRLA, EOMI   Resp: CTAB, no WRC  CVS: RRR, no MRG  Abdomen: BS +, SNT. Significant abdominal distension. Extremities: No clubbing, cyanosis. Trace ble edema. Warm. Dry. I have reviewed this patient's previous records. I have reviewed this patient's labs. I have reviewed this patient's medications. Assessment/Plan: [facility] was seen today for hypertension, diabetes and shortness of breath. Diagnoses and all orders for this visit:    Cirrhosis of liver with ascites, unspecified hepatic cirrhosis type ([address]  -     COMPREHENSIVE METABOLIC PANEL;  Future  -     IR US GUIDED PARACENTESIS; Future    Shortness of breath  -     IR US GUIDED PARACENTESIS; Future    Type 2 diabetes mellitus with diabetic neuropathy, unspecified whether long term insulin use ([address]  -     POCT glycosylated hemoglobin (Hb A1C)    Cystic disease of liver    Leg swelling    Anemia, unspecified type  -     CBC Auto Differential; Future    History of pulmonary embolus (PE)  -     apixaban (ELIQUIS) 5 MG TABS tablet; Take 1 tablet by mouth 2 times daily            Patient Instructions   Make another appointment as soon as possible with the [facility].         rtc 1 mo         Electronically signed by Antony Dao MD on 4/15/2021 at 3:15 PM

## 2021-04-15 NOTE — PATIENT INSTRUCTIONS
Make another appointment as soon as possible with the [facility].

## 2021-04-16 ENCOUNTER — HOSPITAL ENCOUNTER (OUTPATIENT)
Dept: INTERVENTIONAL RADIOLOGY/VASCULAR | Age: 76
Discharge: HOME | End: 2021-04-18
Payer: MEDICARE

## 2021-04-16 ENCOUNTER — IMMUNIZATION (OUTPATIENT)
Dept: PRIMARY CARE CLINIC | Age: 76
End: 2021-04-16
Payer: MEDICARE

## 2021-04-16 VITALS
OXYGEN SATURATION: 93 % | RESPIRATION RATE: 18 BRPM | WEIGHT: 215 LBS | DIASTOLIC BLOOD PRESSURE: 62 MMHG | HEART RATE: 113 BPM | TEMPERATURE: 99.3 F | HEIGHT: 62 IN | BODY MASS INDEX: 39.56 KG/M2 | SYSTOLIC BLOOD PRESSURE: 138 MMHG

## 2021-04-16 DIAGNOSIS — R06.02 SHORTNESS OF BREATH: ICD-10-CM

## 2021-04-16 DIAGNOSIS — K74.60 CIRRHOSIS OF LIVER WITH ASCITES, UNSPECIFIED HEPATIC CIRRHOSIS TYPE (HCC): ICD-10-CM

## 2021-04-16 DIAGNOSIS — R18.8 CIRRHOSIS OF LIVER WITH ASCITES, UNSPECIFIED HEPATIC CIRRHOSIS TYPE (HCC): ICD-10-CM

## 2021-04-16 LAB
ANISOCYTOSIS: ABNORMAL
BASOPHILS ABSOLUTE: 0.1 E9/L (ref 0–0.2)
BASOPHILS RELATIVE PERCENT: 1.7 % (ref 0–2)
EOSINOPHILS ABSOLUTE: 0.25 E9/L (ref 0.05–0.5)
EOSINOPHILS RELATIVE PERCENT: 4.3 % (ref 0–6)
HCT VFR BLD CALC: 32 % (ref 34–48)
HEMOGLOBIN: 10 G/DL (ref 11.5–15.5)
HYPOCHROMIA: ABNORMAL
INR BLD: 1.7
LYMPHOCYTES ABSOLUTE: 0.99 E9/L (ref 1.5–4)
LYMPHOCYTES RELATIVE PERCENT: 16.5 % (ref 20–42)
MCH RBC QN AUTO: 28.9 PG (ref 26–35)
MCHC RBC AUTO-ENTMCNC: 31.3 % (ref 32–34.5)
MCV RBC AUTO: 92.5 FL (ref 80–99.9)
MONOCYTES ABSOLUTE: 0.23 E9/L (ref 0.1–0.95)
MONOCYTES RELATIVE PERCENT: 4.3 % (ref 2–12)
NEUTROPHILS ABSOLUTE: 4.23 E9/L (ref 1.8–7.3)
NEUTROPHILS RELATIVE PERCENT: 73 % (ref 43–80)
OVALOCYTES: ABNORMAL
PDW BLD-RTO: 27.2 FL (ref 11.5–15)
PLATELET # BLD: 221 E9/L (ref 130–450)
PMV BLD AUTO: 10.3 FL (ref 7–12)
POIKILOCYTES: ABNORMAL
POLYCHROMASIA: ABNORMAL
PROTHROMBIN TIME: 18.9 SEC (ref 9.3–12.4)
RBC # BLD: 3.46 E12/L (ref 3.5–5.5)
SARS-COV-2, NAAT: NOT DETECTED
TARGET CELLS: ABNORMAL
TEAR DROP CELLS: ABNORMAL
WBC # BLD: 5.8 E9/L (ref 4.5–11.5)

## 2021-04-16 PROCEDURE — 36415 COLL VENOUS BLD VENIPUNCTURE: CPT

## 2021-04-16 PROCEDURE — 87635 SARS-COV-2 COVID-19 AMP PRB: CPT

## 2021-04-16 PROCEDURE — 91300 COVID-19, PFIZER VACCINE 30MCG/0.3ML DOSE: CPT | Performed by: INTERNAL MEDICINE

## 2021-04-16 PROCEDURE — 0002A COVID-19, PFIZER VACCINE 30MCG/0.3ML DOSE: CPT | Performed by: INTERNAL MEDICINE

## 2021-04-16 PROCEDURE — 85025 COMPLETE CBC W/AUTO DIFF WBC: CPT

## 2021-04-16 PROCEDURE — 85610 PROTHROMBIN TIME: CPT

## 2021-04-16 RX ORDER — ALBUMIN (HUMAN) 12.5 G/50ML
25 SOLUTION INTRAVENOUS ONCE
Status: DISCONTINUED | OUTPATIENT
Start: 2021-04-16 | End: 2021-04-19 | Stop reason: HOSPADM

## 2021-04-16 RX ORDER — SODIUM CHLORIDE 0.9 % (FLUSH) 0.9 %
10 SYRINGE (ML) INJECTION PRN
Status: DISCONTINUED | OUTPATIENT
Start: 2021-04-16 | End: 2021-04-19 | Stop reason: HOSPADM

## 2021-04-16 RX ORDER — LIDOCAINE HYDROCHLORIDE 20 MG/ML
20 INJECTION, SOLUTION EPIDURAL; INFILTRATION; INTRACAUDAL; PERINEURAL ONCE
Status: DISCONTINUED | OUTPATIENT
Start: 2021-04-16 | End: 2021-04-19 | Stop reason: HOSPADM

## 2021-04-16 NOTE — PROGRESS NOTES
Pt informed this nurse that she had taken her Eliquis yesterday. Dr. NAGY [facility] informed and stated to reschedule paracentesis for Monday. Patient aware and agreeable. IV removed intact.

## 2021-04-16 NOTE — PROGRESS NOTES
Patient arrived with family to Radiology department for paracentesis. Allergies, home medications, H&P and fasting instructions reviewed with patient. Vital signs taken. 22g IV placed, blood obtained, IV flushed and prn adapter attached. Blood sample sent to lab for ordered tests. Procedural instructions given, questions answered, understanding expressed and consent signed. No questions at this time.

## 2021-04-16 NOTE — PROGRESS NOTES
Pt given discharge instructions and to hold Eliquis until procedure. Verbalized understanding. Taken via wheelchair out of procedural area in stable condition.

## 2021-04-19 ENCOUNTER — TELEPHONE (OUTPATIENT)
Dept: FAMILY MEDICINE CLINIC | Age: 76
End: 2021-04-19

## 2021-04-19 ENCOUNTER — HOSPITAL ENCOUNTER (OUTPATIENT)
Dept: INTERVENTIONAL RADIOLOGY/VASCULAR | Age: 76
Discharge: HOME | End: 2021-04-21
Payer: COMMERCIAL

## 2021-04-19 VITALS
HEART RATE: 93 BPM | RESPIRATION RATE: 20 BRPM | SYSTOLIC BLOOD PRESSURE: 138 MMHG | TEMPERATURE: 98.1 F | OXYGEN SATURATION: 94 % | DIASTOLIC BLOOD PRESSURE: 79 MMHG

## 2021-04-19 DIAGNOSIS — R06.02 SHORTNESS OF BREATH: ICD-10-CM

## 2021-04-19 DIAGNOSIS — R06.02 SHORTNESS OF BREATH: Primary | ICD-10-CM

## 2021-04-19 DIAGNOSIS — K74.60 CIRRHOSIS OF LIVER WITH ASCITES, UNSPECIFIED HEPATIC CIRRHOSIS TYPE (HCC): ICD-10-CM

## 2021-04-19 DIAGNOSIS — R18.8 CIRRHOSIS OF LIVER WITH ASCITES, UNSPECIFIED HEPATIC CIRRHOSIS TYPE (HCC): ICD-10-CM

## 2021-04-19 PROCEDURE — C1729 CATH, DRAINAGE: HCPCS

## 2021-04-19 PROCEDURE — 76705 ECHO EXAM OF ABDOMEN: CPT

## 2021-04-19 PROCEDURE — 76705 ECHO EXAM OF ABDOMEN: CPT | Performed by: RADIOLOGY

## 2021-04-19 NOTE — TELEPHONE ENCOUNTER
Pt calling and states she had her procedure done and nothing was found, but she is still having trouble breathing. She is just using her inhaler. Advised pt to ER if having trouble breathing but she does not want to go. She will wait on Dr Dorinda Heath advice.

## 2021-04-19 NOTE — TELEPHONE ENCOUNTER
Sent in Rx for advair. Ordered PFTs. Please update me on progress one week after starting advair. Thanks.

## 2021-04-19 NOTE — PROGRESS NOTES
I spoke with Geraldine Haile (Lead) at Dr. Sigrid Sampson office and let them know that according to Dr. Cuca Bishop II to us that there was not enough fluid for us to perform paracentesis. I also notified Geraldine Haile that I instructed the patient to call their office today to follow up for further evaluation of her shortness of breath. The patient verbalized understanding and Geraldine Haile stated they would also follow up with her.

## 2021-04-19 NOTE — TELEPHONE ENCOUNTER
Hospital called, there was no fluid build up on pt, no procedure needed completed to remove fluid.   They recommend f/u with PCP due to SOB

## 2021-04-21 ENCOUNTER — TELEPHONE (OUTPATIENT)
Dept: FAMILY MEDICINE CLINIC | Age: 76
End: 2021-04-21

## 2021-04-21 DIAGNOSIS — Z11.52 ENCOUNTER FOR SCREENING FOR COVID-19: Primary | ICD-10-CM

## 2021-04-22 ENCOUNTER — TELEPHONE (OUTPATIENT)
Dept: FAMILY MEDICINE CLINIC | Age: 76
End: 2021-04-22

## 2021-04-22 NOTE — TELEPHONE ENCOUNTER
Pt left a message asking for a Covid order that is needed before her PFT screening on 4/29/21 , please notify pt when order is placed

## 2021-04-23 NOTE — TELEPHONE ENCOUNTER
Pt notified. Wants you to know the inhaler isn't helping.  She is taking every 12 hours and is having \"terrible breathing attacks\"

## 2021-04-27 ENCOUNTER — HOSPITAL ENCOUNTER (OUTPATIENT)
Dept: AUDIOLOGY | Age: 76
Discharge: HOME | End: 2021-04-27

## 2021-04-29 ENCOUNTER — HOSPITAL ENCOUNTER (OUTPATIENT)
Dept: PULMONOLOGY | Age: 76
Discharge: HOME | End: 2021-04-29
Payer: MEDICARE

## 2021-04-29 DIAGNOSIS — R06.02 SHORTNESS OF BREATH: ICD-10-CM

## 2021-04-29 PROCEDURE — 94060 EVALUATION OF WHEEZING: CPT | Performed by: INTERNAL MEDICINE

## 2021-04-29 PROCEDURE — 94729 DIFFUSING CAPACITY: CPT

## 2021-04-29 PROCEDURE — 94729 DIFFUSING CAPACITY: CPT | Performed by: INTERNAL MEDICINE

## 2021-04-29 PROCEDURE — 94726 PLETHYSMOGRAPHY LUNG VOLUMES: CPT | Performed by: INTERNAL MEDICINE

## 2021-04-29 PROCEDURE — 94060 EVALUATION OF WHEEZING: CPT

## 2021-04-29 PROCEDURE — 94726 PLETHYSMOGRAPHY LUNG VOLUMES: CPT

## 2021-05-04 DIAGNOSIS — J84.9 INTERSTITIAL LUNG DISEASE (HCC): ICD-10-CM

## 2021-05-04 DIAGNOSIS — R06.02 SHORTNESS OF BREATH: Primary | ICD-10-CM

## 2021-05-04 DIAGNOSIS — Z86.711 HISTORY OF PULMONARY EMBOLUS (PE): ICD-10-CM

## 2021-05-04 NOTE — PROCEDURES
[address]                  [address] [facility],  [facility]                               PULMONARY FUNCTION    PATIENT NAME: Yfn Richardson                     :        1945  MED REC NO:   [id number]                            ROOM:  ACCOUNT NO:   [de-identified]                           ADMIT DATE: 2021  PROVIDER:     Betsey Cash MD    DATE OF PROCEDURE:  2021    YEARS OF SMOKING:  None. HEIGHT:  62.    WEIGHT:  217. ID:  [id number]. SPIROMETRY:  FVC 0.99 which is 39. FEV1 0.91 which is 56. FEV1/FVC 92  which is 117 of predicted. MVV 48 which is 60% of predicted. Moving to  lung volumes, slow vital capacity 1.10 which is 43. ERV 0.01 which is  2. RV 1.52 which is 68. TLC 2.62 which is 54. RV/TLC 58. Diffusion  capacity 1.36 which is 6 and corrects for alveolar volume to 47. IMPRESSION:  This PFT is showing evidence of severe restrictive lung  disease along with severe reduction in diffusion capacity, most likely  diagnosis interstitial lung disease. Clinical and radiological  correlation indicated. Given the patient also overweight that can add a  restriction.         Lizabeth Han MD    D: 2021 15:33:53       T: 2021 15:36:53     [email or web address]  Job#: [id number]     Doc#: [id number]

## 2021-05-10 ENCOUNTER — HOSPITAL ENCOUNTER (OUTPATIENT)
Age: 76
Discharge: HOME | End: 2021-05-10
Payer: MEDICARE

## 2021-05-10 LAB
ALBUMIN SERPL-MCNC: 3.1 G/DL (ref 3.5–5.2)
ALP BLD-CCNC: 100 U/L (ref 35–104)
ALT SERPL-CCNC: 10 U/L (ref 0–32)
ANION GAP SERPL CALCULATED.3IONS-SCNC: 10 MMOL/L (ref 7–16)
AST SERPL-CCNC: 31 U/L (ref 0–31)
BILIRUB SERPL-MCNC: 1.1 MG/DL (ref 0–1.2)
BUN BLDV-MCNC: 10 MG/DL (ref 6–23)
CALCIUM SERPL-MCNC: 10 MG/DL (ref 8.6–10.2)
CHLORIDE BLD-SCNC: 108 MMOL/L (ref 98–107)
CO2: 24 MMOL/L (ref 22–29)
CREAT SERPL-MCNC: 1 MG/DL (ref 0.5–1)
GFR AFRICAN AMERICAN: >60
GFR NON-AFRICAN AMERICAN: 54 ML/MIN/1.73
GLUCOSE BLD-MCNC: 136 MG/DL (ref 74–99)
POTASSIUM SERPL-SCNC: 4.4 MMOL/L (ref 3.5–5)
SODIUM BLD-SCNC: 142 MMOL/L (ref 132–146)
TOTAL PROTEIN: 7 G/DL (ref 6.4–8.3)

## 2021-05-10 PROCEDURE — 36415 COLL VENOUS BLD VENIPUNCTURE: CPT

## 2021-05-10 PROCEDURE — 82105 ALPHA-FETOPROTEIN SERUM: CPT

## 2021-05-10 PROCEDURE — 80053 COMPREHEN METABOLIC PANEL: CPT

## 2021-05-12 LAB — AFP-TUMOR MARKER: 6 NG/ML (ref 0–9)

## 2021-05-13 ENCOUNTER — OFFICE VISIT (OUTPATIENT)
Dept: FAMILY MEDICINE CLINIC | Age: 76
End: 2021-05-13
Payer: MEDICARE

## 2021-05-13 VITALS
DIASTOLIC BLOOD PRESSURE: 70 MMHG | RESPIRATION RATE: 20 BRPM | SYSTOLIC BLOOD PRESSURE: 129 MMHG | WEIGHT: 210.6 LBS | HEART RATE: 93 BPM | HEIGHT: 63 IN | TEMPERATURE: 97 F | OXYGEN SATURATION: 95 % | BODY MASS INDEX: 37.32 KG/M2

## 2021-05-13 DIAGNOSIS — R06.02 SOB (SHORTNESS OF BREATH): ICD-10-CM

## 2021-05-13 DIAGNOSIS — I49.9 IRREGULAR CARDIAC RHYTHM: ICD-10-CM

## 2021-05-13 DIAGNOSIS — R31.9 HEMATURIA, UNSPECIFIED TYPE: Primary | ICD-10-CM

## 2021-05-13 DIAGNOSIS — R18.8 CIRRHOSIS OF LIVER WITH ASCITES, UNSPECIFIED HEPATIC CIRRHOSIS TYPE (HCC): ICD-10-CM

## 2021-05-13 DIAGNOSIS — K74.60 CIRRHOSIS OF LIVER WITH ASCITES, UNSPECIFIED HEPATIC CIRRHOSIS TYPE (HCC): ICD-10-CM

## 2021-05-13 DIAGNOSIS — R06.02 SHORTNESS OF BREATH: ICD-10-CM

## 2021-05-13 PROCEDURE — G8417 CALC BMI ABV UP PARAM F/U: HCPCS | Performed by: FAMILY MEDICINE

## 2021-05-13 PROCEDURE — 99214 OFFICE O/P EST MOD 30 MIN: CPT | Performed by: FAMILY MEDICINE

## 2021-05-13 PROCEDURE — G8399 PT W/DXA RESULTS DOCUMENT: HCPCS | Performed by: FAMILY MEDICINE

## 2021-05-13 PROCEDURE — G8427 DOCREV CUR MEDS BY ELIG CLIN: HCPCS | Performed by: FAMILY MEDICINE

## 2021-05-13 PROCEDURE — 93000 ELECTROCARDIOGRAM COMPLETE: CPT | Performed by: FAMILY MEDICINE

## 2021-05-13 PROCEDURE — 4040F PNEUMOC VAC/ADMIN/RCVD: CPT | Performed by: FAMILY MEDICINE

## 2021-05-13 PROCEDURE — 3017F COLORECTAL CA SCREEN DOC REV: CPT | Performed by: FAMILY MEDICINE

## 2021-05-13 PROCEDURE — 1090F PRES/ABSN URINE INCON ASSESS: CPT | Performed by: FAMILY MEDICINE

## 2021-05-13 PROCEDURE — 1036F TOBACCO NON-USER: CPT | Performed by: FAMILY MEDICINE

## 2021-05-13 PROCEDURE — 1123F ACP DISCUSS/DSCN MKR DOCD: CPT | Performed by: FAMILY MEDICINE

## 2021-05-13 RX ORDER — SPIRONOLACTONE 100 MG/1
TABLET, FILM COATED ORAL
COMMUNITY
Start: 2021-05-05 | End: 2021-06-30 | Stop reason: SDUPTHER

## 2021-05-13 RX ORDER — LACTULOSE 10 G/15ML
SOLUTION ORAL; RECTAL
COMMUNITY
Start: 2021-02-03 | End: 2021-10-19

## 2021-05-13 RX ORDER — PANTOPRAZOLE SODIUM 40 MG/1
TABLET, DELAYED RELEASE ORAL
COMMUNITY
Start: 2021-05-04 | End: 2021-06-30 | Stop reason: SDUPTHER

## 2021-05-13 RX ORDER — BUDESONIDE AND FORMOTEROL FUMARATE DIHYDRATE 160; 4.5 UG/1; UG/1
2 AEROSOL RESPIRATORY (INHALATION) 2 TIMES DAILY
Qty: 3 INHALER | Refills: 1 | Status: SHIPPED
Start: 2021-05-13 | End: 2021-06-30 | Stop reason: SDUPTHER

## 2021-05-13 RX ORDER — ALBUTEROL SULFATE 90 UG/1
2 AEROSOL, METERED RESPIRATORY (INHALATION) 4 TIMES DAILY PRN
Qty: 3 INHALER | Refills: 1 | Status: SHIPPED
Start: 2021-05-13 | End: 2021-06-30 | Stop reason: SDUPTHER

## 2021-05-13 NOTE — PROGRESS NOTES
FM Progress Note    Subjective:   Cirrhosis. On Lasix and Aldactone. Liver doctor (Dr. Irwin Krishna) will be seen again on the 26th.     PE. Stopped eliquis after second attempt due to worsening hematuria and also some blood in phlegm coughing up, but the latter has resolved. Breathing is \"bad\". advair not helping. Albuterol helps when walking, needs refill. PFTs showed:  IMPRESSION:  This PFT is showing evidence of severe restrictive lung  disease along with severe reduction in diffusion capacity, most likely  diagnosis interstitial lung disease. Clinical and radiological  correlation indicated. Given the patient also overweight that can add a  Restriction. Not following w/ Pulm. Blood in urine. urinalysis questionably positive for UTI, but urine culture negative. Lower urinary tract symptoms resolved    GI noted premature beats. Asked us to eval further. Health Maintenance Due   Topic Date Due    Hepatitis A vaccine (1 of 2 - Risk 2-dose series) Never done    Diabetic retinal exam  05/01/2020    Diabetic foot exam  07/25/2020         Objective:   /70 (Site: Right Upper Arm, Position: Sitting, Cuff Size: Large Adult)   Pulse 93   Temp 97 °F (36.1 °C) (Temporal)   Resp 20   Ht 5' 2.5\" (1.588 m)   Wt 210 lb 9.6 oz (95.5 kg)   SpO2 95%   BMI 37.91 kg/m²   General appearance: NAD, alert and interacting appropriately  HEENT: NCAT, PERRLA, EOMI   Resp: CTAB, no WRC  CVS: RRR w/ premature beats, IVAN 2/6  Abdomen: BS +, SNT. Significant abdominal distension. Extremities: No clubbing, cyanosis. Trace ble edema. Warm. Dry. I have reviewed this patient's previous records. I have reviewed this patient's labs and imaging reports including US showing no significant ascites from 2/1/21 and recently. I have reviewed this patient's medications. Assessment/Plan: Darris Canavan was seen today for cirrhosis.     Diagnoses and all orders for this visit:    Hematuria, unspecified type  - External Referral To Urology  -     EKG 12 Lead    SOB (shortness of breath)  -     albuterol sulfate HFA (VENTOLIN HFA) 108 (90 Base) MCG/ACT inhaler; Inhale 2 puffs into the lungs 4 times daily as needed for Wheezing  -     budesonide-formoterol (SYMBICORT) 160-4.5 MCG/ACT AERO; Inhale 2 puffs into the lungs 2 times daily  -     EKG 12 Lead; Future  -     [facility] Bashir Taylor MD, Pulmonary, [facility]  -     EKG 12 Lead    Shortness of breath    Cirrhosis of liver with ascites, unspecified hepatic cirrhosis type (HCC)  -     rifaximin (XIFAXAN) 550 MG tablet; Take 1 tablet by mouth 2 times daily    Irregular cardiac rhythm  -     EKG 12 Lead; Future    EKG showed NSR with PAC, no concerning e/o ischemia          There are no Patient Instructions on file for this visit.      rtc pending specialist eval        Electronically signed by Mary Pierre MD on 5/13/2021 at 1:40 PM

## 2021-05-17 ENCOUNTER — HOSPITAL ENCOUNTER (OUTPATIENT)
Dept: AUDIOLOGY | Age: 76
Discharge: HOME | End: 2021-05-17
Payer: MEDICARE

## 2021-05-17 PROCEDURE — 9990000010 HC NO CHARGE VISIT: Performed by: AUDIOLOGIST

## 2021-05-17 NOTE — PROGRESS NOTES
Fit with binaural  [employer]  hearing aids. Instructed in use and care. Gave  battery charger, warranty information and scheduled  30 day check for 6/17/21. Made following adjustments: none. Hearing aid contract/battery warning form reviewed and signed. Hearing aids paired to phone logan. Patient was satisfied and will follow up on above date, unless problems arise. No charge visit today. Will bill at 30 day follow up per [facility] guidelines.      Electronically signed by Noah Jimenez on 5/17/2021 at 3:17 PM

## 2021-05-21 DIAGNOSIS — F41.9 ANXIETY: ICD-10-CM

## 2021-05-21 DIAGNOSIS — F32.9 MAJOR DEPRESSIVE DISORDER, REMISSION STATUS UNSPECIFIED, UNSPECIFIED WHETHER RECURRENT: ICD-10-CM

## 2021-05-21 DIAGNOSIS — F32.A DEPRESSION, UNSPECIFIED DEPRESSION TYPE: ICD-10-CM

## 2021-05-21 RX ORDER — VENLAFAXINE HYDROCHLORIDE 75 MG/1
75 CAPSULE, EXTENDED RELEASE ORAL DAILY
Qty: 90 CAPSULE | Refills: 1 | Status: SHIPPED
Start: 2021-05-21 | End: 2021-06-30 | Stop reason: SDUPTHER

## 2021-05-28 DIAGNOSIS — Z96.612 S/P REVERSE TOTAL SHOULDER ARTHROPLASTY, LEFT: Primary | ICD-10-CM

## 2021-06-02 ENCOUNTER — HOSPITAL ENCOUNTER (OUTPATIENT)
Dept: GENERAL RADIOLOGY | Age: 76
Discharge: HOME | End: 2021-06-04
Payer: MEDICARE

## 2021-06-02 ENCOUNTER — OFFICE VISIT (OUTPATIENT)
Dept: ORTHOPEDIC SURGERY | Age: 76
End: 2021-06-02
Payer: MEDICARE

## 2021-06-02 ENCOUNTER — TELEPHONE (OUTPATIENT)
Dept: FAMILY MEDICINE CLINIC | Age: 76
End: 2021-06-02

## 2021-06-02 VITALS — TEMPERATURE: 97.5 F

## 2021-06-02 DIAGNOSIS — Z96.612 S/P REVERSE TOTAL SHOULDER ARTHROPLASTY, LEFT: ICD-10-CM

## 2021-06-02 DIAGNOSIS — S49.002S: Primary | ICD-10-CM

## 2021-06-02 PROCEDURE — 1036F TOBACCO NON-USER: CPT | Performed by: ORTHOPAEDIC SURGERY

## 2021-06-02 PROCEDURE — G8417 CALC BMI ABV UP PARAM F/U: HCPCS | Performed by: ORTHOPAEDIC SURGERY

## 2021-06-02 PROCEDURE — 99212 OFFICE O/P EST SF 10 MIN: CPT

## 2021-06-02 PROCEDURE — G8399 PT W/DXA RESULTS DOCUMENT: HCPCS | Performed by: ORTHOPAEDIC SURGERY

## 2021-06-02 PROCEDURE — 99214 OFFICE O/P EST MOD 30 MIN: CPT | Performed by: ORTHOPAEDIC SURGERY

## 2021-06-02 PROCEDURE — G8427 DOCREV CUR MEDS BY ELIG CLIN: HCPCS | Performed by: ORTHOPAEDIC SURGERY

## 2021-06-02 PROCEDURE — 4040F PNEUMOC VAC/ADMIN/RCVD: CPT | Performed by: ORTHOPAEDIC SURGERY

## 2021-06-02 PROCEDURE — 1090F PRES/ABSN URINE INCON ASSESS: CPT | Performed by: ORTHOPAEDIC SURGERY

## 2021-06-02 PROCEDURE — 1123F ACP DISCUSS/DSCN MKR DOCD: CPT | Performed by: ORTHOPAEDIC SURGERY

## 2021-06-02 PROCEDURE — 73030 X-RAY EXAM OF SHOULDER: CPT

## 2021-06-02 PROCEDURE — 3017F COLORECTAL CA SCREEN DOC REV: CPT | Performed by: ORTHOPAEDIC SURGERY

## 2021-06-02 NOTE — TELEPHONE ENCOUNTER
Yuriy Milan from [facility] [address] called and states patient just enrolled in their mail service and was not clear on her medications and is asking for a med list .   Fax [phone]  Phone [phone]

## 2021-06-02 NOTE — PROGRESS NOTES
Chief Complaint   Patient presents with    Fracture      Left TSA       SUBJECTIVE: Patient is here for follow-up of a left reverse total shoulder arthroplasty for fracture. She is improved with therapy. When I initially saw her last time she had very little range of motion left upper extremity due to other medical complications and delay in start of therapy. Patient has been attending therapy and states it is helped her pain level. She still has extremely limited range of motion but feels that she is improving. She denies any further injury. Review of Systems -   General ROS: negative for - chills, fatigue, fever or night sweats  Respiratory ROS: no cough, shortness of breath, or wheezing  Cardiovascular ROS: no chest pain or dyspnea on exertion  Gastrointestinal ROS: no abdominal pain, change in bowel habits, or black or bloody stools  Genitourinary: no hematuria, dysuria, or incontinence   Musculoskeletal ROS:see above  Neurological ROS: no TIA or stroke symptoms       OBJECTIVE:   Alert and oriented X 3, no acute distress, respirations easy and unlabored with no audible wheezes, skin warm and dry, speech and dress appropriate for noted age, affect euthymic. Extremity:    Left upper extremity  Surgical incision well-healed  Compartment soft compressible  Good range of motion left elbow  Improved flexion of the shoulder to about 50 degrees  External rotation at 20 degrees  Extension to her back  Shoulder feels stable with no crepitus  Neurovascular intact distally  2/4 radial pulse    XR: 6/2/21left shoulder shows components well fixed with no obvious new fracture or dislocation    Temp 97.5 °F (36.4 °C) (Oral)     ASSESSMENT:     Diagnosis Orders   1.  Closed fracture of proximal epiphysis of left humerus, sequela         PLAN:  Continue therapy for left shoulder    Follow-up in 3 to 4 months    Call sooner with any questions, concerns, or need for reevaluation    ELECTRONICALLY signed by:    He Navarro Shey RAMIREZ  6/2/21

## 2021-06-02 NOTE — PATIENT INSTRUCTIONS
Continue therapy for left shoulder    Follow-up in 3 to 4 months    Call sooner with any questions, concerns, or need for reevaluation

## 2021-06-02 NOTE — TELEPHONE ENCOUNTER
[facility] calling back, they received med list but need a call back with clarification on some of the meds.  [phone]

## 2021-06-02 NOTE — PROGRESS NOTES
Timo Garcia is a 76 y.o. female who presents for follow up of  fx left  shoulder     SURGEON: Dr. Mj Quintero MD  Date of Injury/Surgery:  Initial injury 2-20; follow up surgery December 2020   Date last seen in office: 3-16-21    Symptoms: better with therapy, but pain increases with motion/activity. Weightbearing:  As tolerated      Assistive device Straight cane  Participating in therapy (location if yes)?  yes,  [facility]  Refills Needed: no  Order/Referral Needed: no

## 2021-06-04 NOTE — TELEPHONE ENCOUNTER
Please advise if patient is supposed to be taking both Advair and Symbicort, as well as both lasix and spirolactone?   Please advise pt medication list, once updated a new med list needs faxed to [facility] pharmacy

## 2021-06-07 ENCOUNTER — HOSPITAL ENCOUNTER (OUTPATIENT)
Age: 76
Discharge: HOME | End: 2021-06-09
Payer: MEDICARE

## 2021-06-07 ENCOUNTER — HOSPITAL ENCOUNTER (OUTPATIENT)
Age: 76
Discharge: HOME | End: 2021-06-07
Payer: MEDICARE

## 2021-06-07 ENCOUNTER — OFFICE VISIT (OUTPATIENT)
Dept: PULMONOLOGY | Age: 76
End: 2021-06-07
Payer: MEDICARE

## 2021-06-07 ENCOUNTER — HOSPITAL ENCOUNTER (OUTPATIENT)
Dept: GENERAL RADIOLOGY | Age: 76
Discharge: HOME | End: 2021-06-09
Payer: MEDICARE

## 2021-06-07 VITALS
HEIGHT: 63 IN | WEIGHT: 200 LBS | TEMPERATURE: 98.7 F | OXYGEN SATURATION: 93 % | RESPIRATION RATE: 16 BRPM | BODY MASS INDEX: 35.44 KG/M2 | HEART RATE: 92 BPM

## 2021-06-07 DIAGNOSIS — G47.33 OBSTRUCTIVE SLEEP APNEA SYNDROME: ICD-10-CM

## 2021-06-07 DIAGNOSIS — J90 PLEURAL EFFUSION: ICD-10-CM

## 2021-06-07 DIAGNOSIS — R06.02 SOB (SHORTNESS OF BREATH): Primary | ICD-10-CM

## 2021-06-07 DIAGNOSIS — R06.02 SOB (SHORTNESS OF BREATH): ICD-10-CM

## 2021-06-07 LAB
BASOPHILS ABSOLUTE: 0.07 E9/L (ref 0–0.2)
BASOPHILS RELATIVE PERCENT: 1.1 % (ref 0–2)
D DIMER: 945 NG/ML DDU
EOSINOPHILS ABSOLUTE: 0.18 E9/L (ref 0.05–0.5)
EOSINOPHILS RELATIVE PERCENT: 2.8 % (ref 0–6)
HCT VFR BLD CALC: 34.9 % (ref 34–48)
HEMOGLOBIN: 11.3 G/DL (ref 11.5–15.5)
IMMATURE GRANULOCYTES #: 0.02 E9/L
IMMATURE GRANULOCYTES %: 0.3 % (ref 0–5)
LYMPHOCYTES ABSOLUTE: 1.18 E9/L (ref 1.5–4)
LYMPHOCYTES RELATIVE PERCENT: 18.6 % (ref 20–42)
MCH RBC QN AUTO: 29.1 PG (ref 26–35)
MCHC RBC AUTO-ENTMCNC: 32.4 % (ref 32–34.5)
MCV RBC AUTO: 89.9 FL (ref 80–99.9)
MONOCYTES ABSOLUTE: 0.75 E9/L (ref 0.1–0.95)
MONOCYTES RELATIVE PERCENT: 11.8 % (ref 2–12)
NEUTROPHILS ABSOLUTE: 4.15 E9/L (ref 1.8–7.3)
NEUTROPHILS RELATIVE PERCENT: 65.4 % (ref 43–80)
PDW BLD-RTO: 19.4 FL (ref 11.5–15)
PLATELET # BLD: 214 E9/L (ref 130–450)
PMV BLD AUTO: 10.1 FL (ref 7–12)
RBC # BLD: 3.88 E12/L (ref 3.5–5.5)
WBC # BLD: 6.4 E9/L (ref 4.5–11.5)

## 2021-06-07 PROCEDURE — 99214 OFFICE O/P EST MOD 30 MIN: CPT | Performed by: INTERNAL MEDICINE

## 2021-06-07 PROCEDURE — 36415 COLL VENOUS BLD VENIPUNCTURE: CPT

## 2021-06-07 PROCEDURE — G8417 CALC BMI ABV UP PARAM F/U: HCPCS | Performed by: INTERNAL MEDICINE

## 2021-06-07 PROCEDURE — 1036F TOBACCO NON-USER: CPT | Performed by: INTERNAL MEDICINE

## 2021-06-07 PROCEDURE — 71046 X-RAY EXAM CHEST 2 VIEWS: CPT

## 2021-06-07 PROCEDURE — 99213 OFFICE O/P EST LOW 20 MIN: CPT | Performed by: INTERNAL MEDICINE

## 2021-06-07 PROCEDURE — 85378 FIBRIN DEGRADE SEMIQUANT: CPT

## 2021-06-07 PROCEDURE — 1123F ACP DISCUSS/DSCN MKR DOCD: CPT | Performed by: INTERNAL MEDICINE

## 2021-06-07 PROCEDURE — 4040F PNEUMOC VAC/ADMIN/RCVD: CPT | Performed by: INTERNAL MEDICINE

## 2021-06-07 PROCEDURE — 85025 COMPLETE CBC W/AUTO DIFF WBC: CPT

## 2021-06-07 PROCEDURE — G8399 PT W/DXA RESULTS DOCUMENT: HCPCS | Performed by: INTERNAL MEDICINE

## 2021-06-07 PROCEDURE — 1090F PRES/ABSN URINE INCON ASSESS: CPT | Performed by: INTERNAL MEDICINE

## 2021-06-07 PROCEDURE — G8428 CUR MEDS NOT DOCUMENT: HCPCS | Performed by: INTERNAL MEDICINE

## 2021-06-07 NOTE — PROGRESS NOTES
Pulmonary [facility]                             Pulmonary Consult/Progress Note :          Patient: Lex March  MRN: [id number]  : 1945      Date of Admission: . No admission date for patient encounter. Reason for Consultation:pleural effusion ,Possible PE  CC : SOB   HPI:     Lex March is a 76 y.o. female with a PMH of malignant neoplasm of L breast, s/p lumpectomy in , DOREEN, , liver cirrhosis,     She  presents to ED with SOB for 4 days along with some tightness and wheezing with no fever or chills and no chest pain . She has doreen but seems CPAP machine not working at home.)  Patient reports that she had been having ankle pain for weeks that relieved about 4 days ago, when she developed SOB, wheezing, and chest tightness especially with exertion. She denies current leg swelling and pain. She denies cough, fever, chills, and chest pain. She does report recent UTI symptoms of burning and frequency for which she was given antibiotics. She has never smoked.     Today visit  Patient came for follow-up, she stated that her shortness of breath has been about the same    She said she has skipped beats and palpitation at times that make her even more shortness of breath    She use her CPAP faithfully    She never smoked    She had pleural fluid drained in January and it was negative for any malignancy    She is on diuretics and she has mild swelling in her legs    PAST MEDICAL HISTORY:     Past Medical History:   Diagnosis Date    Breast cancer ([address]     Cirrhosis ([address]     Essential hypertension     Hyperlipidemia     Osteopenia     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 ([address]     Sleep apnea     Spinal stenosis     Type 2 diabetes mellitus ([address]        PAST SURGICAL HISTORY:   Past Surgical History:   Procedure Laterality Date    BREAST LUMPECTOMY      lumpectomy [id number]    CARDIOVASCULAR STRESS TEST      Lexiscan stress test    CARPAL TUNNEL RELEASE      COLONOSCOPY  01/31/2017    multiple polyps; diverticula--jerod    COLONOSCOPY  04/23/2019    polyps; diverticula; hemorrhoids--jerod    COLONOSCOPY N/A 04/23/2019    COLONOSCOPY POLYPECTOMY SNARE/COLD BIOPSY performed by Paola Oliva MD at [address] COLONOSCOPY  04/23/2019    COLONOSCOPY WITH BIOPSY performed by Paola Oliva MD at [address] LITHOTRIPSY Left 05/04/2016    C-R STENT PLACEMENT    SHOULDER ARTHROPLASTY Left 12/21/2020    LEFT REVERSE TOTAL SHOULDER  ARTHROPLASTY -- DEPUY performed by Ashley Kelley MD [address]  04/23/2019    gastritis--jerod    UPPER GASTROINTESTINAL ENDOSCOPY N/A 04/23/2019    EGD BIOPSY performed by Paola Oliva MD at [address]:   Family History   Problem Relation Age of Onset    COPD Father     Heart Attack Father     Arthritis Mother     Cancer Other 48        colon       SOCIAL HISTORY:   Social History     Socioeconomic History    Marital status:      Spouse name: Not on file    Number of children: Not on file    Years of education: Not on file    Highest education level: Not on file   Occupational History    Not on file   Tobacco Use    Smoking status: Never Smoker    Smokeless tobacco: Never Used   Vaping Use    Vaping Use: Never used   Substance and Sexual Activity    Alcohol use: Never     Alcohol/week: 0.0 standard drinks    Drug use: Never    Sexual activity: Never     Partners: Male     Comment: last in 2013   Other Topics Concern    Not on file   Social History Narrative    Not on file     Social Determinants of Health     Financial Resource Strain:     Difficulty of Paying Living Expenses:    Food Insecurity:     Worried About Running Out of Food in the Last Year:     [address] in the Last Year:    Transportation Needs:     Lack of Transportation (Medical):      Lack of Transportation (Non-Medical): Physical Activity:     Days of Exercise per Week:     Minutes of Exercise per Session:    Stress:     Feeling of Stress :    Social Connections:     Frequency of Communication with Friends and Family:     Frequency of Social Gatherings with Friends and Family:     Attends Hoahaoism Services:     Active Member of Clubs or Organizations:     Attends Club or Organization Meetings:     Marital Status:    Intimate Partner Violence:     Fear of Current or Ex-Partner:     Emotionally Abused:     Physically Abused:     Sexually Abused:      Social History     Tobacco Use   Smoking Status Never Smoker   Smokeless Tobacco Never Used     Social History     Substance and Sexual Activity   Alcohol Use Never    Alcohol/week: 0.0 standard drinks     Social History     Substance and Sexual Activity   Drug Use Never           HOME MEDICATIONS:  Prior to Admission medications    Medication Sig Start Date End Date Taking?  Authorizing Provider   albuterol sulfate HFA (VENTOLIN HFA) 108 (90 Base) MCG/ACT inhaler Inhale 2 puffs into the lungs 4 times daily as needed for Wheezing 5/13/21  Yes Manuela De Souza MD   budesonide-formoterol (SYMBICORT) 160-4.5 MCG/ACT AERO Inhale 2 puffs into the lungs 2 times daily 5/13/21  Yes Manuela De Souza MD   venlafaxine (EFFEXOR XR) 75 MG extended release capsule Take 1 capsule by mouth daily 5/21/21   Manuela De Souza MD   pantoprazole (PROTONIX) 40 MG tablet TAKE 1 TABLET BY MOUTH EVERY DAY 5/4/21   Historical Provider, MD   spironolactone (ALDACTONE) 100 MG tablet TAKE 1 TABLET BY MOUTH EVERY MORNING 5/5/21   Historical Provider, MD   ENULOSE 10 GM/15ML SOLN solution  2/3/21   Historical Provider, MD   rifaximin (XIFAXAN) 550 MG tablet Take 1 tablet by mouth 2 times daily 5/13/21   Manuela De Souza MD   FEROSUL 325 (65 Fe) MG tablet Take 325 mg by mouth daily  Patient not taking: Reported on 6/2/2021 3/18/21   Historical Provider, MD   lactulose ([facility]) 10 GM/15ML solution Take 30 mLs by mouth 3 times daily 2/3/21   Vikram Nava MD   furosemide (LASIX) 40 MG tablet Take 1 tablet by mouth daily 2/3/21   Vikram Nava MD   felodipine (PLENDIL) 2.5 MG extended release tablet Take 1 tablet by mouth daily 2/2/21   Miguel Diamond MD   doxazosin (CARDURA) 1 MG tablet Take 1 mg by mouth nightly  4/4/20   Historical Provider, MD   vitamin D (ERGOCALCIFEROL) 1.25 MG (20891 UT) CAPS capsule Take 1 capsule by mouth once a week  Patient taking differently: Take 50,000 Units by mouth  3/27/20   Eleni Zuleta DO       CURRENT MEDICATIONS:  No current facility-administered medications for this visit. IV MEDICATIONS:      ALLERGIES:  Allergies   Allergen Reactions    Lisinopril        REVIEW OF SYSTEMS:  General ROS:  No weight loss ,no fatigue     ENT ROS:   No Sore throat ,no lymphoadenopathy,no nasal stuffiness     Hematological and Lymphatic ROS:   No ecchymosis ,no tendency to bleed  Respiratory ROS:   SOB   Cardiovascular ROS:   No CP,No Palpitation   Gastrointestinal ROS:   No Gi bleed,no nausea or vomiting      - Musculoskeletal ROS:      - no joint swelling ,no joint pain   Neurological ROS:     -no weakness or numbness    Dermatological ROS:   No skin rash ,no urticaria     PHYSICAL EXAMINATION:     VITAL SIGNS:  Pulse 92   Temp 98.7 °F (37.1 °C)   Resp 16   Ht 5' 3\" (1.6 m)   Wt 200 lb (90.7 kg)   SpO2 93%   BMI 35.43 kg/m²   Wt Readings from Last 3 Encounters:   06/07/21 200 lb (90.7 kg)   05/13/21 210 lb 9.6 oz (95.5 kg)   04/16/21 215 lb (97.5 kg)     Temp Readings from Last 3 Encounters:   06/07/21 98.7 °F (37.1 °C)   06/02/21 97.5 °F (36.4 °C) (Oral)   05/13/21 97 °F (36.1 °C) (Temporal)     TMAX:  BP Readings from Last 3 Encounters:   05/13/21 129/70   04/19/21 138/79   04/16/21 138/62     Pulse Readings from Last 3 Encounters:   06/07/21 92   05/13/21 93   04/19/21 93           INTAKE/OUTPUTS:  No intake/output data recorded.   No intake or output data in the 24 hours ending 06/07/21 1051    General Appearance: alert and oriented to person, place and time, well-developed and   well-nourished, in no acute distress   Eyes: pupils equal, round, and reactive to light, extraocular eye movements intact, conjunctivae normal and sclera anicteric   Neck: neck supple and non tender without mass, no thyromegaly, no thyroid nodules and no cervical adenopathy   Pulmonary/Chest:decrease breath sound left /wheeizng occasional exp  Cardiovascular: normal rate, regular rhythm, normal S1 and S2, no murmurs, rubs, clicks or gallops, distal pulses intact, no carotid bruits, no murmurs, no gallops, no carotid bruits and no JVD   Abdomen: obese, soft, non-tender, non-distended, normal bowel sounds, no masses or organomegaly   Extremities:mild edema   Musculoskeletal: normal range of motion, no joint swelling, deformity or tenderness   Neurologic: reflexes normal and symmetric, no cranial nerve deficit noted    LABS/IMAGING:    CBC:  Lab Results   Component Value Date    WBC 5.8 04/16/2021    HGB 10.0 (L) 04/16/2021    HCT 32.0 (L) 04/16/2021    MCV 92.5 04/16/2021     04/16/2021    LYMPHOPCT 16.5 (L) 04/16/2021    RBC 3.46 (L) 04/16/2021    MCH 28.9 04/16/2021    MCHC 31.3 (L) 04/16/2021    RDW 27.2 (H) 04/16/2021    NEUTOPHILPCT 73.0 04/16/2021    MONOPCT 4.3 04/16/2021    BASOPCT 1.7 04/16/2021    NEUTROABS 4.23 04/16/2021    LYMPHSABS 0.99 (L) 04/16/2021    MONOSABS 0.23 04/16/2021    EOSABS 0.25 04/16/2021    BASOSABS 0.10 04/16/2021       No results for input(s): WBC, HGB, HCT, MCV, PLT in the last 720 hours. BMP:   No results for input(s): NA, K, CL, CO2, PHOS, BUN, CREATININE in the last 72 hours.     Invalid input(s): CA    MG:   Lab Results   Component Value Date    MG 1.7 02/04/2021     Ca/Phos:   Lab Results   Component Value Date    CALCIUM 10.0 05/10/2021    PHOS 2.7 02/27/2021     Amylase: No results found for: AMYLASE  Lipase: No results found for: LIPASE  LIVER PROFILE:   No results for input(s): AST, ALT, LIPASE, BILIDIR, BILITOT, ALKPHOS in the last 72 hours. Invalid input(s): AMYLASE,  ALB    PT/INR: No results for input(s): PROTIME, INR in the last 72 hours. APTT:   No results for input(s): APTT in the last 72 hours. Cardiac Enzymes:  Lab Results   Component Value Date    CKTOTAL 104 01/21/2016    TROPONINI <0.01 02/04/2021                 PROBLEM LIST:  Patient Active Problem List   Diagnosis    Malignant [facility] of [facility]    Type 2 diabetes mellitus ([facility]    Obstructive sleep apnea syndrome    Chronic back pain    Essential hypertension    Multiple thyroid nodules    Balance problem    Depression    At risk for colon cancer    Hx of adenomatous colonic polyps    Dyslipidemia    Murmur, cardiac    Cirrhosis (HCC)    Closed fracture of proximal epiphysis of left humerus    COVID-19    S/P reverse total shoulder arthroplasty, left    REN (acute kidney injury) (HCC)    Altered mental status    Anemia    Pleural effusion    Precordial pain               ASSESSMENT:  1.) left side effusion  2-. Shortness of breath  2. )Breast cancer   3.)Possible pneumonia  4.)Small PE ,if any  5. )KATE      PLAN:  Patient with breast cancer, left side pleural effusion s/p thoracentesis that was negative for malignancy she has mild swelling in her legs    She denies any chest pain but has shortness of breath and dyspnea on exertion    Fluid were negative for malignancy    She could not take anticoagulation secondary to bleed    Shortness of breath seems multifactorial, she never smoked, she is on CPAP, she has skipped beats and palpitation feeling so we will need cardiology evaluation 2D echo was ordered    Patient does not have any evidence of interstitial lung disease or COPD    Has history of diastolic dysfunction so we will await cardiology    She also has liver cirrhosis and other possibility of her shortness of breath could be portal pulmonary hypertension versus hepatopulmonary syndrome but I would like to repeat the echo and I would like cardiac evaluation before do further work-up in that route as she might need to go to [facility] for further evaluation if there is no cardiac etiology explaining her shortness of breath    Thank you very much for allowing me to participate in the care of this pleasant patient , should you have any questions ,please do not hesitate to contact me      Daysi Webb MD,[facility]  Pulmonary&Critical Care Medicine   Director of [address] [place] [address] Director of [address] [place] [address]   Professor Anamaria Bueno    NOTE: This report was transcribed using voice recognition software. Every effort was made to ensure accuracy; however, inadvertent computerized transcription errors may be present.

## 2021-06-07 NOTE — PROGRESS NOTES
6 mos follow up in office today; pt reports feeling some shortness of breath. Using daily Symbicort and albuterol prn. Referral to cardiology, Echo and D Dimer/CBC to be done and follow up in 6 mos in office. Will follow up with test results via phone.

## 2021-06-08 ENCOUNTER — TELEPHONE (OUTPATIENT)
Dept: PULMONOLOGY | Age: 76
End: 2021-06-08

## 2021-06-08 NOTE — TELEPHONE ENCOUNTER
Mailed a letter to patient informing her that her 2D Echo is scheduled for 7-9-21 at 8:30 am at the [facility]. She must arrive by 8:00 am. She is to wear NO lotions, creams or powders on her chest

## 2021-06-30 ENCOUNTER — OFFICE VISIT (OUTPATIENT)
Dept: CARDIOLOGY CLINIC | Age: 76
End: 2021-06-30
Payer: MEDICARE

## 2021-06-30 VITALS
HEIGHT: 63 IN | RESPIRATION RATE: 16 BRPM | OXYGEN SATURATION: 96 % | BODY MASS INDEX: 34.43 KG/M2 | SYSTOLIC BLOOD PRESSURE: 130 MMHG | DIASTOLIC BLOOD PRESSURE: 60 MMHG | HEART RATE: 93 BPM | WEIGHT: 194.34 LBS

## 2021-06-30 DIAGNOSIS — F32.9 MAJOR DEPRESSIVE DISORDER, REMISSION STATUS UNSPECIFIED, UNSPECIFIED WHETHER RECURRENT: ICD-10-CM

## 2021-06-30 DIAGNOSIS — R06.02 SOB (SHORTNESS OF BREATH): ICD-10-CM

## 2021-06-30 DIAGNOSIS — R18.8 CIRRHOSIS OF LIVER WITH ASCITES, UNSPECIFIED HEPATIC CIRRHOSIS TYPE (HCC): ICD-10-CM

## 2021-06-30 DIAGNOSIS — F32.A DEPRESSION, UNSPECIFIED DEPRESSION TYPE: ICD-10-CM

## 2021-06-30 DIAGNOSIS — K74.60 CIRRHOSIS OF LIVER WITH ASCITES, UNSPECIFIED HEPATIC CIRRHOSIS TYPE (HCC): ICD-10-CM

## 2021-06-30 DIAGNOSIS — R00.2 PALPITATIONS: ICD-10-CM

## 2021-06-30 DIAGNOSIS — F41.9 ANXIETY: ICD-10-CM

## 2021-06-30 PROBLEM — R41.82 ALTERED MENTAL STATUS: Status: RESOLVED | Noted: 2020-12-30 | Resolved: 2021-06-30

## 2021-06-30 PROBLEM — R01.1 MURMUR, CARDIAC: Status: RESOLVED | Noted: 2017-01-05 | Resolved: 2021-06-30

## 2021-06-30 PROBLEM — J90 PLEURAL EFFUSION: Status: RESOLVED | Noted: 2021-01-31 | Resolved: 2021-06-30

## 2021-06-30 PROBLEM — R07.2 PRECORDIAL PAIN: Status: RESOLVED | Noted: 2021-02-04 | Resolved: 2021-06-30

## 2021-06-30 PROBLEM — S49.002A: Status: RESOLVED | Noted: 2020-02-09 | Resolved: 2021-06-30

## 2021-06-30 PROBLEM — N17.9 AKI (ACUTE KIDNEY INJURY) (HCC): Status: RESOLVED | Noted: 2020-12-30 | Resolved: 2021-06-30

## 2021-06-30 PROCEDURE — 99204 OFFICE O/P NEW MOD 45 MIN: CPT | Performed by: INTERNAL MEDICINE

## 2021-06-30 PROCEDURE — 1090F PRES/ABSN URINE INCON ASSESS: CPT | Performed by: INTERNAL MEDICINE

## 2021-06-30 PROCEDURE — G8427 DOCREV CUR MEDS BY ELIG CLIN: HCPCS | Performed by: INTERNAL MEDICINE

## 2021-06-30 PROCEDURE — 1036F TOBACCO NON-USER: CPT | Performed by: INTERNAL MEDICINE

## 2021-06-30 PROCEDURE — 93000 ELECTROCARDIOGRAM COMPLETE: CPT | Performed by: INTERNAL MEDICINE

## 2021-06-30 PROCEDURE — 1123F ACP DISCUSS/DSCN MKR DOCD: CPT | Performed by: INTERNAL MEDICINE

## 2021-06-30 PROCEDURE — 4040F PNEUMOC VAC/ADMIN/RCVD: CPT | Performed by: INTERNAL MEDICINE

## 2021-06-30 PROCEDURE — G8417 CALC BMI ABV UP PARAM F/U: HCPCS | Performed by: INTERNAL MEDICINE

## 2021-06-30 PROCEDURE — G8399 PT W/DXA RESULTS DOCUMENT: HCPCS | Performed by: INTERNAL MEDICINE

## 2021-06-30 RX ORDER — MELATONIN
1000 DAILY
Status: ON HOLD | COMMUNITY
End: 2022-08-04 | Stop reason: HOSPADM

## 2021-06-30 RX ORDER — BUDESONIDE AND FORMOTEROL FUMARATE DIHYDRATE 160; 4.5 UG/1; UG/1
2 AEROSOL RESPIRATORY (INHALATION) 2 TIMES DAILY
Qty: 1 INHALER | Refills: 3 | Status: SHIPPED | OUTPATIENT
Start: 2021-06-30 | End: 2021-06-30 | Stop reason: SDUPTHER

## 2021-06-30 RX ORDER — ALBUTEROL SULFATE 90 UG/1
2 AEROSOL, METERED RESPIRATORY (INHALATION) 4 TIMES DAILY PRN
Qty: 1 INHALER | Refills: 3 | Status: SHIPPED | OUTPATIENT
Start: 2021-06-30 | End: 2021-06-30 | Stop reason: SDUPTHER

## 2021-06-30 RX ORDER — LACTULOSE 10 G/15ML
20 SOLUTION ORAL 3 TIMES DAILY
Qty: 1892 ML | Refills: 3 | Status: SHIPPED | OUTPATIENT
Start: 2021-06-30 | End: 2021-06-30 | Stop reason: SDUPTHER

## 2021-06-30 RX ORDER — LACTULOSE 10 G/15ML
20 SOLUTION ORAL 3 TIMES DAILY
Qty: 1892 ML | Refills: 3 | Status: ON HOLD
Start: 2021-06-30 | End: 2021-12-15

## 2021-06-30 RX ORDER — SPIRONOLACTONE 100 MG/1
100 TABLET, FILM COATED ORAL DAILY
Qty: 90 TABLET | Refills: 0 | Status: ON HOLD
Start: 2021-06-30 | End: 2021-12-15

## 2021-06-30 RX ORDER — PANTOPRAZOLE SODIUM 40 MG/1
40 TABLET, DELAYED RELEASE ORAL DAILY
Qty: 90 TABLET | Refills: 0 | Status: SHIPPED
Start: 2021-06-30 | End: 2021-07-13 | Stop reason: SDUPTHER

## 2021-06-30 RX ORDER — DOXAZOSIN MESYLATE 1 MG/1
1 TABLET ORAL NIGHTLY
Qty: 90 TABLET | Refills: 0 | Status: ON HOLD
Start: 2021-06-30 | End: 2021-12-15

## 2021-06-30 RX ORDER — VENLAFAXINE HYDROCHLORIDE 75 MG/1
75 CAPSULE, EXTENDED RELEASE ORAL DAILY
Qty: 90 CAPSULE | Refills: 0 | Status: SHIPPED
Start: 2021-06-30 | End: 2022-01-12 | Stop reason: SDUPTHER

## 2021-06-30 RX ORDER — PANTOPRAZOLE SODIUM 40 MG/1
40 TABLET, DELAYED RELEASE ORAL DAILY
Qty: 30 TABLET | Refills: 3 | Status: SHIPPED
Start: 2021-06-30 | End: 2021-06-30 | Stop reason: SDUPTHER

## 2021-06-30 RX ORDER — FUROSEMIDE 40 MG/1
40 TABLET ORAL DAILY
Qty: 90 TABLET | Refills: 0 | Status: ON HOLD
Start: 2021-06-30 | End: 2021-12-15

## 2021-06-30 RX ORDER — GLUCOSAMINE/D3/BOSWELLIA SERRA 1500MG-400
1000 TABLET ORAL
COMMUNITY
End: 2022-08-10

## 2021-06-30 RX ORDER — FUROSEMIDE 40 MG/1
40 TABLET ORAL DAILY
Qty: 30 TABLET | Refills: 3 | Status: SHIPPED | OUTPATIENT
Start: 2021-06-30 | End: 2021-06-30 | Stop reason: SDUPTHER

## 2021-06-30 RX ORDER — VENLAFAXINE HYDROCHLORIDE 75 MG/1
75 CAPSULE, EXTENDED RELEASE ORAL DAILY
Qty: 30 CAPSULE | Refills: 3 | Status: SHIPPED | OUTPATIENT
Start: 2021-06-30 | End: 2021-06-30 | Stop reason: SDUPTHER

## 2021-06-30 RX ORDER — BUDESONIDE AND FORMOTEROL FUMARATE DIHYDRATE 160; 4.5 UG/1; UG/1
2 AEROSOL RESPIRATORY (INHALATION) 2 TIMES DAILY
Qty: 1 INHALER | Refills: 3 | Status: ON HOLD
Start: 2021-06-30 | End: 2021-12-17 | Stop reason: HOSPADM

## 2021-06-30 RX ORDER — ALBUTEROL SULFATE 90 UG/1
2 AEROSOL, METERED RESPIRATORY (INHALATION) 4 TIMES DAILY PRN
Qty: 1 INHALER | Refills: 3 | Status: SHIPPED
Start: 2021-06-30 | End: 2022-01-12 | Stop reason: SDUPTHER

## 2021-06-30 RX ORDER — DOXAZOSIN MESYLATE 1 MG/1
1 TABLET ORAL NIGHTLY
Qty: 30 TABLET | Refills: 3 | Status: SHIPPED | OUTPATIENT
Start: 2021-06-30 | End: 2021-06-30 | Stop reason: SDUPTHER

## 2021-06-30 RX ORDER — SPIRONOLACTONE 100 MG/1
100 TABLET, FILM COATED ORAL DAILY
Qty: 30 TABLET | Refills: 3 | Status: SHIPPED | OUTPATIENT
Start: 2021-06-30 | End: 2021-06-30 | Stop reason: SDUPTHER

## 2021-06-30 NOTE — TELEPHONE ENCOUNTER
[facility] program calling for rx's on all the following meds: Venlafaxine, Pantoprazole, spironolactone, albuterol sulfate, Xifaxan,Symbicort,Advair Diskus, Ferosul, Lactulose,Furosemide,Doxazosin and Amlodipine.  Fax # [phone]

## 2021-06-30 NOTE — PROGRESS NOTES
Chief Complaint   Patient presents with    Heart Problem    Palpitations       Patient Active Problem List    Diagnosis Date Noted    Palpitations 06/30/2021    Anemia 12/30/2020    S/P reverse total shoulder arthroplasty, left 12/21/2020    Cirrhosis ([facility] 02/21/2019    Dyslipidemia 12/01/2016    Hx of adenomatous colonic polyps 11/16/2016    Depression 08/26/2016    Malignant neoplasm of left breast ([facility] 11/17/2015    Type 2 diabetes mellitus ([facility] 11/17/2015    Obstructive sleep apnea syndrome 11/17/2015    Essential hypertension 11/17/2015    Multiple thyroid nodules 11/17/2015       Current Outpatient Medications   Medication Sig Dispense Refill    Biotin 87006 MCG TABS Take by mouth every 7 days      vitamin D3 (CHOLECALCIFEROL) 25 MCG (1000 UT) TABS tablet Take 1,000 Units by mouth daily      venlafaxine (EFFEXOR XR) 75 MG extended release capsule Take 1 capsule by mouth daily 90 capsule 1    pantoprazole (PROTONIX) 40 MG tablet TAKE 1 TABLET BY MOUTH EVERY DAY      spironolactone (ALDACTONE) 100 MG tablet TAKE 1 TABLET BY MOUTH EVERY MORNING      albuterol sulfate HFA (VENTOLIN HFA) 108 (90 Base) MCG/ACT inhaler Inhale 2 puffs into the lungs 4 times daily as needed for Wheezing 3 Inhaler 1    rifaximin (XIFAXAN) 550 MG tablet Take 1 tablet by mouth 2 times daily 120 tablet 0    budesonide-formoterol (SYMBICORT) 160-4.5 MCG/ACT AERO Inhale 2 puffs into the lungs 2 times daily 3 Inhaler 1    lactulose (CHRONULAC) 10 GM/15ML solution Take 30 mLs by mouth 3 times daily 1892 mL 1    furosemide (LASIX) 40 MG tablet Take 1 tablet by mouth daily 60 tablet 3    felodipine (PLENDIL) 2.5 MG extended release tablet Take 1 tablet by mouth daily 90 tablet 1    doxazosin (CARDURA) 1 MG tablet Take 1 mg by mouth nightly       ENULOSE 10 GM/15ML SOLN solution       vitamin D (ERGOCALCIFEROL) 1.25 MG (63373 UT) CAPS capsule Take 1 capsule by mouth once a week (Patient not taking: Reported on 6/30/2021) 12 capsule 1     No current facility-administered medications for this visit. Allergies   Allergen Reactions    Lisinopril        Vitals:    06/30/21 1342   BP: 130/60   Site: Right Upper Arm   Position: Sitting   Cuff Size: Large Adult   Pulse: 93   Resp: 16   SpO2: 96%   Weight: 194 lb 5.4 oz (88.2 kg)   Height: 5' 2.5\" (1.588 m)       Social History     Socioeconomic History    Marital status:      Spouse name: Not on file    Number of children: Not on file    Years of education: Not on file    Highest education level: Not on file   Occupational History    Not on file   Tobacco Use    Smoking status: Never Smoker    Smokeless tobacco: Never Used   Vaping Use    Vaping Use: Never used   Substance and Sexual Activity    Alcohol use: Never     Alcohol/week: 0.0 standard drinks    Drug use: Never    Sexual activity: Never     Partners: Male     Comment: last in 2013   Other Topics Concern    Not on file   Social History Narrative    Denies caffeine. Social Determinants of Health     Financial Resource Strain:     Difficulty of Paying Living Expenses:    Food Insecurity:     Worried About Running Out of Food in the Last Year:     [address] in the Last Year:    Transportation Needs:     Lack of Transportation (Medical):      Lack of Transportation (Non-Medical):    Physical Activity:     Days of Exercise per Week:     Minutes of Exercise per Session:    Stress:     Feeling of Stress :    Social Connections:     Frequency of Communication with Friends and Family:     Frequency of Social Gatherings with Friends and Family:     Attends Cheondoism Services:     Active Member of Clubs or Organizations:     Attends Club or Organization Meetings:     Marital Status:    Intimate Partner Violence:     Fear of Current or Ex-Partner:     Emotionally Abused:     Physically Abused:     Sexually Abused:        Family History   Problem Relation Age of Onset    Cardiovascular: Normal rate, regular rhythm, normal heart sounds and intact distal pulses. Exam reveals no gallop and no friction rub. No murmur heard. Pulmonary/Chest: Effort normal and breath sounds normal. No respiratory distress. No wheezes. No rales. No tenderness. Abdominal: Soft. Bowel sounds are normal. No distension and no mass. No obvious ascites. Musculoskeletal: Normal range of motion. trace edema and no tenderness. Neurological: Alert and oriented to person, place, and time. Skin: Skin is warm and dry. No rash noted. Not diaphoretic. No erythema. Psychiatric: Normal mood and affect. Behavior is normal.     EKG:  normal EKG, normal sinus rhythm, rate 93, axis +2. ASSESSMENT AND PLAN:  Patient Active Problem List   Diagnosis    Malignant neoplasm of left breast (HCC)    Type 2 diabetes mellitus ([facility]    Obstructive sleep apnea syndrome    Essential hypertension    Multiple thyroid nodules    Depression    Hx of adenomatous colonic polyps    Dyslipidemia    Cirrhosis (HCC)    S/P reverse total shoulder arthroplasty, left    Anemia    Palpitations     Elderly debilitated cirrhotic female with hypoalbuminemia  I reviewed her echo and feel that her volume issues are due to above and not diastolic dysfunction, HFpEF - she has no evidence of increased wall thickness, her doppler patterns are consistent with age, and no elevated pulmonary pressures.    I have encouraged her to be compliant with the two diuretic regimen which seems to work  Her palpitations are benign and require no further w/u required      Saeed Spann M.D  [facility]

## 2021-07-01 ENCOUNTER — HOSPITAL ENCOUNTER (OUTPATIENT)
Dept: AUDIOLOGY | Age: 76
Discharge: HOME | End: 2021-07-01

## 2021-07-01 NOTE — PROGRESS NOTES
Patient called and needs to cancel appointment.  Wants to reschedule but will call her back when at my office later

## 2021-07-13 RX ORDER — PANTOPRAZOLE SODIUM 40 MG/1
40 TABLET, DELAYED RELEASE ORAL DAILY
Qty: 90 TABLET | Refills: 1 | Status: ON HOLD
Start: 2021-07-13 | End: 2021-12-15

## 2021-07-15 ENCOUNTER — TELEPHONE (OUTPATIENT)
Dept: FAMILY MEDICINE CLINIC | Age: 76
End: 2021-07-15

## 2021-07-15 NOTE — TELEPHONE ENCOUNTER
Budesonide-formoterol fumarate 160/4.5 not covered under insurance.  Please advise if provider wants PA

## 2021-07-18 DIAGNOSIS — I10 ESSENTIAL HYPERTENSION: ICD-10-CM

## 2021-07-19 RX ORDER — FELODIPINE 2.5 MG/1
2.5 TABLET, EXTENDED RELEASE ORAL DAILY
Qty: 90 TABLET | Refills: 1 | Status: SHIPPED
Start: 2021-07-19 | End: 2021-10-19

## 2021-07-22 ENCOUNTER — TELEPHONE (OUTPATIENT)
Dept: FAMILY MEDICINE CLINIC | Age: 76
End: 2021-07-22

## 2021-07-23 ENCOUNTER — HOSPITAL ENCOUNTER (OUTPATIENT)
Dept: AUDIOLOGY | Age: 76
Discharge: HOME | End: 2021-07-23
Payer: MEDICARE

## 2021-07-23 PROCEDURE — V5261 HEARING AID, DIGIT, BIN, BTE: HCPCS | Performed by: AUDIOLOGIST

## 2021-07-23 PROCEDURE — V5160 DISPENSING FEE BINAURAL: HCPCS | Performed by: AUDIOLOGIST

## 2021-07-23 NOTE — PROGRESS NOTES
Patient here for one month hearing aid follow up. She is having no problems hearing. Her phone logan is not working. Found logan hidden on phone and placed icon on home page for easy access. The hearing aids were no longer paired so this was done. She is pleased. Encouraged her to call if she has any problems. BILLED BIN DIG BTE AND DISP FEE. Return as needed.      Matt Hurtado M.A., [address] [facility] [id number]  Electronically signed by Antonio Ann on 7/23/2021 at 2:14 PM

## 2021-07-26 ENCOUNTER — TELEPHONE (OUTPATIENT)
Dept: FAMILY MEDICINE CLINIC | Age: 76
End: 2021-07-26

## 2021-07-26 NOTE — TELEPHONE ENCOUNTER
----- Message from Keira Martinez sent at 7/26/2021 10:53 AM EDT -----  Subject: Message to Provider    QUESTIONS  Information for Provider? Pt was trying to reach her PCP to discuss when   she can have a mammogram  ---------------------------------------------------------------------------  --------------  CALL BACK INFO  What is the best way for the office to contact you? OK to leave message on   voicemail  Preferred Call Back Phone Number? [phone]  ---------------------------------------------------------------------------  --------------  SCRIPT ANSWERS  Relationship to Patient?  Self

## 2021-07-27 DIAGNOSIS — Z12.31 ENCOUNTER FOR SCREENING MAMMOGRAM FOR MALIGNANT NEOPLASM OF BREAST: Primary | ICD-10-CM

## 2021-08-17 ENCOUNTER — NURSE TRIAGE (OUTPATIENT)
Dept: OTHER | Facility: CLINIC | Age: 76
End: 2021-08-17

## 2021-08-17 ENCOUNTER — HOSPITAL ENCOUNTER (INPATIENT)
Age: 76
LOS: 3 days | Discharge: NURSING, REHAB OR LONG-TERM CARE | DRG: 564 | End: 2021-08-20
Attending: EMERGENCY MEDICINE | Admitting: FAMILY MEDICINE
Payer: MEDICARE

## 2021-08-17 ENCOUNTER — APPOINTMENT (OUTPATIENT)
Dept: CT IMAGING | Age: 76
DRG: 564 | End: 2021-08-17
Payer: MEDICARE

## 2021-08-17 ENCOUNTER — APPOINTMENT (OUTPATIENT)
Dept: GENERAL RADIOLOGY | Age: 76
DRG: 564 | End: 2021-08-17
Payer: MEDICARE

## 2021-08-17 DIAGNOSIS — N39.0 URINARY TRACT INFECTION WITH HEMATURIA, SITE UNSPECIFIED: ICD-10-CM

## 2021-08-17 DIAGNOSIS — R41.82 ALTERED MENTAL STATUS, UNSPECIFIED ALTERED MENTAL STATUS TYPE: ICD-10-CM

## 2021-08-17 DIAGNOSIS — M62.82 NON-TRAUMATIC RHABDOMYOLYSIS: Primary | ICD-10-CM

## 2021-08-17 DIAGNOSIS — R31.9 URINARY TRACT INFECTION WITH HEMATURIA, SITE UNSPECIFIED: ICD-10-CM

## 2021-08-17 DIAGNOSIS — S42.291A CLOSED FRACTURE OF HEAD OF RIGHT HUMERUS, INITIAL ENCOUNTER: ICD-10-CM

## 2021-08-17 LAB
ALBUMIN SERPL-MCNC: 3.1 G/DL (ref 3.5–5.2)
ALP BLD-CCNC: 120 U/L (ref 35–104)
ALT SERPL-CCNC: 22 U/L (ref 0–32)
AMMONIA: 126 UMOL/L (ref 11–51)
ANION GAP SERPL CALCULATED.3IONS-SCNC: 11 MMOL/L (ref 7–16)
ANISOCYTOSIS: ABNORMAL
APTT: 29.9 SEC (ref 24.5–35.1)
ARTERIAL PH AT TEMPERATURE: 7.45 (ref 7.35–7.45)
AST SERPL-CCNC: 61 U/L (ref 0–31)
BACTERIA: ABNORMAL /HPF
BASE EXCESS: 1.8 MMOL/L (ref -3–3)
BASOPHILS ABSOLUTE: 0.04 E9/L (ref 0–0.2)
BASOPHILS RELATIVE PERCENT: 0.4 % (ref 0–2)
BILIRUB SERPL-MCNC: 1.2 MG/DL (ref 0–1.2)
BILIRUBIN DIRECT: 0.4 MG/DL (ref 0–0.3)
BILIRUBIN URINE: ABNORMAL
BILIRUBIN, INDIRECT: 0.8 MG/DL (ref 0–1)
BLOOD, URINE: ABNORMAL
BUN BLDV-MCNC: 21 MG/DL (ref 6–23)
CALCIUM SERPL-MCNC: 10.6 MG/DL (ref 8.6–10.2)
CHLORIDE BLD-SCNC: 106 MMOL/L (ref 98–107)
CLARITY: ABNORMAL
CO2: 23 MMOL/L (ref 22–29)
COLOR: ABNORMAL
CREAT SERPL-MCNC: 1 MG/DL (ref 0.5–1)
DRAWN BY: NORMAL
EOSINOPHILS ABSOLUTE: 0.02 E9/L (ref 0.05–0.5)
EOSINOPHILS RELATIVE PERCENT: 0.2 % (ref 0–6)
GFR AFRICAN AMERICAN: >60
GFR NON-AFRICAN AMERICAN: 54 ML/MIN/1.73
GLUCOSE BLD-MCNC: 169 MG/DL (ref 74–99)
GLUCOSE URINE: NEGATIVE MG/DL
HCO3: 25.6 MMOL/L (ref 22–26)
HCT VFR BLD CALC: 35.5 % (ref 34–48)
HEMOGLOBIN: 11.9 G/DL (ref 11.5–15.5)
IMMATURE GRANULOCYTES #: 0.04 E9/L
IMMATURE GRANULOCYTES %: 0.4 % (ref 0–5)
INR BLD: 1.3
INSPIRED O2: 21 %
KETONES, URINE: NEGATIVE MG/DL
LACTIC ACID: 3.4 MMOL/L (ref 0.5–2.2)
LEUKOCYTE ESTERASE, URINE: NEGATIVE
LYMPHOCYTES ABSOLUTE: 0.63 E9/L (ref 1.5–4)
LYMPHOCYTES RELATIVE PERCENT: 6.7 % (ref 20–42)
MCH RBC QN AUTO: 31.7 PG (ref 26–35)
MCHC RBC AUTO-ENTMCNC: 33.5 % (ref 32–34.5)
MCV RBC AUTO: 94.7 FL (ref 80–99.9)
METER GLUCOSE: 161 MG/DL (ref 74–99)
MODE: NORMAL
MONOCYTES ABSOLUTE: 0.95 E9/L (ref 0.1–0.95)
MONOCYTES RELATIVE PERCENT: 10.1 % (ref 2–12)
NEUTROPHILS ABSOLUTE: 7.73 E9/L (ref 1.8–7.3)
NEUTROPHILS RELATIVE PERCENT: 82.2 % (ref 43–80)
NITRITE, URINE: POSITIVE
O2 SATURATION: 96.1 % (ref 92–98.5)
PCO2: 37.3 MMHG (ref 35–45)
PDW BLD-RTO: 22.5 FL (ref 11.5–15)
PH UA: 6.5 (ref 5–9)
PLATELET # BLD: 163 E9/L (ref 130–450)
PMV BLD AUTO: 10.8 FL (ref 7–12)
PO2: 79 MMHG (ref 0–100)
POTASSIUM SERPL-SCNC: 4.2 MMOL/L (ref 3.5–5)
PRO-BNP: 11 PG/ML (ref 0–450)
PROTEIN UA: 100 MG/DL
PROTHROMBIN TIME: 14.9 SEC (ref 9.3–12.4)
RBC # BLD: 3.75 E12/L (ref 3.5–5.5)
RBC UA: ABNORMAL /HPF (ref 0–2)
SAMPLE SITE: NORMAL
SODIUM BLD-SCNC: 140 MMOL/L (ref 132–146)
SPECIFIC GRAVITY UA: 1.02 (ref 1–1.03)
SPECIMEN SOURCE: NORMAL
TOTAL CK: 1161 U/L (ref 20–180)
TOTAL PROTEIN: 6.8 G/DL (ref 6.4–8.3)
TROPONIN, HIGH SENSITIVITY: 11 NG/L (ref 0–9)
TROPONIN, HIGH SENSITIVITY: 12 NG/L (ref 0–9)
UROBILINOGEN, URINE: 1 E.U./DL
WBC # BLD: 9.4 E9/L (ref 4.5–11.5)
WBC UA: ABNORMAL /HPF (ref 0–5)

## 2021-08-17 PROCEDURE — 72170 X-RAY EXAM OF PELVIS: CPT

## 2021-08-17 PROCEDURE — 82550 ASSAY OF CK (CPK): CPT

## 2021-08-17 PROCEDURE — 81001 URINALYSIS AUTO W/SCOPE: CPT

## 2021-08-17 PROCEDURE — 82140 ASSAY OF AMMONIA: CPT

## 2021-08-17 PROCEDURE — 82962 GLUCOSE BLOOD TEST: CPT

## 2021-08-17 PROCEDURE — 82803 BLOOD GASES ANY COMBINATION: CPT

## 2021-08-17 PROCEDURE — 36415 COLL VENOUS BLD VENIPUNCTURE: CPT

## 2021-08-17 PROCEDURE — 85610 PROTHROMBIN TIME: CPT

## 2021-08-17 PROCEDURE — 6360000002 HC RX W HCPCS: Performed by: EMERGENCY MEDICINE

## 2021-08-17 PROCEDURE — 2060000000 HC ICU INTERMEDIATE R&B

## 2021-08-17 PROCEDURE — 80048 BASIC METABOLIC PNL TOTAL CA: CPT

## 2021-08-17 PROCEDURE — 80076 HEPATIC FUNCTION PANEL: CPT

## 2021-08-17 PROCEDURE — 85025 COMPLETE CBC W/AUTO DIFF WBC: CPT

## 2021-08-17 PROCEDURE — 83880 ASSAY OF NATRIURETIC PEPTIDE: CPT

## 2021-08-17 PROCEDURE — 84484 ASSAY OF TROPONIN QUANT: CPT

## 2021-08-17 PROCEDURE — 71045 X-RAY EXAM CHEST 1 VIEW: CPT

## 2021-08-17 PROCEDURE — 93005 ELECTROCARDIOGRAM TRACING: CPT | Performed by: EMERGENCY MEDICINE

## 2021-08-17 PROCEDURE — 83605 ASSAY OF LACTIC ACID: CPT

## 2021-08-17 PROCEDURE — 70450 CT HEAD/BRAIN W/O DYE: CPT

## 2021-08-17 PROCEDURE — 72125 CT NECK SPINE W/O DYE: CPT

## 2021-08-17 PROCEDURE — 73060 X-RAY EXAM OF HUMERUS: CPT

## 2021-08-17 PROCEDURE — 6370000000 HC RX 637 (ALT 250 FOR IP): Performed by: EMERGENCY MEDICINE

## 2021-08-17 PROCEDURE — 85730 THROMBOPLASTIN TIME PARTIAL: CPT

## 2021-08-17 PROCEDURE — 87040 BLOOD CULTURE FOR BACTERIA: CPT

## 2021-08-17 PROCEDURE — 99284 EMERGENCY DEPT VISIT MOD MDM: CPT

## 2021-08-17 PROCEDURE — 96360 HYDRATION IV INFUSION INIT: CPT

## 2021-08-17 PROCEDURE — 2580000003 HC RX 258: Performed by: EMERGENCY MEDICINE

## 2021-08-17 RX ORDER — LACTULOSE 10 G/15ML
20 SOLUTION ORAL DAILY
Status: DISCONTINUED | OUTPATIENT
Start: 2021-08-17 | End: 2021-08-18 | Stop reason: SDUPTHER

## 2021-08-17 RX ORDER — 0.9 % SODIUM CHLORIDE 0.9 %
1000 INTRAVENOUS SOLUTION INTRAVENOUS ONCE
Status: COMPLETED | OUTPATIENT
Start: 2021-08-17 | End: 2021-08-17

## 2021-08-17 RX ADMIN — CEFTRIAXONE SODIUM 2000 MG: 2 INJECTION, POWDER, FOR SOLUTION INTRAMUSCULAR; INTRAVENOUS at 20:00

## 2021-08-17 RX ADMIN — SODIUM CHLORIDE 1000 ML: 9 INJECTION, SOLUTION INTRAVENOUS at 18:56

## 2021-08-17 RX ADMIN — LACTULOSE 20 G: 20 SOLUTION ORAL at 21:28

## 2021-08-17 NOTE — ED PROVIDER NOTES
HPI:  8/17/21, Time: 5:23 PM EDT      HPI per son and patient, who is currently not the best historian. Per son she would normally be alert and oriented X3 and lives alone and doing well so far. Today she is alert and oriented to self and place currently. Ty Nelson is a 68 y.o. female presenting to the ED for altered mental status and being found down this after noon by son, beginning today. Patient reports she got up in the middle of the night to use the bathroom and \"passed out\". She laid there all day today; she was found by son today at 2pm. EMS was called and she declined transport. Son called PCP who advised him to bring her to the ER for evaluation. Patient c/o bilateral arm pain (humerus). She states the left arm pain in chronic and the right humerus pain is new (son states he found her on her right side). He was only able to ambulate her a couple steps because she bacame to weak to walk. She does have generalized weakness and somnolence (falls asleep mid converstaion). The complaint has been persistent, moderate in severity, and worsened by nothing. Patient denies fever/chills, sore throat, cough, congestion, chest pain, shortness of breath, edema, headache, visual disturbances, focal paresthesias, focal weakness, abdominal pain, nausea, vomiting, diarrhea, constipation, dysuria, hematuria,  neck or back pain, rash or other complaints. ROS:   A complete review of systems was performed and all pertinent positives and negatives are stated within HPI, all other systems reviewed and are negative.      --------------------------------------------- PAST HISTORY ---------------------------------------------  Past Medical History:  has a past medical history of Breast cancer ([facility] Cirrhosis ([facility] Essential hypertension, Hyperlipidemia, Osteopenia, 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 ([facility] Sleep apnea, Spinal stenosis, and Type 2 diabetes mellitus ([address]     Past Surgical History:  has a past surgical history that includes Hysterectomy; Carpal tunnel release; Lithotripsy (Left, 05/04/2016); Colonoscopy (01/31/2017); Breast lumpectomy; Upper gastrointestinal endoscopy (04/23/2019); Colonoscopy (04/23/2019); Upper gastrointestinal endoscopy (N/A, 04/23/2019); Colonoscopy (N/A, 04/23/2019); Colonoscopy (04/23/2019); Shoulder Arthroplasty (Left, 12/21/2020); and cardiovascular stress test.    Social History:  reports that she has never smoked. She has never used smokeless tobacco. She reports that she does not drink alcohol and does not use drugs. Family History: family history includes Arthritis in her mother; COPD in her father; Cancer (age of onset: 48) in an other family member; Heart Attack in her father. The patients home medications have been reviewed. Allergies: Lisinopril        ----------------------------------------PHYSICAL EXAM--------------------------------------  Constitutional:  Well developed, well nourished, no acute distress, non-toxic appearance   Eyes:  PERRL, conjunctiva normal, EOMI  HENT:  Atraumatic except contusion on right forehead, external ears normal, nose normal, oropharynx moist, no pharyngeal exudates. Neck- normal range of motion, no nuchal rigidity   Respiratory:  No respiratory distress, normal breath sounds, no rales, no wheezing   Cardiovascular:  Tachycardic rate, normal rhythm, no murmurs, no gallops, no rubs. Radial and DP pulses 2+ bilaterally. GI:  Soft, nondistended, normal bowel sounds, nontender, no organomegaly, no mass, no rebound, no guarding   :  No costovertebral angle tenderness   Musculoskeletal:  No edema, no tenderness, no deformities. Back-no midline or paraspinal cervical, thoracic or lumbar tenderness. No step-offs. Chest stable. Pelvis stable. Moves both lower extremities without difficulty or pain but is generally weak and needs assistance to move her legs.   Patient has pain to palpation of the right mid humerus without deformity or swelling. She has no tenderness to palpation of the right fingers, wrist, hand, elbow or shoulder. She has some tenderness to palpation of the left humerus as well but no tenderness or deformity of the shoulder elbow wrist or hand. Integument:  Well hydrated, no visible rash, contusion or lacerations. Adequate perfusion. Lymphatic:  No cervical lymphadenopathy noted   Neurologic:  Alert & oriented x 2 (person and place), CN 2-12 normal, no focal deficits noted. DTRs 2+ bilateral patellar. Somnolent, falls asleep mid-converstaion. Psychiatric:  Speech and behavior appropriate       -------------------------------------------------- RESULTS -------------------------------------------------  I have personally reviewed all laboratory and imaging results for this patient. Results are listed below.      LABS:  Results for orders placed or performed during the hospital encounter of 08/17/21   CBC auto differential   Result Value Ref Range    WBC 9.4 4.5 - 11.5 E9/L    RBC 3.75 3.50 - 5.50 E12/L    Hemoglobin 11.9 11.5 - 15.5 g/dL    Hematocrit 35.5 34.0 - 48.0 %    MCV 94.7 80.0 - 99.9 fL    MCH 31.7 26.0 - 35.0 pg    MCHC 33.5 32.0 - 34.5 %    RDW 22.5 (H) 11.5 - 15.0 fL    Platelets 800 748 - 179 E9/L    MPV 10.8 7.0 - 12.0 fL    Neutrophils % 82.2 (H) 43.0 - 80.0 %    Immature Granulocytes % 0.4 0.0 - 5.0 %    Lymphocytes % 6.7 (L) 20.0 - 42.0 %    Monocytes % 10.1 2.0 - 12.0 %    Eosinophils % 0.2 0.0 - 6.0 %    Basophils % 0.4 0.0 - 2.0 %    Neutrophils Absolute 7.73 (H) 1.80 - 7.30 E9/L    Immature Granulocytes # 0.04 E9/L    Lymphocytes Absolute 0.63 (L) 1.50 - 4.00 E9/L    Monocytes Absolute 0.95 0.10 - 0.95 E9/L    Eosinophils Absolute 0.02 (L) 0.05 - 0.50 E9/L    Basophils Absolute 0.04 0.00 - 0.20 E9/L    Anisocytosis 1+    Basic metabolic panel   Result Value Ref Range    Sodium 140 132 - 146 mmol/L    Potassium 4.2 3.5 - 5.0 mmol/L    Chloride 106 98 - 107 mmol/L    CO2 23 22 - 29 mmol/L    Anion Gap 11 7 - 16 mmol/L    Glucose 169 (H) 74 - 99 mg/dL    BUN 21 6 - 23 mg/dL    CREATININE 1.0 0.5 - 1.0 mg/dL    GFR Non-African American 54 >=60 mL/min/1.73    GFR African American >60     Calcium 10.6 (H) 8.6 - 10.2 mg/dL   Troponin   Result Value Ref Range    Troponin, High Sensitivity 12 (H) 0 - 9 ng/L   Brain Natriuretic Peptide   Result Value Ref Range    Pro-BNP 11 0 - 450 pg/mL   Lactic acid, plasma   Result Value Ref Range    Lactic Acid 3.4 (H) 0.5 - 2.2 mmol/L   CK   Result Value Ref Range    Total CK 1,161 (H) 20 - 180 U/L   Ammonia   Result Value Ref Range    Ammonia 126.0 (H) 11.0 - 51.0 umol/L   APTT   Result Value Ref Range    aPTT 29.9 24.5 - 35.1 sec   Protime-INR   Result Value Ref Range    Protime 14.9 (H) 9.3 - 12.4 sec    INR 1.3    Blood Gas, Arterial   Result Value Ref Range    Arterial pH at Temperature 7.445 7.350 - 7.450    PCO2 37.3 35.0 - 45.0 mmHg    PO2 79.0 0.0 - 100.0 mmHg    HCO3 25.6 22.0 - 26.0 mmol/L    Base Excess 1.8 -3.0 - 3.0 mmol/L    O2 Sat 96.1 92.0 - 98.5 %    Drawn By LOUIS     Specimen Source Arterial     Sample Site Brachial  Punct     Mode Room Air     Inspired O2 21 %   Hepatic function panel   Result Value Ref Range    Total Protein 6.8 6.4 - 8.3 g/dL    Albumin 3.1 (L) 3.5 - 5.2 g/dL    Alkaline Phosphatase 120 (H) 35 - 104 U/L    ALT 22 0 - 32 U/L    AST 61 (H) 0 - 31 U/L    Total Bilirubin 1.2 0.0 - 1.2 mg/dL    Bilirubin, Direct 0.4 (H) 0.0 - 0.3 mg/dL    Bilirubin, Indirect 0.8 0.0 - 1.0 mg/dL   Urinalysis with Microscopic   Result Value Ref Range    Color, UA RED (A) Straw/Yellow    Clarity, UA CLOUDY (A) Clear    Glucose, Ur Negative Negative mg/dL    Bilirubin Urine SMALL (A) Negative    Ketones, Urine Negative Negative mg/dL    Specific Gravity, UA 1.020 1.005 - 1.030    Blood, Urine LARGE (A) Negative    pH, UA 6.5 5.0 - 9.0    Protein,  (A) Negative mg/dL    Urobilinogen, Urine 1.0 <2.0 E.U./dL    Nitrite, Urine POSITIVE (A) Negative    Leukocyte Esterase, Urine Negative Negative    WBC, UA 1-3 0 - 5 /HPF    RBC, UA PACKED 0 - 2 /HPF    Bacteria, UA FEW (A) None Seen /HPF   Troponin   Result Value Ref Range    Troponin, High Sensitivity 11 (H) 0 - 9 ng/L   POCT Glucose   Result Value Ref Range    Meter Glucose 161 (H) 74 - 99 mg/dL       RADIOLOGY:  Interpreted by Radiologist.  XR HUMERUS RIGHT (MIN 2 VIEWS)   Final Result   1. Very minimally displaced fracture of the right humeral head/neck region   which appears to involve the tuberosity. Humeral head is appropriately   positioned within the glenoid. 2.  Status post left shoulder arthroplasty. There appears to be appropriate   alignment on this exam.  No acute osseous findings about the left humerus. XR HUMERUS LEFT (MIN 2 VIEWS)   Final Result   1. Very minimally displaced fracture of the right humeral head/neck region   which appears to involve the tuberosity. Humeral head is appropriately   positioned within the glenoid. 2.  Status post left shoulder arthroplasty. There appears to be appropriate   alignment on this exam.  No acute osseous findings about the left humerus. XR CHEST PORTABLE   Final Result   No radiographic evidence of acute cardiopulmonary disease. XR PELVIS (1-2 VIEWS)   Final Result   No evidence of acute pelvic or hip fracture. CT HEAD WO CONTRAST   Final Result   CT head without contrast:      1. No skull fracture or acute intracranial abnormality. CT cervical spine without contrast:      1. No fracture or joint dislocation is seen. 2. Degenerative changes, as described. CT CERVICAL SPINE WO CONTRAST   Final Result   CT head without contrast:      1. No skull fracture or acute intracranial abnormality. CT cervical spine without contrast:      1. No fracture or joint dislocation is seen. 2. Degenerative changes, as described.                EKG Interpretation  Time: 5:31 PM EDT  Rhythm: sinus tachycardia  Rate: 101  Axis: normal  Conduction: normal  ST Segments: nonspecific changes  T Waves: non specific changes  Clinical Impression: non-specific EKG and sinus tachycardia  Comparison to prior EKG: stable as compared to patient's most recent EKG      ------------------------- NURSING NOTES AND VITALS REVIEWED ---------------------------  The nursing notes within the ED encounter and vital signs as below have been reviewed by myself. /81   Pulse 96   Temp 97.1 °F (36.2 °C) (Infrared)   Resp 18   Ht 5' 2.5\" (1.588 m)   Wt 200 lb (90.7 kg)   SpO2 96%   BMI 36.00 kg/m²   Oxygen Saturation Interpretation: Normal      The patients available past medical records and past encounters were reviewed. ------------------------------ ED COURSE/MEDICAL DECISION MAKING----------------------  Medications   lactulose (CHRONULAC) 10 GM/15ML solution 20 g (has no administration in time range)   0.9 % sodium chloride bolus (0 mLs Intravenous Stopped 8/17/21 2006)   cefTRIAXone (ROCEPHIN) 2,000 mg in sterile water 20 mL IV syringe (0 mg Intravenous Stop Time 8/17/21 2006)           Procedures:   none      Medical Decision Making:    Patient [facility] for UTI   IV fluids for dehydration, rhabdomyolysis and elevated lactic   Ammonia elevated, from HOLDEN will give lactulose here   Sling for right humerus fracture    This patient's ED course included: re-evaluation prior to disposition, IV medications, cardiac monitoring, continuous pulse oximetry and a personal history and physicial eaxmination    This patient has remained hemodynamically stable, remained unchanged and been closely monitored during their ED course. Re-Evaluations:  Time: 8:35 PM EDT   Re-evaluation. Patients symptoms show no change  Repeat physical examination is not changed      Consultations:   8:33 PM EDT  Spoke with Dr Saqib Nguyen, discussed case, patient has HOLDEN.  To be admitted to [facility] practice  9:03 PM EDT  Spoke with Dr Ash Carey Eric Mahmood, discussed case, accepts admission    Critical Care: none    I, Jeannine Barnard, DO, am the Primary Provider of Record    Counseling: The emergency provider has spoken with the patient and son and discussed todays results, in addition to providing specific details for the plan of care and counseling regarding the diagnosis and prognosis. Questions are answered at this time and they are agreeable with the plan.    --------------------------- IMPRESSION AND DISPOSITION ---------------------------------    IMPRESSION  1. Non-traumatic rhabdomyolysis    2. Altered mental status, unspecified altered mental status type    3. Urinary tract infection with hematuria, site unspecified    4.  Closed fracture of head of right humerus, initial encounter        DISPOSITION  Disposition: Admit to telemetry to [facility]  Patient condition is stable                Ramona Rangel DO  08/17/21 2102       Ramona Rangel DO  08/17/21 2103

## 2021-08-17 NOTE — LETTER
[address] Medicaid  CERTIFICATION OF NECESSITY  FOR TRANSPORTATION   BY WHEELCHAIR VAN     Individual Information   1. Name: Roxana Fan 2. [place] Medicaid Billing Number:    3. Address: [place] [id number]      Transportation Provider Information   4. Provider Name:    5. [place] Medicaid Provider Number:  National Provider Identifier (NPI):      Certification  7. Criteria:  By signing this document, the practitioner certifies that two statements are true:  A. This individual must be accompanied by a mobility-related assistive device from the point of pick-up to the point of drop-off. B. Transport of this individual by standard passenger vehicle or common carrier is precluded or contraindicated. 8. Period Beginning Date: 8/20/21   9. Length  [x] Not more than 5 day(s)  [] One Year     Additional Information Relevant to Certification   10. Comments or Explanations, If Necessary or Appropriate     Rhabdomylosis, Cirrhosis     Certifying Practitioner Information   11. Name of Practitioner: Dr Imani Coulter MD   12. [place] Medicaid Provider Number, If Applicable:  [address] Provider Identifier (NPI):      Signature Information   14. Date of Signature: 8/20/21 15. Name of Person Signing: Marilyn Blizzard    12. Signature and Professional Designation: Electronically signed by LAURITA Plascencia on 8/20/2021 at 8:28 AM     [facility] [id number]  Rev. 7/2015    [address] [place] [address] Encounter Date/Time: 8/17/2021 [address] [place] [address] Account: [de-identified]    MRN: [id number]    Patient: [address] Serial #: [id number]      ENCOUNTER          Patient Class: I Private Enc?   No Unit RM [facility] 8509/8509-B   Hospital Service: Med/Surg   Encounter DX: Rhabdomyolysis [M62.82]   ADM Provider: Nuris Fletcher MD   Procedure:     ATT Provider: Nuris Fletcher MD   REF Provider:        Admission DX: Rhabdomyolysis, Closed fracture of head of right humerus, initial encounter, Non-traumatic rhabdomyolysis, Urinary tract infection with hematuria, site unspecified, Altered mental status, unspecified altered mental status type and DX codes: M62.82, S42.291A, M62.82, N39.0, R31.9, R41.82      PATIENT                 Name: Teri Metz : 1945 (68 yrs)   Address:  [address] Sex: Female   [facility] [address]         Marital Status:    Employer: RETIRED         Yarsanism: [employer]   Primary Care Provider: Jaison Roland MD         Primary Phone: [phone]   EMERGENCY CONTACT   Contact Name Legal Guardian? Relationship to Patient Home Phone Work Phone   1. Stewart Rivas III  2. Debbie Sharpe No  No Child  Other [phone]              GUARANTOR            Guarantor: Teri Metz     : 1945   Address: [facility] [address] Sex: Female     [place] [address]     Relation to Patient: Self       Home Phone: [phone]   Guarantor ID: [id number]       Work Phone:     Guarantor Employer: [employer]         Status: RETIRED      COVERAGE        PRIMARY INSURANCE   Payor: HUMANA MEDICARE Plan: HUMANA GOLD PLUS HMO   Payor Address: [facility] [id number]       Group Number: [id number] Insurance Type: [address] Name: Nelly Thomson : 1945   Subscriber ID: [id number] Pat. Rel. to Sub: Self   SECONDARY INSURANCE   Payor: Charline Lantigua* Plan: Zandra Goltz M*   [facility]or [facility]  [facility] [address] [facility]          [place] Number: [id number] Type: INDEMNITY   Subscriber Name: Nelly Thomson : 1945   Subscriber ID: [id number] Pat.  Rel. to Sub: SELF

## 2021-08-17 NOTE — TELEPHONE ENCOUNTER
Received call from Smitha Pascual at [facility] with [employer]. Brief description of triage: Caller found the patientHis Mother) down on the floor of her home, with right sided weakness, uneven smile, confusion, bloody urine    Triage indicates for patient to call 911 now. Caller did call EMS but his mother refused to go. He stated he will try to take her in his car or call the ambulance back. Care advice provided, patient verbalizes understanding; denies any other questions or concerns; instructed to call back for any new or worsening symptoms. Attention Provider: Thank you for allowing me to participate in the care of your patient. The patient was connected to triage in response to information provided to the [facility]. Please do not respond through this encounter as the response is not directed to a shared pool. Reason for Disposition   New neurologic deficit that is present NOW, sudden onset of ANY of the following: * Weakness of the face, arm, or leg on one side of the body* Numbness of the face, arm, or leg on one side of the body* Loss of speech or garbled speech    Answer Assessment - Initial Assessment Questions  1. COLOR of URINE: \"Describe the color of the urine. \"  (e.g., tea-colored, pink, red, blood clots, bloody)      Unable to assess    2. ONSET: \"When did the bleeding start? \"       Unsure      3. EPISODES: \"How many times has there been blood in the urine? \" or \"How many times today? \"      *No Answer*  4. PAIN with URINATION: \"Is there any pain with passing your urine? \" If so, ask: \"How bad is the pain? \"  (Scale 1-10; or mild, moderate, severe)     - MILD - complains slightly about urination hurting     - MODERATE - interferes with normal activities       - SEVERE - excruciating, unwilling or unable to urinate because of the pain       *No Answer*  5. FEVER: \"Do you have a fever? \" If so, ask: \"What is your temperature, how was it measured, and when did it start? \"      *No Answer*  6. ASSOCIATED SYMPTOMS: \"Are you passing urine more frequently than usual?\"      *No Answer*  7. OTHER SYMPTOMS: \"Do you have any other symptoms? \" (e.g., back/flank pain, abdominal pain, vomiting)      *No Answer*  8. PREGNANCY: \"Is there any chance you are pregnant? \" \"When was your last menstrual period? \"      *No Answer*    Answer Assessment - Initial Assessment Questions  1. SYMPTOM: \"What is the main symptom you are concerned about? \" (e.g., weakness, numbness)      found down in home, right sided weakness, uneven smile, confusion, difficulty speaking    2. ONSET: \"When did this start? \" (minutes, hours, days; while sleeping)      Today    3. LAST NORMAL: \"When was the last time you were normal (no symptoms)? \"      Yesterday    4. PATTERN \"Does this come and go, or has it been constant since it started? \"  \"Is it present now? \"      Constant    5. CARDIAC SYMPTOMS: \"Have you had any of the following symptoms: chest pain, difficulty breathing, palpitations? \"      Blood in urine    6. NEUROLOGIC SYMPTOMS: \"Have you had any of the following symptoms: headache, dizziness, vision loss, double vision, changes in speech, unsteady on your feet? \"      Right sided weakness    7. OTHER SYMPTOMS: \"Do you have any other symptoms? \"      See above    8. PREGNANCY: \"Is there any chance you are pregnant? \" \"When was your last menstrual period? \"      N/a    Protocols used: NEUROLOGIC DEFICIT-ADULT-[place], URINE - BLOOD IN-ADULT-[place]

## 2021-08-18 ENCOUNTER — APPOINTMENT (OUTPATIENT)
Dept: GENERAL RADIOLOGY | Age: 76
DRG: 564 | End: 2021-08-18
Payer: MEDICARE

## 2021-08-18 PROBLEM — E72.20 HYPERAMMONEMIA (HCC): Status: ACTIVE | Noted: 2021-08-18

## 2021-08-18 PROBLEM — S42.211A CLOSED DISPLACED FRACTURE OF SURGICAL NECK OF RIGHT HUMERUS: Status: ACTIVE | Noted: 2021-08-18

## 2021-08-18 LAB
AMMONIA: 127 UMOL/L (ref 11–51)
ANION GAP SERPL CALCULATED.3IONS-SCNC: 11 MMOL/L (ref 7–16)
ANION GAP SERPL CALCULATED.3IONS-SCNC: 9 MMOL/L (ref 7–16)
BACTERIA: ABNORMAL /HPF
BILIRUBIN URINE: NEGATIVE
BLOOD, URINE: ABNORMAL
BUN BLDV-MCNC: 16 MG/DL (ref 6–23)
BUN BLDV-MCNC: 16 MG/DL (ref 6–23)
CALCIUM SERPL-MCNC: 10 MG/DL (ref 8.6–10.2)
CALCIUM SERPL-MCNC: 9.9 MG/DL (ref 8.6–10.2)
CHLORIDE BLD-SCNC: 109 MMOL/L (ref 98–107)
CHLORIDE BLD-SCNC: 111 MMOL/L (ref 98–107)
CLARITY: CLEAR
CO2: 22 MMOL/L (ref 22–29)
CO2: 24 MMOL/L (ref 22–29)
COLOR: YELLOW
CREAT SERPL-MCNC: 1 MG/DL (ref 0.5–1)
CREAT SERPL-MCNC: 1.1 MG/DL (ref 0.5–1)
EKG ATRIAL RATE: 101 BPM
EKG P AXIS: 39 DEGREES
EKG P-R INTERVAL: 154 MS
EKG Q-T INTERVAL: 350 MS
EKG QRS DURATION: 78 MS
EKG QTC CALCULATION (BAZETT): 453 MS
EKG R AXIS: -12 DEGREES
EKG T AXIS: 19 DEGREES
EKG VENTRICULAR RATE: 101 BPM
GFR AFRICAN AMERICAN: 58
GFR AFRICAN AMERICAN: >60
GFR NON-AFRICAN AMERICAN: 48 ML/MIN/1.73
GFR NON-AFRICAN AMERICAN: 54 ML/MIN/1.73
GLUCOSE BLD-MCNC: 108 MG/DL (ref 74–99)
GLUCOSE BLD-MCNC: 162 MG/DL (ref 74–99)
GLUCOSE URINE: NEGATIVE MG/DL
HCT VFR BLD CALC: 35 % (ref 34–48)
HEMOGLOBIN: 11.3 G/DL (ref 11.5–15.5)
KETONES, URINE: NEGATIVE MG/DL
LEUKOCYTE ESTERASE, URINE: NEGATIVE
LV EF: 60 %
LVEF MODALITY: NORMAL
MCH RBC QN AUTO: 31.1 PG (ref 26–35)
MCHC RBC AUTO-ENTMCNC: 32.3 % (ref 32–34.5)
MCV RBC AUTO: 96.4 FL (ref 80–99.9)
NITRITE, URINE: NEGATIVE
PDW BLD-RTO: 22.5 FL (ref 11.5–15)
PH UA: 6 (ref 5–9)
PLATELET # BLD: 166 E9/L (ref 130–450)
PMV BLD AUTO: 10.5 FL (ref 7–12)
POTASSIUM REFLEX MAGNESIUM: 4 MMOL/L (ref 3.5–5)
POTASSIUM SERPL-SCNC: 3.8 MMOL/L (ref 3.5–5)
PROTEIN UA: NEGATIVE MG/DL
RBC # BLD: 3.63 E12/L (ref 3.5–5.5)
RBC UA: ABNORMAL /HPF (ref 0–2)
SODIUM BLD-SCNC: 142 MMOL/L (ref 132–146)
SODIUM BLD-SCNC: 144 MMOL/L (ref 132–146)
SPECIFIC GRAVITY UA: 1.02 (ref 1–1.03)
TOTAL CK: 775 U/L (ref 20–180)
UROBILINOGEN, URINE: 0.2 E.U./DL
WBC # BLD: 7.8 E9/L (ref 4.5–11.5)
WBC UA: ABNORMAL /HPF (ref 0–5)

## 2021-08-18 PROCEDURE — 2580000003 HC RX 258

## 2021-08-18 PROCEDURE — 82140 ASSAY OF AMMONIA: CPT

## 2021-08-18 PROCEDURE — 6370000000 HC RX 637 (ALT 250 FOR IP): Performed by: STUDENT IN AN ORGANIZED HEALTH CARE EDUCATION/TRAINING PROGRAM

## 2021-08-18 PROCEDURE — 87088 URINE BACTERIA CULTURE: CPT

## 2021-08-18 PROCEDURE — 81001 URINALYSIS AUTO W/SCOPE: CPT

## 2021-08-18 PROCEDURE — 36415 COLL VENOUS BLD VENIPUNCTURE: CPT

## 2021-08-18 PROCEDURE — 6360000002 HC RX W HCPCS: Performed by: FAMILY MEDICINE

## 2021-08-18 PROCEDURE — 2580000003 HC RX 258: Performed by: FAMILY MEDICINE

## 2021-08-18 PROCEDURE — 2060000000 HC ICU INTERMEDIATE R&B

## 2021-08-18 PROCEDURE — 94640 AIRWAY INHALATION TREATMENT: CPT

## 2021-08-18 PROCEDURE — 82550 ASSAY OF CK (CPK): CPT

## 2021-08-18 PROCEDURE — 93010 ELECTROCARDIOGRAM REPORT: CPT | Performed by: INTERNAL MEDICINE

## 2021-08-18 PROCEDURE — 73030 X-RAY EXAM OF SHOULDER: CPT

## 2021-08-18 PROCEDURE — 99221 1ST HOSP IP/OBS SF/LOW 40: CPT | Performed by: ORTHOPAEDIC SURGERY

## 2021-08-18 PROCEDURE — 80048 BASIC METABOLIC PNL TOTAL CA: CPT

## 2021-08-18 PROCEDURE — 85027 COMPLETE CBC AUTOMATED: CPT

## 2021-08-18 PROCEDURE — 93306 TTE W/DOPPLER COMPLETE: CPT

## 2021-08-18 PROCEDURE — 6370000000 HC RX 637 (ALT 250 FOR IP): Performed by: FAMILY MEDICINE

## 2021-08-18 PROCEDURE — 99222 1ST HOSP IP/OBS MODERATE 55: CPT | Performed by: FAMILY MEDICINE

## 2021-08-18 RX ORDER — OXYCODONE HYDROCHLORIDE 5 MG/1
5 TABLET ORAL ONCE
Status: COMPLETED | OUTPATIENT
Start: 2021-08-18 | End: 2021-08-18

## 2021-08-18 RX ORDER — SODIUM CHLORIDE 0.9 % (FLUSH) 0.9 %
5-40 SYRINGE (ML) INJECTION EVERY 12 HOURS SCHEDULED
Status: DISCONTINUED | OUTPATIENT
Start: 2021-08-18 | End: 2021-08-20 | Stop reason: HOSPADM

## 2021-08-18 RX ORDER — DOXAZOSIN MESYLATE 1 MG/1
1 TABLET ORAL NIGHTLY
Status: DISCONTINUED | OUTPATIENT
Start: 2021-08-18 | End: 2021-08-20 | Stop reason: HOSPADM

## 2021-08-18 RX ORDER — ONDANSETRON 4 MG/1
4 TABLET, ORALLY DISINTEGRATING ORAL EVERY 8 HOURS PRN
Status: DISCONTINUED | OUTPATIENT
Start: 2021-08-18 | End: 2021-08-20 | Stop reason: HOSPADM

## 2021-08-18 RX ORDER — SODIUM CHLORIDE 9 MG/ML
INJECTION, SOLUTION INTRAVENOUS CONTINUOUS
Status: DISCONTINUED | OUTPATIENT
Start: 2021-08-18 | End: 2021-08-18

## 2021-08-18 RX ORDER — VENLAFAXINE HYDROCHLORIDE 75 MG/1
75 CAPSULE, EXTENDED RELEASE ORAL DAILY
Status: DISCONTINUED | OUTPATIENT
Start: 2021-08-18 | End: 2021-08-20 | Stop reason: HOSPADM

## 2021-08-18 RX ORDER — ALBUTEROL SULFATE 2.5 MG/3ML
2.5 SOLUTION RESPIRATORY (INHALATION) 4 TIMES DAILY PRN
Status: DISCONTINUED | OUTPATIENT
Start: 2021-08-18 | End: 2021-08-20 | Stop reason: HOSPADM

## 2021-08-18 RX ORDER — SODIUM CHLORIDE 9 MG/ML
25 INJECTION, SOLUTION INTRAVENOUS PRN
Status: DISCONTINUED | OUTPATIENT
Start: 2021-08-18 | End: 2021-08-20 | Stop reason: HOSPADM

## 2021-08-18 RX ORDER — BUDESONIDE AND FORMOTEROL FUMARATE DIHYDRATE 160; 4.5 UG/1; UG/1
2 AEROSOL RESPIRATORY (INHALATION) 2 TIMES DAILY
Status: DISCONTINUED | OUTPATIENT
Start: 2021-08-18 | End: 2021-08-18 | Stop reason: CLARIF

## 2021-08-18 RX ORDER — SPIRONOLACTONE 25 MG/1
100 TABLET ORAL DAILY
Status: DISCONTINUED | OUTPATIENT
Start: 2021-08-18 | End: 2021-08-20 | Stop reason: HOSPADM

## 2021-08-18 RX ORDER — ACETAMINOPHEN 325 MG/1
650 TABLET ORAL PRN
Status: DISCONTINUED | OUTPATIENT
Start: 2021-08-18 | End: 2021-08-18

## 2021-08-18 RX ORDER — POLYETHYLENE GLYCOL 3350 17 G/17G
17 POWDER, FOR SOLUTION ORAL DAILY PRN
Status: DISCONTINUED | OUTPATIENT
Start: 2021-08-18 | End: 2021-08-20 | Stop reason: HOSPADM

## 2021-08-18 RX ORDER — LACTULOSE 10 G/15ML
20 SOLUTION ORAL 3 TIMES DAILY
Status: DISCONTINUED | OUTPATIENT
Start: 2021-08-18 | End: 2021-08-20 | Stop reason: HOSPADM

## 2021-08-18 RX ORDER — SODIUM CHLORIDE 450 MG/100ML
INJECTION, SOLUTION INTRAVENOUS CONTINUOUS
Status: DISCONTINUED | OUTPATIENT
Start: 2021-08-18 | End: 2021-08-20 | Stop reason: HOSPADM

## 2021-08-18 RX ORDER — ONDANSETRON 2 MG/ML
4 INJECTION INTRAMUSCULAR; INTRAVENOUS EVERY 6 HOURS PRN
Status: DISCONTINUED | OUTPATIENT
Start: 2021-08-18 | End: 2021-08-20 | Stop reason: HOSPADM

## 2021-08-18 RX ORDER — SODIUM CHLORIDE 0.9 % (FLUSH) 0.9 %
5-40 SYRINGE (ML) INJECTION PRN
Status: DISCONTINUED | OUTPATIENT
Start: 2021-08-18 | End: 2021-08-20 | Stop reason: HOSPADM

## 2021-08-18 RX ORDER — PANTOPRAZOLE SODIUM 40 MG/1
40 TABLET, DELAYED RELEASE ORAL
Status: DISCONTINUED | OUTPATIENT
Start: 2021-08-18 | End: 2021-08-20 | Stop reason: HOSPADM

## 2021-08-18 RX ORDER — ARFORMOTEROL TARTRATE 15 UG/2ML
15 SOLUTION RESPIRATORY (INHALATION) 2 TIMES DAILY
Status: DISCONTINUED | OUTPATIENT
Start: 2021-08-18 | End: 2021-08-20 | Stop reason: HOSPADM

## 2021-08-18 RX ORDER — BUDESONIDE 0.5 MG/2ML
0.5 INHALANT ORAL 2 TIMES DAILY
Status: DISCONTINUED | OUTPATIENT
Start: 2021-08-18 | End: 2021-08-20 | Stop reason: HOSPADM

## 2021-08-18 RX ORDER — AMLODIPINE BESYLATE 2.5 MG/1
2.5 TABLET ORAL DAILY
Status: DISCONTINUED | OUTPATIENT
Start: 2021-08-18 | End: 2021-08-20 | Stop reason: HOSPADM

## 2021-08-18 RX ORDER — FUROSEMIDE 40 MG/1
40 TABLET ORAL DAILY
Status: DISCONTINUED | OUTPATIENT
Start: 2021-08-18 | End: 2021-08-20 | Stop reason: HOSPADM

## 2021-08-18 RX ADMIN — SODIUM CHLORIDE: 9 INJECTION, SOLUTION INTRAVENOUS at 05:50

## 2021-08-18 RX ADMIN — AMLODIPINE BESYLATE 2.5 MG: 2.5 TABLET ORAL at 08:41

## 2021-08-18 RX ADMIN — VENLAFAXINE HYDROCHLORIDE 75 MG: 75 CAPSULE, EXTENDED RELEASE ORAL at 08:41

## 2021-08-18 RX ADMIN — FUROSEMIDE 40 MG: 40 TABLET ORAL at 08:41

## 2021-08-18 RX ADMIN — SODIUM CHLORIDE, PRESERVATIVE FREE 10 ML: 5 INJECTION INTRAVENOUS at 20:04

## 2021-08-18 RX ADMIN — BUDESONIDE 500 MCG: 0.5 SUSPENSION RESPIRATORY (INHALATION) at 20:18

## 2021-08-18 RX ADMIN — WATER 1000 MG: 1 INJECTION INTRAMUSCULAR; INTRAVENOUS; SUBCUTANEOUS at 20:03

## 2021-08-18 RX ADMIN — PANTOPRAZOLE SODIUM 40 MG: 40 TABLET, DELAYED RELEASE ORAL at 05:58

## 2021-08-18 RX ADMIN — SPIRONOLACTONE 100 MG: 25 TABLET ORAL at 08:40

## 2021-08-18 RX ADMIN — ARFORMOTEROL TARTRATE 15 MCG: 15 SOLUTION RESPIRATORY (INHALATION) at 20:18

## 2021-08-18 RX ADMIN — DOXAZOSIN 1 MG: 1 TABLET ORAL at 20:03

## 2021-08-18 RX ADMIN — SODIUM CHLORIDE: 4.5 INJECTION, SOLUTION INTRAVENOUS at 23:49

## 2021-08-18 RX ADMIN — SODIUM CHLORIDE: 4.5 INJECTION, SOLUTION INTRAVENOUS at 16:27

## 2021-08-18 RX ADMIN — ENOXAPARIN SODIUM 40 MG: 40 INJECTION SUBCUTANEOUS at 08:41

## 2021-08-18 RX ADMIN — RIFAXIMIN 550 MG: 550 TABLET ORAL at 08:41

## 2021-08-18 RX ADMIN — LACTULOSE 20 G: 20 SOLUTION ORAL at 20:03

## 2021-08-18 RX ADMIN — OXYCODONE 5 MG: 5 TABLET ORAL at 23:49

## 2021-08-18 RX ADMIN — RIFAXIMIN 550 MG: 550 TABLET ORAL at 20:03

## 2021-08-18 RX ADMIN — LACTULOSE 20 G: 20 SOLUTION ORAL at 15:08

## 2021-08-18 RX ADMIN — OXYCODONE 5 MG: 5 TABLET ORAL at 06:33

## 2021-08-18 ASSESSMENT — ENCOUNTER SYMPTOMS
SORE THROAT: 0
VOMITING: 0
BACK PAIN: 1
DIARRHEA: 0
CONSTIPATION: 0
WHEEZING: 0
COUGH: 0
NAUSEA: 0
SHORTNESS OF BREATH: 1
RHINORRHEA: 0
ABDOMINAL PAIN: 0

## 2021-08-18 ASSESSMENT — PAIN DESCRIPTION - LOCATION: LOCATION: ARM

## 2021-08-18 ASSESSMENT — PAIN SCALES - GENERAL
PAINLEVEL_OUTOF10: 10
PAINLEVEL_OUTOF10: 8
PAINLEVEL_OUTOF10: 10
PAINLEVEL_OUTOF10: 9

## 2021-08-18 ASSESSMENT — PAIN DESCRIPTION - PAIN TYPE: TYPE: ACUTE PAIN

## 2021-08-18 ASSESSMENT — PAIN DESCRIPTION - ORIENTATION: ORIENTATION: RIGHT;LEFT

## 2021-08-18 ASSESSMENT — PAIN DESCRIPTION - PROGRESSION: CLINICAL_PROGRESSION: NOT CHANGED

## 2021-08-18 ASSESSMENT — PAIN DESCRIPTION - FREQUENCY: FREQUENCY: CONTINUOUS

## 2021-08-18 ASSESSMENT — PAIN DESCRIPTION - DESCRIPTORS: DESCRIPTORS: ACHING;DISCOMFORT

## 2021-08-18 ASSESSMENT — PAIN DESCRIPTION - ONSET: ONSET: ON-GOING

## 2021-08-18 NOTE — PROGRESS NOTES
Orthopedic consult for Dr. Deisy Sanderson completed.  Placed on census Pt.seen by orthopedic resident

## 2021-08-18 NOTE — PLAN OF CARE
Problem: Pain:    Goal: Pain level will decrease  Description: Pain level will decrease    Outcome: Ongoing

## 2021-08-18 NOTE — PROGRESS NOTES
[facility] Attending    S: 68 y.o. 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 Was found down. She fell day prior to admission ,was found the next day by her son. Does not remember how she fell but she reports that this has happened once before in the last 6 months. Patient seen and examined at bedside. Is alert and oriented x 3. Reports she has end stage liver cirrhosis. Takes all her medications as prescribed. Lives home alone and does not need help with her meds per patient. Denies any chest pain, sob. O: VS- Blood pressure (!) 158/84, pulse 93, temperature 97.5 °F (36.4 °C), temperature source Temporal, resp. rate 18, height 5' 2.5\" (1.588 m), weight 207 lb 9.6 oz (94.2 kg), SpO2 95 %. Exam is as noted by resident with the following changes, additions or corrections:  Gen - lying in bed, NAD, difficult to arouse. Cardio - RRR, no murmurs   Lungs - CTAB, no wheeze   Abd- BS+, soft,TN/ND  Ext- no significant lower extremity edema. MSK - TTP right upper extremity. Sling on right upper extremity    Impressions: Active Problems:    Rhabdomyolysis    Hyperammonemia (HCC)    Closed displaced fracture of surgical neck of right humerus  Resolved Problems:    * No resolved hospital problems. *      Plan:   Syncope - echo pending. Monitor. Rhabdomyolysis - CK 1161 . Cr on admission wnl. Monitor u/a, cmp, urine culture. IVF , monitor CK   Metabolic encaphalothy most likely hepatic encaphalopathy. Follow ammonia. lactuose and rifaxamin. uti - on ceftriaxone. urine culture and blood culture pending. Right arm fracture - ortho consulted. Has sling on. Attending Physician Statement  I have reviewed the chart, including any radiology or labs, and seen the patient with the resident(s). I personally reviewed and performed key elements of the history and exam.  I agree with the assessment, plan and orders as documented by the resident. Please refer to the resident note for additional information. Radha Blount.  Bonnie Oconnell MD

## 2021-08-18 NOTE — CONSULTS
Department of Orthopedic Surgery  Resident Consult Note          Reason for Consult:  R shoulder pain     HISTORY OF PRESENT ILLNESS:       Patient is a 68 y.o. female who presents with R shoulder pain. Pt. Unsure of what happened but she stated she was found down for roughly 14 hours. Does not recall falling but did not pain to the right shoulder. History of L RTSA by Dr. Diaz Machado. Denies pain to LUE but states global discomfort. Denies numbness/tingling/paresthesias. Denies any other orthopedic complaints at this time.       Past Medical History:        Diagnosis Date    [facility]     Cirrhosis [facility]     Essential hypertension     Hyperlipidemia     Osteopenia     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 ([facility]     Sleep apnea     Spinal stenosis     Type 2 diabetes mellitus ([facility] [facility]      Past Surgical History:        Procedure Laterality Date    BREAST LUMPECTOMY      lumpectomy [id number]    CARDIOVASCULAR STRESS TEST      Lexiscan stress test    CARPAL TUNNEL RELEASE      COLONOSCOPY  01/31/2017    multiple polyps; diverticula--jerod    COLONOSCOPY  04/23/2019    polyps; diverticula; hemorrhoids--jerod    COLONOSCOPY N/A 04/23/2019    COLONOSCOPY POLYPECTOMY SNARE/COLD BIOPSY performed by Roxana Grajeda MD at [address] COLONOSCOPY  04/23/2019    COLONOSCOPY WITH BIOPSY performed by Roxana Graejda MD at [address] [facility] LITHOTRIPSY Left 05/04/2016    C-R STENT PLACEMENT    SHOULDER ARTHROPLASTY Left 12/21/2020    LEFT REVERSE TOTAL SHOULDER  ARTHROPLASTY -- DEPUY performed by Patti Griffin MD at [facility] ENDOSCOPY  04/23/2019    gastritis--jerod    UPPER GASTROINTESTINAL ENDOSCOPY N/A 04/23/2019    EGD BIOPSY performed by Roxana Grajeda MD at [facility] ENDOSCOPY     Current Medications:   Current Facility-Administered Medications: albuterol (PROVENTIL) nebulizer solution 2.5 mg, 2.5 mg, Nebulization, 4x Daily PRN  doxazosin (CARDURA) tablet 1 mg, 1 mg, Oral, Nightly  amLODIPine (NORVASC) tablet 2.5 mg, 2.5 mg, Oral, Daily  furosemide (LASIX) tablet 40 mg, 40 mg, Oral, Daily  lactulose (CHRONULAC) 10 GM/15ML solution 20 g, 20 g, Oral, TID  pantoprazole (PROTONIX) tablet 40 mg, 40 mg, Oral, QAM AC  rifaximin (XIFAXAN) tablet 550 mg, 550 mg, Oral, BID  spironolactone (ALDACTONE) tablet 100 mg, 100 mg, Oral, Daily  venlafaxine (EFFEXOR XR) extended release capsule 75 mg, 75 mg, Oral, Daily  0.9 % sodium chloride infusion, , Intravenous, Continuous  sodium chloride flush 0.9 % injection 5-40 mL, 5-40 mL, Intravenous, 2 times per day  sodium chloride flush 0.9 % injection 5-40 mL, 5-40 mL, Intravenous, PRN  0.9 % sodium chloride infusion, 25 mL, Intravenous, PRN  enoxaparin (LOVENOX) injection 40 mg, 40 mg, Subcutaneous, Daily  ondansetron (ZOFRAN-ODT) disintegrating tablet 4 mg, 4 mg, Oral, Q8H PRN **OR** ondansetron (ZOFRAN) injection 4 mg, 4 mg, Intravenous, Q6H PRN  polyethylene glycol (GLYCOLAX) packet 17 g, 17 g, Oral, Daily PRN  budesonide (PULMICORT) nebulizer suspension 500 mcg, 0.5 mg, Nebulization, BID **AND** Arformoterol Tartrate (BROVANA) nebulizer solution 15 mcg, 15 mcg, Nebulization, BID  Allergies:  Lisinopril    Social History:   TOBACCO:   reports that she has never smoked. She has never used smokeless tobacco.  ETOH:   reports no history of alcohol use. DRUGS:   reports no history of drug use.   ACTIVITIES OF DAILY LIVING:    OCCUPATION:    Family History:       Problem Relation Age of Onset    Arthritis Mother     COPD Father     Heart Attack Father     Cancer Other 48        colon       REVIEW OF SYSTEMS:  CONSTITUTIONAL:  negative for  fevers, chills  EYES:  negative for blurred vision, visual disturbance  HEENT:  negative for  hearing loss, voice change  RESPIRATORY:  negative for  dyspnea, wheezing  CARDIOVASCULAR:  negative for  chest pain, palpitations  GASTROINTESTINAL:  negative for nausea, vomiting  GENITOURINARY: Examination:   General appearance: alert, well appearing, and in no distress,  normal appearing weight. No visible signs of trauma   Mental status: alert, oriented to person, place, and time, normal mood, behavior, speech, dress, motor activity, and thought processes  Abdomen: soft, nondistended  Resp:   resp easy and unlabored, no audible wheezes note, normal symmetrical expansion of both hemithoraces  Cardiac: distal pulses palpable, skin and extremities well perfused  Neurological: alert, oriented X3, normal speech, no focal findings or movement disorder noted, motor and sensory grossly normal bilaterally, normal muscle tone, no tremors  HEENT: normochephalic atraumatic, external ears and eyes normal, sclera normal, neck supple, no nasal discharge. Extremities:   peripheral pulses normal, no edema, redness or tenderness in the calves   Skin: normal coloration, no rashes or open wounds, no suspicious skin lesions noted  Psych: Affect euthymic   Musculoskeletal:   Extremity:  Agree with above examination. Compartment soft compressible. Skin is intact. ELECTRONICALLY signed by:    Erum Vaca MD  8/20/21   This is been dictated utilizing voice recognition software. All efforts have been made to make the note accurate although inadvertent errors may be present.

## 2021-08-18 NOTE — ED NOTES
Nurse to nurse report called to [facility]  [facility], RN  08/17/21 [id number]

## 2021-08-18 NOTE — H&P
[address]  Family Medicine Residency Program  History and Physical    Patient: Mel Dobson 68 y.o. female MRN: [id number]     Date of Service: 8/18/2021    Hospital Day: 2      Chief complaint: had concerns including Loss of Consciousness (fell on bathroom floor last night with pants down, son found on the floor at 2pm today, seems out of it, ems called pt refused transport, son brought patient in). History of Present Illness   The patient is a 68 y.o. 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 Patient states that Monday night she got up to go to the restroom and next thing she remembers she was lying on the ground until 2 PM when her son came to check on her. Son called EMS, patient refused transport via ambulance. Son called PCP who recommended patient was brought to the ED for further evaluation. Patient presented to [facility] ED only complaint she had was bilateral arm pain, noted left arm pain was chronic but right arm pain was new. In [facility] ED she was noted to be somnolent with some generalized weakness. She denied any fever, chills, chest pain, S OB, lower extremity swelling, headache, visual disturbance, dizziness, lightheadedness, numbness or weakness. Also denied any GI or  symptoms. ED course:  EKG: Sinus tachycardia with   Labs: CMP-lactic acid 3.4, glucose 169, creatinine 1.0. Total CK: 1161 troponin: 12->11, ammonia: 126.0.  UA: Color-red, clarity-cloudy, small bilirubin, large blood, protein 100, nitrate positive, bacteria few  Given 1 L IV fluids, 1 dose of Rocephin for possible UTI, 1 dose of lactulose for hyperammonemia.     Medications   lactulose (CHRONULAC) 10 GM/15ML solution 20 g (20 g Oral Given 8/17/21 2128)   0.9 % sodium chloride bolus (0 mLs Intravenous Stopped 8/17/21 2006)   cefTRIAXone (ROCEPHIN) 2,000 mg in sterile water 20 mL IV syringe (0 mg Intravenous Stop Time 8/17/21 2006)         Past Medical History:      Diagnosis Date    [facility] [address]     Cirrhosis ([facility] [address] [facility]     Essential hypertension     Hyperlipidemia     Osteopenia     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 ([facility] [address]     Sleep apnea     Spinal stenosis     Type 2 diabetes mellitus ([facility] [address]        Past Surgical History:        Procedure Laterality Date    BREAST LUMPECTOMY      lumpectomy [id number]    CARDIOVASCULAR STRESS TEST      Lexiscan stress test    CARPAL TUNNEL RELEASE      COLONOSCOPY  01/31/2017    multiple polyps; diverticula--jerod    COLONOSCOPY  04/23/2019    polyps; diverticula; hemorrhoids--jerod    COLONOSCOPY N/A 04/23/2019    COLONOSCOPY POLYPECTOMY SNARE/COLD BIOPSY performed by Mart Caruso MD at [address] COLONOSCOPY  04/23/2019    COLONOSCOPY WITH BIOPSY performed by Mart Caruso MD at [facility] LITHOTRIPSY Left 05/04/2016    C-R STENT PLACEMENT    SHOULDER ARTHROPLASTY Left 12/21/2020    LEFT REVERSE TOTAL SHOULDER  ARTHROPLASTY -- DEPUY performed by Max Grayson MD at [address] [facility]  04/23/2019    gastritis--jerod    UPPER GASTROINTESTINAL ENDOSCOPY N/A 04/23/2019    EGD BIOPSY performed by Mart Caruso MD at [facility]       Medications Prior to Admission:    Prior to Admission medications    Medication Sig Start Date End Date Taking?  Authorizing Provider   felodipine (PLENDIL) 2.5 MG extended release tablet TAKE 1 TABLET BY MOUTH DAILY 7/19/21  Yes Heriberto Lozada MD   pantoprazole (PROTONIX) 40 MG tablet Take 1 tablet by mouth daily 7/13/21  Yes Heriberto Lozada MD   Biotin 35178 MCG TABS Take by mouth every 7 days   Yes Historical Provider, MD   vitamin D3 (CHOLECALCIFEROL) 25 MCG (1000 UT) TABS tablet Take 1,000 Units by mouth daily   Yes Historical Provider, MD   venlafaxine (EFFEXOR XR) 75 MG extended release capsule Take 1 capsule by mouth daily 6/30/21  Yes Juanito Julian MD   spironolactone (ALDACTONE) 100 MG tablet Take 1 tablet by mouth daily 6/30/21  Yes Juanito Julian MD   rifaximin (XIFAXAN) 550 MG tablet Take 1 tablet by mouth 2 times daily 6/30/21  Yes Juanito Julian MD   albuterol sulfate HFA (VENTOLIN HFA) 108 (90 Base) MCG/ACT inhaler Inhale 2 puffs into the lungs 4 times daily as needed for Wheezing 6/30/21  Yes Juanito Julian MD   budesonide-formoterol (SYMBICORT) 160-4.5 MCG/ACT AERO Inhale 2 puffs into the lungs 2 times daily 6/30/21  Yes Juanito Julian MD   lactulose (CHRONULAC) 10 GM/15ML solution Take 30 mLs by mouth 3 times daily 6/30/21  Yes Juanito Julian MD   furosemide (LASIX) 40 MG tablet Take 1 tablet by mouth daily 6/30/21  Yes Juanito Julian MD   doxazosin (CARDURA) 1 MG tablet Take 1 tablet by mouth nightly 6/30/21  Yes Juanito Julian MD   ENULOSE 10 GM/15ML SOLN solution  2/3/21  Yes Historical Provider, MD   vitamin D (ERGOCALCIFEROL) 1.25 MG (24398 UT) CAPS capsule Take 1 capsule by mouth once a week 3/27/20  Yes Kailyn Hough DO       Allergies:  Lisinopril    Social History:   TOBACCO:   reports that she has never smoked. She has never used smokeless tobacco.  ETOH:   reports no history of alcohol use. Family History:       Problem Relation Age of Onset    Arthritis Mother     COPD Father     Heart Attack Father     Cancer Other 48        colon       REVIEW OF SYSTEMS:    Review of Systems   Constitutional: Negative for activity change, appetite change, chills, diaphoresis, fatigue and fever. HENT: Negative for congestion, rhinorrhea and sore throat. Eyes: Negative for visual disturbance. Respiratory: Positive for shortness of breath. Negative for cough and wheezing.     Cardiovascular: Negative for chest pain, palpitations and leg swelling. Gastrointestinal: Negative for abdominal pain, constipation, diarrhea, nausea and vomiting. Endocrine: Negative for polydipsia and polyuria. Genitourinary: Negative for difficulty urinating, dysuria, flank pain, frequency, hematuria and urgency. Musculoskeletal: Positive for arthralgias, back pain and myalgias. Negative for neck pain and neck stiffness. Neurological: Positive for syncope. Negative for dizziness, weakness, light-headedness, numbness and headaches. Hematological: Does not bruise/bleed easily. Physical Exam   Vitals: /73   Pulse 99   Temp 97.7 °F (36.5 °C) (Temporal)   Resp 16   Ht 5' 2.5\" (1.588 m)   Wt 200 lb (90.7 kg)   SpO2 95%   BMI 36.00 kg/m²     Physical Exam  Vitals reviewed. Constitutional:       General: She is not in acute distress. Appearance: Normal appearance. She is obese. She is not ill-appearing. HENT:      Head: Normocephalic and atraumatic. Eyes:      Extraocular Movements: Extraocular movements intact. Pupils: Pupils are equal, round, and reactive to light. Cardiovascular:      Rate and Rhythm: Normal rate and regular rhythm. Pulses: Normal pulses. Heart sounds: Normal heart sounds. Pulmonary:      Effort: Pulmonary effort is normal. No respiratory distress. Breath sounds: Normal breath sounds. No wheezing. Abdominal:      General: Bowel sounds are normal. There is no distension. Palpations: Abdomen is soft. Tenderness: There is no abdominal tenderness. Musculoskeletal:         General: Tenderness (TTP to right upper extremity) present. Cervical back: Normal range of motion. No rigidity or tenderness. Right lower leg: No edema. Left lower leg: No edema. Neurological:      Mental Status: She is alert.          Labs and Imaging Studies   Basic Labs  CBC:   Recent Labs     08/17/21  1714   WBC 9.4   RBC 3.75   HGB 11.9   HCT 35.5   MCV 94.7   MCH 31.7   MCHC is appropriately positioned within the glenoid. Cortical irregularity of the right humeral neck consistent with fracture. Fracture appears to involve the tuberosities. No abnormal periosteal elevation along the course of the right humerus. Soft tissues appear unremarkable. LEFT HUMERUS: Patient has undergone previous total left shoulder arthroplasty. Grossly normal alignment. No obvious acute fracture. Left elbow joint is un rim     1. Very minimally displaced fracture of the right humeral head/neck region which appears to involve the tuberosity. Humeral head is appropriately positioned within the glenoid. 2.  Status post left shoulder arthroplasty. There appears to be appropriate alignment on this exam.  No acute osseous findings about the left humerus. XR HUMERUS RIGHT (MIN 2 VIEWS)    Result Date: 8/17/2021  EXAMINATION: TWO XRAY VIEWS OF THE RIGHT HUMERUS; TWO XRAY VIEWS OF THE LEFT HUMERUS 8/17/2021 6:03 pm COMPARISON: Left shoulder series from June 2, 2021 HISTORY: ORDERING SYSTEM PROVIDED HISTORY: pain TECHNOLOGIST PROVIDED HISTORY: Reason for exam:->pain FINDINGS: RIGHT HUMERUS: Mild right AC joint degenerative spurring. There is chronic appearing [facility] [place] joint measuring 11 mm. Humeral head is appropriately positioned within the glenoid. Cortical irregularity of the right humeral neck consistent with fracture. Fracture appears to involve the tuberosities. No abnormal periosteal elevation along the course of the right humerus. Soft tissues appear unremarkable. LEFT HUMERUS: Patient has undergone previous total left shoulder arthroplasty. Grossly normal alignment. No obvious acute fracture. Left elbow joint is un rim     1. Very minimally displaced fracture of the right humeral head/neck region which appears to involve the tuberosity. Humeral head is appropriately positioned within the glenoid. 2.  Status post left shoulder arthroplasty.   There appears to be appropriate alignment on this exam.  No acute osseous findings about the left humerus. CT HEAD WO CONTRAST    Result Date: 8/17/2021  EXAMINATION: CT OF THE HEAD WITHOUT CONTRAST; CT OF THE CERVICAL SPINE WITHOUT CONTRAST 8/17/2021 5:58 pm TECHNIQUE: CT of the head was performed without the administration of intravenous contrast. Dose modulation, iterative reconstruction, and/or weight based adjustment of the mA/kV was utilized to reduce the radiation dose to as low as reasonably achievable.; CT of the cervical spine was performed without the administration of intravenous contrast. Multiplanar reformatted images are provided for review. Dose modulation, iterative reconstruction, and/or weight based adjustment of the mA/kV was utilized to reduce the radiation dose to as low as reasonably achievable. COMPARISON: CT head without contrast, 12/29/2020. HISTORY: ORDERING SYSTEM PROVIDED HISTORY: Evaluate intracranial abnormality TECHNOLOGIST PROVIDED HISTORY: Has a \"code stroke\" or \"stroke alert\" been called? ->No Reason for exam:->Evaluate intracranial abnormality Decision Support Exception - unselect if not a suspected or confirmed emergency medical condition->Emergency Medical Condition (MA) FINDINGS: CT OF THE BRAIN: BRAIN/VENTRICLES: No mass effect, edema or hemorrhage is seen. No significant volume loss is seen in the brain. Mild-to-moderate chronic microvascular ischemic changes. No hydrocephalus or extra-axial fluid is seen. ORBITS: Prosthetic lenses are seen in the globes bilaterally. The orbits are otherwise grossly unremarkable. SINUSES: The visualized paranasal sinuses and mastoid air cells demonstrate no acute abnormality. SOFT TISSUES/SKULL: No acute abnormality of the visualized skull or soft tissues. CT CERVICAL SPINE: Evaluation is somewhat limited due to patient motion artifact. BONES/ALIGNMENT: There is no acute fracture or traumatic malalignment.  DEGENERATIVE CHANGES: Prominent loss of disc heights at C4-5 and C5-6. Small disc osteophyte complexes at multiple levels resulting in mild central canal stenoses at C4-5, C5-6 and C6-7. Multilevel neural foraminal stenoses, worst (moderate to severe) at the right C5-6 and left C6-7 levels. SOFT TISSUES: There is no prevertebral soft tissue swelling. CT head without contrast: 1. No skull fracture or acute intracranial abnormality. CT cervical spine without contrast: 1. No fracture or joint dislocation is seen. 2. Degenerative changes, as described. CT CERVICAL SPINE WO CONTRAST    Result Date: 8/17/2021  EXAMINATION: CT OF THE HEAD WITHOUT CONTRAST; CT OF THE CERVICAL SPINE WITHOUT CONTRAST 8/17/2021 5:58 pm TECHNIQUE: CT of the head was performed without the administration of intravenous contrast. Dose modulation, iterative reconstruction, and/or weight based adjustment of the mA/kV was utilized to reduce the radiation dose to as low as reasonably achievable.; CT of the cervical spine was performed without the administration of intravenous contrast. Multiplanar reformatted images are provided for review. Dose modulation, iterative reconstruction, and/or weight based adjustment of the mA/kV was utilized to reduce the radiation dose to as low as reasonably achievable. COMPARISON: CT head without contrast, 12/29/2020. HISTORY: ORDERING SYSTEM PROVIDED HISTORY: Evaluate intracranial abnormality TECHNOLOGIST PROVIDED HISTORY: Has a \"code stroke\" or \"stroke alert\" been called? ->No Reason for exam:->Evaluate intracranial abnormality Decision Support Exception - unselect if not a suspected or confirmed emergency medical condition->Emergency Medical Condition (MA) FINDINGS: CT OF THE BRAIN: BRAIN/VENTRICLES: No mass effect, edema or hemorrhage is seen. No significant volume loss is seen in the brain. Mild-to-moderate chronic microvascular ischemic changes. No hydrocephalus or extra-axial fluid is seen. ORBITS: Prosthetic lenses are seen in the globes bilaterally.   The orbits are otherwise grossly unremarkable. SINUSES: The visualized paranasal sinuses and mastoid air cells demonstrate no acute abnormality. SOFT TISSUES/SKULL: No acute abnormality of the visualized skull or soft tissues. CT CERVICAL SPINE: Evaluation is somewhat limited due to patient motion artifact. BONES/ALIGNMENT: There is no acute fracture or traumatic malalignment. DEGENERATIVE CHANGES: Prominent loss of disc heights at C4-5 and C5-6. Small disc osteophyte complexes at multiple levels resulting in mild central canal stenoses at C4-5, C5-6 and C6-7. Multilevel neural foraminal stenoses, worst (moderate to severe) at the right C5-6 and left C6-7 levels. SOFT TISSUES: There is no prevertebral soft tissue swelling. CT head without contrast: 1. No skull fracture or acute intracranial abnormality. CT cervical spine without contrast: 1. No fracture or joint dislocation is seen. 2. Degenerative changes, as described. XR CHEST PORTABLE    Result Date: 8/17/2021  EXAMINATION: ONE XRAY VIEW OF THE CHEST 8/17/2021 5:03 pm COMPARISON: June 7 HISTORY: ORDERING SYSTEM PROVIDED HISTORY: altered mental status TECHNOLOGIST PROVIDED HISTORY: Reason for exam:->altered mental status FINDINGS: Cardiac silhouette at the upper limits of normal. There is no evidence of airspace consolidation or pleural effusions. The pulmonary vasculature is within normal limits. Left glenohumeral prosthesis again noted in place. No radiographic evidence of acute cardiopulmonary disease. EKG: sinus tachycardia, unchanged from previous tracings. Resident's Assessment and Plan     Teri Metz is a 68 y.o. female    Active Problems:    Rhabdomyolysis    Hyperammonemia (HCC)    Closed displaced fracture of surgical neck of right humerus  Resolved Problems:    * No resolved hospital problems.  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  -Continue home medications  -Continue to monitor ammonia    GERD  - Protonix 40mg daily  - continue home meds    <Infectious Disease>    <Endocrine>  H/o Diabetes  - Last A1C 5.1 4/2021  - On no meds, A1C has been stable for >1yr    <Genitourinary/Renal>  Rhabdomyolysis  -Patient states she was on the ground from late night to about 2 PM the next day  -Total CK: 1161  -Creatinine on admission 1.0  -UA: Color red, cloudy, small bilirubin, large blood, 100 protein, positive nitrates, negative leukocyte esterase, few bacteria.   -EF- 74% (Stress 2/4/21)  -1 L of fluids given in ED  -IV fluids at 150mL/hr (maintenance fluids rate 131 mL/hr)  -Continue to trend CK levels daily, consider decrease rate of IVF once levels are to PERRL, conjunctiva normal  - repeat UA later today, if continues to have large blood in urine despite fluids consider Urology consult    <Hematology/Oncology>  H/O PE   - PE 2/2021- was on eliquis 5mg BID for 3mos    <Dermatologic/Musculoskeletal>  Right humeral fracture  -Very minimally displaced fracture of the right humeral head/neck region which appears to involve the tuberosity.  -Consult to orthopedic surgery  -Pain control    <Behavioral Health>  Depression   - Home Venlafaxine 75mg Daily  - Continue home meds     PT/OT evaluation:  DVT prophylaxis/ GI prophylaxis: Lovenox/Protonix  Disposition: home +/- home health / [facility] / [place] [id number] / Harley Cruz MD, PGY-3   Attending physician: Dr. Preethi Daugherty

## 2021-08-19 LAB
AMMONIA: 59 UMOL/L (ref 11–51)
ANION GAP SERPL CALCULATED.3IONS-SCNC: 8 MMOL/L (ref 7–16)
BUN BLDV-MCNC: 13 MG/DL (ref 6–23)
CALCIUM SERPL-MCNC: 9.8 MG/DL (ref 8.6–10.2)
CHLORIDE BLD-SCNC: 107 MMOL/L (ref 98–107)
CO2: 22 MMOL/L (ref 22–29)
CREAT SERPL-MCNC: 0.9 MG/DL (ref 0.5–1)
GFR AFRICAN AMERICAN: >60
GFR NON-AFRICAN AMERICAN: >60 ML/MIN/1.73
GLUCOSE BLD-MCNC: 95 MG/DL (ref 74–99)
HCT VFR BLD CALC: 34.2 % (ref 34–48)
HEMOGLOBIN: 11.1 G/DL (ref 11.5–15.5)
MCH RBC QN AUTO: 31.5 PG (ref 26–35)
MCHC RBC AUTO-ENTMCNC: 32.5 % (ref 32–34.5)
MCV RBC AUTO: 97.2 FL (ref 80–99.9)
METER GLUCOSE: 117 MG/DL (ref 74–99)
PDW BLD-RTO: 21.9 FL (ref 11.5–15)
PLATELET # BLD: 147 E9/L (ref 130–450)
PMV BLD AUTO: 10.6 FL (ref 7–12)
POTASSIUM REFLEX MAGNESIUM: 4.2 MMOL/L (ref 3.5–5)
RBC # BLD: 3.52 E12/L (ref 3.5–5.5)
SODIUM BLD-SCNC: 137 MMOL/L (ref 132–146)
TOTAL CK: 397 U/L (ref 20–180)
WBC # BLD: 5.7 E9/L (ref 4.5–11.5)

## 2021-08-19 PROCEDURE — 2060000000 HC ICU INTERMEDIATE R&B

## 2021-08-19 PROCEDURE — 97535 SELF CARE MNGMENT TRAINING: CPT

## 2021-08-19 PROCEDURE — 2580000003 HC RX 258: Performed by: FAMILY MEDICINE

## 2021-08-19 PROCEDURE — 6370000000 HC RX 637 (ALT 250 FOR IP): Performed by: FAMILY MEDICINE

## 2021-08-19 PROCEDURE — 6360000002 HC RX W HCPCS: Performed by: FAMILY MEDICINE

## 2021-08-19 PROCEDURE — 80048 BASIC METABOLIC PNL TOTAL CA: CPT

## 2021-08-19 PROCEDURE — 85027 COMPLETE CBC AUTOMATED: CPT

## 2021-08-19 PROCEDURE — 2580000003 HC RX 258

## 2021-08-19 PROCEDURE — 82140 ASSAY OF AMMONIA: CPT

## 2021-08-19 PROCEDURE — 82962 GLUCOSE BLOOD TEST: CPT

## 2021-08-19 PROCEDURE — 97530 THERAPEUTIC ACTIVITIES: CPT

## 2021-08-19 PROCEDURE — 94640 AIRWAY INHALATION TREATMENT: CPT

## 2021-08-19 PROCEDURE — 99232 SBSQ HOSP IP/OBS MODERATE 35: CPT | Performed by: FAMILY MEDICINE

## 2021-08-19 PROCEDURE — 97161 PT EVAL LOW COMPLEX 20 MIN: CPT

## 2021-08-19 PROCEDURE — 82550 ASSAY OF CK (CPK): CPT

## 2021-08-19 PROCEDURE — 6370000000 HC RX 637 (ALT 250 FOR IP): Performed by: STUDENT IN AN ORGANIZED HEALTH CARE EDUCATION/TRAINING PROGRAM

## 2021-08-19 PROCEDURE — 36415 COLL VENOUS BLD VENIPUNCTURE: CPT

## 2021-08-19 PROCEDURE — 97165 OT EVAL LOW COMPLEX 30 MIN: CPT

## 2021-08-19 RX ORDER — OXYCODONE HYDROCHLORIDE 5 MG/1
2.5 TABLET ORAL EVERY 12 HOURS PRN
Status: DISCONTINUED | OUTPATIENT
Start: 2021-08-19 | End: 2021-08-19

## 2021-08-19 RX ORDER — OXYCODONE HYDROCHLORIDE 5 MG/1
2.5 TABLET ORAL EVERY 8 HOURS PRN
Status: DISCONTINUED | OUTPATIENT
Start: 2021-08-19 | End: 2021-08-20 | Stop reason: HOSPADM

## 2021-08-19 RX ADMIN — VENLAFAXINE HYDROCHLORIDE 75 MG: 75 CAPSULE, EXTENDED RELEASE ORAL at 09:45

## 2021-08-19 RX ADMIN — LACTULOSE 20 G: 20 SOLUTION ORAL at 21:20

## 2021-08-19 RX ADMIN — LACTULOSE 20 G: 20 SOLUTION ORAL at 09:46

## 2021-08-19 RX ADMIN — SPIRONOLACTONE 100 MG: 25 TABLET ORAL at 09:45

## 2021-08-19 RX ADMIN — PANTOPRAZOLE SODIUM 40 MG: 40 TABLET, DELAYED RELEASE ORAL at 05:59

## 2021-08-19 RX ADMIN — DOXAZOSIN 1 MG: 1 TABLET ORAL at 21:20

## 2021-08-19 RX ADMIN — ARFORMOTEROL TARTRATE 15 MCG: 15 SOLUTION RESPIRATORY (INHALATION) at 09:55

## 2021-08-19 RX ADMIN — SODIUM CHLORIDE, PRESERVATIVE FREE 10 ML: 5 INJECTION INTRAVENOUS at 09:43

## 2021-08-19 RX ADMIN — SODIUM CHLORIDE: 4.5 INJECTION, SOLUTION INTRAVENOUS at 15:08

## 2021-08-19 RX ADMIN — ARFORMOTEROL TARTRATE 15 MCG: 15 SOLUTION RESPIRATORY (INHALATION) at 19:45

## 2021-08-19 RX ADMIN — SODIUM CHLORIDE: 4.5 INJECTION, SOLUTION INTRAVENOUS at 05:59

## 2021-08-19 RX ADMIN — WATER 1000 MG: 1 INJECTION INTRAMUSCULAR; INTRAVENOUS; SUBCUTANEOUS at 21:50

## 2021-08-19 RX ADMIN — RIFAXIMIN 550 MG: 550 TABLET ORAL at 09:46

## 2021-08-19 RX ADMIN — LACTULOSE 20 G: 20 SOLUTION ORAL at 14:18

## 2021-08-19 RX ADMIN — OXYCODONE HYDROCHLORIDE 2.5 MG: 5 TABLET ORAL at 15:30

## 2021-08-19 RX ADMIN — GLYCERIN 2 G: 2 SUPPOSITORY RECTAL at 16:55

## 2021-08-19 RX ADMIN — AMLODIPINE BESYLATE 2.5 MG: 2.5 TABLET ORAL at 09:45

## 2021-08-19 RX ADMIN — RIFAXIMIN 550 MG: 550 TABLET ORAL at 21:20

## 2021-08-19 RX ADMIN — BUDESONIDE 500 MCG: 0.5 SUSPENSION RESPIRATORY (INHALATION) at 19:44

## 2021-08-19 RX ADMIN — ENOXAPARIN SODIUM 40 MG: 40 INJECTION SUBCUTANEOUS at 09:44

## 2021-08-19 RX ADMIN — SODIUM CHLORIDE, PRESERVATIVE FREE 10 ML: 5 INJECTION INTRAVENOUS at 21:21

## 2021-08-19 RX ADMIN — BUDESONIDE 500 MCG: 0.5 SUSPENSION RESPIRATORY (INHALATION) at 09:56

## 2021-08-19 ASSESSMENT — PAIN DESCRIPTION - DESCRIPTORS: DESCRIPTORS: SORE;DISCOMFORT;ACHING

## 2021-08-19 ASSESSMENT — PAIN SCALES - WONG BAKER: WONGBAKER_NUMERICALRESPONSE: 0

## 2021-08-19 ASSESSMENT — PAIN SCALES - GENERAL
PAINLEVEL_OUTOF10: 0
PAINLEVEL_OUTOF10: 0
PAINLEVEL_OUTOF10: 10

## 2021-08-19 ASSESSMENT — PAIN DESCRIPTION - LOCATION: LOCATION: ARM;SHOULDER

## 2021-08-19 NOTE — PROGRESS NOTES
Department of Orthopedic Surgery  Resident Progress Note    Patient seen and examined. Pain controlled. No new complaints. Patient states she is able to  her left arm more today    VITALS:  /74   Pulse 70   Temp 97.4 °F (36.3 °C) (Temporal)   Resp 16   Ht 5' 2.5\" (1.588 m)   Wt 208 lb (94.3 kg)   SpO2 95%   BMI 37.44 kg/m²     General: alert and oriented to person, place and time    MUSCULOSKELETAL:   bilateral upper extremity:  · Skin C/D/I  · Compartments soft and compressible  · +AIN/PIN/Ulnar/Median/Radial nerve function intact grossly  · +2/4 Radial pulse, Cap refill <2 sec  · Sensation intact to touch in radial/ulnar/median nerve distributions to hand    CBC:   Lab Results   Component Value Date    WBC 5.7 08/19/2021    HGB 11.1 08/19/2021    HCT 34.2 08/19/2021     08/19/2021     PT/INR:    Lab Results   Component Value Date    PROTIME 14.9 08/17/2021    INR 1.3 08/17/2021         ASSESSMENT  · Right closed proximal humerus fracture  · S/p L RTSA  · Rhabdomyolysis     PLAN      · Continue physical therapy and protocol: MANUEL - OSWALD, WBJANUARY HOBSONE  · No acute orthopedic interventions   · Deep venous thrombosis prophylaxis - per primary, early mobilization  · PT/OT  · Pain Control: per primary  · Monitor H&H  · D/C Plan:  Ok to dc per ortho when medically stable.  Follow up outpatient in 2 weeks  · Discuss with attending

## 2021-08-19 NOTE — PROGRESS NOTES
Physical Therapy  Physical Therapy Initial Assessment     Name: Rosette Warner  : 1945  MRN: [id number]      Date of Service: 2021    Evaluating PT:  Rosalina Richter, PT, DPT [id number]    Room #:  9164/7422-Z  Diagnosis:  Rhabdomyolysis [M62.82]  Closed fracture of head of right humerus, initial encounter [S42.291A]  Non-traumatic rhabdomyolysis [M62.82]  Urinary tract infection with hematuria, site unspecified [N39.0, R31.9]  Altered mental status, unspecified altered mental status type [R41.82]  PMHx/PSHx:  DM 2, HTN, breast CA, sleep apnea, spinal stenosis, osteopenia, radiation induced neuropathy, cirrhosis, HLD, L reverse total shoulder arthroplasty 2020  Precautions:  Falls, NWB RUE, WBAT LUE, incontinent  Equipment Needs:  TBD at [address] [place] [address]:    Pt lives alone in a 2 story home with 0 stairs to enter and 0 rail. Bed is on first floor and bath is on firsrt floor. [facility] [employer] [facility]. Pt reports independence with ADLs. Pt has aides 3x/week that assist with household tasks. Pt arrives after a fall at home. She reports she was on the ground for many hours before her son found her the following afternoon. OBJECTIVE:   Initial Evaluation  Date: 2021 Treatment Short Term/ Long Term   Goals   AM-PAC 6 Clicks      Was pt agreeable to Eval/treatment? yes     Does pt have pain? Severe RUE pain     Bed Mobility  Rolling: NT  Supine to sit: Roxanna  Sit to supine: NT  Scooting: Roxanna  Rolling: Independent  Supine to sit:  Independent  Sit to supine: Independent  Scooting: Independent   Transfers Sit to stand: Roxanna  Stand to sit: Roxanna  Stand pivot: modA SBQC  Sit to stand: SBA  Stand to sit: SBA  Stand pivot: SBA SBQC   Ambulation    15 feet with SBQC modA  50 feet with SBQC SBA   Stair negotiation: ascended and descended  NT  NA   ROM BUE:  Per OT note  BLE:  WNL     Strength BUE:  Per OT note  BLE:  WNL     Balance Sitting EOB:  SBA  Dynamic Standing:  modA SBQC  Sitting EOB: Independent  Dynamic Standing:  SBA  SBQC     Pt is A & O x 4  Sensation:  Pt denies numbness and tingling to extremities  Edema:  unremarkable    Patient education  Pt educated on role of PT intervention. Pt educated on safety in room with utilization of call light for assistance with mobility. Pt educated on importance of maximizing OOB time by transferring to bedside chair for meals and ambulating to bathroom/transferring to bedside commode with assistance from nursing and therapy staff to increase functional activity tolerance and overall functional independence. Pt educated on weight bearing restrictions to UEs    Patient response to education:   Pt verbalized understanding Pt demonstrated skill Pt requires further education in this area   yes yes yes     ASSESSMENT:    Conditions Requiring Skilled Therapeutic Intervention:    [x]Decreased strength     [x]Decreased ROM  [x]Decreased functional mobility  [x]Decreased balance   [x]Decreased endurance   []Decreased posture  []Decreased sensation  []Decreased coordination   []Decreased vision  []Decreased safety awareness   [x]Increased pain       Comments:  RN cleared pt for activity prior to session. Pt received supine in bed and agreeable to PT intervention with OT collaboration at this time. Pt performed all functional mobility as noted above. Two person assistance provided for pt safety given her fall history and acuity of fractures. Limited by RUE pain and NWB status. Unsteady initially with ambulation but did improve with time. Pt typically uses SBQC in R hand but currently must use it in L hand so she required increased time and cues to ambulate. Pt ambulated to/from bathroom for BM and then returned to bedside chair at end of session and left with all needs met and call light in reach. Pt requires continued skilled PT intervention for the purposes of maximizing functional mobility and independence by addressing deficits described above. Treatment:  Patient practiced and was instructed in the following treatment:     Therapeutic Activities Completed:  o Functional mobility as noted above:   - Bed mobility: as noted above. Max VC and hand over hand guidance to facilitate efficient use of BUE on bed rail to promote more independent completion of task. - Transfer training: Sit<>stand:  Roxanna from EOB and chair. max VC for proper hand placement, to maintain NWB RUE, and proper sequencing to promote safe and more independent completion of sit<>stand transition. Stand pivot with SBQC in L hand modA. Max VC for proper sequencing when turning to sit with [facility] to promote safe and independent execution of stand pivot transfer. Pt unsteady when turning and with poor eccentric control during stand>sit. - Ambulation: 15 feet SBQC in L hand modA x 2 reps to/from bathroom. Pt unsteady with lateral LOB requiring modA to correct. Tactile facilitation through LUE into [facility] to promote proper sequencing and improve dynamic standing balance.  o Skilled repositioning in seated position for comfort.  o Pt education as noted above. Pt's/ family goals   1. AMAN. \"I want to be independent\"    Prognosis is good for reaching above PT goals. Patient and or family understand(s) diagnosis, prognosis, and plan of care. yes    PHYSICAL THERAPY PLAN OF CARE:    PT POC is established based on physician order and patient diagnosis     Referring provider/PT Order:    08/19/21 0845  PT eval and treat Start: 08/19/21 0845, End: 08/19/21 0845, ONE TIME, Standing Count: 1 Occurrences, R      Angelo Allred MD      Diagnosis:  Rhabdomyolysis [M62.82]  Closed fracture of head of right humerus, initial encounter [S42.291A]  Non-traumatic rhabdomyolysis [M62.82]  Urinary tract infection with hematuria, site unspecified [N39.0, R31.9]  Altered mental status, unspecified altered mental status type [R41.82]  Specific instructions for next treatment:  Ambulate as tolerated. LE strengthening. Dynamic sitting/standing balance activities for improved overall functional mobility. Current Treatment Recommendations:     [x] Strengthening to improve independence with functional mobility   [x] ROM to improve independence with functional mobility   [x] Balance Training to improve static/dynamic balance and to reduce fall risk  [x] Endurance Training to improve activity tolerance during functional mobility   [x] Transfer Training to improve safety and independence with all functional transfers   [x] Gait Training to improve gait mechanics, endurance and assess need for appropriate assistive device  [x] Stair Training in preparation for safe discharge home and/or into the community   [x] Positioning to prevent skin breakdown and contractures  [x] Safety and Education Training   [x] Patient/Caregiver Education   [] HEP  [] Other     PT long term treatment goals are located in above grid    Frequency of treatments: 2-5x/week x 1-2 weeks. Time in  1340  Time out  1405    Total Treatment Time  10 minutes     Evaluation Time includes thorough review of current medical information, gathering information on past medical history/social history and prior level of function, completion of standardized testing/informal observation of tasks, assessment of data and education on plan of care and goals.     CPT codes:  [x] Low Complexity PT evaluation 37855  [] Moderate Complexity PT evaluation 14398  [] High Complexity PT evaluation 32408  [] PT Re-evaluation 55198  [] Gait training 43870 0 minutes  [] Manual therapy 77639 0 minutes  [x] Therapeutic activities 41269 10 minutes  [] Therapeutic exercises 17412 0 minutes  [] Neuromuscular reeducation 06904 0 minutes     Alfreda Coon, PT, DPT  [id number]

## 2021-08-19 NOTE — PROGRESS NOTES
[facility]   Resident Progress Note    S:  Hospital day: 2   Brief Synopsis:   68 y.o. woman who presents after being found down. Work-up in ER she was found to have elevated CK, imaging right shoulder showed right closed proximal humerus fracture, also had elevated ammonia. Orthopedic Surgery was consulted during the admit; no acute intervention planned recommended sling. Seen today at bedside she complains of pain in her right shoulder (10/10) and not had a bowel movement. No chest pain, no sob, abdominal pain, weakness of hands. Has bar in place. No urinary symptom. Cont meds:    sodium chloride      sodium chloride 134 mL/hr at 08/19/21 1508     Scheduled meds:    glycerin (ADULT)  1 suppository Rectal Once    doxazosin  1 mg Oral Nightly    amLODIPine  2.5 mg Oral Daily    [Held by provider] furosemide  40 mg Oral Daily    lactulose  20 g Oral TID    pantoprazole  40 mg Oral QAM AC    rifaximin  550 mg Oral BID    spironolactone  100 mg Oral Daily    venlafaxine  75 mg Oral Daily    sodium chloride flush  5-40 mL Intravenous 2 times per day    enoxaparin  40 mg Subcutaneous Daily    budesonide  0.5 mg Nebulization BID    And    Arformoterol Tartrate  15 mcg Nebulization BID    cefTRIAXone (ROCEPHIN) IV  1,000 mg Intravenous Q24H     PRN meds: oxyCODONE, albuterol, sodium chloride flush, sodium chloride, ondansetron **OR** ondansetron, polyethylene glycol     I reviewed the patient's past medical and surgical history, Medications and Allergies. O:  /74   Pulse 70   Temp 97.4 °F (36.3 °C) (Temporal)   Resp 16   Ht 5' 2.5\" (1.588 m)   Wt 208 lb (94.3 kg)   SpO2 95%   BMI 37.44 kg/m²   24 hour I&O: I/O last 3 completed shifts: In: 3650 [P.O.:60; I.V.:3710]  Out: 200 [Urine:200]  No intake/output data recorded. Physical Exam  Constitutional:       Appearance: Normal appearance. HENT:      Head: Normocephalic and atraumatic.    Cardiovascular: Rate and Rhythm: Normal rate and regular rhythm. Pulses: Normal pulses. Heart sounds: Normal heart sounds. No murmur heard. No friction rub. No gallop. Pulmonary:      Effort: Pulmonary effort is normal.      Breath sounds: No wheezing or rales. Abdominal:      General: Abdomen is flat. There is distension. Palpations: Abdomen is soft. Musculoskeletal:      Comments: Right arm in sling  Right hand: moving fingers, warm-well perfused, no cyanosis   Neurological:      General: No focal deficit present. Mental Status: She is alert and oriented to person, place, and time. Labs:  Na/K/Cl/CO2:  137/4.2/107/22 (08/19 8514)  BUN/Cr/glu/ALT/AST/amyl/lip:  13/0.9/--/--/--/--/-- (08/19 8560)  WBC/Hgb/Hct/Plts:  5.7/11.1/34.2/147 (08/19 7548)  estimated creatinine clearance is 58 mL/min (based on SCr of 0.9 mg/dL). Other pertinent labs as noted below    Radiology:  XR SHOULDER RIGHT (MIN 2 VIEWS)   Final Result   Unchanged appearance of the previously described fracture of the right   humeral neck. XR HUMERUS RIGHT (MIN 2 VIEWS)   Final Result   1. Very minimally displaced fracture of the right humeral head/neck region   which appears to involve the tuberosity. Humeral head is appropriately   positioned within the glenoid. 2.  Status post left shoulder arthroplasty. There appears to be appropriate   alignment on this exam.  No acute osseous findings about the left humerus. XR HUMERUS LEFT (MIN 2 VIEWS)   Final Result   1. Very minimally displaced fracture of the right humeral head/neck region   which appears to involve the tuberosity. Humeral head is appropriately   positioned within the glenoid. 2.  Status post left shoulder arthroplasty. There appears to be appropriate   alignment on this exam.  No acute osseous findings about the left humerus. XR CHEST PORTABLE   Final Result   No radiographic evidence of acute cardiopulmonary disease.          XR 2.5mg daily  - continue home meds     KATE  -CPAP at home nightly  -CPAP at night during admission     GERD  - Protonix 40mg daily  - continue home meds     H/o Diabetes  - Last A1C 5.1 4/2021  - On no meds, A1C has been stable for >1yr     H/O PE   - PE 2/2020- was on eliquis 5mg BID for 3mos     Depression   - Home Venlafaxine 75mg Daily  - Continue home meds           Electronically signed by Erick Baltazar MD  PGY-2 on 8/19/2021 at 3:10 PM  This case was discussed with attending physician: Dr. Luli Archer

## 2021-08-19 NOTE — PROGRESS NOTES
* Training on energy conservation strategies, correct breathing pattern and techniques to improve independence/tolerance for self-care routine  * Functional transfer/mobility training/DME recommendations for increased independence, safety, and fall prevention  * Patient/Family education to increase follow through with safety techniques and functional independence  * Recommendation of environmental modifications for increased safety with functional transfers/mobility and ADLs  * Therapeutic exercise to improve motor endurance, ROM, and functional strength for ADLs/functional transfers  * Therapeutic activities to facilitate/challenge dynamic balance, stand tolerance for increased safety and independence with ADLs  * Positioning to improve skin integrity, interaction with environment and functional independence    Recommended Adaptive Equipment: TBD      Home Living: alone; apartment, 1 story, no JAVIER. Bathroom set-up: tub/shower with grab bars        Equipment owned: quad cane, wheeled walker, shower chair, elevated commode. Prior Level of Function: Independent with ADLs , has aides 3x/wk to assist with IADLs, son is supportive; ambulated with quad cane. Driving: No  Occupation: Retired     Pain Level: 9/10 pain in right shoulder; Nursing notified. Cognition: A&O: 4/4; Follows 2-3 step directions   Memory: fair    Sequencing: fair    Problem solving: fair    Judgement/safety: fair     Functional Assessment:  AM-PAC Daily Activity Raw Score: 12/24   Initial Eval Status  Date: 8/19/21 Treatment Status  Date: STGs = LTGs  Time frame: 10-14 days   Feeding Minimal Assist to reach for items on tray with left UE. Moderate Duval    Grooming Moderate Assist   Moderate Duval    UB Dressing Maximal Assist for adjustment of sling. Moderate Duval    LB Dressing Maximal Assist.   Unable to don socks from seated position. Assist provided to don undergarments over feet/hips.    Moderate Johnson    Bathing Maximal Assist   Moderate Johnson    Toileting Dependent for posterior perineal hygiene following bowel mvmt. Moderate Johnson    Bed Mobility  Supine to sit: Minimal Assist   Sit to supine:  NT   Supine to sit: Moderate Johnson   Sit to supine: Moderate Johnson    Functional Transfers Moderate Assist from EOB to quad cane. Slight LOB with initial stand. Moderate Assist for sit to stand transfer at commode. Transfer training with verbal cues for left hand placement throughout session to improve safety. Moderate Johnson    Functional Mobility Moderate Assist with quad cane to/from bathroom to improve balance, verbal cues for cane sequence and safety. Moderate Johnson    Balance Sitting:     Static: fair     Dynamic: poor  Standing: poor   Sitting:     Static: good     Dynamic: good   Standing: good  with quad cane    Activity Tolerance fair  -   Increase standing tolerance >3 minutes for improved engagement with functional transfers and indep in ADLs   Visual/  Perceptual Glasses: Yes - not present     Reports changes in vision since admission: No     NA      Hand Dominance: Right      AROM (PROM) Strength Additional Info:    RUE  Impaired - immobilized  NT Fair minus       LUE Shoulder AROM limited 2/2 soreness from recent fall. Distal AROM limited however WFL to feed self. 2/5 Fair  and wfl FMC/dexterity noted during ADL tasks       Hearing: WFL   Sensation:  No c/o numbness or tingling  Tone: WFL   Edema: None    Comments: RN cleared patient for OT. Upon arrival patient in supine. At end of session, patient was in chair, sling intact, RN at bedside, call light and phone within reach, all lines and tubes intact. Overall, patient demonstrated  decreased independence and safety during completion of ADL tasks.   Pt would benefit from continued skilled OT to increase safety and independence with completion of ADL tasks and functional mobility for improved quality of life. Treatment: OT treatment provided this date includes:   · Instruction, education and training on safe facilitation and adapted techniques for completion of ADLs. These include safe functional transfer techniques on/off commode and energy conservation for completion of ADLs. Pt educated on NWB status with R UE and proper sling wear. Pt also education provided on hand/feet placement with quad cane and body mechanics for fall prevention. Prior to and at the end of session, environmental modifications / line management completed for patients safety and efficiency of treatment session. See above for further details. Rehab Potential: Good for established goals. LTG: maximize independence with ADLs to return to PLOF     Patient / Family Goal: Not stated       Patient and/or family were instructed on functional diagnosis, prognosis/goals and OT plan of care. Demonstrated fair + understanding. Eval Complexity: Low    Time In: 1:30 PM   Time Out: 2:14 PM    Total Treatment Time: 14      Min Units   OT Eval Low 97165  X  1    OT Eval Medium 67700      OT Eval High 33712      OT Re-Eval 69852            ADL/Self Care 54216 15 1   Therapeutic Activities 60492       Therapeutic Ex 76917       Orthotic Management 15746       Manual 44935     Neuro Re-Ed 31690       Non-Billable Time 14       Evaluation Time additionally includes thorough review of current medical information, gathering information on past medical history/social history and prior level of function, interpretation of standardized testing/informal observation of tasks, assessment of data and development of plan of care and goals.         Evaluating OT: Mikey Crigler OTR/L #[id number]

## 2021-08-19 NOTE — CARE COORDINATION
DAVINA spoke with Nadya Sauer at [facility]. They can accept and will start precert. Await auth.

## 2021-08-19 NOTE — PROGRESS NOTES
[facility] Attending    S: 68 y.o. 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 Was found down. She fell day prior to admission ,was found the next day by her son. Does not remember how she fell but she reports that this has happened once before in the last 6 months. Today, remains alert and oriented x 3. Pain is controlled, using sling for right arm. Has not yet had a BM. O: VS- Blood pressure 134/74, pulse 93, temperature 98.7 °F (37.1 °C), temperature source Temporal, resp. rate 20, height 5' 2.5\" (1.588 m), weight 208 lb (94.3 kg), SpO2 96 %. Exam is as noted by resident with the following changes, additions or corrections:  Gen - lying in bed, NAD, A&A  Cardio - RRR, no murmurs   Lungs - CTAB, no wheeze   Abd- soft,TN/ND  Ext- no significant lower extremity edema. MSK - Sling on right upper extremity    Impressions: Active Problems:    Rhabdomyolysis    Hyperammonemia (HCC)    Closed displaced fracture of surgical neck of right humerus  Resolved Problems:    * No resolved hospital problems. *      Plan:   Syncope - echo pending. Monitor. Rhabdomyolysis - CK 1161, now 397. Cr on admission wnl. Monitor u/a, cmp, urine culture. IVF, monitor CK   Metabolic encaphalothy most likely hepatic encaphalopathy. Follow ammonia. lactulose and rifaxamin. Ammonia 59 today. Possible UTI, hematuria, given ceftriaxone. Urine culture and blood culture pending. Follow closely. Follow for BM today. Right arm fracture - ortho consulted. Has sling on. Attending Physician Statement  I have reviewed the chart, including any radiology or labs, and seen the patient with the resident(s). I personally reviewed and performed key elements of the history and exam.  I agree with the assessment, plan and orders as documented by the resident. Please refer to the resident note for additional information.       Janeen Mars DO

## 2021-08-19 NOTE — PROGRESS NOTES
Physician Progress Note      Valerio Yarbrough  CSN #:                  [id number]  :                       1945  ADMIT DATE:       2021 4:45 PM  [address] [place] DATE:  RESPONDING  PROVIDER #:        Camden Mccollum MD          QUERY TEXT:    Pt admitted with humerus fracture and altered mental status. Pt noted to have   rhabdomyolysis. If possible, please document in progress notes and discharge   summary if you are evaluating and/or treating any of the following: The medical record reflects the following:  Risk Factors: AMS, Humerus fracture  Clinical Indicators: Per H&P fell on bathroom floor last night with pants   down, son found on the floor at 2pm today; Rhabdomyolysis -Patient states she   was on the ground from late night to about 2 PM the next day  Total CK: 1161  Treatment: Serial Labs, IVF    Per [email or web address]  Traumatic rhabdomyolysis cause examples: crush syndrome, prolonged   immobilization  Nontraumatic rhabdomyolysis cause examples:  marked exertion, hyperthermia,   metabolic myopathy, drugs or toxins, infections, electrolyte disorders. Thank you  Sami WATSON, RN, CCDS  Clinical Documentation Improvement  Options provided:  -- Traumatic rhabdomyolysis  -- Nontraumatic rhabdomyolysis  -- Other - I will add my own diagnosis  -- Disagree - Not applicable / Not valid  -- Disagree - Clinically unable to determine / Unknown  -- Refer to Clinical Documentation Reviewer    PROVIDER RESPONSE TEXT:    This patient has traumatic rhabdomyolysis.     Query created by: Benito Harris on 5595 1:60 PM      Electronically signed by:  Camden Mccollum MD 2021 8:43 AM

## 2021-08-19 NOTE — CARE COORDINATION
SW met with patient in room, We discussed transition of care and likely need for AMAN at discharge. She is in agreement. She has a history of AMAN at [facility] and would like to return there. Referral made to [facility], she will review and let DAVINA know if able to accept. If so, they will start precert. Will follow.

## 2021-08-20 VITALS
RESPIRATION RATE: 18 BRPM | SYSTOLIC BLOOD PRESSURE: 129 MMHG | WEIGHT: 207.6 LBS | HEIGHT: 63 IN | HEART RATE: 92 BPM | TEMPERATURE: 98.6 F | OXYGEN SATURATION: 97 % | DIASTOLIC BLOOD PRESSURE: 81 MMHG | BODY MASS INDEX: 36.79 KG/M2

## 2021-08-20 LAB
AMMONIA: 54 UMOL/L (ref 11–51)
ANION GAP SERPL CALCULATED.3IONS-SCNC: 9 MMOL/L (ref 7–16)
BUN BLDV-MCNC: 12 MG/DL (ref 6–23)
CALCIUM SERPL-MCNC: 10 MG/DL (ref 8.6–10.2)
CHLORIDE BLD-SCNC: 109 MMOL/L (ref 98–107)
CO2: 22 MMOL/L (ref 22–29)
CREAT SERPL-MCNC: 1 MG/DL (ref 0.5–1)
GFR AFRICAN AMERICAN: >60
GFR NON-AFRICAN AMERICAN: 54 ML/MIN/1.73
GLUCOSE BLD-MCNC: 108 MG/DL (ref 74–99)
HCT VFR BLD CALC: 33.6 % (ref 34–48)
HEMOGLOBIN: 10.8 G/DL (ref 11.5–15.5)
MCH RBC QN AUTO: 31.2 PG (ref 26–35)
MCHC RBC AUTO-ENTMCNC: 32.1 % (ref 32–34.5)
MCV RBC AUTO: 97.1 FL (ref 80–99.9)
PDW BLD-RTO: 21.8 FL (ref 11.5–15)
PLATELET # BLD: 135 E9/L (ref 130–450)
PMV BLD AUTO: 11.1 FL (ref 7–12)
POTASSIUM REFLEX MAGNESIUM: 4.4 MMOL/L (ref 3.5–5)
RBC # BLD: 3.46 E12/L (ref 3.5–5.5)
SODIUM BLD-SCNC: 140 MMOL/L (ref 132–146)
TOTAL CK: 203 U/L (ref 20–180)
WBC # BLD: 6.3 E9/L (ref 4.5–11.5)

## 2021-08-20 PROCEDURE — 85027 COMPLETE CBC AUTOMATED: CPT

## 2021-08-20 PROCEDURE — 82140 ASSAY OF AMMONIA: CPT

## 2021-08-20 PROCEDURE — 6370000000 HC RX 637 (ALT 250 FOR IP): Performed by: FAMILY MEDICINE

## 2021-08-20 PROCEDURE — 6370000000 HC RX 637 (ALT 250 FOR IP): Performed by: STUDENT IN AN ORGANIZED HEALTH CARE EDUCATION/TRAINING PROGRAM

## 2021-08-20 PROCEDURE — 6360000002 HC RX W HCPCS: Performed by: FAMILY MEDICINE

## 2021-08-20 PROCEDURE — 94640 AIRWAY INHALATION TREATMENT: CPT

## 2021-08-20 PROCEDURE — 82550 ASSAY OF CK (CPK): CPT

## 2021-08-20 PROCEDURE — 80048 BASIC METABOLIC PNL TOTAL CA: CPT

## 2021-08-20 PROCEDURE — 2580000003 HC RX 258: Performed by: FAMILY MEDICINE

## 2021-08-20 PROCEDURE — 36415 COLL VENOUS BLD VENIPUNCTURE: CPT

## 2021-08-20 PROCEDURE — 99239 HOSP IP/OBS DSCHRG MGMT >30: CPT | Performed by: FAMILY MEDICINE

## 2021-08-20 RX ORDER — CEFDINIR 300 MG/1
300 CAPSULE ORAL 2 TIMES DAILY
Qty: 10 CAPSULE | Refills: 0 | Status: SHIPPED | OUTPATIENT
Start: 2021-08-20 | End: 2021-08-23

## 2021-08-20 RX ADMIN — ARFORMOTEROL TARTRATE 15 MCG: 15 SOLUTION RESPIRATORY (INHALATION) at 07:56

## 2021-08-20 RX ADMIN — LACTULOSE 20 G: 20 SOLUTION ORAL at 09:30

## 2021-08-20 RX ADMIN — OXYCODONE 2.5 MG: 5 TABLET ORAL at 11:29

## 2021-08-20 RX ADMIN — ENOXAPARIN SODIUM 40 MG: 40 INJECTION SUBCUTANEOUS at 09:29

## 2021-08-20 RX ADMIN — BUDESONIDE 500 MCG: 0.5 SUSPENSION RESPIRATORY (INHALATION) at 07:56

## 2021-08-20 RX ADMIN — RIFAXIMIN 550 MG: 550 TABLET ORAL at 09:30

## 2021-08-20 RX ADMIN — OXYCODONE 2.5 MG: 5 TABLET ORAL at 00:32

## 2021-08-20 RX ADMIN — PANTOPRAZOLE SODIUM 40 MG: 40 TABLET, DELAYED RELEASE ORAL at 09:34

## 2021-08-20 RX ADMIN — SODIUM CHLORIDE, PRESERVATIVE FREE 10 ML: 5 INJECTION INTRAVENOUS at 09:26

## 2021-08-20 RX ADMIN — AMLODIPINE BESYLATE 2.5 MG: 2.5 TABLET ORAL at 09:30

## 2021-08-20 RX ADMIN — VENLAFAXINE HYDROCHLORIDE 75 MG: 75 CAPSULE, EXTENDED RELEASE ORAL at 09:30

## 2021-08-20 RX ADMIN — SPIRONOLACTONE 100 MG: 25 TABLET ORAL at 09:30

## 2021-08-20 ASSESSMENT — PAIN DESCRIPTION - LOCATION: LOCATION: ARM;SHOULDER

## 2021-08-20 ASSESSMENT — PAIN SCALES - GENERAL
PAINLEVEL_OUTOF10: 8
PAINLEVEL_OUTOF10: 0
PAINLEVEL_OUTOF10: 8
PAINLEVEL_OUTOF10: 6

## 2021-08-20 ASSESSMENT — PAIN DESCRIPTION - PAIN TYPE: TYPE: ACUTE PAIN

## 2021-08-20 ASSESSMENT — PAIN DESCRIPTION - ONSET: ONSET: ON-GOING

## 2021-08-20 ASSESSMENT — PAIN DESCRIPTION - DESCRIPTORS: DESCRIPTORS: ACHING;DISCOMFORT

## 2021-08-20 ASSESSMENT — PAIN DESCRIPTION - FREQUENCY: FREQUENCY: CONTINUOUS

## 2021-08-20 ASSESSMENT — PAIN DESCRIPTION - ORIENTATION: ORIENTATION: RIGHT

## 2021-08-20 NOTE — DISCHARGE INSTR - COC
Continuity of Care Form    Patient Name: Lyn Hawkins   :  1945  MRN:  [id number]    Admit date:  2021  Discharge date:  21    Code Status Order: Full Code   Advance Directives:      Admitting Physician:  Andrew Flores MD  PCP: Ashok Mariscal MD    Discharging Nurse: Leighton Bowers Unit/Room#: 8795/6521-I  Discharging Unit Phone Number:        Emergency Contact:   Extended Emergency Contact Information  Primary Emergency Contact: [address] [place] [address]  Home Phone: [phone]  Mobile Phone: [phone]  Relation: Child  Preferred language: English   needed? No  Secondary Emergency [facility] [address] Phone: [phone]  Mobile Phone: [phone]  Relation: Other  Preferred language: English   needed?  No    Past Surgical History:  Past Surgical History:   Procedure Laterality Date    BREAST LUMPECTOMY      lumpectomy [id number]    CARDIOVASCULAR STRESS TEST      Lexiscan stress test    CARPAL TUNNEL RELEASE      COLONOSCOPY  2017    multiple polyps; diverticula--jerod    COLONOSCOPY  2019    polyps; diverticula; hemorrhoids--jerod    COLONOSCOPY N/A 2019    COLONOSCOPY POLYPECTOMY SNARE/COLD BIOPSY performed by Yehuda Kelsey MD at [address] COLONOSCOPY  2019    COLONOSCOPY WITH BIOPSY performed by Yehuda Kelsey MD at [address] [place] [address] LITHOTRIPSY Left 2016    C-R STENT PLACEMENT    SHOULDER ARTHROPLASTY Left 2020    LEFT REVERSE TOTAL SHOULDER  ARTHROPLASTY -- DEPUY performed by Diana Walker MD at [address] [place] [address]  2019    gastritis--jerod    UPPER GASTROINTESTINAL ENDOSCOPY N/A 2019    EGD BIOPSY performed by Yehuda Kelsey MD at [facility] History:   Immunization History   Administered Date(s) Administered    COVID-19, SeeWhy, PF, 30mcg/0.3mL 2021, 2021  Hepatitis B Adult (Recombivax HB) 03/12/2020    Influenza A (B1T4-66) Vaccine PF IM 12/21/2009    Influenza Vaccine, unspecified formulation 09/27/2016    Influenza Virus Vaccine 09/28/2015    Influenza Whole 09/28/2015    Influenza, High Dose (Fluzone 65 yrs and older) 09/27/2016, 09/21/2017, 10/05/2018, 10/21/2019, 12/01/2020    Influenza, Sigifredo Starch, IM, (6 mo and older Fluzone, Flulaval, Fluarix and 3 yrs and older Afluria) 09/27/2016    Influenza, Quadv, adjuvanted, 65 yrs +, IM, PF (Fluad) 10/07/2020    Pneumococcal Conjugate 13-valent ([id number]) 09/27/2016    Pneumococcal Polysaccharide ([id number]) 11/21/2017    Tdap (Boostrix, Adacel) 01/14/2021    Zoster Live (Zostavax) 04/01/2013    Zoster Recombinant (Shingrix) 04/12/2018, 01/14/2021       Active Problems:  Patient Active Problem List   Diagnosis Code    Malignant neoplasm of left breast ([facility] C50.912    Type 2 diabetes mellitus ([facility] E11.9    Obstructive sleep apnea syndrome G47.33    Essential hypertension I10    Multiple thyroid nodules E04.2    Depression F32.9    Hx of adenomatous colonic polyps Z86.010    Dyslipidemia E78.5    Cirrhosis ([facility] K74.60    S/P reverse total shoulder arthroplasty, 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    Anemia D64.9    Palpitations R00.2    Rhabdomyolysis M62.82    Hyperammonemia (HCC) E72.20    Closed displaced fracture of surgical neck of right humerus S42.211A       Isolation/Infection:   Isolation            No Isolation          Patient Infection Status       Infection Onset Added Last Indicated Last Indicated By Review Planned Expiration Resolved Resolved By    None active    Resolved    COVID-19 Rule Out 01/31/21 01/31/21 01/31/21 Respiratory Panel, Molecular, with COVID-19 (Restricted: peds pts or suitable admitted adults) (Ordered)   01/31/21 Rule-Out Test Resulted    COVID-19 Rule Out 12/29/20 12/29/20 12/29/20 COVID-19 (Ordered)   12/29/20 Rule-Out Test Resulted    COVID-19 Rule Out 12/14/20 12/14/20 12/14/20 COVID-19 Ambulatory (Ordered)   12/15/20 Rule-Out Test Resulted            Nurse Assessment:  Last Vital Signs: /81   Pulse 92   Temp 98.6 °F (37 °C) (Temporal)   Resp 18   Ht 5' 2.5\" (1.588 m)   Wt 207 lb 9.6 oz (94.2 kg)   SpO2 97%   BMI 37.37 kg/m²     Last documented pain score (0-10 scale): Pain Level: 8  Last Weight:   Wt Readings from Last 1 Encounters:   08/20/21 207 lb 9.6 oz (94.2 kg)     Mental Status:  {IP PT MENTAL STATUS:20030:::0}    IV Access:  - None    Nursing Mobility/ADLs:  Walking   Assisted  Transfer  Assisted  Bathing  Assisted  Dressing  Assisted  Toileting  Assisted  Feeding  Assisted  Med Admin  Assisted  Med Delivery   whole    Wound Care Documentation and Therapy:        Elimination:  Continence:   · Bowel: Yes  · Bladder: Yes and at times; wears depends  Urinary Catheter: None   Colostomy/Ileostomy/Ileal Conduit: No       Date of Last BM: 08/19/21    Intake/Output Summary (Last 24 hours) at 8/20/2021 0949  Last data filed at 8/20/2021 0618  Gross per 24 hour   Intake 1310 ml   Output 1050 ml   Net 260 ml     I/O last 3 completed shifts: In: 1310 [P.O.:100; I.V.:1210]  Out: 1050 [Urine:1050]    Safety Concerns:     History of Falls (last 30 days)    Impairments/Disabilities:      None    Nutrition Therapy:  Current Nutrition Therapy:   - Oral Diet:  Carb Control 4 carbs/meal (1800kcals/day)    Routes of Feeding: Oral  Liquids: Thin Liquids  Daily Fluid Restriction: no  Last Modified Barium Swallow with Video (Video Swallowing Test): not done    Treatments at the Time of Hospital Discharge:   Respiratory Treatments: see discharge  med rec  Oxygen Therapy:  is not on home oxygen therapy.   Ventilator:    - No ventilator support    Rehab Therapies: Physical Therapy and Occupational Therapy  Weight Bearing Status/Restrictions: NWB  Right upper extremity; WBAT left upper extremity  Other Medical Equipment (for information only, NOT a DME order):  Quad cane  Other Treatments: splint right arm    Patient's personal belongings (please select all that are sent with patient):  Partials upper and lower    RN SIGNATURE:  Electronically signed by Eduardo Awad RN on 8/20/21 at 12:44 PM EDT    CASE MANAGEMENT/SOCIAL WORK SECTION    Inpatient Status Date: ***    Readmission Risk Assessment Score:  Readmission Risk              Risk of Unplanned Readmission:  17           Discharging to Facility/ Agency   · Name:   · Address:  · Phone:  · Fax:    Dialysis Facility (if applicable)   · Name:  · Address:  · Dialysis Schedule:  · Phone:  · Fax:    / signature: {Esiature:[id number]:::0}    PHYSICIAN SECTION    Prognosis: Good    Condition at Discharge: Stable    Rehab Potential (if transferring to Rehab): Good    Recommended Labs or Other Treatments After Discharge:   -No labs    Physician Certification: I certify the above information and transfer of Fuentes Hopkins  is necessary for the continuing treatment of the diagnosis listed and that she requires Assisted Living for less 30 days. Update Admission H&P: Changes in H&P as follows -  68 y.o. woman who presents after being found down. Work-up in ER she was found to have elevated CK, imaging right shoulder showed right closed proximal humerus fracture, also had elevated ammonia. Orthopedic Surgery was consulted during the admit; no acute intervention planned recommended sling. Patient was treated for her rhabdomyolysis and hr CK is significantly improved. She was also treated for Hyperammoniemia and her Ammonium level improved significantly as well.        PHYSICIAN SIGNATURE:  Electronically signed by Naya Arora MD on 8/20/21 at 10:33 AM EDT

## 2021-08-20 NOTE — CARE COORDINATION
Received message from Chucho at [facility], they have [address] for patient. Have sent perfect serve message to [facility] medicine resident regarding possible discharge.

## 2021-08-20 NOTE — PROGRESS NOTES
[address] [place] [address] Attending    S: 68 y.o. 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 Was found down. She fell day prior to admission ,was found the next day by her son. Does not remember how she fell but she reports that this has happened once before in the last 6 months. Today, remains alert and oriented x 3. Pain is controlled, using sling for right arm. Has had two bowel movements. Denies any issues. O: VS- Blood pressure 129/81, pulse 92, temperature 98.6 °F (37 °C), temperature source Temporal, resp. rate 18, height 5' 2.5\" (1.588 m), weight 207 lb 9.6 oz (94.2 kg), SpO2 97 %. Exam is as noted by resident with the following changes, additions or corrections:  Gen - lying in bed, NAD, A&A  Cardio - RRR, no murmurs   Lungs - CTAB, no wheeze   Abd- soft,TN/ND  Ext- no significant lower extremity edema. MSK - Sling on right upper extremity    Impressions: Active Problems:    Rhabdomyolysis    Hyperammonemia (HCC)    Closed displaced fracture of surgical neck of right humerus  Resolved Problems:    * No resolved hospital problems. *      Plan:   Syncope - echo within normal limits. Consider outpatient MRI. Rhabdomyolysis - CK 1161, now 200 . Cr at baseline . Off IVF  Metabolic encaphalothy most likely hepatic encaphalopathy. Follow ammonia. lactulose and rifaxamin. Ammonia 54 today. Mentation at baseline. Possible UTI, hematuria, given ceftriaxone. Urine culture and blood culture negative. Continue cefdinir 300mg bid x 3 days. Follow for BM today. Right arm fracture - ortho consulted. Has sling on. Attending Physician Statement  I have reviewed the chart, including any radiology or labs, and seen the patient with the resident(s). I personally reviewed and performed key elements of the history and exam.  I agree with the assessment, plan and orders as documented by the resident.   Please refer to the resident note for

## 2021-08-20 NOTE — PROGRESS NOTES
[facility] - Family Medicine Inpatient   Resident Progress Note    S:  Hospital day: 3   Brief Synopsis:   68 y.o. woman who presents after being found down. Work-up in ER she was found to have elevated CK, imaging right shoulder showed right closed proximal humerus fracture, also had elevated ammonia. Orthopedic Surgery was consulted during the admit; no acute intervention planned recommended sling. Seen today at bedside she complains of pain in her right shoulder (10/10) and not had a bowel movement. No chest pain, no sob, abdominal pain, weakness of hands. Has bar in place. No urinary symptom. Cont meds:    sodium chloride      [Held by provider] sodium chloride Stopped (08/19/21 1735)     Scheduled meds:    doxazosin  1 mg Oral Nightly    amLODIPine  2.5 mg Oral Daily    [Held by provider] furosemide  40 mg Oral Daily    lactulose  20 g Oral TID    pantoprazole  40 mg Oral QAM AC    rifaximin  550 mg Oral BID    spironolactone  100 mg Oral Daily    venlafaxine  75 mg Oral Daily    sodium chloride flush  5-40 mL Intravenous 2 times per day    enoxaparin  40 mg Subcutaneous Daily    budesonide  0.5 mg Nebulization BID    And    Arformoterol Tartrate  15 mcg Nebulization BID    cefTRIAXone (ROCEPHIN) IV  1,000 mg Intravenous Q24H     PRN meds: oxyCODONE, albuterol, sodium chloride flush, sodium chloride, ondansetron **OR** ondansetron, polyethylene glycol     I reviewed the patient's past medical and surgical history, Medications and Allergies. O:  /81   Pulse 95   Temp 97.8 °F (36.6 °C) (Oral)   Resp 18   Ht 5' 2.5\" (1.588 m)   Wt 208 lb (94.3 kg)   SpO2 94%   BMI 37.44 kg/m²   24 hour I&O: I/O last 3 completed shifts: In: 3770 [P.O.:60; I.V.:3710]  Out: 1000 [Urine:1000]  I/O this shift:  In: 100 [P.O.:100]  Out: -         Physical Exam  Constitutional:       Appearance: Normal appearance. HENT:      Head: Normocephalic and atraumatic.    Cardiovascular:      Rate and Rhythm: Normal rate and regular rhythm. Pulses: Normal pulses. Heart sounds: Normal heart sounds. No murmur heard. No friction rub. No gallop. Pulmonary:      Effort: Pulmonary effort is normal.      Breath sounds: No wheezing or rales. Abdominal:      General: Abdomen is flat. Palpations: Abdomen is soft. Musculoskeletal:      Comments: Right arm in sling  Right hand: moving fingers, warm-well perfused, no cyanosis   Neurological:      General: No focal deficit present. Mental Status: She is alert and oriented to person, place, and time. Labs:  Na/K/Cl/CO2:  137/4.2/107/22 (08/19 3049)  BUN/Cr/glu/ALT/AST/amyl/lip:  13/0.9/--/--/--/--/-- (08/19 5911)  WBC/Hgb/Hct/Plts:  6.3/10.8/33.6/135 (08/20 0502)  estimated creatinine clearance is 58 mL/min (based on SCr of 0.9 mg/dL). Other pertinent labs as noted below    Radiology:  XR SHOULDER RIGHT (MIN 2 VIEWS)   Final Result   Unchanged appearance of the previously described fracture of the right   humeral neck. XR HUMERUS RIGHT (MIN 2 VIEWS)   Final Result   1. Very minimally displaced fracture of the right humeral head/neck region   which appears to involve the tuberosity. Humeral head is appropriately   positioned within the glenoid. 2.  Status post left shoulder arthroplasty. There appears to be appropriate   alignment on this exam.  No acute osseous findings about the left humerus. XR HUMERUS LEFT (MIN 2 VIEWS)   Final Result   1. Very minimally displaced fracture of the right humeral head/neck region   which appears to involve the tuberosity. Humeral head is appropriately   positioned within the glenoid. 2.  Status post left shoulder arthroplasty. There appears to be appropriate   alignment on this exam.  No acute osseous findings about the left humerus. XR CHEST PORTABLE   Final Result   No radiographic evidence of acute cardiopulmonary disease.          XR PELVIS (1-2 VIEWS)   Final Result   No evidence of acute pelvic or hip fracture. CT HEAD WO CONTRAST   Final Result   CT head without contrast:      1. No skull fracture or acute intracranial abnormality. CT cervical spine without contrast:      1. No fracture or joint dislocation is seen. 2. Degenerative changes, as described. CT CERVICAL SPINE WO CONTRAST   Final Result   CT head without contrast:      1. No skull fracture or acute intracranial abnormality. CT cervical spine without contrast:      1. No fracture or joint dislocation is seen. 2. Degenerative changes, as described. A/P:  Active Problems:    Rhabdomyolysis    Hyperammonemia (HCC)    Closed displaced fracture of surgical neck of right humerus  Resolved Problems:    * No resolved hospital problems. 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   - Continue home medications  - Plan to continue to monitor Ammonia today, and monitor for bowel movement on lactulose     Right humeral fracture  - Xray findings showed non-displaced fracture  - Ortho was consulted; plan for sling no acute intervention  - Plan for pain control oxycodone PRN- will monitor  - Ordered PT/OT     Syncope- questionable  -Patient presented to the ED for questionable syncope  -Cardiac work-up up to this point has been negative  -Vitals have been stable  -CT head: No intracranial abnormalities  -We will continue to monitor     HTN  - Controlled  - Home meds:  Cardura 1mg nightly, amlodipine 2.5mg daily  - continue home meds     KATE  -CPAP at home nightly  -CPAP at night during admission     GERD  - Protonix 40mg daily  - continue home meds     H/o Diabetes  - Last A1C 5.1 4/2021  - On no meds, A1C has been stable for >1yr     H/O PE   - PE 2/2020- was on eliquis 5mg BID for 3mos     Depression   - Home Venlafaxine 75mg Daily  - Continue home meds           Electronically signed by Erick Baltazar MD  PGY-2 on 8/20/2021 at 6:10 AM  This case was discussed with attending physician: Dr. Luli Archer

## 2021-08-20 NOTE — PLAN OF CARE
Problem: Pain:  Goal: Control of acute pain  Description: Control of acute pain  Outcome: Met This Shift  Goal: Control of chronic pain  Description: Control of chronic pain  Outcome: Met This Shift     Problem: Falls - Risk of:  Goal: Will remain free from falls  Description: Will remain free from falls  Outcome: Met This Shift  Goal: Absence of physical injury  Description: Absence of physical injury  Outcome: Met This Shift     Problem: Skin Integrity:  Goal: Will show no infection signs and symptoms  Description: Will show no infection signs and symptoms  Outcome: Met This Shift  Goal: Absence of new skin breakdown  Description: Absence of new skin breakdown  Outcome: Met This Shift     Problem: Mobility - Impaired:  Goal: Mobility will improve  Description: Mobility will improve  Outcome: Met This Shift     Problem: Pain:  Goal: Pain level will decrease  Description: Pain level will decrease  Outcome: Ongoing  Note: PRN pain meds frequency increased

## 2021-08-20 NOTE — CARE COORDINATION
Patient to discharge at 1p. Via [facility]. Have notified patient and her son, Newton Buck who is at bedside. All discharge paperwork on soft chart.

## 2021-08-21 LAB — URINE CULTURE, ROUTINE: NORMAL

## 2021-08-21 NOTE — DISCHARGE SUMMARY
Discharge Summary    Roxana Fan  :  1945  MRN:  [id number]    Admit date:  2021  Discharge date:  2021    Admitting Physician:  Nuris Fletcher MD    Discharge Diagnoses:    Patient Active Problem List   Diagnosis    [facility]    Type 2 diabetes mellitus ([facility]    Obstructive sleep apnea syndrome    Essential hypertension    Multiple thyroid nodules    Depression    Hx of adenomatous colonic polyps    Dyslipidemia    Cirrhosis ([facility]    S/P reverse total shoulder arthroplasty, left    Anemia    Palpitations    Rhabdomyolysis    Hyperammonemia (HCC)    Closed displaced fracture of surgical neck of right humerus       Admission Condition:  poor    Discharged Condition:  fair    Hospital Course: Roxana Fan is a 68 y.o. female with PMH of malignant neoplasm of the left breast s/p lumpectomy thousand 13, KATE, HTN, depression, HLD, liver cirrhosis 2/2 HOLDEN with toxic metabolic encephalopathy and recent PE on 2021 who was brought to the ED by her son for altered mentation and being found on the floor. Patient stated that Monday night she got up to go to the restroom and next thing she remembers she was lying on the ground until 2 PM when her son came to check on her. Son called EMS, patient refused transport via ambulance. Son called PCP who recommended patient was brought to the ED for further evaluation. Patient presented to [facility] ED. The only complaint she had was bilateral arm pain, noted left arm pain was chronic but right arm pain was new. In [facility] ED she was noted to be somnolent with some generalized weakness. Subsequently she was transferred to [facility] and admitted for further treatment. Her workup on  revealed right closed proximal humerus fracture, rhabdomyolysis, and toxic metabolic encephalopathy-hyperammonemia. Orthopedic surgery was consulted for right arm fracture. They recommended sling but no acute intervention.  For the rest of her stay, she was treated for her hyperammonemia and rhabdomyolysis with IV hydrations and lactulose. Patient remained stable, recovered and was in a suitable condition to be discharged to [facility].         Discharge plan:   [facility], [facility].      Discharge Medications:         Medication List      START taking these medications    cefdinir 300 MG capsule  Commonly known as: OMNICEF  Take 1 capsule by mouth 2 times daily for 3 days        CONTINUE taking these medications    albuterol sulfate  (90 Base) MCG/ACT inhaler  Commonly known as: Ventolin HFA  Inhale 2 puffs into the lungs 4 times daily as needed for Wheezing     Biotin 14905 MCG Tabs     budesonide-formoterol 160-4.5 MCG/ACT Aero  Commonly known as: Symbicort  Inhale 2 puffs into the lungs 2 times daily     doxazosin 1 MG tablet  Commonly known as: CARDURA  Take 1 tablet by mouth nightly     Enulose 10 GM/15ML Soln solution  Generic drug: lactulose encephalopathy     felodipine 2.5 MG extended release tablet  Commonly known as: PLENDIL  TAKE 1 TABLET BY MOUTH DAILY     furosemide 40 MG tablet  Commonly known as: LASIX  Take 1 tablet by mouth daily     lactulose 10 GM/15ML solution  Commonly known as: CHRONULAC  Take 30 mLs by mouth 3 times daily     pantoprazole 40 MG tablet  Commonly known as: PROTONIX  Take 1 tablet by mouth daily     rifaximin 550 MG tablet  Commonly known as: Xifaxan  Take 1 tablet by mouth 2 times daily     spironolactone 100 MG tablet  Commonly known as: ALDACTONE  Take 1 tablet by mouth daily     venlafaxine 75 MG extended release capsule  Commonly known as: EFFEXOR XR  Take 1 capsule by mouth daily     vitamin D 1.25 MG (77855 UT) Caps capsule  Commonly known as: ERGOCALCIFEROL  Take 1 capsule by mouth once a week     vitamin D3 25 MCG (1000 UT) Tabs tablet  Commonly known as: CHOLECALCIFEROL           Where to Get Your Medications      These medications were sent to [facility] [address] [place] [address] [address]  [address] [address]    Phone: [phone]   · cefdinir 300 MG capsule         Consults:  orthopedic surgery    Significant Diagnostic Studies:  radiology: X-Ray: Chest, Pelvis, CT scan: Head and Cervical spine and Ultrasound: Abdomin and cardiac graphics: ECG: Sinus tachycardia, Possible Left atrial enlargement and Echocardiogram: EF > 60%, Mitral and tricuspid regurgitation. See below for more details. Labs:  Lab Results   Component Value Date    WBC 6.3 08/20/2021    HGB 10.8 (L) 08/20/2021    HCT 33.6 (L) 08/20/2021     08/20/2021    CHOL 94 02/27/2021    TRIG 65 02/27/2021    HDL 37 02/27/2021    ALT 22 08/17/2021    AST 61 (H) 08/17/2021     08/20/2021    K 4.4 08/20/2021     (H) 08/20/2021    CREATININE 1.0 08/20/2021    BUN 12 08/20/2021    CO2 22 08/20/2021    TSH 2.060 12/30/2020    INR 1.3 08/17/2021    LABA1C 5.1 04/15/2021    LABMICR 27.4 (H) 02/27/2021      Lab Results   Component Value Date    COLORU Yellow 08/18/2021    NITRU Negative 08/18/2021    GLUCOSEU Negative 08/18/2021    KETUA Negative 08/18/2021    UROBILINOGEN 0.2 08/18/2021    BILIRUBINUR Negative 08/18/2021    BILIRUBINUR Neg 10/29/2018      Lab Results   Component Value Date    INR 1.3 08/17/2021    INR 1.7 04/16/2021    INR 1.6 12/31/2020    PROTIME 14.9 (H) 08/17/2021    PROTIME 18.9 (H) 04/16/2021    PROTIME 18.7 (H) 12/31/2020       BP Readings from Last 3 Encounters:   08/20/21 129/81   06/30/21 130/60   05/13/21 129/70         New Imaging:  XR SHOULDER RIGHT (MIN 2 VIEWS)   Final Result   Unchanged appearance of the previously described fracture of the right   humeral neck. XR HUMERUS RIGHT (MIN 2 VIEWS)   Final Result   1. Very minimally displaced fracture of the right humeral head/neck region   which appears to involve the tuberosity. Humeral head is appropriately   positioned within the glenoid.       2.  Status post left shoulder arthroplasty. There appears to be appropriate   alignment on this exam.  No acute osseous findings about the left humerus. XR HUMERUS LEFT (MIN 2 VIEWS)   Final Result   1. Very minimally displaced fracture of the right humeral head/neck region   which appears to involve the tuberosity. Humeral head is appropriately   positioned within the glenoid. 2.  Status post left shoulder arthroplasty. There appears to be appropriate   alignment on this exam.  No acute osseous findings about the left humerus. XR CHEST PORTABLE   Final Result   No radiographic evidence of acute cardiopulmonary disease. XR PELVIS (1-2 VIEWS)   Final Result   No evidence of acute pelvic or hip fracture. CT HEAD WO CONTRAST   Final Result   CT head without contrast:      1. No skull fracture or acute intracranial abnormality. CT cervical spine without contrast:      1. No fracture or joint dislocation is seen. 2. Degenerative changes, as described. CT CERVICAL SPINE WO CONTRAST   Final Result   CT head without contrast:      1. No skull fracture or acute intracranial abnormality. CT cervical spine without contrast:      1. No fracture or joint dislocation is seen. 2. Degenerative changes, as described. Treatments:   IV hydration, antibiotics: ceftriaxone, analgesia: Oxycodon, cardiac meds: furosemide, anticoagulation: LMW heparin and therapies: PT and OT    Discharge Exam:     Physical Exam  Constitutional:       Appearance: Normal appearance. HENT:      Head: Normocephalic and atraumatic. Cardiovascular:      Rate and Rhythm: Normal rate and regular rhythm. Pulses: Normal pulses. Heart sounds: Normal heart sounds. No murmur heard. No friction rub. No gallop. Pulmonary:      Effort: Pulmonary effort is normal.      Breath sounds: No wheezing or rales. Abdominal:      General: Abdomen is flat. Palpations: Abdomen is soft.    Musculoskeletal: Comments: Right arm in sling  Right hand: moving fingers, warm-well perfused, no cyanosis   Neurological:      General: No focal deficit present. Mental Status: She is alert and oriented to person, place, and time. See progress note on the day of discharge for more details    Disposition: SNF, [facility].     Future Appointments   Date Time Provider Geraldine Rader   8/31/2021  2:00 PM [facility]   9/8/2021  9:30 AM Kathy Castaneda MD [facility]   1/18/2022 10:15 AM Jessica Lubin MD [facility]   3/15/2022 10:00 AM XENIA Sanchez - CNP [facility]       More than 30 minutes was spent in preparation of this patient's discharge including, but not limited to, examination, preparation of documents, prescription preparation, counseling and coordination.     Signed:  Yuly Randolph MD  8/22/2021, 2:18 PM

## 2021-08-23 LAB
BLOOD CULTURE, ROUTINE: NORMAL
CULTURE, BLOOD 2: NORMAL

## 2021-09-01 ENCOUNTER — TELEPHONE (OUTPATIENT)
Dept: ORTHOPEDIC SURGERY | Age: 76
End: 2021-09-01

## 2021-09-01 DIAGNOSIS — Z96.612 S/P REVERSE TOTAL SHOULDER ARTHROPLASTY, LEFT: Primary | ICD-10-CM

## 2021-09-01 DIAGNOSIS — S42.211A CLOSED DISPLACED FRACTURE OF SURGICAL NECK OF RIGHT HUMERUS, UNSPECIFIED FRACTURE MORPHOLOGY, INITIAL ENCOUNTER: ICD-10-CM

## 2021-09-10 ENCOUNTER — TELEPHONE (OUTPATIENT)
Dept: ORTHOPEDIC SURGERY | Age: 76
End: 2021-09-10

## 2021-09-10 NOTE — TELEPHONE ENCOUNTER
Patient missed appoint on 9/8 ( same day cancel due to transportation) needs to reschedule the hospital follow up. Please contact her son to schedule.  Thank you

## 2021-09-10 NOTE — TELEPHONE ENCOUNTER
Call back to patient to r/s cancelled appt, lvm advising appt available on 10/6 and to call back if would like to be seen that day.

## 2021-09-15 ENCOUNTER — TELEPHONE (OUTPATIENT)
Dept: FAMILY MEDICINE CLINIC | Age: 76
End: 2021-09-15

## 2021-09-15 ENCOUNTER — TELEPHONE (OUTPATIENT)
Dept: ORTHOPEDIC SURGERY | Age: 76
End: 2021-09-15

## 2021-09-15 NOTE — TELEPHONE ENCOUNTER
R prox humerus fx non-op. Future Appointments   Date Time Provider Geraldine Rader   10/19/2021  2:00 PM MD Bryan Moreiramatthias [facility]   1/18/2022 10:15 AM Cheryl Jacob MD [facility]   3/15/2022 10:00 AM XENIA Sommers CNP [facility]     Routed to Providers for consideration and scheduling recommendations.

## 2021-09-22 DIAGNOSIS — R11.0 NAUSEA: ICD-10-CM

## 2021-09-22 RX ORDER — ONDANSETRON 4 MG/1
4 TABLET, FILM COATED ORAL DAILY PRN
Qty: 30 TABLET | Refills: 1 | Status: SHIPPED
Start: 2021-09-22 | End: 2021-10-19

## 2021-09-29 ENCOUNTER — HOSPITAL ENCOUNTER (OUTPATIENT)
Dept: GENERAL RADIOLOGY | Age: 76
Discharge: HOME | End: 2021-10-01
Payer: MEDICARE

## 2021-09-29 ENCOUNTER — OFFICE VISIT (OUTPATIENT)
Dept: ORTHOPEDIC SURGERY | Age: 76
End: 2021-09-29
Payer: MEDICARE

## 2021-09-29 VITALS — TEMPERATURE: 97.8 F

## 2021-09-29 DIAGNOSIS — Z96.612 S/P REVERSE TOTAL SHOULDER ARTHROPLASTY, LEFT: ICD-10-CM

## 2021-09-29 DIAGNOSIS — S42.211A CLOSED DISPLACED FRACTURE OF SURGICAL NECK OF RIGHT HUMERUS, UNSPECIFIED FRACTURE MORPHOLOGY, INITIAL ENCOUNTER: ICD-10-CM

## 2021-09-29 DIAGNOSIS — S42.211D CLOSED DISPLACED FRACTURE OF SURGICAL NECK OF RIGHT HUMERUS WITH ROUTINE HEALING, UNSPECIFIED FRACTURE MORPHOLOGY, SUBSEQUENT ENCOUNTER: ICD-10-CM

## 2021-09-29 PROCEDURE — 73030 X-RAY EXAM OF SHOULDER: CPT

## 2021-09-29 PROCEDURE — G8417 CALC BMI ABV UP PARAM F/U: HCPCS | Performed by: ORTHOPAEDIC SURGERY

## 2021-09-29 PROCEDURE — G8427 DOCREV CUR MEDS BY ELIG CLIN: HCPCS | Performed by: ORTHOPAEDIC SURGERY

## 2021-09-29 PROCEDURE — 1090F PRES/ABSN URINE INCON ASSESS: CPT | Performed by: ORTHOPAEDIC SURGERY

## 2021-09-29 PROCEDURE — 99212 OFFICE O/P EST SF 10 MIN: CPT | Performed by: ORTHOPAEDIC SURGERY

## 2021-09-29 PROCEDURE — 99213 OFFICE O/P EST LOW 20 MIN: CPT | Performed by: ORTHOPAEDIC SURGERY

## 2021-09-29 PROCEDURE — 4040F PNEUMOC VAC/ADMIN/RCVD: CPT | Performed by: ORTHOPAEDIC SURGERY

## 2021-09-29 PROCEDURE — 1036F TOBACCO NON-USER: CPT | Performed by: ORTHOPAEDIC SURGERY

## 2021-09-29 PROCEDURE — G8399 PT W/DXA RESULTS DOCUMENT: HCPCS | Performed by: ORTHOPAEDIC SURGERY

## 2021-09-29 PROCEDURE — 1123F ACP DISCUSS/DSCN MKR DOCD: CPT | Performed by: ORTHOPAEDIC SURGERY

## 2021-09-29 RX ORDER — METHOCARBAMOL 500 MG/1
500 TABLET, FILM COATED ORAL 3 TIMES DAILY
Qty: 42 TABLET | Refills: 0 | Status: SHIPPED | OUTPATIENT
Start: 2021-09-29 | End: 2021-10-13

## 2021-09-29 NOTE — PATIENT INSTRUCTIONS
Physical therapy ordered today - sent to [facility] SPECIALTY [facility] (free transportation)   Call office if they cannot pick you up    Continue aggressive range of motion, stretching, strengthening to bilateral upper extremities    Recommend buying diclofenac/voltaren gel for bilateral shoulders  Robaxin sent to [facility] pharmacy. Trial for a week. If not helping, okay to discontinue. Call office for refill. Follow up in 6 weeks  Please call the office at [phone] or send So1 message to providers sooner with any questions or concerns  Strongly recommend all of our patients sign up for So1 in order to have direct communication VIA So1 BOBBY with our clinic staff. Patient Education        methocarbamol (oral/injection)  Pronunciation:  cierra stewart  Brand:  Carbacot, Robaxin, Skelex  What is the most important information I should know about methocarbamol? Follow all directions on your medicine label and package. Tell each of your healthcare providers about all your medical conditions, allergies, and all medicines you use. What is methocarbamol? Methocarbamol is a muscle relaxer that is used together with rest and physical therapy to treat skeletal muscle conditions such as pain or injury. Methocarbamol injection is sometimes used in the treatment of tetanus, (lockjaw) which causes painful tightening of the muscles. Methocarbamol may also be used for purposes not listed in this medication guide. What should I discuss with my healthcare provider before using methocarbamol? You should not use methocarbamol if you are allergic to it. Tell your doctor if you have ever had:  · kidney disease;  · a seizure; or  · myasthenia gravis. Methocarbamol may harm an unborn baby. Use effective birth control to prevent pregnancy, and tell your doctor if you become pregnant. It may not be safe to breastfeed while using this medicine. Ask your doctor about any risk.   Methocarbamol is not approved for use by anyone younger than 12years old unless to treat tetanus. How should I use methocarbamol? Follow all directions on your prescription label and read all medication guides or instruction sheets. Use the medicine exactly as directed. Methocarbamol is only part of a complete treatment program that may also include rest, physical therapy, or other pain relief measures. Methocarbamol oral is taken by mouth. You may need to reduce your dose after the first 2 or 3 days of treatment. Carefully follow your doctor's dosing instructions. Methocarbamol injection is injected into a muscle or given as an infusion into a vein. A healthcare provider will give you this injection. Methocarbamol injection is usually given as a single dose before you start taking the oral form. Tell your caregivers if you feel any burning, pain, or swelling around the IV needle when methocarbamol is injected. This medicine can affect the results of certain medical tests. Tell any doctor who treats you that you are using methocarbamol. Store at room temperature away from moisture and heat. Keep the bottle tightly closed when not in use. What happens if I miss a dose? Take the medicine as soon as you can, but skip the missed dose if it is almost time for your next dose. Do not take two doses at one time. What happens if I overdose? Seek emergency medical attention or call the Poison Help line at 1-904.643.9795. Overdose symptoms may include nausea, extreme drowsiness, fainting, seizure, or coma. What should I avoid while taking methocarbamol? Avoid driving or hazardous activity until you know how this medicine will affect you. Dizziness or drowsiness can cause falls, accidents, or severe injuries. Drinking alcohol with this medicine can cause side effects. What are the possible side effects of methocarbamol?   Get emergency medical help if you have signs of an allergic reaction:  hives; difficult breathing; swelling of your face, lips, tongue, or throat. Stop using methocarbamol and call your doctor at once if you have:  · a light-headed feeling, like you might pass out;  · slow heartbeats;  · a seizure;  · jaundice (yellowing of your skin or eyes); or  · pain, bruising, swelling, or skin changes where a methocarbamol injection was given. Common side effects may include:  · headache, dizziness, drowsiness;  · fever;  · confusion, problems with memory;  · nausea, vomiting, upset stomach;  · blurred vision, double vision;  · flushing (warmth, redness, or tingly feeling);  · sleep problems (insomnia); or  · lack of coordination. This is not a complete list of side effects and others may occur. Call your doctor for medical advice about side effects. You may report side effects to FDA at 4-560-FDA-2125. What other drugs will affect methocarbamol? Using methocarbamol with other drugs that make you sleepy or slow your breathing can cause dangerous side effects or death. Ask your doctor before using opioid medication, a sleeping pill, a muscle relaxer, or medicine for anxiety or seizures. Other drugs may affect methocarbamol, including prescription and over-the-counter medicines, vitamins, and herbal products. Tell your doctor about all your current medicines and any medicine you start or stop using. Where can I get more information? Your pharmacist can provide more information about methocarbamol. Remember, keep this and all other medicines out of the reach of children, never share your medicines with others, and use this medication only for the indication prescribed. Every effort has been made to ensure that the information provided by Anh Culp Dr is accurate, up-to-date, and complete, but no guarantee is made to that effect. Drug information contained herein may be time sensitive.  [facility] information has been compiled for use by healthcare practitioners and consumers in the [place] and therefore [facility] does not diclofenac topical?  Diclofenac is a nonsteroidal anti-inflammatory drug (NSAID). Diclofenac topical (for the skin) is used to treat joint pain caused by osteoarthritis. This medicine is for use on the hands, wrists, elbows, knees, ankles, or feet. Diclofenac topical may not be effective in treating arthritis pain elsewhere in the body. Pennsaid is for use only on the knees. Solaraze is used to treat warty overgrowths of skin (actinic keratoses) on sun-exposed areas of the body. Diclofenac topical may also be used for purposes not listed in this medication guide. What should I discuss with my healthcare provider before using diclofenac topical?  Diclofenac topical can increase your risk of fatal heart attack or stroke, even if you don't have any risk factors. Do not use this medicine just before or after heart bypass surgery (coronary artery bypass graft, or CABG). Diclofenac topical may also cause stomach or intestinal bleeding, which can be fatal. These conditions can occur without warning while you are using diclofenac topical, especially in older adults. You should not use this medicine if you are allergic to diclofenac (Voltaren, Cataflam, Flector, and others), or if you have ever had an asthma attack or severe allergic reaction after taking aspirin or an NSAID. Diclofenac topical is not approved for use by anyone younger than 25years old. Tell your doctor if you have ever had:  · heart disease, high blood pressure, high cholesterol, diabetes, or if you smoke;  · a heart attack, stroke, or blood clot;  · stomach ulcers, bleeding in your stomach or intestines;  · asthma;  · liver or kidney disease; or  · fluid retention. Diclofenac can affect ovulation and it may be harder to get pregnant while you are using this medicine. If you are pregnant, you should not take diclofenac topical unless your doctor tells you to.  Taking an NSAID during the last 20 weeks of pregnancy can cause serious heart or kidney problems in the unborn baby and possible complications with your pregnancy. It may not be safe to breastfeed while using this medicine. Ask your doctor about any risk. How should I use diclofenac topical?  Follow all directions on your prescription label and read all medication guides. Use the lowest dose that is effective in treating your condition. Do not take by mouth. Topical medicine is for use only on the skin. Rinse with water if this medicine gets in your eyes or mouth. Read and carefully follow any Instructions for Use provided with your medicine. Ask your doctor or pharmacist if you do not understand these instructions. Do not apply diclofenac topical to an open skin wound, or on areas of infection, rash, burn, or peeling skin. Store at room temperature away from moisture and heat. Do not freeze. What happens if I miss a dose? Apply the medicine as soon as you can, but skip the missed dose if it is almost time for your next dose. Do not apply two doses at one time. What happens if I overdose? Seek emergency medical attention or call the Poison Help line at 1-543.569.9108. What should I avoid while using diclofenac topical?  Ask a doctor or pharmacist before using other medicines for pain, fever, swelling, or cold/flu symptoms. They may contain ingredients similar to diclofenac (such as aspirin, ibuprofen, ketoprofen, or naproxen). Avoid drinking alcohol. It may increase your risk of stomach bleeding. Avoid exposing treated skin to heat, sunlight, or tanning beds. Heat can increase the amount of diclofenac you absorb through your skin. Avoid getting this medicine in your eyes. If contact does occur, rinse with water. Call your doctor if you have eye irritation that lasts longer than 1 hour.   Do not use cosmetics, sunscreen, lotions, insect repellant, or other medicated skin products on the same area you treat with diclofenac topical.  What are the possible side effects of diclofenac topical?  Get emergency medical help if you have signs of an allergic reaction (hives, sneezing, runny or stuffy nose, wheezing or trouble breathing, swelling in your face or throat) or a severe skin reaction (fever, sore throat, burning eyes, skin pain, red or purple skin rash with blistering and peeling). Although the risk of serious side effects is low when diclofenac is applied to the skin, this medicine can be absorbed through the skin, which may cause steroid side effects throughout the body. Stop using diclofenac and seek emergency medical attention if you have signs of a heart attack or stroke: chest pain spreading to your jaw or shoulder, sudden numbness or weakness on one side of the body, slurred speech, feeling short of breath. Also call your doctor at once if you have:  · a skin rash, no matter how mild;  · swelling, rapid weight gain;  · severe headache, blurred vision, pounding in your neck or ears;  · little or no urination;  · liver problems --nausea, diarrhea, stomach pain (upper right side), tiredness, itching, dark urine, bren-colored stools, jaundice (yellowing of the skin or eyes);  · low red blood cells (anemia) --pale skin, unusual tiredness, feeling light-headed or short of breath, cold hands and feet; or  · signs of stomach bleeding --bloody or tarry stools, coughing up blood or vomit that looks like coffee grounds. Common side effects may include:  · heartburn, gas, stomach pain, nausea, vomiting;  · diarrhea, constipation;  · headache, dizziness, drowsiness;  · stuffy nose;  · itching, increased sweating;  · increased blood pressure; or  · skin redness, itching, dryness, scaling, or peeling where the medicine was applied. This is not a complete list of side effects and others may occur. Call your doctor for medical advice about side effects. You may report side effects to FDA at 6-974-FDA-6624.   What other drugs will affect diclofenac topical?  Ask your doctor before using diclofenac if you take an antidepressant. Taking certain antidepressants with an NSAID may cause you to bruise or bleed easily. Tell your doctor about all your current medicines, especially:  · cyclosporine;  · lithium;  · methotrexate;  · a blood thinner (warfarin, Coumadin, Jantoven);  · heart or blood pressure medication, including a diuretic or \"water pill\"; or  · steroid medicine (prednisone and others). This list is not complete and many other drugs may affect diclofenac. This includes prescription and over-the-counter medicines, vitamins, and herbal products. Not all possible drug interactions are listed here. Where can I get more information? Your pharmacist can provide more information about diclofenac topical.  Remember, keep this and all other medicines out of the reach of children, never share your medicines with others, and use this medication only for the indication prescribed. Every effort has been made to ensure that the information provided by Anh Culp Dr is accurate, up-to-date, and complete, but no guarantee is made to that effect. Drug information contained herein may be time sensitive. [facility] information has been compiled for use by healthcare practitioners and consumers in the [facility] and therefore [facility] does not warrant that uses outside of the [facility] are appropriate, unless specifically indicated otherwise. [facility]'s drug information does not endorse drugs, diagnose patients or recommend therapy. [facility] drug information is an informational resource designed to assist licensed healthcare practitioners in caring for their patients and/or to serve consumers viewing this service as a supplement to, and not a substitute for, the expertise, skill, knowledge and judgment of healthcare practitioners.  The absence of a warning for a given drug or drug combination in no way should be construed to indicate that the drug or drug combination is safe, effective or appropriate for any given patient. [facility] does not assume any responsibility for any aspect of healthcare administered with the aid of information [facility] provides. The information contained herein is not intended to cover all possible uses, directions, precautions, warnings, drug interactions, allergic reactions, or adverse effects. If you have questions about the drugs you are taking, check with your doctor, nurse or pharmacist.  Copyright 4550-9785 [address]: 10.03. Revision date: 10/28/2020. Care instructions adapted under license by [facility] ([facility]). If you have questions about a medical condition or this instruction, always ask your healthcare professional. [place] [address] any warranty or liability for your use of this information.

## 2021-09-29 NOTE — PROGRESS NOTES
Chief Complaint   Patient presents with    Shoulder Pain     [facility] 12/21/2020       SUBJECTIVE: Patient very pleasant 70-year-old female who is here about 6 weeks out from right proximal humerus fracture being treated nonoperatively. She is about a year out from a left reverse total shoulder for fracture. She has pain in both sides. She is gotten very stiff bilaterally. She is recently got out of a nursing facility. She is accompanied by her son today. Does have pain in both shoulders currently the right hurts worse than the left. She denies any recent trauma. Review of Systems -   General ROS: negative for - chills, fatigue, fever or night sweats  Respiratory ROS: no cough, shortness of breath, or wheezing  Cardiovascular ROS: no chest pain or dyspnea on exertion  Gastrointestinal ROS: no abdominal pain, change in bowel habits, or black or bloody stools  Genitourinary: no hematuria, dysuria, or incontinence   Musculoskeletal ROS:see above  Neurological ROS: no TIA or stroke symptoms       OBJECTIVE:   Alert and oriented X 3, no acute distress, respirations easy and unlabored with no audible wheezes, skin warm and dry, speech and dress appropriate for noted age, affect euthymic.     Extremity:    Right shoulder   Skin intact   Minimal tenderness to palpation about the bone, no crepitus   Range of motion passively to 60 degrees flexion, 5 degrees external rotation, internal rotation to the belly   No significant pain with range of motion   Compartments soft and compressible   Fires M U R nerves   2/4 radial pulse   Sensation intact   Capillary refill less than 3 seconds    Left shoulder     Skin intact, previous incision well-healed   Active range of motion to the horizon, internal rotation to the belly, external rotation about 10 degrees   With range of motion no crepitus   The pain in this arm is located at the scapula which has very little range of motion   Compartments soft and compressible   Fires M U R nerves   2/4 radial pulse   Sensation intact   Capillary refill less than 3 seconds            XR: 9/29/21     Right proximal humerusfracture does appear to be healing. She does not have abundant callus but has not changed alignment over the past 6 weeks. She does have some callus on the lateral's of the humerus. Left proximal humerusprosthesis appears well fixed including the glenosphere and the stem. No obvious fracture dislocation. Temp 97.8 °F (36.6 °C) (Oral)     ASSESSMENT:     Diagnosis Orders   1. Closed displaced fracture of surgical neck of right humerus with routine healing, unspecified fracture morphology, subsequent encounter  External Referral To Physical Therapy    methocarbamol (ROBAXIN) 500 MG tablet   2. S/P reverse total shoulder arthroplasty, left  External Referral To Physical Therapy    methocarbamol (ROBAXIN) 500 MG tablet       PLAN:    Document detail about starting rehabilitation hopefully outpatient. She has not had aggressive outpatient rehab at this point time. I think we do both shoulders with active range of motion. They are agreeable. We will see him back in 6 weeks time. Also recommend using Voltaren over-the-counter which she is agreeable with and potential Robaxin.       ELECTRONICALLY signed by:    Yesenia Arreaga MD  9/29/21

## 2021-10-02 ENCOUNTER — APPOINTMENT (OUTPATIENT)
Dept: GENERAL RADIOLOGY | Age: 76
End: 2021-10-02
Payer: MEDICARE

## 2021-10-02 ENCOUNTER — HOSPITAL ENCOUNTER (EMERGENCY)
Age: 76
Discharge: HOME | End: 2021-10-02
Attending: EMERGENCY MEDICINE
Payer: MEDICARE

## 2021-10-02 VITALS
SYSTOLIC BLOOD PRESSURE: 110 MMHG | HEART RATE: 89 BPM | TEMPERATURE: 98.8 F | BODY MASS INDEX: 35.44 KG/M2 | HEIGHT: 63 IN | RESPIRATION RATE: 16 BRPM | DIASTOLIC BLOOD PRESSURE: 60 MMHG | WEIGHT: 200 LBS | OXYGEN SATURATION: 95 %

## 2021-10-02 DIAGNOSIS — N30.01 ACUTE CYSTITIS WITH HEMATURIA: Primary | ICD-10-CM

## 2021-10-02 DIAGNOSIS — K74.60 CIRRHOSIS OF LIVER WITH ASCITES, UNSPECIFIED HEPATIC CIRRHOSIS TYPE (HCC): ICD-10-CM

## 2021-10-02 DIAGNOSIS — R18.8 CIRRHOSIS OF LIVER WITH ASCITES, UNSPECIFIED HEPATIC CIRRHOSIS TYPE (HCC): ICD-10-CM

## 2021-10-02 LAB
ALBUMIN SERPL-MCNC: 3.1 G/DL (ref 3.5–5.2)
ALP BLD-CCNC: 175 U/L (ref 35–104)
ALT SERPL-CCNC: 13 U/L (ref 0–32)
ANION GAP SERPL CALCULATED.3IONS-SCNC: 6 MMOL/L (ref 7–16)
AST SERPL-CCNC: 31 U/L (ref 0–31)
BACTERIA: ABNORMAL /HPF
BASOPHILS ABSOLUTE: 0.06 E9/L (ref 0–0.2)
BASOPHILS RELATIVE PERCENT: 1 % (ref 0–2)
BILIRUB SERPL-MCNC: 0.9 MG/DL (ref 0–1.2)
BILIRUBIN URINE: NEGATIVE
BLOOD, URINE: ABNORMAL
BUN BLDV-MCNC: 11 MG/DL (ref 6–23)
CALCIUM SERPL-MCNC: 10.3 MG/DL (ref 8.6–10.2)
CHLORIDE BLD-SCNC: 104 MMOL/L (ref 98–107)
CLARITY: ABNORMAL
CO2: 27 MMOL/L (ref 22–29)
COLOR: ABNORMAL
CREAT SERPL-MCNC: 1.1 MG/DL (ref 0.5–1)
EOSINOPHILS ABSOLUTE: 0.31 E9/L (ref 0.05–0.5)
EOSINOPHILS RELATIVE PERCENT: 5 % (ref 0–6)
GFR AFRICAN AMERICAN: 58
GFR NON-AFRICAN AMERICAN: 48 ML/MIN/1.73
GLUCOSE BLD-MCNC: 86 MG/DL (ref 74–99)
GLUCOSE URINE: NEGATIVE MG/DL
HCT VFR BLD CALC: 31.7 % (ref 34–48)
HEMOGLOBIN: 10.5 G/DL (ref 11.5–15.5)
IMMATURE GRANULOCYTES #: 0.01 E9/L
IMMATURE GRANULOCYTES %: 0.2 % (ref 0–5)
KETONES, URINE: ABNORMAL MG/DL
LEUKOCYTE ESTERASE, URINE: ABNORMAL
LYMPHOCYTES ABSOLUTE: 1.2 E9/L (ref 1.5–4)
LYMPHOCYTES RELATIVE PERCENT: 19.5 % (ref 20–42)
MCH RBC QN AUTO: 33.9 PG (ref 26–35)
MCHC RBC AUTO-ENTMCNC: 33.1 % (ref 32–34.5)
MCV RBC AUTO: 102.3 FL (ref 80–99.9)
MONOCYTES ABSOLUTE: 0.94 E9/L (ref 0.1–0.95)
MONOCYTES RELATIVE PERCENT: 15.3 % (ref 2–12)
NEUTROPHILS ABSOLUTE: 3.64 E9/L (ref 1.8–7.3)
NEUTROPHILS RELATIVE PERCENT: 59 % (ref 43–80)
NITRITE, URINE: POSITIVE
PDW BLD-RTO: 18 FL (ref 11.5–15)
PH UA: 6.5 (ref 5–9)
PLATELET # BLD: 214 E9/L (ref 130–450)
PMV BLD AUTO: 10 FL (ref 7–12)
POTASSIUM REFLEX MAGNESIUM: 4.3 MMOL/L (ref 3.5–5)
PRO-BNP: 62 PG/ML (ref 0–450)
PROTEIN UA: 100 MG/DL
RBC # BLD: 3.1 E12/L (ref 3.5–5.5)
RBC UA: ABNORMAL /HPF (ref 0–2)
SODIUM BLD-SCNC: 137 MMOL/L (ref 132–146)
SPECIFIC GRAVITY UA: 1.02 (ref 1–1.03)
TOTAL PROTEIN: 6.7 G/DL (ref 6.4–8.3)
TROPONIN, HIGH SENSITIVITY: 14 NG/L (ref 0–9)
TROPONIN, HIGH SENSITIVITY: 16 NG/L (ref 0–9)
UROBILINOGEN, URINE: 1 E.U./DL
WBC # BLD: 6.2 E9/L (ref 4.5–11.5)
WBC UA: ABNORMAL /HPF (ref 0–5)

## 2021-10-02 PROCEDURE — 87088 URINE BACTERIA CULTURE: CPT

## 2021-10-02 PROCEDURE — 84484 ASSAY OF TROPONIN QUANT: CPT

## 2021-10-02 PROCEDURE — 36415 COLL VENOUS BLD VENIPUNCTURE: CPT

## 2021-10-02 PROCEDURE — 81001 URINALYSIS AUTO W/SCOPE: CPT

## 2021-10-02 PROCEDURE — 71045 X-RAY EXAM CHEST 1 VIEW: CPT

## 2021-10-02 PROCEDURE — 80053 COMPREHEN METABOLIC PANEL: CPT

## 2021-10-02 PROCEDURE — 85025 COMPLETE CBC W/AUTO DIFF WBC: CPT

## 2021-10-02 PROCEDURE — 99283 EMERGENCY DEPT VISIT LOW MDM: CPT

## 2021-10-02 PROCEDURE — 93005 ELECTROCARDIOGRAM TRACING: CPT | Performed by: EMERGENCY MEDICINE

## 2021-10-02 PROCEDURE — 83880 ASSAY OF NATRIURETIC PEPTIDE: CPT

## 2021-10-02 PROCEDURE — 6370000000 HC RX 637 (ALT 250 FOR IP)

## 2021-10-02 RX ORDER — CEFDINIR 300 MG/1
300 CAPSULE ORAL 2 TIMES DAILY
Qty: 20 CAPSULE | Refills: 0 | Status: SHIPPED | OUTPATIENT
Start: 2021-10-02 | End: 2021-10-12

## 2021-10-02 RX ORDER — CEFDINIR 300 MG/1
300 CAPSULE ORAL EVERY 12 HOURS SCHEDULED
Status: DISCONTINUED | OUTPATIENT
Start: 2021-10-02 | End: 2021-10-02 | Stop reason: HOSPADM

## 2021-10-02 RX ORDER — CEFDINIR 300 MG/1
CAPSULE ORAL
Status: COMPLETED
Start: 2021-10-02 | End: 2021-10-02

## 2021-10-02 RX ADMIN — CEFDINIR 300 MG: 300 CAPSULE ORAL at 18:40

## 2021-10-02 ASSESSMENT — PAIN SCALES - GENERAL: PAINLEVEL_OUTOF10: 9

## 2021-10-02 ASSESSMENT — PAIN DESCRIPTION - PAIN TYPE: TYPE: ACUTE PAIN

## 2021-10-02 ASSESSMENT — PAIN DESCRIPTION - LOCATION: LOCATION: BACK

## 2021-10-02 NOTE — ED PROVIDER NOTES
HPI:  10/2/21, Time: 7:14 PM EDT        Roxana Hernandez is a 68 y.o. female presenting to the ED for hematuria and lower back pain , beginning 2 weeks ago. The complaint has been persistent, moderate in severity, and worsened by nothing. He states he has had hematuria before last time that she had a urinary tract infection. She states she is used a water pill for heart failure however she did not take it today due to known she did come to the hospital be evaluated. .  Patient admits to dysuria with hematuria    Patient denies fever/chills, sore throat, cough, congestion, chest pain, shortness of breath, edema, headache, visual disturbances, focal paresthesias, focal weakness, abdominal pain, nausea, vomiting, diarrhea, constipation,  trauma, neck or back pain, rash or other complaints. ROS:   A complete review of systems was performed and all pertinent positives and negatives are stated within HPI, all other systems reviewed and are negative.            --------------------------------------------- PAST HISTORY ---------------------------------------------  Past Medical History:  has a past medical history of Breast cancer ([facility] Cirrhosis ([facility] Essential hypertension, Hyperlipidemia, Osteopenia, 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 ([facility] Sleep apnea, Spinal stenosis, and [facility] Past Surgical History:  has a past surgical history that includes Hysterectomy; Carpal tunnel release; Lithotripsy (Left, 05/04/2016); Colonoscopy (01/31/2017); Breast lumpectomy; Upper gastrointestinal endoscopy (04/23/2019); Colonoscopy (04/23/2019); Upper gastrointestinal endoscopy (N/A, 04/23/2019); Colonoscopy (N/A, 04/23/2019); Colonoscopy (04/23/2019); Shoulder Arthroplasty (Left, 12/21/2020); and cardiovascular stress test.    Social History:  reports that she has never smoked. She has never used smokeless tobacco. She reports that she does not drink alcohol and does not use drugs.     Family History: family history includes Arthritis in her mother; COPD in her father; Cancer (age of onset: 48) in an other family member; Heart Attack in her father. The patients home medications have been reviewed. Allergies: Lisinopril        ----------------------------------------PHYSICAL EXAM--------------------------------------    Constitutional:  Well developed, well nourished, obese,no acute distress, non-toxic appearance   Eyes:  PERRL, conjunctiva normal, EOMI  HENT:  Atraumatic, external ears normal, nose normal, oropharynx moist, no pharyngeal exudates. Neck- normal range of motion, no nuchal rigidity   Respiratory:  No respiratory distress, normal breath sounds, no rales, no wheezing   Cardiovascular:  Normal rate, normal rhythm, no murmurs, no gallops, no rubs. Radial and DP pulses 2+ bilaterally. GI:  Soft, nondistended, normal bowel sounds, nontender, no organomegaly, no mass, no rebound, no guarding   :  No costovertebral angle tenderness   Musculoskeletal:  No edema, no tenderness, no deformities. Back- no tenderness  Integument:  Well hydrated, no rash. Adequate perfusion. Lymphatic:  No cervical lymphadenopathy noted   Neurologic:  Alert & oriented x 3, CN 2-12 normal, normal motor function, normal sensory function, no focal deficits noted. Psychiatric:  Speech and behavior appropriate       -------------------------------------------------- RESULTS -------------------------------------------------  I have personally reviewed all laboratory and imaging results for this patient. Results are listed below.      LABS:  Results for orders placed or performed during the hospital encounter of 10/02/21   CBC Auto Differential   Result Value Ref Range    WBC 6.2 4.5 - 11.5 E9/L    RBC 3.10 (L) 3.50 - 5.50 E12/L    Hemoglobin 10.5 (L) 11.5 - 15.5 g/dL    Hematocrit 31.7 (L) 34.0 - 48.0 %    .3 (H) 80.0 - 99.9 fL    MCH 33.9 26.0 - 35.0 pg    MCHC 33.1 32.0 - 34.5 %    RDW 18.0 (H) 11.5 - 15.0 fL Platelets 085 339 - 348 E9/L    MPV 10.0 7.0 - 12.0 fL    Neutrophils % 59.0 43.0 - 80.0 %    Immature Granulocytes % 0.2 0.0 - 5.0 %    Lymphocytes % 19.5 (L) 20.0 - 42.0 %    Monocytes % 15.3 (H) 2.0 - 12.0 %    Eosinophils % 5.0 0.0 - 6.0 %    Basophils % 1.0 0.0 - 2.0 %    Neutrophils Absolute 3.64 1.80 - 7.30 E9/L    Immature Granulocytes # 0.01 E9/L    Lymphocytes Absolute 1.20 (L) 1.50 - 4.00 E9/L    Monocytes Absolute 0.94 0.10 - 0.95 E9/L    Eosinophils Absolute 0.31 0.05 - 0.50 E9/L    Basophils Absolute 0.06 0.00 - 0.20 E9/L   Comprehensive Metabolic Panel w/ Reflex to MG   Result Value Ref Range    Sodium 137 132 - 146 mmol/L    Potassium reflex Magnesium 4.3 3.5 - 5.0 mmol/L    Chloride 104 98 - 107 mmol/L    CO2 27 22 - 29 mmol/L    Anion Gap 6 (L) 7 - 16 mmol/L    Glucose 86 74 - 99 mg/dL    BUN 11 6 - 23 mg/dL    CREATININE 1.1 (H) 0.5 - 1.0 mg/dL    GFR Non-African American 48 >=60 mL/min/1.73    GFR African American 58     Calcium 10.3 (H) 8.6 - 10.2 mg/dL    Total Protein 6.7 6.4 - 8.3 g/dL    Albumin 3.1 (L) 3.5 - 5.2 g/dL    Total Bilirubin 0.9 0.0 - 1.2 mg/dL    Alkaline Phosphatase 175 (H) 35 - 104 U/L    ALT 13 0 - 32 U/L    AST 31 0 - 31 U/L   Troponin   Result Value Ref Range    Troponin, High Sensitivity 16 (H) 0 - 9 ng/L   Brain Natriuretic Peptide   Result Value Ref Range    Pro-BNP 62 0 - 450 pg/mL   Urinalysis, reflex to microscopic   Result Value Ref Range    Color, UA RED (A) Straw/Yellow    Clarity, UA CLOUDY (A) Clear    Glucose, Ur Negative Negative mg/dL    Bilirubin Urine Negative Negative    Ketones, Urine TRACE (A) Negative mg/dL    Specific Gravity, UA 1.025 1.005 - 1.030    Blood, Urine LARGE (A) Negative    pH, UA 6.5 5.0 - 9.0    Protein,  (A) Negative mg/dL    Urobilinogen, Urine 1.0 <2.0 E.U./dL    Nitrite, Urine POSITIVE (A) Negative    Leukocyte Esterase, Urine TRACE (A) Negative   Microscopic Urinalysis   Result Value Ref Range    WBC, UA 1-3 0 - 5 /HPF    RBC, UA PACKED 0 - 2 /HPF    Bacteria, UA FEW (A) None Seen /HPF   Troponin   Result Value Ref Range    Troponin, High Sensitivity 14 (H) 0 - 9 ng/L   EKG 12 Lead   Result Value Ref Range    Ventricular Rate 89 BPM    Atrial Rate 89 BPM    P-R Interval 124 ms    QRS Duration 64 ms    Q-T Interval 352 ms    QTc Calculation (Bazett) 428 ms    P Axis 28 degrees    R Axis 10 degrees    T Axis 30 degrees       RADIOLOGY:  Interpreted by Radiologist.  XR CHEST PORTABLE   Final Result   Mild to moderate left-sided pleural effusion. EKG Interpretation by Me  Time: 1710  Rhythm: normal sinus   Rate: normal  Axis: normal  Conduction: normal  ST Segments: no acute change  T Waves: no acute change  Clinical Impression: no acute changes  Comparison to prior EKG: stable as compared to patient's most recent EKG      ------------------------- NURSING NOTES AND VITALS REVIEWED ---------------------------  The nursing notes within the ED encounter and vital signs as below have been reviewed by myself. /60   Pulse 89   Temp 98.8 °F (37.1 °C) (Oral)   Resp 16   Ht 5' 2.5\" (1.588 m)   Wt 200 lb (90.7 kg)   SpO2 95%   BMI 36.00 kg/m²   Oxygen Saturation Interpretation: Normal      The patients available past medical records and past encounters were reviewed. ------------------------------ ED COURSE/MEDICAL DECISION MAKING----------------------  Medications   cefdinir (OMNICEF) 300 MG capsule (300 mg  Given 10/2/21 1840)          MEDICATIONS  Discharge Medication List as of 10/2/2021  7:47 PM      START taking these medications    Details   cefdinir (OMNICEF) 300 MG capsule Take 1 capsule by mouth 2 times daily for 10 days, Disp-20 capsule, R-0Normal                     Medical Decision Making:    Patient is 70-year-old female presenting to the emergency department with hematuria and lower back pain.   Patient states he is similar symptoms to previous when she had a urinary tract infection denies any injury to her back. Patient was found to have hematuria on urinalysis as well as urinary tract infection. She was given 1 dose of Omnicef in the emergency department and then discharged with a prescription for CARRI CALDERA. Patient told to follow back up with PCP for further evaluation of her urologic symptoms. Patient otherwise was stable in the emergency department cardiac evaluation was done due to the patient's history of CHF and this was reassuring. Patient was discharged and told to continue take her medications as prescribed. This patient's ED course included: re-evaluation prior to disposition, multiple bedside re-evaluations, IV medications, cardiac monitoring, continuous pulse oximetry, complex medical decision making and emergency management and a personal history and physicial eaxmination    This patient has remained hemodynamically stable and been closely monitored during their ED course. Re-Evaluations:  Time: 1950   Re-evaluation. Patients symptoms show no change  Repeat physical examination is not changed            Counseling: The emergency provider has spoken with the patient and discussed todays results, in addition to providing specific details for the plan of care and counseling regarding the diagnosis and prognosis. Questions are answered at this time and they are agreeable with the plan.    --------------------------- IMPRESSION AND DISPOSITION ---------------------------------    IMPRESSION  1.  Acute cystitis with hematuria        DISPOSITION  Disposition: Discharge to home  Patient condition is stable             Edgar Morillo DO  Resident  10/03/21 1025

## 2021-10-03 LAB
EKG ATRIAL RATE: 89 BPM
EKG P AXIS: 28 DEGREES
EKG P-R INTERVAL: 124 MS
EKG Q-T INTERVAL: 352 MS
EKG QRS DURATION: 64 MS
EKG QTC CALCULATION (BAZETT): 428 MS
EKG R AXIS: 10 DEGREES
EKG T AXIS: 30 DEGREES
EKG VENTRICULAR RATE: 89 BPM

## 2021-10-03 PROCEDURE — 93010 ELECTROCARDIOGRAM REPORT: CPT | Performed by: INTERNAL MEDICINE

## 2021-10-04 ENCOUNTER — TELEPHONE (OUTPATIENT)
Dept: ORTHOPEDIC SURGERY | Age: 76
End: 2021-10-04

## 2021-10-04 LAB — URINE CULTURE, ROUTINE: NORMAL

## 2021-10-04 RX ORDER — RIFAXIMIN 550 MG/1
TABLET ORAL
Qty: 120 TABLET | Refills: 0 | Status: ON HOLD
Start: 2021-10-04 | End: 2021-12-15

## 2021-10-04 NOTE — TELEPHONE ENCOUNTER
Call from elan @ [facility] questions re: referral. Would like a call back before 4 pm.

## 2021-10-05 ENCOUNTER — TELEPHONE (OUTPATIENT)
Dept: ORTHOPEDIC SURGERY | Age: 76
End: 2021-10-05

## 2021-10-05 DIAGNOSIS — S42.211D CLOSED DISPLACED FRACTURE OF SURGICAL NECK OF RIGHT HUMERUS WITH ROUTINE HEALING, UNSPECIFIED FRACTURE MORPHOLOGY, SUBSEQUENT ENCOUNTER: Primary | ICD-10-CM

## 2021-10-05 DIAGNOSIS — Z96.612 S/P REVERSE TOTAL SHOULDER ARTHROPLASTY, LEFT: ICD-10-CM

## 2021-10-05 NOTE — TELEPHONE ENCOUNTER
[facility] called office stating they do not take patient's insurance. Call placed to patient. She requested [employer] for transportation assistance. She does not have transportation to another location. Order for home health pended and routed at this time.

## 2021-10-15 ENCOUNTER — TELEPHONE (OUTPATIENT)
Dept: ORTHOPEDIC SURGERY | Age: 76
End: 2021-10-15

## 2021-10-15 NOTE — TELEPHONE ENCOUNTER
Tonya Leong from [facility] called office requesting call back. Call returned. No answer, left voicemail.     Future Appointments   Date Time Provider Geraldine Rader   10/19/2021  2:00 PM [facility]   11/10/2021  9:30 AM Corrie Dance, MD [facility]   1/18/2022 10:15 AM Yang Richard MD [facility]   3/15/2022 10:00 AM [facility]

## 2021-10-19 ENCOUNTER — OFFICE VISIT (OUTPATIENT)
Dept: FAMILY MEDICINE CLINIC | Age: 76
End: 2021-10-19
Payer: MEDICARE

## 2021-10-19 VITALS
OXYGEN SATURATION: 98 % | WEIGHT: 187.2 LBS | SYSTOLIC BLOOD PRESSURE: 130 MMHG | HEART RATE: 82 BPM | HEIGHT: 63 IN | TEMPERATURE: 97.9 F | RESPIRATION RATE: 20 BRPM | BODY MASS INDEX: 33.17 KG/M2 | DIASTOLIC BLOOD PRESSURE: 74 MMHG

## 2021-10-19 DIAGNOSIS — R18.8 CIRRHOSIS OF LIVER WITH ASCITES, UNSPECIFIED HEPATIC CIRRHOSIS TYPE (HCC): ICD-10-CM

## 2021-10-19 DIAGNOSIS — K74.60 CIRRHOSIS OF LIVER WITH ASCITES, UNSPECIFIED HEPATIC CIRRHOSIS TYPE (HCC): ICD-10-CM

## 2021-10-19 DIAGNOSIS — Q44.6 CYSTIC DISEASE OF LIVER: ICD-10-CM

## 2021-10-19 DIAGNOSIS — J84.9 INTERSTITIAL LUNG DISEASE (HCC): ICD-10-CM

## 2021-10-19 DIAGNOSIS — J98.4 RESTRICTIVE LUNG DISEASE: ICD-10-CM

## 2021-10-19 DIAGNOSIS — Z86.711 HISTORY OF PULMONARY EMBOLUS (PE): ICD-10-CM

## 2021-10-19 DIAGNOSIS — E11.40 TYPE 2 DIABETES MELLITUS WITH DIABETIC NEUROPATHY, UNSPECIFIED WHETHER LONG TERM INSULIN USE (HCC): Primary | ICD-10-CM

## 2021-10-19 DIAGNOSIS — Z23 NEED FOR INFLUENZA VACCINATION: ICD-10-CM

## 2021-10-19 DIAGNOSIS — I10 ESSENTIAL HYPERTENSION: ICD-10-CM

## 2021-10-19 DIAGNOSIS — F32.A DEPRESSION, UNSPECIFIED DEPRESSION TYPE: ICD-10-CM

## 2021-10-19 LAB — HBA1C MFR BLD: 5.3 %

## 2021-10-19 PROCEDURE — G8484 FLU IMMUNIZE NO ADMIN: HCPCS | Performed by: FAMILY MEDICINE

## 2021-10-19 PROCEDURE — G8399 PT W/DXA RESULTS DOCUMENT: HCPCS | Performed by: FAMILY MEDICINE

## 2021-10-19 PROCEDURE — G8417 CALC BMI ABV UP PARAM F/U: HCPCS | Performed by: FAMILY MEDICINE

## 2021-10-19 PROCEDURE — 1123F ACP DISCUSS/DSCN MKR DOCD: CPT | Performed by: FAMILY MEDICINE

## 2021-10-19 PROCEDURE — 99215 OFFICE O/P EST HI 40 MIN: CPT | Performed by: FAMILY MEDICINE

## 2021-10-19 PROCEDURE — 83036 HEMOGLOBIN GLYCOSYLATED A1C: CPT | Performed by: FAMILY MEDICINE

## 2021-10-19 PROCEDURE — G8427 DOCREV CUR MEDS BY ELIG CLIN: HCPCS | Performed by: FAMILY MEDICINE

## 2021-10-19 PROCEDURE — 4040F PNEUMOC VAC/ADMIN/RCVD: CPT | Performed by: FAMILY MEDICINE

## 2021-10-19 PROCEDURE — G0008 ADMIN INFLUENZA VIRUS VAC: HCPCS | Performed by: FAMILY MEDICINE

## 2021-10-19 PROCEDURE — 90694 VACC AIIV4 NO PRSRV 0.5ML IM: CPT | Performed by: FAMILY MEDICINE

## 2021-10-19 PROCEDURE — 1090F PRES/ABSN URINE INCON ASSESS: CPT | Performed by: FAMILY MEDICINE

## 2021-10-19 PROCEDURE — 1036F TOBACCO NON-USER: CPT | Performed by: FAMILY MEDICINE

## 2021-10-19 RX ORDER — FERROUS SULFATE 325(65) MG
TABLET ORAL
Status: ON HOLD | COMMUNITY
Start: 2021-10-06 | End: 2021-11-03 | Stop reason: HOSPADM

## 2021-10-19 RX ORDER — AMLODIPINE BESYLATE 2.5 MG/1
TABLET ORAL
Status: ON HOLD | COMMUNITY
Start: 2021-10-06 | End: 2021-12-15

## 2021-10-19 SDOH — ECONOMIC STABILITY: FOOD INSECURITY: WITHIN THE PAST 12 MONTHS, THE FOOD YOU BOUGHT JUST DIDN'T LAST AND YOU DIDN'T HAVE MONEY TO GET MORE.: NEVER TRUE

## 2021-10-19 SDOH — ECONOMIC STABILITY: FOOD INSECURITY: WITHIN THE PAST 12 MONTHS, YOU WORRIED THAT YOUR FOOD WOULD RUN OUT BEFORE YOU GOT MONEY TO BUY MORE.: NEVER TRUE

## 2021-10-19 ASSESSMENT — SOCIAL DETERMINANTS OF HEALTH (SDOH): HOW HARD IS IT FOR YOU TO PAY FOR THE VERY BASICS LIKE FOOD, HOUSING, MEDICAL CARE, AND HEATING?: HARD

## 2021-10-19 NOTE — PROGRESS NOTES
FM Progress Note    Subjective: Fall risk. Fall in August. Released from [facility] September. Since then [facility] 78 helping with ADLs and PT (starting last week). The last two times she fell she may have had UTI, but UCx neg. ? UTI at [facility]. Depression. Controlled on effexor 75. DM. Controlled w/o medication. Lab Results   Component Value Date    LABA1C 5.3 10/19/2021     HTN. Controlled. Amlodipine 2.5 daily. Doxazosin. CTA chest showing   PE  pleural effusion  cystic liver dz    Cirrhosis. Lactulose bid with 2 BMs per day, some hard stools. xifaxan and protonix. Lasix 40 and aldactone 100 daily. Not following with GI. Interstitial lung disease. PFTs 4/2021:  IMPRESSION:  This PFT is showing evidence of severe restrictive lung  disease along with severe reduction in diffusion capacity, most likely  diagnosis interstitial lung disease. Clinical and radiological  correlation indicated. Given the patient also overweight that can add a  restriction. Gets some MENDEZ, but more so fatigue and some weakness with walking. Not following with pulm. On advair AND symbicort. Extensive counseling today reviewing pt's records and coordinating care with pt. Health Maintenance Due   Topic Date Due    Hepatitis A vaccine (1 of 2 - Risk 2-dose series) Never done    COVID-19 Vaccine (3 - Pfizer booster) 10/16/2021     Objective:   /74 (Site: Right Upper Arm, Position: Sitting, Cuff Size: Large Adult)   Pulse 82   Temp 97.9 °F (36.6 °C) (Temporal)   Resp 20   Ht 5' 2.5\" (1.588 m)   Wt 187 lb 3.2 oz (84.9 kg)   SpO2 98%   BMI 33.69 kg/m²   General appearance: NAD, alert and interacting appropriately  HEENT: NCAT, PERRLA, EOMI   Resp: CTAB, no WRC  CVS: RRR, no MRG  Abdomen: BS +, SNDNT  Extremities: No clubbing, cyanosis, or edema. Warm. Dry. I have reviewed this patient's previous records. I have reviewed this patient's labs.     I have reviewed this patient's imaging reports    I have reviewed this

## 2021-10-19 NOTE — PATIENT INSTRUCTIONS
Add another 1 oz (half dose) of lactulose to your daily lactulose regimen. Stop the fluticasone-salmeterol (advair). Continue only the symbicort twice daily and the albuterol if needed as a rescue inhaler.

## 2021-10-28 ENCOUNTER — TELEPHONE (OUTPATIENT)
Dept: ADMINISTRATIVE | Age: 76
End: 2021-10-28

## 2021-10-28 ENCOUNTER — TELEPHONE (OUTPATIENT)
Dept: FAMILY MEDICINE CLINIC | Age: 76
End: 2021-10-28

## 2021-10-28 DIAGNOSIS — R41.82 ALTERED MENTAL STATUS, UNSPECIFIED ALTERED MENTAL STATUS TYPE: Primary | ICD-10-CM

## 2021-10-28 DIAGNOSIS — R29.6 RECURRENT FALLS: ICD-10-CM

## 2021-10-28 NOTE — TELEPHONE ENCOUNTER
----- Message from April Clay sent at 10/28/2021  3:09 PM EDT -----  Subject: Message to Provider    QUESTIONS  Information for Provider? Pt is reminding Dr Marissa Sharma of the brain scan   that she needs   ---------------------------------------------------------------------------  --------------  CALL BACK INFO  What is the best way for the office to contact you? OK to leave message on   voicemail  Preferred Call Back Phone Number? [phone]  ---------------------------------------------------------------------------  --------------  SCRIPT ANSWERS  Relationship to Patient?  Self

## 2021-10-28 NOTE — TELEPHONE ENCOUNTER
Pt has an appt with Dr Renee Dixon on 11/10 at 9:45 am.  She would like to have that rescheduled to an afternoon appt.   She said she cannot get transportation in the am.

## 2021-10-29 ENCOUNTER — HOSPITAL ENCOUNTER (INPATIENT)
Age: 76
LOS: 4 days | Discharge: HOME | DRG: 186 | End: 2021-11-03
Attending: EMERGENCY MEDICINE | Admitting: FAMILY MEDICINE
Payer: MEDICARE

## 2021-10-29 ENCOUNTER — APPOINTMENT (OUTPATIENT)
Dept: CT IMAGING | Age: 76
DRG: 186 | End: 2021-10-29
Payer: MEDICARE

## 2021-10-29 ENCOUNTER — APPOINTMENT (OUTPATIENT)
Dept: GENERAL RADIOLOGY | Age: 76
DRG: 186 | End: 2021-10-29
Payer: MEDICARE

## 2021-10-29 DIAGNOSIS — J90 PLEURAL EFFUSION: ICD-10-CM

## 2021-10-29 DIAGNOSIS — I26.99 OTHER ACUTE PULMONARY EMBOLISM WITHOUT ACUTE COR PULMONALE (HCC): Primary | ICD-10-CM

## 2021-10-29 DIAGNOSIS — N63.0 BREAST MASS: ICD-10-CM

## 2021-10-29 DIAGNOSIS — R06.02 SOB (SHORTNESS OF BREATH): ICD-10-CM

## 2021-10-29 DIAGNOSIS — J18.9 PNEUMONIA OF LEFT LUNG DUE TO INFECTIOUS ORGANISM, UNSPECIFIED PART OF LUNG: ICD-10-CM

## 2021-10-29 DIAGNOSIS — K85.90 ACUTE PANCREATITIS, UNSPECIFIED COMPLICATION STATUS, UNSPECIFIED PANCREATITIS TYPE: ICD-10-CM

## 2021-10-29 LAB
ALBUMIN SERPL-MCNC: 3.4 G/DL (ref 3.5–5.2)
ALP BLD-CCNC: 170 U/L (ref 35–104)
ALT SERPL-CCNC: 12 U/L (ref 0–32)
AMMONIA: 45 UMOL/L (ref 11–51)
ANION GAP SERPL CALCULATED.3IONS-SCNC: 9 MMOL/L (ref 7–16)
AST SERPL-CCNC: 30 U/L (ref 0–31)
BACTERIA: ABNORMAL /HPF
BASOPHILS ABSOLUTE: 0.05 E9/L (ref 0–0.2)
BASOPHILS RELATIVE PERCENT: 0.5 % (ref 0–2)
BILIRUB SERPL-MCNC: 0.8 MG/DL (ref 0–1.2)
BILIRUBIN URINE: NEGATIVE
BLOOD, URINE: ABNORMAL
BUN BLDV-MCNC: 14 MG/DL (ref 6–23)
CALCIUM SERPL-MCNC: 10.3 MG/DL (ref 8.6–10.2)
CHLORIDE BLD-SCNC: 108 MMOL/L (ref 98–107)
CLARITY: CLEAR
CO2: 22 MMOL/L (ref 22–29)
COLOR: YELLOW
CREAT SERPL-MCNC: 1.1 MG/DL (ref 0.5–1)
D DIMER: 862 NG/ML DDU
EOSINOPHILS ABSOLUTE: 0.34 E9/L (ref 0.05–0.5)
EOSINOPHILS RELATIVE PERCENT: 3.4 % (ref 0–6)
EPITHELIAL CELLS, UA: ABNORMAL /HPF
GFR AFRICAN AMERICAN: 58
GFR NON-AFRICAN AMERICAN: 48 ML/MIN/1.73
GLUCOSE BLD-MCNC: 107 MG/DL (ref 74–99)
GLUCOSE URINE: NEGATIVE MG/DL
HCT VFR BLD CALC: 34.1 % (ref 34–48)
HEMOGLOBIN: 11.3 G/DL (ref 11.5–15.5)
IMMATURE GRANULOCYTES #: 0.04 E9/L
IMMATURE GRANULOCYTES %: 0.4 % (ref 0–5)
KETONES, URINE: ABNORMAL MG/DL
LACTIC ACID: 1.7 MMOL/L (ref 0.5–2.2)
LEUKOCYTE ESTERASE, URINE: NEGATIVE
LIPASE: 70 U/L (ref 13–60)
LYMPHOCYTES ABSOLUTE: 1.19 E9/L (ref 1.5–4)
LYMPHOCYTES RELATIVE PERCENT: 12.1 % (ref 20–42)
MCH RBC QN AUTO: 32.5 PG (ref 26–35)
MCHC RBC AUTO-ENTMCNC: 33.1 % (ref 32–34.5)
MCV RBC AUTO: 98 FL (ref 80–99.9)
MONOCYTES ABSOLUTE: 1.41 E9/L (ref 0.1–0.95)
MONOCYTES RELATIVE PERCENT: 14.3 % (ref 2–12)
NEUTROPHILS ABSOLUTE: 6.83 E9/L (ref 1.8–7.3)
NEUTROPHILS RELATIVE PERCENT: 69.3 % (ref 43–80)
NITRITE, URINE: NEGATIVE
PDW BLD-RTO: 17.8 FL (ref 11.5–15)
PH UA: 5 (ref 5–9)
PLATELET # BLD: 183 E9/L (ref 130–450)
PMV BLD AUTO: 10.6 FL (ref 7–12)
POC BASE EXCESS: -2.3 MMOL/L (ref -3–3)
POC CPB: NO
POC DELIVERY SYSTEM: ABNORMAL
POC DEVICE ID: ABNORMAL
POC HCO3: 21.3 MMOL/L (ref 22–26)
POC O2 SATURATION: 97.8 % (ref 92–98.5)
POC OPERATOR ID: ABNORMAL
POC PCO2: 32.3 MMHG (ref 35–45)
POC PH: 7.43 (ref 7.35–7.45)
POC PO2: 96.7 MMHG (ref 60–80)
POC SAMPLE TYPE: ABNORMAL
POTASSIUM SERPL-SCNC: 4.5 MMOL/L (ref 3.5–5)
PRO-BNP: 30 PG/ML (ref 0–450)
PROTEIN UA: NEGATIVE MG/DL
RBC # BLD: 3.48 E12/L (ref 3.5–5.5)
RBC UA: >20 /HPF (ref 0–2)
SODIUM BLD-SCNC: 139 MMOL/L (ref 132–146)
SPECIFIC GRAVITY UA: 1.01 (ref 1–1.03)
TOTAL PROTEIN: 6.7 G/DL (ref 6.4–8.3)
TROPONIN, HIGH SENSITIVITY: 14 NG/L (ref 0–9)
UROBILINOGEN, URINE: 0.2 E.U./DL
WBC # BLD: 9.9 E9/L (ref 4.5–11.5)
WBC UA: ABNORMAL /HPF (ref 0–5)

## 2021-10-29 PROCEDURE — 84484 ASSAY OF TROPONIN QUANT: CPT

## 2021-10-29 PROCEDURE — 36415 COLL VENOUS BLD VENIPUNCTURE: CPT

## 2021-10-29 PROCEDURE — 81001 URINALYSIS AUTO W/SCOPE: CPT

## 2021-10-29 PROCEDURE — 99284 EMERGENCY DEPT VISIT MOD MDM: CPT

## 2021-10-29 PROCEDURE — 71275 CT ANGIOGRAPHY CHEST: CPT

## 2021-10-29 PROCEDURE — 82803 BLOOD GASES ANY COMBINATION: CPT

## 2021-10-29 PROCEDURE — 74177 CT ABD & PELVIS W/CONTRAST: CPT

## 2021-10-29 PROCEDURE — 6360000002 HC RX W HCPCS: Performed by: EMERGENCY MEDICINE

## 2021-10-29 PROCEDURE — 80053 COMPREHEN METABOLIC PANEL: CPT

## 2021-10-29 PROCEDURE — 85378 FIBRIN DEGRADE SEMIQUANT: CPT

## 2021-10-29 PROCEDURE — 6360000004 HC RX CONTRAST MEDICATION: Performed by: RADIOLOGY

## 2021-10-29 PROCEDURE — 71045 X-RAY EXAM CHEST 1 VIEW: CPT

## 2021-10-29 PROCEDURE — 83690 ASSAY OF LIPASE: CPT

## 2021-10-29 PROCEDURE — 93005 ELECTROCARDIOGRAM TRACING: CPT | Performed by: EMERGENCY MEDICINE

## 2021-10-29 PROCEDURE — 85025 COMPLETE CBC W/AUTO DIFF WBC: CPT

## 2021-10-29 PROCEDURE — 96372 THER/PROPH/DIAG INJ SC/IM: CPT

## 2021-10-29 PROCEDURE — 87635 SARS-COV-2 COVID-19 AMP PRB: CPT

## 2021-10-29 PROCEDURE — 2580000003 HC RX 258: Performed by: EMERGENCY MEDICINE

## 2021-10-29 PROCEDURE — 82140 ASSAY OF AMMONIA: CPT

## 2021-10-29 PROCEDURE — 2500000003 HC RX 250 WO HCPCS: Performed by: EMERGENCY MEDICINE

## 2021-10-29 PROCEDURE — 6370000000 HC RX 637 (ALT 250 FOR IP): Performed by: EMERGENCY MEDICINE

## 2021-10-29 PROCEDURE — 83880 ASSAY OF NATRIURETIC PEPTIDE: CPT

## 2021-10-29 PROCEDURE — 96365 THER/PROPH/DIAG IV INF INIT: CPT

## 2021-10-29 PROCEDURE — 6370000000 HC RX 637 (ALT 250 FOR IP)

## 2021-10-29 PROCEDURE — 87040 BLOOD CULTURE FOR BACTERIA: CPT

## 2021-10-29 PROCEDURE — 83605 ASSAY OF LACTIC ACID: CPT

## 2021-10-29 RX ORDER — IPRATROPIUM BROMIDE AND ALBUTEROL SULFATE 2.5; .5 MG/3ML; MG/3ML
1 SOLUTION RESPIRATORY (INHALATION) ONCE
Status: COMPLETED | OUTPATIENT
Start: 2021-10-29 | End: 2021-10-30

## 2021-10-29 RX ORDER — IPRATROPIUM BROMIDE AND ALBUTEROL SULFATE 2.5; .5 MG/3ML; MG/3ML
SOLUTION RESPIRATORY (INHALATION)
Status: COMPLETED
Start: 2021-10-29 | End: 2021-10-29

## 2021-10-29 RX ORDER — IPRATROPIUM BROMIDE AND ALBUTEROL SULFATE 2.5; .5 MG/3ML; MG/3ML
1 SOLUTION RESPIRATORY (INHALATION)
Status: DISCONTINUED | OUTPATIENT
Start: 2021-10-29 | End: 2021-11-03 | Stop reason: HOSPADM

## 2021-10-29 RX ADMIN — IPRATROPIUM BROMIDE AND ALBUTEROL SULFATE 1 AMPULE: .5; 2.5 SOLUTION RESPIRATORY (INHALATION) at 23:08

## 2021-10-29 RX ADMIN — DOXYCYCLINE 100 MG: 100 INJECTION, POWDER, LYOPHILIZED, FOR SOLUTION INTRAVENOUS at 23:38

## 2021-10-29 RX ADMIN — IPRATROPIUM BROMIDE AND ALBUTEROL SULFATE 1 AMPULE: .5; 2.5 SOLUTION RESPIRATORY (INHALATION) at 18:53

## 2021-10-29 RX ADMIN — IOPAMIDOL 75 ML: 755 INJECTION, SOLUTION INTRAVENOUS at 19:59

## 2021-10-29 RX ADMIN — ENOXAPARIN SODIUM 80 MG: 100 INJECTION SUBCUTANEOUS at 23:04

## 2021-10-29 RX ADMIN — WATER 1000 MG: 1 INJECTION INTRAMUSCULAR; INTRAVENOUS; SUBCUTANEOUS at 23:30

## 2021-10-29 ASSESSMENT — PAIN DESCRIPTION - FREQUENCY: FREQUENCY: CONTINUOUS

## 2021-10-29 ASSESSMENT — PAIN DESCRIPTION - DESCRIPTORS: DESCRIPTORS: SHARP

## 2021-10-29 ASSESSMENT — PAIN SCALES - GENERAL: PAINLEVEL_OUTOF10: 8

## 2021-10-29 ASSESSMENT — PAIN DESCRIPTION - LOCATION: LOCATION: ABDOMEN

## 2021-10-29 ASSESSMENT — PAIN DESCRIPTION - PAIN TYPE: TYPE: ACUTE PAIN

## 2021-10-29 NOTE — ED PROVIDER NOTES
HPI:  10/29/21, Time: 6:02 PM EDT         Cindy Lugo is a 68 y.o. female presenting to the ED for dyspnea, cough with phlegm, chest pains, nausea, emesis (no blood, no bile), diarrhea, inability to tolerate PO and generalized abdominal pain, beginning 3 days ago. The complaint has been persistent, moderate in severity, and worsened by nothing. Patient denies fever/chills, sore throat, edema, headache, visual disturbances, focal paresthesias, focal weakness, constipation, dysuria, hematuria, trauma, neck or back pain, rash or other complaints. ROS:   A complete review of systems was performed and all pertinent positives and negatives are stated within HPI, all other systems reviewed and are negative.      --------------------------------------------- PAST HISTORY ---------------------------------------------  Past Medical History:  has a past medical history of Breast cancer ([facility] [address] Cirrhosis ([facility] [address] Essential hypertension, Hyperlipidemia, Osteopenia, 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 ([facility] [address] Sleep apnea, Spinal stenosis, and Type 2 diabetes mellitus ([facility] [address] Past Surgical History:  has a past surgical history that includes Hysterectomy; Carpal tunnel release; Lithotripsy (Left, 05/04/2016); Colonoscopy (01/31/2017); Breast lumpectomy; Upper gastrointestinal endoscopy (04/23/2019); Colonoscopy (04/23/2019); Upper gastrointestinal endoscopy (N/A, 04/23/2019); Colonoscopy (N/A, 04/23/2019); Colonoscopy (04/23/2019); Shoulder Arthroplasty (Left, 12/21/2020); and cardiovascular stress test.    Social History:  reports that she has never smoked. She has never used smokeless tobacco. She reports that she does not drink alcohol and does not use drugs. Family History: family history includes Arthritis in her mother; COPD in her father; Cancer (age of onset: 48) in an other family member; Heart Attack in her father. The patients home medications have been reviewed.     Allergies: Lisinopril        ----------------------------------------PHYSICAL EXAM--------------------------------------  Constitutional:  Well developed, well nourished, no acute distress, non-toxic appearance   Eyes:  PERRL, conjunctiva normal, EOMI  HENT:  Atraumatic, external ears normal, nose normal, oropharynx moist, no pharyngeal exudates. Neck- normal range of motion, no nuchal rigidity   Respiratory:  No respiratory distress, no rhocnshi, no rales, scattered expiratory wheezing   Cardiovascular:  Normal rate, normal rhythm, no murmurs, no gallops, no rubs. Radial and DP pulses 2+ bilaterally. Her meds are soft. She is warm and well perfused. Cap refill less than 3 seconds. GI:  Soft, nondistended, normal bowel sounds, diffusely tender, no organomegaly, no mass, no rebound, no guarding   :  No costovertebral angle tenderness   Musculoskeletal:  No edema, no tenderness, no deformities. Back- no tenderness  Integument:  Well hydrated, no visible rash. Adequate perfusion. Lymphatic:  No cervical lymphadenopathy noted   Neurologic:  Alert & oriented x 3, CN 2-12 normal, no focal deficits noted. Psychiatric:  Speech and behavior appropriate       -------------------------------------------------- RESULTS -------------------------------------------------  I have personally reviewed all laboratory and imaging results for this patient. Results are listed below.      LABS:  Results for orders placed or performed during the hospital encounter of 10/29/21   Culture, Blood 2    Specimen: Blood   Result Value Ref Range    Culture, Blood 2 24 Hours no growth    Culture, Blood 1    Specimen: Blood   Result Value Ref Range    Blood Culture, Routine 24 Hours no growth    COVID-19, Rapid    Specimen: Nasopharyngeal Swab   Result Value Ref Range    SARS-CoV-2, NAAT Not Detected Not Detected   Culture, Body Fluid    Specimen: Pleural   Result Value Ref Range    Gram Stain Result Refer to ordered Gram stain for results    Gram Stain    Specimen: Pleural   Result Value Ref Range    Gram Stain Orderable       Gram stain performed on unspun fluid  Few Polymorphonuclear leukocytes  Epithelial cells not seen  No organisms seen     CBC auto differential   Result Value Ref Range    WBC 9.9 4.5 - 11.5 E9/L    RBC 3.48 (L) 3.50 - 5.50 E12/L    Hemoglobin 11.3 (L) 11.5 - 15.5 g/dL    Hematocrit 34.1 34.0 - 48.0 %    MCV 98.0 80.0 - 99.9 fL    MCH 32.5 26.0 - 35.0 pg    MCHC 33.1 32.0 - 34.5 %    RDW 17.8 (H) 11.5 - 15.0 fL    Platelets 457 639 - 095 E9/L    MPV 10.6 7.0 - 12.0 fL    Neutrophils % 69.3 43.0 - 80.0 %    Immature Granulocytes % 0.4 0.0 - 5.0 %    Lymphocytes % 12.1 (L) 20.0 - 42.0 %    Monocytes % 14.3 (H) 2.0 - 12.0 %    Eosinophils % 3.4 0.0 - 6.0 %    Basophils % 0.5 0.0 - 2.0 %    Neutrophils Absolute 6.83 1.80 - 7.30 E9/L    Immature Granulocytes # 0.04 E9/L    Lymphocytes Absolute 1.19 (L) 1.50 - 4.00 E9/L    Monocytes Absolute 1.41 (H) 0.10 - 0.95 E9/L    Eosinophils Absolute 0.34 0.05 - 0.50 E9/L    Basophils Absolute 0.05 0.00 - 0.20 E9/L   Comprehensive Metabolic Panel   Result Value Ref Range    Sodium 139 132 - 146 mmol/L    Potassium 4.5 3.5 - 5.0 mmol/L    Chloride 108 (H) 98 - 107 mmol/L    CO2 22 22 - 29 mmol/L    Anion Gap 9 7 - 16 mmol/L    Glucose 107 (H) 74 - 99 mg/dL    BUN 14 6 - 23 mg/dL    CREATININE 1.1 (H) 0.5 - 1.0 mg/dL    GFR Non-African American 48 >=60 mL/min/1.73    GFR African American 58     Calcium 10.3 (H) 8.6 - 10.2 mg/dL    Total Protein 6.7 6.4 - 8.3 g/dL    Albumin 3.4 (L) 3.5 - 5.2 g/dL    Total Bilirubin 0.8 0.0 - 1.2 mg/dL    Alkaline Phosphatase 170 (H) 35 - 104 U/L    ALT 12 0 - 32 U/L    AST 30 0 - 31 U/L   D-Dimer, Quantitative   Result Value Ref Range    D-Dimer, Quant 862 ng/mL DDU   Lactic Acid, Plasma   Result Value Ref Range    Lactic Acid 1.7 0.5 - 2.2 mmol/L   Lipase   Result Value Ref Range    Lipase 70 (H) 13 - 60 U/L   Urinalysis with Microscopic   Result Value Ref Range Color, UA Yellow Straw/Yellow    Clarity, UA Clear Clear    Glucose, Ur Negative Negative mg/dL    Bilirubin Urine Negative Negative    Ketones, Urine TRACE (A) Negative mg/dL    Specific Gravity, UA 1.015 1.005 - 1.030    Blood, Urine LARGE (A) Negative    pH, UA 5.0 5.0 - 9.0    Protein, UA Negative Negative mg/dL    Urobilinogen, Urine 0.2 <2.0 E.U./dL    Nitrite, Urine Negative Negative    Leukocyte Esterase, Urine Negative Negative    WBC, UA 0-1 0 - 5 /HPF    RBC, UA >20 0 - 2 /HPF    Epithelial Cells, UA MODERATE /HPF    Bacteria, UA FEW (A) None Seen /HPF   Brain Natriuretic Peptide   Result Value Ref Range    Pro-BNP 30 0 - 450 pg/mL   Ammonia   Result Value Ref Range    Ammonia 45.0 11.0 - 51.0 umol/L   Troponin   Result Value Ref Range    Troponin, High Sensitivity 14 (H) 0 - 9 ng/L   Troponin   Result Value Ref Range    Troponin, High Sensitivity 13 (H) 0 - 9 ng/L   Procalcitonin   Result Value Ref Range    Procalcitonin 0.10 (H) 0.00 - 0.08 ng/mL   Lipase   Result Value Ref Range    Lipase 49 13 - 60 U/L   Urinalysis with Microscopic   Result Value Ref Range    Color, UA Yellow Straw/Yellow    Clarity, UA SL CLOUDY Clear    Glucose, Ur Negative Negative mg/dL    Bilirubin Urine Negative Negative    Ketones, Urine Negative Negative mg/dL    Specific Gravity, UA 1.025 1.005 - 1.030    Blood, Urine LARGE (A) Negative    pH, UA 5.5 5.0 - 9.0    Protein, UA Negative Negative mg/dL    Urobilinogen, Urine 0.2 <2.0 E.U./dL    Nitrite, Urine Negative Negative    Leukocyte Esterase, Urine Negative Negative    WBC, UA NONE 0 - 5 /HPF    RBC, UA >20 0 - 2 /HPF    Bacteria, UA RARE (A) None Seen /HPF   Basic Metabolic Panel w/ Reflex to MG   Result Value Ref Range    Sodium 138 132 - 146 mmol/L    Potassium reflex Magnesium 4.0 3.5 - 5.0 mmol/L    Chloride 109 (H) 98 - 107 mmol/L    CO2 20 (L) 22 - 29 mmol/L    Anion Gap 9 7 - 16 mmol/L    Glucose 105 (H) 74 - 99 mg/dL    BUN 12 6 - 23 mg/dL    CREATININE 1.1 (H) 0.5 - 1.0 mg/dL    GFR Non-African American 48 >=60 mL/min/1.73    GFR African American 58     Calcium 10.4 (H) 8.6 - 10.2 mg/dL   CBC auto differential   Result Value Ref Range    WBC 6.6 4.5 - 11.5 E9/L    RBC 2.98 (L) 3.50 - 5.50 E12/L    Hemoglobin 9.5 (L) 11.5 - 15.5 g/dL    Hematocrit 29.6 (L) 34.0 - 48.0 %    MCV 99.3 80.0 - 99.9 fL    MCH 31.9 26.0 - 35.0 pg    MCHC 32.1 32.0 - 34.5 %    RDW 18.1 (H) 11.5 - 15.0 fL    Platelets 538 486 - 861 E9/L    MPV 10.7 7.0 - 12.0 fL    Neutrophils % 61.7 43.0 - 80.0 %    Immature Granulocytes % 0.3 0.0 - 5.0 %    Lymphocytes % 19.4 (L) 20.0 - 42.0 %    Monocytes % 13.2 (H) 2.0 - 12.0 %    Eosinophils % 4.6 0.0 - 6.0 %    Basophils % 0.8 0.0 - 2.0 %    Neutrophils Absolute 4.07 1.80 - 7.30 E9/L    Immature Granulocytes # 0.02 E9/L    Lymphocytes Absolute 1.28 (L) 1.50 - 4.00 E9/L    Monocytes Absolute 0.87 0.10 - 0.95 E9/L    Eosinophils Absolute 0.30 0.05 - 0.50 E9/L    Basophils Absolute 0.05 0.00 - 0.20 E9/L   Body Fluid Cell Count with Differential   Result Value Ref Range    Cell Count Fluid Type Pleural     Color, Fluid Milky     Appearance, Fluid Turbid     Nucl Cell, Fluid 738 /uL    RBC, Fluid 3,000 /uL    Neutrophil Count, Fluid 19 %    Monocyte Count, Fluid 81 %   Cholesterol, Body Fluid   Result Value Ref Range    Cholesterol, Fluid 19 Not Established mg/dL    Fluid Type Pleural    Glucose, Body Fluid   Result Value Ref Range    Glucose, Fluid 116 Not Established mg/dL   Lactate Dehydrogenase, Body Fluid   Result Value Ref Range    LD, Fluid 48 Not Established U/L   Protein, Body Fluid   Result Value Ref Range    Protein, Fluid 1.2 Not Established g/dL   Triglyceride, Body Fluid   Result Value Ref Range    Triglycerides, Fluid 662 Not Established mg/dL   Miscellaneous Sendout 1   Result Value Ref Range    test code FLOW PLEURAL    Basic Metabolic Panel w/ Reflex to MG   Result Value Ref Range    Sodium 136 132 - 146 mmol/L    Potassium reflex Magnesium 4.6 3.5 - 5.0 mmol/L    Chloride 107 98 - 107 mmol/L    CO2 22 22 - 29 mmol/L    Anion Gap 7 7 - 16 mmol/L    Glucose 77 74 - 99 mg/dL    BUN 12 6 - 23 mg/dL    CREATININE 1.1 (H) 0.5 - 1.0 mg/dL    GFR Non-African American 48 >=60 mL/min/1.73    GFR African American 58     Calcium 9.8 8.6 - 10.2 mg/dL   CBC auto differential   Result Value Ref Range    WBC 5.1 4.5 - 11.5 E9/L    RBC 2.91 (L) 3.50 - 5.50 E12/L    Hemoglobin 9.4 (L) 11.5 - 15.5 g/dL    Hematocrit 28.5 (L) 34.0 - 48.0 %    MCV 97.9 80.0 - 99.9 fL    MCH 32.3 26.0 - 35.0 pg    MCHC 33.0 32.0 - 34.5 %    RDW 18.5 (H) 11.5 - 15.0 fL    Platelets 280 775 - 339 E9/L    MPV 10.9 7.0 - 12.0 fL    Neutrophils % 60.9 43.0 - 80.0 %    Immature Granulocytes % 0.2 0.0 - 5.0 %    Lymphocytes % 21.0 20.0 - 42.0 %    Monocytes % 12.2 (H) 2.0 - 12.0 %    Eosinophils % 4.7 0.0 - 6.0 %    Basophils % 1.0 0.0 - 2.0 %    Neutrophils Absolute 3.11 1.80 - 7.30 E9/L    Immature Granulocytes # 0.01 E9/L    Lymphocytes Absolute 1.07 (L) 1.50 - 4.00 E9/L    Monocytes Absolute 0.62 0.10 - 0.95 E9/L    Eosinophils Absolute 0.24 0.05 - 0.50 E9/L    Basophils Absolute 0.05 0.00 - 0.20 E9/L   POCT blood gases   Result Value Ref Range    Sample Type Arterial     POC pH 7.428 7.350 - 7.450    POC pCO2 32.3 (L) 35.0 - 45.0 mmHg    POC PO2 96.7 (H) 60.0 - 80.0 mmHg    POC HCO3 21.3 (L) 22.0 - 26.0 mmol/L    POC Base Excess -2.3 -3.0 - 3.0 mmol/L    POC O2 SAT 97.8 92.0 - 98.5 %    POC CPB No     POC  ,072     POC Device ID [id number]     POC Delivery System RoomAir    EKG 12 Lead   Result Value Ref Range    Ventricular Rate 100 BPM    Atrial Rate 100 BPM    P-R Interval 130 ms    QRS Duration 78 ms    Q-T Interval 346 ms    QTc Calculation (Bazett) 446 ms    P Axis 39 degrees    R Axis 7 degrees    T Axis 26 degrees       RADIOLOGY:  Interpreted by Radiologist.  XR CHEST PORTABLE   Final Result   Significant improvement in the left pleural effusion with persistent minimal   atelectasis and pleural effusion bilaterally. There is no pneumothorax. US THORACENTESIS Which side should the procedure be performed? Left   Final Result   1. Successful ultrasound-guided left thoracentesis performed by Dr. Burdick. 2.  Please see separate procedure report for details. CTA PULMONARY W CONTRAST   Final Result   No central pulmonary embolism or aortic dissection. There are small filling   defects in the distal  subsegmental and peripheral vessels of lower lobes   concerning for small distal subsegmental pulmonary embolism. Moderate large pleural effusion with atelectasis and infiltrates in the left   lower lobe concerning for pneumonia. Asymmetric soft tissue density in the left breast.  Consider mammographic   correlation. Cirrhotic liver with multiple indeterminate hypodense hepatic lesions, some   of which were seen previously and surveillance is recommended. There is   portal venous hypertension with splenomegaly, venous varices and ascites. Haziness surrounding the pancreas concerning for pancreatitis. Please   correlate with pancreatic enzymes. 9 mm indeterminate hypodense lesion in the pancreatic head. Consider   surveillance. Diverticulosis of the colon with thickening of the left hemicolon which may   be due to spasm or uncomplicated diverticulitis. CT ABDOMEN PELVIS W IV CONTRAST Additional Contrast? None   Final Result   No central pulmonary embolism or aortic dissection. There are small filling   defects in the distal  subsegmental and peripheral vessels of lower lobes   concerning for small distal subsegmental pulmonary embolism. Moderate large pleural effusion with atelectasis and infiltrates in the left   lower lobe concerning for pneumonia. Asymmetric soft tissue density in the left breast.  Consider mammographic   correlation.       Cirrhotic liver with multiple indeterminate hypodense hepatic lesions, some   of which were seen previously and surveillance is recommended. There is   portal venous hypertension with splenomegaly, venous varices and ascites. Haziness surrounding the pancreas concerning for pancreatitis. Please   correlate with pancreatic enzymes. 9 mm indeterminate hypodense lesion in the pancreatic head. Consider   surveillance. Diverticulosis of the colon with thickening of the left hemicolon which may   be due to spasm or uncomplicated diverticulitis. XR CHEST PORTABLE   Final Result   Cardiomegaly with possible pulmonary vascular congestive changes. EKG Interpretation  Time: 18:38 PM EDT  Rhythm: sinus tachycardia with PACs  Rate: 100  Axis: normal  Conduction: normal  ST Segments: no acute change  T Waves: no acute change  Clinical Impression: sinus tachycardia  Comparison to prior EKG: stable as compared to patient's most recent EKG except rate      ------------------------- NURSING NOTES AND VITALS REVIEWED ---------------------------  The nursing notes within the ED encounter and vital signs as below have been reviewed by myself. /64   Pulse 94   Temp 97.5 °F (36.4 °C) (Temporal)   Resp 18   Ht 5' 2\" (1.575 m)   Wt 170 lb (77.1 kg)   SpO2 92%   BMI 31.09 kg/m²   Oxygen Saturation Interpretation: Normal      The patients available past medical records and past encounters were reviewed.         ------------------------------ ED COURSE/MEDICAL DECISION MAKING----------------------  Medications   ipratropium-albuterol (DUONEB) nebulizer solution 1 ampule (1 ampule Inhalation Given 10/29/21 2308)   amLODIPine (NORVASC) tablet 2.5 mg (2.5 mg Oral Given 11/1/21 0826)   doxazosin (CARDURA) tablet 1 mg (1 mg Oral Given 10/31/21 2130)   furosemide (LASIX) tablet 40 mg (40 mg Oral Given 11/1/21 0827)   lactulose (CHRONULAC) 10 GM/15ML solution 20 g (20 g Oral Not Given 11/1/21 0828)   pantoprazole (PROTONIX) tablet 40 mg (40 mg Oral Given 11/1/21 0827)   spironolactone (ALDACTONE) tablet 100 mg (100 mg Oral Given 11/1/21 0827)   venlafaxine (EFFEXOR XR) extended release capsule 75 mg (75 mg Oral Given 11/1/21 0826)   Vitamin D (CHOLECALCIFEROL) tablet 1,000 Units (1,000 Units Oral Given 11/1/21 0826)   rifaximin (XIFAXAN) tablet 550 mg (550 mg Oral Given 11/1/21 0827)   sodium chloride flush 0.9 % injection 10 mL (10 mLs IntraVENous Given 11/1/21 0828)   sodium chloride flush 0.9 % injection 10 mL (has no administration in time range)   0.9 % sodium chloride infusion (has no administration in time range)   enoxaparin (LOVENOX) injection 40 mg (40 mg SubCUTAneous Given 11/1/21 0827)   ondansetron (ZOFRAN-ODT) disintegrating tablet 4 mg (4 mg Oral Given 10/30/21 1858)     Or   ondansetron (ZOFRAN) injection 4 mg ( IntraVENous See Alternative 10/30/21 1858)   magnesium hydroxide (MILK OF MAGNESIA) 400 MG/5ML suspension 30 mL (has no administration in time range)   budesonide (PULMICORT) nebulizer suspension 500 mcg (500 mcg Nebulization Given 11/1/21 1024)     And   Arformoterol Tartrate (BROVANA) nebulizer solution 15 mcg (15 mcg Nebulization Given 11/1/21 1023)   iopamidol (ISOVUE-370) 76 % injection 75 mL (75 mLs IntraVENous Given 10/29/21 1959)   enoxaparin (LOVENOX) injection 80 mg (80 mg SubCUTAneous Given 10/29/21 2304)   ipratropium-albuterol (DUONEB) nebulizer solution 1 ampule (1 ampule Inhalation Given 10/30/21 0346)   cefTRIAXone (ROCEPHIN) 1,000 mg in sterile water 10 mL IV syringe (0 mg IntraVENous Stopped 10/30/21 0005)   doxycycline (VIBRAMYCIN) 100 mg in dextrose 5 % 100 mL IVPB (0 mg IntraVENous Stopped 10/30/21 0240)   oxyCODONE (ROXICODONE) immediate release tablet 5 mg (5 mg Oral Given 11/1/21 0136)           Procedures:   none      Medical Decision Making:    arabella with improvement   CT concerning for PEs - will start Lovenox in ER   CT concerening for left breast mass.  Does have palpable left breast mass, patient states she does have history of left breast mass that was resected in the past and this one is new and recurrent   CT concerning for pleural effusion and PNA on left. Covid rapid pending upon admission. Will start antibiotics. Note - patient given lovenox in ER for concern of PE. IV rocephin and Doxy for CAP coverage   Pancreatitis - IV fluids   Cirrhosis and liver lesions - known   Duonebs for wheezing   Wants admitted to [facility]    This patient's ED course included: re-evaluation prior to disposition, IV medications, cardiac monitoring, continuous pulse oximetry and a personal history and physicial eaxmination    This patient has remained hemodynamically stable, improved and been closely monitored during their ED course. Re-Evaluations:  Time: 2300   Re-evaluation. Patients symptoms are improving  Repeat physical examination is improved      Consultations:   hospitalist    Critical Care: none    I, Lianne Masters, , am the Primary Provider of Record    Counseling: The emergency provider has spoken with the patient and son and discussed todays results, in addition to providing specific details for the plan of care and counseling regarding the diagnosis and prognosis. Questions are answered at this time and they are agreeable with the plan.    --------------------------- IMPRESSION AND DISPOSITION ---------------------------------    IMPRESSION  1. Other acute pulmonary embolism without acute cor pulmonale ([facility] [address]    2. Acute pancreatitis, unspecified complication status, unspecified pancreatitis type    3. Breast mass    4. Pleural effusion    5.  Pneumonia of left lung due to infectious organism, unspecified part of lung        DISPOSITION  Disposition: Admit to telemetry  Patient condition is stable             Neftali Caceres DO  11/01/21 1042

## 2021-10-30 PROBLEM — N63.20 BREAST MASS, LEFT: Status: ACTIVE | Noted: 2021-10-30

## 2021-10-30 PROBLEM — K85.00 IDIOPATHIC ACUTE PANCREATITIS WITHOUT INFECTION OR NECROSIS: Status: ACTIVE | Noted: 2021-10-30

## 2021-10-30 PROBLEM — R31.21 ASYMPTOMATIC MICROSCOPIC HEMATURIA: Status: ACTIVE | Noted: 2021-10-30

## 2021-10-30 PROBLEM — K76.9 PLEURAL EFFUSION ASSOCIATED WITH HEPATIC DISORDER: Status: ACTIVE | Noted: 2021-01-31

## 2021-10-30 PROBLEM — J91.8 PLEURAL EFFUSION ASSOCIATED WITH HEPATIC DISORDER: Status: ACTIVE | Noted: 2021-01-31

## 2021-10-30 PROBLEM — I26.99 PULMONARY EMBOLISM (HCC): Status: ACTIVE | Noted: 2021-10-30

## 2021-10-30 LAB
BACTERIA: ABNORMAL /HPF
BILIRUBIN URINE: NEGATIVE
BLOOD, URINE: ABNORMAL
CLARITY: ABNORMAL
COLOR: YELLOW
GLUCOSE URINE: NEGATIVE MG/DL
KETONES, URINE: NEGATIVE MG/DL
LEUKOCYTE ESTERASE, URINE: NEGATIVE
LIPASE: 49 U/L (ref 13–60)
NITRITE, URINE: NEGATIVE
PH UA: 5.5 (ref 5–9)
PROCALCITONIN: 0.1 NG/ML (ref 0–0.08)
PROTEIN UA: NEGATIVE MG/DL
RBC UA: >20 /HPF (ref 0–2)
SARS-COV-2, NAAT: NOT DETECTED
SPECIFIC GRAVITY UA: 1.02 (ref 1–1.03)
TROPONIN, HIGH SENSITIVITY: 13 NG/L (ref 0–9)
UROBILINOGEN, URINE: 0.2 E.U./DL
WBC UA: ABNORMAL /HPF (ref 0–5)

## 2021-10-30 PROCEDURE — 1200000000 HC SEMI PRIVATE

## 2021-10-30 PROCEDURE — 99223 1ST HOSP IP/OBS HIGH 75: CPT | Performed by: INTERNAL MEDICINE

## 2021-10-30 PROCEDURE — 81001 URINALYSIS AUTO W/SCOPE: CPT

## 2021-10-30 PROCEDURE — 2580000003 HC RX 258: Performed by: STUDENT IN AN ORGANIZED HEALTH CARE EDUCATION/TRAINING PROGRAM

## 2021-10-30 PROCEDURE — 94664 DEMO&/EVAL PT USE INHALER: CPT

## 2021-10-30 PROCEDURE — 99223 1ST HOSP IP/OBS HIGH 75: CPT | Performed by: FAMILY MEDICINE

## 2021-10-30 PROCEDURE — 6360000002 HC RX W HCPCS: Performed by: STUDENT IN AN ORGANIZED HEALTH CARE EDUCATION/TRAINING PROGRAM

## 2021-10-30 PROCEDURE — 36415 COLL VENOUS BLD VENIPUNCTURE: CPT

## 2021-10-30 PROCEDURE — 84145 PROCALCITONIN (PCT): CPT

## 2021-10-30 PROCEDURE — 83690 ASSAY OF LIPASE: CPT

## 2021-10-30 PROCEDURE — 6370000000 HC RX 637 (ALT 250 FOR IP): Performed by: STUDENT IN AN ORGANIZED HEALTH CARE EDUCATION/TRAINING PROGRAM

## 2021-10-30 PROCEDURE — 6370000000 HC RX 637 (ALT 250 FOR IP): Performed by: EMERGENCY MEDICINE

## 2021-10-30 PROCEDURE — 94640 AIRWAY INHALATION TREATMENT: CPT

## 2021-10-30 RX ORDER — ACETAMINOPHEN 650 MG/1
650 SUPPOSITORY RECTAL EVERY 6 HOURS PRN
Status: DISCONTINUED | OUTPATIENT
Start: 2021-10-30 | End: 2021-10-30

## 2021-10-30 RX ORDER — VITAMIN B COMPLEX
1000 TABLET ORAL DAILY
Status: DISCONTINUED | OUTPATIENT
Start: 2021-10-30 | End: 2021-11-03 | Stop reason: HOSPADM

## 2021-10-30 RX ORDER — LACTULOSE 10 G/15ML
20 SOLUTION ORAL 3 TIMES DAILY
Status: DISCONTINUED | OUTPATIENT
Start: 2021-10-30 | End: 2021-11-03 | Stop reason: HOSPADM

## 2021-10-30 RX ORDER — BUDESONIDE AND FORMOTEROL FUMARATE DIHYDRATE 160; 4.5 UG/1; UG/1
2 AEROSOL RESPIRATORY (INHALATION) 2 TIMES DAILY
Status: DISCONTINUED | OUTPATIENT
Start: 2021-10-30 | End: 2021-10-30 | Stop reason: CLARIF

## 2021-10-30 RX ORDER — SPIRONOLACTONE 25 MG/1
100 TABLET ORAL DAILY
Status: DISCONTINUED | OUTPATIENT
Start: 2021-10-30 | End: 2021-11-03 | Stop reason: HOSPADM

## 2021-10-30 RX ORDER — SODIUM CHLORIDE 9 MG/ML
25 INJECTION, SOLUTION INTRAVENOUS PRN
Status: DISCONTINUED | OUTPATIENT
Start: 2021-10-30 | End: 2021-11-03 | Stop reason: HOSPADM

## 2021-10-30 RX ORDER — SODIUM CHLORIDE 0.9 % (FLUSH) 0.9 %
10 SYRINGE (ML) INJECTION PRN
Status: DISCONTINUED | OUTPATIENT
Start: 2021-10-30 | End: 2021-11-03 | Stop reason: HOSPADM

## 2021-10-30 RX ORDER — ACETAMINOPHEN 325 MG/1
650 TABLET ORAL EVERY 6 HOURS PRN
Status: DISCONTINUED | OUTPATIENT
Start: 2021-10-30 | End: 2021-10-30

## 2021-10-30 RX ORDER — BUDESONIDE 0.5 MG/2ML
0.5 INHALANT ORAL 2 TIMES DAILY
Status: DISCONTINUED | OUTPATIENT
Start: 2021-10-30 | End: 2021-11-03 | Stop reason: HOSPADM

## 2021-10-30 RX ORDER — SODIUM CHLORIDE 0.9 % (FLUSH) 0.9 %
10 SYRINGE (ML) INJECTION EVERY 12 HOURS SCHEDULED
Status: DISCONTINUED | OUTPATIENT
Start: 2021-10-30 | End: 2021-11-03 | Stop reason: HOSPADM

## 2021-10-30 RX ORDER — AMLODIPINE BESYLATE 2.5 MG/1
2.5 TABLET ORAL DAILY
Status: DISCONTINUED | OUTPATIENT
Start: 2021-10-30 | End: 2021-11-03 | Stop reason: HOSPADM

## 2021-10-30 RX ORDER — DOXAZOSIN MESYLATE 1 MG/1
1 TABLET ORAL NIGHTLY
Status: DISCONTINUED | OUTPATIENT
Start: 2021-10-30 | End: 2021-11-03 | Stop reason: HOSPADM

## 2021-10-30 RX ORDER — PANTOPRAZOLE SODIUM 40 MG/1
40 TABLET, DELAYED RELEASE ORAL DAILY
Status: DISCONTINUED | OUTPATIENT
Start: 2021-10-30 | End: 2021-11-03 | Stop reason: HOSPADM

## 2021-10-30 RX ORDER — ARFORMOTEROL TARTRATE 15 UG/2ML
15 SOLUTION RESPIRATORY (INHALATION) 2 TIMES DAILY
Status: DISCONTINUED | OUTPATIENT
Start: 2021-10-30 | End: 2021-11-03 | Stop reason: HOSPADM

## 2021-10-30 RX ORDER — FUROSEMIDE 40 MG/1
40 TABLET ORAL DAILY
Status: DISCONTINUED | OUTPATIENT
Start: 2021-10-30 | End: 2021-11-03 | Stop reason: HOSPADM

## 2021-10-30 RX ORDER — ONDANSETRON 4 MG/1
4 TABLET, ORALLY DISINTEGRATING ORAL EVERY 8 HOURS PRN
Status: DISCONTINUED | OUTPATIENT
Start: 2021-10-30 | End: 2021-11-03 | Stop reason: HOSPADM

## 2021-10-30 RX ORDER — ONDANSETRON 2 MG/ML
4 INJECTION INTRAMUSCULAR; INTRAVENOUS EVERY 6 HOURS PRN
Status: DISCONTINUED | OUTPATIENT
Start: 2021-10-30 | End: 2021-11-03 | Stop reason: HOSPADM

## 2021-10-30 RX ORDER — VENLAFAXINE HYDROCHLORIDE 75 MG/1
75 CAPSULE, EXTENDED RELEASE ORAL DAILY
Status: DISCONTINUED | OUTPATIENT
Start: 2021-10-30 | End: 2021-11-03 | Stop reason: HOSPADM

## 2021-10-30 RX ADMIN — SPIRONOLACTONE 100 MG: 25 TABLET ORAL at 11:36

## 2021-10-30 RX ADMIN — DOXAZOSIN 1 MG: 1 TABLET ORAL at 21:39

## 2021-10-30 RX ADMIN — PANTOPRAZOLE SODIUM 40 MG: 40 TABLET, DELAYED RELEASE ORAL at 11:38

## 2021-10-30 RX ADMIN — SODIUM CHLORIDE, PRESERVATIVE FREE 10 ML: 5 INJECTION INTRAVENOUS at 21:41

## 2021-10-30 RX ADMIN — VENLAFAXINE HYDROCHLORIDE 75 MG: 75 CAPSULE, EXTENDED RELEASE ORAL at 11:37

## 2021-10-30 RX ADMIN — FUROSEMIDE 40 MG: 40 TABLET ORAL at 11:36

## 2021-10-30 RX ADMIN — IPRATROPIUM BROMIDE AND ALBUTEROL SULFATE 1 AMPULE: .5; 2.5 SOLUTION RESPIRATORY (INHALATION) at 03:46

## 2021-10-30 RX ADMIN — RIFAXIMIN 550 MG: 550 TABLET ORAL at 21:40

## 2021-10-30 RX ADMIN — BUDESONIDE 500 MCG: 0.5 SUSPENSION RESPIRATORY (INHALATION) at 11:07

## 2021-10-30 RX ADMIN — SODIUM CHLORIDE, PRESERVATIVE FREE 10 ML: 5 INJECTION INTRAVENOUS at 11:38

## 2021-10-30 RX ADMIN — Medication 1000 UNITS: at 11:37

## 2021-10-30 RX ADMIN — AMLODIPINE BESYLATE 2.5 MG: 2.5 TABLET ORAL at 11:37

## 2021-10-30 RX ADMIN — RIFAXIMIN 550 MG: 550 TABLET ORAL at 11:37

## 2021-10-30 RX ADMIN — ENOXAPARIN SODIUM 40 MG: 100 INJECTION SUBCUTANEOUS at 11:36

## 2021-10-30 RX ADMIN — ARFORMOTEROL TARTRATE 15 MCG: 15 SOLUTION RESPIRATORY (INHALATION) at 21:03

## 2021-10-30 RX ADMIN — BUDESONIDE 500 MCG: 0.5 SUSPENSION RESPIRATORY (INHALATION) at 21:04

## 2021-10-30 RX ADMIN — ARFORMOTEROL TARTRATE 15 MCG: 15 SOLUTION RESPIRATORY (INHALATION) at 11:06

## 2021-10-30 RX ADMIN — LACTULOSE 20 G: 20 SOLUTION ORAL at 11:36

## 2021-10-30 RX ADMIN — ONDANSETRON 4 MG: 4 TABLET, ORALLY DISINTEGRATING ORAL at 18:58

## 2021-10-30 ASSESSMENT — PAIN SCALES - GENERAL
PAINLEVEL_OUTOF10: 0

## 2021-10-30 NOTE — PROGRESS NOTES
[address] [facility]  Family Medicine Attending    S: 68 y.o. female with PMH of malignant neoplasm L breast s/p lumpectomy 2013, DM 2, KATE, HTN, multiple thyroid nodules, depression, HLD, cirrhosis, s/p reverse total L shoulder who presents to ED for Shortness of Breath for several days. Also c/o of abdominal pain after eating. In ED, noted to have moderately large left pleural effusion (chronic), possible pancreatitis, cirrhosis, and possible distal segmental PE's  Today, feels a little better, but still with MENDEZ    O: VS- Blood pressure 108/71, pulse 108, temperature 98.3 °F (36.8 °C), temperature source Oral, resp. rate 18, height 5' 2\" (1.575 m), weight 170 lb (77.1 kg), SpO2 96 %. Exam is as noted by resident with the following changes, additions or corrections:  Awake, alert, slow to answer but oriented, NAD  Heart- RRR  Lungs- clear, but decreased (?absent) BS at left base  And - soft, tender in RUQ and epigastrium    Impressions:   Principal Problem:    Pulmonary embolism (HCC)  Active Problems:    Type 2 diabetes mellitus ([address]    Essential hypertension    Cirrhosis ([address]    Pleural effusion associated with hepatic disorder    Idiopathic acute pancreatitis without infection or necrosis    Asymptomatic microscopic hematuria  Resolved Problems:    * No resolved hospital problems. *      Plan:   Patient admitted because of dyspnea and abdominal pain   Transferred to our care after arrival on floor.    Imaging suggests small subsegmental PE's, along with large left pleural effusion - also noted is cirrhosis with portal hypertension and varices, possible pancreatitis, and left breast mass   Labs include urine with large blood and lipase of 70   Hematuria noted in past - follows with urology (?cystoscopy)   Repeat lipase, CBC, lytes in AM   Consult pulmonology - effusion formerly required drainage   Will feed cautiously and monitor symptoms   Breast mass previously felt to be scarring from previous biopsy - will need outpatient mammogram        Attending Physician Statement  I have reviewed the chart and seen the patient with the resident(s). I personally reviewed images, EKG's and similar tests, if present. I personally reviewed and performed key elements of the history and exam.  I have reviewed and confirmed student and/or resident history and exam with changes as indicated above. I agree with the assessment, plan and orders as documented by the resident. Please refer to the resident and/or student note for additional information.       Luc Alvarez MD

## 2021-10-30 NOTE — CONSULTS
Pulmonary [facility]                             Pulmonary Consult/Progress Note :          Patient: Kole Calle  MRN: [id number]  : 1945      Date of Admission: .10/29/2021  5:24 PM            Reason for Consultation:pleural effusion ,  CC : SOB   HPI:     Kole Calle is a 76 y.o. female with a PMH of malignant neoplasm of L breast, s/p lumpectomy in , KATE, , liver cirrhosis,     She  presents to ED with SOB for 4 weak  along with some tightness and wheezing with no fever or chills and no chest pain . She has kate on  CPAP machine   She denies current leg swelling and pain. She denies cough, fever, chills, and chest pain. She has never smoked.       She use her CPAP faithfully    She never smoked    She had pleural fluid drained in January and it was negative for any malignancy    She is on diuretics and she has mild swelling in her legs    PAST MEDICAL HISTORY:     Past Medical History:   Diagnosis Date    [facility]     Cirrhosis ([facility]     Essential hypertension     Hyperlipidemia     Osteopenia     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 ([facility]     Sleep apnea     Spinal stenosis     Type 2 diabetes mellitus ([facility]        PAST SURGICAL HISTORY:   Past Surgical History:   Procedure Laterality Date    BREAST LUMPECTOMY      lumpectomy [id number]    CARDIOVASCULAR STRESS TEST      Lexiscan stress test    CARPAL TUNNEL RELEASE      COLONOSCOPY  2017    multiple polyps; diverticula--jerod    COLONOSCOPY  2019    polyps; diverticula; hemorrhoids--jerod    COLONOSCOPY N/A 2019    COLONOSCOPY POLYPECTOMY SNARE/COLD BIOPSY performed by Leena Clark MD at [place] [address]  2019    COLONOSCOPY WITH BIOPSY performed by Leena Clark MD at [address] [place] [address] LITHOTRIPSY Left 2016    C-R STENT PLACEMENT    SHOULDER ARTHROPLASTY Left 2020    LEFT REVERSE TOTAL SHOULDER  ARTHROPLASTY -- PERLITA performed by Lu Gale MD at [place] [address]  04/23/2019    gastritis--jerod    UPPER GASTROINTESTINAL ENDOSCOPY N/A 04/23/2019    EGD BIOPSY performed by Td Mcclure MD at [address]:   Family History   Problem Relation Age of Onset    Arthritis Mother     COPD Father     Heart Attack Father     Cancer Other 48        colon       SOCIAL HISTORY:   Social History     Socioeconomic History    Marital status:      Spouse name: Not on file    Number of children: Not on file    Years of education: Not on file    Highest education level: Not on file   Occupational History    Not on file   Tobacco Use    Smoking status: Never Smoker    Smokeless tobacco: Never Used   Vaping Use    Vaping Use: Never used   Substance and Sexual Activity    Alcohol use: Never     Alcohol/week: 0.0 standard drinks    Drug use: Never    Sexual activity: Never     Partners: Male     Comment: last in 2013   Other Topics Concern    Not on file   Social History Narrative    Denies caffeine. Social Determinants of Health     Financial Resource Strain: High Risk    Difficulty of Paying Living Expenses: Hard   Food Insecurity: No Food Insecurity    Worried About Running Out of Food in the Last Year: Never true    Lj of Food in the Last Year: Never true   Transportation Needs:     Lack of Transportation (Medical):      Lack of Transportation (Non-Medical):    Physical Activity:     Days of Exercise per Week:     Minutes of Exercise per Session:    Stress:     Feeling of Stress :    Social Connections:     Frequency of Communication with Friends and Family:     Frequency of Social Gatherings with Friends and Family:     Attends Zoroastrianism Services:     Active Member of Clubs or Organizations:     Attends Club or Organization Meetings:     Marital Status:    Intimate Partner Violence:     Fear of Current or Ex-Partner:     Emotionally Abused:     Physically Abused:     Sexually Abused:      Social History     Tobacco Use   Smoking Status Never Smoker   Smokeless Tobacco Never Used     Social History     Substance and Sexual Activity   Alcohol Use Never    Alcohol/week: 0.0 standard drinks     Social History     Substance and Sexual Activity   Drug Use Never           HOME MEDICATIONS:  Prior to Admission medications    Medication Sig Start Date End Date Taking?  Authorizing Provider   amLODIPine (NORVASC) 2.5 MG tablet TAKE ONE TABLET BY MOUTH ONCE DAILY AT 9AM 10/6/21  Yes Historical Provider, MD   XIFAXAN 550 MG tablet TAKE 1 TABLET BY MOUTH TWICE DAILY 10/4/21  Yes Ana Grijalva MD   pantoprazole (PROTONIX) 40 MG tablet Take 1 tablet by mouth daily 7/13/21  Yes Ana Grijalva MD   Biotin 26286 MCG TABS Take by mouth every 7 days   Yes Historical Provider, MD   vitamin D3 (CHOLECALCIFEROL) 25 MCG (1000 UT) TABS tablet Take 1,000 Units by mouth daily   Yes Historical Provider, MD   venlafaxine (EFFEXOR XR) 75 MG extended release capsule Take 1 capsule by mouth daily 6/30/21  Yes Ana Grijalva MD   spironolactone (ALDACTONE) 100 MG tablet Take 1 tablet by mouth daily 6/30/21  Yes Ana Grijalva MD   albuterol sulfate HFA (VENTOLIN HFA) 108 (90 Base) MCG/ACT inhaler Inhale 2 puffs into the lungs 4 times daily as needed for Wheezing 6/30/21  Yes Ana Grijalva MD   lactulose (CHRONULAC) 10 GM/15ML solution Take 30 mLs by mouth 3 times daily 6/30/21  Yes Ana Grijalva MD   furosemide (LASIX) 40 MG tablet Take 1 tablet by mouth daily 6/30/21  Yes Ana Grijalva MD   doxazosin (CARDURA) 1 MG tablet Take 1 tablet by mouth nightly 6/30/21  Yes nAa Grijalva MD   FEROSUL 325 (65 Fe) MG tablet TAKE ONE TABLET BY MOUTH ONCE DAILY AT 9AM 10/6/21   Historical Provider, MD   fluticasone-salmeterol (ADVAIR) 250-50 MCG/DOSE AEPB INHALE 1 PUFF EVERY TWELVE HOURS 10/6/21   Historical Provider, MD budesonide-formoterol (SYMBICORT) 160-4.5 MCG/ACT AERO Inhale 2 puffs into the lungs 2 times daily 6/30/21   Kole Malik MD   vitamin D (ERGOCALCIFEROL) 1.25 MG (69079 UT) CAPS capsule Take 1 capsule by mouth once a week 3/27/20   Ginny Ford DO       CURRENT MEDICATIONS:  Current Facility-Administered Medications: amLODIPine (NORVASC) tablet 2.5 mg, 2.5 mg, Oral, Daily  doxazosin (CARDURA) tablet 1 mg, 1 mg, Oral, Nightly  furosemide (LASIX) tablet 40 mg, 40 mg, Oral, Daily  lactulose (CHRONULAC) 10 GM/15ML solution 20 g, 20 g, Oral, TID  pantoprazole (PROTONIX) tablet 40 mg, 40 mg, Oral, Daily  spironolactone (ALDACTONE) tablet 100 mg, 100 mg, Oral, Daily  venlafaxine (EFFEXOR XR) extended release capsule 75 mg, 75 mg, Oral, Daily  Vitamin D (CHOLECALCIFEROL) tablet 1,000 Units, 1,000 Units, Oral, Daily  rifaximin (XIFAXAN) tablet 550 mg, 550 mg, Oral, BID  sodium chloride flush 0.9 % injection 10 mL, 10 mL, IntraVENous, 2 times per day  sodium chloride flush 0.9 % injection 10 mL, 10 mL, IntraVENous, PRN  0.9 % sodium chloride infusion, 25 mL, IntraVENous, PRN  enoxaparin (LOVENOX) injection 40 mg, 40 mg, SubCUTAneous, Daily  ondansetron (ZOFRAN-ODT) disintegrating tablet 4 mg, 4 mg, Oral, Q8H PRN **OR** ondansetron (ZOFRAN) injection 4 mg, 4 mg, IntraVENous, Q6H PRN  magnesium hydroxide (MILK OF MAGNESIA) 400 MG/5ML suspension 30 mL, 30 mL, Oral, Daily PRN  budesonide (PULMICORT) nebulizer suspension 500 mcg, 0.5 mg, Nebulization, BID **AND** Arformoterol Tartrate (BROVANA) nebulizer solution 15 mcg, 15 mcg, Nebulization, BID  ipratropium-albuterol (DUONEB) nebulizer solution 1 ampule, 1 ampule, Inhalation, Q15 Min PRN    IV MEDICATIONS:      ALLERGIES:  Allergies   Allergen Reactions    Lisinopril        REVIEW OF SYSTEMS:  General ROS:  No weight loss ,no fatigue     ENT ROS:   No Sore throat ,no lymphoadenopathy,no nasal stuffiness     Hematological and Lymphatic ROS:   No ecchymosis ,no tendency to bleed  Respiratory ROS:   SOB   Cardiovascular ROS:   No CP,No Palpitation   Gastrointestinal ROS:   No Gi bleed,no nausea or vomiting      - Musculoskeletal ROS:      - no joint swelling ,no joint pain   Neurological ROS:     -no weakness or numbness    Dermatological ROS:   No skin rash ,no urticaria     PHYSICAL EXAMINATION:     VITAL SIGNS:  /71   Pulse 108   Temp 98.3 °F (36.8 °C) (Oral)   Resp 18   Ht 5' 2\" (1.575 m)   Wt 170 lb (77.1 kg)   SpO2 96%   BMI 31.09 kg/m²   Wt Readings from Last 3 Encounters:   10/29/21 170 lb (77.1 kg)   10/19/21 187 lb 3.2 oz (84.9 kg)   10/02/21 200 lb (90.7 kg)     Temp Readings from Last 3 Encounters:   10/30/21 98.3 °F (36.8 °C) (Oral)   10/19/21 97.9 °F (36.6 °C) (Temporal)   10/02/21 98.8 °F (37.1 °C) (Oral)     TMAX:  BP Readings from Last 3 Encounters:   10/30/21 108/71   10/19/21 130/74   10/02/21 110/60     Pulse Readings from Last 3 Encounters:   10/30/21 108   10/19/21 82   10/02/21 89           INTAKE/OUTPUTS:  No intake/output data recorded.   No intake or output data in the 24 hours ending 10/30/21 1251    General Appearance: alert and oriented to person, place and time, well-developed and   well-nourished, in no acute distress   Eyes: pupils equal, round, and reactive to light, extraocular eye movements intact, conjunctivae normal and sclera anicteric   Neck: neck supple and non tender without mass, no thyromegaly, no thyroid nodules and no cervical adenopathy   Pulmonary/Chest:decrease breath sound left /wheeizng occasional exp  Cardiovascular: normal rate, regular rhythm, normal S1 and S2, no murmurs, rubs, clicks or gallops, distal pulses intact, no carotid bruits, no murmurs, no gallops, no carotid bruits and no JVD   Abdomen: obese, soft, non-tender, non-distended, normal bowel sounds, no masses or organomegaly   Extremities:mild edema   Musculoskeletal: normal range of motion, no joint swelling, deformity or tenderness   Neurologic: reflexes normal and symmetric, no cranial nerve deficit noted    LABS/IMAGING:    CBC:  Lab Results   Component Value Date    WBC 9.9 10/29/2021    HGB 11.3 (L) 10/29/2021    HCT 34.1 10/29/2021    MCV 98.0 10/29/2021     10/29/2021    LYMPHOPCT 12.1 (L) 10/29/2021    RBC 3.48 (L) 10/29/2021    MCH 32.5 10/29/2021    MCHC 33.1 10/29/2021    RDW 17.8 (H) 10/29/2021    NEUTOPHILPCT 69.3 10/29/2021    MONOPCT 14.3 (H) 10/29/2021    BASOPCT 0.5 10/29/2021    NEUTROABS 6.83 10/29/2021    LYMPHSABS 1.19 (L) 10/29/2021    MONOSABS 1.41 (H) 10/29/2021    EOSABS 0.34 10/29/2021    BASOSABS 0.05 10/29/2021       Recent Labs     10/29/21  1904 10/02/21  1727   WBC 9.9 6.2   HGB 11.3* 10.5*   HCT 34.1 31.7*   MCV 98.0 102.3*    214       BMP:   Recent Labs     10/29/21  1904      K 4.5   *   CO2 22   BUN 14   CREATININE 1.1*       MG:   Lab Results   Component Value Date    MG 1.7 02/04/2021     Ca/Phos:   Lab Results   Component Value Date    CALCIUM 10.3 (H) 10/29/2021    PHOS 2.7 02/27/2021     Amylase: No results found for: AMYLASE  Lipase:   Lab Results   Component Value Date    LIPASE 70 (H) 10/29/2021     LIVER PROFILE:   Recent Labs     10/29/21  1904   AST 30   ALT 12   LIPASE 70*   BILITOT 0.8   ALKPHOS 170*       PT/INR: No results for input(s): PROTIME, INR in the last 72 hours. APTT:   No results for input(s): APTT in the last 72 hours.     Cardiac Enzymes:  Lab Results   Component Value Date    CKTOTAL 203 (H) 08/20/2021    TROPONINI <0.01 02/04/2021                 PROBLEM LIST:  Patient Active Problem List   Diagnosis    Malignant neoplasm of left breast ([facility]    Type 2 diabetes mellitus ([facility]    Obstructive sleep apnea syndrome    Essential hypertension    Multiple thyroid nodules    Depression    Hx of adenomatous colonic polyps    Dyslipidemia    Cirrhosis (HCC)    S/P reverse total shoulder arthroplasty, left    Anemia    Pleural effusion associated with hepatic disorder  Palpitations    Rhabdomyolysis    Hyperammonemia (HCC)    Closed displaced fracture of surgical neck of right humerus    Pulmonary embolism (HCC)    Idiopathic acute pancreatitis without infection or necrosis    Asymptomatic microscopic hematuria               ASSESSMENT:  1.) left side effusion  2-. Shortness of breath  2. )Breast cancer   3.)Possible pneumonia  4.)Small PE ,if any  5. )KATE      PLAN:  Patient with breast cancer, left side pleural effusion s/p thoracentesis that was negative for malignancy she has mild swelling in her legsm,will need drain again and if keep coming will do Pleurax ,for US thoracentesis tomorrow if large enough     Fluid last time  were negative for malignancy    Shortness of breath seems multifactorial, she never smoked, she is on CPAP, she has skipped beats and palpitation   2D echo was done 8/21 . shows diastolic     Patient does not have any evidence of interstitial lung disease or COPD    Has history of diastolic dysfunction so we will await cardiology    She also has liver cirrhosis and other possibility of her shortness of breath could be portal pulmonary hypertension versus hepatopulmonary syndrome but ,continue follow as OP      Thank you very much for allowing me to participate in the care of this pleasant patient , should you have any questions ,please do not hesitate to contact me      Daysi Webb MD,[facility]  Pulmonary&Critical Care Medicine   Director of [address] [place] [address] Director of [address] [place] [address]    Frances Hernandez    NOTE: This report was transcribed using voice recognition software. Every effort was made to ensure accuracy; however, inadvertent computerized transcription errors may be present.

## 2021-10-30 NOTE — ED NOTES
Treatment complete. States breathing easier. Still with labored breathing and accessory muscle use. Decrease noted in wheezing. Refusing repeat treatment at this time.       Suzette Chu RN  10/30/21 2903

## 2021-10-30 NOTE — SIGNIFICANT EVENT
Pt being followed by Family Medicine, case d/w FM Resident. Pt to be transferred to [facility] service under Dr. Khushboo Ordoñez.     Rand Mueller MD  Dept of Internal Medicine  [facility]

## 2021-10-30 NOTE — PLAN OF CARE
Problem: Skin Integrity:  Goal: Will show no infection signs and symptoms  Description: Will show no infection signs and symptoms  Outcome: Met This Shift  Goal: Absence of new skin breakdown  Description: Absence of new skin breakdown  Outcome: Met This Shift     Problem: Pain:  Goal: Pain level will decrease  Description: Pain level will decrease  Outcome: Met This Shift  Goal: Control of acute pain  Description: Control of acute pain  Outcome: Met This Shift  Goal: Control of chronic pain  Description: Control of chronic pain  Outcome: Met This Shift     Problem: Falls - Risk of:  Goal: Will remain free from falls  Description: Will remain free from falls  Outcome: Met This Shift  Goal: Absence of physical injury  Description: Absence of physical injury  Outcome: Met This Shift

## 2021-10-30 NOTE — ED NOTES
Assumed patient care. Breathing TX running. Patient tachypenic, labored breathing with accessory muscle use. Lungs with wheezes bilaterally.       Hyun Avilez, RN  10/30/21 [id number]

## 2021-10-30 NOTE — H&P
[facility] - Family Medicine Resident Inpatient  History and Physical    CC: SOB    HPI: History obtained from patient, electronic medical record. Ben Hernandez is a 68 y.o. female with a PMH of malignant neoplasm L breast s/p lumpectomy 2013, DM 2, KATE, HTN, multiple thyroid nodules, depression, HLD, cirrhosis, s/p reverse total L shoulder who presents to ED for Shortness of Breath (2-3 days short of breath,nauseated,emesis)  . She reports worsening SOB for the past 3-4 weeks, but states it became too much over the past few days. She reports it is worse with exertion. She also report abdominal pain and nausea with eating/drinking floods, denies vomiting. Reports subjective fever, productive cough, a little blood in urine, and headache. Denies change in chronic back pain, burning or pain with urination. Denies smoking, recreational drugs, and alcohol. ED Course: The patient remained hemodynamically stable. Patient was given duonebs, lovenox, doxycycline, and ceftriaxone. EKG Rhythm strip normal sinus rhythm, PAC's noted. The significant laboratory data obtained by ED work up was:  CT abdomen/pelvis/ CTA pulm- moderate large pleural effusion with atelectasis and infiltrates in the LLL, distal subsegmental PEs, asymmetric soft tissue density in L breast, cirrhotic liver with multiple hypodense hepatic lesions, some previously seen, portal venous hypertension with splenomegaly venous verices, ascites, haziness surrounding pancreas, 9 mm lesion on pancreatic head, diverticulosis. UA with large blood and negative LE.     Medications   ipratropium-albuterol (DUONEB) nebulizer solution 1 ampule (1 ampule Inhalation Given 10/29/21 2308)   amLODIPine (NORVASC) tablet 2.5 mg (2.5 mg Oral Given 10/30/21 1137)   doxazosin (CARDURA) tablet 1 mg (has no administration in time range)   furosemide (LASIX) tablet 40 mg (40 mg Oral Given 10/30/21 1136)   lactulose (CHRONULAC) 10 GM/15ML solution 20 g (20 g Oral Not Given 10/30/21 1355)   pantoprazole (PROTONIX) tablet 40 mg (40 mg Oral Given 10/30/21 1138)   spironolactone (ALDACTONE) tablet 100 mg (100 mg Oral Given 10/30/21 1136)   venlafaxine (EFFEXOR XR) extended release capsule 75 mg (75 mg Oral Given 10/30/21 1137)   Vitamin D (CHOLECALCIFEROL) tablet 1,000 Units (1,000 Units Oral Given 10/30/21 1137)   rifaximin (XIFAXAN) tablet 550 mg (550 mg Oral Given 10/30/21 1137)   sodium chloride flush 0.9 % injection 10 mL (10 mLs IntraVENous Given 10/30/21 1138)   sodium chloride flush 0.9 % injection 10 mL (has no administration in time range)   0.9 % sodium chloride infusion (has no administration in time range)   enoxaparin (LOVENOX) injection 40 mg (40 mg SubCUTAneous Given 10/30/21 1136)   ondansetron (ZOFRAN-ODT) disintegrating tablet 4 mg (has no administration in time range)     Or   ondansetron (ZOFRAN) injection 4 mg (has no administration in time range)   magnesium hydroxide (MILK OF MAGNESIA) 400 MG/5ML suspension 30 mL (has no administration in time range)   budesonide (PULMICORT) nebulizer suspension 500 mcg (500 mcg Nebulization Given 10/30/21 1107)     And   Arformoterol Tartrate (BROVANA) nebulizer solution 15 mcg (15 mcg Nebulization Given 10/30/21 1106)   iopamidol (ISOVUE-370) 76 % injection 75 mL (75 mLs IntraVENous Given 10/29/21 1959)   enoxaparin (LOVENOX) injection 80 mg (80 mg SubCUTAneous Given 10/29/21 2304)   ipratropium-albuterol (DUONEB) nebulizer solution 1 ampule (1 ampule Inhalation Given 10/30/21 0346)   cefTRIAXone (ROCEPHIN) 1,000 mg in sterile water 10 mL IV syringe (0 mg IntraVENous Stopped 10/30/21 0005)   doxycycline (VIBRAMYCIN) 100 mg in dextrose 5 % 100 mL IVPB (0 mg IntraVENous Stopped 10/30/21 0240)        ED orders:   Orders Placed This Encounter   Procedures    Culture, Blood 2    Culture, Blood 1    COVID-19, Rapid    XR CHEST PORTABLE    CTA PULMONARY W CONTRAST    CT ABDOMEN PELVIS W IV CONTRAST Additional Contrast? None    CBC auto differential    Comprehensive Metabolic Panel    D-Dimer, Quantitative    Lactic Acid, Plasma    Lipase    Urinalysis with Microscopic    Brain Natriuretic Peptide    Ammonia    Troponin    Troponin    Basic Metabolic Panel w/ Reflex to MG    CBC auto differential    Procalcitonin    Lipase    ADULT DIET; Full Liquid    Telemetry monitoring - continuous duration    Pulse Oximetry    Vital signs per unit routine    Notify physician    Up as tolerated    Adv Diet as Tolerated (nurse communication)    Neurovascular checks    Full Code    Inpatient consult to Hospitalist    Inpatient consult to Pulmonology    Initiate Oxygen Therapy Protocol    POCT blood gases    EKG 12 Lead    Saline lock IV    PATIENT STATUS (DIRECT) Inpatient    PATIENT STATUS (FROM ED OR OR/PROCEDURAL) Inpatient       PMH:  has a past medical history of Breast cancer ([facility] [address] Cirrhosis ([address] Essential hypertension, Hyperlipidemia, Osteopenia, 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 ([address] Sleep apnea, Spinal stenosis, and Type 2 diabetes mellitus ([facility] [address] PSH:  has a past surgical history that includes Hysterectomy; Carpal tunnel release; Lithotripsy (Left, 05/04/2016); Colonoscopy (01/31/2017); Breast lumpectomy; Upper gastrointestinal endoscopy (04/23/2019); Colonoscopy (04/23/2019); Upper gastrointestinal endoscopy (N/A, 04/23/2019); Colonoscopy (N/A, 04/23/2019); Colonoscopy (04/23/2019); Shoulder Arthroplasty (Left, 12/21/2020); and cardiovascular stress test.    FH: family history includes Arthritis in her mother; COPD in her father; Cancer (age of onset: 48) in an other family member; Heart Attack in her father. Social:  reports that she has never smoked. She has never used smokeless tobacco. She reports that she does not drink alcohol and does not use drugs. Allergies: Allergies   Allergen Reactions    Lisinopril         Home Medications:   No current facility-administered medications on file prior to encounter. Current Outpatient Medications on File Prior to Encounter   Medication Sig Dispense Refill    amLODIPine (NORVASC) 2.5 MG tablet TAKE ONE TABLET BY MOUTH ONCE DAILY AT 9AM      XIFAXAN 550 MG tablet TAKE 1 TABLET BY MOUTH TWICE DAILY 120 tablet 0    pantoprazole (PROTONIX) 40 MG tablet Take 1 tablet by mouth daily 90 tablet 1    Biotin 66835 MCG TABS Take by mouth every 7 days      vitamin D3 (CHOLECALCIFEROL) 25 MCG (1000 UT) TABS tablet Take 1,000 Units by mouth daily      venlafaxine (EFFEXOR XR) 75 MG extended release capsule Take 1 capsule by mouth daily 90 capsule 0    spironolactone (ALDACTONE) 100 MG tablet Take 1 tablet by mouth daily 90 tablet 0    albuterol sulfate HFA (VENTOLIN HFA) 108 (90 Base) MCG/ACT inhaler Inhale 2 puffs into the lungs 4 times daily as needed for Wheezing 1 Inhaler 3    lactulose (CHRONULAC) 10 GM/15ML solution Take 30 mLs by mouth 3 times daily 1892 mL 3    furosemide (LASIX) 40 MG tablet Take 1 tablet by mouth daily 90 tablet 0    doxazosin (CARDURA) 1 MG tablet Take 1 tablet by mouth nightly 90 tablet 0    FEROSUL 325 (65 Fe) MG tablet TAKE ONE TABLET BY MOUTH ONCE DAILY AT 9AM      fluticasone-salmeterol (ADVAIR) 250-50 MCG/DOSE AEPB INHALE 1 PUFF EVERY TWELVE HOURS      budesonide-formoterol (SYMBICORT) 160-4.5 MCG/ACT AERO Inhale 2 puffs into the lungs 2 times daily 1 Inhaler 3    vitamin D (ERGOCALCIFEROL) 1.25 MG (36235 UT) CAPS capsule Take 1 capsule by mouth once a week 12 capsule 1       ROS:    Const: No chills, reports fever  HEENT: No blurred vision, double vision; no URI symptoms  Resp: No pleuritic chest pain; reports cough, SOB  Cardio: No chest pain, no exertional dyspnea, no PND, no palpitation, no leg swelling; reports orthopnea  GI: No dysphagia, no reflux;  no v; no c/d.  No hematochezia; reports nausea, abdominal pain worse with eating  : No dysuria, no frequency, hesitancy; reports hematuria  MSK: reports chronic pain, no worse than baseline  Neuro: no focal weakness, no slurred speech, no double vision, no numbness or tingling in extremities  Endo: no heat/cold intolerance, no polyphagia, polydipsia or polyuria  Hem: no increased bleeding, no bruising, no lymphadenopathy  Skin: no skin changes  Psych: no depressed mood, no suicidal ideation    PE:  Blood pressure 108/71, pulse 108, temperature 98.3 °F (36.8 °C), temperature source Oral, resp. rate 18, height 5' 2\" (1.575 m), weight 170 lb (77.1 kg), SpO2 96 %. General: Alert, cooperative, no acute distress. HEENT: Normocephalic, atraumatic. Conjunctiva/corneas clear, EOM's intact,   Neck: Symmetrical, trachea midline  Chest: No tenderness or deformity, full & symmetric excursion  Lung: Crackles towards lung bases  Heart: RRR, loud systolic murmur present  Abdomen: SNTND, no masses, no organomegaly, no guarding, rebound or rigidity. Genital/Rectal: deferred  Extremities:  Extremities normal, atraumatic, no cyanosis or edema. Skin: Skin color, texture, turgor normal, no rashes or lesions  Musculoskeletal: No joint swelling, no muscle tenderness.    Neurologic: Alert & Oriented    Labs:   Results for orders placed or performed during the hospital encounter of 10/29/21   COVID-19, Rapid    Specimen: Nasopharyngeal Swab   Result Value Ref Range    SARS-CoV-2, NAAT Not Detected Not Detected   CBC auto differential   Result Value Ref Range    WBC 9.9 4.5 - 11.5 E9/L    RBC 3.48 (L) 3.50 - 5.50 E12/L    Hemoglobin 11.3 (L) 11.5 - 15.5 g/dL    Hematocrit 34.1 34.0 - 48.0 %    MCV 98.0 80.0 - 99.9 fL    MCH 32.5 26.0 - 35.0 pg    MCHC 33.1 32.0 - 34.5 %    RDW 17.8 (H) 11.5 - 15.0 fL    Platelets 868 254 - 339 E9/L    MPV 10.6 7.0 - 12.0 fL    Neutrophils % 69.3 43.0 - 80.0 %    Immature Granulocytes % 0.4 0.0 - 5.0 %    Lymphocytes % 12.1 (L) 20.0 - 42.0 %    Monocytes % 14.3 (H) 2.0 - 12.0 %    Eosinophils % 3.4 0.0 - 6.0 %    Basophils % 0.5 0.0 - 2.0 %    Neutrophils Absolute 6.83 1.80 - 7.30 E9/L    Immature Granulocytes # 0.04 E9/L    Lymphocytes Absolute 1.19 (L) 1.50 - 4.00 E9/L    Monocytes Absolute 1.41 (H) 0.10 - 0.95 E9/L    Eosinophils Absolute 0.34 0.05 - 0.50 E9/L    Basophils Absolute 0.05 0.00 - 0.20 E9/L   Comprehensive Metabolic Panel   Result Value Ref Range    Sodium 139 132 - 146 mmol/L    Potassium 4.5 3.5 - 5.0 mmol/L    Chloride 108 (H) 98 - 107 mmol/L    CO2 22 22 - 29 mmol/L    Anion Gap 9 7 - 16 mmol/L    Glucose 107 (H) 74 - 99 mg/dL    BUN 14 6 - 23 mg/dL    CREATININE 1.1 (H) 0.5 - 1.0 mg/dL    GFR Non-African American 48 >=60 mL/min/1.73    GFR African American 58     Calcium 10.3 (H) 8.6 - 10.2 mg/dL    Total Protein 6.7 6.4 - 8.3 g/dL    Albumin 3.4 (L) 3.5 - 5.2 g/dL    Total Bilirubin 0.8 0.0 - 1.2 mg/dL    Alkaline Phosphatase 170 (H) 35 - 104 U/L    ALT 12 0 - 32 U/L    AST 30 0 - 31 U/L   D-Dimer, Quantitative   Result Value Ref Range    D-Dimer, Quant 862 ng/mL DDU   Lactic Acid, Plasma   Result Value Ref Range    Lactic Acid 1.7 0.5 - 2.2 mmol/L   Lipase   Result Value Ref Range    Lipase 70 (H) 13 - 60 U/L   Urinalysis with Microscopic   Result Value Ref Range    Color, UA Yellow Straw/Yellow    Clarity, UA Clear Clear    Glucose, Ur Negative Negative mg/dL    Bilirubin Urine Negative Negative    Ketones, Urine TRACE (A) Negative mg/dL    Specific Gravity, UA 1.015 1.005 - 1.030    Blood, Urine LARGE (A) Negative    pH, UA 5.0 5.0 - 9.0    Protein, UA Negative Negative mg/dL    Urobilinogen, Urine 0.2 <2.0 E.U./dL    Nitrite, Urine Negative Negative    Leukocyte Esterase, Urine Negative Negative    WBC, UA 0-1 0 - 5 /HPF    RBC, UA >20 0 - 2 /HPF    Epithelial Cells, UA MODERATE /HPF    Bacteria, UA FEW (A) None Seen /HPF   Brain Natriuretic Peptide   Result Value Ref Range    Pro-BNP 30 0 - 450 pg/mL   Ammonia   Result Value Ref Range    Ammonia 45.0 11.0 - 51.0 umol/L   Troponin   Result Value Ref Range    Troponin, High Sensitivity 14 (H) 0 - 9 ng/L   Troponin Result Value Ref Range    Troponin, High Sensitivity 13 (H) 0 - 9 ng/L   POCT blood gases   Result Value Ref Range    Sample Type Arterial     POC pH 7.428 7.350 - 7.450    POC pCO2 32.3 (L) 35.0 - 45.0 mmHg    POC PO2 96.7 (H) 60.0 - 80.0 mmHg    POC HCO3 21.3 (L) 22.0 - 26.0 mmol/L    POC Base Excess -2.3 -3.0 - 3.0 mmol/L    POC O2 SAT 97.8 92.0 - 98.5 %    POC CPB No     POC  ,072     POC Device ID [id number]     POC Delivery System RoomAir    EKG 12 Lead   Result Value Ref Range    Ventricular Rate 100 BPM    Atrial Rate 100 BPM    P-R Interval 130 ms    QRS Duration 78 ms    Q-T Interval 346 ms    QTc Calculation (Bazett) 446 ms    P Axis 39 degrees    R Axis 7 degrees    T Axis 26 degrees       Imaging:  CT ABDOMEN PELVIS W IV CONTRAST Additional Contrast? None    Result Date: 10/29/2021  EXAMINATION: CTA OF THE CHEST; CT OF THE ABDOMEN AND PELVIS WITH CONTRAST 10/29/2021 7:36 pm TECHNIQUE: CTA of the chest was performed after the administration of intravenous contrast.  Multiplanar reformatted images are provided for review. MIP images are provided for review. Dose modulation, iterative reconstruction, and/or weight based adjustment of the mA/kV was utilized to reduce the radiation dose to as low as reasonably achievable.; CT of the abdomen and pelvis was performed with the administration of intravenous contrast. Multiplanar reformatted images are provided for review. Dose modulation, iterative reconstruction, and/or weight based adjustment of the mA/kV was utilized to reduce the radiation dose to as low as reasonably achievable. COMPARISON: 01/31/2021 HISTORY: ORDERING SYSTEM PROVIDED HISTORY: rule out PE TECHNOLOGIST PROVIDED HISTORY: Reason for exam:->rule out PE Decision Support Exception - unselect if not a suspected or confirmed emergency medical condition->Emergency Medical Condition (MA) FINDINGS: CTA chest. There is cardiomegaly with mild coronary artery calcification.   The distal  subsegmental and peripheral vessels of lower lobes concerning for small distal subsegmental pulmonary embolism. Moderate large pleural effusion with atelectasis and infiltrates in the left lower lobe concerning for pneumonia. Asymmetric soft tissue density in the left breast.  Consider mammographic correlation. Cirrhotic liver with multiple indeterminate hypodense hepatic lesions, some of which were seen previously and surveillance is recommended. There is portal venous hypertension with splenomegaly, venous varices and ascites. Haziness surrounding the pancreas concerning for pancreatitis. Please correlate with pancreatic enzymes. 9 mm indeterminate hypodense lesion in the pancreatic head. Consider surveillance. Diverticulosis of the colon with thickening of the left hemicolon which may be due to spasm or uncomplicated diverticulitis. XR CHEST PORTABLE    Result Date: 10/29/2021  EXAMINATION: ONE XRAY VIEW OF THE CHEST 10/29/2021 7:55 pm COMPARISON: None. HISTORY: ORDERING SYSTEM PROVIDED HISTORY: dyspnea TECHNOLOGIST PROVIDED HISTORY: Reason for exam:->dyspnea FINDINGS: Possible pulmonary vascular congestive changes. There is no effusion or pneumothorax. Cardiomegaly. The osseous structures are without acute process. Total reverse left shoulder arthroplasty. Cardiomegaly with possible pulmonary vascular congestive changes. XR CHEST PORTABLE    Result Date: 10/2/2021  EXAMINATION: ONE XRAY VIEW OF THE CHEST 10/2/2021 4:52 pm COMPARISON: August 7, 2021 reviewed CT chest January 31, 2021 HISTORY: ORDERING SYSTEM PROVIDED HISTORY: SOB TECHNOLOGIST PROVIDED HISTORY: Reason for exam:->SOB FINDINGS: Presence of a mild to moderate the left-sided pleural effusion. Quantification of pleural effusion can be achieved with right and left lateral decubitus views of the chest. Right lungs normally expanded. Right-sided pleural space are free. Heart has upper borderline size.   There is no in conspicuous indication for perihilar vascular congestion but there is a prominent azygos vein density in the right-side of the mediastinum paratracheal area. Mild to moderate left-sided pleural effusion. CTA PULMONARY W CONTRAST    Result Date: 10/29/2021  EXAMINATION: CTA OF THE CHEST; CT OF THE ABDOMEN AND PELVIS WITH CONTRAST 10/29/2021 7:36 pm TECHNIQUE: CTA of the chest was performed after the administration of intravenous contrast.  Multiplanar reformatted images are provided for review. MIP images are provided for review. Dose modulation, iterative reconstruction, and/or weight based adjustment of the mA/kV was utilized to reduce the radiation dose to as low as reasonably achievable.; CT of the abdomen and pelvis was performed with the administration of intravenous contrast. Multiplanar reformatted images are provided for review. Dose modulation, iterative reconstruction, and/or weight based adjustment of the mA/kV was utilized to reduce the radiation dose to as low as reasonably achievable. COMPARISON: 01/31/2021 HISTORY: ORDERING SYSTEM PROVIDED HISTORY: rule out PE TECHNOLOGIST PROVIDED HISTORY: Reason for exam:->rule out PE Decision Support Exception - unselect if not a suspected or confirmed emergency medical condition->Emergency Medical Condition (MA) FINDINGS: CTA chest. There is cardiomegaly with mild coronary artery calcification. The great vessels are normal.  Trachea and major bronchi are patent. Nonenlarged mediastinal lymph nodes are present. The contrast opacification of the pulmonary arteries is limited. Given this limitation, there are no filling defects in the main pulmonary artery and the central branches. However, small filling defects are identified in the distal subsegmental and peripheral vessels more on the left lower lobe. There is large pleural effusion with atelectasis/infiltrates in the left base. The right lung is clear.   Degenerative changes are identified in the thoracic spine with mild compression deformity of T8 and T11 probably new since the previous examination. 1.1 cm soft tissue density is identified in the left breast which may be related to patient's breast malignancy. Consider mammographic correlation CT abdomen and pelvis. The liver is heterogeneous with nodular border concerning for cirrhosis with multiple subcentimeter hypodense lesions some of which were seen in the previous examination. Consider surveillance. There is portal venous hypertension with splenomegaly and extensive venous varices. Pancreas is edematous with Debby pancreatic edema/ inflammatory changes more surrounding the pancreatic head concerning for pancreatitis. There is biliary dilatation. A 9 mm hypodense lesion is noted in pancreatic head which is indeterminate for malignancy and surveillance is recommended. The adrenals and the kidneys are normal.  Degenerative changes are identified in the lumbar spine. There is a small amount of ascites. There is an umbilical hernia with herniation of ascites. Pelvis. There is ascites fluid in the pelvis. Bladder is partially distended. There is diffuse diverticulosis of the colon with thickening of the descending and sigmoid colon. There is constipation. The appendix cannot be identified. No central pulmonary embolism or aortic dissection. There are small filling defects in the distal  subsegmental and peripheral vessels of lower lobes concerning for small distal subsegmental pulmonary embolism. Moderate large pleural effusion with atelectasis and infiltrates in the left lower lobe concerning for pneumonia. Asymmetric soft tissue density in the left breast.  Consider mammographic correlation. Cirrhotic liver with multiple indeterminate hypodense hepatic lesions, some of which were seen previously and surveillance is recommended. There is portal venous hypertension with splenomegaly, venous varices and ascites.  Haziness surrounding the pancreas concerning for pancreatitis. Please correlate with pancreatic enzymes. 9 mm indeterminate hypodense lesion in the pancreatic head. Consider surveillance. Diverticulosis of the colon with thickening of the left hemicolon which may be due to spasm or uncomplicated diverticulitis. Assessment and Plan  Principal Problem:    Pleural effusion associated with hepatic disorder  Active Problems:    Type 2 diabetes mellitus (HCC)    Essential hypertension    Cirrhosis ([address]    Pulmonary embolism (HCC)    Idiopathic acute pancreatitis without infection or necrosis    Asymptomatic microscopic hematuria    Breast mass, left  Resolved Problems:    * No resolved hospital problems.  *    Pleural effusion  · Chronic pleural effusion with worsening SOB for past month  · Consult to pulmonology to consider thoracocentesis  · Pulmicort, brovana, and duoneb for SOB  · Procalcitonin ordered     Pulmonary Embolism  · Currently on prophylactic dose of lovenox, consider switching to therapeutic dose  · telemetry    Idiopathic acute pancreatitis  · Will get lipase and plan to trend  · Full liquid diet to start, advance as tolerated    Cirrhosis  · Chronic cirrhosis  · Continue home rifaximin and lactulose  · Ammonia 45 on 10/29    HTN  · Currently well controlled, continue home amlodipine 2.5 mg dialy, cardura 1 mg nightly, lasix 40 mg daily, and aldactone 100 mg daily    DM2  · Last A1c 5.1 in April  · Monitor BG, consider LDSS if needed     Asymptomatic microscopic hematuria  · Large blood in initial U/A  · Repeat today, may need outpatient work up    L breast mass  · S/p lumpectomy L breast in 2013, last mammogram in 2020 not concerning for masses  · Possibly new mass, will need outpatient work up    Depression  · Continue home effexor 75 mg    DVT / GI prophylaxis: lovenox 40mg SC daily and Protonix    Dispo -     Electronically signed by Aaron Luke DO on 10/30/2021 at 2:30 PM.  This case was discussed with attending physician, Dr. Adin Muse

## 2021-10-30 NOTE — ED NOTES
Patient ambulatory to bathroom. Steady gait. + tachypnea and labored breathing upon return. O2 sat 94%. Lungs with wheezing. Treatment initiated.       Papi Dorman RN  10/30/21 5421

## 2021-10-30 NOTE — ED NOTES
[facility] here for patient transport. Patient to [facility] via [facility].       Patsy Carrera RN  10/30/21 6545

## 2021-10-30 NOTE — ED NOTES
Report to [facility] on [id number].       Mer Ramey RN  10/30/21 9629

## 2021-10-31 ENCOUNTER — APPOINTMENT (OUTPATIENT)
Dept: GENERAL RADIOLOGY | Age: 76
DRG: 186 | End: 2021-10-31
Payer: MEDICARE

## 2021-10-31 ENCOUNTER — APPOINTMENT (OUTPATIENT)
Dept: ULTRASOUND IMAGING | Age: 76
DRG: 186 | End: 2021-10-31
Payer: MEDICARE

## 2021-10-31 LAB
ANION GAP SERPL CALCULATED.3IONS-SCNC: 9 MMOL/L (ref 7–16)
APPEARANCE FLUID: NORMAL
BASOPHILS ABSOLUTE: 0.05 E9/L (ref 0–0.2)
BASOPHILS RELATIVE PERCENT: 0.8 % (ref 0–2)
BUN BLDV-MCNC: 12 MG/DL (ref 6–23)
CALCIUM SERPL-MCNC: 10.4 MG/DL (ref 8.6–10.2)
CELL COUNT FLUID TYPE: NORMAL
CHLORIDE BLD-SCNC: 109 MMOL/L (ref 98–107)
CHOLESTEROL FLUID: 19 MG/DL
CO2: 20 MMOL/L (ref 22–29)
COLOR FLUID: NORMAL
CREAT SERPL-MCNC: 1.1 MG/DL (ref 0.5–1)
EOSINOPHILS ABSOLUTE: 0.3 E9/L (ref 0.05–0.5)
EOSINOPHILS RELATIVE PERCENT: 4.6 % (ref 0–6)
FLUID TYPE: NORMAL
GFR AFRICAN AMERICAN: 58
GFR NON-AFRICAN AMERICAN: 48 ML/MIN/1.73
GLUCOSE BLD-MCNC: 105 MG/DL (ref 74–99)
GLUCOSE, FLUID: 116 MG/DL
HCT VFR BLD CALC: 29.6 % (ref 34–48)
HEMOGLOBIN: 9.5 G/DL (ref 11.5–15.5)
IMMATURE GRANULOCYTES #: 0.02 E9/L
IMMATURE GRANULOCYTES %: 0.3 % (ref 0–5)
LD, FLUID: 48 U/L
LYMPHOCYTES ABSOLUTE: 1.28 E9/L (ref 1.5–4)
LYMPHOCYTES RELATIVE PERCENT: 19.4 % (ref 20–42)
MCH RBC QN AUTO: 31.9 PG (ref 26–35)
MCHC RBC AUTO-ENTMCNC: 32.1 % (ref 32–34.5)
MCV RBC AUTO: 99.3 FL (ref 80–99.9)
MONOCYTE, FLUID: 81 %
MONOCYTES ABSOLUTE: 0.87 E9/L (ref 0.1–0.95)
MONOCYTES RELATIVE PERCENT: 13.2 % (ref 2–12)
NEUTROPHIL, FLUID: 19 %
NEUTROPHILS ABSOLUTE: 4.07 E9/L (ref 1.8–7.3)
NEUTROPHILS RELATIVE PERCENT: 61.7 % (ref 43–80)
NUCLEATED CELLS FLUID: 738 /UL
PDW BLD-RTO: 18.1 FL (ref 11.5–15)
PLATELET # BLD: 169 E9/L (ref 130–450)
PMV BLD AUTO: 10.7 FL (ref 7–12)
POTASSIUM REFLEX MAGNESIUM: 4 MMOL/L (ref 3.5–5)
PROTEIN FLUID: 1.2 G/DL
RBC # BLD: 2.98 E12/L (ref 3.5–5.5)
RBC FLUID: 3000 /UL
SODIUM BLD-SCNC: 138 MMOL/L (ref 132–146)
TRIGLYCERIDES FLUID: 662 MG/DL
WBC # BLD: 6.6 E9/L (ref 4.5–11.5)

## 2021-10-31 PROCEDURE — 80048 BASIC METABOLIC PNL TOTAL CA: CPT

## 2021-10-31 PROCEDURE — 0W9B3ZZ DRAINAGE OF LEFT PLEURAL CAVITY, PERCUTANEOUS APPROACH: ICD-10-PCS | Performed by: RADIOLOGY

## 2021-10-31 PROCEDURE — 87205 SMEAR GRAM STAIN: CPT

## 2021-10-31 PROCEDURE — 87116 MYCOBACTERIA CULTURE: CPT

## 2021-10-31 PROCEDURE — 84157 ASSAY OF PROTEIN OTHER: CPT

## 2021-10-31 PROCEDURE — 88305 TISSUE EXAM BY PATHOLOGIST: CPT

## 2021-10-31 PROCEDURE — 1200000000 HC SEMI PRIVATE

## 2021-10-31 PROCEDURE — 88184 FLOWCYTOMETRY/ TC 1 MARKER: CPT

## 2021-10-31 PROCEDURE — 82947 ASSAY GLUCOSE BLOOD QUANT: CPT

## 2021-10-31 PROCEDURE — 83615 LACTATE (LD) (LDH) ENZYME: CPT

## 2021-10-31 PROCEDURE — 71045 X-RAY EXAM CHEST 1 VIEW: CPT

## 2021-10-31 PROCEDURE — 89051 BODY FLUID CELL COUNT: CPT

## 2021-10-31 PROCEDURE — C1729 CATH, DRAINAGE: HCPCS

## 2021-10-31 PROCEDURE — 87015 SPECIMEN INFECT AGNT CONCNTJ: CPT

## 2021-10-31 PROCEDURE — 99232 SBSQ HOSP IP/OBS MODERATE 35: CPT | Performed by: FAMILY MEDICINE

## 2021-10-31 PROCEDURE — 32555 ASPIRATE PLEURA W/ IMAGING: CPT | Performed by: INTERNAL MEDICINE

## 2021-10-31 PROCEDURE — 36415 COLL VENOUS BLD VENIPUNCTURE: CPT

## 2021-10-31 PROCEDURE — 86812 HLA TYPING A B OR C: CPT

## 2021-10-31 PROCEDURE — 88185 FLOWCYTOMETRY/TC ADD-ON: CPT

## 2021-10-31 PROCEDURE — 87070 CULTURE OTHR SPECIMN AEROBIC: CPT

## 2021-10-31 PROCEDURE — 94640 AIRWAY INHALATION TREATMENT: CPT

## 2021-10-31 PROCEDURE — 87206 SMEAR FLUORESCENT/ACID STAI: CPT

## 2021-10-31 PROCEDURE — 85025 COMPLETE CBC W/AUTO DIFF WBC: CPT

## 2021-10-31 PROCEDURE — 6360000002 HC RX W HCPCS: Performed by: STUDENT IN AN ORGANIZED HEALTH CARE EDUCATION/TRAINING PROGRAM

## 2021-10-31 PROCEDURE — 84478 ASSAY OF TRIGLYCERIDES: CPT

## 2021-10-31 PROCEDURE — 6370000000 HC RX 637 (ALT 250 FOR IP): Performed by: STUDENT IN AN ORGANIZED HEALTH CARE EDUCATION/TRAINING PROGRAM

## 2021-10-31 PROCEDURE — 84999 UNLISTED CHEMISTRY PROCEDURE: CPT

## 2021-10-31 PROCEDURE — 88112 CYTOPATH CELL ENHANCE TECH: CPT

## 2021-10-31 PROCEDURE — 2580000003 HC RX 258: Performed by: STUDENT IN AN ORGANIZED HEALTH CARE EDUCATION/TRAINING PROGRAM

## 2021-10-31 RX ADMIN — ARFORMOTEROL TARTRATE 15 MCG: 15 SOLUTION RESPIRATORY (INHALATION) at 11:12

## 2021-10-31 RX ADMIN — Medication 1000 UNITS: at 17:02

## 2021-10-31 RX ADMIN — RIFAXIMIN 550 MG: 550 TABLET ORAL at 21:30

## 2021-10-31 RX ADMIN — SODIUM CHLORIDE, PRESERVATIVE FREE 10 ML: 5 INJECTION INTRAVENOUS at 21:31

## 2021-10-31 RX ADMIN — VENLAFAXINE HYDROCHLORIDE 75 MG: 75 CAPSULE, EXTENDED RELEASE ORAL at 17:02

## 2021-10-31 RX ADMIN — BUDESONIDE 500 MCG: 0.5 SUSPENSION RESPIRATORY (INHALATION) at 11:13

## 2021-10-31 RX ADMIN — DOXAZOSIN 1 MG: 1 TABLET ORAL at 21:30

## 2021-10-31 RX ADMIN — SODIUM CHLORIDE, PRESERVATIVE FREE 10 ML: 5 INJECTION INTRAVENOUS at 17:04

## 2021-10-31 RX ADMIN — PANTOPRAZOLE SODIUM 40 MG: 40 TABLET, DELAYED RELEASE ORAL at 17:03

## 2021-10-31 RX ADMIN — BUDESONIDE 500 MCG: 0.5 SUSPENSION RESPIRATORY (INHALATION) at 22:41

## 2021-10-31 RX ADMIN — AMLODIPINE BESYLATE 2.5 MG: 2.5 TABLET ORAL at 17:02

## 2021-10-31 RX ADMIN — ARFORMOTEROL TARTRATE 15 MCG: 15 SOLUTION RESPIRATORY (INHALATION) at 22:40

## 2021-10-31 RX ADMIN — LACTULOSE 20 G: 20 SOLUTION ORAL at 17:01

## 2021-10-31 ASSESSMENT — PAIN SCALES - GENERAL
PAINLEVEL_OUTOF10: 0
PAINLEVEL_OUTOF10: 0

## 2021-10-31 NOTE — PROCEDURES
Ultrasound guided Thoracentesis Procedure Note       DATE:  10/31/ 2021  INDICTATIONS: pleural effusion found on imaging. PRE - OPERATIVE DIAGNOSIS:  Left Pleural Effusion    POST - OPERATIVE DIAGNOSIS: left  Pleural Effusion    PERFORMED By:  Pernell Campbell MD    ASSISTANT(S):  US Technician     CONSCENT:  Verbal consent obtained. Written consent obtained. After informed consent & appropriate time out protocol was noted & obtained. Risks/Benefits/Alternatives of the procedure were discussed including: infection, bleeding, pain, & pneumothorax. THORACENTESIS PROCEDURE DETAILS:  Patient understanding: patient states understanding of the procedure being performed. Time out: Immediately prior to procedure a \"time out\" was called to verify the correct. Patient was placed in a sitting position and ultrasound was done and site of maximum fluid collection was marked. PREPARATION: Patient was prepped and draped in the usual sterile fashion. ANESTHESIA: Lidocaine 1% without epinephrine, amount varied for local control. PROCEDURE NOTE: The patient was placed in the sitting position. US was then used to antonio the fluid and depth was determined. Then the skin was prepped with Chloraprep solution and draped with sterile covers. For the procedure we used 1% buffered lidocaine to anesthetize the skin, subcutaneous tissue and parietal pleura. After adequate anesthesia was accomplished. The plural catheter was advanced over a guide into the pleural space. The fluid was obtained without any difficulties and minimal blood loss. FINDINGS/SAMPLES: We removed 950  ml of milky  cloudy pleural fluid. The samples was sent for analysis. COMPLICATIONS:  None; patient tolerated the procedure well. PLAN: Care will be taken to review the post procedure radiograph for pneumothorax & testing when available.     Pernell Campbell

## 2021-10-31 NOTE — PLAN OF CARE
Problem: Skin Integrity:  Goal: Will show no infection signs and symptoms  Description: Will show no infection signs and symptoms  Outcome: Met This Shift  Goal: Absence of new skin breakdown  Description: Absence of new skin breakdown  10/31/2021 1435 by Alva Bhandari RN  Outcome: Met This Shift  10/31/2021 0037 by Jt Melo  Outcome: Met This Shift

## 2021-10-31 NOTE — PROGRESS NOTES
Comprehensive Nutrition Assessment    Type and Reason for Visit:  Initial, Positive Nutrition Screen    Nutrition Recommendations/Plan: Continue NPO and advance nutrition as medically feasible    Nutrition Assessment:  Pt admit w/ noted pleural effusion, PE, possible pancreatitis w/ hx DM, cirrhosis. noted hx Breast CA s/p lumpectomy w/ noted new breast mass. Will monitor for nutrition progression. Malnutrition Assessment:  Malnutrition Status: At risk for malnutrition (Comment)    Context:  Chronic Illness     Findings of the 6 clinical characteristics of malnutrition:  Energy Intake:  Mild decrease in energy intake (Comment)  Weight Loss:  Unable to assess (d/t fluctutaions w/ cirrhosis)     Body Fat Loss:  No significant body fat loss     Muscle Mass Loss:  No significant muscle mass loss    Fluid Accumulation:  No significant fluid accumulation     Strength:  Not Performed    Estimated Daily Nutrient Needs:  Energy (kcal):  MSJ 1291 x 1.2 SF = 1521-1018; Weight Used for Energy Requirements:  Current     Protein (g):  65-75; Weight Used for Protein Requirements:  Ideal (1.3-1.5)        Fluid (ml/day):  7803-8487; Method Used for Fluid Requirements:  1 ml/kcal      Nutrition Related Findings:  A&Ox4, abd WDL, +1 edema, fluids WNL      Wounds:  None       Current Nutrition Therapies:    Diet NPO    Anthropometric Measures:  · Height: 5' 2\" (157.5 cm)  · Current Body Weight: 187 lb (84.8 kg) (10/19 actual most recent wt)   · Usual Body Weight: 207 lb (93.9 kg) (8/2021 actual per EMR)     · Ideal Body Weight: 110 lbs; % Ideal Body Weight 170 %   · BMI: 34.2  · BMI Categories: Obese Class 1 (BMI 30.0-34. 9)       Nutrition Diagnosis:   · Inadequate oral intake related to altered GI function as evidenced by NPO or clear liquid status due to medical condition    Nutrition Interventions:   Food and/or Nutrient Delivery:  Continue NPO  Nutrition Education/Counseling:  Education not indicated   Coordination of Nutrition Care:  Continue to monitor while inpatient    Goals:  nutrition progression       Nutrition Monitoring and Evaluation:   Food/Nutrient Intake Outcomes:  Diet Advancement/Tolerance  Physical Signs/Symptoms Outcomes:  Biochemical Data, GI Status, Fluid Status or Edema, Nutrition Focused Physical Findings, Skin, Weight     Discharge Planning:     Too soon to determine     Electronically signed by Rene Lauren MS, RD, LD on 10/31/21 at 12:33 PM EDT    Contact: [phone]

## 2021-10-31 NOTE — PROGRESS NOTES
[facility] - [facility] Inpatient   Resident Progress Note    S:  Hospital day: 1   Brief Synopsis: Manav Vaughn is a 68 y.o. female with a PMH of malignant neoplasm L breast s/p lumpectomy 2013, DM 2, KATE, HTN, multiple thyroid nodules, depression, HLD, cirrhosis, s/p reverse total L shoulder, who presented with SOB over the past 3-4 weeks, abdominal pain and nausea. She was found to have a new L breast lump on CT, as well as pleural effusion and small PE. Also diagnosed with acute pancreatitis. Overnight/interim:    No acute events overnight    Patient reports improved symptoms today, including improved breathing and abdominal pain. She is hungry this morning and would like to eat solid foods. Mentation seems a bit better this morning. Cont meds:    sodium chloride       Scheduled meds:    amLODIPine  2.5 mg Oral Daily    doxazosin  1 mg Oral Nightly    furosemide  40 mg Oral Daily    lactulose  20 g Oral TID    pantoprazole  40 mg Oral Daily    spironolactone  100 mg Oral Daily    venlafaxine  75 mg Oral Daily    Vitamin D  1,000 Units Oral Daily    rifaximin  550 mg Oral BID    sodium chloride flush  10 mL IntraVENous 2 times per day    enoxaparin  40 mg SubCUTAneous Daily    budesonide  0.5 mg Nebulization BID    And    Arformoterol Tartrate  15 mcg Nebulization BID     PRN meds: sodium chloride flush, sodium chloride, ondansetron **OR** ondansetron, magnesium hydroxide, ipratropium-albuterol     I reviewed the patient's past medical and surgical history, Medications and Allergies. O:  BP (!) 110/59   Pulse 101   Temp 98.2 °F (36.8 °C) (Temporal)   Resp 18   Ht 5' 2\" (1.575 m)   Wt 170 lb (77.1 kg)   SpO2 94%   BMI 31.09 kg/m²   24 hour I&O: I/O last 3 completed shifts:  In: -   Out: 300 [Urine:300]  No intake/output data recorded.        General: Alert, cooperative, no acute distress. HEENT: Normocephalic, atraumatic.  Conjunctiva/corneas clear, EOM's intact,   Neck: Symmetrical, trachea midline  Chest: No tenderness or deformity, full & symmetric excursion  Lung: Respirations unlabored. Crackles towards lung bases  Heart: RRR, S1 and S2 normal; systolic murmur   Abdomen: SNTND, no masses, no organomegaly, no guarding, rebound or rigidity. Genital/Rectal: deferred  Extremities:  Extremities normal, atraumatic, no cyanosis or edema. Skin: Skin color, texture, turgor normal, no rashes or lesions  Musculoskeletal: No joint swelling, no muscle tenderness. Neurologic: Alert & Oriented      Labs:  Na/K/Cl/CO2:  139/4.5/108/22 (10/29 1904)  BUN/Cr/glu/ALT/AST/amyl/lip:  --/--/--/--/--/--/49 (10/30 1554)  WBC/Hgb/Hct/Plts:  9.9/11.3/34.1/183 (10/29 1904)  estimated creatinine clearance is 42 mL/min (A) (based on SCr of 1.1 mg/dL (H)). Other pertinent labs as noted below    Radiology:  CTA PULMONARY W CONTRAST   Final Result   No central pulmonary embolism or aortic dissection. There are small filling   defects in the distal  subsegmental and peripheral vessels of lower lobes   concerning for small distal subsegmental pulmonary embolism. Moderate large pleural effusion with atelectasis and infiltrates in the left   lower lobe concerning for pneumonia. Asymmetric soft tissue density in the left breast.  Consider mammographic   correlation. Cirrhotic liver with multiple indeterminate hypodense hepatic lesions, some   of which were seen previously and surveillance is recommended. There is   portal venous hypertension with splenomegaly, venous varices and ascites. Haziness surrounding the pancreas concerning for pancreatitis. Please   correlate with pancreatic enzymes. 9 mm indeterminate hypodense lesion in the pancreatic head. Consider   surveillance. Diverticulosis of the colon with thickening of the left hemicolon which may   be due to spasm or uncomplicated diverticulitis.          CT ABDOMEN PELVIS W IV CONTRAST Additional Contrast? None   Final Result   No central pulmonary embolism or aortic dissection. There are small filling   defects in the distal  subsegmental and peripheral vessels of lower lobes   concerning for small distal subsegmental pulmonary embolism. Moderate large pleural effusion with atelectasis and infiltrates in the left   lower lobe concerning for pneumonia. Asymmetric soft tissue density in the left breast.  Consider mammographic   correlation. Cirrhotic liver with multiple indeterminate hypodense hepatic lesions, some   of which were seen previously and surveillance is recommended. There is   portal venous hypertension with splenomegaly, venous varices and ascites. Haziness surrounding the pancreas concerning for pancreatitis. Please   correlate with pancreatic enzymes. 9 mm indeterminate hypodense lesion in the pancreatic head. Consider   surveillance. Diverticulosis of the colon with thickening of the left hemicolon which may   be due to spasm or uncomplicated diverticulitis. XR CHEST PORTABLE   Final Result   Cardiomegaly with possible pulmonary vascular congestive changes. US THORACENTESIS Which side should the procedure be performed? Left    (Results Pending)       A/P:  Principal Problem:    Pleural effusion associated with hepatic disorder  Active Problems:    Type 2 diabetes mellitus (HCC)    Essential hypertension    Cirrhosis ([address]    Pulmonary embolism (HCC)    Idiopathic acute pancreatitis without infection or necrosis    Asymptomatic microscopic hematuria    Breast mass, left  Resolved Problems:    * No resolved hospital problems.  *    Pleural effusion  · Chronic pleural effusion with worsening SOB for past month  · Consult to pulmonology to consider thoracocentesis  · Pulmicort, brovana, and duoneb for SOB  · Procalcitonin ordered  · Pulmonary consulted, will do U/S and consider thoracocentesis  · Patient made NPO this AM     Pulmonary Embolism  · Currently on

## 2021-10-31 NOTE — HOME CARE
Patient is active with [facility] for PT and OT only. Patient will need resumption of care orders prior to discharge. If patient is in need of other disciplines a new home health order is needed.  Thank you, [facility]

## 2021-10-31 NOTE — PROGRESS NOTES
[address] [facility]  Family Medicine Attending    S: 68 y.o. female with PMH of malignant neoplasm L breast s/p lumpectomy 2013, DM 2, KATE, HTN, multiple thyroid nodules, depression, HLD, cirrhosis, s/p reverse total L shoulder who presents to ED for Shortness of Breath for several days. Also c/o of abdominal pain after eating. In ED, noted to have moderately large left pleural effusion (chronic), possible pancreatitis, cirrhosis, and possible distal segmental PE's  Today, feels better. Wants to eat    O: VS- Blood pressure (!) 110/59, pulse 101, temperature 98.2 °F (36.8 °C), temperature source Temporal, resp. rate 18, height 5' 2\" (1.575 m), weight 170 lb (77.1 kg), SpO2 94 %. Exam is as noted by resident with the following changes, additions or corrections:  Awake, awake,alert, NAD  Heart- RRR  Lungs- clear, but decreased (?absent) BS at left base  And - soft, minimal tenderness today    Impressions:   Principal Problem:    Pleural effusion associated with hepatic disorder  Active Problems:    Type 2 diabetes mellitus ([address]    Essential hypertension    Cirrhosis ([address]    Pulmonary embolism (HCC)    Idiopathic acute pancreatitis without infection or necrosis    Asymptomatic microscopic hematuria    Breast mass, left  Resolved Problems:    * No resolved hospital problems. *      Plan:   Patient admitted because of dyspnea and abdominal pain   Transferred to our care after arrival on floor.    Imaging suggests small subsegmental PE's, along with large left pleural effusion - also noted is cirrhosis with portal hypertension and varices, possible pancreatitis, and left breast mass   Labs include urine with large blood and lipase of 70 - lipase now normal   Hematuria noted in past - follows with urology (?cystoscopy)   Consult pulmonology - appreciate recs   For thoracentesis today if US demonstrates enough fluid   NPO until decision for thoracentesis is made -would advance to regular diet afterwards   Breast mass previously felt to be scarring from previous biopsy - will need outpatient mammogram        Attending Physician Statement  I have reviewed the chart and seen the patient with the resident(s). I personally reviewed images, EKG's and similar tests, if present. I personally reviewed and performed key elements of the history and exam.  I have reviewed and confirmed student and/or resident history and exam with changes as indicated above. I agree with the assessment, plan and orders as documented by the resident. Please refer to the resident and/or student note for additional information.       Abner Nagy MD

## 2021-11-01 LAB
ANION GAP SERPL CALCULATED.3IONS-SCNC: 7 MMOL/L (ref 7–16)
BASOPHILS ABSOLUTE: 0.05 E9/L (ref 0–0.2)
BASOPHILS RELATIVE PERCENT: 1 % (ref 0–2)
BUN BLDV-MCNC: 12 MG/DL (ref 6–23)
CALCIUM SERPL-MCNC: 9.8 MG/DL (ref 8.6–10.2)
CHLORIDE BLD-SCNC: 107 MMOL/L (ref 98–107)
CO2: 22 MMOL/L (ref 22–29)
CREAT SERPL-MCNC: 1.1 MG/DL (ref 0.5–1)
EKG ATRIAL RATE: 100 BPM
EKG P AXIS: 39 DEGREES
EKG P-R INTERVAL: 130 MS
EKG Q-T INTERVAL: 346 MS
EKG QRS DURATION: 78 MS
EKG QTC CALCULATION (BAZETT): 446 MS
EKG R AXIS: 7 DEGREES
EKG T AXIS: 26 DEGREES
EKG VENTRICULAR RATE: 100 BPM
EOSINOPHILS ABSOLUTE: 0.24 E9/L (ref 0.05–0.5)
EOSINOPHILS RELATIVE PERCENT: 4.7 % (ref 0–6)
GFR AFRICAN AMERICAN: 58
GFR NON-AFRICAN AMERICAN: 48 ML/MIN/1.73
GLUCOSE BLD-MCNC: 77 MG/DL (ref 74–99)
GRAM STAIN ORDERABLE: NORMAL
HCT VFR BLD CALC: 28.5 % (ref 34–48)
HEMOGLOBIN: 9.4 G/DL (ref 11.5–15.5)
IMMATURE GRANULOCYTES #: 0.01 E9/L
IMMATURE GRANULOCYTES %: 0.2 % (ref 0–5)
LACTATE DEHYDROGENASE: 194 U/L (ref 135–214)
LYMPHOCYTES ABSOLUTE: 1.07 E9/L (ref 1.5–4)
LYMPHOCYTES RELATIVE PERCENT: 21 % (ref 20–42)
MCH RBC QN AUTO: 32.3 PG (ref 26–35)
MCHC RBC AUTO-ENTMCNC: 33 % (ref 32–34.5)
MCV RBC AUTO: 97.9 FL (ref 80–99.9)
MONOCYTES ABSOLUTE: 0.62 E9/L (ref 0.1–0.95)
MONOCYTES RELATIVE PERCENT: 12.2 % (ref 2–12)
NEUTROPHILS ABSOLUTE: 3.11 E9/L (ref 1.8–7.3)
NEUTROPHILS RELATIVE PERCENT: 60.9 % (ref 43–80)
PDW BLD-RTO: 18.5 FL (ref 11.5–15)
PLATELET # BLD: 172 E9/L (ref 130–450)
PMV BLD AUTO: 10.9 FL (ref 7–12)
POTASSIUM REFLEX MAGNESIUM: 4.6 MMOL/L (ref 3.5–5)
RBC # BLD: 2.91 E12/L (ref 3.5–5.5)
SODIUM BLD-SCNC: 136 MMOL/L (ref 132–146)
WBC # BLD: 5.1 E9/L (ref 4.5–11.5)

## 2021-11-01 PROCEDURE — 36415 COLL VENOUS BLD VENIPUNCTURE: CPT

## 2021-11-01 PROCEDURE — 6360000002 HC RX W HCPCS: Performed by: STUDENT IN AN ORGANIZED HEALTH CARE EDUCATION/TRAINING PROGRAM

## 2021-11-01 PROCEDURE — 2580000003 HC RX 258: Performed by: STUDENT IN AN ORGANIZED HEALTH CARE EDUCATION/TRAINING PROGRAM

## 2021-11-01 PROCEDURE — 6370000000 HC RX 637 (ALT 250 FOR IP): Performed by: FAMILY MEDICINE

## 2021-11-01 PROCEDURE — 83615 LACTATE (LD) (LDH) ENZYME: CPT

## 2021-11-01 PROCEDURE — 80048 BASIC METABOLIC PNL TOTAL CA: CPT

## 2021-11-01 PROCEDURE — 6370000000 HC RX 637 (ALT 250 FOR IP): Performed by: STUDENT IN AN ORGANIZED HEALTH CARE EDUCATION/TRAINING PROGRAM

## 2021-11-01 PROCEDURE — 94640 AIRWAY INHALATION TREATMENT: CPT

## 2021-11-01 PROCEDURE — 1200000000 HC SEMI PRIVATE

## 2021-11-01 PROCEDURE — 85025 COMPLETE CBC W/AUTO DIFF WBC: CPT

## 2021-11-01 PROCEDURE — 99232 SBSQ HOSP IP/OBS MODERATE 35: CPT | Performed by: FAMILY MEDICINE

## 2021-11-01 RX ORDER — OXYCODONE HYDROCHLORIDE 5 MG/1
5 TABLET ORAL ONCE
Status: COMPLETED | OUTPATIENT
Start: 2021-11-01 | End: 2021-11-01

## 2021-11-01 RX ADMIN — RIFAXIMIN 550 MG: 550 TABLET ORAL at 08:27

## 2021-11-01 RX ADMIN — BUDESONIDE 500 MCG: 0.5 SUSPENSION RESPIRATORY (INHALATION) at 20:14

## 2021-11-01 RX ADMIN — AMLODIPINE BESYLATE 2.5 MG: 2.5 TABLET ORAL at 08:26

## 2021-11-01 RX ADMIN — VENLAFAXINE HYDROCHLORIDE 75 MG: 75 CAPSULE, EXTENDED RELEASE ORAL at 08:26

## 2021-11-01 RX ADMIN — SODIUM CHLORIDE, PRESERVATIVE FREE 10 ML: 5 INJECTION INTRAVENOUS at 08:28

## 2021-11-01 RX ADMIN — ARFORMOTEROL TARTRATE 15 MCG: 15 SOLUTION RESPIRATORY (INHALATION) at 20:14

## 2021-11-01 RX ADMIN — BUDESONIDE 500 MCG: 0.5 SUSPENSION RESPIRATORY (INHALATION) at 10:24

## 2021-11-01 RX ADMIN — Medication 1000 UNITS: at 08:26

## 2021-11-01 RX ADMIN — PANTOPRAZOLE SODIUM 40 MG: 40 TABLET, DELAYED RELEASE ORAL at 08:27

## 2021-11-01 RX ADMIN — SODIUM CHLORIDE, PRESERVATIVE FREE 10 ML: 5 INJECTION INTRAVENOUS at 20:24

## 2021-11-01 RX ADMIN — SPIRONOLACTONE 100 MG: 25 TABLET ORAL at 08:27

## 2021-11-01 RX ADMIN — FUROSEMIDE 40 MG: 40 TABLET ORAL at 08:27

## 2021-11-01 RX ADMIN — DOXAZOSIN 1 MG: 1 TABLET ORAL at 20:23

## 2021-11-01 RX ADMIN — RIFAXIMIN 550 MG: 550 TABLET ORAL at 20:23

## 2021-11-01 RX ADMIN — OXYCODONE 5 MG: 5 TABLET ORAL at 01:36

## 2021-11-01 RX ADMIN — ENOXAPARIN SODIUM 40 MG: 100 INJECTION SUBCUTANEOUS at 08:27

## 2021-11-01 RX ADMIN — ARFORMOTEROL TARTRATE 15 MCG: 15 SOLUTION RESPIRATORY (INHALATION) at 10:23

## 2021-11-01 ASSESSMENT — PAIN SCALES - GENERAL
PAINLEVEL_OUTOF10: 0
PAINLEVEL_OUTOF10: 0
PAINLEVEL_OUTOF10: 8
PAINLEVEL_OUTOF10: 0

## 2021-11-01 NOTE — PROGRESS NOTES
[address] [facility]  Family Medicine Attending    S: 68 y.o. female with PMH of malignant neoplasm L breast s/p lumpectomy 2013, DM 2, KATE, HTN, multiple thyroid nodules, depression, HLD, cirrhosis, s/p reverse total L shoulder who presents to ED for Shortness of Breath for several days. Also c/o of abdominal pain after eating. In ED, noted to have moderately large left pleural effusion (chronic), possible pancreatitis, cirrhosis, and possible distal segmental PE's  Currently feeling better    O: VS- Blood pressure 107/64, pulse 94, temperature 97.5 °F (36.4 °C), temperature source Temporal, resp. rate 18, height 5' 2\" (1.575 m), weight 170 lb (77.1 kg), SpO2 92 %. Exam is as noted by residet  Awake, awake,alert, NAD  Heart- RRR  Lungs- clear, but decreased (?absent) BS at left base  And - soft, minimal tenderness today  No leg tenderness    Impressions:   Principal Problem:    Pleural effusion associated with hepatic disorder  Active Problems:    Type 2 diabetes mellitus (HCC)    Essential hypertension    Cirrhosis ([address]    Pulmonary embolism (HCC)    Idiopathic acute pancreatitis without infection or necrosis    Asymptomatic microscopic hematuria    Breast mass, left        Plan:   Patient admitted because of dyspnea and abdominal pain   Transferred to our care after arrival on floor. 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     Breast mass previously felt to be scarring from previous biopsy - will need outpatient mammogram                  Sliding scale insulin        Attending Physician Statement  I have

## 2021-11-01 NOTE — PLAN OF CARE
Problem: Skin Integrity:  Goal: Will show no infection signs and symptoms  Description: Will show no infection signs and symptoms  11/1/2021 1114 by Mirna Mohs, RN  Outcome: Met This Shift     Problem: Skin Integrity:  Goal: Absence of new skin breakdown  Description: Absence of new skin breakdown  11/1/2021 1114 by Mirna Mohs, RN  Outcome: Met This Shift     Problem: Pain:  Description: Pain management should include both nonpharmacologic and pharmacologic interventions. Goal: Pain level will decrease  Description: Pain level will decrease  11/1/2021 1114 by Mirna Mohs, RN  Outcome: Met This Shift     Problem: Pain:  Description: Pain management should include both nonpharmacologic and pharmacologic interventions.   Goal: Control of acute pain  Description: Control of acute pain  11/1/2021 1114 by Mirna Mohs, RN  Outcome: Met This Shift    Problem: Falls - Risk of:  Goal: Will remain free from falls  Description: Will remain free from falls  11/1/2021 1114 by Mirna Mohs, RN  Outcome: Met This Shift     Problem: Falls - Risk of:  Goal: Absence of physical injury  Description: Absence of physical injury  11/1/2021 1114 by Mirna Mohs, RN  Outcome: Met This Shift

## 2021-11-01 NOTE — PROGRESS NOTES
[facility] Inpatient   Resident Progress Note    S:  Hospital day: 2   Brief Synopsis: Yoko Saeed is a 68 y.o. female with a PMH of malignant neoplasm L breast s/p lumpectomy 2013, DM 2, KATE, HTN, multiple thyroid nodules, depression, HLD, cirrhosis, s/p reverse total L shoulder, who presented with SOB over the past 3-4 weeks, abdominal pain and nausea. She was found to have a new L breast lump on CT, as well as pleural effusion and small PE. Also diagnosed with acute pancreatitis. Overnight/interim:    No acute events overnight    Patient reports improved symptoms today, including improved breathing and abdominal pain. She is hungry this morning and would like to eat solid foods. Mentation seems a bit better this morning. Cont meds:    sodium chloride       Scheduled meds:    amLODIPine  2.5 mg Oral Daily    doxazosin  1 mg Oral Nightly    furosemide  40 mg Oral Daily    lactulose  20 g Oral TID    pantoprazole  40 mg Oral Daily    spironolactone  100 mg Oral Daily    venlafaxine  75 mg Oral Daily    Vitamin D  1,000 Units Oral Daily    rifaximin  550 mg Oral BID    sodium chloride flush  10 mL IntraVENous 2 times per day    enoxaparin  40 mg SubCUTAneous Daily    budesonide  0.5 mg Nebulization BID    And    Arformoterol Tartrate  15 mcg Nebulization BID     PRN meds: sodium chloride flush, sodium chloride, ondansetron **OR** ondansetron, magnesium hydroxide, ipratropium-albuterol     I reviewed the patient's past medical and surgical history, Medications and Allergies. O:  /64   Pulse 94   Temp 97.5 °F (36.4 °C) (Temporal)   Resp 18   Ht 5' 2\" (1.575 m)   Wt 170 lb (77.1 kg)   SpO2 92%   BMI 31.09 kg/m²   24 hour I&O: I/O last 3 completed shifts: In: 360 [P.O.:360]  Out: -   No intake/output data recorded.        General: Alert, cooperative, no acute distress. HEENT: Normocephalic, atraumatic.  Conjunctiva/corneas clear, EOM's intact,   Neck: Symmetrical, trachea midline  Chest: No tenderness or deformity, full & symmetric excursion  Lung: Respirations unlabored. Crackles towards lung bases  Heart: RRR, S1 and S2 normal; systolic murmur   Abdomen: SNTND, no masses, no organomegaly, no guarding, rebound or rigidity. Genital/Rectal: deferred  Extremities:  Extremities normal, atraumatic, no cyanosis or edema. Skin: Skin color, texture, turgor normal, no rashes or lesions  Musculoskeletal: No joint swelling, no muscle tenderness. Neurologic: Alert & Oriented      Labs:  Na/K/Cl/CO2:  136/4.6/107/22 (11/01 7621)  BUN/Cr/glu/ALT/AST/amyl/lip:  12/1.1/--/--/--/--/-- (11/01 0816)  WBC/Hgb/Hct/Plts:  5.1/9.4/28.5/172 (11/01 9264)  estimated creatinine clearance is 42 mL/min (A) (based on SCr of 1.1 mg/dL (H)). Other pertinent labs as noted below    Radiology:  XR CHEST PORTABLE   Final Result   Significant improvement in the left pleural effusion with persistent minimal   atelectasis and pleural effusion bilaterally. There is no pneumothorax. US THORACENTESIS Which side should the procedure be performed? Left   Final Result   1. Successful ultrasound-guided left thoracentesis performed by Dr. Ameena Goddard. 2.  Please see separate procedure report for details. CTA PULMONARY W CONTRAST   Final Result   No central pulmonary embolism or aortic dissection. There are small filling   defects in the distal  subsegmental and peripheral vessels of lower lobes   concerning for small distal subsegmental pulmonary embolism. Moderate large pleural effusion with atelectasis and infiltrates in the left   lower lobe concerning for pneumonia. Asymmetric soft tissue density in the left breast.  Consider mammographic   correlation. Cirrhotic liver with multiple indeterminate hypodense hepatic lesions, some   of which were seen previously and surveillance is recommended. There is   portal venous hypertension with splenomegaly, venous varices and ascites. Haziness surrounding the pancreas concerning for pancreatitis. Please   correlate with pancreatic enzymes. 9 mm indeterminate hypodense lesion in the pancreatic head. Consider   surveillance. Diverticulosis of the colon with thickening of the left hemicolon which may   be due to spasm or uncomplicated diverticulitis. CT ABDOMEN PELVIS W IV CONTRAST Additional Contrast? None   Final Result   No central pulmonary embolism or aortic dissection. There are small filling   defects in the distal  subsegmental and peripheral vessels of lower lobes   concerning for small distal subsegmental pulmonary embolism. Moderate large pleural effusion with atelectasis and infiltrates in the left   lower lobe concerning for pneumonia. Asymmetric soft tissue density in the left breast.  Consider mammographic   correlation. Cirrhotic liver with multiple indeterminate hypodense hepatic lesions, some   of which were seen previously and surveillance is recommended. There is   portal venous hypertension with splenomegaly, venous varices and ascites. Haziness surrounding the pancreas concerning for pancreatitis. Please   correlate with pancreatic enzymes. 9 mm indeterminate hypodense lesion in the pancreatic head. Consider   surveillance. Diverticulosis of the colon with thickening of the left hemicolon which may   be due to spasm or uncomplicated diverticulitis. XR CHEST PORTABLE   Final Result   Cardiomegaly with possible pulmonary vascular congestive changes. A/P:  Principal Problem:    Pleural effusion associated with hepatic disorder  Active Problems:    Type 2 diabetes mellitus (HCC)    Essential hypertension    Cirrhosis ([address]    Pulmonary embolism (HCC)    Idiopathic acute pancreatitis without infection or necrosis    Asymptomatic microscopic hematuria    Breast mass, left  Resolved Problems:    * No resolved hospital problems.  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, lights criteria negative, elevated   · Pulmicort, brovana, and duoneb for SOB  · Procalcitonin 0.10 10/30/2021     Pulmonary Embolism  · Currently on prophylactic dose of lovenox, consider switching to therapeutic dose  · telemetry      Idiopathic acute pancreatitis-resolving  · Repeat lipase 49, improved from 70  · Patient appetite improved , tolerating normal adult diet at this time     Cirrhosis  · Chronic cirrhosis  · Continue home rifaximin and lactulose  · Ammonia 45 on 10/29     HTN  · Currently well controlled, continue home amlodipine 2.5 mg dialy, cardura 1 mg nightly, lasix 40 mg daily, and aldactone 100 mg daily     DM2  · Last A1c 5.1 in April  · Monitor BG, consider LDSS if needed      Asymptomatic microscopic hematuria  · Large blood in initial U/A  · Repeat ordered, may need outpatient work up      L breast mass  · S/p lumpectomy L breast in 2013, last mammogram in 2020 not concerning for masses  · Possibly new mass, will need outpatient work up     Depression  · Continue home effexor 75 mg    Electronically signed by Garrel Goltz, MD PGY-2 on 11/1/2021 at 12:47 PM  This case was discussed with attending physician: Dr. Elayne Mckoy

## 2021-11-01 NOTE — PROGRESS NOTES
[address] 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     PHYSICAL EXAMINATION:     VITAL SIGNS:  /60   Pulse 95   Temp 98.2 °F (36.8 °C) (Temporal)   Resp 18   Ht 5' 2\" (1.575 m)   Wt 170 lb (77.1 kg)   SpO2 92%   BMI 31.09 kg/m²   Wt Readings from Last 3 Encounters:   10/29/21 170 lb (77.1 kg)   10/19/21 187 lb 3.2 oz (84.9 kg)   10/02/21 200 lb (90.7 kg)     Temp Readings from Last 3 Encounters:   10/31/21 98.2 °F (36.8 °C) (Temporal)   10/19/21 97.9 °F (36.6 °C) (Temporal)   10/02/21 98.8 °F (37.1 °C) (Oral)     TMAX:  BP Readings from Last 3 Encounters:   10/31/21 106/60   10/19/21 130/74   10/02/21 110/60     Pulse Readings from Last 3 Encounters:   10/31/21 95   10/19/21 82   10/02/21 89           INTAKE/OUTPUTS:  I/O last 3 completed shifts:   In: 360 [P.O.:360]  Out: -     Intake/Output Summary (Last 24 hours) at 11/1/2021 0107  Last data filed at 10/31/2021 1844  Gross per 24 hour   Intake 360 ml   Output --   Net 360 ml       General Appearance: alert and oriented to person, place and time, well-developed and   well-nourished, in no acute distress   Eyes: pupils equal, round, and reactive to light, extraocular eye movements intact, conjunctivae normal and sclera anicteric   Neck: neck supple and non tender without mass, no thyromegaly, no thyroid nodules and no cervical adenopathy   Pulmonary/Chest:decrease breath sound left /wheeizng occasional exp  Cardiovascular: normal rate, regular rhythm, normal S1 and S2, no murmurs, rubs, clicks or gallops, distal pulses intact, no carotid bruits, no murmurs, no gallops, no carotid bruits and no JVD   Abdomen: obese, soft, non-tender, non-distended, normal bowel sounds, no masses or organomegaly Extremities:mild edema   Musculoskeletal: normal range of motion, no joint swelling, deformity or tenderness   Neurologic: reflexes normal and symmetric, no cranial nerve deficit noted    LABS/IMAGING:    CBC:  Lab Results   Component Value Date    WBC 6.6 10/31/2021    HGB 9.5 (L) 10/31/2021    HCT 29.6 (L) 10/31/2021    MCV 99.3 10/31/2021     10/31/2021    LYMPHOPCT 19.4 (L) 10/31/2021    RBC 2.98 (L) 10/31/2021    MCH 31.9 10/31/2021    MCHC 32.1 10/31/2021    RDW 18.1 (H) 10/31/2021    NEUTOPHILPCT 61.7 10/31/2021    MONOPCT 13.2 (H) 10/31/2021    BASOPCT 0.8 10/31/2021    NEUTROABS 4.07 10/31/2021    LYMPHSABS 1.28 (L) 10/31/2021    MONOSABS 0.87 10/31/2021    EOSABS 0.30 10/31/2021    BASOSABS 0.05 10/31/2021       Recent Labs     10/31/21  0808 10/29/21  1904 10/02/21  1727   WBC 6.6 9.9 6.2   HGB 9.5* 11.3* 10.5*   HCT 29.6* 34.1 31.7*   MCV 99.3 98.0 102.3*    183 214       BMP:   Recent Labs     10/29/21  1904 10/31/21  0808    138   K 4.5 4.0   * 109*   CO2 22 20*   BUN 14 12   CREATININE 1.1* 1.1*       MG:   Lab Results   Component Value Date    MG 1.7 02/04/2021     Ca/Phos:   Lab Results   Component Value Date    CALCIUM 10.4 (H) 10/31/2021    PHOS 2.7 02/27/2021     Amylase: No results found for: AMYLASE  Lipase:   Lab Results   Component Value Date    LIPASE 49 10/30/2021     LIVER PROFILE:   Recent Labs     10/29/21  1904 10/30/21  1554   AST 30  --    ALT 12  --    LIPASE 70* 49   BILITOT 0.8  --    ALKPHOS 170*  --        PT/INR: No results for input(s): PROTIME, INR in the last 72 hours. APTT:   No results for input(s): APTT in the last 72 hours.     Cardiac Enzymes:  Lab Results   Component Value Date    CKTOTAL 203 (H) 08/20/2021    TROPONINI <0.01 02/04/2021                 PROBLEM LIST:  Patient Active Problem List   Diagnosis    Malignant neoplasm of left breast ([facility]    Type 2 diabetes mellitus ([facility]    Obstructive sleep apnea syndrome    Essential hypertension    Multiple thyroid nodules    Depression    Hx of adenomatous colonic polyps    Dyslipidemia    Cirrhosis (HCC)    S/P reverse total shoulder arthroplasty, left    Anemia    Pleural effusion associated with hepatic disorder    Palpitations    Rhabdomyolysis    Hyperammonemia (HCC)    Closed displaced fracture of surgical neck of right humerus    Pulmonary embolism (HCC)    Idiopathic acute pancreatitis without infection or necrosis    Asymptomatic microscopic hematuria    Breast mass, left               ASSESSMENT:  1.) left side effusion  2-. Shortness of breath  2. )Breast cancer   3.)Possible pneumonia  4.)Small PE ,if any  5. 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  ,so chylothorax  Wait cytology   May need lymphatic and chylos duct ligation by CTS ,will see cytology and flow cytometer   Patient does not have any evidence of interstitial lung disease or COPD    Has history of diastolic dysfunction so we will await cardiology    She also has liver cirrhosis and other possibility of her shortness of breath could be portal pulmonary hypertension versus hepatopulmonary syndrome but ,continue follow as OP        Daysi Webb MD,[facility]  Pulmonary&Critical Care Medicine   Director of [address] [place] [address] Director of [address] [place] [address]    Newton Carnes    NOTE: This report was transcribed using voice recognition software. Every effort was made to ensure accuracy; however, inadvertent computerized transcription errors may be present.

## 2021-11-01 NOTE — CARE COORDINATION
Spoke with Pt about Transition Plan of Care. Pt lives with her cat with no steps to enter. Pt uses a bi--pap at [facility]. PCP: Dr. Anne Tobias. Pharmacy: [facility]. Son will provide transport at discharge. [employer] at [employer]. Pt needs options for transportation. Gave a list of transportation companies to pt. Discharge plan is to return home when medically stable. SW/CM to follow for discharge needs.    Paco Woodward, L.S.W.  [phone]

## 2021-11-02 LAB
ANION GAP SERPL CALCULATED.3IONS-SCNC: 6 MMOL/L (ref 7–16)
BASOPHILS ABSOLUTE: 0.03 E9/L (ref 0–0.2)
BASOPHILS RELATIVE PERCENT: 0.6 % (ref 0–2)
BUN BLDV-MCNC: 12 MG/DL (ref 6–23)
CALCIUM SERPL-MCNC: 9.7 MG/DL (ref 8.6–10.2)
CHLORIDE BLD-SCNC: 107 MMOL/L (ref 98–107)
CO2: 24 MMOL/L (ref 22–29)
CREAT SERPL-MCNC: 1.1 MG/DL (ref 0.5–1)
EOSINOPHILS ABSOLUTE: 0.24 E9/L (ref 0.05–0.5)
EOSINOPHILS RELATIVE PERCENT: 5 % (ref 0–6)
GFR AFRICAN AMERICAN: 58
GFR NON-AFRICAN AMERICAN: 48 ML/MIN/1.73
GLUCOSE BLD-MCNC: 72 MG/DL (ref 74–99)
HCT VFR BLD CALC: 30.9 % (ref 34–48)
HEMOGLOBIN: 10 G/DL (ref 11.5–15.5)
IMMATURE GRANULOCYTES #: 0.02 E9/L
IMMATURE GRANULOCYTES %: 0.4 % (ref 0–5)
LYMPHOCYTES ABSOLUTE: 0.96 E9/L (ref 1.5–4)
LYMPHOCYTES RELATIVE PERCENT: 19.9 % (ref 20–42)
MCH RBC QN AUTO: 32.5 PG (ref 26–35)
MCHC RBC AUTO-ENTMCNC: 32.4 % (ref 32–34.5)
MCV RBC AUTO: 100.3 FL (ref 80–99.9)
MONOCYTES ABSOLUTE: 0.57 E9/L (ref 0.1–0.95)
MONOCYTES RELATIVE PERCENT: 11.8 % (ref 2–12)
NEUTROPHILS ABSOLUTE: 3 E9/L (ref 1.8–7.3)
NEUTROPHILS RELATIVE PERCENT: 62.3 % (ref 43–80)
PDW BLD-RTO: 18.6 FL (ref 11.5–15)
PLATELET # BLD: 178 E9/L (ref 130–450)
PMV BLD AUTO: 10.6 FL (ref 7–12)
POTASSIUM REFLEX MAGNESIUM: 4.8 MMOL/L (ref 3.5–5)
RBC # BLD: 3.08 E12/L (ref 3.5–5.5)
SODIUM BLD-SCNC: 137 MMOL/L (ref 132–146)
WBC # BLD: 4.8 E9/L (ref 4.5–11.5)

## 2021-11-02 PROCEDURE — 6370000000 HC RX 637 (ALT 250 FOR IP): Performed by: FAMILY MEDICINE

## 2021-11-02 PROCEDURE — 6360000002 HC RX W HCPCS: Performed by: STUDENT IN AN ORGANIZED HEALTH CARE EDUCATION/TRAINING PROGRAM

## 2021-11-02 PROCEDURE — 80048 BASIC METABOLIC PNL TOTAL CA: CPT

## 2021-11-02 PROCEDURE — 2580000003 HC RX 258: Performed by: STUDENT IN AN ORGANIZED HEALTH CARE EDUCATION/TRAINING PROGRAM

## 2021-11-02 PROCEDURE — 36415 COLL VENOUS BLD VENIPUNCTURE: CPT

## 2021-11-02 PROCEDURE — 99232 SBSQ HOSP IP/OBS MODERATE 35: CPT | Performed by: INTERNAL MEDICINE

## 2021-11-02 PROCEDURE — 6370000000 HC RX 637 (ALT 250 FOR IP): Performed by: STUDENT IN AN ORGANIZED HEALTH CARE EDUCATION/TRAINING PROGRAM

## 2021-11-02 PROCEDURE — 99232 SBSQ HOSP IP/OBS MODERATE 35: CPT | Performed by: FAMILY MEDICINE

## 2021-11-02 PROCEDURE — 85025 COMPLETE CBC W/AUTO DIFF WBC: CPT

## 2021-11-02 PROCEDURE — 1200000000 HC SEMI PRIVATE

## 2021-11-02 PROCEDURE — 99222 1ST HOSP IP/OBS MODERATE 55: CPT | Performed by: STUDENT IN AN ORGANIZED HEALTH CARE EDUCATION/TRAINING PROGRAM

## 2021-11-02 PROCEDURE — 94640 AIRWAY INHALATION TREATMENT: CPT

## 2021-11-02 RX ADMIN — ENOXAPARIN SODIUM 40 MG: 100 INJECTION SUBCUTANEOUS at 08:56

## 2021-11-02 RX ADMIN — BUDESONIDE 500 MCG: 0.5 SUSPENSION RESPIRATORY (INHALATION) at 20:34

## 2021-11-02 RX ADMIN — VENLAFAXINE HYDROCHLORIDE 75 MG: 75 CAPSULE, EXTENDED RELEASE ORAL at 08:57

## 2021-11-02 RX ADMIN — PANTOPRAZOLE SODIUM 40 MG: 40 TABLET, DELAYED RELEASE ORAL at 08:56

## 2021-11-02 RX ADMIN — RIFAXIMIN 550 MG: 550 TABLET ORAL at 08:57

## 2021-11-02 RX ADMIN — DICLOFENAC SODIUM 2 G: 10 GEL TOPICAL at 21:18

## 2021-11-02 RX ADMIN — AMLODIPINE BESYLATE 2.5 MG: 2.5 TABLET ORAL at 08:57

## 2021-11-02 RX ADMIN — LACTULOSE 20 G: 20 SOLUTION ORAL at 08:56

## 2021-11-02 RX ADMIN — FUROSEMIDE 40 MG: 40 TABLET ORAL at 08:56

## 2021-11-02 RX ADMIN — SODIUM CHLORIDE, PRESERVATIVE FREE 10 ML: 5 INJECTION INTRAVENOUS at 08:58

## 2021-11-02 RX ADMIN — Medication 1000 UNITS: at 08:57

## 2021-11-02 RX ADMIN — DICLOFENAC SODIUM 2 G: 10 GEL TOPICAL at 10:43

## 2021-11-02 RX ADMIN — SODIUM CHLORIDE, PRESERVATIVE FREE 10 ML: 5 INJECTION INTRAVENOUS at 21:19

## 2021-11-02 RX ADMIN — SPIRONOLACTONE 100 MG: 25 TABLET ORAL at 08:57

## 2021-11-02 RX ADMIN — RIFAXIMIN 550 MG: 550 TABLET ORAL at 21:19

## 2021-11-02 RX ADMIN — DOXAZOSIN 1 MG: 1 TABLET ORAL at 21:19

## 2021-11-02 RX ADMIN — BUDESONIDE 500 MCG: 0.5 SUSPENSION RESPIRATORY (INHALATION) at 07:52

## 2021-11-02 RX ADMIN — LACTULOSE 20 G: 20 SOLUTION ORAL at 21:19

## 2021-11-02 RX ADMIN — ARFORMOTEROL TARTRATE 15 MCG: 15 SOLUTION RESPIRATORY (INHALATION) at 20:34

## 2021-11-02 RX ADMIN — ARFORMOTEROL TARTRATE 15 MCG: 15 SOLUTION RESPIRATORY (INHALATION) at 07:53

## 2021-11-02 ASSESSMENT — PAIN SCALES - GENERAL
PAINLEVEL_OUTOF10: 0
PAINLEVEL_OUTOF10: 0

## 2021-11-02 NOTE — CARE COORDINATION
Waiting for results from [facility]. Discharge Plan continues to be returning home when medically stable with [place] [employer]. DAVINA/CM to follow for discharge needs.   Myles Jonas, L.S.W.  [phone]

## 2021-11-02 NOTE — PROGRESS NOTES
Statement  I have reviewed the chart and seen the patient with the resident(s). I personally reviewed images, EKG's and similar tests, if present. I personally reviewed and performed key elements of the history and exam.  I have reviewed and confirmed student and/or resident history and exam with changes as indicated above. I agree with the assessment, plan and orders as documented by the resident. Please refer to the resident and/or student note for additional information.       Ankit De Jesus MD

## 2021-11-02 NOTE — PLAN OF CARE
Problem: Skin Integrity:  Goal: Will show no infection signs and symptoms  Description: Will show no infection signs and symptoms  11/2/2021 1342 by Emir Sears RN  Outcome: Met This Shift     Problem: Skin Integrity:  Goal: Absence of new skin breakdown  Description: Absence of new skin breakdown  11/2/2021 1342 by Emir Sears RN  Outcome: Met This Shift     Problem: Pain:  Description: Pain management should include both nonpharmacologic and pharmacologic interventions. Goal: Pain level will decrease  Description: Pain level will decrease  11/2/2021 1342 by Emir Sears RN  Outcome: Met This Shift     Problem: Pain:  Description: Pain management should include both nonpharmacologic and pharmacologic interventions.   Goal: Control of acute pain  Description: Control of acute pain  11/2/2021 1342 by Emir Sears RN  Outcome: Met This Shift     Problem: Falls - Risk of:  Goal: Will remain free from falls  Description: Will remain free from falls  Outcome: Met This Shift     Problem: Falls - Risk of:  Goal: Absence of physical injury  Description: Absence of physical injury  Outcome: Met This Shift

## 2021-11-02 NOTE — PROGRESS NOTES
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     PHYSICAL EXAMINATION:     VITAL SIGNS:  /78   Pulse 89   Temp 97.6 °F (36.4 °C) (Oral)   Resp 18   Ht 5' 2\" (1.575 m)   Wt 170 lb (77.1 kg)   SpO2 94%   BMI 31.09 kg/m²   Wt Readings from Last 3 Encounters:   10/29/21 170 lb (77.1 kg)   10/19/21 187 lb 3.2 oz (84.9 kg)   10/02/21 200 lb (90.7 kg)     Temp Readings from Last 3 Encounters:   11/01/21 97.6 °F (36.4 °C) (Oral)   10/19/21 97.9 °F (36.6 °C) (Temporal)   10/02/21 98.8 °F (37.1 °C) (Oral)     TMAX:  BP Readings from Last 3 Encounters:   11/01/21 124/78   10/19/21 130/74   10/02/21 110/60     Pulse Readings from Last 3 Encounters:   11/01/21 89   10/19/21 82   10/02/21 89           INTAKE/OUTPUTS:  I/O last 3 completed shifts:   In: 240 [P.O.:240]  Out: -     Intake/Output Summary (Last 24 hours) at 11/2/2021 0003  Last data filed at 11/1/2021 1545  Gross per 24 hour   Intake 240 ml   Output --   Net 240 ml       General Appearance: alert and oriented to person, place and time, well-developed and   well-nourished, in no acute distress   Eyes: pupils equal, round, and reactive to light, extraocular eye movements intact, conjunctivae normal and sclera anicteric   Neck: neck supple and non tender without mass, no thyromegaly, no thyroid nodules and no cervical adenopathy   Pulmonary/Chest:decrease breath sound left /wheeizng occasional exp  Cardiovascular: normal rate, regular rhythm, normal S1 and S2, no murmurs, rubs, clicks or gallops, distal pulses intact, no carotid bruits, no murmurs, no gallops, no carotid bruits and no JVD   Abdomen: obese, soft, non-tender, non-distended, normal bowel sounds, no masses or organomegaly Extremities:mild edema   Musculoskeletal: normal range of motion, no joint swelling, deformity or tenderness   Neurologic: reflexes normal and symmetric, no cranial nerve deficit noted    LABS/IMAGING:    CBC:  Lab Results   Component Value Date    WBC 5.1 11/01/2021    HGB 9.4 (L) 11/01/2021    HCT 28.5 (L) 11/01/2021    MCV 97.9 11/01/2021     11/01/2021    LYMPHOPCT 21.0 11/01/2021    RBC 2.91 (L) 11/01/2021    MCH 32.3 11/01/2021    MCHC 33.0 11/01/2021    RDW 18.5 (H) 11/01/2021    NEUTOPHILPCT 60.9 11/01/2021    MONOPCT 12.2 (H) 11/01/2021    BASOPCT 1.0 11/01/2021    NEUTROABS 3.11 11/01/2021    LYMPHSABS 1.07 (L) 11/01/2021    MONOSABS 0.62 11/01/2021    EOSABS 0.24 11/01/2021    BASOSABS 0.05 11/01/2021       Recent Labs     11/01/21  0649 10/31/21  0808 10/29/21  1904   WBC 5.1 6.6 9.9   HGB 9.4* 9.5* 11.3*   HCT 28.5* 29.6* 34.1   MCV 97.9 99.3 98.0    169 183       BMP:   Recent Labs     10/31/21  0808 11/01/21  0649    136   K 4.0 4.6   * 107   CO2 20* 22   BUN 12 12   CREATININE 1.1* 1.1*       MG:   Lab Results   Component Value Date    MG 1.7 02/04/2021     Ca/Phos:   Lab Results   Component Value Date    CALCIUM 9.8 11/01/2021    PHOS 2.7 02/27/2021     Amylase: No results found for: AMYLASE  Lipase:   Lab Results   Component Value Date    LIPASE 49 10/30/2021     LIVER PROFILE:   Recent Labs     10/30/21  1554   LIPASE 49       PT/INR: No results for input(s): PROTIME, INR in the last 72 hours. APTT:   No results for input(s): APTT in the last 72 hours.     Cardiac Enzymes:  Lab Results   Component Value Date    CKTOTAL 203 (H) 08/20/2021    TROPONINI <0.01 02/04/2021                 PROBLEM LIST:  Patient Active Problem List   Diagnosis    Malignant neoplasm of left breast ([facility] [address]    Type 2 diabetes mellitus ([facility] [address]    Obstructive sleep apnea syndrome    Essential hypertension    Multiple thyroid nodules    Depression    Hx of adenomatous colonic polyps   

## 2021-11-02 NOTE — CONSULTS
CTS Consult    Patient name: Claudeen Porch    Reason for consult: chylothorax    Referring Physician: Colton Mario MD    Primary Care Physician: Denys Salomon MD    Date of service: 11/2/2021    Chief Complaint: SOB    HPI: 76yo F with extensive PMH including cirrhosis, DM, breast cancer, KATE that presented 10/29/2021 with CC of several week history of SOB, as well as abdominal pain and nausea. She underwent imaging which demonstrated large left pleural effusion and pancreatitis. Incidentally noted to have subsegmental PE, concern for new density in L breast, multiple liver lesions, evidence of portal hypertension, ascites. She was admitted for further management. Pulmonary was consulted and she underwent L thoracentesis 10/31. 950cc of milky cloudy fluid was removed. She tolerated the procedure well. Pleural fluid analysis was notable for . She previously underwent L thoracentesis in 2/2021 with drainage of 1L of straw colored fluid. She is unclear about how many thoracentesis she has undergone in the past.   Currently, she denies CP or SOB. She was resting comfortably upon arrival. She is on room air. Allergies:    Allergies   Allergen Reactions    Lisinopril        Home medications:    Current Facility-Administered Medications   Medication Dose Route Frequency Provider Last Rate Last Admin    diclofenac sodium (VOLTAREN) 1 % gel 2 g  2 g Topical BID Dayanara Woods MD   2 g at 11/02/21 1043    amLODIPine (NORVASC) tablet 2.5 mg  2.5 mg Oral Daily Linda A Rech, DO   2.5 mg at 11/02/21 0857    doxazosin (CARDURA) tablet 1 mg  1 mg Oral Nightly Linda A Rech, DO   1 mg at 11/01/21 2023    furosemide (LASIX) tablet 40 mg  40 mg Oral Daily Linda A Rech, DO   40 mg at 11/02/21 0856    lactulose (CHRONULAC) 10 GM/15ML solution 20 g  20 g Oral TID Linda A Rech, DO   20 g at 11/02/21 0856    pantoprazole (PROTONIX) tablet 40 mg  40 mg Oral Daily Linda A Rech, DO   40 mg at 11/02/21 0856    spironolactone (ALDACTONE) tablet 100 mg  100 mg Oral Daily Linda A Rech, DO   100 mg at 11/02/21 0857    venlafaxine (EFFEXOR XR) extended release capsule 75 mg  75 mg Oral Daily Linda A Rech, DO   75 mg at 11/02/21 0857    Vitamin D (CHOLECALCIFEROL) tablet 1,000 Units  1,000 Units Oral Daily Linda A Rech, DO   1,000 Units at 11/02/21 0857    rifaximin (XIFAXAN) tablet 550 mg  550 mg Oral BID Linda A Rech, DO   550 mg at 11/02/21 0857    sodium chloride flush 0.9 % injection 10 mL  10 mL IntraVENous 2 times per day Nancy Linear, DO   10 mL at 11/02/21 0858    sodium chloride flush 0.9 % injection 10 mL  10 mL IntraVENous PRN Nancy Linear, DO        0.9 % sodium chloride infusion  25 mL IntraVENous PRN Nancy Linear, DO        enoxaparin (LOVENOX) injection 40 mg  40 mg SubCUTAneous Daily Linda A Rech, DO   40 mg at 11/02/21 0856    ondansetron (ZOFRAN-ODT) disintegrating tablet 4 mg  4 mg Oral Q8H PRN Nancy Linear, DO   4 mg at 10/30/21 1858    Or    ondansetron (ZOFRAN) injection 4 mg  4 mg IntraVENous Q6H PRN Nancy Linear, DO        magnesium hydroxide (MILK OF MAGNESIA) 400 MG/5ML suspension 30 mL  30 mL Oral Daily PRN Nancy Linear, DO        budesonide (PULMICORT) nebulizer suspension 500 mcg  0.5 mg Nebulization BID Linda A Rech, DO   500 mcg at 11/02/21 5629    And    Arformoterol Tartrate (BROVANA) nebulizer solution 15 mcg  15 mcg Nebulization BID Linda A Rech, DO   15 mcg at 11/02/21 0753    ipratropium-albuterol (DUONEB) nebulizer solution 1 ampule  1 ampule Inhalation Q15 Min PRN Nancy Linear, DO   1 ampule at 10/29/21 2308       Past Medical History:  Past Medical History:   Diagnosis Date    [facility]     [facility]     Essential hypertension     Hyperlipidemia     Osteopenia     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 ([facility]     Sleep apnea     Spinal stenosis     Type 2 diabetes mellitus ([address]        Past Surgical History:  Past Surgical History:   Procedure Laterality Date  BREAST LUMPECTOMY      lumpectomy [id number]    CARDIOVASCULAR STRESS TEST      Lexiscan stress test    CARPAL TUNNEL RELEASE      COLONOSCOPY  01/31/2017    multiple polyps; diverticula--ejrod    COLONOSCOPY  04/23/2019    polyps; diverticula; hemorrhoids--jerod    COLONOSCOPY N/A 04/23/2019    COLONOSCOPY POLYPECTOMY SNARE/COLD BIOPSY performed by Leon Robin MD at [address] COLONOSCOPY  04/23/2019    COLONOSCOPY WITH BIOPSY performed by Leon Robin MD at [address] [facility] LITHOTRIPSY Left 05/04/2016    C-R STENT PLACEMENT    SHOULDER ARTHROPLASTY Left 12/21/2020    LEFT REVERSE TOTAL SHOULDER  ARTHROPLASTY -- DEPUY performed by Jacqueline Jacques MD at [address]  04/23/2019    gastritis--jerod    UPPER GASTROINTESTINAL ENDOSCOPY N/A 04/23/2019    EGD BIOPSY performed by Leon Robin MD at [address] History:  Social History     Socioeconomic History    Marital status:      Spouse name: Not on file    Number of children: Not on file    Years of education: Not on file    Highest education level: Not on file   Occupational History    Not on file   Tobacco Use    Smoking status: Never Smoker    Smokeless tobacco: Never Used   Vaping Use    Vaping Use: Never used   Substance and Sexual Activity    Alcohol use: Never     Alcohol/week: 0.0 standard drinks    Drug use: Never    Sexual activity: Never     Partners: Male     Comment: last in 2013   Other Topics Concern    Not on file   Social History Narrative    Denies caffeine. Social Determinants of Health     Financial Resource Strain: High Risk    Difficulty of Paying Living Expenses: Hard   Food Insecurity: No Food Insecurity    Worried About Running Out of Food in the Last Year: Never true    Lj of Food in the Last Year: Never true   Transportation Needs:     Lack of Transportation (Medical):      Lack of Transportation (Non-Medical):    Physical Activity:     Days of Exercise per Week:     Minutes of Exercise per Session:    Stress:     Feeling of Stress :    Social Connections:     Frequency of Communication with Friends and Family:     Frequency of Social Gatherings with Friends and Family:     Attends Restorationist Services:     Active Member of Clubs or Organizations:     Attends Club or Organization Meetings:     Marital Status:    Intimate Partner Violence:     Fear of Current or Ex-Partner:     Emotionally Abused:     Physically Abused:     Sexually Abused:        Family History:  Family History   Problem Relation Age of Onset    Arthritis Mother     COPD Father     Heart Attack Father     Cancer Other 48        colon       Review of Systems   All other systems reviewed and are negative. Labs:  Recent Labs     10/31/21  0808 11/01/21  0649 11/02/21  0515   WBC 6.6 5.1 4.8   HGB 9.5* 9.4* 10.0*   HCT 29.6* 28.5* 30.9*    172 178      Recent Labs     10/31/21  0808 11/01/21  0649 11/02/21  0515   BUN 12 12 12   CREATININE 1.1* 1.1* 1.1*       Objective:  Vitals /78   Pulse 84   Temp 98.1 °F (36.7 °C) (Oral)   Resp 22   Ht 5' 2\" (1.575 m)   Wt 170 lb (77.1 kg)   SpO2 95%   BMI 31.09 kg/m²   Physical Exam  Constitutional:       General: She is not in acute distress. Appearance: Normal appearance. She is obese. She is not ill-appearing. HENT:      Head: Normocephalic and atraumatic. Nose: Nose normal. No congestion. Mouth/Throat:      Mouth: Mucous membranes are moist.      Pharynx: Oropharynx is clear. Eyes:      General: No scleral icterus. Extraocular Movements: Extraocular movements intact. Conjunctiva/sclera: Conjunctivae normal.   Cardiovascular:      Rate and Rhythm: Normal rate and regular rhythm. Pulses: Normal pulses. Pulmonary:      Effort: Pulmonary effort is normal. No respiratory distress. Abdominal:      General: Abdomen is flat. Palpations: Abdomen is soft. Comments: Mild abdominal distention   Musculoskeletal:         General: Normal range of motion. Comments: Minimal edema   Skin:     Capillary Refill: Capillary refill takes less than 2 seconds. Neurological:      General: No focal deficit present. Mental Status: She is alert. Mental status is at baseline. Psychiatric:         Mood and Affect: Mood normal.         Behavior: Behavior normal.          CTA pulmonary 10/29    CTA OF THE CHEST; CT OF THE ABDOMEN AND PELVIS WITH CONTRAST 10/29/2021 7:36   pm       TECHNIQUE:   CTA of the chest was performed after the administration of intravenous   contrast.  Multiplanar reformatted images are provided for review.  MIP   images are provided for review. Dose modulation, iterative reconstruction,   and/or weight based adjustment of the mA/kV was utilized to reduce the   radiation dose to as low as reasonably achievable.; CT of the abdomen and   pelvis was performed with the administration of intravenous contrast.   Multiplanar reformatted images are provided for review.  Dose modulation,   iterative reconstruction, and/or weight based adjustment of the mA/kV was   utilized to reduce the radiation dose to as low as reasonably achievable.       COMPARISON:   01/31/2021       HISTORY:   ORDERING SYSTEM PROVIDED HISTORY: rule out PE   TECHNOLOGIST PROVIDED HISTORY:   Reason for exam:->rule out PE   Decision Support Exception - unselect if not a suspected or confirmed   emergency medical condition->Emergency Medical Condition (MA)       FINDINGS:   CTA chest.       There is cardiomegaly with mild coronary artery calcification.  The great   vessels are normal.  Trachea and major bronchi are patent.  Nonenlarged   mediastinal lymph nodes are present.  The contrast opacification of the   pulmonary arteries is limited.  Given this limitation, there are no filling   defects in the main pulmonary artery and the central branches.  However, small filling defects are identified in the distal subsegmental and   peripheral vessels more on the left lower lobe. Jolayne Cancel is large pleural   effusion with atelectasis/infiltrates in the left base.  The right lung is   clear.  Degenerative changes are identified in the thoracic spine with mild   compression deformity of T8 and T11 probably new since the previous   examination.  1.1 cm soft tissue density is identified in the left breast   which may be related to patient's breast malignancy.  Consider mammographic   correlation       CT abdomen and pelvis.       The liver is heterogeneous with nodular border concerning for cirrhosis with   multiple subcentimeter hypodense lesions some of which were seen in the   previous examination.  Consider surveillance.  There is portal venous   hypertension with splenomegaly and extensive venous varices.  Pancreas is   edematous with Debby pancreatic edema/ inflammatory changes more surrounding   the pancreatic head concerning for pancreatitis.  There is biliary   dilatation.  A 9 mm hypodense lesion is noted in pancreatic head which is   indeterminate for malignancy and surveillance is recommended.  The adrenals   and the kidneys are normal.  Degenerative changes are identified in the   lumbar spine. Jolayne Cancel is a small amount of ascites. Jolayne Cancel is an umbilical   hernia with herniation of ascites.       Pelvis.  There is ascites fluid in the pelvis.  Bladder is partially   distended.  There is diffuse diverticulosis of the colon with thickening of   the descending and sigmoid colon. Jolayne Cancel is constipation.  The appendix   cannot be identified.           Impression   No central pulmonary embolism or aortic dissection.  There are small filling   defects in the distal  subsegmental and peripheral vessels of lower lobes   concerning for small distal subsegmental pulmonary embolism.       Moderate large pleural effusion with atelectasis and infiltrates in the left   lower lobe concerning for pneumonia.       Asymmetric soft tissue density in the left breast.  Consider mammographic   correlation.       Cirrhotic liver with multiple indeterminate hypodense hepatic lesions, some   of which were seen previously and surveillance is recommended.  There is   portal venous hypertension with splenomegaly, venous varices and ascites.       Haziness surrounding the pancreas concerning for pancreatitis.  Please   correlate with pancreatic enzymes.       9 mm indeterminate hypodense lesion in the pancreatic head.  Consider   surveillance.       Diverticulosis of the colon with thickening of the left hemicolon which may   be due to spasm or uncomplicated diverticulitis. Assessment: 74yo F with extensive PMH including cirrhosis, DM, breast cancer, KATE that presented 10/29/2021 with CC of several week history of SOB, as well as abdominal pain and nausea. She was found to have large left pleural effusion, left subsegmental PE, pancreatitis. 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       Plan:   No urgent surgical intervention, patient is asymptomatic on room air  Will repeat CXR in AM to evaluate if pleural effusion has re-accumulated  At this time, would recommend conservative management with low fat diet +/- octreotide if benefits > risk  If pleural effusion recurs will consider L VATS, pleurodesis, possible PleurX placement    Electronically signed by Tl Aburto DO on 11/2/2021 at 1:54 PM

## 2021-11-02 NOTE — PROGRESS NOTES
[facility] - Family Medicine Inpatient   Resident Progress Note    S:  Hospital day: 3   Brief Synopsis: Angélica Mcclelland is a 68 y.o. female with a PMH of malignant neoplasm L breast s/p lumpectomy 2013, DM 2, KATE, HTN, multiple thyroid nodules, depression, HLD, cirrhosis, s/p reverse total L shoulder, who presented with SOB over the past 3-4 weeks, abdominal pain and nausea. She was found to have a new L breast lump on CT, as well as pleural effusion and small PE. Also diagnosed with acute pancreatitis. Patient seen and examined today. Doing well. S/p thoracentesis. No chest pain, no SOB. Awaiting cytology. Overnight/interim:    No acute events overnight      Cont meds:    sodium chloride       Scheduled meds:    amLODIPine  2.5 mg Oral Daily    doxazosin  1 mg Oral Nightly    furosemide  40 mg Oral Daily    lactulose  20 g Oral TID    pantoprazole  40 mg Oral Daily    spironolactone  100 mg Oral Daily    venlafaxine  75 mg Oral Daily    Vitamin D  1,000 Units Oral Daily    rifaximin  550 mg Oral BID    sodium chloride flush  10 mL IntraVENous 2 times per day    enoxaparin  40 mg SubCUTAneous Daily    budesonide  0.5 mg Nebulization BID    And    Arformoterol Tartrate  15 mcg Nebulization BID     PRN meds: sodium chloride flush, sodium chloride, ondansetron **OR** ondansetron, magnesium hydroxide, ipratropium-albuterol     I reviewed the patient's past medical and surgical history, Medications and Allergies. O:  /78   Pulse 89   Temp 97.6 °F (36.4 °C) (Oral)   Resp 18   Ht 5' 2\" (1.575 m)   Wt 170 lb (77.1 kg)   SpO2 94%   BMI 31.09 kg/m²   24 hour I&O: I/O last 3 completed shifts: In: 240 [P.O.:240]  Out: -   No intake/output data recorded.        General: Alert, cooperative, no acute distress. HEENT: Normocephalic, atraumatic.  Conjunctiva/corneas clear, EOM's intact,   Neck: Symmetrical, trachea midline  Chest: No tenderness or deformity, full & symmetric excursion  Lung: Respirations unlabored. Crackles towards lung bases  Heart: RRR, S1 and S2 normal; systolic murmur   Abdomen: SNTND, no masses, no organomegaly, no guarding, rebound or rigidity. Genital/Rectal: deferred  Extremities:  Extremities normal, atraumatic, no cyanosis or edema. Skin: Skin color, texture, turgor normal, no rashes or lesions  Musculoskeletal: No joint swelling, no muscle tenderness. Neurologic: Alert & Oriented      Labs:  Na/K/Cl/CO2:  137/4.8/107/24 (11/02 0515)  BUN/Cr/glu/ALT/AST/amyl/lip:  12/1.1/--/--/--/--/-- (11/02 0515)  WBC/Hgb/Hct/Plts:  4.8/10.0/30.9/178 (11/02 0515)  estimated creatinine clearance is 42 mL/min (A) (based on SCr of 1.1 mg/dL (H)). Other pertinent labs as noted below    Radiology:  XR CHEST PORTABLE   Final Result   Significant improvement in the left pleural effusion with persistent minimal   atelectasis and pleural effusion bilaterally. There is no pneumothorax. US THORACENTESIS Which side should the procedure be performed? Left   Final Result   1. Successful ultrasound-guided left thoracentesis performed by Dr. Addie Mullen. 2.  Please see separate procedure report for details. CTA PULMONARY W CONTRAST   Final Result   No central pulmonary embolism or aortic dissection. There are small filling   defects in the distal  subsegmental and peripheral vessels of lower lobes   concerning for small distal subsegmental pulmonary embolism. Moderate large pleural effusion with atelectasis and infiltrates in the left   lower lobe concerning for pneumonia. Asymmetric soft tissue density in the left breast.  Consider mammographic   correlation. Cirrhotic liver with multiple indeterminate hypodense hepatic lesions, some   of which were seen previously and surveillance is recommended. There is   portal venous hypertension with splenomegaly, venous varices and ascites. Haziness surrounding the pancreas concerning for pancreatitis.   Please correlate with pancreatic enzymes. 9 mm indeterminate hypodense lesion in the pancreatic head. Consider   surveillance. Diverticulosis of the colon with thickening of the left hemicolon which may   be due to spasm or uncomplicated diverticulitis. CT ABDOMEN PELVIS W IV CONTRAST Additional Contrast? None   Final Result   No central pulmonary embolism or aortic dissection. There are small filling   defects in the distal  subsegmental and peripheral vessels of lower lobes   concerning for small distal subsegmental pulmonary embolism. Moderate large pleural effusion with atelectasis and infiltrates in the left   lower lobe concerning for pneumonia. Asymmetric soft tissue density in the left breast.  Consider mammographic   correlation. Cirrhotic liver with multiple indeterminate hypodense hepatic lesions, some   of which were seen previously and surveillance is recommended. There is   portal venous hypertension with splenomegaly, venous varices and ascites. Haziness surrounding the pancreas concerning for pancreatitis. Please   correlate with pancreatic enzymes. 9 mm indeterminate hypodense lesion in the pancreatic head. Consider   surveillance. Diverticulosis of the colon with thickening of the left hemicolon which may   be due to spasm or uncomplicated diverticulitis. XR CHEST PORTABLE   Final Result   Cardiomegaly with possible pulmonary vascular congestive changes. A/P:  Principal Problem:    Pleural effusion associated with hepatic disorder  Active Problems:    Type 2 diabetes mellitus (HCC)    Essential hypertension    Cirrhosis ([address]    Pulmonary embolism (HCC)    Idiopathic acute pancreatitis without infection or necrosis    Asymptomatic microscopic hematuria    Breast mass, left  Resolved Problems:    * No resolved hospital problems.  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, lights criteria negative, elevated   · Pulmicort, brovana, and duoneb for SOB  · Procalcitonin 0.10 10/30/2021     Pulmonary Embolism  · Currently on prophylactic dose of lovenox, consider switching to therapeutic dose  · telemetry      Idiopathic acute pancreatitis-resolving  · Repeat lipase 49, improved from 70  · Patient appetite improved , tolerating normal adult diet at this time     Cirrhosis  · Chronic cirrhosis  · Continue home rifaximin and lactulose  · Ammonia 45 on 10/29     HTN  · Currently well controlled, continue home amlodipine 2.5 mg dialy, cardura 1 mg nightly, lasix 40 mg daily, and aldactone 100 mg daily     DM2  · Last A1c 5.1 in April  · Monitor BG, consider LDSS if needed      Asymptomatic microscopic hematuria  · Large blood in initial U/A  · Repeat ordered, may need outpatient work up      L breast mass  · S/p lumpectomy L breast in 2013, last mammogram in 2020 not concerning for masses  · Possibly new mass, will need outpatient work up     Depression  · Continue home effexor 75 mg    Electronically signed by Justin Gonzalez MD PGY-2 on 11/2/2021 at 6:35 AM  This case was discussed with attending physician: Dr. Singh Goncalves   Disposition: await cytology prior to discharge

## 2021-11-03 ENCOUNTER — APPOINTMENT (OUTPATIENT)
Dept: GENERAL RADIOLOGY | Age: 76
DRG: 186 | End: 2021-11-03
Payer: MEDICARE

## 2021-11-03 ENCOUNTER — TELEPHONE (OUTPATIENT)
Dept: ORTHOPEDIC SURGERY | Age: 76
End: 2021-11-03

## 2021-11-03 VITALS
HEIGHT: 62 IN | TEMPERATURE: 98 F | DIASTOLIC BLOOD PRESSURE: 78 MMHG | SYSTOLIC BLOOD PRESSURE: 136 MMHG | BODY MASS INDEX: 31.28 KG/M2 | WEIGHT: 170 LBS | HEART RATE: 81 BPM | OXYGEN SATURATION: 98 % | RESPIRATION RATE: 28 BRPM

## 2021-11-03 DIAGNOSIS — Z96.612 S/P REVERSE TOTAL SHOULDER ARTHROPLASTY, LEFT: ICD-10-CM

## 2021-11-03 DIAGNOSIS — J90 PLEURAL EFFUSION: Primary | ICD-10-CM

## 2021-11-03 DIAGNOSIS — S42.211D CLOSED DISPLACED FRACTURE OF SURGICAL NECK OF RIGHT HUMERUS WITH ROUTINE HEALING, UNSPECIFIED FRACTURE MORPHOLOGY, SUBSEQUENT ENCOUNTER: Primary | ICD-10-CM

## 2021-11-03 PROBLEM — F33.2 MAJOR DEPRESSIVE DISORDER, RECURRENT SEVERE WITHOUT PSYCHOTIC FEATURES (HCC): Status: ACTIVE | Noted: 2020-02-11

## 2021-11-03 PROBLEM — S49.002D: Status: ACTIVE | Noted: 2020-02-11

## 2021-11-03 PROBLEM — F41.9 ANXIETY DISORDER, UNSPECIFIED: Status: ACTIVE | Noted: 2020-02-11

## 2021-11-03 PROBLEM — R26.2 DIFFICULTY IN WALKING, NOT ELSEWHERE CLASSIFIED: Status: ACTIVE | Noted: 2021-08-20

## 2021-11-03 PROBLEM — R26.89 OTHER ABNORMALITIES OF GAIT AND MOBILITY: Status: ACTIVE | Noted: 2020-02-11

## 2021-11-03 PROBLEM — M62.81 MUSCLE WEAKNESS (GENERALIZED): Status: ACTIVE | Noted: 2020-02-11

## 2021-11-03 PROBLEM — Z74.1 NEED FOR ASSISTANCE WITH PERSONAL CARE: Status: ACTIVE | Noted: 2020-02-11

## 2021-11-03 PROBLEM — K21.9 GASTRO-ESOPHAGEAL REFLUX DISEASE WITHOUT ESOPHAGITIS: Status: ACTIVE | Noted: 2020-02-11

## 2021-11-03 LAB
ANION GAP SERPL CALCULATED.3IONS-SCNC: 8 MMOL/L (ref 7–16)
BASOPHILS ABSOLUTE: 0.05 E9/L (ref 0–0.2)
BASOPHILS RELATIVE PERCENT: 1.1 % (ref 0–2)
BODY FLUID CULTURE, STERILE: NORMAL
BUN BLDV-MCNC: 12 MG/DL (ref 6–23)
CALCIUM SERPL-MCNC: 10.2 MG/DL (ref 8.6–10.2)
CHLORIDE BLD-SCNC: 103 MMOL/L (ref 98–107)
CO2: 25 MMOL/L (ref 22–29)
CREAT SERPL-MCNC: 1.1 MG/DL (ref 0.5–1)
EOSINOPHILS ABSOLUTE: 0.2 E9/L (ref 0.05–0.5)
EOSINOPHILS RELATIVE PERCENT: 4.3 % (ref 0–6)
GFR AFRICAN AMERICAN: 58
GFR NON-AFRICAN AMERICAN: 48 ML/MIN/1.73
GLUCOSE BLD-MCNC: 120 MG/DL (ref 74–99)
GRAM STAIN RESULT: NORMAL
HCT VFR BLD CALC: 29.7 % (ref 34–48)
HEMOGLOBIN: 9.6 G/DL (ref 11.5–15.5)
HLA B27: POSITIVE
IMMATURE GRANULOCYTES #: 0.01 E9/L
IMMATURE GRANULOCYTES %: 0.2 % (ref 0–5)
LYMPHOCYTES ABSOLUTE: 1 E9/L (ref 1.5–4)
LYMPHOCYTES RELATIVE PERCENT: 21.4 % (ref 20–42)
Lab: NORMAL
MCH RBC QN AUTO: 31.8 PG (ref 26–35)
MCHC RBC AUTO-ENTMCNC: 32.3 % (ref 32–34.5)
MCV RBC AUTO: 98.3 FL (ref 80–99.9)
MONOCYTES ABSOLUTE: 0.51 E9/L (ref 0.1–0.95)
MONOCYTES RELATIVE PERCENT: 10.9 % (ref 2–12)
NEUTROPHILS ABSOLUTE: 2.9 E9/L (ref 1.8–7.3)
NEUTROPHILS RELATIVE PERCENT: 62.1 % (ref 43–80)
PDW BLD-RTO: 18.6 FL (ref 11.5–15)
PLATELET # BLD: 181 E9/L (ref 130–450)
PMV BLD AUTO: 10.3 FL (ref 7–12)
POTASSIUM REFLEX MAGNESIUM: 4 MMOL/L (ref 3.5–5)
RBC # BLD: 3.02 E12/L (ref 3.5–5.5)
REPORT: NORMAL
SODIUM BLD-SCNC: 136 MMOL/L (ref 132–146)
THIS TEST SENT TO: NORMAL
WBC # BLD: 4.7 E9/L (ref 4.5–11.5)

## 2021-11-03 PROCEDURE — 6360000002 HC RX W HCPCS: Performed by: STUDENT IN AN ORGANIZED HEALTH CARE EDUCATION/TRAINING PROGRAM

## 2021-11-03 PROCEDURE — 36415 COLL VENOUS BLD VENIPUNCTURE: CPT

## 2021-11-03 PROCEDURE — 85025 COMPLETE CBC W/AUTO DIFF WBC: CPT

## 2021-11-03 PROCEDURE — 2580000003 HC RX 258: Performed by: STUDENT IN AN ORGANIZED HEALTH CARE EDUCATION/TRAINING PROGRAM

## 2021-11-03 PROCEDURE — 71045 X-RAY EXAM CHEST 1 VIEW: CPT

## 2021-11-03 PROCEDURE — 99232 SBSQ HOSP IP/OBS MODERATE 35: CPT | Performed by: STUDENT IN AN ORGANIZED HEALTH CARE EDUCATION/TRAINING PROGRAM

## 2021-11-03 PROCEDURE — 6370000000 HC RX 637 (ALT 250 FOR IP): Performed by: STUDENT IN AN ORGANIZED HEALTH CARE EDUCATION/TRAINING PROGRAM

## 2021-11-03 PROCEDURE — 80048 BASIC METABOLIC PNL TOTAL CA: CPT

## 2021-11-03 PROCEDURE — 99232 SBSQ HOSP IP/OBS MODERATE 35: CPT | Performed by: FAMILY MEDICINE

## 2021-11-03 PROCEDURE — 94640 AIRWAY INHALATION TREATMENT: CPT

## 2021-11-03 RX ORDER — ONDANSETRON 4 MG/1
4 TABLET, ORALLY DISINTEGRATING ORAL EVERY 8 HOURS PRN
Qty: 21 TABLET | Refills: 0 | Status: SHIPPED | OUTPATIENT
Start: 2021-11-03 | End: 2021-11-10

## 2021-11-03 RX ADMIN — BUDESONIDE 500 MCG: 0.5 SUSPENSION RESPIRATORY (INHALATION) at 14:19

## 2021-11-03 RX ADMIN — DICLOFENAC SODIUM 2 G: 10 GEL TOPICAL at 09:05

## 2021-11-03 RX ADMIN — SODIUM CHLORIDE, PRESERVATIVE FREE 10 ML: 5 INJECTION INTRAVENOUS at 09:14

## 2021-11-03 RX ADMIN — AMLODIPINE BESYLATE 2.5 MG: 2.5 TABLET ORAL at 09:13

## 2021-11-03 RX ADMIN — ENOXAPARIN SODIUM 40 MG: 100 INJECTION SUBCUTANEOUS at 09:13

## 2021-11-03 RX ADMIN — SPIRONOLACTONE 100 MG: 25 TABLET ORAL at 09:13

## 2021-11-03 RX ADMIN — PANTOPRAZOLE SODIUM 40 MG: 40 TABLET, DELAYED RELEASE ORAL at 09:13

## 2021-11-03 RX ADMIN — LACTULOSE 20 G: 20 SOLUTION ORAL at 09:14

## 2021-11-03 RX ADMIN — ARFORMOTEROL TARTRATE 15 MCG: 15 SOLUTION RESPIRATORY (INHALATION) at 14:18

## 2021-11-03 RX ADMIN — VENLAFAXINE HYDROCHLORIDE 75 MG: 75 CAPSULE, EXTENDED RELEASE ORAL at 09:13

## 2021-11-03 RX ADMIN — Medication 1000 UNITS: at 09:13

## 2021-11-03 RX ADMIN — RIFAXIMIN 550 MG: 550 TABLET ORAL at 09:13

## 2021-11-03 RX ADMIN — FUROSEMIDE 40 MG: 40 TABLET ORAL at 09:13

## 2021-11-03 ASSESSMENT — PAIN DESCRIPTION - LOCATION: LOCATION: ABDOMEN

## 2021-11-03 ASSESSMENT — PAIN SCALES - GENERAL
PAINLEVEL_OUTOF10: 0
PAINLEVEL_OUTOF10: 0

## 2021-11-03 ASSESSMENT — PAIN DESCRIPTION - PAIN TYPE
TYPE: ACUTE PAIN
TYPE: ACUTE PAIN

## 2021-11-03 NOTE — PLAN OF CARE
Problem: Skin Integrity:  Goal: Will show no infection signs and symptoms  Description: Will show no infection signs and symptoms  11/3/2021 1514 by Khris Vasquez RN  Outcome: Completed  11/3/2021 1514 by Khris Vasquez RN  Outcome: Met This Shift  11/3/2021 0123 by Corin Copeland RN  Outcome: Ongoing  Goal: Absence of new skin breakdown  Description: Absence of new skin breakdown  11/3/2021 1514 by Khris Vasquez RN  Outcome: Completed  11/3/2021 1514 by Khris Vasquez RN  Outcome: Met This Shift  11/3/2021 0123 by Corin Copeland RN  Outcome: Ongoing

## 2021-11-03 NOTE — PROGRESS NOTES
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 No complaints. Laying flat with no issues.     Past Medical History:   Diagnosis Date    [facility]     Cirrhosis ([facility]     Essential hypertension     Hyperlipidemia     Osteopenia     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 ([facility]     Sleep apnea     Spinal stenosis     Type 2 diabetes mellitus ([facility]      Past Surgical History:   Procedure Laterality Date    BREAST LUMPECTOMY      lumpectomy [id number]    CARDIOVASCULAR STRESS TEST      Lexiscan stress test    CARPAL TUNNEL RELEASE      COLONOSCOPY  01/31/2017    multiple polyps; diverticula--jerod    COLONOSCOPY  04/23/2019    polyps; diverticula; hemorrhoids--jerod    COLONOSCOPY N/A 04/23/2019    COLONOSCOPY POLYPECTOMY SNARE/COLD BIOPSY performed by Shira Pham MD at [address] [facility] COLONOSCOPY  04/23/2019    COLONOSCOPY WITH BIOPSY performed by Shira Pham MD at [address] [facility] LITHOTRIPSY Left 05/04/2016    C-R STENT PLACEMENT    SHOULDER ARTHROPLASTY Left 12/21/2020    LEFT REVERSE TOTAL SHOULDER  ARTHROPLASTY -- DEPUY performed by Felisa Chavez MD at [facility]  04/23/2019    gastritis--jerod    UPPER GASTROINTESTINAL ENDOSCOPY N/A 04/23/2019    EGD BIOPSY performed by Shira Pham MD at [address]  Marital status:      Spouse name: Not on file    Number of children: Not on file    Years of education: Not on file    Highest education level: Not on file   Occupational History    Not on file   Tobacco Use    Smoking status: Never Smoker    Smokeless tobacco: Never Used   Vaping Use    Vaping Use: Never used   Substance and Sexual Activity    Alcohol use: Never     Alcohol/week: 0.0 standard drinks    Drug use: Never    Sexual activity: Never     Partners: Male     Comment: last in 2013   Other Topics Concern    Not on file Social History Narrative    Denies caffeine. Social Determinants of Health     Financial Resource Strain: High Risk    Difficulty of Paying Living Expenses: Hard   Food Insecurity: No Food Insecurity    Worried About Running Out of Food in the Last Year: Never true    Lj of Food in the Last Year: Never true   Transportation Needs:     Lack of Transportation (Medical):      Lack of Transportation (Non-Medical):    Physical Activity:     Days of Exercise per Week:     Minutes of Exercise per Session:    Stress:     Feeling of Stress :    Social Connections:     Frequency of Communication with Friends and Family:     Frequency of Social Gatherings with Friends and Family:     Attends Hindu Services:     Active Member of Clubs or Organizations:     Attends Club or Organization Meetings:     Marital Status:    Intimate Partner Violence:     Fear of Current or Ex-Partner:     Emotionally Abused:     Physically Abused:     Sexually Abused:      Family History   Problem Relation Age of Onset    Arthritis Mother     COPD Father     Heart Attack Father     Cancer Other 50        colon       Vitals:    11/02/21 1030 11/02/21 1500 11/03/21 0115 11/03/21 0845   BP: 126/78 (!) 115/56 129/67 136/78   Pulse: 84 103 96 81   Resp: 22 20 18 17   Temp: 98.1 °F (36.7 °C) 97.9 °F (36.6 °C) 97.9 °F (36.6 °C) 98 °F (36.7 °C)   TempSrc: Oral Oral Oral Oral   SpO2:  96% 97% 94%   Weight:       Height:             Intake/Output Summary (Last 24 hours) at 11/3/2021 1008  Last data filed at 11/2/2021 1500  Gross per 24 hour   Intake 240 ml   Output --   Net 240 ml       Recent Labs     11/01/21  0649 11/02/21  0515 11/03/21  0447   WBC 5.1 4.8 4.7   HGB 9.4* 10.0* 9.6*   HCT 28.5* 30.9* 29.7*    178 181      Recent Labs     11/01/21  0649 11/02/21  0515 11/03/21  0447   BUN 12 12 12   CREATININE 1.1* 1.1* 1.1*       ROS:   Negative for CP, palpitations, SOB at rest, dizziness/lightheadedness. Physical Exam   Constitutional: Oriented to person, place, and time. Appears well-developed. No distress. CARDIOVASCULAR:  Normal apical impulse, regular rate and rhythm, normal S1 and S2, no S3 or S4, and no murmur noted  Pulmonary/Chest: Effort normal. No respiratory distress. Abdominal: Soft. Bowel sounds are normal.   Musculoskeletal: Normal range of motion. Neurological: alert and oriented to person, place, and time. Skin: Skin is warm and dry. Psychiatric: normal mood and affect. EXAMINATION:   ONE XRAY VIEW OF THE CHEST       11/3/2021 7:53 am       COMPARISON:   10/31/2021       HISTORY:   ORDERING SYSTEM PROVIDED HISTORY: eval pleural effusion   TECHNOLOGIST PROVIDED HISTORY:   Reason for exam:->eval pleural effusion   What reading provider will be dictating this exam?->CRC       FINDINGS:   The cardiomediastinal silhouette is stable.  No infiltrate, effusion, or   pneumothorax.  No acute osseous abnormality.           Impression   No radiographic evidence of acute abnormality         ASSESSMENT:   Patient Active Problem List   Diagnosis    Malignant neoplasm of left breast (HCC)    Type 2 diabetes mellitus ([facility]    Obstructive sleep apnea syndrome    Essential hypertension    Multiple thyroid nodules    Depression    Hx of adenomatous colonic polyps    Dyslipidemia    Cirrhosis (HCC)    S/P reverse total shoulder arthroplasty, left    Anemia    Pleural effusion associated with hepatic disorder    Palpitations    Rhabdomyolysis    Hyperammonemia (HCC)    Closed displaced fracture of surgical neck of right humerus    Pulmonary embolism (HCC)    Idiopathic acute pancreatitis without infection or necrosis    Asymptomatic microscopic hematuria    Breast mass, left         74yo F with extensive PMH including cirrhosis, DM, breast cancer, KATE that presented 10/29/2021 with CC of several week history of SOB, as well as abdominal pain and nausea.  She was found to have large left pleural effusion, left subsegmental PE, pancreatitis. 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 PLAN:  CXR reviewed, report above, no signs of recurrent pleural effusion  Follow-up in office 11/23 with repeat CXR to evaluate for recurrent effusion  Will consider L VATS, pleurodesis, possible PleurX if evidence of recurrent effusion  OK for discharge from CTS perspective, will sign off, please contact with any questions or concerns    Note: 22 minutes was spent providing face-to-face patient care, including:  and coordinating care, reviewing the chart, labs, and diagnostics, as well as medical decision making. Greater than 50% of this time was spent instructing and counseling the patient face to face regarding findings and recommendations.

## 2021-11-03 NOTE — PLAN OF CARE
Problem: Skin Integrity:  Goal: Will show no infection signs and symptoms  Description: Will show no infection signs and symptoms  11/3/2021 0123 by Shiloh Mtz RN  Outcome: Ongoing  11/2/2021 1342 by Caro Akers RN  Outcome: Met This Shift  Goal: Absence of new skin breakdown  Description: Absence of new skin breakdown  11/3/2021 0123 by Shiloh Mtz RN  Outcome: Ongoing  11/2/2021 1342 by Caro Akers RN  Outcome: Met This Shift     Problem: Pain:  Goal: Pain level will decrease  Description: Pain level will decrease  11/3/2021 0123 by Shiloh Mtz RN  Outcome: Ongoing  11/2/2021 1342 by Caro Akers RN  Outcome: Met This Shift  Goal: Control of acute pain  Description: Control of acute pain  11/3/2021 0123 by Shiloh Mtz RN  Outcome: Ongoing  11/2/2021 1342 by Caro Akers RN  Outcome: Met This Shift  Goal: Control of chronic pain  Description: Control of chronic pain  Outcome: Ongoing     Problem: Falls - Risk of:  Goal: Will remain free from falls  Description: Will remain free from falls  11/3/2021 0123 by Shiloh Mtz RN  Outcome: Ongoing  11/2/2021 1342 by Caro Akers RN  Outcome: Met This Shift  Goal: Absence of physical injury  Description: Absence of physical injury  11/3/2021 0123 by Shiloh Mtz RN  Outcome: Ongoing  11/2/2021 1342 by Caro Akers RN  Outcome: Met This Shift

## 2021-11-03 NOTE — PROGRESS NOTES
[facility] - Family Medicine Inpatient   Resident Progress Note    S:  Hospital day: 4   Brief Synopsis: Makenzie Desir is a 68 y.o. female with a PMH of malignant neoplasm L breast s/p lumpectomy 2013, DM 2, KATE, HTN, multiple thyroid nodules, depression, HLD, cirrhosis, s/p reverse total L shoulder, who presented with SOB over the past 3-4 weeks, abdominal pain and nausea. She was found to have a new L breast lump on CT, as well as pleural effusion and small PE. Also diagnosed with acute pancreatitis. Patient seen and examined today. Doing well. S/p thoracentesis. No chest pain, no SOB. Cytology negative for malignant cells  Repeat CXR this AM negative for recurrent pleural effusion    Overnight/interim:    No acute events overnight      Cont meds:    sodium chloride       Scheduled meds:    diclofenac sodium  2 g Topical BID    amLODIPine  2.5 mg Oral Daily    doxazosin  1 mg Oral Nightly    furosemide  40 mg Oral Daily    lactulose  20 g Oral TID    pantoprazole  40 mg Oral Daily    spironolactone  100 mg Oral Daily    venlafaxine  75 mg Oral Daily    Vitamin D  1,000 Units Oral Daily    rifaximin  550 mg Oral BID    sodium chloride flush  10 mL IntraVENous 2 times per day    enoxaparin  40 mg SubCUTAneous Daily    budesonide  0.5 mg Nebulization BID    And    Arformoterol Tartrate  15 mcg Nebulization BID     PRN meds: sodium chloride flush, sodium chloride, ondansetron **OR** ondansetron, magnesium hydroxide, ipratropium-albuterol     I reviewed the patient's past medical and surgical history, Medications and Allergies. O:  /78   Pulse 81   Temp 98 °F (36.7 °C)   Resp 17   Ht 5' 2\" (1.575 m)   Wt 170 lb (77.1 kg)   SpO2 97%   BMI 31.09 kg/m²   24 hour I&O: I/O last 3 completed shifts: In: 600 [P.O.:600]  Out: -   No intake/output data recorded.        General: Alert, cooperative, no acute distress. HEENT: Normocephalic, atraumatic.  Conjunctiva/corneas clear, EOM's intact, Neck: Symmetrical, trachea midline  Chest: No tenderness or deformity, full & symmetric excursion  Lung: Respirations unlabored. No crackles, rales or rhonchi heard  Heart: RRR, S1 and S2 normal; systolic murmur   Abdomen: SNTND, no masses, no organomegaly, no guarding, rebound or rigidity. Genital/Rectal: deferred  Extremities:  Extremities normal, atraumatic, no cyanosis or edema. Skin: Skin color, texture, turgor normal, no rashes or lesions  Musculoskeletal: No joint swelling, no muscle tenderness. Neurologic: Alert & Oriented      Labs:  Na/K/Cl/CO2:  136/4.0/103/25 (11/03 2029)  BUN/Cr/glu/ALT/AST/amyl/lip:  12/1.1/--/--/--/--/-- (11/03 0447)  WBC/Hgb/Hct/Plts:  4.7/9.6/29.7/181 (11/03 0447)  estimated creatinine clearance is 42 mL/min (A) (based on SCr of 1.1 mg/dL (H)). Other pertinent labs as noted below    Radiology:  XR CHEST PORTABLE   Final Result   Significant improvement in the left pleural effusion with persistent minimal   atelectasis and pleural effusion bilaterally. There is no pneumothorax. US THORACENTESIS Which side should the procedure be performed? Left   Final Result   1. Successful ultrasound-guided left thoracentesis performed by Dr. Skye Weston. 2.  Please see separate procedure report for details. CTA PULMONARY W CONTRAST   Final Result   No central pulmonary embolism or aortic dissection. There are small filling   defects in the distal  subsegmental and peripheral vessels of lower lobes   concerning for small distal subsegmental pulmonary embolism. Moderate large pleural effusion with atelectasis and infiltrates in the left   lower lobe concerning for pneumonia. Asymmetric soft tissue density in the left breast.  Consider mammographic   correlation. Cirrhotic liver with multiple indeterminate hypodense hepatic lesions, some   of which were seen previously and surveillance is recommended.   There is   portal venous hypertension with splenomegaly, venous varices and ascites. Haziness surrounding the pancreas concerning for pancreatitis. Please   correlate with pancreatic enzymes. 9 mm indeterminate hypodense lesion in the pancreatic head. Consider   surveillance. Diverticulosis of the colon with thickening of the left hemicolon which may   be due to spasm or uncomplicated diverticulitis. CT ABDOMEN PELVIS W IV CONTRAST Additional Contrast? None   Final Result   No central pulmonary embolism or aortic dissection. There are small filling   defects in the distal  subsegmental and peripheral vessels of lower lobes   concerning for small distal subsegmental pulmonary embolism. Moderate large pleural effusion with atelectasis and infiltrates in the left   lower lobe concerning for pneumonia. Asymmetric soft tissue density in the left breast.  Consider mammographic   correlation. Cirrhotic liver with multiple indeterminate hypodense hepatic lesions, some   of which were seen previously and surveillance is recommended. There is   portal venous hypertension with splenomegaly, venous varices and ascites. Haziness surrounding the pancreas concerning for pancreatitis. Please   correlate with pancreatic enzymes. 9 mm indeterminate hypodense lesion in the pancreatic head. Consider   surveillance. Diverticulosis of the colon with thickening of the left hemicolon which may   be due to spasm or uncomplicated diverticulitis. XR CHEST PORTABLE   Final Result   Cardiomegaly with possible pulmonary vascular congestive changes.          XR CHEST PORTABLE    (Results Pending)       A/P:  Principal Problem:    Pleural effusion associated with hepatic disorder  Active Problems:    Type 2 diabetes mellitus (HCC)    Essential hypertension    Cirrhosis ([address]    Pulmonary embolism (HCC)    Idiopathic acute pancreatitis without infection or necrosis    Asymptomatic microscopic hematuria    Breast mass,

## 2021-11-03 NOTE — PROGRESS NOTES
Jason Milligan  Family Medicine Attending    S: 68 y.o. female with PMH of malignant neoplasm L breast s/p lumpectomy 2013, DM 2, KATE, HTN, multiple thyroid nodules, depression, HLD, cirrhosis, s/p reverse total L shoulder who presents to ED for Shortness of Breath for several days. Also c/o of abdominal pain after eating. In ED, noted to have moderately large left pleural effusion (chronic), possible pancreatitis, cirrhosis, and possible distal segmental PE's  Currently feeling better S/P thoracentesis    O: VS- Blood pressure 136/78, pulse 81, temperature 98 °F (36.7 °C), temperature source Oral, resp. rate 17, height 5' 2\" (1.575 m), weight 170 lb (77.1 kg), SpO2 94 %. Exam is as noted by residet  Awake, awake,alert, NAD  Heart- RRR  Lungs- clear, but decreased on left  And - soft, minimal tenderness today  No leg tenderness    Impressions:   Principal Problem:    Pleural effusion, improved after thoracentesis of chylous fluid  Active Problems:    Type 2 diabetes mellitus (HCC)    Essential hypertension    Cirrhosis ([address]    Pulmonary embolism (HCC)    Idiopathic acute pancreatitis without infection or necrosis    Asymptomatic microscopic hematuria  History of breast cancer        Plan:   Patient admitted because of dyspnea and abdominal pain   Transferred to our care after arrival on floor. Imaging suggested small subsegmental PE's, along with large left pleural effusion - also noted is cirrhosis with portal hypertension and varices, possible pancreatitis, and left breast mass   Labs include urine with large blood and lipase of 70 - lipase now normal   Hematuria noted in past - follows with urology (?cystoscopy)   Non-malignant cytology of thoracentesis fluid, which was milky with triglycerides of 667, therefore chylothorax.     Breast mass previously felt to be scarring from previous biopsy - will need outpatient mammogram                  Sliding scale insulin  Stable for discharge with medical management  Repeat chest x-ray in 1 month and follow with pulmonology        Attending Physician Statement  I have reviewed the chart and seen the patient with the resident(s). I personally reviewed images, EKG's and similar tests, if present. I personally reviewed and performed key elements of the history and exam.  I have reviewed and confirmed student and/or resident history and exam with changes as indicated above. I agree with the assessment, plan and orders as documented by the resident. Please refer to the resident and/or student note for additional information.       Fiona Echavarria MD

## 2021-11-03 NOTE — PROGRESS NOTES
Patient peripheral IV site discontinued per protocol. All discharge paperwork reviewed with patient and son present for discharge, signed. All belongings sent with son. Wheelchair transport provided to main entrance.

## 2021-11-03 NOTE — PROGRESS NOTES
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 with     PHYSICAL EXAMINATION:     VITAL SIGNS:  BP (!) 115/56   Pulse 103   Temp 97.9 °F (36.6 °C) (Oral)   Resp 20   Ht 5' 2\" (1.575 m)   Wt 170 lb (77.1 kg)   SpO2 96%   BMI 31.09 kg/m²   Wt Readings from Last 3 Encounters:   10/29/21 170 lb (77.1 kg)   10/19/21 187 lb 3.2 oz (84.9 kg)   10/02/21 200 lb (90.7 kg)     Temp Readings from Last 3 Encounters:   11/02/21 97.9 °F (36.6 °C) (Oral)   10/19/21 97.9 °F (36.6 °C) (Temporal)   10/02/21 98.8 °F (37.1 °C) (Oral)     TMAX:  BP Readings from Last 3 Encounters:   11/02/21 (!) 115/56   10/19/21 130/74   10/02/21 110/60     Pulse Readings from Last 3 Encounters:   11/02/21 103   10/19/21 82   10/02/21 89           INTAKE/OUTPUTS:  I/O last 3 completed shifts:   In: 5 [P.O.:720]  Out: -     Intake/Output Summary (Last 24 hours) at 11/2/2021 2230  Last data filed at 11/2/2021 1500  Gross per 24 hour   Intake 600 ml   Output --   Net 600 ml       General Appearance: alert and oriented to person, place and time, well-developed and   well-nourished, in no acute distress   Eyes: pupils equal, round, and reactive to light, extraocular eye movements intact, conjunctivae normal and sclera anicteric   Neck: neck supple and non tender without mass, no thyromegaly, no thyroid nodules and no cervical adenopathy   Pulmonary/Chest:decrease breath sound left /wheeizng occasional exp  Cardiovascular: normal rate, regular rhythm, normal S1 and S2, no murmurs, rubs, clicks or gallops, distal pulses intact, no carotid bruits, no murmurs, no gallops, no carotid bruits and no JVD   Abdomen: obese, soft, non-tender, non-distended, normal bowel sounds, no masses or organomegaly   Extremities:mild edema   Musculoskeletal: normal range of motion, no joint swelling, deformity or tenderness   Neurologic: reflexes normal and symmetric, no cranial nerve deficit noted    LABS/IMAGING:    CBC:  Lab Results   Component Value Date    WBC 4.8 11/02/2021    HGB 10.0 (L) 11/02/2021    HCT 30.9 (L) 11/02/2021    .3 (H) 11/02/2021     11/02/2021    LYMPHOPCT 19.9 (L) 11/02/2021    RBC 3.08 (L) 11/02/2021    MCH 32.5 11/02/2021    MCHC 32.4 11/02/2021    RDW 18.6 (H) 11/02/2021    NEUTOPHILPCT 62.3 11/02/2021    MONOPCT 11.8 11/02/2021    BASOPCT 0.6 11/02/2021    NEUTROABS 3.00 11/02/2021    LYMPHSABS 0.96 (L) 11/02/2021    MONOSABS 0.57 11/02/2021    EOSABS 0.24 11/02/2021    BASOSABS 0.03 11/02/2021       Recent Labs     11/02/21  0515 11/01/21  0649 10/31/21  0808   WBC 4.8 5.1 6.6   HGB 10.0* 9.4* 9.5*   HCT 30.9* 28.5* 29.6*   .3* 97.9 99.3    172 169       BMP:   Recent Labs     10/31/21  0808 11/01/21  0649 11/02/21  0515    136 137   K 4.0 4.6 4.8   * 107 107   CO2 20* 22 24   BUN 12 12 12   CREATININE 1.1* 1.1* 1.1*       MG:   Lab Results   Component Value Date    MG 1.7 02/04/2021     Ca/Phos:   Lab Results   Component Value Date    CALCIUM 9.7 11/02/2021    PHOS 2.7 02/27/2021     Amylase: No results found for: AMYLASE  Lipase:   Lab Results   Component Value Date    LIPASE 49 10/30/2021     LIVER PROFILE:   No results for input(s): AST, ALT, LIPASE, BILIDIR, BILITOT, ALKPHOS in the last 72 hours. Invalid input(s): AMYLASE,  ALB    PT/INR: No results for input(s): PROTIME, INR in the last 72 hours. APTT:   No results for input(s): APTT in the last 72 hours.     Cardiac Enzymes:  Lab Results   Component Value Date    CKTOTAL 203 (H) 08/20/2021    TROPONINI <0.01 02/04/2021                 PROBLEM LIST:  Patient Active Problem List   Diagnosis    [facility]    Type 2 diabetes mellitus ([facility]    Obstructive sleep apnea syndrome    Essential hypertension    Multiple thyroid nodules    Depression    Hx of adenomatous colonic polyps    Dyslipidemia    Cirrhosis (HCC)    S/P reverse total shoulder arthroplasty, left    Anemia    Pleural effusion associated with hepatic disorder    Palpitations    Rhabdomyolysis    Hyperammonemia (HCC)    Closed displaced fracture of surgical neck of right humerus    Pulmonary embolism (HCC)    Idiopathic acute pancreatitis without infection or necrosis    Asymptomatic microscopic hematuria    Breast mass, left               ASSESSMENT:  1.) left side effusion  2-. Shortness of breath  2. )Breast cancer   3.)Possible pneumonia  4.)Small PE ,if any  5. 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  ,so chylothorax   cytology negative  Appreciate CTS input  If CXR ok in am and no frurther plan by CTS ,then she can be discharged and follow up CXR with in 1 months   Will follow on flow cytometry   Has history of diastolic dysfunction so we will await cardiology  She also has liver cirrhosis and other possibility of her shortness of breath could be portal pulmonary hypertension versus hepatopulmonary syndrome but ,continue follow as OP    Small  PE ,may consider elquis 3 months     Daysi Webb MD,[facility]  Pulmonary&Critical Care Medicine   Director of [address] [place] [address] Director of [address] [place] [address]    Sharon La    NOTE: This report was transcribed using voice recognition software. Every effort was made to ensure accuracy; however, inadvertent computerized transcription errors may be present.

## 2021-11-03 NOTE — PLAN OF CARE
Problem: Skin Integrity:  Goal: Will show no infection signs and symptoms  Description: Will show no infection signs and symptoms  11/3/2021 1514 by Yakelin Bourne RN  Outcome: Met This Shift  11/3/2021 0123 by Radha Post RN  Outcome: Ongoing  Goal: Absence of new skin breakdown  Description: Absence of new skin breakdown  11/3/2021 1514 by Yakelin Bourne RN  Outcome: Met This Shift  11/3/2021 0123 by Radha Post RN  Outcome: Ongoing

## 2021-11-04 ENCOUNTER — CARE COORDINATION (OUTPATIENT)
Dept: CASE MANAGEMENT | Age: 76
End: 2021-11-04

## 2021-11-04 LAB
BLOOD CULTURE, ROUTINE: NORMAL
CULTURE, BLOOD 2: NORMAL

## 2021-11-04 NOTE — CARE COORDINATION
Donovan 45 Transitions Initial Follow Up Call    Call within 2 business days of discharge: Yes    Patient: Senait Cough Patient : 1945   MRN: [id number]  Reason for Admission: Pleural Effusion associated with Hepatic Disorder  Discharge Date: 11/3/21 RARS: Readmission Risk Score: 20.7      Last Discharge [facility]       Complaint Diagnosis Description Type Department Provider    10/29/21 Shortness of Breath Other acute pulmonary embolism without acute cor pulmonale ([address] . .. ED to Hosp-Admission (Discharged) (ADMITTED) RADHA 8WE Milton Weiner MD; Brad Smith. .. Attempted to reach patient by phone for initial post hospital discharge follow up. HIPAA compliant message left on patient's voicemail; CTN contact information provided. CTN spoke with Aaron Lynch at [phone] [facility] and confirmed [facility] 2021.       Follow Up  Future Appointments   Date Time Provider Geraldine Rader   11/10/2021  2:00 PM Talha Tovar MD SE Ortho [facility]   2021 12:45 PM Brigido Lopez DO CARDIO SURG [facility]   2021  3:00 PM [facility] MRI ROOM YZ MRI/AUS [facility]   2022 10:15 AM Daysi Martins MD ACC Pulm [facility]   2022  2:00 PM [facility]   3/15/2022 10:00 AM Brenna Hernandez APRN - CNP [facility]       Wilder Odell RN

## 2021-11-05 ENCOUNTER — CARE COORDINATION (OUTPATIENT)
Dept: CASE MANAGEMENT | Age: 76
End: 2021-11-05

## 2021-11-05 NOTE — CARE COORDINATION
Donovan 45 Transitions Initial Follow Up Call    Call within 2 business days of discharge: Yes    Patient: Ben Hernandez Patient : 1945   MRN: [id number]  Reason for Admission: PE  Discharge Date: 11/3/21 RARS: Readmission Risk Score: 20.7      Last Discharge [facility]       Complaint Diagnosis Description Type Department Provider    10/29/21 Shortness of Breath Other acute pulmonary embolism without acute cor pulmonale ([address] . .. ED to Hosp-Admission (Discharged) (ADMITTED) RADHA 8WE Gladis Mcbride MD; Emily Angel. .. 2nd attempt to reach the patient for initial Care Transition call post hospital discharge Message left with CTN's contact information requesting return phone call. No further outreaches will be made. If no return call today, CTN will sign off and resolve care transition episode.     CTN will route message to [facility] front office staff requesting they attempt to reach pt to schedule TCM visit with pcp      Follow Up  Future Appointments   Date Time Provider Geraldine Rader   11/10/2021  2:00 PM Kirk Pruitt  [address]   2021 12:45 PM Sayra Brown DO CARDIO SURG [facility]   2021  3:00 PM [facility] MRI ROOM [facility]   2022 10:15 AM Daysi Hopkins MD ACC Pulm [facility]   2022  2:00 PM [facility]   3/15/2022 10:00 AM Gregorio Meckel, APRN - CNP [facility]       Winter Dean RN

## 2021-11-07 NOTE — DISCHARGE SUMMARY
Discharge Summary    Ben Hernandez  :  1945  MRN:  [id number]    Admit date:  10/29/2021  Discharge date:  11/3/2021    Admitting Physician:  Gladis Mcbride MD      Admission Condition:  fair    Discharged Condition:  good    Discharge Diagnoses:   Patient Active Problem List   Diagnosis    [facility] left [facility]    Type 2 diabetes mellitus ([facility]    Obstructive sleep apnea syndrome    Essential hypertension    Multiple thyroid nodules    Depression    Hx of adenomatous colonic polyps    Dyslipidemia    Cirrhosis ([facility]    S/P reverse total shoulder arthroplasty, left    Anemia    Pleural effusion associated with hepatic disorder    Palpitations    Rhabdomyolysis    Hyperammonemia (HCC)    Closed displaced fracture of surgical neck of right humerus    Pulmonary embolism (HCC)    Idiopathic acute pancreatitis without infection or necrosis    Asymptomatic microscopic hematuria    Breast mass, left    Anxiety disorder, unspecified    Gastro-esophageal reflux disease without esophagitis    Major depressive disorder, recurrent severe without psychotic features [facility]    Muscle weakness (generalized)    Need for assistance with personal care    Other abnormalities of gait and mobility    Unspecified physeal fracture of upper end of humerus, left arm, subsequent encounter for fracture with routine healing    Difficulty in walking, not elsewhere classified       Hospital Course: Ben Hernandez is a 68 y.o. female with past medical history of malignant neoplasm of the left breast, status post lumpectomy , DM type II, KATE, hypertension multiple thyroid nodules, depression, hyperlipidemia, cirrhosis, s/p reverse total left shoulder who presented to the ER for shortness of breath for the past 2 to 3 days, nausea and emesis. Patient reported that her shortness of breath has been worsening over the past 3 to 4 weeks but became too much to 3 days prior to admission. HFA  Inhale 2 puffs into the lungs 4 times daily as needed for Wheezing     amLODIPine 2.5 MG tablet  Commonly known as: NORVASC     Biotin 32468 MCG Tabs     budesonide-formoterol 160-4.5 MCG/ACT Aero  Commonly known as: Symbicort  Inhale 2 puffs into the lungs 2 times daily     doxazosin 1 MG tablet  Commonly known as: CARDURA  Take 1 tablet by mouth nightly     fluticasone-salmeterol 250-50 MCG/DOSE Aepb  Commonly known as: ADVAIR     furosemide 40 MG tablet  Commonly known as: LASIX  Take 1 tablet by mouth daily     lactulose 10 GM/15ML solution  Commonly known as: CHRONULAC  Take 30 mLs by mouth 3 times daily     pantoprazole 40 MG tablet  Commonly known as: PROTONIX  Take 1 tablet by mouth daily     spironolactone 100 MG tablet  Commonly known as: ALDACTONE  Take 1 tablet by mouth daily     venlafaxine 75 MG extended release capsule  Commonly known as: EFFEXOR XR  Take 1 capsule by mouth daily     vitamin D3 25 MCG (1000 UT) Tabs tablet  Commonly known as: CHOLECALCIFEROL     Xifaxan 550 MG tablet  Generic drug: rifaximin  TAKE 1 TABLET BY MOUTH TWICE DAILY        STOP taking these medications    FeroSul 325 (65 Fe) MG tablet  Generic drug: ferrous sulfate     vitamin D 1.25 MG (25075 UT) Caps capsule  Commonly known as: ERGOCALCIFEROL           Where to Get Your Medications      These medications were sent to [facility] [address] [address]    Phone: [phone]   · apixaban starter pack 5 MG Tbpk tablet     These medications were sent to [facility] #[id number] Eugenia Toro [address] [facility] [phone]  [address] [address]    Phone: [phone]   · ondansetron 4 MG disintegrating tablet         Consults:  pulmonary/intensive care and cardiothoracic surgery    Significant Diagnostic Studies:      Labs:    Recent Results (from the past 120 hour(s)) Basic Metabolic Panel w/ Reflex to MG    Collection Time: 11/03/21  4:47 AM   Result Value Ref Range    Sodium 136 132 - 146 mmol/L    Potassium reflex Magnesium 4.0 3.5 - 5.0 mmol/L    Chloride 103 98 - 107 mmol/L    CO2 25 22 - 29 mmol/L    Anion Gap 8 7 - 16 mmol/L    Glucose 120 (H) 74 - 99 mg/dL    BUN 12 6 - 23 mg/dL    CREATININE 1.1 (H) 0.5 - 1.0 mg/dL    GFR Non-African American 48 >=60 mL/min/1.73    GFR African American 58     Calcium 10.2 8.6 - 10.2 mg/dL   CBC auto differential    Collection Time: 11/03/21  4:47 AM   Result Value Ref Range    WBC 4.7 4.5 - 11.5 E9/L    RBC 3.02 (L) 3.50 - 5.50 E12/L    Hemoglobin 9.6 (L) 11.5 - 15.5 g/dL    Hematocrit 29.7 (L) 34.0 - 48.0 %    MCV 98.3 80.0 - 99.9 fL    MCH 31.8 26.0 - 35.0 pg    MCHC 32.3 32.0 - 34.5 %    RDW 18.6 (H) 11.5 - 15.0 fL    Platelets 160 815 - 086 E9/L    MPV 10.3 7.0 - 12.0 fL    Neutrophils % 62.1 43.0 - 80.0 %    Immature Granulocytes % 0.2 0.0 - 5.0 %    Lymphocytes % 21.4 20.0 - 42.0 %    Monocytes % 10.9 2.0 - 12.0 %    Eosinophils % 4.3 0.0 - 6.0 %    Basophils % 1.1 0.0 - 2.0 %    Neutrophils Absolute 2.90 1.80 - 7.30 E9/L    Immature Granulocytes # 0.01 E9/L    Lymphocytes Absolute 1.00 (L) 1.50 - 4.00 E9/L    Monocytes Absolute 0.51 0.10 - 0.95 E9/L    Eosinophils Absolute 0.20 0.05 - 0.50 E9/L    Basophils Absolute 0.05 0.00 - 0.20 E9/L     New Imaging:  CT ABDOMEN PELVIS W IV CONTRAST Additional Contrast? None    Result Date: 10/29/2021  EXAMINATION: CTA OF THE CHEST; CT OF THE ABDOMEN AND PELVIS WITH CONTRAST 10/29/2021 7:36 pm TECHNIQUE: CTA of the chest was performed after the administration of intravenous contrast.  Multiplanar reformatted images are provided for review. MIP images are provided for review.  Dose modulation, iterative reconstruction, and/or weight based adjustment of the mA/kV was utilized to reduce the radiation dose to as low as reasonably achievable.; CT of the abdomen and pelvis was performed with the administration of intravenous contrast. Multiplanar reformatted images are provided for review. Dose modulation, iterative reconstruction, and/or weight based adjustment of the mA/kV was utilized to reduce the radiation dose to as low as reasonably achievable. COMPARISON: 01/31/2021 HISTORY: ORDERING SYSTEM PROVIDED HISTORY: rule out PE TECHNOLOGIST PROVIDED HISTORY: Reason for exam:->rule out PE Decision Support Exception - unselect if not a suspected or confirmed emergency medical condition->Emergency Medical Condition (MA) FINDINGS: CTA chest. There is cardiomegaly with mild coronary artery calcification. The great vessels are normal.  Trachea and major bronchi are patent. Nonenlarged mediastinal lymph nodes are present. The contrast opacification of the pulmonary arteries is limited. Given this limitation, there are no filling defects in the main pulmonary artery and the central branches. However, small filling defects are identified in the distal subsegmental and peripheral vessels more on the left lower lobe. There is large pleural effusion with atelectasis/infiltrates in the left base. The right lung is clear. Degenerative changes are identified in the thoracic spine with mild compression deformity of T8 and T11 probably new since the previous examination. 1.1 cm soft tissue density is identified in the left breast which may be related to patient's breast malignancy. Consider mammographic correlation CT abdomen and pelvis. The liver is heterogeneous with nodular border concerning for cirrhosis with multiple subcentimeter hypodense lesions some of which were seen in the previous examination. Consider surveillance. There is portal venous hypertension with splenomegaly and extensive venous varices. Pancreas is edematous with Debby pancreatic edema/ inflammatory changes more surrounding the pancreatic head concerning for pancreatitis. There is biliary dilatation.   A 9 mm hypodense lesion is noted in pancreatic head which is indeterminate for malignancy and surveillance is recommended. The adrenals and the kidneys are normal.  Degenerative changes are identified in the lumbar spine. There is a small amount of ascites. There is an umbilical hernia with herniation of ascites. Pelvis. There is ascites fluid in the pelvis. Bladder is partially distended. There is diffuse diverticulosis of the colon with thickening of the descending and sigmoid colon. There is constipation. The appendix cannot be identified. No central pulmonary embolism or aortic dissection. There are small filling defects in the distal  subsegmental and peripheral vessels of lower lobes concerning for small distal subsegmental pulmonary embolism. Moderate large pleural effusion with atelectasis and infiltrates in the left lower lobe concerning for pneumonia. Asymmetric soft tissue density in the left breast.  Consider mammographic correlation. Cirrhotic liver with multiple indeterminate hypodense hepatic lesions, some of which were seen previously and surveillance is recommended. There is portal venous hypertension with splenomegaly, venous varices and ascites. Haziness surrounding the pancreas concerning for pancreatitis. Please correlate with pancreatic enzymes. 9 mm indeterminate hypodense lesion in the pancreatic head. Consider surveillance. Diverticulosis of the colon with thickening of the left hemicolon which may be due to spasm or uncomplicated diverticulitis. XR CHEST PORTABLE    Result Date: 11/3/2021  EXAMINATION: ONE XRAY VIEW OF THE CHEST 11/3/2021 7:53 am COMPARISON: 10/31/2021 HISTORY: ORDERING SYSTEM PROVIDED HISTORY: eval pleural effusion TECHNOLOGIST PROVIDED HISTORY: Reason for exam:->eval pleural effusion What reading provider will be dictating this exam?->CRC FINDINGS: The cardiomediastinal silhouette is stable. No infiltrate, effusion, or pneumothorax. No acute osseous abnormality.      No radiographic evidence of acute abnormality     XR CHEST PORTABLE    Result Date: 10/31/2021  EXAMINATION: ONE XRAY VIEW OF THE CHEST 10/31/2021 2:04 pm COMPARISON: CT and chest radiograph October 29, 2021 HISTORY: ORDERING SYSTEM PROVIDED HISTORY: Thoracentesis TECHNOLOGIST PROVIDED HISTORY: Reason for exam:->Thoracentesis What reading provider will be dictating this exam?->CRC FINDINGS: There is borderline cardiac size. There is significant improvement in the previously noted left pleural effusion. There is no pneumothorax. There is small amount of residual atelectasis and pleural effusions in the lung bases. Significant improvement in the left pleural effusion with persistent minimal atelectasis and pleural effusion bilaterally. There is no pneumothorax. XR CHEST PORTABLE    Result Date: 10/29/2021  EXAMINATION: ONE XRAY VIEW OF THE CHEST 10/29/2021 7:55 pm COMPARISON: None. HISTORY: ORDERING SYSTEM PROVIDED HISTORY: dyspnea TECHNOLOGIST PROVIDED HISTORY: Reason for exam:->dyspnea FINDINGS: Possible pulmonary vascular congestive changes. There is no effusion or pneumothorax. Cardiomegaly. The osseous structures are without acute process. Total reverse left shoulder arthroplasty. Cardiomegaly with possible pulmonary vascular congestive changes. CTA PULMONARY W CONTRAST    Result Date: 10/29/2021  EXAMINATION: CTA OF THE CHEST; CT OF THE ABDOMEN AND PELVIS WITH CONTRAST 10/29/2021 7:36 pm TECHNIQUE: CTA of the chest was performed after the administration of intravenous contrast.  Multiplanar reformatted images are provided for review. MIP images are provided for review. Dose modulation, iterative reconstruction, and/or weight based adjustment of the mA/kV was utilized to reduce the radiation dose to as low as reasonably achievable.; CT of the abdomen and pelvis was performed with the administration of intravenous contrast. Multiplanar reformatted images are provided for review.  Dose modulation, iterative normal.  Degenerative changes are identified in the lumbar spine. There is a small amount of ascites. There is an umbilical hernia with herniation of ascites. Pelvis. There is ascites fluid in the pelvis. Bladder is partially distended. There is diffuse diverticulosis of the colon with thickening of the descending and sigmoid colon. There is constipation. The appendix cannot be identified. No central pulmonary embolism or aortic dissection. There are small filling defects in the distal  subsegmental and peripheral vessels of lower lobes concerning for small distal subsegmental pulmonary embolism. Moderate large pleural effusion with atelectasis and infiltrates in the left lower lobe concerning for pneumonia. Asymmetric soft tissue density in the left breast.  Consider mammographic correlation. Cirrhotic liver with multiple indeterminate hypodense hepatic lesions, some of which were seen previously and surveillance is recommended. There is portal venous hypertension with splenomegaly, venous varices and ascites. Haziness surrounding the pancreas concerning for pancreatitis. Please correlate with pancreatic enzymes. 9 mm indeterminate hypodense lesion in the pancreatic head. Consider surveillance. Diverticulosis of the colon with thickening of the left hemicolon which may be due to spasm or uncomplicated diverticulitis. US THORACENTESIS Which side should the procedure be performed? Left    Result Date: 10/31/2021  PROCEDURE: Dejuan Perez LEFT THORACENTESIS 10/31/2021 HISTORY: ORDERING SYSTEM PROVIDED HISTORY: call dr Osorio at 1 pm at [address]: Reason for exam:->call dr Osorio at 1 pm at [address] side should the procedure be performed? ->Left What reading provider will be dictating this exam?->CRC TECHNIQUE: Ultrasound-guided left thoracentesis performed by Dr. Arty Lennox. The radiologist was not present.  FINDINGS: Left pleural effusion was 1st localized with ultrasound and the overlying skin marked. Left thoracentesis then performed by Dr. Marita Cruz with 950 mL fluid removed. 1.  Successful ultrasound-guided left thoracentesis performed by Dr. Marita Cruz. 2.  Please see separate procedure report for details. Treatments:   cardiac meds: amlodipine, furosemide, Cardura and Aldactone, anticoagulation: LMW heparin and procedures: thoracentesis negative    Discharge Exam:  Please refer to progress note day of discharge    Disposition:   home    Future Appointments   Date Time Provider Geraldine Dior   11/10/2021  2:00 PM Morene Primrose,  [address]   11/23/2021 12:45 PM Neil Gloria DO CARDIO SURG [facility]   12/14/2021  3:00 PM [facility] MRI ROOM [facility] MRI[facility]   1/18/2022 10:15 AM [facility]   1/20/2022  2:00 PM [facility]   3/15/2022 10:00 AM XENIA Noriega - CNP [facility]       More than 30 minutes was spent in preparation of this patient's discharge including, but not limited to, examination, preparation of documents, prescription preparation, counseling and coordination.     Signed:  Zaki Bass MD  11/7/2021, 6:32 PM

## 2021-11-09 ENCOUNTER — TELEPHONE (OUTPATIENT)
Dept: FAMILY MEDICINE CLINIC | Age: 76
End: 2021-11-09

## 2021-11-09 NOTE — TELEPHONE ENCOUNTER
Vivian Roach from [facility] calling to let you know that pt has not picked up her Eliquis. She has not had this for a few days. She did tell family member this needs picked up and to be given to her. Pt is also c/o some dizziness but O2 was good.   MARYANN

## 2021-11-11 ENCOUNTER — TELEPHONE (OUTPATIENT)
Dept: FAMILY MEDICINE CLINIC | Age: 76
End: 2021-11-11

## 2021-11-11 DIAGNOSIS — R31.9 HEMATURIA, UNSPECIFIED TYPE: Primary | ICD-10-CM

## 2021-11-11 NOTE — TELEPHONE ENCOUNTER
Continue the Eliquis. Recheck blood work at any [facility] in 1 week. If hematuria persists, follow-up with urology. Thanks.

## 2021-11-11 NOTE — TELEPHONE ENCOUNTER
apixaban starter pack (ELIQUIS DVT/PE STARTER PACK) 5 MG TBPK tablet    Pt started prescription on 11/03/21. Pt now has blood in their urine.  [phone] [facility]

## 2021-11-18 ENCOUNTER — TELEPHONE (OUTPATIENT)
Dept: ORTHOPEDIC SURGERY | Age: 76
End: 2021-11-18

## 2021-11-18 NOTE — TELEPHONE ENCOUNTER
Meli Caceres from [facility] called office stating patient was discharged from [address] [place] [address] Appointments   Date Time Provider Geraldine Rader   11/23/2021 12:45 PM Dorrene Hodgkin, DO CARDIO SURG [facility]   12/14/2021  3:00 PM [facility] MRI ROOM [facility] MRI/AUS [facility]   1/18/2022 10:15 AM Daysi Cleaning MD ACC Pulm [facility]   1/20/2022  2:00 PM [facility]   3/15/2022 10:00 AM [facility]

## 2021-11-19 ENCOUNTER — HOSPITAL ENCOUNTER (OUTPATIENT)
Age: 76
Discharge: HOME | End: 2021-11-21
Payer: MEDICARE

## 2021-11-19 ENCOUNTER — HOSPITAL ENCOUNTER (OUTPATIENT)
Dept: GENERAL RADIOLOGY | Age: 76
Discharge: HOME | End: 2021-11-21
Payer: MEDICARE

## 2021-11-19 DIAGNOSIS — J90 PLEURAL EFFUSION: ICD-10-CM

## 2021-11-19 DIAGNOSIS — Z96.612 S/P REVERSE TOTAL SHOULDER ARTHROPLASTY, LEFT: ICD-10-CM

## 2021-11-19 DIAGNOSIS — S42.211D CLOSED DISPLACED FRACTURE OF SURGICAL NECK OF RIGHT HUMERUS WITH ROUTINE HEALING, UNSPECIFIED FRACTURE MORPHOLOGY, SUBSEQUENT ENCOUNTER: ICD-10-CM

## 2021-11-19 PROCEDURE — 73030 X-RAY EXAM OF SHOULDER: CPT

## 2021-11-19 PROCEDURE — 71046 X-RAY EXAM CHEST 2 VIEWS: CPT

## 2021-11-23 ENCOUNTER — TELEPHONE (OUTPATIENT)
Dept: OTHER | Facility: CLINIC | Age: 76
End: 2021-11-23

## 2021-11-23 LAB
ALBUMIN SERPL-MCNC: 3.3 G/DL (ref 3.5–5.2)
ALP BLD-CCNC: 156 U/L (ref 35–104)
ALT SERPL-CCNC: 13 U/L (ref 0–32)
ANION GAP SERPL CALCULATED.3IONS-SCNC: 13 MMOL/L (ref 7–16)
ANISOCYTOSIS: ABNORMAL
AST SERPL-CCNC: 27 U/L (ref 0–31)
BASOPHILS ABSOLUTE: 0.08 E9/L (ref 0–0.2)
BASOPHILS RELATIVE PERCENT: 0.8 % (ref 0–2)
BILIRUB SERPL-MCNC: 1.5 MG/DL (ref 0–1.2)
BILIRUBIN DIRECT: 0.6 MG/DL (ref 0–0.3)
BILIRUBIN, INDIRECT: 0.9 MG/DL (ref 0–1)
BUN BLDV-MCNC: 24 MG/DL (ref 6–23)
CALCIUM SERPL-MCNC: 10.4 MG/DL (ref 8.6–10.2)
CHLORIDE BLD-SCNC: 103 MMOL/L (ref 98–107)
CO2: 21 MMOL/L (ref 22–29)
CREAT SERPL-MCNC: 3 MG/DL (ref 0.5–1)
EOSINOPHILS ABSOLUTE: 0 E9/L (ref 0.05–0.5)
EOSINOPHILS RELATIVE PERCENT: 0.5 % (ref 0–6)
GFR AFRICAN AMERICAN: 18
GFR NON-AFRICAN AMERICAN: 15 ML/MIN/1.73
GLUCOSE BLD-MCNC: 127 MG/DL (ref 74–99)
HCT VFR BLD CALC: 35 % (ref 34–48)
HEMOGLOBIN: 11.5 G/DL (ref 11.5–15.5)
LACTIC ACID: 1.8 MMOL/L (ref 0.5–2.2)
LIPASE: 46 U/L (ref 13–60)
LYMPHOCYTES ABSOLUTE: 0.1 E9/L (ref 1.5–4)
LYMPHOCYTES RELATIVE PERCENT: 0.9 % (ref 20–42)
MCH RBC QN AUTO: 32.1 PG (ref 26–35)
MCHC RBC AUTO-ENTMCNC: 32.9 % (ref 32–34.5)
MCV RBC AUTO: 97.8 FL (ref 80–99.9)
MONOCYTES ABSOLUTE: 0.4 E9/L (ref 0.1–0.95)
MONOCYTES RELATIVE PERCENT: 3.5 % (ref 2–12)
NEUTROPHILS ABSOLUTE: 9.31 E9/L (ref 1.8–7.3)
NEUTROPHILS RELATIVE PERCENT: 93.9 % (ref 43–80)
PDW BLD-RTO: 20.7 FL (ref 11.5–15)
PLATELET # BLD: 211 E9/L (ref 130–450)
PMV BLD AUTO: 10.4 FL (ref 7–12)
POLYCHROMASIA: ABNORMAL
POTASSIUM SERPL-SCNC: 5.4 MMOL/L (ref 3.5–5)
PROMYELOCYTES PERCENT: 0.9 % (ref 0–0)
RBC # BLD: 3.58 E12/L (ref 3.5–5.5)
SODIUM BLD-SCNC: 137 MMOL/L (ref 132–146)
TOTAL PROTEIN: 7.3 G/DL (ref 6.4–8.3)
WBC # BLD: 9.9 E9/L (ref 4.5–11.5)

## 2021-11-23 PROCEDURE — 99284 EMERGENCY DEPT VISIT MOD MDM: CPT

## 2021-11-23 PROCEDURE — 83690 ASSAY OF LIPASE: CPT

## 2021-11-23 PROCEDURE — 80048 BASIC METABOLIC PNL TOTAL CA: CPT

## 2021-11-23 PROCEDURE — 83605 ASSAY OF LACTIC ACID: CPT

## 2021-11-23 PROCEDURE — 80076 HEPATIC FUNCTION PANEL: CPT

## 2021-11-23 PROCEDURE — 85025 COMPLETE CBC W/AUTO DIFF WBC: CPT

## 2021-11-23 RX ORDER — SODIUM CHLORIDE 0.9 % (FLUSH) 0.9 %
10 SYRINGE (ML) INJECTION PRN
Status: DISCONTINUED | OUTPATIENT
Start: 2021-11-23 | End: 2021-11-24 | Stop reason: SDUPTHER

## 2021-11-23 RX ORDER — ONDANSETRON 4 MG/1
4 TABLET, ORALLY DISINTEGRATING ORAL ONCE
Status: DISCONTINUED | OUTPATIENT
Start: 2021-11-23 | End: 2021-11-30 | Stop reason: HOSPADM

## 2021-11-23 NOTE — ED NOTES
Department of Emergency Medicine  FIRST PROVIDER TRIAGE NOTE             Independent MLP         11/23/21  1:40 PM EST    Date of Encounter: 11/23/21   MRN: [id number]      HPI: Ty  is a 68 y.o. female who presents to the ED for No chief complaint on file. n/v RLQ pain radiating to back. Hx of chirrosis    ROS: Negative for cp or sob. PE: Gen Appearance/Constitutional: alert  GI: soft and NT     Initial Plan of Care: All treatment areas with department are currently occupied. Plan to order/Initiate the following while awaiting opening in ED: labs and imaging studies  .     Initial Plan of Care: Initiate Treatment-Testing, Proceed toTreatment Area When Bed Available for ED Attending/MLP to Continue Care    Electronically signed by STEPHANIE Montesinos   DD: 11/23/21         STEPHANIE Chaidez  11/23/21 3225

## 2021-11-24 ENCOUNTER — HOSPITAL ENCOUNTER (INPATIENT)
Age: 76
LOS: 6 days | Discharge: HOME HEALTH | DRG: 659 | End: 2021-11-30
Attending: EMERGENCY MEDICINE | Admitting: INTERNAL MEDICINE
Payer: MEDICARE

## 2021-11-24 ENCOUNTER — TELEPHONE (OUTPATIENT)
Dept: OTHER | Facility: CLINIC | Age: 76
End: 2021-11-24

## 2021-11-24 ENCOUNTER — APPOINTMENT (OUTPATIENT)
Dept: CT IMAGING | Age: 76
DRG: 659 | End: 2021-11-24
Payer: MEDICARE

## 2021-11-24 ENCOUNTER — APPOINTMENT (OUTPATIENT)
Dept: GENERAL RADIOLOGY | Age: 76
DRG: 659 | End: 2021-11-24
Payer: MEDICARE

## 2021-11-24 DIAGNOSIS — S49.002D: ICD-10-CM

## 2021-11-24 DIAGNOSIS — R11.2 INTRACTABLE NAUSEA AND VOMITING: ICD-10-CM

## 2021-11-24 DIAGNOSIS — N17.9 ACUTE KIDNEY INJURY (HCC): Primary | ICD-10-CM

## 2021-11-24 LAB
ANION GAP SERPL CALCULATED.3IONS-SCNC: 12 MMOL/L (ref 7–16)
BACTERIA: ABNORMAL /HPF
BACTERIA: ABNORMAL /HPF
BILIRUBIN URINE: NEGATIVE
BILIRUBIN URINE: NEGATIVE
BLOOD, URINE: ABNORMAL
BLOOD, URINE: ABNORMAL
BUN BLDV-MCNC: 28 MG/DL (ref 6–23)
CALCIUM SERPL-MCNC: 10.1 MG/DL (ref 8.6–10.2)
CHLORIDE BLD-SCNC: 105 MMOL/L (ref 98–107)
CHLORIDE URINE RANDOM: 65 MMOL/L
CLARITY: ABNORMAL
CLARITY: ABNORMAL
CO2: 21 MMOL/L (ref 22–29)
COLOR: ABNORMAL
COLOR: YELLOW
CREAT SERPL-MCNC: 3.1 MG/DL (ref 0.5–1)
CREATININE URINE: 77 MG/DL (ref 29–226)
EKG ATRIAL RATE: 111 BPM
EKG P AXIS: 25 DEGREES
EKG P-R INTERVAL: 162 MS
EKG Q-T INTERVAL: 328 MS
EKG QRS DURATION: 64 MS
EKG QTC CALCULATION (BAZETT): 446 MS
EKG R AXIS: 5 DEGREES
EKG T AXIS: 41 DEGREES
EKG VENTRICULAR RATE: 111 BPM
EPITHELIAL CELLS, UA: ABNORMAL /HPF
EPITHELIAL CELLS, UA: ABNORMAL /HPF
GFR AFRICAN AMERICAN: 18
GFR NON-AFRICAN AMERICAN: 15 ML/MIN/1.73
GLUCOSE BLD-MCNC: 57 MG/DL (ref 74–99)
GLUCOSE URINE: NEGATIVE MG/DL
GLUCOSE URINE: NEGATIVE MG/DL
KETONES, URINE: NEGATIVE MG/DL
KETONES, URINE: NEGATIVE MG/DL
LEUKOCYTE ESTERASE, URINE: ABNORMAL
LEUKOCYTE ESTERASE, URINE: ABNORMAL
NITRITE, URINE: NEGATIVE
NITRITE, URINE: NEGATIVE
PH UA: 6 (ref 5–9)
PH UA: 7 (ref 5–9)
POTASSIUM SERPL-SCNC: 5 MMOL/L (ref 3.5–5)
POTASSIUM, UR: 32.3 MMOL/L
PROTEIN UA: ABNORMAL MG/DL
PROTEIN UA: NEGATIVE MG/DL
RBC UA: >20 /HPF (ref 0–2)
RBC UA: ABNORMAL /HPF (ref 0–2)
REASON FOR REJECTION: NORMAL
REJECTED TEST: NORMAL
SARS-COV-2, NAAT: NOT DETECTED
SODIUM BLD-SCNC: 138 MMOL/L (ref 132–146)
SODIUM URINE: 87 MMOL/L
SODIUM URINE: 91 MMOL/L
SPECIFIC GRAVITY UA: 1.01 (ref 1–1.03)
SPECIFIC GRAVITY UA: 1.01 (ref 1–1.03)
TROPONIN, HIGH SENSITIVITY: 18 NG/L (ref 0–9)
UREA NITROGEN, UR: 316 MG/DL (ref 800–1666)
UROBILINOGEN, URINE: 1 E.U./DL
UROBILINOGEN, URINE: 2 E.U./DL
WBC UA: ABNORMAL /HPF (ref 0–5)
WBC UA: ABNORMAL /HPF (ref 0–5)

## 2021-11-24 PROCEDURE — 6370000000 HC RX 637 (ALT 250 FOR IP): Performed by: INTERNAL MEDICINE

## 2021-11-24 PROCEDURE — 6360000002 HC RX W HCPCS: Performed by: INTERNAL MEDICINE

## 2021-11-24 PROCEDURE — 94664 DEMO&/EVAL PT USE INHALER: CPT

## 2021-11-24 PROCEDURE — 2580000003 HC RX 258: Performed by: INTERNAL MEDICINE

## 2021-11-24 PROCEDURE — 81001 URINALYSIS AUTO W/SCOPE: CPT

## 2021-11-24 PROCEDURE — 84300 ASSAY OF URINE SODIUM: CPT

## 2021-11-24 PROCEDURE — 2580000003 HC RX 258: Performed by: NURSE PRACTITIONER

## 2021-11-24 PROCEDURE — 36415 COLL VENOUS BLD VENIPUNCTURE: CPT

## 2021-11-24 PROCEDURE — 84133 ASSAY OF URINE POTASSIUM: CPT

## 2021-11-24 PROCEDURE — 84484 ASSAY OF TROPONIN QUANT: CPT

## 2021-11-24 PROCEDURE — 87088 URINE BACTERIA CULTURE: CPT

## 2021-11-24 PROCEDURE — 2060000000 HC ICU INTERMEDIATE R&B

## 2021-11-24 PROCEDURE — 80048 BASIC METABOLIC PNL TOTAL CA: CPT

## 2021-11-24 PROCEDURE — 6360000002 HC RX W HCPCS: Performed by: NURSE PRACTITIONER

## 2021-11-24 PROCEDURE — 74176 CT ABD & PELVIS W/O CONTRAST: CPT

## 2021-11-24 PROCEDURE — 87635 SARS-COV-2 COVID-19 AMP PRB: CPT

## 2021-11-24 PROCEDURE — 2580000003 HC RX 258: Performed by: EMERGENCY MEDICINE

## 2021-11-24 PROCEDURE — 71045 X-RAY EXAM CHEST 1 VIEW: CPT

## 2021-11-24 PROCEDURE — 93010 ELECTROCARDIOGRAM REPORT: CPT | Performed by: INTERNAL MEDICINE

## 2021-11-24 PROCEDURE — 94640 AIRWAY INHALATION TREATMENT: CPT

## 2021-11-24 PROCEDURE — 84540 ASSAY OF URINE/UREA-N: CPT

## 2021-11-24 PROCEDURE — 82436 ASSAY OF URINE CHLORIDE: CPT

## 2021-11-24 PROCEDURE — 82570 ASSAY OF URINE CREATININE: CPT

## 2021-11-24 PROCEDURE — 93005 ELECTROCARDIOGRAM TRACING: CPT | Performed by: EMERGENCY MEDICINE

## 2021-11-24 RX ORDER — SODIUM CHLORIDE 0.9 % (FLUSH) 0.9 %
5-40 SYRINGE (ML) INJECTION EVERY 12 HOURS SCHEDULED
Status: DISCONTINUED | OUTPATIENT
Start: 2021-11-24 | End: 2021-11-30 | Stop reason: HOSPADM

## 2021-11-24 RX ORDER — ONDANSETRON 2 MG/ML
4 INJECTION INTRAMUSCULAR; INTRAVENOUS EVERY 6 HOURS PRN
Status: DISCONTINUED | OUTPATIENT
Start: 2021-11-24 | End: 2021-11-30 | Stop reason: HOSPADM

## 2021-11-24 RX ORDER — BUDESONIDE AND FORMOTEROL FUMARATE DIHYDRATE 160; 4.5 UG/1; UG/1
2 AEROSOL RESPIRATORY (INHALATION) 2 TIMES DAILY
Status: DISCONTINUED | OUTPATIENT
Start: 2021-11-24 | End: 2021-11-24 | Stop reason: CLARIF

## 2021-11-24 RX ORDER — ARFORMOTEROL TARTRATE 15 UG/2ML
15 SOLUTION RESPIRATORY (INHALATION) 2 TIMES DAILY
Status: DISCONTINUED | OUTPATIENT
Start: 2021-11-24 | End: 2021-11-30 | Stop reason: HOSPADM

## 2021-11-24 RX ORDER — AMLODIPINE BESYLATE 2.5 MG/1
2.5 TABLET ORAL DAILY
Status: DISCONTINUED | OUTPATIENT
Start: 2021-11-24 | End: 2021-11-30 | Stop reason: HOSPADM

## 2021-11-24 RX ORDER — 0.9 % SODIUM CHLORIDE 0.9 %
1000 INTRAVENOUS SOLUTION INTRAVENOUS ONCE
Status: COMPLETED | OUTPATIENT
Start: 2021-11-24 | End: 2021-11-24

## 2021-11-24 RX ORDER — ALBUTEROL SULFATE 90 UG/1
2 AEROSOL, METERED RESPIRATORY (INHALATION) 4 TIMES DAILY PRN
Status: DISCONTINUED | OUTPATIENT
Start: 2021-11-24 | End: 2021-11-24 | Stop reason: CLARIF

## 2021-11-24 RX ORDER — BUDESONIDE AND FORMOTEROL FUMARATE DIHYDRATE 80; 4.5 UG/1; UG/1
2 AEROSOL RESPIRATORY (INHALATION) 2 TIMES DAILY
Status: DISCONTINUED | OUTPATIENT
Start: 2021-11-24 | End: 2021-11-24 | Stop reason: CLARIF

## 2021-11-24 RX ORDER — ALBUTEROL SULFATE 2.5 MG/3ML
2.5 SOLUTION RESPIRATORY (INHALATION) 4 TIMES DAILY PRN
Status: DISCONTINUED | OUTPATIENT
Start: 2021-11-24 | End: 2021-11-30 | Stop reason: HOSPADM

## 2021-11-24 RX ORDER — SODIUM CHLORIDE 9 MG/ML
25 INJECTION, SOLUTION INTRAVENOUS PRN
Status: DISCONTINUED | OUTPATIENT
Start: 2021-11-24 | End: 2021-11-30 | Stop reason: HOSPADM

## 2021-11-24 RX ORDER — VENLAFAXINE HYDROCHLORIDE 75 MG/1
75 CAPSULE, EXTENDED RELEASE ORAL DAILY
Status: DISCONTINUED | OUTPATIENT
Start: 2021-11-24 | End: 2021-11-30 | Stop reason: HOSPADM

## 2021-11-24 RX ORDER — ONDANSETRON 4 MG/1
4 TABLET, ORALLY DISINTEGRATING ORAL EVERY 8 HOURS PRN
Status: DISCONTINUED | OUTPATIENT
Start: 2021-11-24 | End: 2021-11-30 | Stop reason: HOSPADM

## 2021-11-24 RX ORDER — DOXAZOSIN MESYLATE 1 MG/1
1 TABLET ORAL NIGHTLY
Status: DISCONTINUED | OUTPATIENT
Start: 2021-11-24 | End: 2021-11-30 | Stop reason: HOSPADM

## 2021-11-24 RX ORDER — PANTOPRAZOLE SODIUM 40 MG/1
40 TABLET, DELAYED RELEASE ORAL DAILY
Status: DISCONTINUED | OUTPATIENT
Start: 2021-11-24 | End: 2021-11-30 | Stop reason: HOSPADM

## 2021-11-24 RX ORDER — POLYETHYLENE GLYCOL 3350 17 G/17G
17 POWDER, FOR SOLUTION ORAL DAILY PRN
Status: DISCONTINUED | OUTPATIENT
Start: 2021-11-24 | End: 2021-11-30 | Stop reason: HOSPADM

## 2021-11-24 RX ORDER — SODIUM CHLORIDE 9 MG/ML
1000 INJECTION, SOLUTION INTRAVENOUS CONTINUOUS
Status: DISCONTINUED | OUTPATIENT
Start: 2021-11-24 | End: 2021-11-24

## 2021-11-24 RX ORDER — BUDESONIDE 0.5 MG/2ML
0.5 INHALANT ORAL 2 TIMES DAILY
Status: DISCONTINUED | OUTPATIENT
Start: 2021-11-24 | End: 2021-11-30 | Stop reason: HOSPADM

## 2021-11-24 RX ORDER — SODIUM CHLORIDE 0.9 % (FLUSH) 0.9 %
5-40 SYRINGE (ML) INJECTION PRN
Status: DISCONTINUED | OUTPATIENT
Start: 2021-11-24 | End: 2021-11-30 | Stop reason: HOSPADM

## 2021-11-24 RX ORDER — ACETAMINOPHEN 325 MG/1
650 TABLET ORAL EVERY 6 HOURS PRN
Status: DISCONTINUED | OUTPATIENT
Start: 2021-11-24 | End: 2021-11-30 | Stop reason: HOSPADM

## 2021-11-24 RX ORDER — SODIUM CHLORIDE 9 MG/ML
1000 INJECTION, SOLUTION INTRAVENOUS CONTINUOUS
Status: DISCONTINUED | OUTPATIENT
Start: 2021-11-24 | End: 2021-11-26

## 2021-11-24 RX ORDER — LACTULOSE 10 G/15ML
20 SOLUTION ORAL 3 TIMES DAILY
Status: DISCONTINUED | OUTPATIENT
Start: 2021-11-24 | End: 2021-11-30 | Stop reason: HOSPADM

## 2021-11-24 RX ORDER — ACETAMINOPHEN 650 MG/1
650 SUPPOSITORY RECTAL EVERY 6 HOURS PRN
Status: DISCONTINUED | OUTPATIENT
Start: 2021-11-24 | End: 2021-11-30 | Stop reason: HOSPADM

## 2021-11-24 RX ADMIN — AMLODIPINE BESYLATE 2.5 MG: 2.5 TABLET ORAL at 11:11

## 2021-11-24 RX ADMIN — ARFORMOTEROL TARTRATE 15 MCG: 15 SOLUTION RESPIRATORY (INHALATION) at 19:52

## 2021-11-24 RX ADMIN — LACTULOSE 20 G: 20 SOLUTION ORAL at 11:11

## 2021-11-24 RX ADMIN — APIXABAN 5 MG: 5 TABLET, FILM COATED ORAL at 11:11

## 2021-11-24 RX ADMIN — BUDESONIDE 500 MCG: 0.5 SUSPENSION RESPIRATORY (INHALATION) at 19:52

## 2021-11-24 RX ADMIN — APIXABAN 5 MG: 5 TABLET, FILM COATED ORAL at 21:34

## 2021-11-24 RX ADMIN — SODIUM CHLORIDE 1000 ML: 9 INJECTION, SOLUTION INTRAVENOUS at 11:10

## 2021-11-24 RX ADMIN — RIFAXIMIN 550 MG: 550 TABLET ORAL at 21:34

## 2021-11-24 RX ADMIN — ONDANSETRON 4 MG: 2 INJECTION INTRAMUSCULAR; INTRAVENOUS at 17:50

## 2021-11-24 RX ADMIN — ARFORMOTEROL TARTRATE 15 MCG: 15 SOLUTION RESPIRATORY (INHALATION) at 10:57

## 2021-11-24 RX ADMIN — RIFAXIMIN 550 MG: 550 TABLET ORAL at 12:57

## 2021-11-24 RX ADMIN — PANTOPRAZOLE SODIUM 40 MG: 40 TABLET, DELAYED RELEASE ORAL at 11:11

## 2021-11-24 RX ADMIN — BUDESONIDE 500 MCG: 0.5 SUSPENSION RESPIRATORY (INHALATION) at 10:57

## 2021-11-24 RX ADMIN — VENLAFAXINE HYDROCHLORIDE 75 MG: 75 CAPSULE, EXTENDED RELEASE ORAL at 12:13

## 2021-11-24 RX ADMIN — SODIUM CHLORIDE 1000 ML: 9 INJECTION, SOLUTION INTRAVENOUS at 04:45

## 2021-11-24 RX ADMIN — ONDANSETRON 4 MG: 2 INJECTION INTRAMUSCULAR; INTRAVENOUS at 23:53

## 2021-11-24 RX ADMIN — DOXAZOSIN 1 MG: 2 TABLET ORAL at 21:33

## 2021-11-24 RX ADMIN — Medication 10 ML: at 11:12

## 2021-11-24 RX ADMIN — WATER 1000 MG: 1 INJECTION INTRAMUSCULAR; INTRAVENOUS; SUBCUTANEOUS at 14:43

## 2021-11-24 ASSESSMENT — ENCOUNTER SYMPTOMS
NAUSEA: 1
SORE THROAT: 0
VOMITING: 1
BACK PAIN: 0
COUGH: 0
EYE PAIN: 0
DIARRHEA: 0
ABDOMINAL PAIN: 0
SHORTNESS OF BREATH: 0

## 2021-11-24 ASSESSMENT — PAIN SCALES - GENERAL
PAINLEVEL_OUTOF10: 0

## 2021-11-24 NOTE — H&P
Sleep apnea     Spinal stenosis     Type 2 diabetes mellitus ([facility] [address]        Past Surgical History:   Procedure Laterality Date    BREAST LUMPECTOMY      lumpectomy [id number]    CARDIOVASCULAR STRESS TEST      Lexiscan stress test    CARPAL TUNNEL RELEASE      COLONOSCOPY  01/31/2017    multiple polyps; diverticula--jerod    COLONOSCOPY  04/23/2019    polyps; diverticula; hemorrhoids--jerod    COLONOSCOPY N/A 04/23/2019    COLONOSCOPY POLYPECTOMY SNARE/COLD BIOPSY performed by John Chen MD at [address] COLONOSCOPY  04/23/2019    COLONOSCOPY WITH BIOPSY performed by John Chen MD at [address] [facility] LITHOTRIPSY Left 05/04/2016    C-R STENT PLACEMENT    SHOULDER ARTHROPLASTY Left 12/21/2020    LEFT REVERSE TOTAL SHOULDER  ARTHROPLASTY -- DEPUY performed by Ese Miramontes MD at [facility]  04/23/2019    gastritis--jerod    UPPER GASTROINTESTINAL ENDOSCOPY N/A 04/23/2019    EGD BIOPSY performed by John Chen MD at [facility]       Prior to Admission medications    Medication Sig Start Date End Date Taking?  Authorizing Provider   apixaban starter pack (ELIQUIS DVT/PE STARTER PACK) 5 MG TBPK tablet Take 1 tablet by mouth See Admin Instructions Take two tablets twice daily for 7 days ( 20 mg daily) and then decrease dose to 5 mg twice daily (10 mg total) daily for 3 months 11/3/21   Hattie Ling MD   amLODIPine (NORVASC) 2.5 MG tablet TAKE ONE TABLET BY MOUTH ONCE DAILY AT 9AM 10/6/21   Historical Provider, MD   fluticasone-salmeterol (ADVAIR) 250-50 MCG/DOSE AEPB INHALE 1 PUFF EVERY TWELVE HOURS 10/6/21   Historical Provider, MD   XIFAXAN 550 MG tablet TAKE 1 TABLET BY MOUTH TWICE DAILY 10/4/21   Kole Malik MD   pantoprazole (PROTONIX) 40 MG tablet Take 1 tablet by mouth daily 7/13/21   Kole Malik MD   Biotin 49156 MCG TABS Take by mouth every 7 days    Historical Provider, MD   vitamin D3 (CHOLECALCIFEROL) 25 MCG (1000 UT) TABS tablet Take 1,000 Units by mouth daily    Historical Provider, MD   venlafaxine (EFFEXOR XR) 75 MG extended release capsule Take 1 capsule by mouth daily 6/30/21   Seun Martinez MD   spironolactone (ALDACTONE) 100 MG tablet Take 1 tablet by mouth daily 6/30/21   Seun Martinez MD   albuterol sulfate HFA (VENTOLIN HFA) 108 (90 Base) MCG/ACT inhaler Inhale 2 puffs into the lungs 4 times daily as needed for Wheezing 6/30/21   Seun Martinez MD   budesonide-formoterol [facility]) 160-4.5 MCG/ACT AERO Inhale 2 puffs into the lungs 2 times daily 6/30/21   Seun Martinez MD   lactulose (CHRONULAC) 10 GM/15ML solution Take 30 mLs by mouth 3 times daily 6/30/21   Seun Martinez MD   furosemide (LASIX) 40 MG tablet Take 1 tablet by mouth daily 6/30/21   Seun Martinez MD   doxazosin (CARDURA) 1 MG tablet Take 1 tablet by mouth nightly 6/30/21   Seun Martinez MD         Allergies:  Lisinopril    Social History:    TOBACCO:   reports that she has never smoked. She has never used smokeless tobacco.  ETOH:   reports no history of alcohol use. Family History:    Reviewed in detail and negative for DM, CAD, Cancer, CVA. Positive as follows\"      Problem Relation Age of Onset    Arthritis Mother     COPD Father     Heart Attack Father     Cancer Other 48        colon       REVIEW OF SYSTEMS:   Pertinent positives as noted in the HPI. All other systems reviewed and negative. PHYSICAL EXAM:  /79   Pulse 95   Temp 98.2 °F (36.8 °C)   Resp 17   Ht 5' 2.5\" (1.588 m)   Wt 170 lb (77.1 kg)   SpO2 95%   BMI 30.60 kg/m²   General appearance: No apparent distress, appears stated age and cooperative. HEENT: Normal cephalic, atraumatic without obvious deformity. Pupils equal, round, and reactive to light. Extra ocular muscles intact. Conjunctivae/corneas clear. Neck: Supple, with full range of motion. No jugular venous distention. Trachea midline. Respiratory: Clear to auscultation bilateral, no expiratory wheeze or rhonchi  Cardiovascular: S1, S2, no murmurs  Abdomen: Soft, nontender, nondistended  Musculoskeletal: No clubbing, cyanosis, edema of bilateral lower extremities. Brisk capillary refill. Skin: Normal skin color. No rashes or lesions. Neurologic:  Neurovascularly intact without any focal sensory/motor deficits. Cranial nerves: II-XII intact, grossly non-focal.  Psychiatric: Alert and oriented, thought content appropriate, normal insight    Reviewed EKG and CXR personally      CBC:   Recent Labs     11/23/21  1400   WBC 9.9   RBC 3.58   HGB 11.5   HCT 35.0   MCV 97.8   RDW 20.7*        BMP:   Recent Labs     11/23/21  1400      K 5.4*      CO2 21*   BUN 24*   CREATININE 3.0*     LFT:  Recent Labs     11/23/21  1400   PROT 7.3   ALKPHOS 156*   ALT 13   AST 27   BILITOT 1.5*   LIPASE 46     CE:  No results for input(s): Josey Comber in the last 72 hours. PT/INR: No results for input(s): INR, APTT in the last 72 hours. BNP: No results for input(s): BNP in the last 72 hours.   ESR:   Lab Results   Component Value Date    SEDRATE 16 01/21/2016     CRP: No results found for: CRP  D Dimer:   Lab Results   Component Value Date    DDIMER 862 10/29/2021      Folate and B12:   Lab Results   Component Value Date    XCKDCVOD68 9644 (H) 12/31/2020   ,   Lab Results   Component Value Date    FOLATE 17.3 12/31/2020     Lactic Acid:   Lab Results   Component Value Date    LACTA 1.8 11/23/2021     Thyroid Studies:   Lab Results   Component Value Date    TSH 2.060 12/30/2020       Oupatient labs:  Lab Results   Component Value Date    CHOL 94 02/27/2021    TRIG 65 02/27/2021    HDL 37 02/27/2021    LDLCALC 44 02/27/2021    TSH 2.060 12/30/2020    INR 1.3 08/17/2021    LABA1C 5.3 10/19/2021       Urinalysis:    Lab Results   Component Value Date    NITRU Negative 11/24/2021    WBCUA 1-3 11/24/2021    BACTERIA FEW 11/24/2021    RBCUA >20 11/24/2021    BLOODU LARGE 11/24/2021    SPECGRAV 1.015 11/24/2021    GLUCOSEU Negative 11/24/2021       ASSESSMENT & PLAN:    Acute kidney injury  Nonoliguric REN, likely volume depletion with continued intake of Lasix and Aldactone  Baseline creatinine 1.1  Admitted with creatinine 2.0  Hold Lasix and Aldactone  Obtain UA with microscopy, urine electrolytes, urine urea nitrogen  Maintain on normal saline at 100 cc/h  Consult nephrology    Pulmonary embolism  Diagnosed on 11/3/2021  Started on full dose Eliquis, continue for now for 3 months    Cirrhosis  Chronic cirrhosis, etiology unclear  Continue home dose rifaximin and lactulose  No encephalopathy noted  No SBP suspected    Hypertension  Blood pressure well controlled  Currently on amlodipine 2.5 mg p.o. daily and Cardura 1 mg p.o. at night, continue for now  Hold Lasix and Aldactone    Depression  Continue venlafaxine    Idiopathic acute pancreatitis  Resolved during last admission    Pleural effusion  Status post thoracentesis on 10/31/2021  Seen by cardiothoracic surgery who recommended no intervention  Monitor closely for now    End-of-life care discussion  Patient wants to be full code    Diet: No diet orders on file  Code Status: Prior  Surrogate decision maker confirmed with patient:   Extended Emergency Contact Information  Primary Emergency Contact: [address] [place] [address] Phone: [phone]  Mobile Phone: [phone]  Relation: Child  Preferred language: English   needed? No  Secondary Emergency Contact: [address] Phone: [phone]  Mobile Phone: [phone]  Relation: Other  Preferred language: English   needed?  No    DVT Prophylaxis: []Lovenox []Heparin []PCD [x] Warfarin/NOAC []Encouraged ambulation  Disposition: [x]Med/Surg [] Intermediate [] ICU/CCU  Admit status: [] Observation [x] Inpatient     +++++++++++++++++++++++++++++++++++++++++++++++++  Staci Briones MD  [address]  +++++++++++++++++++++++++++++++++++++++++++++++++  NOTE: This report was transcribed using voice recognition software. Every effort was made to ensure accuracy; however, inadvertent computerized transcription errors may be present.

## 2021-11-24 NOTE — CONSULTS
11/24/2021 1:33 PM  Service: Urology  Group: [facility] (Rinku/Wendy/Deepak)    Shubham Wyatt  [id number]     Chief Complaint:    Right proximal ureteral calculi     History of Present Illness: The patient is a 68 y.o. female patient who presented to the hospital yesterday with nausea and vomiting. She had a CT abdomen pelvis performed that showed a 6mm right proximal ureteral calculi with hydronephrosis for which we were asked to evaluate. She has had no fevers. She has no leukocytosis. She does have REN with a serum creatinine of 3.0. Nephrology is following. She does report some intermittent right flank pain, but denies any currently. She is known to our practice and is a patient of Dr. Ciara Dobbs. She does have a history of stone disease requiring left ureteroscopy with laser lithotripsy in 2016.      Past Medical History:   Diagnosis Date    Breast cancer ([address]     Cirrhosis ([address]     Essential hypertension     Hx of blood clots     Hyperlipidemia     Osteopenia     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 ([address]     Sleep apnea     Spinal stenosis     Type 2 diabetes mellitus ([address]          Past Surgical History:   Procedure Laterality Date    BREAST LUMPECTOMY      lumpectomy [id number]    CARDIOVASCULAR STRESS TEST      Lexiscan stress test    CARPAL TUNNEL RELEASE      COLONOSCOPY  01/31/2017    multiple polyps; diverticula--jerod    COLONOSCOPY  04/23/2019    polyps; diverticula; hemorrhoids--jerod    COLONOSCOPY N/A 04/23/2019    COLONOSCOPY POLYPECTOMY SNARE/COLD BIOPSY performed by Abeba Grant MD at [place] [address]  04/23/2019    COLONOSCOPY WITH BIOPSY performed by Abeba Grant MD at [address] [place] [address] LITHOTRIPSY Left 05/04/2016    C-R STENT PLACEMENT    SHOULDER ARTHROPLASTY Left 12/21/2020    LEFT REVERSE TOTAL SHOULDER  ARTHROPLASTY -- DEPUY performed by Su Bo MD at [facility]  04/23/2019 gastritis--jerod    UPPER GASTROINTESTINAL ENDOSCOPY N/A 04/23/2019    EGD BIOPSY performed by Amanda Cerrato MD at [address]       Medications Prior to Admission:    Medications Prior to Admission: apixaban starter pack (ELIQUIS DVT/PE STARTER PACK) 5 MG TBPK tablet, Take 1 tablet by mouth See Admin Instructions Take two tablets twice daily for 7 days ( 20 mg daily) and then decrease dose to 5 mg twice daily (10 mg total) daily for 3 months  amLODIPine (NORVASC) 2.5 MG tablet, TAKE ONE TABLET BY MOUTH ONCE DAILY AT 9AM  fluticasone-salmeterol (ADVAIR) 250-50 MCG/DOSE AEPB, INHALE 1 PUFF EVERY TWELVE HOURS  XIFAXAN 550 MG tablet, TAKE 1 TABLET BY MOUTH TWICE DAILY  pantoprazole (PROTONIX) 40 MG tablet, Take 1 tablet by mouth daily  Biotin 15220 MCG TABS, Take by mouth every 7 days  vitamin D3 (CHOLECALCIFEROL) 25 MCG (1000 UT) TABS tablet, Take 1,000 Units by mouth daily  venlafaxine (EFFEXOR XR) 75 MG extended release capsule, Take 1 capsule by mouth daily  spironolactone (ALDACTONE) 100 MG tablet, Take 1 tablet by mouth daily  albuterol sulfate HFA (VENTOLIN HFA) 108 (90 Base) MCG/ACT inhaler, Inhale 2 puffs into the lungs 4 times daily as needed for Wheezing  budesonide-formoterol (SYMBICORT) 160-4.5 MCG/ACT AERO, Inhale 2 puffs into the lungs 2 times daily  lactulose (CHRONULAC) 10 GM/15ML solution, Take 30 mLs by mouth 3 times daily  furosemide (LASIX) 40 MG tablet, Take 1 tablet by mouth daily  doxazosin (CARDURA) 1 MG tablet, Take 1 tablet by mouth nightly    Allergies:    Lisinopril    Social History:    reports that she has never smoked. She has never used smokeless tobacco. She reports that she does not drink alcohol and does not use drugs. Family History:   Non-contributory to this Urological problem  family history includes Arthritis in her mother; COPD in her father; Cancer (age of onset: 48) in an other family member; Heart Attack in her father.     Review of Systems:  Constitutional: No fever or chills   Respiratory: negative for cough and hemoptysis  Cardiovascular: negative for chest pain and dyspnea  Gastrointestinal:+ nausea and vomiting    Derm: negative for rash and skin lesion(s)  Neurological: negative for seizures and tremors  Musculoskeletal: Negative    Psychiatric: Negative   : As above in the HPI, otherwise negative  All other reviews are negative    Physical Exam:     Vitals:  BP (!) 142/81   Pulse 89   Temp 98.3 °F (36.8 °C) (Oral)   Resp 18   Ht 5' 2.5\" (1.588 m)   Wt 170 lb (77.1 kg)   SpO2 93%   BMI 30.60 kg/m²     General:  Awake, alert, oriented X 3. No apparent distress. HEENT:  Normocephalic, atraumatic. Lungs:  Respirations symmetric and non-labored. Abdomen:  soft, nontender, no masses  Extremities:  No clubbing, cyanosis, or edema  Skin:  Warm and dry, no open lesions or rashes  Neuro: There are no motor or sensory deficits in the 4 quadrant extremities   Rectal: deferred  Genitourinary:  No bar     Labs:     Recent Labs     11/23/21  1400   WBC 9.9   RBC 3.58   HGB 11.5   HCT 35.0   MCV 97.8   MCH 32.1   MCHC 32.9   RDW 20.7*      MPV 10.4         Recent Labs     11/23/21  1400   CREATININE 3.0*       No results found for: PSA    Imaging:   Narrative   EXAMINATION:   CT OF THE ABDOMEN AND PELVIS WITHOUT CONTRAST 11/24/2021 8:22 am       TECHNIQUE:   CT of the abdomen and pelvis was performed without the administration of   intravenous contrast. Multiplanar reformatted images are provided for review.    Dose modulation, iterative reconstruction, and/or weight based adjustment of   the mA/kV was utilized to reduce the radiation dose to as low as reasonably   achievable.       COMPARISON:   10/29/2021       HISTORY:   ORDERING SYSTEM PROVIDED HISTORY: abdominal pain vomiting   TECHNOLOGIST PROVIDED HISTORY:   Reason for exam:->abdominal pain vomiting   Additional Contrast?->None   Decision Support Exception - unselect if not a suspected or confirmed   emergency medical condition->Emergency Medical Condition (MA)   What reading provider will be dictating this exam?->CRC       FINDINGS:   Lower Chest: Moderate to large left pleural effusion, apparently complete   atelectasis in the left lower lobe       Organs: Cirrhotic appearance of the liver, evaluation limited without   contrast, low-attenuation left lobe lesion around image 69 is present with   density favoring a cyst on this exam although prior exam showed above water   density, considering the high risk recommend follow-up MRI.  There are beam   hardening artifacts also limiting evaluation of abdominal organs. Hypodensity present at the medial spleen appears to be cystic as well by   density.  Mild nephrolithiasis.  Right hydronephrosis left-side collecting   system mildly prominent but may be compensation with parenchymal volume loss. Calculus at the proximal right ureter is 6 mm, lower nephrolithiasis on the   left present.  Mild splenomegaly.  Additional low-attenuation hepatic lesions   are less well visualized on this exam a a       GI/Bowel: No obstruction.  Diverticulosis coli present       Pelvis: Hysterectomy       Peritoneum/Retroperitoneum: There are mild atherosclerotic calcifications   present.       Bones/Soft Tissues: Umbilicus fat hernia with a small amount of.  Anterior   soft tissue nodules in the subcutaneous fat may be injection related but are   nonspecific. Mack Cardoza to severe spinal degenerative changes. Prior mild   compression fractures in thoracic spine are seen           Impression   1. 6 mm right proximal ureteral calculus with moderate hydronephrosis. Bilateral nephrolithiasis   2. Hepatic cirrhosis and portal hypertension including mild ascites   3. Moderate to large left pleural effusion, complete left lower lobe   atelectasis   4.  Indeterminate hepatic lesions, recommend follow-up MRI evaluation                       Assessment/plan:  6mm right proximal ureteral calculi   Right

## 2021-11-24 NOTE — ED PROVIDER NOTES
HPI   Patient is a 68 y.o. female with a past medical history of breast neoplasm, diabetes, hypertension, hyperlipidemia, cirrhosis, 1 kidney, presenting to the Emergency Department for nausea and vomitting. History obtained by patient. Symptoms are moderate in severity and persistant since onset. They are improved by not eating and worsened by eating. Patient with n/v starting a few days prior. States she tried her at home antiemetics without relief. Every time she tries to eat she vomits. Low grade fevers at home as high as 99. Deneis abdominal pain, diarrhea, constipation. Recent PE and has been taking blood thinners without missing doses. Does have hx cirrhosis and 1 kidnya. No hx bowel obstruction. No chest pain or dyspnea. Review of Systems   Constitutional: Positive for fatigue. Negative for chills and fever. HENT: Negative for congestion and sore throat. Eyes: Negative for pain and visual disturbance. Respiratory: Negative for cough and shortness of breath. Cardiovascular: Negative for chest pain. Gastrointestinal: Positive for nausea and vomiting. Negative for abdominal pain and diarrhea. Genitourinary: Negative for dysuria and frequency. Musculoskeletal: Negative for arthralgias and back pain. Skin: Negative for rash and wound. Neurological: Negative for weakness and headaches. All other systems reviewed and are negative. Physical Exam  Vitals and nursing note reviewed. Constitutional:       General: She is not in acute distress. Appearance: She is well-developed. She is not ill-appearing. HENT:      Head: Normocephalic and atraumatic. Mouth/Throat:      Mouth: Mucous membranes are dry. Eyes:      Extraocular Movements: Extraocular movements intact. Cardiovascular:      Rate and Rhythm: Normal rate and regular rhythm. Pulses: Normal pulses. Pulmonary:      Effort: Pulmonary effort is normal. No respiratory distress.       Breath sounds: Normal breath sounds. No wheezing or rales. Abdominal:      Palpations: Abdomen is soft. Tenderness: There is no abdominal tenderness. There is no right CVA tenderness, left CVA tenderness, guarding or rebound. Musculoskeletal:         General: Normal range of motion. Cervical back: Normal range of motion and neck supple. Skin:     General: Skin is warm and dry. Neurological:      General: No focal deficit present. Mental Status: She is alert and oriented to person, place, and time. Cranial Nerves: No cranial nerve deficit. Sensory: No sensory deficit. Motor: No weakness. Coordination: Coordination normal.          Procedures     MDM   Patient presented to the Emergency Department for nausea . They are clinically stable, VSS, non toxic appearing. Clinically dry on exam. Work up initiated. Found to have frank wit creatinine of 3. IVF given. There is mild hyperkalemia but with reassuring ekg, will give IVF. There is a mild elevation in bili but with hx cirrhosis most likely 2/2 to that. Troponin was ordered but H&P less likely for ACS and troponin pending at this time. With frank and renal failure as well as hperbili, CT abdomen was ordered and pending. She is non peritoneal, no TTP, less likely acute shanta issue and most likely 2/2 know cirrhosis. With patients hx of 1 kidney and new onset renal failure would benefit from admission. With persistant n/v most likely 2/2 dehydration. Consult to on call hospitalsit for admission              ----------------------------------------------- PAST HISTORY --------------------------------------------  Past Medical History:  has a past medical history of Breast cancer ([facility] [address] Cirrhosis ([facility] [address] Essential hypertension, Hyperlipidemia, Osteopenia, 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 ([facility] [address] Sleep apnea, Spinal stenosis, and Type 2 diabetes mellitus ([facility] [address] Past Surgical History:  has a past surgical history that includes Hysterectomy;  Carpal tunnel release; Lithotripsy (Left, 05/04/2016); Colonoscopy (01/31/2017); Breast lumpectomy; Upper gastrointestinal endoscopy (04/23/2019); Colonoscopy (04/23/2019); Upper gastrointestinal endoscopy (N/A, 04/23/2019); Colonoscopy (N/A, 04/23/2019); Colonoscopy (04/23/2019); Shoulder Arthroplasty (Left, 12/21/2020); and cardiovascular stress test.    Social History:  reports that she has never smoked. She has never used smokeless tobacco. She reports that she does not drink alcohol and does not use drugs. Family History: family history includes Arthritis in her mother; COPD in her father; Cancer (age of onset: 48) in an other family member; Heart Attack in her father. The patients home medications have been reviewed.     Allergies: Lisinopril    ------------------------------------------------ RESULTS ---------------------------------------------------    LABS:  Results for orders placed or performed during the hospital encounter of 11/24/21   COVID-19, Rapid    Specimen: Nasopharyngeal Swab   Result Value Ref Range    SARS-CoV-2, NAAT Not Detected Not Detected   CBC Auto Differential   Result Value Ref Range    WBC 9.9 4.5 - 11.5 E9/L    RBC 3.58 3.50 - 5.50 E12/L    Hemoglobin 11.5 11.5 - 15.5 g/dL    Hematocrit 35.0 34.0 - 48.0 %    MCV 97.8 80.0 - 99.9 fL    MCH 32.1 26.0 - 35.0 pg    MCHC 32.9 32.0 - 34.5 %    RDW 20.7 (H) 11.5 - 15.0 fL    Platelets 570 890 - 348 E9/L    MPV 10.4 7.0 - 12.0 fL    Neutrophils % 93.9 (H) 43.0 - 80.0 %    Lymphocytes % 0.9 (L) 20.0 - 42.0 %    Monocytes % 3.5 2.0 - 12.0 %    Eosinophils % 0.5 0.0 - 6.0 %    Basophils % 0.8 0.0 - 2.0 %    Neutrophils Absolute 9.31 (H) 1.80 - 7.30 E9/L    Lymphocytes Absolute 0.10 (L) 1.50 - 4.00 E9/L    Monocytes Absolute 0.40 0.10 - 0.95 E9/L    Eosinophils Absolute 0.00 (L) 0.05 - 0.50 E9/L    Basophils Absolute 0.08 0.00 - 0.20 E9/L    Promyelocytes Percent 0.9 0 - 0 %    Anisocytosis 2+     Polychromasia 3+    Basic Metabolic Panel   Result Value Ref Range    Sodium 137 132 - 146 mmol/L    Potassium 5.4 (H) 3.5 - 5.0 mmol/L    Chloride 103 98 - 107 mmol/L    CO2 21 (L) 22 - 29 mmol/L    Anion Gap 13 7 - 16 mmol/L    Glucose 127 (H) 74 - 99 mg/dL    BUN 24 (H) 6 - 23 mg/dL    CREATININE 3.0 (H) 0.5 - 1.0 mg/dL    GFR Non-African American 15 >=60 mL/min/1.73    GFR African American 18     Calcium 10.4 (H) 8.6 - 10.2 mg/dL   Hepatic Function Panel   Result Value Ref Range    Total Protein 7.3 6.4 - 8.3 g/dL    Albumin 3.3 (L) 3.5 - 5.2 g/dL    Alkaline Phosphatase 156 (H) 35 - 104 U/L    ALT 13 0 - 32 U/L    AST 27 0 - 31 U/L    Total Bilirubin 1.5 (H) 0.0 - 1.2 mg/dL    Bilirubin, Direct 0.6 (H) 0.0 - 0.3 mg/dL    Bilirubin, Indirect 0.9 0.0 - 1.0 mg/dL   Lipase   Result Value Ref Range    Lipase 46 13 - 60 U/L   Lactic Acid, Plasma   Result Value Ref Range    Lactic Acid 1.8 0.5 - 2.2 mmol/L   Urinalysis   Result Value Ref Range    Color, UA DARK YELLOW (A) Straw/Yellow    Clarity, UA CLOUDY (A) Clear    Glucose, Ur Negative Negative mg/dL    Bilirubin Urine Negative Negative    Ketones, Urine Negative Negative mg/dL    Specific Gravity, UA 1.015 1.005 - 1.030    Blood, Urine LARGE (A) Negative    pH, UA 6.0 5.0 - 9.0    Protein, UA TRACE Negative mg/dL    Urobilinogen, Urine 1.0 <2.0 E.U./dL    Nitrite, Urine Negative Negative    Leukocyte Esterase, Urine TRACE (A) Negative   Microscopic Urinalysis   Result Value Ref Range    WBC, UA 1-3 0 - 5 /HPF    RBC, UA >20 0 - 2 /HPF    Epithelial Cells, UA FEW /HPF    Bacteria, UA FEW (A) None Seen /HPF   SPECIMEN REJECTION   Result Value Ref Range    Rejected Test TRP5     Reason for Rejection see below    EKG 12 Lead   Result Value Ref Range    Ventricular Rate 111 BPM    Atrial Rate 111 BPM    P-R Interval 162 ms    QRS Duration 64 ms    Q-T Interval 328 ms    QTc Calculation (Bazett) 446 ms    P Axis 25 degrees    R Axis 5 degrees    T Axis 41 degrees       RADIOLOGY:    All Radiology results interpreted by Radiologist unless otherwise noted. XR CHEST PORTABLE   Final Result   Lower left lung opacity compatible with layering moderate pleural effusion   and associated atelectasis, with underlying infiltrate not excluded. Cannot exclude a subtle early infiltrate within the right pulmonary base   compared to most recent prior 2 exams. CT ABDOMEN PELVIS WO CONTRAST Additional Contrast? None    (Results Pending)       EKG: This EKG is signed and interpreted by ED Physician. Time:  0428   Rate: 111  Rhythm: Sinus. Interpretation: non-specific EKG. Normal axis. qtc 446. Low voltage. No stemi. Comparison: stable as compared to patient's most recent EKG.    ---------------------------- NURSING NOTES AND VITALS REVIEWED -------------------------   The nursing notes within the ED encounter and vital signs as below have been reviewed. /79   Pulse 95   Temp 98.2 °F (36.8 °C)   Resp 17   Ht 5' 2.5\" (1.588 m)   Wt 170 lb (77.1 kg)   SpO2 95%   BMI 30.60 kg/m²   Oxygen Saturation Interpretation: Normal      ------------------------------------------PROGRESS NOTES -------------------------------------------    ED COURSE MEDICATIONS:                Medications   sodium chloride flush 0.9 % injection 10 mL (has no administration in time range)   ondansetron (ZOFRAN-ODT) disintegrating tablet 4 mg (has no administration in time range)   0.9 % sodium chloride infusion (has no administration in time range)   0.9 % sodium chloride bolus (1,000 mLs IntraVENous New Bag 11/24/21 1022)       CONSULTATIONS:            Consultation:  Will speak with hospitalist for admission    PROCEDURES:            none.       COUNSELING:   I have spoken with the patient and discussed todays results, in addition to providing specific details for the plan of care and counseling regarding the diagnosis and prognosis.     --------------------------------------- IMPRESSION & DISPOSITION --------------------------------     IMPRESSION(s):  1. Acute kidney injury ([facility] [address]    2. Intractable nausea and vomiting        This patient's ED course included: a personal history and physicial examination, cardiac monitoring and continuous pulse oximetry    This patient has been closely monitored during their ED course. DISPOSITION:  Disposition: Admit to telemetry. Patient condition is stable. END OF PROVIDER NOTE. Juana Rodriguez DO  Resident  11/24/21 [id number]  ATTENDING PROVIDER ATTESTATION:     I have personally performed and/or participated in the history, exam, medical decision making, and procedures and agree with all pertinent clinical information. I have also reviewed and agree with the past medical, family and social history unless otherwise noted. I have discussed this patient in detail with the resident, and provided the instruction and education regarding nausea vomiting. My findings/Plan: As the primary provider for patient. Patient presenting here because of nausea vomiting over the past month. Patient reports symptoms worsen over the last several days she states she was vomiting up bile. Patient reporting low-grade fevers at home. Patient reporting no black or tarry stools. Patient reporting no productive cough. Patient reporting no leg pain. Patient is awake alert oriented x3 lungs are clear heart exam normal patient tender mainly lower abdomen. Patient neurologically intact. Labs noted reviewed. Patient ordered IV fluids as well as was given antiemetic. Patient made aware lab results and plan. Patient CT ordered of the abdomen pelvis still pending. We did initially place a call to family [facility]. They reports us that census was capped. Call was placed to on-call internal medicine for admission.   Patient will be admitted for further evaluation and treatment       Damon Butcher MD  11/24/21 [id number]       Damon Butcher MD  11/24/21 2021

## 2021-11-24 NOTE — CONSULTS
Nephrology Consult Note  Patient's Name: Dahlia Post  11:45 AM  11/24/2021    Reason for Consult: REN  Requesting Physician:  Pito Giron MD    Chief Complaint:  Intractable nausea and vomiting  History Obtained From:  patient and EMR    History of Present Ilness: Dahlia Post is a 68 y.o. female with a PMH of breast CA, DM, cirrhosis, HTN, and hyperlipidemia. Patient presented to the ED on 11/23/2021 for nausea and vomiting, which had been ongoing for a few days and unrelieved by her home antiemetics. In the ED K+ 5.4, BUN 24, Cr 3.0, CO2 21, alk phos 156, and albumin 3.3, otherwise unremarkable labs. CT scan of the abdomen and pelvis without contrast showed a right proximal ureteral calculi with right hydronephrosis as well as bilateral nonobstructing renal calculi. /78 and  in the ED. Baseline Cr from 8/21-11/21 0.9-1.1    She was seen earlier this month for SOB and subsequently had a thoracentesis (950ml removed) and had been diagnosed with PEs and started on Eliquis 10 mg BID. At present, Inez Alvarez is pleasant. She denies any chest pain, SOB, or abdominal pain. She reports ongoing nausea and vomiting with a poor appetite, and explained that if she eats anything she begins to feel sick. She also reported intermittent headaches.  She denies following with a nephrologist in the past.     Past Medical History:   Diagnosis Date    Breast cancer ([address]     Cirrhosis ([address]     Essential hypertension     Hx of blood clots     Hyperlipidemia     Osteopenia     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 ([address]     Sleep apnea     Spinal stenosis     Type 2 diabetes mellitus ([address]        Past Surgical History:   Procedure Laterality Date    BREAST LUMPECTOMY      lumpectomy [id number]    CARDIOVASCULAR STRESS TEST      Lexiscan stress test    CARPAL TUNNEL RELEASE      COLONOSCOPY  01/31/2017    multiple polyps; diverticula--jerod    COLONOSCOPY  04/23/2019    polyps; diverticula; hemorrhoids--jerod    COLONOSCOPY N/A 04/23/2019    COLONOSCOPY POLYPECTOMY SNARE/COLD BIOPSY performed by Td Mcclure MD at [address] COLONOSCOPY  04/23/2019    COLONOSCOPY WITH BIOPSY performed by Td Mcclure MD at [address] [facility] LITHOTRIPSY Left 05/04/2016    C-R STENT PLACEMENT    SHOULDER ARTHROPLASTY Left 12/21/2020    LEFT REVERSE TOTAL SHOULDER  ARTHROPLASTY -- DEPUY performed by Lu Gale MD at [address]  04/23/2019    gastritis--jerod    UPPER GASTROINTESTINAL ENDOSCOPY N/A 04/23/2019    EGD BIOPSY performed by Td Mcclure MD at [address] History   Problem Relation Age of Onset    Arthritis Mother     COPD Father     Heart Attack Father     Cancer Other 48        colon        reports that she has never smoked. She has never used smokeless tobacco. She reports that she does not drink alcohol and does not use drugs.     Allergies:  Lisinopril    Current Medications:    0.9 % sodium chloride infusion, Continuous  amLODIPine (NORVASC) tablet 2.5 mg, Daily  doxazosin (CARDURA) tablet 1 mg, Nightly  lactulose (CHRONULAC) 10 GM/15ML solution 20 g, TID  pantoprazole (PROTONIX) tablet 40 mg, Daily  venlafaxine (EFFEXOR XR) extended release capsule 75 mg, Daily  rifaximin (XIFAXAN) tablet 550 mg, BID  sodium chloride flush 0.9 % injection 5-40 mL, 2 times per day  sodium chloride flush 0.9 % injection 5-40 mL, PRN  0.9 % sodium chloride infusion, PRN  ondansetron (ZOFRAN-ODT) disintegrating tablet 4 mg, Q8H PRN   Or  ondansetron (ZOFRAN) injection 4 mg, Q6H PRN  polyethylene glycol (GLYCOLAX) packet 17 g, Daily PRN  acetaminophen (TYLENOL) tablet 650 mg, Q6H PRN   Or  acetaminophen (TYLENOL) suppository 650 mg, Q6H PRN  apixaban (ELIQUIS) tablet 5 mg, BID  albuterol (PROVENTIL) nebulizer solution 2.5 mg, 4x Daily PRN  budesonide (PULMICORT) nebulizer suspension 500 mcg, BID   And  Arformoterol Tartrate (BROVANA) nebulizer solution 15 mcg, BID  ondansetron (ZOFRAN-ODT) disintegrating tablet 4 mg, Once        Review of Systems:   Pertinent items are noted in HPI. Physical exam:  Vitals:    11/24/21 1021   BP: (!) 142/81   Pulse: 89   Resp: 18   Temp: 98.3 °F (36.8 °C)   SpO2: 93%         Constitutional: alert, oriented, pleasant  General: No distress. Alert. Eyes: PERRL. No sclera icterus. No conjunctival injection. ENT: No discharge. Pharynx clear. Neck: Trachea midline. Normal thyroid. Lungs: No accessory muscle use. No crackles. No wheezing. No rhonchi. CV: Regular rate. Regular rhythm. No murmur or rub. .   Abd: Non-tender. Non-distended. No masses. No organmegaly. Normal bowel sounds. Skin: Warm and dry. No nodule on exposed extremities. No rash on exposed extremities. Ext: No cyanosis, clubbing, edema   Neuro: Awake. Follows commands. Positive pupils/gag/corneals. Normal pain response.       Data:   Labs:  Lab Results   Component Value Date     11/23/2021     11/03/2021     11/02/2021    K 5.4 (H) 11/23/2021    K 4.0 11/03/2021    K 4.8 11/02/2021     11/23/2021    CO2 21 (L) 11/23/2021    CO2 25 11/03/2021    CO2 24 11/02/2021    CREATININE 3.0 (H) 11/23/2021    CREATININE 1.1 (H) 11/03/2021    CREATININE 1.1 (H) 11/02/2021    BUN 24 (H) 11/23/2021    BUN 12 11/03/2021    BUN 12 11/02/2021    GLUCOSE 127 (H) 11/23/2021    GLUCOSE 120 (H) 11/03/2021    GLUCOSE 72 (L) 11/02/2021    PHOS 2.7 02/27/2021    PHOS 3.0 06/07/2016    WBC 9.9 11/23/2021    WBC 4.7 11/03/2021    WBC 4.8 11/02/2021    HGB 11.5 11/23/2021    HGB 9.6 (L) 11/03/2021    HGB 10.0 (L) 11/02/2021    HCT 35.0 11/23/2021    HCT 29.7 (L) 11/03/2021    HCT 30.9 (L) 11/02/2021    MCV 97.8 11/23/2021     11/23/2021         Imaging:  CT ABDOMEN PELVIS WO CONTRAST Additional Contrast? None    Result Date: 11/24/2021  EXAMINATION: CT OF THE ABDOMEN AND PELVIS WITHOUT CONTRAST 11/24/2021 8:22 am TECHNIQUE: CT of the abdomen and pelvis was performed without the administration of intravenous contrast. Multiplanar reformatted images are provided for review. Dose modulation, iterative reconstruction, and/or weight based adjustment of the mA/kV was utilized to reduce the radiation dose to as low as reasonably achievable. COMPARISON: 10/29/2021 HISTORY: ORDERING SYSTEM PROVIDED HISTORY: abdominal pain vomiting TECHNOLOGIST PROVIDED HISTORY: Reason for exam:->abdominal pain vomiting Additional Contrast?->None Decision Support Exception - unselect if not a suspected or confirmed emergency medical condition->Emergency Medical Condition (MA) What reading provider will be dictating this exam?->CRC FINDINGS: Lower Chest: Moderate to large left pleural effusion, apparently complete atelectasis in the left lower lobe Organs: Cirrhotic appearance of the liver, evaluation limited without contrast, low-attenuation left lobe lesion around image 69 is present with density favoring a cyst on this exam although prior exam showed above water density, considering the high risk recommend follow-up MRI. There are beam hardening artifacts also limiting evaluation of abdominal organs. Hypodensity present at the medial spleen appears to be cystic as well by density. Mild nephrolithiasis. Right hydronephrosis left-side collecting system mildly prominent but may be compensation with parenchymal volume loss. Calculus at the proximal right ureter is 6 mm, lower nephrolithiasis on the left present. Mild splenomegaly. Additional low-attenuation hepatic lesions are less well visualized on this exam a a GI/Bowel: No obstruction. Diverticulosis coli present Pelvis: Hysterectomy Peritoneum/Retroperitoneum: There are mild atherosclerotic calcifications present. Bones/Soft Tissues: Umbilicus fat hernia with a small amount of. Anterior soft tissue nodules in the subcutaneous fat may be injection related but are nonspecific. .  Moderate to severe spinal degenerative changes. Prior mild compression fractures in thoracic spine are seen     1. 6 mm right proximal ureteral calculus with moderate hydronephrosis. Bilateral nephrolithiasis 2. Hepatic cirrhosis and portal hypertension including mild ascites 3. Moderate to large left pleural effusion, complete left lower lobe atelectasis 4. Indeterminate hepatic lesions, recommend follow-up MRI evaluation     XR CHEST PORTABLE    Result Date: 11/24/2021  EXAMINATION: ONE XRAY VIEW OF THE CHEST 11/24/2021 4:16 am COMPARISON: Two-view study from 11/19/2021. HISTORY: ORDERING SYSTEM PROVIDED HISTORY: vomiting fever TECHNOLOGIST PROVIDED HISTORY: Reason for exam:->vomiting fever What reading provider will be dictating this exam?->CRC FINDINGS: The cardiopericardial silhouette size is within normal limits. There is relative prominence of the azygous arch. No pneumothorax. Graded opacity within the visualized left lower lung is compatible with layering moderate pleural effusion and associated atelectasis. No dense consolidation within the right lower lung, however there is somewhat geographic increased attenuation compared to both the listed prior study as well as the portable chest radiograph from 11/03/2021, unclear whether fully accounted for by extra thoracic soft tissue attenuation, with early infiltrate not excluded. The upper lungs are clear. No acute osseous abnormality is identified. Right glenohumeral DJD. Stable postsurgical changes of reversed left shoulder arthroplasty. Lower left lung opacity compatible with layering moderate pleural effusion and associated atelectasis, with underlying infiltrate not excluded. Cannot exclude a subtle early infiltrate within the right pulmonary base compared to most recent prior 2 exams. Assessment/ Plan  1. REN in the setting of obstructive uropathy.   and with GI loss component  Baseline Cr 0.9-1.1  11/23 Cr 3.0  CT A&P - 6 mm right proximal ureteral calculus with moderate hydronephrosis. Bilateral nephrolithiasis. ; left nonobstruicting  UA - no nitrites, trace protein and leukocytes  Check urine studies  Strict I/O  On IVF NS @ 100 ml/ hr   Lasix and aldactone at home--> currently on hold   Urology consulted    2. Hyperkalemia  K+ 5.4   Suspect due to renal failure and dietary source  Will follow    3. NonHAG metabolic acidosis, mild  In the setting of GI loss  Concerned for hyperkalemic RTA  Will check urine osmolal gap  Will follow     4.  HTN  Near goal < 130/80  Will follow    Thank you Dr. Sabino Torres MD for allowing us to participate in care of Christel Hughes MD  11:45 AM  11/24/2021

## 2021-11-24 NOTE — PLAN OF CARE
Problem: Falls - Risk of:  Goal: Will remain free from falls  Description: Will remain free from falls  Outcome: Met This Shift  Goal: Absence of physical injury  Description: Absence of physical injury  Outcome: Met This Shift     Problem: Skin Integrity:  Goal: Absence of new skin breakdown  Description: Absence of new skin breakdown  Outcome: Met This Shift     Problem: Skin Integrity:  Goal: Will show no infection signs and symptoms  Description: Will show no infection signs and symptoms  Outcome: Not Met This Shift

## 2021-11-24 NOTE — PROGRESS NOTES
Advance Care Planning     Advance Care Planning Activator (Inpatient)  Conversation Note      Date of ACP Conversation: 11/24/2021   Conversation Conducted with: Patient with Decision Making Capacity  ACP Activator: Brianna Ho MD    Hospital diagnoses warranting ACP:  Active Problems:    Acute renal failure (ARF) ([address]  Resolved Problems:    * No resolved hospital problems. *        Health Care Decision Maker:   Current Designated Health Care Decision Maker:     Primary Decision Maker: Marquis Chapa - Child - [phone]    Secondary Decision Maker: Jorge Luis Rowe Other - [phone]      Care Preferences    Ventilation: \"If you were in your present state of health and suddenly became very ill and were unable to breathe on your own, what would your preference be about the use of a ventilator (breathing machine) if it were available to you? \"      Would the patient desire the use of ventilator (breathing machine)?: yes    \"If your health worsens and it becomes clear that your chance of recovery is unlikely, what would your preference be about the use of a ventilator (breathing machine) if it were available to you? \"     Would the patient desire the use of ventilator (breathing machine)?: Yes      Resuscitation  \"CPR works best to restart the heart when there is a sudden event, like a heart attack, in someone who is otherwise healthy. Unfortunately, CPR does not typically restart the heart for people who have serious health conditions or who are very sick. \"    \"In the event your heart stopped as a result of an underlying serious health condition, would you want attempts to be made to restart your heart (answer \"yes\" for attempt to resuscitate) or would you prefer a natural death (answer \"no\" for do not attempt to resuscitate)? \" yes       [] Yes   [] No   Educated Patient / Leanora Chill regarding differences between Advance Directives and portable DNR orders.         Conversation Outcomes:  [] ACP discussion completed  [] Existing advance directive reviewed with patient; no changes to patient's previously recorded wishes  [] New Advance Directive completed  [] Portable Do Not Rescitate prepared for Provider review and signature  [] POLST/POST/MOLST/MOST prepared for Provider review and signature    Details of ACP discussion: Discussed with the patient who is not sure about her DNR status and wants to be full code for now  Length of ACP Conversation in minutes: 30  Code status following completion of discussion: Prior     Follow-up plan:    [] Schedule follow-up conversation to continue planning  [] Referred individual to Provider for additional questions/concerns   [] Advised patient/agent/surrogate to review completed ACP document and update if needed with changes in condition, patient preferences or care setting  [] This note routed to one or more involved healthcare providers  [x] None    Electronically signed by Kirstie Cadet MD on 11/24/2021 at 9:47 AM

## 2021-11-25 LAB
ALBUMIN SERPL-MCNC: 2.7 G/DL (ref 3.5–5.2)
ALP BLD-CCNC: 118 U/L (ref 35–104)
ALT SERPL-CCNC: 9 U/L (ref 0–32)
ANION GAP SERPL CALCULATED.3IONS-SCNC: 12 MMOL/L (ref 7–16)
AST SERPL-CCNC: 21 U/L (ref 0–31)
BILIRUB SERPL-MCNC: 1.1 MG/DL (ref 0–1.2)
BUN BLDV-MCNC: 28 MG/DL (ref 6–23)
CALCIUM SERPL-MCNC: 9.7 MG/DL (ref 8.6–10.2)
CHLORIDE BLD-SCNC: 107 MMOL/L (ref 98–107)
CO2: 22 MMOL/L (ref 22–29)
CREAT SERPL-MCNC: 2.5 MG/DL (ref 0.5–1)
FOLATE: 17.4 NG/ML (ref 4.8–24.2)
GFR AFRICAN AMERICAN: 23
GFR NON-AFRICAN AMERICAN: 19 ML/MIN/1.73
GLUCOSE BLD-MCNC: 53 MG/DL (ref 74–99)
HCT VFR BLD CALC: 30.9 % (ref 34–48)
HEMOGLOBIN: 9.8 G/DL (ref 11.5–15.5)
MCH RBC QN AUTO: 32 PG (ref 26–35)
MCHC RBC AUTO-ENTMCNC: 31.7 % (ref 32–34.5)
MCV RBC AUTO: 101 FL (ref 80–99.9)
PDW BLD-RTO: 20.6 FL (ref 11.5–15)
PLATELET # BLD: 189 E9/L (ref 130–450)
PMV BLD AUTO: 10.3 FL (ref 7–12)
POTASSIUM SERPL-SCNC: 4.9 MMOL/L (ref 3.5–5)
RBC # BLD: 3.06 E12/L (ref 3.5–5.5)
SODIUM BLD-SCNC: 141 MMOL/L (ref 132–146)
TOTAL PROTEIN: 5.8 G/DL (ref 6.4–8.3)
TSH SERPL DL<=0.05 MIU/L-ACNC: 2.48 UIU/ML (ref 0.27–4.2)
URINE CULTURE, ROUTINE: NORMAL
VITAMIN B-12: 1096 PG/ML (ref 211–946)
VITAMIN D 25-HYDROXY: 28 NG/ML (ref 30–100)
WBC # BLD: 6 E9/L (ref 4.5–11.5)

## 2021-11-25 PROCEDURE — 2580000003 HC RX 258: Performed by: INTERNAL MEDICINE

## 2021-11-25 PROCEDURE — 6360000002 HC RX W HCPCS: Performed by: NURSE PRACTITIONER

## 2021-11-25 PROCEDURE — 82306 VITAMIN D 25 HYDROXY: CPT

## 2021-11-25 PROCEDURE — 80053 COMPREHEN METABOLIC PANEL: CPT

## 2021-11-25 PROCEDURE — 6360000002 HC RX W HCPCS: Performed by: INTERNAL MEDICINE

## 2021-11-25 PROCEDURE — 82607 VITAMIN B-12: CPT

## 2021-11-25 PROCEDURE — 6370000000 HC RX 637 (ALT 250 FOR IP): Performed by: INTERNAL MEDICINE

## 2021-11-25 PROCEDURE — 84443 ASSAY THYROID STIM HORMONE: CPT

## 2021-11-25 PROCEDURE — 94640 AIRWAY INHALATION TREATMENT: CPT

## 2021-11-25 PROCEDURE — 2580000003 HC RX 258: Performed by: NURSE PRACTITIONER

## 2021-11-25 PROCEDURE — 36415 COLL VENOUS BLD VENIPUNCTURE: CPT

## 2021-11-25 PROCEDURE — 82746 ASSAY OF FOLIC ACID SERUM: CPT

## 2021-11-25 PROCEDURE — 2060000000 HC ICU INTERMEDIATE R&B

## 2021-11-25 PROCEDURE — 85027 COMPLETE CBC AUTOMATED: CPT

## 2021-11-25 RX ADMIN — ONDANSETRON 4 MG: 2 INJECTION INTRAMUSCULAR; INTRAVENOUS at 12:15

## 2021-11-25 RX ADMIN — PANTOPRAZOLE SODIUM 40 MG: 40 TABLET, DELAYED RELEASE ORAL at 09:05

## 2021-11-25 RX ADMIN — WATER 1000 MG: 1 INJECTION INTRAMUSCULAR; INTRAVENOUS; SUBCUTANEOUS at 15:36

## 2021-11-25 RX ADMIN — DOXAZOSIN 1 MG: 2 TABLET ORAL at 20:41

## 2021-11-25 RX ADMIN — Medication 10 ML: at 20:42

## 2021-11-25 RX ADMIN — RIFAXIMIN 550 MG: 550 TABLET ORAL at 09:05

## 2021-11-25 RX ADMIN — Medication 10 ML: at 09:06

## 2021-11-25 RX ADMIN — ARFORMOTEROL TARTRATE 15 MCG: 15 SOLUTION RESPIRATORY (INHALATION) at 20:51

## 2021-11-25 RX ADMIN — VENLAFAXINE HYDROCHLORIDE 75 MG: 75 CAPSULE, EXTENDED RELEASE ORAL at 09:05

## 2021-11-25 RX ADMIN — BUDESONIDE 500 MCG: 0.5 SUSPENSION RESPIRATORY (INHALATION) at 20:51

## 2021-11-25 RX ADMIN — ONDANSETRON 4 MG: 2 INJECTION INTRAMUSCULAR; INTRAVENOUS at 23:57

## 2021-11-25 RX ADMIN — RIFAXIMIN 550 MG: 550 TABLET ORAL at 20:41

## 2021-11-25 RX ADMIN — AMLODIPINE BESYLATE 2.5 MG: 2.5 TABLET ORAL at 09:05

## 2021-11-25 RX ADMIN — LACTULOSE 20 G: 20 SOLUTION ORAL at 09:04

## 2021-11-25 RX ADMIN — ARFORMOTEROL TARTRATE 15 MCG: 15 SOLUTION RESPIRATORY (INHALATION) at 09:35

## 2021-11-25 RX ADMIN — APIXABAN 5 MG: 5 TABLET, FILM COATED ORAL at 09:05

## 2021-11-25 RX ADMIN — BUDESONIDE 500 MCG: 0.5 SUSPENSION RESPIRATORY (INHALATION) at 09:35

## 2021-11-25 ASSESSMENT — PAIN SCALES - GENERAL: PAINLEVEL_OUTOF10: 0

## 2021-11-25 NOTE — PLAN OF CARE
Problem: Falls - Risk of:  Goal: Will remain free from falls  Description: Will remain free from falls  11/25/2021 0231 by Elisabeth Burgos RN  Outcome: Met This Shift  11/24/2021 1729 by Lc Sidhu RN  Outcome: Met This Shift     Problem: Falls - Risk of:  Goal: Absence of physical injury  Description: Absence of physical injury  11/25/2021 0231 by Elisabeth Burgos RN  Outcome: Met This Shift  11/24/2021 1729 by Lc Sidhu RN  Outcome: Met This Shift

## 2021-11-25 NOTE — CONSULTS
Nephrology Consult Note  Patient's Name: Radha Mcdonald    Nephrologist: Abbie Cevallos  Reason for Consult: REN  Requesting Physician:  Khushboo Ordoñez MD    Chief Complaint:  RLQ pain radiating to the back    History Obtained From:  patient and past medical records    History of Present Ilness: Radha Mcdonald is a 68 y.o. female with prior history of CKD G3A with a baseline serum cr 1.1mg/dl with an e-GFR=48ml/min presumed sec to DM Nephropathy who was seen by Dr. Valeriy Iniguez in the office. The pt was hospitalized 10/29/21-11/3/21 for SOB. 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 She was also found to have subsegmental Pe's and started on Eliquis. She had a CT Scan of the Abd and Pelvis which did not show any hydronephrosis. SHe presented back to the ED 11/23/21 for RLQ pain radiating to the back with associated N/V unrelieved by oral anti-emetics. At home T 99. She notes intermittent hematuria. Cr in the ED 3.0mg/dl with K+ 5.4. At home she was being maintained on loop+ aldactone. CT Scan in the ED revealed R hydro and Bilat calculus.    This Am she notes mild midepigastric pain and ongoing nausea    Past Medical History:   Diagnosis Date    Breast cancer ([address]     Cirrhosis ([address]     Essential hypertension     Hx of blood clots     Hyperlipidemia     Osteopenia     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 ([address]     Sleep apnea     Spinal stenosis     Type 2 diabetes mellitus ([address]        Past Surgical History:   Procedure Laterality Date    BREAST LUMPECTOMY      lumpectomy [id number]    CARDIOVASCULAR STRESS TEST      Lexiscan stress test    CARPAL TUNNEL RELEASE      COLONOSCOPY  01/31/2017    multiple polyps; diverticula--jerod    COLONOSCOPY  04/23/2019    polyps; diverticula; hemorrhoids--jerod    COLONOSCOPY N/A 04/23/2019    COLONOSCOPY POLYPECTOMY SNARE/COLD BIOPSY performed by Lev Ramirez MD at [address]  04/23/2019    COLONOSCOPY WITH BIOPSY performed by Rach Avila MD at [address] [facility] LITHOTRIPSY Left 05/04/2016    C-R STENT PLACEMENT    SHOULDER ARTHROPLASTY Left 12/21/2020    LEFT REVERSE TOTAL SHOULDER  ARTHROPLASTY -- DEPUY performed by Laquita Foss MD at [facility]  04/23/2019    gastritis--jerod    UPPER GASTROINTESTINAL ENDOSCOPY N/A 04/23/2019    EGD BIOPSY performed by Rach Avila MD at [place] [address] History   Problem Relation Age of Onset    Arthritis Mother     COPD Father     Heart Attack Father     Cancer Other 48        colon        reports that she has never smoked. She has never used smokeless tobacco. She reports that she does not drink alcohol and does not use drugs.     Allergies:  Lisinopril    Current Medications:    0.9 % sodium chloride infusion, Continuous  amLODIPine (NORVASC) tablet 2.5 mg, Daily  doxazosin (CARDURA) tablet 1 mg, Nightly  lactulose (CHRONULAC) 10 GM/15ML solution 20 g, TID  pantoprazole (PROTONIX) tablet 40 mg, Daily  venlafaxine (EFFEXOR XR) extended release capsule 75 mg, Daily  rifaximin (XIFAXAN) tablet 550 mg, BID  sodium chloride flush 0.9 % injection 5-40 mL, 2 times per day  sodium chloride flush 0.9 % injection 5-40 mL, PRN  0.9 % sodium chloride infusion, PRN  ondansetron (ZOFRAN-ODT) disintegrating tablet 4 mg, Q8H PRN   Or  ondansetron (ZOFRAN) injection 4 mg, Q6H PRN  polyethylene glycol (GLYCOLAX) packet 17 g, Daily PRN  acetaminophen (TYLENOL) tablet 650 mg, Q6H PRN   Or  acetaminophen (TYLENOL) suppository 650 mg, Q6H PRN  apixaban (ELIQUIS) tablet 5 mg, BID  albuterol (PROVENTIL) nebulizer solution 2.5 mg, 4x Daily PRN  budesonide (PULMICORT) nebulizer suspension 500 mcg, BID   And  Arformoterol Tartrate (BROVANA) nebulizer solution 15 mcg, BID  cefTRIAXone (ROCEPHIN) 1,000 mg in sterile water 10 mL IV syringe, Q24H  ondansetron (ZOFRAN-ODT) disintegrating tablet 4 mg, Once        Review of Systems: Pertinent items are noted in HPI.     Physical exam:   Constitutional: Elderly female in NAD   Vitals:   VITALS:  /72   Pulse 93   Temp 98 °F (36.7 °C) (Oral)   Resp 18   Ht 5' 2.5\" (1.588 m)   Wt 170 lb (77.1 kg)   SpO2 93%   BMI 30.60 kg/m²   24HR INTAKE/OUTPUT:    Intake/Output Summary (Last 24 hours) at 11/24/2021 2042  Last data filed at 11/24/2021 1840  Gross per 24 hour   Intake 443 ml   Output 800 ml   Net -357 ml     URINARY CATHETER OUTPUT (Engel):     DRAIN/TUBE OUTPUT:     VENT SETTINGS:  Vent Information  SpO2: 93 %  Additional Respiratory  Assessments  Pulse: 93  Resp: 18  SpO2: 93 %    Skin: no rash, turgor wnl  Heent:  eomi, mmm  Neck: no bruits or jvd noted  Cardiovascular: PMI not lat displaced S1, S2 without m/r/g  Respiratory: CTA B without w/r/r  Abdomen:  +bs, soft, nd, tender in the midepigastrium, no HSM  Ext: (-) bilat lower extremity edema  Psychiatric: mood and affect appropriate, cr nr 2-12 grossly intact  Musculoskeletal:  Rom, muscular strength intact    Data:   Labs:  CBC:   Lab Results   Component Value Date    WBC 9.9 11/23/2021    RBC 3.58 11/23/2021    HGB 11.5 11/23/2021    HCT 35.0 11/23/2021    MCV 97.8 11/23/2021    MCH 32.1 11/23/2021    MCHC 32.9 11/23/2021    RDW 20.7 11/23/2021     11/23/2021    MPV 10.4 11/23/2021     CBC with Differential:    Lab Results   Component Value Date    WBC 9.9 11/23/2021    RBC 3.58 11/23/2021    HGB 11.5 11/23/2021    HCT 35.0 11/23/2021     11/23/2021    MCV 97.8 11/23/2021    MCH 32.1 11/23/2021    MCHC 32.9 11/23/2021    RDW 20.7 11/23/2021    LYMPHOPCT 0.9 11/23/2021    PROMYELOPCT 0.9 11/23/2021    MONOPCT 3.5 11/23/2021    MYELOPCT 0.9 04/15/2021    BASOPCT 0.8 11/23/2021    MONOSABS 0.40 11/23/2021    LYMPHSABS 0.10 11/23/2021    EOSABS 0.00 11/23/2021    BASOSABS 0.08 11/23/2021     Hemoglobin/Hematocrit:    Lab Results   Component Value Date    HGB 11.5 11/23/2021    HCT 35.0 11/23/2021     CMP:    Lab Results   Component Value Date     11/24/2021    K 5.0 11/24/2021    K 4.0 11/03/2021     11/24/2021    CO2 21 11/24/2021    BUN 28 11/24/2021    CREATININE 3.1 11/24/2021    GFRAA 18 11/24/2021    LABGLOM 15 11/24/2021    GLUCOSE 57 11/24/2021    PROT 7.3 11/23/2021    LABALBU 3.3 11/23/2021    CALCIUM 10.1 11/24/2021    BILITOT 1.5 11/23/2021    ALKPHOS 156 11/23/2021    AST 27 11/23/2021    ALT 13 11/23/2021     BMP:    Lab Results   Component Value Date     11/24/2021    K 5.0 11/24/2021    K 4.0 11/03/2021     11/24/2021    CO2 21 11/24/2021    BUN 28 11/24/2021    LABALBU 3.3 11/23/2021    CREATININE 3.1 11/24/2021    CALCIUM 10.1 11/24/2021    GFRAA 18 11/24/2021    LABGLOM 15 11/24/2021    GLUCOSE 57 11/24/2021     BUN/Creatinine:    Lab Results   Component Value Date    BUN 28 11/24/2021    CREATININE 3.1 11/24/2021     Hepatic Function Panel:    Lab Results   Component Value Date    ALKPHOS 156 11/23/2021    ALT 13 11/23/2021    AST 27 11/23/2021    PROT 7.3 11/23/2021    BILITOT 1.5 11/23/2021    BILIDIR 0.6 11/23/2021    IBILI 0.9 11/23/2021    LABALBU 3.3 11/23/2021     Albumin:    Lab Results   Component Value Date    LABALBU 3.3 11/23/2021     Calcium:    Lab Results   Component Value Date    CALCIUM 10.1 11/24/2021     Ionized Calcium:  No results found for: IONCA  Magnesium:    Lab Results   Component Value Date    MG 1.7 02/04/2021     Phosphorus:    Lab Results   Component Value Date    PHOS 2.7 02/27/2021     LDH:    Lab Results   Component Value Date     11/01/2021     Uric Acid:    Lab Results   Component Value Date    LABURIC 8.0 02/27/2021     PT/INR:    Lab Results   Component Value Date    PROTIME 14.9 08/17/2021    INR 1.3 08/17/2021     PTT:    Lab Results   Component Value Date    APTT 29.9 08/17/2021   [APTT}  Troponin:    Lab Results   Component Value Date    TROPONINI <0.01 02/04/2021     U/A:    Lab Results   Component Value Date    NITRITE Neg 10/29/2018    COLORU Yellow 11/24/2021    PROTEINU Negative 11/24/2021    PHUR 7.0 11/24/2021    WBCUA 1-3 11/24/2021    RBCUA >20 11/24/2021    YEAST MODERATE 08/22/2017    BACTERIA FEW 11/24/2021    CLARITYU SL CLOUDY 11/24/2021    SPECGRAV 1.010 11/24/2021    LEUKOCYTESUR TRACE 11/24/2021    UROBILINOGEN 2.0 11/24/2021    BILIRUBINUR Negative 11/24/2021    BILIRUBINUR Neg 10/29/2018    BLOODU LARGE 11/24/2021    GLUCOSEU Negative 11/24/2021     ABG:    Lab Results   Component Value Date    PCO2 37.3 08/17/2021    PO2 79.0 08/17/2021    HCO3 25.6 08/17/2021    BE 1.8 08/17/2021    O2SAT 96.1 08/17/2021     HgBA1c:    Lab Results   Component Value Date    LABA1C 5.3 10/19/2021     Microalbumen/Creatinine ratio:  No components found for: RUCREAT  FLP:    Lab Results   Component Value Date    TRIG 65 02/27/2021    HDL 37 02/27/2021    LDLCALC 44 02/27/2021    LABVLDL 13 02/27/2021     TSH:    Lab Results   Component Value Date    TSH 2.060 12/30/2020     VITAMIN B12: No components found for: B12  FOLATE:    Lab Results   Component Value Date    FOLATE 17.3 12/31/2020     Iron Saturation:  No components found for: PERCENTFE  FERRITIN:    Lab Results   Component Value Date    FERRITIN 408 05/16/2019     AMYLASE:  No results found for: AMYLASE  LIPASE:    Lab Results   Component Value Date    LIPASE 46 11/23/2021     Fibrinogen Level:  No components found for: FIB  24 Hour Urine for Protein:  No components found for: RAWUPRO, UHRS3, IHYH47FC, UTV3  24 Hour Urine for Creatinine Clearance:  No components found for: CREAT4, UHRS10, UTV10     Imaging:  CXR results:EXAMINATION:  ONE XRAY VIEW OF THE CHEST     11/24/2021 4:16 am     COMPARISON:  Two-view study from 11/19/2021.     HISTORY:  ORDERING SYSTEM PROVIDED HISTORY: vomiting fever  TECHNOLOGIST PROVIDED HISTORY:  Reason for exam:->vomiting fever  What reading provider will be dictating this exam?->CRC     FINDINGS:  The cardiopericardial silhouette size is within normal limits.     There is relative prominence of the azygous arch.     No pneumothorax.     Graded opacity within the visualized left lower lung is compatible with  layering moderate pleural effusion and associated atelectasis.     No dense consolidation within the right lower lung, however there is somewhat  geographic increased attenuation compared to both the listed prior study as  well as the portable chest radiograph from 11/03/2021, unclear whether fully  accounted for by extra thoracic soft tissue attenuation, with early  infiltrate not excluded.     The upper lungs are clear.     No acute osseous abnormality is identified.     Right glenohumeral DJD.     Stable postsurgical changes of reversed left shoulder arthroplasty.        Impression  Lower left lung opacity compatible with layering moderate pleural effusion  and associated atelectasis, with underlying infiltrate not excluded.     Cannot exclude a subtle early infiltrate within the right pulmonary base  compared to most recent prior 2 exams. ION:  CT OF THE ABDOMEN AND PELVIS WITHOUT CONTRAST 11/24/2021 8:22 am     TECHNIQUE:  CT of the abdomen and pelvis was performed without the administration of  intravenous contrast. Multiplanar reformatted images are provided for review.   Dose modulation, iterative reconstruction, and/or weight based adjustment of  the mA/kV was utilized to reduce the radiation dose to as low as reasonably  achievable.     COMPARISON:  10/29/2021     HISTORY:  ORDERING SYSTEM PROVIDED HISTORY: abdominal pain vomiting  TECHNOLOGIST PROVIDED HISTORY:  Reason for exam:->abdominal pain vomiting  Additional Contrast?->None  Decision Support Exception - unselect if not a suspected or confirmed  emergency medical condition->Emergency Medical Condition (MA)  What reading provider will be dictating this exam?->CRC     FINDINGS:  Lower Chest: Moderate to large left pleural effusion, apparently complete  atelectasis in the left lower How Severe Is Your Rosacea?: mild lobe     Organs: Cirrhotic appearance of the liver, evaluation limited without  contrast, low-attenuation left lobe lesion around image 69 is present with  density favoring a cyst on this exam although prior exam showed above water  density, considering the high risk recommend follow-up MRI.  There are beam  hardening artifacts also limiting evaluation of abdominal organs. Hypodensity present at the medial spleen appears to be cystic as well by  density.  Mild nephrolithiasis.  Right hydronephrosis left-side collecting  system mildly prominent but may be compensation with parenchymal volume loss. Calculus at the proximal right ureter is 6 mm, lower nephrolithiasis on the  left present.  Mild splenomegaly.  Additional low-attenuation hepatic lesions  are less well visualized on this exam a a     GI/Bowel: No obstruction.  Diverticulosis coli present     Pelvis: Hysterectomy     Peritoneum/Retroperitoneum: There are mild atherosclerotic calcifications  present.     Bones/Soft Tissues: Umbilicus fat hernia with a small amount of.  Anterior  soft tissue nodules in the subcutaneous fat may be injection related but are  nonspecific. [place] Glance to severe spinal degenerative changes. Prior mild  compression fractures in thoracic spine are seen        Impression  1. 6 mm right proximal ureteral calculus with moderate hydronephrosis. Bilateral nephrolithiasis  2. Hepatic cirrhosis and portal hypertension including mild ascites  3. Moderate to large left pleural effusion, complete left lower lobe  atelectasis  4. Indeterminate hepatic lesions, recommend follow-up MRI evaluation      Assessment  1-Stage III REN in the setting of Nephrolithiasis and Obstructive Uropathy exacerbated by the outpt furosemide and spironolactone  UA cloudy, large blood, pH 7.0, LE tr, RBC >20, WBC 1-3  Cr trending down 3.0-->3.1-->2.5mg/dl  PLAN:  1.  Follow Cr post Stent 11/26    2-CKD G3A with a baseline serum cr 1.1mg/dl with an e-GFR=48ml/min Is This A New Presentation, Or A Follow-Up?: Follow Up Rosacea presumed sec to DM Nephropathy  PLAN:  1. Continue to monitor    3-Hyperkalemia in the setting of the REN and the Aldactone  K+ WNL  PLAN  1. Follow K+    4- Hypervolemia with Mod -Large L Pl Eff and Ascites  PLAN:  1. Follow for present    5- HTN with CKD I-IV  BP goal <130/80  PLAN:  1.  Monitor off the loop+ Aldactone      Thank you Dr. Seun Martinez MD for allowing us to participate in care of Dee Rice MD

## 2021-11-25 NOTE — PROGRESS NOTES
Hospitalist Progress Note      SYNOPSIS: Patient admitted on 2021 for complaint of nausea and vomiting, persistent. In the ER, she had stable vitals and unremarkable labs with exception of elevated creatinine of 2.0 compared to 1.1 at her baseline. Patient denies any abdominal pain, distention. No vomiting since arrival in the ER, possibly because of the Zofran she received in the ER. She received 1 L of normal saline initially and then kept on normal saline at 100 cc/h for maintenance IV fluids. SUBJECTIVE:    Patient seen and examined in her room. States that vomiting completely resolved. Now complains of some cough. Records reviewed. Stable overnight. No other overnight issues reported. Temp (24hrs), Av °F (36.7 °C), Min:97.7 °F (36.5 °C), Max:98.3 °F (36.8 °C)    DIET: Diet NPO  ADULT DIET; Regular; Low Sodium (2 gm)  CODE: Full Code    Intake/Output Summary (Last 24 hours) at 2021 9093  Last data filed at 2021 0604  Gross per 24 hour   Intake 1159 ml   Output 1400 ml   Net -241 ml       OBJECTIVE:    /63   Pulse 94   Temp 98 °F (36.7 °C) (Oral)   Resp 16   Ht 5' 2.5\" (1.588 m)   Wt 170 lb (77.1 kg)   SpO2 92%   BMI 30.60 kg/m²     General appearance: No apparent distress, appears stated age and cooperative. HEENT:  Conjunctivae/corneas clear. Neck: Supple. No jugular venous distention. Respiratory: Clear to auscultation bilaterally, normal respiratory effort  Cardiovascular: Regular rate rhythm, normal S1-S2  Abdomen: Soft, nontender, nondistended  Musculoskeletal: No clubbing, cyanosis, no bilateral lower extremity edema. Brisk capillary refill.    Skin:  No rashes  on visible skin  Neurologic: awake, alert and following commands     ASSESSMENT & PLAN:    Acute kidney injury  Nonoliguric REN, likely volume depletion with continued intake of Lasix and Aldactone  Baseline creatinine 1.1  Admitted with creatinine 3.0>>3.1>>2.5  Hold Lasix and Aldactone  Obtain UA with microscopy, urine electrolytes, urine urea nitrogen  Maintain on normal saline at 100 cc/h  Consult nephrology, follow recommendations    Right hydronephrosis  Secondary to 6 mm right proximal ureteral obstructing calculus  Plus bilateral nephrolithiasis  Started IV Rocephin on 11/24 by urology  Possible cystoscopy on 11/26     Pulmonary embolism  Diagnosed on 11/3/2021  Started on full dose Eliquis, continue for now for 3 months     Cirrhosis  Chronic cirrhosis, etiology unclear  Continue home dose rifaximin and lactulose  No encephalopathy noted  No SBP suspected     Hypertension  Blood pressure well controlled  Currently on amlodipine 2.5 mg p.o. daily and Cardura 1 mg p.o. at night, continue for now  Hold Lasix and Aldactone     Depression  Continue venlafaxine     Idiopathic acute pancreatitis  Resolved during last admission     Pleural effusion  Status post thoracentesis on 10/31/2021  Seen by cardiothoracic surgery who recommended no intervention  Monitor closely for now       DISPOSITION:     Medications:  REVIEWED DAILY    Infusion Medications    sodium chloride Stopped (11/24/21 1915)    sodium chloride       Scheduled Medications    amLODIPine  2.5 mg Oral Daily    doxazosin  1 mg Oral Nightly    lactulose  20 g Oral TID    pantoprazole  40 mg Oral Daily    venlafaxine  75 mg Oral Daily    rifaximin  550 mg Oral BID    sodium chloride flush  5-40 mL IntraVENous 2 times per day    apixaban  5 mg Oral BID    budesonide  0.5 mg Nebulization BID    And    Arformoterol Tartrate  15 mcg Nebulization BID    cefTRIAXone (ROCEPHIN) IV  1,000 mg IntraVENous Q24H    ondansetron  4 mg Oral Once     PRN Meds: sodium chloride flush, sodium chloride, ondansetron **OR** ondansetron, polyethylene glycol, acetaminophen **OR** acetaminophen, albuterol    Labs:     Recent Labs     11/23/21  1400 11/25/21  0442   WBC 9.9 6.0   HGB 11.5 9.8*   HCT 35.0 30.9*    189       Recent Labs     11/23/21  1400 11/24/21  0603 11/25/21  0442    138 141   K 5.4* 5.0 4.9    105 107   CO2 21* 21* 22   BUN 24* 28* 28*   CREATININE 3.0* 3.1* 2.5*   CALCIUM 10.4* 10.1 9.7       Recent Labs     11/23/21  1400 11/25/21  0442   PROT 7.3 5.8*   ALKPHOS 156* 118*   ALT 13 9   AST 27 21   BILITOT 1.5* 1.1   LIPASE 46  --        No results for input(s): INR in the last 72 hours. No results for input(s): Maura Height in the last 72 hours. Chronic labs:    Lab Results   Component Value Date    CHOL 94 02/27/2021    TRIG 65 02/27/2021    HDL 37 02/27/2021    LDLCALC 44 02/27/2021    TSH 2.060 12/30/2020    INR 1.3 08/17/2021    LABA1C 5.3 10/19/2021       Radiology: REVIEWED DAILY    +++++++++++++++++++++++++++++++++++++++++++++++++  Bethany Clement MD  [employer] - [address], [place]  +++++++++++++++++++++++++++++++++++++++++++++++++  NOTE: This report was transcribed using voice recognition software. Every effort was made to ensure accuracy; however, inadvertent computerized transcription errors may be present.

## 2021-11-25 NOTE — PROGRESS NOTES
[facility]  PROGRESS NOTE    Chief Complaint:   6mm Right proximal ureteral calculi/Right hydronephrosis/Bilateral nonobstructing renal calculi     HPI:   She feels better today. She has mild midepigastric discomfort. She is voiding comfortably.   She denies gross hematuria    Vitals:    11/25/21 0737   BP: 110/63   Pulse: 94   Resp: 16   Temp: 98 °F (36.7 °C)   SpO2: 92%       Allergies: Lisinopril    PAST MEDICAL HISTORY:   Past Medical History:   Diagnosis Date    [facility] ([facility]     Cirrhosis ([facility]     Essential hypertension     Hx of blood clots     Hyperlipidemia     Osteopenia     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 ([facility]     Sleep apnea     Spinal stenosis     Type 2 diabetes mellitus ([facility]        PAST SURGICAL HISTORY:   Past Surgical History:   Procedure Laterality Date    BREAST LUMPECTOMY      lumpectomy [id number]    CARDIOVASCULAR STRESS TEST      Lexiscan stress test    CARPAL TUNNEL RELEASE      COLONOSCOPY  01/31/2017    multiple polyps; diverticula--jerod    COLONOSCOPY  04/23/2019    polyps; diverticula; hemorrhoids--jerod    COLONOSCOPY N/A 04/23/2019    COLONOSCOPY POLYPECTOMY SNARE/COLD BIOPSY performed by Lev Ramirez MD at [facility] 9  04/23/2019    COLONOSCOPY WITH BIOPSY performed by Lev Ramirez MD at [id number] [place] [address] LITHOTRIPSY Left 05/04/2016    C-R STENT PLACEMENT    SHOULDER ARTHROPLASTY Left 12/21/2020    LEFT REVERSE TOTAL SHOULDER  ARTHROPLASTY -- DEPUY performed by Elina Whyte MD at [address]  04/23/2019    gastritis--jerod    UPPER GASTROINTESTINAL ENDOSCOPY N/A 04/23/2019    EGD BIOPSY performed by Lev Ramirez MD at [facility] HISTORY:    Family History   Problem Relation Age of Onset    Arthritis Mother     COPD Father     Heart Attack Father     Cancer Other 48        colon       PAST SOCIAL HISTORY:    Social History     Socioeconomic sodium chloride flush, sodium chloride, ondansetron **OR** ondansetron, polyethylene glycol, acetaminophen **OR** acetaminophen, albuterol    Scheduled:    amLODIPine  2.5 mg Oral Daily    doxazosin  1 mg Oral Nightly    lactulose  20 g Oral TID    pantoprazole  40 mg Oral Daily    venlafaxine  75 mg Oral Daily    rifaximin  550 mg Oral BID    sodium chloride flush  5-40 mL IntraVENous 2 times per day    apixaban  5 mg Oral BID    budesonide  0.5 mg Nebulization BID    And    Arformoterol Tartrate  15 mcg Nebulization BID    cefTRIAXone (ROCEPHIN) IV  1,000 mg IntraVENous Q24H    ondansetron  4 mg Oral Once       Lab Results   Component Value Date     11/25/2021    K 4.9 11/25/2021    K 4.0 11/03/2021    BUN 28 11/25/2021    CREATININE 2.5 11/25/2021        Lab Results   Component Value Date    HGB 9.8 11/25/2021    HCT 30.9 11/25/2021       Review Of Systems:  Constitutional: Tired  Eyes: negative  Ears, nose, mouth, throat, and face: negative  Respiratory: Sleep apnea  Cardiovascular: Hypertension  Gastrointestinal: negative  Genitourinary: Stone  Musculoskeletal: Spinal stenosis  Neurological: negative  Behavioral/Psych: negative  Endocrine: Diabetes    Physical Exam:  Skin is dry, and without rashes  Respirations are non-labored, intact  Abdomen is soft, non-tender, non-distended. Active bowel sounds are present. No rebound or guarding. Her abdomen is obese  Alert and oriented x 3. No focal motor/sensory deficits    Assessment and Plan:  6mm Right proximal ureteral calculi/Right hydronephrosis/Bilateral nonobstructing renal calculi   -NPO after midnight. To the OR tomorrow for cystoscopy and right ureteral stent insertion with delayed stone management in the future we discussed in detail  -Hold Eliquis for the procedure  -Urine culture yesterday was negative.   Continue empiric antibiotic prophylaxis  -We will follow her during her hospital stay      [address] [facility], MD  11/25/2021  1:59 PM

## 2021-11-26 ENCOUNTER — ANESTHESIA EVENT (OUTPATIENT)
Dept: OPERATING ROOM | Age: 76
DRG: 659 | End: 2021-11-26
Payer: MEDICARE

## 2021-11-26 ENCOUNTER — APPOINTMENT (OUTPATIENT)
Dept: GENERAL RADIOLOGY | Age: 76
DRG: 659 | End: 2021-11-26
Payer: MEDICARE

## 2021-11-26 ENCOUNTER — ANESTHESIA (OUTPATIENT)
Dept: OPERATING ROOM | Age: 76
DRG: 659 | End: 2021-11-26
Payer: MEDICARE

## 2021-11-26 VITALS — OXYGEN SATURATION: 100 % | DIASTOLIC BLOOD PRESSURE: 68 MMHG | SYSTOLIC BLOOD PRESSURE: 115 MMHG

## 2021-11-26 LAB
ANION GAP SERPL CALCULATED.3IONS-SCNC: 11 MMOL/L (ref 7–16)
BUN BLDV-MCNC: 22 MG/DL (ref 6–23)
CALCIUM SERPL-MCNC: 9.9 MG/DL (ref 8.6–10.2)
CHLORIDE BLD-SCNC: 106 MMOL/L (ref 98–107)
CO2: 22 MMOL/L (ref 22–29)
CREAT SERPL-MCNC: 2.1 MG/DL (ref 0.5–1)
GFR AFRICAN AMERICAN: 28
GFR NON-AFRICAN AMERICAN: 23 ML/MIN/1.73
GLUCOSE BLD-MCNC: 72 MG/DL (ref 74–99)
HCT VFR BLD CALC: 30.9 % (ref 34–48)
HEMOGLOBIN: 9.9 G/DL (ref 11.5–15.5)
MCH RBC QN AUTO: 31.5 PG (ref 26–35)
MCHC RBC AUTO-ENTMCNC: 32 % (ref 32–34.5)
MCV RBC AUTO: 98.4 FL (ref 80–99.9)
PDW BLD-RTO: 20.2 FL (ref 11.5–15)
PLATELET # BLD: 185 E9/L (ref 130–450)
PMV BLD AUTO: 10.3 FL (ref 7–12)
POTASSIUM SERPL-SCNC: 4.3 MMOL/L (ref 3.5–5)
RBC # BLD: 3.14 E12/L (ref 3.5–5.5)
SODIUM BLD-SCNC: 139 MMOL/L (ref 132–146)
WBC # BLD: 5.2 E9/L (ref 4.5–11.5)

## 2021-11-26 PROCEDURE — 85027 COMPLETE CBC AUTOMATED: CPT

## 2021-11-26 PROCEDURE — 2709999900 HC NON-CHARGEABLE SUPPLY: Performed by: UROLOGY

## 2021-11-26 PROCEDURE — 6360000002 HC RX W HCPCS: Performed by: UROLOGY

## 2021-11-26 PROCEDURE — 36415 COLL VENOUS BLD VENIPUNCTURE: CPT

## 2021-11-26 PROCEDURE — 2060000000 HC ICU INTERMEDIATE R&B

## 2021-11-26 PROCEDURE — 97161 PT EVAL LOW COMPLEX 20 MIN: CPT

## 2021-11-26 PROCEDURE — 6370000000 HC RX 637 (ALT 250 FOR IP): Performed by: UROLOGY

## 2021-11-26 PROCEDURE — C1758 CATHETER, URETERAL: HCPCS | Performed by: UROLOGY

## 2021-11-26 PROCEDURE — 2580000003 HC RX 258: Performed by: UROLOGY

## 2021-11-26 PROCEDURE — 87088 URINE BACTERIA CULTURE: CPT

## 2021-11-26 PROCEDURE — 7100000001 HC PACU RECOVERY - ADDTL 15 MIN: Performed by: UROLOGY

## 2021-11-26 PROCEDURE — C1769 GUIDE WIRE: HCPCS | Performed by: UROLOGY

## 2021-11-26 PROCEDURE — 2580000003 HC RX 258: Performed by: ANESTHESIOLOGIST ASSISTANT

## 2021-11-26 PROCEDURE — 97116 GAIT TRAINING THERAPY: CPT

## 2021-11-26 PROCEDURE — 0T768DZ DILATION OF RIGHT URETER WITH INTRALUMINAL DEVICE, VIA NATURAL OR ARTIFICIAL OPENING ENDOSCOPIC: ICD-10-PCS | Performed by: UROLOGY

## 2021-11-26 PROCEDURE — 7100000000 HC PACU RECOVERY - FIRST 15 MIN: Performed by: UROLOGY

## 2021-11-26 PROCEDURE — 3600000003 HC SURGERY LEVEL 3 BASE: Performed by: UROLOGY

## 2021-11-26 PROCEDURE — 80048 BASIC METABOLIC PNL TOTAL CA: CPT

## 2021-11-26 PROCEDURE — 97165 OT EVAL LOW COMPLEX 30 MIN: CPT

## 2021-11-26 PROCEDURE — 3700000000 HC ANESTHESIA ATTENDED CARE: Performed by: UROLOGY

## 2021-11-26 PROCEDURE — 2500000003 HC RX 250 WO HCPCS: Performed by: ANESTHESIOLOGIST ASSISTANT

## 2021-11-26 PROCEDURE — C2617 STENT, NON-COR, TEM W/O DEL: HCPCS | Performed by: UROLOGY

## 2021-11-26 PROCEDURE — 3209999900 FLUORO FOR SURGICAL PROCEDURES

## 2021-11-26 PROCEDURE — 94640 AIRWAY INHALATION TREATMENT: CPT

## 2021-11-26 PROCEDURE — 3700000001 HC ADD 15 MINUTES (ANESTHESIA): Performed by: UROLOGY

## 2021-11-26 PROCEDURE — 6360000002 HC RX W HCPCS: Performed by: ANESTHESIOLOGIST ASSISTANT

## 2021-11-26 PROCEDURE — 3600000013 HC SURGERY LEVEL 3 ADDTL 15MIN: Performed by: UROLOGY

## 2021-11-26 PROCEDURE — BT1D1ZZ FLUOROSCOPY OF RIGHT KIDNEY, URETER AND BLADDER USING LOW OSMOLAR CONTRAST: ICD-10-PCS | Performed by: UROLOGY

## 2021-11-26 PROCEDURE — 97535 SELF CARE MNGMENT TRAINING: CPT

## 2021-11-26 DEVICE — UNIVERSA FIRM URETERAL STENT AND POSITIONER WITH HYDROPHILIC COATING
Type: IMPLANTABLE DEVICE | Site: URETER | Status: FUNCTIONAL
Brand: UNIVERSA

## 2021-11-26 RX ORDER — PROMETHAZINE HYDROCHLORIDE 25 MG/ML
25 INJECTION, SOLUTION INTRAMUSCULAR; INTRAVENOUS PRN
Status: DISCONTINUED | OUTPATIENT
Start: 2021-11-26 | End: 2021-11-26

## 2021-11-26 RX ORDER — FENTANYL CITRATE 50 UG/ML
INJECTION, SOLUTION INTRAMUSCULAR; INTRAVENOUS PRN
Status: DISCONTINUED | OUTPATIENT
Start: 2021-11-26 | End: 2021-11-26 | Stop reason: SDUPTHER

## 2021-11-26 RX ORDER — PHENYLEPHRINE HCL IN 0.9% NACL 1 MG/10 ML
SYRINGE (ML) INTRAVENOUS PRN
Status: DISCONTINUED | OUTPATIENT
Start: 2021-11-26 | End: 2021-11-26 | Stop reason: SDUPTHER

## 2021-11-26 RX ORDER — LABETALOL HYDROCHLORIDE 5 MG/ML
5 INJECTION, SOLUTION INTRAVENOUS EVERY 10 MIN PRN
Status: DISCONTINUED | OUTPATIENT
Start: 2021-11-26 | End: 2021-11-26

## 2021-11-26 RX ORDER — LIDOCAINE HYDROCHLORIDE 20 MG/ML
INJECTION, SOLUTION INTRAVENOUS PRN
Status: DISCONTINUED | OUTPATIENT
Start: 2021-11-26 | End: 2021-11-26 | Stop reason: SDUPTHER

## 2021-11-26 RX ORDER — ATROPA BELLADONNA AND OPIUM 16.2; 6 MG/1; MG/1
SUPPOSITORY RECTAL PRN
Status: DISCONTINUED | OUTPATIENT
Start: 2021-11-26 | End: 2021-11-26 | Stop reason: ALTCHOICE

## 2021-11-26 RX ORDER — PROPOFOL 10 MG/ML
INJECTION, EMULSION INTRAVENOUS PRN
Status: DISCONTINUED | OUTPATIENT
Start: 2021-11-26 | End: 2021-11-26 | Stop reason: SDUPTHER

## 2021-11-26 RX ORDER — SODIUM CHLORIDE 9 MG/ML
INJECTION, SOLUTION INTRAVENOUS CONTINUOUS PRN
Status: DISCONTINUED | OUTPATIENT
Start: 2021-11-26 | End: 2021-11-26 | Stop reason: SDUPTHER

## 2021-11-26 RX ORDER — MEPERIDINE HYDROCHLORIDE 25 MG/ML
12.5 INJECTION INTRAMUSCULAR; INTRAVENOUS; SUBCUTANEOUS EVERY 5 MIN PRN
Status: DISCONTINUED | OUTPATIENT
Start: 2021-11-26 | End: 2021-11-26

## 2021-11-26 RX ADMIN — LIDOCAINE HYDROCHLORIDE 50 MG: 20 INJECTION, SOLUTION INTRAVENOUS at 08:00

## 2021-11-26 RX ADMIN — Medication 10 ML: at 20:33

## 2021-11-26 RX ADMIN — VENLAFAXINE HYDROCHLORIDE 75 MG: 75 CAPSULE, EXTENDED RELEASE ORAL at 13:16

## 2021-11-26 RX ADMIN — ARFORMOTEROL TARTRATE 15 MCG: 15 SOLUTION RESPIRATORY (INHALATION) at 19:30

## 2021-11-26 RX ADMIN — APIXABAN 5 MG: 5 TABLET, FILM COATED ORAL at 20:32

## 2021-11-26 RX ADMIN — Medication 100 MCG: at 08:21

## 2021-11-26 RX ADMIN — RIFAXIMIN 550 MG: 550 TABLET ORAL at 13:16

## 2021-11-26 RX ADMIN — BUDESONIDE 500 MCG: 0.5 SUSPENSION RESPIRATORY (INHALATION) at 19:30

## 2021-11-26 RX ADMIN — FENTANYL CITRATE 25 MCG: 50 INJECTION, SOLUTION INTRAMUSCULAR; INTRAVENOUS at 08:07

## 2021-11-26 RX ADMIN — SODIUM CHLORIDE 1000 ML: 9 INJECTION, SOLUTION INTRAVENOUS at 10:08

## 2021-11-26 RX ADMIN — RIFAXIMIN 550 MG: 550 TABLET ORAL at 20:32

## 2021-11-26 RX ADMIN — PROPOFOL 50 MG: 10 INJECTION, EMULSION INTRAVENOUS at 08:00

## 2021-11-26 RX ADMIN — SODIUM CHLORIDE: 9 INJECTION, SOLUTION INTRAVENOUS at 07:54

## 2021-11-26 RX ADMIN — FENTANYL CITRATE 50 MCG: 50 INJECTION, SOLUTION INTRAMUSCULAR; INTRAVENOUS at 08:10

## 2021-11-26 RX ADMIN — AMLODIPINE BESYLATE 2.5 MG: 2.5 TABLET ORAL at 13:15

## 2021-11-26 RX ADMIN — PROPOFOL 120 MCG/KG/MIN: 10 INJECTION, EMULSION INTRAVENOUS at 08:01

## 2021-11-26 RX ADMIN — Medication 100 MCG: at 08:13

## 2021-11-26 RX ADMIN — FENTANYL CITRATE 25 MCG: 50 INJECTION, SOLUTION INTRAMUSCULAR; INTRAVENOUS at 08:00

## 2021-11-26 RX ADMIN — LACTULOSE 20 G: 20 SOLUTION ORAL at 20:32

## 2021-11-26 RX ADMIN — DOXAZOSIN 1 MG: 2 TABLET ORAL at 20:32

## 2021-11-26 RX ADMIN — WATER 1000 MG: 1 INJECTION INTRAMUSCULAR; INTRAVENOUS; SUBCUTANEOUS at 14:37

## 2021-11-26 ASSESSMENT — PULMONARY FUNCTION TESTS
PIF_VALUE: 1
PIF_VALUE: 0
PIF_VALUE: 1
PIF_VALUE: 1
PIF_VALUE: 0
PIF_VALUE: 1
PIF_VALUE: 0
PIF_VALUE: 1
PIF_VALUE: 1
PIF_VALUE: 0
PIF_VALUE: 0
PIF_VALUE: 1
PIF_VALUE: 0
PIF_VALUE: 1
PIF_VALUE: 0

## 2021-11-26 ASSESSMENT — PAIN SCALES - GENERAL
PAINLEVEL_OUTOF10: 0

## 2021-11-26 NOTE — PROGRESS NOTES
Physical Therapy  Physical Therapy Initial Assessment     Name: Elaina Rivera  : 1945  MRN: [id number]      Date of Service: 2021    Evaluating PT:  Chago Kidd PT, DPT    Room #:  3337/4871-J  Diagnosis:  Acute renal failure (ARF) ([facility] [N17.9]  Acute kidney injury ([facility] [N17.9]  Intractable nausea and vomiting [R11.2]  PMHx/PSHx:  HTN, breast CA, cirrhosis, KATE, spinal stenosis, DM II  Procedure/Surgery:  S/p cystopanendoscopy, R stent insertion  Precautions:  Fall risk  Equipment Needs:  TBD    SUBJECTIVE:    Pt lives mattie in a 1st floor apt with no steps to enter. Bed/bath is on 1st floor. Pt ambulated with quad cane PTA. OBJECTIVE:   Initial Evaluation  Date: 21 Treatment Short Term/ Long Term   Goals   AM-PAC 6 Clicks      Was pt agreeable to Eval/treatment? yes     Does pt have pain? 0/10     Bed Mobility  Rolling: CGA  Supine to sit: min A  Sit to supine: min A  Scooting: CGA  Rolling: IND  Supine to sit: IND  Sit to supine: IND  Scooting: IND   Transfers Sit to stand: min A  Stand to sit: CGA  Stand pivot: min A with ww  Sit to stand: mod I  Stand to sit: mod I  Stand pivot: mod I   Ambulation    75'x2 with ww min A  150'x2 with AAD mod I   Stair negotiation: ascended and descended  NT     ROM BUE:  RUE WFL, L shoulder ~ 25% AROM  BLE:  WFL     Strength BUE:  RUE 3+/5, LUE 2/5  BLE:  Grossly 4/5     Balance Sitting EOB:  SBA  Dynamic Standing:  Min A with ww  Sitting EOB:  IND  Dynamic Standing:   Mod I with AAD     Pt is A & O x 4  Sensation:  Pt denies numbness and tingling to extremities  Edema:  unremarkable    Therapeutic Exercises:    Bed mobility: sit<>supine, cues for positioning at EOB  Transfers: STS x3, cued for hand placement  Ambulation: 75'x2  BLE AROM    Patient education  Pt educated on role of PT, importance of functional mobility during hospital stay    Patient response to education:   Pt verbalized understanding Pt demonstrated skill Pt requires further education in this area   yes yes reinforce     ASSESSMENT:    Conditions Requiring Skilled Therapeutic Intervention:    [x]Decreased strength     [x]Decreased ROM  [x]Decreased functional mobility  [x]Decreased balance   [x]Decreased endurance   []Decreased posture  []Decreased sensation  []Decreased coordination   []Decreased vision  [x]Decreased safety awareness   []Increased pain       Comments:      Pt supine in bed upon entering, agreeable to participate. Pt assisted to EOB, no reports of dizziness with position change. Pt instructed to transfer from bed and ambulate to tolerance. Pt initially ambulating with quad cane, however, pt demonstrating increased instability with device. FWW introduced, pt ambulating well with ww, improved stevie and balance with device. Pt returned to bed after bout, no c/o fatigue or SOB. Pt supine in bed with call bell in reach and all needs met prior to exiting. Treatment:  Patient practiced and was instructed in the following treatment:     Bed mobility training - pt given verbal and tactile cues to facilitate proper sequencing and safety during rolling and supine>sit as well as provided with physical assistance to complete task    STS and pivot transfer training - pt educated on proper hand and foot placement, safety and sequencing, and use of verbal and tactile cues to safely complete sit<>stand and pivot transfers with hands on assistance to complete task safely    Gait training- pt was given verbal and tactile cues to facilitate stevie and ww spacing during ambulation as well as provided with physical assistance. Pt's/ family goals   1. Return home    Prognosis is good for reaching above PT goals. Patient and or family understand(s) diagnosis, prognosis, and plan of care.   yes    PHYSICAL THERAPY PLAN OF CARE:    PT POC is established based on physician order and patient diagnosis     Referring provider/PT Order:  Anup Ronquillo MD  Diagnosis:  Acute renal failure (ARF) ([facility] [N17.9]  Acute kidney injury ([facility] [N17.9]  Intractable nausea and vomiting [R11.2]  Specific instructions for next treatment:  Progress as tolerated, gait training    Current Treatment Recommendations:     [x] Strengthening to improve independence with functional mobility   [x] ROM to improve independence with functional mobility   [x] Balance Training to improve static/dynamic balance and to reduce fall risk  [x] Endurance Training to improve activity tolerance during functional mobility   [x] Transfer Training to improve safety and independence with all functional transfers   [x] Gait Training to improve gait mechanics, endurance and assess need for appropriate assistive device  [] Stair Training in preparation for safe discharge home and/or into the community   [] Positioning to prevent skin breakdown and contractures  [x] Safety and Education Training   [x] Patient/Caregiver Education   [] HEP  [] Other     PT long term treatment goals are located in above grid    Frequency of treatments: 2-5x/week x 1-2 weeks. Time in  1405  Time out  1425    Total Treatment Time  10 minutes     Evaluation Time includes thorough review of current medical information, gathering information on past medical history/social history and prior level of function, completion of standardized testing/informal observation of tasks, assessment of data and education on plan of care and goals.     CPT codes:  [x] Low Complexity PT evaluation 82046  [] Moderate Complexity PT evaluation 87074  [] High Complexity PT evaluation 44882  [] PT Re-evaluation 01582  [x] Gait training 43460 10 minutes  [] Manual therapy [id number] -- minutes  [] Therapeutic activities 64313 -- minutes  [] Therapeutic exercises 63811 -- minutes  [] Neuromuscular reeducation 19469 -- minutes     Ney Eduardo, PT, DPT  [id number]

## 2021-11-26 NOTE — PROGRESS NOTES
Nephrology Progress Note  Patient's Name: Cayla Zarate    Nephrologist: Marquise Chapin  Reason for Consult: REN      History of Present Ilness from the 11/25/21 note: Cayla Zarate is a 68 y.o. female with prior history of CKD G3A with a baseline serum cr 1.1mg/dl with an e-GFR=48ml/min presumed sec to DM Nephropathy who was seen by Dr. Londa Buerger in the office. The pt was hospitalized 10/29/21-11/3/21 for SOB. 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 She was also found to have subsegmental Pe's and started on Eliquis. She had a CT Scan of the Abd and Pelvis which did not show any hydronephrosis. SHe presented back to the ED 11/23/21 for RLQ pain radiating to the back with associated N/V unrelieved by oral anti-emetics. At home T 99. She notes intermittent hematuria. Cr in the ED 3.0mg/dl with K+ 5.4. At home she was being maintained on loop+ aldactone. CT Scan in the ED revealed R hydro and Bilat calculus.    This Am she notes mild midepigastric pain and ongoing nausea    11/26/21: Pt just back from [place], states she feels OK, no CP or SOB, minimal abd discomfort    Past Medical History:   Diagnosis Date    Breast cancer ([address]     Cirrhosis ([address]     Essential hypertension     Hx of blood clots     Hyperlipidemia     Osteopenia     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 ([address]     Sleep apnea     Spinal stenosis     Type 2 diabetes mellitus ([address]        Past Surgical History:   Procedure Laterality Date    BREAST LUMPECTOMY      lumpectomy [id number]    CARDIOVASCULAR STRESS TEST      Lexiscan stress test    CARPAL TUNNEL RELEASE      COLONOSCOPY  01/31/2017    multiple polyps; diverticula--jerod    COLONOSCOPY  04/23/2019    polyps; diverticula; hemorrhoids--jerod    COLONOSCOPY N/A 04/23/2019    COLONOSCOPY POLYPECTOMY SNARE/COLD BIOPSY performed by Shania Cerrato MD at [place] [id number]  04/23/2019    COLONOSCOPY WITH BIOPSY performed by [facility]ania [facility] MD at [facility] Subjective:     Simon Bee is a 8 y.o. male here with mother. Patient brought in for well child     History was provided by the mother.    Simon Bee is a 8 y.o. male who is here for this well-child visit.    Current Issues:  Current concerns include headache associated with flonase usage .  Does patient snore? no     Review of Nutrition:  Current diet: ok  Balanced diet? yes    Social Screening:  Sibling relations: only child  Parental coping and self-care: doing well; no concerns  Opportunities for peer interaction? yes - he has problems with anger  Concerns regarding behavior with peers? yes - see above ; he sees the  at his school, [employer]    School performance: c student;  He studies;  Mom thinks that he is learning well;   Secondhand smoke exposure? yes - boyfriend of mom smokes outside    Screening Questions:  Patient has a dental home: yes  Risk factors for anemia: no  Risk factors for tuberculosis: no  Risk factors for hearing loss: no  Risk factors for dyslipidemia: no    Review of Systems   Constitutional: Negative for fever.   HENT: Negative for ear pain and sore throat.    Eyes: Negative for discharge.   Respiratory: Negative for cough.    Gastrointestinal: Negative for abdominal pain, diarrhea and vomiting.   Genitourinary: Negative for dysuria.   Skin: Negative for rash.         Objective:     Physical Exam   Constitutional: He appears well-developed.   HENT:   Right Ear: Tympanic membrane normal.   Left Ear: Tympanic membrane normal.   Nose: No nasal discharge.   Mouth/Throat: Mucous membranes are moist. No tonsillar exudate. Pharynx is normal.   Eyes: Right eye exhibits no discharge. Left eye exhibits no discharge.   Cardiovascular: Normal rate, regular rhythm, S1 normal and S2 normal.    Pulmonary/Chest: No respiratory distress. He has no wheezes. He has no rales.   Abdominal: Full and soft. Bowel sounds are normal. He exhibits no distension and no mass.  ENDOSCOPY    HYSTERECTOMY      LITHOTRIPSY Left 05/04/2016    C-R STENT PLACEMENT    SHOULDER ARTHROPLASTY Left 12/21/2020    LEFT REVERSE TOTAL SHOULDER  ARTHROPLASTY -- DEPUY performed by Kirk Pruitt MD at [facility] ENDOSCOPY  04/23/2019    gastritis--jerod    UPPER GASTROINTESTINAL ENDOSCOPY N/A 04/23/2019    EGD BIOPSY performed by Izetta Carrel, MD at [place] [address] History   Problem Relation Age of Onset    Arthritis Mother     COPD Father     Heart Attack Father     Cancer Other 48        colon        reports that she has never smoked. She has never used smokeless tobacco. She reports that she does not drink alcohol and does not use drugs.     Allergies:  Lisinopril    Current Medications:    0.9 % sodium chloride infusion, Continuous  amLODIPine (NORVASC) tablet 2.5 mg, Daily  doxazosin (CARDURA) tablet 1 mg, Nightly  lactulose (CHRONULAC) 10 GM/15ML solution 20 g, TID  pantoprazole (PROTONIX) tablet 40 mg, Daily  venlafaxine (EFFEXOR XR) extended release capsule 75 mg, Daily  rifaximin (XIFAXAN) tablet 550 mg, BID  sodium chloride flush 0.9 % injection 5-40 mL, 2 times per day  sodium chloride flush 0.9 % injection 5-40 mL, PRN  0.9 % sodium chloride infusion, PRN  ondansetron (ZOFRAN-ODT) disintegrating tablet 4 mg, Q8H PRN   Or  ondansetron (ZOFRAN) injection 4 mg, Q6H PRN  polyethylene glycol (GLYCOLAX) packet 17 g, Daily PRN  acetaminophen (TYLENOL) tablet 650 mg, Q6H PRN   Or  acetaminophen (TYLENOL) suppository 650 mg, Q6H PRN  apixaban (ELIQUIS) tablet 5 mg, BID  albuterol (PROVENTIL) nebulizer solution 2.5 mg, 4x Daily PRN  budesonide (PULMICORT) nebulizer suspension 500 mcg, BID   And  Arformoterol Tartrate (BROVANA) nebulizer solution 15 mcg, BID  cefTRIAXone (ROCEPHIN) 1,000 mg in sterile water 10 mL IV syringe, Q24H  ondansetron (ZOFRAN-ODT) disintegrating tablet 4 mg, Once        Review of Systems:   Pertinent items are noted in There is no hepatosplenomegaly. There is no tenderness. There is no rebound and no guarding.   Genitourinary: Penis normal.   Neurological: He is alert.   Skin: Skin is warm. No pallor.       Simon was seen today for well child.    Diagnoses and all orders for this visit:    Encounter for well child check without abnormal findings  -     Flu Vaccine - Quadrivalent (PF) (3 years & older)    Behavior problem in child  -     Ambulatory Referral to Child and Adolescent Psychology    Other orders  -     Influenza - Quadrivalent (3 years & older) (PF)                Patient Instructions       If you have an active [facility] account, please look for your well child questionnaire to come to your [facility] account before your next well child visit.    Well-Child Checkup: 6 to 10 Years     Struggles in school can indicate problems with a childs health or development. If your child is having trouble in school, talk to the childs healthcare provider.     Even if your child is healthy, keep bringing him or her in for yearly checkups. These visits make sure that your childs health is protected with scheduled vaccines and health screenings. Your child's healthcare provider will also check his or her growth and development. This sheet describes some of what you can expect.  School and social issues  Here are some topics you, your child, and the healthcare provider may want to discuss during this visit:  · Reading. Does your child like to read? Is the child reading at the right level for his or her age group?   · Friendships. Does your child have friends at school? How do they get along? Do you like your childs friends? Do you have any concerns about your childs friendships or problems that may be happening with other children (such as bullying)?  · Activities. What does your child like to do for fun? Is he or she involved in after-school activities such as sports, scouting, or music classes?   · Family interaction. How are  HPI.    Physical exam:   Constitutional: Elderly female in NAD   Vitals:   VITALS:  BP (!) 118/55   Pulse 90   Temp 97.7 °F (36.5 °C) (Oral) Comment: returned from OR  Resp 18   Ht 5' 2.5\" (1.588 m)   Wt 170 lb (77.1 kg)   SpO2 94%   BMI 30.60 kg/m²   24HR INTAKE/OUTPUT:      Intake/Output Summary (Last 24 hours) at 11/26/2021 1103  Last data filed at 11/26/2021 0945  Gross per 24 hour   Intake 420 ml   Output 300 ml   Net 120 ml     URINARY CATHETER OUTPUT ([place]):     DRAIN/TUBE OUTPUT:     VENT SETTINGS:  Vent Information  SpO2: 94 %  Additional Respiratory  Assessments  Pulse: 90  Resp: 18  SpO2: 94 %    Skin: no rash, turgor wnl  Heent:  eomi, mmm  Neck: no bruits or jvd noted  Cardiovascular: PMI not lat displaced S1, S2 without m/r/g  Respiratory: CTA B without w/r/r  Abdomen:  +bs, soft, nd, tender in the midepigastrium, no HSM  Ext: (-) bilat lower extremity edema  Psychiatric: mood and affect appropriate, cr nr 2-12 grossly intact  Musculoskeletal:  Rom, muscular strength intact    Data:   Labs:  CBC:   Lab Results   Component Value Date    WBC 5.2 11/26/2021    RBC 3.14 11/26/2021    HGB 9.9 11/26/2021    HCT 30.9 11/26/2021    MCV 98.4 11/26/2021    MCH 31.5 11/26/2021    MCHC 32.0 11/26/2021    RDW 20.2 11/26/2021     11/26/2021    MPV 10.3 11/26/2021     CBC with Differential:    Lab Results   Component Value Date    WBC 5.2 11/26/2021    RBC 3.14 11/26/2021    HGB 9.9 11/26/2021    HCT 30.9 11/26/2021     11/26/2021    MCV 98.4 11/26/2021    MCH 31.5 11/26/2021    MCHC 32.0 11/26/2021    RDW 20.2 11/26/2021    LYMPHOPCT 0.9 11/23/2021    PROMYELOPCT 0.9 11/23/2021    MONOPCT 3.5 11/23/2021    MYELOPCT 0.9 04/15/2021    BASOPCT 0.8 11/23/2021    MONOSABS 0.40 11/23/2021    LYMPHSABS 0.10 11/23/2021    EOSABS 0.00 11/23/2021    BASOSABS 0.08 11/23/2021     Hemoglobin/Hematocrit:    Lab Results   Component Value Date    HGB 9.9 11/26/2021    HCT 30.9 11/26/2021     CMP:    Lab things at home? Does your child have good relationships with others in the family? Does he or she talk to you about problems? How is the childs behavior at home?   · Behavior and participation at school. How does your child act at school? Does the child follow the classroom routine and take part in group activities? What do teachers say about the childs behavior? Is homework finished on time? Do you or other family members help with homework?  · Household chores. Does your child help around the house with chores such as taking out the trash or setting the table?  Nutrition and exercise tips  Teaching your child healthy eating and lifestyle habits can lead to a lifetime of good health. To help, set a good example with your words and actions. Remember, good habits formed now will stay with your child forever. Here are some tips:  · Help your child get at least 30 to 60 minutes of active play per day. Moving around helps keep your child healthy. Go to the park, ride bikes, or play active games like tag or ball.  · Limit screen time to 1 hour each day. This includes time spent watching TV, playing video games, using the computer, and texting. If your child has a TV, computer, or video game console in the bedroom, replace it with a music player. For many kids, dancing and singing are fun ways to get moving.  · Limit sugary drinks. Soda, juice, and sports drinks lead to unhealthy weight gain and tooth decay. Water and low-fat or nonfat milk are best to drink. In moderation (6 ounces for a child 6 years old and 12 ounces for a child 7 to 10 years old daily), 100% fruit juice is OK. Save soda and other sugary drinks for special occasions.   · Serve nutritious foods. Keep a variety of healthy foods on hand for snacks, including fresh fruits and vegetables, lean meats, and whole grains. Foods like french fries, candy, and snack foods should only be served rarely.   · Serve child-sized portions. Children dont need as much  Results   Component Value Date     11/26/2021    K 4.3 11/26/2021    K 4.0 11/03/2021     11/26/2021    CO2 22 11/26/2021    BUN 22 11/26/2021    CREATININE 2.1 11/26/2021    GFRAA 28 11/26/2021    LABGLOM 23 11/26/2021    GLUCOSE 72 11/26/2021    PROT 5.8 11/25/2021    LABALBU 2.7 11/25/2021    CALCIUM 9.9 11/26/2021    BILITOT 1.1 11/25/2021    ALKPHOS 118 11/25/2021    AST 21 11/25/2021    ALT 9 11/25/2021     BMP:    Lab Results   Component Value Date     11/26/2021    K 4.3 11/26/2021    K 4.0 11/03/2021     11/26/2021    CO2 22 11/26/2021    BUN 22 11/26/2021    LABALBU 2.7 11/25/2021    CREATININE 2.1 11/26/2021    CALCIUM 9.9 11/26/2021    GFRAA 28 11/26/2021    LABGLOM 23 11/26/2021    GLUCOSE 72 11/26/2021     BUN/Creatinine:    Lab Results   Component Value Date    BUN 22 11/26/2021    CREATININE 2.1 11/26/2021     Hepatic Function Panel:    Lab Results   Component Value Date    ALKPHOS 118 11/25/2021    ALT 9 11/25/2021    AST 21 11/25/2021    PROT 5.8 11/25/2021    BILITOT 1.1 11/25/2021    BILIDIR 0.6 11/23/2021    IBILI 0.9 11/23/2021    LABALBU 2.7 11/25/2021     Albumin:    Lab Results   Component Value Date    LABALBU 2.7 11/25/2021     Calcium:    Lab Results   Component Value Date    CALCIUM 9.9 11/26/2021     Ionized Calcium:  No results found for: IONCA  Magnesium:    Lab Results   Component Value Date    MG 1.7 02/04/2021     Phosphorus:    Lab Results   Component Value Date    PHOS 2.7 02/27/2021     LDH:    Lab Results   Component Value Date     11/01/2021     Uric Acid:    Lab Results   Component Value Date    LABURIC 8.0 02/27/2021     PT/INR:    Lab Results   Component Value Date    PROTIME 14.9 08/17/2021    INR 1.3 08/17/2021     PTT:    Lab Results   Component Value Date    APTT 29.9 08/17/2021   [APTT}  Troponin:    Lab Results   Component Value Date    TROPONINI <0.01 02/04/2021     U/A:    Lab Results   Component Value Date    NITRITE Neg 10/29/2018 food as adults. Serve your child portions that make sense for his or her age and size. Let your child stop eating when he or she is full. If your child is still hungry after a meal, offer more vegetables or fruit.  · Ask the healthcare provider about your childs weight. Your child should gain about 4 to 5 pounds each year. If your child is gaining more than that, talk to the healthcare provider about healthy eating habits and exercise guidelines.  · Bring your child to the dentist at least twice a year for teeth cleaning and a checkup.  Sleeping tips  Now that your child is in school, a good nights sleep is even more important. At this age, your child needs about 10 hours of sleep each night. Here are some tips:  · Set a bedtime and make sure your child follows it each night.  · TV, computer, and video games can agitate a child and make it hard to calm down for the night. Turn them off at least an hour before bed. Instead, read a chapter of a book together.  · Remind your child to brush and floss his or her teeth before bed. Directly supervise your child's dental self-care to make sure that both the back teeth and the front teeth are cleaned.  Safety tips  Recommendations to keep your child safe include the following:   · When riding a bike, your child should wear a helmet with the strap fastened. While roller-skating, roller-blading, or using a scooter or skateboard, its safest to wear wrist guards, elbow pads, and knee pads, as well as a helmet.  · In the car, continue to use a booster seat until your child is taller than 4 feet 9 inches. At this height, kids are able to sit with the seat belt fitting correctly over the collarbone and hips. Ask the healthcare provider if you have questions about when your child will be ready to stop using a booster seat. All children younger than 13 should sit in the back seat.  · Teach your child not to talk to strangers or go anywhere with a stranger.  · Teach your child to  COLORU Yellow 11/24/2021    PROTEINU Negative 11/24/2021    PHUR 7.0 11/24/2021    WBCUA 1-3 11/24/2021    RBCUA 10-20 11/24/2021    YEAST MODERATE 08/22/2017    BACTERIA MANY 11/24/2021    CLARITYU SL CLOUDY 11/24/2021    SPECGRAV 1.010 11/24/2021    LEUKOCYTESUR TRACE 11/24/2021    UROBILINOGEN 2.0 11/24/2021    BILIRUBINUR Negative 11/24/2021    BILIRUBINUR Neg 10/29/2018    BLOODU LARGE 11/24/2021    GLUCOSEU Negative 11/24/2021     ABG:    Lab Results   Component Value Date    PCO2 37.3 08/17/2021    PO2 79.0 08/17/2021    HCO3 25.6 08/17/2021    BE 1.8 08/17/2021    O2SAT 96.1 08/17/2021     HgBA1c:    Lab Results   Component Value Date    LABA1C 5.3 10/19/2021     Microalbumen/Creatinine ratio:  No components found for: RUCREAT  FLP:    Lab Results   Component Value Date    TRIG 65 02/27/2021    HDL 37 02/27/2021    LDLCALC 44 02/27/2021    LABVLDL 13 02/27/2021     TSH:    Lab Results   Component Value Date    TSH 2.480 11/25/2021     VITAMIN B12: No components found for: B12  FOLATE:    Lab Results   Component Value Date    FOLATE 17.4 11/25/2021     Iron Saturation:  No components found for: PERCENTFE  FERRITIN:    Lab Results   Component Value Date    FERRITIN 408 05/16/2019     AMYLASE:  No results found for: AMYLASE  LIPASE:    Lab Results   Component Value Date    LIPASE 46 11/23/2021     Fibrinogen Level:  No components found for: FIB  24 Hour Urine for Protein:  No components found for: RAWUPRO, UHRS3, BEZM46HF, UTV3  24 Hour Urine for Creatinine Clearance:  No components found for: CREAT4, UHRS10, UTV10     Imaging:  CXR results:EXAMINATION:  ONE XRAY VIEW OF THE CHEST     11/24/2021 4:16 am     COMPARISON:  Two-view study from 11/19/2021.     HISTORY:  ORDERING SYSTEM PROVIDED HISTORY: vomiting fever  TECHNOLOGIST PROVIDED HISTORY:  Reason for exam:->vomiting fever  What reading provider will be dictating this exam?->CRC     FINDINGS:  The cardiopericardial silhouette size is within normal swim. Many communities offer low-cost swimming lessons. Do not let your child play in or around a pool unattended, even if he or she knows how to swim.  Vaccines  Based on recommendations from the CDC, at this visit your child may receive the following vaccines:  · Diphtheria, tetanus, and pertussis (age 6 only)  · Human papillomavirus (HPV) (ages 9 and up)  · Influenza (flu), annually  · Measles, mumps, and rubella (age 6)  · Polio (age 6)  · Varicella (chickenpox) (age 6)  Bedwetting: Its not your childs fault  Bedwetting, or urinating when sleeping, can be frustrating for both you and your child. But its usually not a sign of a major problem. Your childs body may simply need more time to mature. If a child suddenly starts wetting the bed, the cause is often a lifestyle change (such as starting school) or a stressful event (such as the birth of a sibling). But whatever the cause, its not in your childs direct control. If your child wets the bed:  · Keep in mind that your child is not wetting on purpose. Never punish or tease a child for wetting the bed. Punishment or shaming may make the problem worse, not better.  · To help your child, be positive and supportive. Praise your child for not wetting and even for trying hard to stay dry.  · Two hours before bedtime, dont serve your child anything to drink.  · Remind your child to use the toilet before bed. You could also wake him or her to use the bathroom before you go to bed yourself.  · Have a routine for changing sheets and pajamas when the child wets. Try to make this routine as calm and orderly as possible. This will help keep both you and your child from getting too upset or frustrated to go back to sleep.  · Put up a calendar or chart and give your child a star or sticker for nights that he or she doesnt wet the bed.  · Encourage your child to get out of bed and try to use the toilet if he or she wakes during the night. Put night-lights in the  bedroom, hallway, and bathroom to help your child feel safer walking to the bathroom.  · If you have concerns about bedwetting, discuss them with the healthcare provider.       Next checkup at: _______________________________     PARENT NOTES:  Date Last Reviewed: 12/1/2016  © 8759-8745 [employer]. [address] [place] [address], [place] [address]. All rights reserved. This information is not intended as a substitute for professional medical care. Always follow your healthcare professional's instructions.      Please continue to see the  at school; please also make an appointment to see a child psychologist.    Eat healthily.  Fewer chips, snacks, cookies, soft drinks, and fast food.          lobe     Organs: Cirrhotic appearance of the liver, evaluation limited without  contrast, low-attenuation left lobe lesion around image 69 is present with  density favoring a cyst on this exam although prior exam showed above water  density, considering the high risk recommend follow-up MRI.  There are beam  hardening artifacts also limiting evaluation of abdominal organs. Hypodensity present at the medial spleen appears to be cystic as well by  density.  Mild nephrolithiasis.  Right hydronephrosis left-side collecting  system mildly prominent but may be compensation with parenchymal volume loss. Calculus at the proximal right ureter is 6 mm, lower nephrolithiasis on the  left present.  Mild splenomegaly.  Additional low-attenuation hepatic lesions  are less well visualized on this exam a a     GI/Bowel: No obstruction.  Diverticulosis coli present     Pelvis: Hysterectomy     Peritoneum/Retroperitoneum: There are mild atherosclerotic calcifications  present.     Bones/Soft Tissues: Umbilicus fat hernia with a small amount of.  Anterior  soft tissue nodules in the subcutaneous fat may be injection related but are  nonspecific. Reyez Pyle to severe spinal degenerative changes. Prior mild  compression fractures in thoracic spine are seen        Impression  1. 6 mm right proximal ureteral calculus with moderate hydronephrosis. Bilateral nephrolithiasis  2. Hepatic cirrhosis and portal hypertension including mild ascites  3. Moderate to large left pleural effusion, complete left lower lobe  atelectasis  4. Indeterminate hepatic lesions, recommend follow-up MRI evaluation      Assessment  1-Stage III REN in the setting of Nephrolithiasis and Obstructive Uropathy exacerbated by the outpt furosemide and spironolactone  UA cloudy, large blood, pH 7.0, LE tr, RBC >20, WBC 1-3  Cr trending down 3.0-->3.1-->2.5-->2.1mg/dl  11/26/21 S/P Cystopanendoscopy; retrograde pyelogram, right; and right  stent insertion.   PLAN:  1. Follow Cr post Stent     2-CKD G3A with a baseline serum cr 1.1mg/dl with an e-GFR=48ml/min presumed sec to DM Nephropathy  PLAN:  1. Continue to monitor    3-Hyperkalemia in the setting of the REN and the Aldactone  K+ WNL  PLAN  1. Follow K+    4- Hypervolemia with Mod -Large L Pl Eff and Ascites  PLAN:  1. Follow for present    5- HTN with CKD I-IV  BP goal <130/80  PLAN:  1. Monitor off the loop+ Aldactone    6. Anemia in CKD  HgB down with the hydration  PLAN:  1. Check Fe++, B12, folate, SPEP and UPEP  2.  Follow H/H    Thank you Dr. Jeanette Thayer MD for allowing us to participate in care of Tiffanie Malik MD

## 2021-11-26 NOTE — BRIEF OP NOTE
Brief Postoperative Note      Patient:  Angélica Mcclelland  YOB: 1945  MRN: [id number]    Date of Procedure: 11/26/2021    Pre-Op Diagnosis: right proximal ureteral calculus with hydronephrosis and azotemia and pain    Post-Op Diagnosis: same       Procedure(s):  CYSTOSCOPY RETROGRADE PYELOGRAM RIGHT  STENT INSERTION    Surgeon(s):  Bryon Higgins MD    Assistant:  none    Anesthesia: Monitor Anesthesia Care and a B&0 supp at the end of the case    Estimated Blood Loss (mL): minimal less than 5 cc    Complications: none    Specimens:   Urine for C&S from right renal pelvis    Implants:  none      Drains:   [REMOVED] External Urinary Catheter (Removed)       Findings: as above    Electronically signed by Bryon Higgins MD on 11/26/2021 at 7:48 AM

## 2021-11-26 NOTE — CARE COORDINATION
Spoke with patient. She is from home, lives alone. She uses a cane for ambulation. Currently active with [facility], they are following, will need resume homecare orders when released. Patient was at [facility] in the past and this was a good experience if weak and requiring AMAN when released. Scored 16 with OT, will follow up after PT to confirm plan is ok for home. For questions I can be reached at [phone]. [place], [place]    The Plan for Transition of Care is related to the following treatment goals: discharge planning when stable     The Patient and/or patient representative Daniela Connor was provided with a choice of provider and agrees   with the discharge plan. [x] Yes [] No    Freedom of choice list was provided with basic dialogue that supports the patient's individualized plan of care/goals, treatment preferences and shares the quality data associated with the providers.  [x] Yes [] No

## 2021-11-26 NOTE — PROGRESS NOTES
OCCUPATIONAL THERAPY INITIAL EVALUATION    [place] [place] [address] [address]      Date:2021                                                  Patient Name: Ana María Case  MRN: [id number]  : 1945  Room: [address] [place] [address]    Evaluating OT: RACIEL Almanzar, OTR/L [id number]  Referring Becca Cabrera MD  Specific Provider Orders: OT eval and treat   Recommended Adaptive Equipment: AE for LB dressing    Diagnosis: acute renal failure   Surgery:  CYSTOSCOPY RETROGRADE PYELOGRAM RIGHT  STENT INSERTION  Pertinent Medical History: breast CA, HTN, HLD, DM II, radiation induced neuropathy, R humerus fx (2021) , cirrhosis  Precautions:  Fall Risk    Assessment of current deficits   [x] Functional mobility  [x]ADLs  [x] Strength               [x]Cognition   [x] Functional transfers   [x] IADLs         [x] Safety Awareness   [x]Endurance   [] Fine Coordination              [x] Balance      [] Vision/perception   [x]Sensation    []Gross Motor Coordination  [] ROM  [] Delirium                   [] Motor Control     OT PLAN OF CARE   OT POC based on physician orders, patient diagnosis and results of clinical assessment    Frequency/Duration: 2-3 days/wk for 2 weeks PRN   Specific OT Treatment to include:   * Instruction/training on adapted ADL techniques and AE recommendations to increase functional independence within precautions       * Training on energy conservation strategies, correct breathing pattern and techniques to improve independence/tolerance for self-care routine  * Functional transfer/mobility training/DME recommendations for increased independence, safety, and fall prevention  * Patient/Family education to increase follow through with safety techniques and functional independence  * Recommendation of environmental modifications for increased safety with functional transfers/mobility and ADLs  * Therapeutic exercise to improve motor endurance, ROM, and functional strength for ADLs/functional transfers  * Therapeutic activities to facilitate/challenge dynamic balance, stand tolerance for increased safety and independence with ADLs  * Neuro-muscular re-education: facilitation of righting/equilibrium reactions, midline orientation, scapular stability/mobility, normalization of muscle tone, and facilitation of volitional active controled movement    Home Living: Pt lives alone in an apartment with step(s) to enter; bed/bath on 1 level when entering apartment   Bathroom setup: tub shower unit  Equipment owned: shower cahir, toilet riser   Prior Level of Function: IND with ADLs/ IADLs; using SBQC for functional mobility   Driving: no    Pain Level: 0/10  Cognition: A&O: 3/4; Follows 1 step directions    Memory:  fair    Sequencing:  fair    Problem solving:  fair    Judgement/safety:  fair     Functional Assessment:  AM-PAC Daily Activity Raw Score: 16/24   Initial Eval Status  Date: 11/26/21 Treatment Status  Date: STGs=LTGs  Time Frame: 10-14 days   Feeding SUP   IND   Grooming SBA (standing at sink for hand hygiene)   IND   UB Dressing Min A (due to limited ROM)   SBA   LB Dressing Max A (assist with socks/brief)  Min A    Bathing Mod A (simulated)  Min A    Toileting Min A (assist with hygiene)  SBA   Bed Mobility  Log roll: SBA  Supine to sit: SBA   Sit to supine: NT   Log roll IND  Supine to sit: IND   Sit to supine: IND   Functional Transfers Sit to stand:SBA   Stand to sit:SBA  Commode: SBA  IND   Functional Mobility SBA (using SBQC, to/from bathroom)  IND   Balance Sitting: SBA  Standing: SBA     Activity Tolerance Fair+     Visual/  Perceptual Glasses: none present              UE ROM: BUE: elbow flex WFL, shoulder flex grossly 90'  Strength: RUE: grossly 3+/5 LUE: grossly 3+/5   Strength: B WFL  Fine Motor Coordination:  WFL     Hearing: WFL  Sensation:  c/o numbness/tingling intermittently in B feet  Tone:  WFL  Edema: BLEs [address] [facility] [id number]       ADL/Home Mgt [id number] 15 1   Neuro Re-ed [id number]       Group Therapy        Orthotic manage/training  [id number]       Non-Billable Time           Austin Guerrero OTR/L [id number]

## 2021-11-26 NOTE — ANESTHESIA PRE PROCEDURE
Department of Anesthesiology  Preprocedure Note       Name:  Neptali Ch   Age:  68 y.o.  :  1945                                          MRN:  [id number]         Date:  2021      Surgeon: Becka Randolph):  Cherelle Dobbs MD    Procedure: CYSTOSCOPY RETROGRADE PYELOGRAM RIGHT  STENT INSERTION (Right )    Medications prior to admission:   Prior to Admission medications    Medication Sig Start Date End Date Taking?  Authorizing Provider   apixaban starter pack (ELIQUIS DVT/PE STARTER PACK) 5 MG TBPK tablet Take 1 tablet by mouth See Admin Instructions Take two tablets twice daily for 7 days ( 20 mg daily) and then decrease dose to 5 mg twice daily (10 mg total) daily for 3 months 11/3/21   Ginny Gandara MD   amLODIPine (NORVASC) 2.5 MG tablet TAKE ONE TABLET BY MOUTH ONCE DAILY AT 9AM 10/6/21   Historical Provider, MD   fluticasone-salmeterol (ADVAIR) 250-50 MCG/DOSE AEPB INHALE 1 PUFF EVERY TWELVE HOURS 10/6/21   Historical Provider, MD   XIFAXAN 550 MG tablet TAKE 1 TABLET BY MOUTH TWICE DAILY 10/4/21   Unique Dill MD   pantoprazole (PROTONIX) 40 MG tablet Take 1 tablet by mouth daily 21   Unique Dill MD   Biotin 66866 MCG TABS Take by mouth every 7 days    Historical Provider, MD   vitamin D3 (CHOLECALCIFEROL) 25 MCG (1000 UT) TABS tablet Take 1,000 Units by mouth daily    Historical Provider, MD   venlafaxine (EFFEXOR XR) 75 MG extended release capsule Take 1 capsule by mouth daily 21   Unique Dill MD   spironolactone (ALDACTONE) 100 MG tablet Take 1 tablet by mouth daily 21   Unique Dill MD   albuterol sulfate HFA (VENTOLIN HFA) 108 (90 Base) MCG/ACT inhaler Inhale 2 puffs into the lungs 4 times daily as needed for Wheezing 21   Unique Dill MD   budesonide-formoterol [facility]) 160-4.5 MCG/ACT AERO Inhale 2 puffs into the lungs 2 times daily 21   Unique Dill MD   lactulose (CHRONULAC) 10 GM/15ML solution Take 30 mLs by mouth 3 times daily 6/30/21   Rukhsana Baker MD   furosemide (LASIX) 40 MG tablet Take 1 tablet by mouth daily 6/30/21   Rukhsana Baker MD   doxazosin (CARDURA) 1 MG tablet Take 1 tablet by mouth nightly 6/30/21   Rukhsana Baker MD       Current medications:    No current facility-administered medications for this visit. No current outpatient medications on file.      Facility-Administered Medications Ordered in Other Visits   Medication Dose Route Frequency Provider Last Rate Last Admin    0.9 % sodium chloride infusion  1,000 mL IntraVENous Continuous Tasha Godfrey MD   Stopped at 11/24/21 1915    amLODIPine (NORVASC) tablet 2.5 mg  2.5 mg Oral Daily Tasha Godfrey MD   2.5 mg at 11/25/21 0905    doxazosin (CARDURA) tablet 1 mg  1 mg Oral Nightly Tasha Godfrey MD   1 mg at 11/25/21 2041    lactulose (CHRONULAC) 10 GM/15ML solution 20 g  20 g Oral TID Tasha Godfrey MD   20 g at 11/25/21 0904    pantoprazole (PROTONIX) tablet 40 mg  40 mg Oral Daily Tasha Godfrey MD   40 mg at 11/25/21 0905    venlafaxine (EFFEXOR XR) extended release capsule 75 mg  75 mg Oral Daily Tasha Godfrey MD   75 mg at 11/25/21 0905    rifaximin (XIFAXAN) tablet 550 mg  550 mg Oral BID Tasha Godfrey MD   550 mg at 11/25/21 2041    sodium chloride flush 0.9 % injection 5-40 mL  5-40 mL IntraVENous 2 times per day Tasha Godfrey MD   10 mL at 11/25/21 2042    sodium chloride flush 0.9 % injection 5-40 mL  5-40 mL IntraVENous PRN Tasha Godfrey MD        0.9 % sodium chloride infusion  25 mL IntraVENous PRN Tasha Godfrey MD        ondansetron (ZOFRAN-ODT) disintegrating tablet 4 mg  4 mg Oral Q8H PRN Tasha Godfrey MD        Or    ondansetron (ZOFRAN) injection 4 mg  4 mg IntraVENous Q6H PRN Tasha Godfrey MD   4 mg at 11/25/21 2357    polyethylene glycol (GLYCOLAX) packet 17 g  17 g Oral Daily PRN Tasha Godfrey MD        acetaminophen (TYLENOL) tablet 650 mg  650 mg Oral Q6H PRN Tasha Godfrey MD        Or    acetaminophen (TYLENOL) suppository 650 mg  650 mg Rectal Q6H PRN Jessica Boyer MD        apixaban (ELIQUIS) tablet 5 mg  5 mg Oral BID Jessica Boyer MD   5 mg at 11/25/21 0905    albuterol (PROVENTIL) nebulizer solution 2.5 mg  2.5 mg Nebulization 4x Daily PRN Jessica Boyer MD        budesonide (PULMICORT) nebulizer suspension 500 mcg  0.5 mg Nebulization BID Jessica Boyer MD   500 mcg at 11/25/21 2051    And    Arformoterol Tartrate (BROVANA) nebulizer solution 15 mcg  15 mcg Nebulization BID Jessica Boyer MD   15 mcg at 11/25/21 2051    cefTRIAXone (ROCEPHIN) 1,000 mg in sterile water 10 mL IV syringe  1,000 mg IntraVENous Q24H XENIA Byers - CNP   1,000 mg at 11/25/21 1536    ondansetron (ZOFRAN-ODT) disintegrating tablet 4 mg  4 mg Oral Once Jessica Boyer MD           Allergies: Allergies   Allergen Reactions    Lisinopril        Problem List:    Patient Active Problem List   Diagnosis Code    Malignant neoplasm of left breast ([facility] C50.912    Type 2 diabetes mellitus ([address] E11.9    Obstructive sleep apnea syndrome G47.33    Essential hypertension I10    Multiple thyroid nodules E04.2    Depression F32. A    Hx of adenomatous colonic polyps Z86.010    Dyslipidemia E78.5    Cirrhosis ([address] K74.60    S/P reverse total shoulder arthroplasty, 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    Anemia D64.9    Pleural effusion associated with hepatic disorder K76.9, J91.8    Palpitations R00.2    Rhabdomyolysis M62.82    Hyperammonemia (HCC) E72.20    Closed displaced fracture of surgical neck of right humerus S42.211A    Pulmonary embolism (HCC) I26.99    Idiopathic acute pancreatitis without infection or necrosis K85.00    Asymptomatic microscopic hematuria R31.21    Breast mass, left N63.20    Anxiety disorder, unspecified F41.9    Gastro-esophageal reflux disease without esophagitis K21.9    Major depressive disorder, recurrent severe without psychotic features ([address] F33.2    Muscle weakness (generalized) M62.81    Need for assistance with personal care Z74.1    Other abnormalities of gait and mobility R26.89    Unspecified physeal fracture of upper end of humerus, left arm, subsequent encounter for fracture with routine healing S49.002D    Difficulty in walking, not elsewhere classified R26.2    Acute renal failure (ARF) (HCC) N17.9       Past Medical History:        Diagnosis Date    Breast cancer ([facility]     Cirrhosis ([facility]     Essential hypertension     Hx of blood clots     Hyperlipidemia     Osteopenia     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 ([facility]     Sleep apnea     Spinal stenosis     Type 2 diabetes mellitus ([facility]        Past Surgical History:        Procedure Laterality Date    BREAST LUMPECTOMY      lumpectomy [id number]    CARDIOVASCULAR STRESS TEST      Lexiscan stress test    CARPAL TUNNEL RELEASE      COLONOSCOPY  01/31/2017    multiple polyps; diverticula--jerod    COLONOSCOPY  04/23/2019    polyps; diverticula; hemorrhoids--jerod    COLONOSCOPY N/A 04/23/2019    COLONOSCOPY POLYPECTOMY SNARE/COLD BIOPSY performed by Kevin Milligan MD at [place] [address]  04/23/2019    COLONOSCOPY WITH BIOPSY performed by Kevin Milligan MD at [address] [place] [address] LITHOTRIPSY Left 05/04/2016    C-R STENT PLACEMENT    SHOULDER ARTHROPLASTY Left 12/21/2020    LEFT REVERSE TOTAL SHOULDER  ARTHROPLASTY -- DEPUY performed by Mckenzie Marshall MD at [facility] ENDOSCOPY  04/23/2019    gastritis--jerod    UPPER GASTROINTESTINAL ENDOSCOPY N/A 04/23/2019    EGD BIOPSY performed by Kevin Milligan MD at [address] History:    Social History     Tobacco Use    Smoking status: Never Smoker    Smokeless tobacco: Never Used   Substance Use Topics    Alcohol use: Never     Alcohol/week: 0.0 standard drinks                                Counseling given: Not Answered      Vital Signs (Current): There were no vitals filed for this visit.                                            BP Readings from Last 3 Encounters:   11/26/21 136/68   11/03/21 136/78   10/19/21 130/74       NPO Status:                                                                                 BMI:   Wt Readings from Last 3 Encounters:   11/23/21 170 lb (77.1 kg)   10/29/21 170 lb (77.1 kg)   10/19/21 187 lb 3.2 oz (84.9 kg)     There is no height or weight on file to calculate BMI.    CBC:   Lab Results   Component Value Date    WBC 5.2 11/26/2021    RBC 3.14 11/26/2021    HGB 9.9 11/26/2021    HCT 30.9 11/26/2021    MCV 98.4 11/26/2021    RDW 20.2 11/26/2021     11/26/2021       CMP:   Lab Results   Component Value Date     11/26/2021    K 4.3 11/26/2021    K 4.0 11/03/2021     11/26/2021    CO2 22 11/26/2021    BUN 22 11/26/2021    CREATININE 2.1 11/26/2021    GFRAA 28 11/26/2021    LABGLOM 23 11/26/2021    GLUCOSE 72 11/26/2021    PROT 5.8 11/25/2021    CALCIUM 9.9 11/26/2021    BILITOT 1.1 11/25/2021    ALKPHOS 118 11/25/2021    AST 21 11/25/2021    ALT 9 11/25/2021       POC Tests: No results for input(s): POCGLU, POCNA, POCK, POCCL, POCBUN, POCHEMO, POCHCT in the last 72 hours. Coags:   Lab Results   Component Value Date    PROTIME 14.9 08/17/2021    INR 1.3 08/17/2021    APTT 29.9 08/17/2021       HCG (If Applicable): No results found for: PREGTESTUR, PREGSERUM, HCG, HCGQUANT     ABGs: No results found for: PHART, PO2ART, HAM1WCO, HXD9QHU, BEART, K0YYITDK     Type & Screen (If Applicable):  No results found for: LABABO, [address]    Anesthesia Evaluation  Patient summary reviewed no history of anesthetic complications:   Airway: Mallampati: II  TM distance: >3 FB   Neck ROM: full  Mouth opening: > = 3 FB Dental:      Comment: Grossly intact    Pulmonary: breath sounds clear to auscultation  (+) sleep apnea: on CPAP,                            ROS comment: CXR 12/2020:  FINDINGS:  The lungs are without acute focal process.  There is no effusion or  pneumothorax.  The cardiomediastinal silhouette is without acute process. The  osseous structures are without acute process. Cardiovascular:  Exercise tolerance: good (>4 METS),   (+) hypertension: moderate, CHF: diastolic, hyperlipidemia      ECG reviewed  Rhythm: regular  Rate: abnormal  Echocardiogram reviewed               ROS comment: Echo 7/2019:   Summary   Ejection fraction is visually estimated at 60%. No regional wall motion abnormalities seen   There is doppler evidence of stage I diastolic dysfunction. Normal right ventricle structure and function. Left atrial volume index of 38 ml per meters squared BSA. Mild mitral regurgitation is present. Mild to moderate tricuspid regurgitation. Neuro/Psych:   (+) neuromuscular disease (spinal stenosis):, psychiatric history: stable with treatmentdepression/anxiety             GI/Hepatic/Renal:   (+) liver disease (Cirrhosis):, renal disease: kidney stones,           Endo/Other:    (+) DiabetesType II DM, , blood dyscrasia (INR: 1.4 on 12/21/2020): anticoagulation therapy and anemia:., malignancy/cancer (Colon, left breast CA). Abdominal:   (+) obese,           Vascular: negative vascular ROS. Other Findings:               Anesthesia Plan      MAC     ASA 3       Induction: intravenous. Anesthetic plan and risks discussed with patient. Plan discussed with CRNA.                   Pepito Monk MD   11/26/2021

## 2021-11-26 NOTE — OP NOTE
The patient has bilateral stones. There is a large  stone in the lower pole on the left, not easily visualized on KUB today. No need for attention to the left side as there was no obstruction on  that side on CAT scan. A ureteral catheter was inserted and the 5  open-ended catheter popped the stone into the renal pelvis. Urine was  obtained for culture from the renal pelvis, was grossly clear. Retrograde pyelogram demonstrates moderate hydronephrosis of the renal  pelvis. A 6 x 28 J-stent was inserted to renal and positioned with  fluoroscopy of the bladder under direct visualization. The patient will  be stabilized with hydration and be sure she is not infected and at the  later time, a right ESWL will be considered and stent could possibly be  removed at that time. Bimanual exam demonstrates _____ cervix palpable. There was no cystocele, rectocele, or vaginal wall prolapse. The  cul-de-sac is negative by bimanual exam and rectal, good anal tone. No  hemorrhoids, no mass, no impaction. B and O suppository inserted. Blood loss in this case less than 5 mL. The patient tolerated the  procedure well and was sent back to her room in satisfactory condition. I anticipate discharge in the next 24 hours.         Devi Bacon MD    D: 11/26/2021 8:30:01       T: 11/26/2021 8:32:42     [email or web address]  Job#: [id number]     Doc#: [id number]    CC:  Aide Alfonso MD Abdominal pain, unspecified location

## 2021-11-26 NOTE — PROGRESS NOTES
injury  Nonoliguric REN, likely volume depletion with continued intake of Lasix and Aldactone  Baseline creatinine 1.1  Admitted with creatinine 3.0>>3.1>>2.5  Hold Lasix and Aldactone  Obtain UA with microscopy, urine electrolytes, urine urea nitrogen  Maintain on normal saline at 100 cc/h  Consult nephrology, follow recommendations    Right hydronephrosis  Secondary to 6 mm right proximal ureteral obstructing calculus  Plus bilateral nephrolithiasis  Started IV Rocephin on 11/24 by urology  Urine culture showed mixed fidelina  Cystoscopy on 11/26     Pulmonary embolism  Diagnosed on 11/3/2021  Started on full dose Eliquis, continue for now for 3 months     Cirrhosis  Chronic cirrhosis, etiology unclear  Continue home dose rifaximin and lactulose  No encephalopathy noted  No SBP suspected     Hypertension  Blood pressure well controlled  Currently on amlodipine 2.5 mg p.o. daily and Cardura 1 mg p.o. at night, continue for now  Hold Lasix and Aldactone     Depression  Continue venlafaxine     Idiopathic acute pancreatitis  Resolved during last admission     Pleural effusion  Status post thoracentesis on 10/31/2021  Seen by cardiothoracic surgery who recommended no intervention  Monitor closely for now       DISPOSITION:   Possible discharge over the weekend or early morning on Monday.     Medications:  REVIEWED DAILY    Infusion Medications    sodium chloride Stopped (11/24/21 1915)    sodium chloride       Scheduled Medications    amLODIPine  2.5 mg Oral Daily    doxazosin  1 mg Oral Nightly    lactulose  20 g Oral TID    pantoprazole  40 mg Oral Daily    venlafaxine  75 mg Oral Daily    rifaximin  550 mg Oral BID    sodium chloride flush  5-40 mL IntraVENous 2 times per day    apixaban  5 mg Oral BID    budesonide  0.5 mg Nebulization BID    And    Arformoterol Tartrate  15 mcg Nebulization BID    cefTRIAXone (ROCEPHIN) IV  1,000 mg IntraVENous Q24H    ondansetron  4 mg Oral Once     PRN Meds: meperidine, promethazine, labetalol, HYDROmorphone, HYDROmorphone, sodium chloride flush, sodium chloride, ondansetron **OR** ondansetron, polyethylene glycol, acetaminophen **OR** acetaminophen, albuterol    Labs:     Recent Labs     11/23/21  1400 11/25/21  0442 11/26/21  0500   WBC 9.9 6.0 5.2   HGB 11.5 9.8* 9.9*   HCT 35.0 30.9* 30.9*    189 185       Recent Labs     11/24/21  0603 11/25/21  0442 11/26/21  0500    141 139   K 5.0 4.9 4.3    107 106   CO2 21* 22 22   BUN 28* 28* 22   CREATININE 3.1* 2.5* 2.1*   CALCIUM 10.1 9.7 9.9       Recent Labs     11/23/21  1400 11/25/21  0442   PROT 7.3 5.8*   ALKPHOS 156* 118*   ALT 13 9   AST 27 21   BILITOT 1.5* 1.1   LIPASE 46  --        No results for input(s): INR in the last 72 hours. No results for input(s): Kathyrn Belch in the last 72 hours. Chronic labs:    Lab Results   Component Value Date    CHOL 94 02/27/2021    TRIG 65 02/27/2021    HDL 37 02/27/2021    LDLCALC 44 02/27/2021    TSH 2.480 11/25/2021    INR 1.3 08/17/2021    LABA1C 5.3 10/19/2021       Radiology: REVIEWED DAILY    +++++++++++++++++++++++++++++++++++++++++++++++++  Faby Reynolds MD  [facility] Physician - [address] [place], [place]  +++++++++++++++++++++++++++++++++++++++++++++++++  NOTE: This report was transcribed using voice recognition software. Every effort was made to ensure accuracy; however, inadvertent computerized transcription errors may be present.

## 2021-11-27 LAB
ALBUMIN SERPL-MCNC: 2.9 G/DL (ref 3.5–5.2)
ALP BLD-CCNC: 114 U/L (ref 35–104)
ALT SERPL-CCNC: 10 U/L (ref 0–32)
ANION GAP SERPL CALCULATED.3IONS-SCNC: 11 MMOL/L (ref 7–16)
APTT: 31.8 SEC (ref 24.5–35.1)
APTT: 32.6 SEC (ref 24.5–35.1)
AST SERPL-CCNC: 27 U/L (ref 0–31)
BILIRUB SERPL-MCNC: 0.5 MG/DL (ref 0–1.2)
BUN BLDV-MCNC: 19 MG/DL (ref 6–23)
CALCIUM SERPL-MCNC: 9.5 MG/DL (ref 8.6–10.2)
CHLORIDE BLD-SCNC: 106 MMOL/L (ref 98–107)
CO2: 20 MMOL/L (ref 22–29)
CREAT SERPL-MCNC: 1.7 MG/DL (ref 0.5–1)
FERRITIN: 39 NG/ML
FOLATE: 13.8 NG/ML (ref 4.8–24.2)
GFR AFRICAN AMERICAN: 35
GFR NON-AFRICAN AMERICAN: 29 ML/MIN/1.73
GLUCOSE BLD-MCNC: 102 MG/DL (ref 74–99)
HCT VFR BLD CALC: 30.7 % (ref 34–48)
HCT VFR BLD CALC: 31.2 % (ref 34–48)
HEMOGLOBIN: 10 G/DL (ref 11.5–15.5)
HEMOGLOBIN: 9.7 G/DL (ref 11.5–15.5)
IMMATURE RETIC FRACT: 22 % (ref 3–15.9)
IRON SATURATION: 9 % (ref 15–50)
IRON: 26 MCG/DL (ref 37–145)
MCH RBC QN AUTO: 31.9 PG (ref 26–35)
MCH RBC QN AUTO: 33 PG (ref 26–35)
MCHC RBC AUTO-ENTMCNC: 31.6 % (ref 32–34.5)
MCHC RBC AUTO-ENTMCNC: 32.1 % (ref 32–34.5)
MCV RBC AUTO: 104.4 FL (ref 80–99.9)
MCV RBC AUTO: 99.7 FL (ref 80–99.9)
PDW BLD-RTO: 19.9 FL (ref 11.5–15)
PDW BLD-RTO: 20.1 FL (ref 11.5–15)
PLATELET # BLD: 172 E9/L (ref 130–450)
PLATELET # BLD: 195 E9/L (ref 130–450)
PMV BLD AUTO: 10.2 FL (ref 7–12)
PMV BLD AUTO: 10.8 FL (ref 7–12)
POTASSIUM SERPL-SCNC: 4.2 MMOL/L (ref 3.5–5)
RBC # BLD: 2.94 E12/L (ref 3.5–5.5)
RBC # BLD: 3.13 E12/L (ref 3.5–5.5)
RETIC HGB EQUIVALENT: 36.9 PG (ref 28.2–36.6)
RETICULOCYTE ABSOLUTE COUNT: 0.09 E12/L
RETICULOCYTE COUNT PCT: 3.1 % (ref 0.4–1.9)
SODIUM BLD-SCNC: 137 MMOL/L (ref 132–146)
TOTAL IRON BINDING CAPACITY: 300 MCG/DL (ref 250–450)
TOTAL PROTEIN: 6.1 G/DL (ref 6.4–8.3)
VITAMIN B-12: 1009 PG/ML (ref 211–946)
WBC # BLD: 4.6 E9/L (ref 4.5–11.5)
WBC # BLD: 6.1 E9/L (ref 4.5–11.5)

## 2021-11-27 PROCEDURE — 85045 AUTOMATED RETICULOCYTE COUNT: CPT

## 2021-11-27 PROCEDURE — 2580000003 HC RX 258: Performed by: UROLOGY

## 2021-11-27 PROCEDURE — 84166 PROTEIN E-PHORESIS/URINE/CSF: CPT

## 2021-11-27 PROCEDURE — 6360000002 HC RX W HCPCS: Performed by: UROLOGY

## 2021-11-27 PROCEDURE — 86334 IMMUNOFIX E-PHORESIS SERUM: CPT

## 2021-11-27 PROCEDURE — 85027 COMPLETE CBC AUTOMATED: CPT

## 2021-11-27 PROCEDURE — 84165 PROTEIN E-PHORESIS SERUM: CPT

## 2021-11-27 PROCEDURE — 80053 COMPREHEN METABOLIC PANEL: CPT

## 2021-11-27 PROCEDURE — 83550 IRON BINDING TEST: CPT

## 2021-11-27 PROCEDURE — 85730 THROMBOPLASTIN TIME PARTIAL: CPT

## 2021-11-27 PROCEDURE — 36415 COLL VENOUS BLD VENIPUNCTURE: CPT

## 2021-11-27 PROCEDURE — 94640 AIRWAY INHALATION TREATMENT: CPT

## 2021-11-27 PROCEDURE — 51798 US URINE CAPACITY MEASURE: CPT

## 2021-11-27 PROCEDURE — 2060000000 HC ICU INTERMEDIATE R&B

## 2021-11-27 PROCEDURE — 6370000000 HC RX 637 (ALT 250 FOR IP): Performed by: INTERNAL MEDICINE

## 2021-11-27 PROCEDURE — 6370000000 HC RX 637 (ALT 250 FOR IP): Performed by: UROLOGY

## 2021-11-27 PROCEDURE — 82746 ASSAY OF FOLIC ACID SERUM: CPT

## 2021-11-27 PROCEDURE — 82607 VITAMIN B-12: CPT

## 2021-11-27 PROCEDURE — 82728 ASSAY OF FERRITIN: CPT

## 2021-11-27 PROCEDURE — 83540 ASSAY OF IRON: CPT

## 2021-11-27 RX ORDER — FUROSEMIDE 20 MG/1
40 TABLET ORAL DAILY
Status: DISCONTINUED | OUTPATIENT
Start: 2021-11-27 | End: 2021-11-30 | Stop reason: HOSPADM

## 2021-11-27 RX ORDER — HEPARIN SODIUM 1000 [USP'U]/ML
80 INJECTION, SOLUTION INTRAVENOUS; SUBCUTANEOUS PRN
Status: DISCONTINUED | OUTPATIENT
Start: 2021-11-27 | End: 2021-11-27

## 2021-11-27 RX ORDER — SPIRONOLACTONE 25 MG/1
100 TABLET ORAL DAILY
Status: DISCONTINUED | OUTPATIENT
Start: 2021-11-27 | End: 2021-11-30 | Stop reason: HOSPADM

## 2021-11-27 RX ORDER — HEPARIN SODIUM 1000 [USP'U]/ML
80 INJECTION, SOLUTION INTRAVENOUS; SUBCUTANEOUS ONCE
Status: DISCONTINUED | OUTPATIENT
Start: 2021-11-27 | End: 2021-11-27

## 2021-11-27 RX ORDER — HEPARIN SODIUM 10000 [USP'U]/100ML
5-30 INJECTION, SOLUTION INTRAVENOUS CONTINUOUS
Status: DISCONTINUED | OUTPATIENT
Start: 2021-11-27 | End: 2021-11-27

## 2021-11-27 RX ORDER — HEPARIN SODIUM 1000 [USP'U]/ML
40 INJECTION, SOLUTION INTRAVENOUS; SUBCUTANEOUS PRN
Status: DISCONTINUED | OUTPATIENT
Start: 2021-11-27 | End: 2021-11-27

## 2021-11-27 RX ADMIN — LACTULOSE 20 G: 20 SOLUTION ORAL at 09:16

## 2021-11-27 RX ADMIN — APIXABAN 5 MG: 5 TABLET, FILM COATED ORAL at 23:42

## 2021-11-27 RX ADMIN — DOXAZOSIN 1 MG: 2 TABLET ORAL at 23:41

## 2021-11-27 RX ADMIN — ALBUTEROL SULFATE 2.5 MG: 2.5 SOLUTION RESPIRATORY (INHALATION) at 08:28

## 2021-11-27 RX ADMIN — ARFORMOTEROL TARTRATE 15 MCG: 15 SOLUTION RESPIRATORY (INHALATION) at 08:28

## 2021-11-27 RX ADMIN — APIXABAN 5 MG: 5 TABLET, FILM COATED ORAL at 09:15

## 2021-11-27 RX ADMIN — PANTOPRAZOLE SODIUM 40 MG: 40 TABLET, DELAYED RELEASE ORAL at 09:15

## 2021-11-27 RX ADMIN — Medication 10 ML: at 23:42

## 2021-11-27 RX ADMIN — RIFAXIMIN 550 MG: 550 TABLET ORAL at 09:15

## 2021-11-27 RX ADMIN — AMLODIPINE BESYLATE 2.5 MG: 2.5 TABLET ORAL at 09:15

## 2021-11-27 RX ADMIN — ARFORMOTEROL TARTRATE 15 MCG: 15 SOLUTION RESPIRATORY (INHALATION) at 19:43

## 2021-11-27 RX ADMIN — FUROSEMIDE 40 MG: 20 TABLET ORAL at 09:40

## 2021-11-27 RX ADMIN — WATER 1000 MG: 1 INJECTION INTRAMUSCULAR; INTRAVENOUS; SUBCUTANEOUS at 15:16

## 2021-11-27 RX ADMIN — SPIRONOLACTONE 100 MG: 25 TABLET ORAL at 09:40

## 2021-11-27 RX ADMIN — ONDANSETRON 4 MG: 2 INJECTION INTRAMUSCULAR; INTRAVENOUS at 19:51

## 2021-11-27 RX ADMIN — BUDESONIDE 500 MCG: 0.5 SUSPENSION RESPIRATORY (INHALATION) at 08:28

## 2021-11-27 RX ADMIN — SODIUM CHLORIDE, PRESERVATIVE FREE 10 ML: 5 INJECTION INTRAVENOUS at 15:16

## 2021-11-27 RX ADMIN — VENLAFAXINE HYDROCHLORIDE 75 MG: 75 CAPSULE, EXTENDED RELEASE ORAL at 09:15

## 2021-11-27 RX ADMIN — Medication 10 ML: at 09:19

## 2021-11-27 RX ADMIN — RIFAXIMIN 550 MG: 550 TABLET ORAL at 23:41

## 2021-11-27 RX ADMIN — BUDESONIDE 500 MCG: 0.5 SUSPENSION RESPIRATORY (INHALATION) at 19:43

## 2021-11-27 ASSESSMENT — PAIN DESCRIPTION - ONSET: ONSET: ON-GOING

## 2021-11-27 ASSESSMENT — PAIN DESCRIPTION - LOCATION: LOCATION: OTHER (COMMENT)

## 2021-11-27 ASSESSMENT — PAIN SCALES - GENERAL
PAINLEVEL_OUTOF10: 7
PAINLEVEL_OUTOF10: 0

## 2021-11-27 ASSESSMENT — PAIN DESCRIPTION - PAIN TYPE: TYPE: ACUTE PAIN

## 2021-11-27 ASSESSMENT — PAIN - FUNCTIONAL ASSESSMENT: PAIN_FUNCTIONAL_ASSESSMENT: ACTIVITIES ARE NOT PREVENTED

## 2021-11-27 ASSESSMENT — PAIN DESCRIPTION - FREQUENCY: FREQUENCY: CONTINUOUS

## 2021-11-27 ASSESSMENT — PAIN DESCRIPTION - PROGRESSION: CLINICAL_PROGRESSION: NOT CHANGED

## 2021-11-27 ASSESSMENT — PAIN DESCRIPTION - DESCRIPTORS: DESCRIPTORS: BURNING

## 2021-11-27 NOTE — ANESTHESIA POSTPROCEDURE EVALUATION
Department of Anesthesiology  Postprocedure Note    Patient: Angélica Mcclelland  MRN: [id number]  YOB: 1945  Date of evaluation: 11/27/2021  Time:  6:48 AM     Procedure Summary     Date: 11/26/21 Room / Location: [place] [address] / [facility]    Anesthesia Start: 4533 Anesthesia Stop: 1744    Procedure: CYSTOSCOPY RETROGRADE PYELOGRAM RIGHT  STENT INSERTION (Right Bladder) Diagnosis: (/)    Surgeons: Bryon Higgins MD Responsible Provider: Buffy Manriquez MD    Anesthesia Type: MAC ASA Status: 3          Anesthesia Type: MAC    Peter Phase I: Peter Score: 9    Peter Phase II: Peter Score: 10    Last vitals: Reviewed and per EMR flowsheets.        Anesthesia Post Evaluation    Patient location during evaluation: PACU  Patient participation: complete - patient participated  Level of consciousness: awake  Airway patency: patent  Nausea & Vomiting: no nausea and no vomiting  Complications: no  Cardiovascular status: hemodynamically stable  Respiratory status: acceptable  Hydration status: stable
no

## 2021-11-27 NOTE — PROGRESS NOTES
Hospitalist Progress Note      SYNOPSIS: Patient admitted on 2021 for complaint of nausea and vomiting, persistent. In the ER, she had stable vitals and unremarkable labs with exception of elevated creatinine of 2.0 compared to 1.1 at her baseline. Patient denies any abdominal pain, distention. No vomiting since arrival in the ER, possibly because of the Zofran she received in the ER. She received 1 L of normal saline initially and then kept on normal saline at 100 cc/h for maintenance IV fluids. CT abdomen pelvis without contrast showed right hydronephrosis Secondary to 6 mm right proximal ureteral obstructing calculus  Plus bilateral nephrolithiasis  Started IV Rocephin on  by urology. Status post cystoscopy panendoscopy, retrograde pyelogram right-sided with right-sided stent insertion. SUBJECTIVE:    Patient seen and examined in her room. Patient appears comfortable. No major overnight events. Now complains of some cough. Records reviewed. Stable overnight. No other overnight issues reported. Temp (24hrs), Av.9 °F (36.6 °C), Min:97.7 °F (36.5 °C), Max:98.1 °F (36.7 °C)    DIET: ADULT DIET; Regular  CODE: Full Code    Intake/Output Summary (Last 24 hours) at 2021 0814  Last data filed at 2021  Gross per 24 hour   Intake 2143.34 ml   Output 1800 ml   Net 343.34 ml       OBJECTIVE:    /78   Pulse 98   Temp 98 °F (36.7 °C) (Oral)   Resp 18   Ht 5' 2.5\" (1.588 m)   Wt 170 lb (77.1 kg)   SpO2 98%   BMI 30.60 kg/m²     General appearance: No apparent distress, appears stated age and cooperative. HEENT:  Conjunctivae/corneas clear. Neck: Supple. No jugular venous distention. Respiratory: Clear to auscultation bilaterally, normal respiratory effort  Cardiovascular: Regular rate rhythm, normal S1-S2  Abdomen: Soft, nontender, nondistended  Musculoskeletal: No clubbing, cyanosis, no bilateral lower extremity edema. Brisk capillary refill.    Skin: No rashes  on visible skin  Neurologic: awake, alert and following commands     ASSESSMENT & PLAN:    Acute kidney injury  Nonoliguric REN, likely volume depletion with continued intake of Lasix and Aldactone  Baseline creatinine 1.1  Admitted with creatinine 3.0>>3.1>>2.5>>2.1  IV fluids discontinued on 11/26  Hold Lasix and Aldactone, resumed on 11/27  Consult nephrology, follow recommendations    Right hydronephrosis  Secondary to 6 mm right proximal ureteral obstructing calculus  Plus bilateral nephrolithiasis  Started IV Rocephin on 11/24 by urology  Urine culture showed mixed fidelina   Status post cystoscopy panendoscopy, retrograde pyelogram right-sided with right-sided stent insertion.     Pulmonary embolism  Diagnosed on 11/3/2021  Started on full dose Eliquis, continue for now for 3 months     Cirrhosis  Chronic cirrhosis, etiology unclear  Continue home dose rifaximin and lactulose  No encephalopathy noted  No SBP suspected     Hypertension  Blood pressure well controlled  Currently on amlodipine 2.5 mg p.o. daily and Cardura 1 mg p.o. at night, continue for now  Hold Lasix and Aldactone     Depression  Continue venlafaxine     Idiopathic acute pancreatitis  Resolved during last admission     Pleural effusion  Status post thoracentesis on 10/31/2021  Seen by cardiothoracic surgery who recommended no intervention  Monitor closely for now     DISPOSITION:   Discharge on Monday.     Medications:  REVIEWED DAILY    Infusion Medications    sodium chloride       Scheduled Medications    amLODIPine  2.5 mg Oral Daily    doxazosin  1 mg Oral Nightly    lactulose  20 g Oral TID    pantoprazole  40 mg Oral Daily    venlafaxine  75 mg Oral Daily    rifaximin  550 mg Oral BID    sodium chloride flush  5-40 mL IntraVENous 2 times per day    apixaban  5 mg Oral BID    budesonide  0.5 mg Nebulization BID    And    Arformoterol Tartrate  15 mcg Nebulization BID    cefTRIAXone (ROCEPHIN) IV  1,000 mg IntraVENous Q24H    ondansetron  4 mg Oral Once     PRN Meds: sodium chloride flush, sodium chloride, ondansetron **OR** ondansetron, polyethylene glycol, acetaminophen **OR** acetaminophen, albuterol    Labs:     Recent Labs     11/25/21  0442 11/26/21  0500 11/27/21  0535   WBC 6.0 5.2 4.6   HGB 9.8* 9.9* 9.7*   HCT 30.9* 30.9* 30.7*    185 172       Recent Labs     11/25/21  0442 11/26/21  0500    139   K 4.9 4.3    106   CO2 22 22   BUN 28* 22   CREATININE 2.5* 2.1*   CALCIUM 9.7 9.9       Recent Labs     11/25/21  0442   PROT 5.8*   ALKPHOS 118*   ALT 9   AST 21   BILITOT 1.1       No results for input(s): INR in the last 72 hours. No results for input(s): Ronald Pears in the last 72 hours. Chronic labs:    Lab Results   Component Value Date    CHOL 94 02/27/2021    TRIG 65 02/27/2021    HDL 37 02/27/2021    LDLCALC 44 02/27/2021    TSH 2.480 11/25/2021    INR 1.3 08/17/2021    LABA1C 5.3 10/19/2021       Radiology: REVIEWED DAILY    +++++++++++++++++++++++++++++++++++++++++++++++++  Guero Swann MD  [facility] Emergency Department Physician - [address] [facility], [place]  +++++++++++++++++++++++++++++++++++++++++++++++++  NOTE: This report was transcribed using voice recognition software. Every effort was made to ensure accuracy; however, inadvertent computerized transcription errors may be present.

## 2021-11-27 NOTE — PROGRESS NOTES
Nephrology Progress Note  Patient's Name: Claudeen Porch    Nephrologist: Tong Bledsoe  Reason for Consult: REN      History of Present Ilness from the 11/25/21 note: Claudeen Porch is a 68 y.o. female with prior history of CKD G3A with a baseline serum cr 1.1mg/dl with an e-GFR=48ml/min presumed sec to DM Nephropathy who was seen by Dr. Lena Eduardo in the office. The pt was hospitalized 10/29/21-11/3/21 for SOB. 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 She was also found to have subsegmental Pe's and started on Eliquis. She had a CT Scan of the Abd and Pelvis which did not show any hydronephrosis. SHe presented back to the ED 11/23/21 for RLQ pain radiating to the back with associated N/V unrelieved by oral anti-emetics. At home T 99. She notes intermittent hematuria. Cr in the ED 3.0mg/dl with K+ 5.4. At home she was being maintained on loop+ aldactone. CT Scan in the ED revealed R hydro and Bilat calculus. This Am she notes mild midepigastric pain and ongoing nausea    11/27/21: Pt jawake alert and states she had cramping R sided abd pain.  She notes gross hematuria when she urinates    Past Medical History:   Diagnosis Date    Breast cancer ([address]     Cirrhosis ([address]     Essential hypertension     Hx of blood clots     Hyperlipidemia     Osteopenia     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 ([address]     Sleep apnea     Spinal stenosis     Type 2 diabetes mellitus ([address]        Past Surgical History:   Procedure Laterality Date    BREAST LUMPECTOMY      lumpectomy [id number]    CARDIOVASCULAR STRESS TEST      Lexiscan stress test    CARPAL TUNNEL RELEASE      COLONOSCOPY  01/31/2017    multiple polyps; diverticula--jerod    COLONOSCOPY  04/23/2019    polyps; diverticula; hemorrhoids--jerod    COLONOSCOPY N/A 04/23/2019    COLONOSCOPY POLYPECTOMY SNARE/COLD BIOPSY performed by Danie Go MD at [address] [facility]  04/23/2019    COLONOSCOPY WITH BIOPSY performed by Jennifer Dodson Once        Review of Systems:   Pertinent items are noted in HPI.     Physical exam:   Constitutional: Elderly female in NAD   Vitals:   VITALS:  /79   Pulse 103   Temp 97.8 °F (36.6 °C) (Oral)   Resp 16   Ht 5' 2.5\" (1.588 m)   Wt 170 lb (77.1 kg)   SpO2 92%   BMI 30.60 kg/m²   24HR INTAKE/OUTPUT:      Intake/Output Summary (Last 24 hours) at 11/27/2021 1033  Last data filed at 11/27/2021 0845  Gross per 24 hour   Intake 1843.34 ml   Output 1500 ml   Net 343.34 ml     URINARY CATHETER OUTPUT ([place]):     DRAIN/TUBE OUTPUT:     VENT SETTINGS:  Vent Information  SpO2: 92 %  Additional Respiratory  Assessments  Pulse: 103  Resp: 16  SpO2: 92 %    Skin: no rash, turgor wnl  Heent:  eomi, mmm  Neck: no bruits or jvd noted  Cardiovascular: PMI not lat displaced S1, S2 without m/r/g  Respiratory: CTA B without w/r/r  Abdomen:  +bs, soft, nd, tender in the midepigastrium, no HSM  Ext: (-) bilat lower extremity edema  Psychiatric: mood and affect appropriate, cr nr 2-12 grossly intact  Musculoskeletal:  Rom, muscular strength intact    Data:   Labs:  CBC:   Lab Results   Component Value Date    WBC 4.6 11/27/2021    RBC 2.94 11/27/2021    HGB 9.7 11/27/2021    HCT 30.7 11/27/2021    .4 11/27/2021    MCH 33.0 11/27/2021    MCHC 31.6 11/27/2021    RDW 20.1 11/27/2021     11/27/2021    MPV 10.2 11/27/2021     CBC with Differential:    Lab Results   Component Value Date    WBC 4.6 11/27/2021    RBC 2.94 11/27/2021    HGB 9.7 11/27/2021    HCT 30.7 11/27/2021     11/27/2021    .4 11/27/2021    MCH 33.0 11/27/2021    MCHC 31.6 11/27/2021    RDW 20.1 11/27/2021    LYMPHOPCT 0.9 11/23/2021    PROMYELOPCT 0.9 11/23/2021    MONOPCT 3.5 11/23/2021    MYELOPCT 0.9 04/15/2021    BASOPCT 0.8 11/23/2021    MONOSABS 0.40 11/23/2021    LYMPHSABS 0.10 11/23/2021    EOSABS 0.00 11/23/2021    BASOSABS 0.08 11/23/2021     Hemoglobin/Hematocrit:    Lab Results   Component Value Date    HGB 9.7 11/27/2021 HCT 30.7 11/27/2021     CMP:    Lab Results   Component Value Date     11/27/2021    K 4.2 11/27/2021    K 4.0 11/03/2021     11/27/2021    CO2 20 11/27/2021    BUN 19 11/27/2021    CREATININE 1.7 11/27/2021    GFRAA 35 11/27/2021    LABGLOM 29 11/27/2021    GLUCOSE 102 11/27/2021    PROT 6.1 11/27/2021    LABALBU 2.9 11/27/2021    CALCIUM 9.5 11/27/2021    BILITOT 0.5 11/27/2021    ALKPHOS 114 11/27/2021    AST 27 11/27/2021    ALT 10 11/27/2021     BMP:    Lab Results   Component Value Date     11/27/2021    K 4.2 11/27/2021    K 4.0 11/03/2021     11/27/2021    CO2 20 11/27/2021    BUN 19 11/27/2021    LABALBU 2.9 11/27/2021    CREATININE 1.7 11/27/2021    CALCIUM 9.5 11/27/2021    GFRAA 35 11/27/2021    LABGLOM 29 11/27/2021    GLUCOSE 102 11/27/2021     BUN/Creatinine:    Lab Results   Component Value Date    BUN 19 11/27/2021    CREATININE 1.7 11/27/2021     Hepatic Function Panel:    Lab Results   Component Value Date    ALKPHOS 114 11/27/2021    ALT 10 11/27/2021    AST 27 11/27/2021    PROT 6.1 11/27/2021    BILITOT 0.5 11/27/2021    BILIDIR 0.6 11/23/2021    IBILI 0.9 11/23/2021    LABALBU 2.9 11/27/2021     Albumin:    Lab Results   Component Value Date    LABALBU 2.9 11/27/2021     Calcium:    Lab Results   Component Value Date    CALCIUM 9.5 11/27/2021     Ionized Calcium:  No results found for: IONCA  Magnesium:    Lab Results   Component Value Date    MG 1.7 02/04/2021     Phosphorus:    Lab Results   Component Value Date    PHOS 2.7 02/27/2021     LDH:    Lab Results   Component Value Date     11/01/2021     Uric Acid:    Lab Results   Component Value Date    LABURIC 8.0 02/27/2021     PT/INR:    Lab Results   Component Value Date    PROTIME 14.9 08/17/2021    INR 1.3 08/17/2021     PTT:    Lab Results   Component Value Date    APTT 29.9 08/17/2021   [APTT}  Troponin:    Lab Results   Component Value Date    TROPONINI <0.01 02/04/2021     U/A:    Lab Results Component Value Date    NITRITE Neg 10/29/2018    COLORU Yellow 11/24/2021    PROTEINU Negative 11/24/2021    PHUR 7.0 11/24/2021    WBCUA 1-3 11/24/2021    RBCUA 10-20 11/24/2021    YEAST MODERATE 08/22/2017    BACTERIA MANY 11/24/2021    CLARITYU SL CLOUDY 11/24/2021    SPECGRAV 1.010 11/24/2021    LEUKOCYTESUR TRACE 11/24/2021    UROBILINOGEN 2.0 11/24/2021    BILIRUBINUR Negative 11/24/2021    BILIRUBINUR Neg 10/29/2018    BLOODU LARGE 11/24/2021    GLUCOSEU Negative 11/24/2021     ABG:    Lab Results   Component Value Date    PCO2 37.3 08/17/2021    PO2 79.0 08/17/2021    HCO3 25.6 08/17/2021    BE 1.8 08/17/2021    O2SAT 96.1 08/17/2021     HgBA1c:    Lab Results   Component Value Date    LABA1C 5.3 10/19/2021     Microalbumen/Creatinine ratio:  No components found for: RUCREAT  FLP:    Lab Results   Component Value Date    TRIG 65 02/27/2021    HDL 37 02/27/2021    LDLCALC 44 02/27/2021    LABVLDL 13 02/27/2021     TSH:    Lab Results   Component Value Date    TSH 2.480 11/25/2021     VITAMIN B12: No components found for: B12  FOLATE:    Lab Results   Component Value Date    FOLATE 13.8 11/27/2021     Iron Saturation:  No components found for: PERCENTFE  FERRITIN:    Lab Results   Component Value Date    FERRITIN 39 11/27/2021     AMYLASE:  No results found for: AMYLASE  LIPASE:    Lab Results   Component Value Date    LIPASE 46 11/23/2021     Fibrinogen Level:  No components found for: FIB  24 Hour Urine for Protein:  No components found for: RAWUPRO, UHRS3, GYYI78CE, UTV3  24 Hour Urine for Creatinine Clearance:  No components found for: CREAT4, UHRS10, UTV10     Imaging:  CXR results:EXAMINATION:  ONE XRAY VIEW OF THE CHEST     11/24/2021 4:16 am     COMPARISON:  Two-view study from 11/19/2021.     HISTORY:  ORDERING SYSTEM PROVIDED HISTORY: vomiting fever  TECHNOLOGIST PROVIDED HISTORY:  Reason for exam:->vomiting fever  What reading provider will be dictating this exam?->CRC     FINDINGS:  The cardiopericardial silhouette size is within normal limits.     There is relative prominence of the azygous arch.     No pneumothorax.     Graded opacity within the visualized left lower lung is compatible with  layering moderate pleural effusion and associated atelectasis.     No dense consolidation within the right lower lung, however there is somewhat  geographic increased attenuation compared to both the listed prior study as  well as the portable chest radiograph from 11/03/2021, unclear whether fully  accounted for by extra thoracic soft tissue attenuation, with early  infiltrate not excluded.     The upper lungs are clear.     No acute osseous abnormality is identified.     Right glenohumeral DJD.     Stable postsurgical changes of reversed left shoulder arthroplasty.        Impression  Lower left lung opacity compatible with layering moderate pleural effusion  and associated atelectasis, with underlying infiltrate not excluded.     Cannot exclude a subtle early infiltrate within the right pulmonary base  compared to most recent prior 2 exams. ION:  CT OF THE ABDOMEN AND PELVIS WITHOUT CONTRAST 11/24/2021 8:22 am     TECHNIQUE:  CT of the abdomen and pelvis was performed without the administration of  intravenous contrast. Multiplanar reformatted images are provided for review.   Dose modulation, iterative reconstruction, and/or weight based adjustment of  the mA/kV was utilized to reduce the radiation dose to as low as reasonably  achievable.     COMPARISON:  10/29/2021     HISTORY:  ORDERING SYSTEM PROVIDED HISTORY: abdominal pain vomiting  TECHNOLOGIST PROVIDED HISTORY:  Reason for exam:->abdominal pain vomiting  Additional Contrast?->None  Decision Support Exception - unselect if not a suspected or confirmed  emergency medical condition->Emergency Medical Condition (MA)  What reading provider will be dictating this exam?->CRC     FINDINGS:  Lower Chest: Moderate to large left pleural effusion, apparently complete  atelectasis in the left lower lobe     Organs: Cirrhotic appearance of the liver, evaluation limited without  contrast, low-attenuation left lobe lesion around image 69 is present with  density favoring a cyst on this exam although prior exam showed above water  density, considering the high risk recommend follow-up MRI.  There are beam  hardening artifacts also limiting evaluation of abdominal organs. Hypodensity present at the medial spleen appears to be cystic as well by  density.  Mild nephrolithiasis.  Right hydronephrosis left-side collecting  system mildly prominent but may be compensation with parenchymal volume loss. Calculus at the proximal right ureter is 6 mm, lower nephrolithiasis on the  left present.  Mild splenomegaly.  Additional low-attenuation hepatic lesions  are less well visualized on this exam a a     GI/Bowel: No obstruction.  Diverticulosis coli present     Pelvis: Hysterectomy     Peritoneum/Retroperitoneum: There are mild atherosclerotic calcifications  present.     Bones/Soft Tissues: Umbilicus fat hernia with a small amount of.  Anterior  soft tissue nodules in the subcutaneous fat may be injection related but are  nonspecific. Oralee Senters to severe spinal degenerative changes. Prior mild  compression fractures in thoracic spine are seen        Impression  1. 6 mm right proximal ureteral calculus with moderate hydronephrosis. Bilateral nephrolithiasis  2. Hepatic cirrhosis and portal hypertension including mild ascites  3. Moderate to large left pleural effusion, complete left lower lobe  atelectasis  4.  Indeterminate hepatic lesions, recommend follow-up MRI evaluation      Assessment  1-Stage III REN in the setting of Nephrolithiasis and Obstructive Uropathy exacerbated by the outpt furosemide and spironolactone  UA cloudy, large blood, pH 7.0, LE tr, RBC >20, WBC 1-3  Cr trending down 3.0-->3.1-->2.5-->2.1-->1.7mg/dl  11/26/21 S/P Cystopanendoscopy; retrograde pyelogram, right; and right  stent insertion.   PLAN:  1. Follow Cr post Stent     2-CKD G3A with a baseline serum cr 1.1mg/dl with an e-GFR=48ml/min presumed sec to DM Nephropathy  PLAN:  1. Continue to monitor    3-Hyperkalemia in the setting of the REN and the Aldactone  K+ WNL  PLAN  1. Follow K+    4- Hypervolemia with Mod -Large L Pl Eff and Ascites  UO 1800ml recorded for 2 shifts yesterday  PLAN:  1. Follow  With the resumption of the furosemide 40mg po qd and the Aldactone 100mg po qd    5- HTN with CKD I-IV  BP goal <130/80  PLAN:  1. Monitor with the resumption  the loop+ Aldactone    6. Anemia in CKD  HgB down with the hydration but stable over the last 48hrs  Ferritin 39 and Iron Sat 9%  Folate 13.8 and Vit B12 1009  PLAN:  1. Await  SPEP and UPEP  2. Follow H/H  3.  Once off antibx will plan for IV Fe++    Thank you Dr. Norma Bennett MD for allowing us to participate in care of Elissa Escamilla MD

## 2021-11-27 NOTE — PROGRESS NOTES
Call placed to urology answering service regarding patient's urine output remaining dark cherry red in color. Per nursing communication order from urology today patient bladder scanned after voiding for volume of 0. Awaiting call back.

## 2021-11-27 NOTE — PROGRESS NOTES
11/27/2021 9:42 AM  Service: Urology  Group: [facility] (Rinku/Deepak Nguyen)    Lashell Morrissey  [id number]    Chief urologic compliant: right proximal ureteral stone   HPI:  Patient is doing better  She is having hematuria  Denies any flank pain or fevers   Frequency     Review of Systems:  Respiratory: negative for cough and hemoptysis  Cardiovascular: negative for chest pain and dyspnea  Gastrointestinal: negative for abdominal pain, diarrhea, nausea and vomiting  Derm: negative for rash and skin lesion(s)  Neurological: negative for seizures and tremors  Endocrine: negative for diabetic symptoms including polydipsia and polyuria  : As above in the HPI, otherwise negative  All other reviews are negative     Allergies: Lisinopril    Objective:   Vitals:    11/27/21 0817   BP: 128/79   Pulse: 103   Resp: 16   Temp: 97.8 °F (36.6 °C)   SpO2: 92%       Neuro: A/A/O x3  Respiratory: non labored breathing  ABD: soft non-tender, non-distended  : bar no  Ext: no clubbing, cyanosis, edema    Labs:   Recent Labs     11/25/21 0442 11/26/21  0500 11/27/21  0535   WBC 6.0 5.2 4.6   RBC 3.06* 3.14* 2.94*   HGB 9.8* 9.9* 9.7*   HCT 30.9* 30.9* 30.7*   .0* 98.4 104.4*   MCH 32.0 31.5 33.0   MCHC 31.7* 32.0 31.6*   RDW 20.6* 20.2* 20.1*    185 172   MPV 10.3 10.3 10.2       Recent Labs     11/25/21 0442 11/26/21  0500 11/27/21  0535   CREATININE 2.5* 2.1* 1.7*       Assessment: Lashell Morrissey 68 y.o. female     POD 1 right stent insertion (Casie Perez) for right hydronephrosis with 6 mm proximal ureteral stone  ARF, improving       Plan:    Line up urine  Check a PVR   Cont the stent  Cont to watch the Cr  Cont the IVF  Cont the antibiotics  Will need a laser litho in the future

## 2021-11-27 NOTE — PROGRESS NOTES
Perfect serve message sent to Dr. Mook Rivas to verify resumption of lasix and aldactone today. Per Dr. Mook Rivas okay to resume.

## 2021-11-28 LAB
ANION GAP SERPL CALCULATED.3IONS-SCNC: 14 MMOL/L (ref 7–16)
APTT: 32 SEC (ref 24.5–35.1)
APTT: 32.7 SEC (ref 24.5–35.1)
BUN BLDV-MCNC: 15 MG/DL (ref 6–23)
CALCIUM SERPL-MCNC: 9.3 MG/DL (ref 8.6–10.2)
CHLORIDE BLD-SCNC: 105 MMOL/L (ref 98–107)
CO2: 18 MMOL/L (ref 22–29)
CREAT SERPL-MCNC: 1.6 MG/DL (ref 0.5–1)
GFR AFRICAN AMERICAN: 38
GFR NON-AFRICAN AMERICAN: 31 ML/MIN/1.73
GLUCOSE BLD-MCNC: 136 MG/DL (ref 74–99)
HCT VFR BLD CALC: 31.8 % (ref 34–48)
HEMOGLOBIN: 9.9 G/DL (ref 11.5–15.5)
MCH RBC QN AUTO: 32.1 PG (ref 26–35)
MCHC RBC AUTO-ENTMCNC: 31.1 % (ref 32–34.5)
MCV RBC AUTO: 103.2 FL (ref 80–99.9)
PDW BLD-RTO: 19.8 FL (ref 11.5–15)
PLATELET # BLD: 173 E9/L (ref 130–450)
PMV BLD AUTO: 10.6 FL (ref 7–12)
POTASSIUM SERPL-SCNC: 4.5 MMOL/L (ref 3.5–5)
RBC # BLD: 3.08 E12/L (ref 3.5–5.5)
SODIUM BLD-SCNC: 137 MMOL/L (ref 132–146)
URINE CULTURE, ROUTINE: NORMAL
WBC # BLD: 7.5 E9/L (ref 4.5–11.5)

## 2021-11-28 PROCEDURE — 2580000003 HC RX 258: Performed by: UROLOGY

## 2021-11-28 PROCEDURE — 94640 AIRWAY INHALATION TREATMENT: CPT

## 2021-11-28 PROCEDURE — 80048 BASIC METABOLIC PNL TOTAL CA: CPT

## 2021-11-28 PROCEDURE — 6370000000 HC RX 637 (ALT 250 FOR IP): Performed by: UROLOGY

## 2021-11-28 PROCEDURE — 6370000000 HC RX 637 (ALT 250 FOR IP): Performed by: INTERNAL MEDICINE

## 2021-11-28 PROCEDURE — 36415 COLL VENOUS BLD VENIPUNCTURE: CPT

## 2021-11-28 PROCEDURE — 6360000002 HC RX W HCPCS: Performed by: UROLOGY

## 2021-11-28 PROCEDURE — 85027 COMPLETE CBC AUTOMATED: CPT

## 2021-11-28 PROCEDURE — 2060000000 HC ICU INTERMEDIATE R&B

## 2021-11-28 PROCEDURE — 85730 THROMBOPLASTIN TIME PARTIAL: CPT

## 2021-11-28 RX ORDER — FERROUS SULFATE 325(65) MG
325 TABLET ORAL 2 TIMES DAILY WITH MEALS
Status: DISCONTINUED | OUTPATIENT
Start: 2021-11-28 | End: 2021-11-30 | Stop reason: HOSPADM

## 2021-11-28 RX ADMIN — LACTULOSE 20 G: 20 SOLUTION ORAL at 08:23

## 2021-11-28 RX ADMIN — AMLODIPINE BESYLATE 2.5 MG: 2.5 TABLET ORAL at 08:22

## 2021-11-28 RX ADMIN — SODIUM CHLORIDE, PRESERVATIVE FREE 10 ML: 5 INJECTION INTRAVENOUS at 13:58

## 2021-11-28 RX ADMIN — FERROUS SULFATE TAB 325 MG (65 MG ELEMENTAL FE) 325 MG: 325 (65 FE) TAB at 17:08

## 2021-11-28 RX ADMIN — ONDANSETRON 4 MG: 2 INJECTION INTRAMUSCULAR; INTRAVENOUS at 09:09

## 2021-11-28 RX ADMIN — VENLAFAXINE HYDROCHLORIDE 75 MG: 75 CAPSULE, EXTENDED RELEASE ORAL at 08:23

## 2021-11-28 RX ADMIN — FERROUS SULFATE TAB 325 MG (65 MG ELEMENTAL FE) 325 MG: 325 (65 FE) TAB at 08:22

## 2021-11-28 RX ADMIN — BUDESONIDE 500 MCG: 0.5 SUSPENSION RESPIRATORY (INHALATION) at 19:37

## 2021-11-28 RX ADMIN — ONDANSETRON 4 MG: 2 INJECTION INTRAMUSCULAR; INTRAVENOUS at 17:10

## 2021-11-28 RX ADMIN — WATER 1000 MG: 1 INJECTION INTRAMUSCULAR; INTRAVENOUS; SUBCUTANEOUS at 13:58

## 2021-11-28 RX ADMIN — SPIRONOLACTONE 100 MG: 25 TABLET ORAL at 08:23

## 2021-11-28 RX ADMIN — DOXAZOSIN 1 MG: 2 TABLET ORAL at 20:44

## 2021-11-28 RX ADMIN — RIFAXIMIN 550 MG: 550 TABLET ORAL at 08:22

## 2021-11-28 RX ADMIN — ACETAMINOPHEN 650 MG: 325 TABLET ORAL at 05:25

## 2021-11-28 RX ADMIN — Medication 10 ML: at 20:48

## 2021-11-28 RX ADMIN — SODIUM CHLORIDE, PRESERVATIVE FREE 10 ML: 5 INJECTION INTRAVENOUS at 09:09

## 2021-11-28 RX ADMIN — PANTOPRAZOLE SODIUM 40 MG: 40 TABLET, DELAYED RELEASE ORAL at 08:23

## 2021-11-28 RX ADMIN — ARFORMOTEROL TARTRATE 15 MCG: 15 SOLUTION RESPIRATORY (INHALATION) at 19:37

## 2021-11-28 RX ADMIN — Medication 10 ML: at 08:23

## 2021-11-28 RX ADMIN — FUROSEMIDE 40 MG: 20 TABLET ORAL at 08:22

## 2021-11-28 RX ADMIN — ONDANSETRON 4 MG: 2 INJECTION INTRAMUSCULAR; INTRAVENOUS at 02:24

## 2021-11-28 RX ADMIN — RIFAXIMIN 550 MG: 550 TABLET ORAL at 20:45

## 2021-11-28 ASSESSMENT — PAIN SCALES - GENERAL
PAINLEVEL_OUTOF10: 0
PAINLEVEL_OUTOF10: 6

## 2021-11-28 NOTE — PROGRESS NOTES
Hospitalist Progress Note      SYNOPSIS: Patient admitted on 2021 for complaint of nausea and vomiting, persistent. In the ER, she had stable vitals and unremarkable labs with exception of elevated creatinine of 2.0 compared to 1.1 at her baseline. Patient denies any abdominal pain, distention. No vomiting since arrival in the ER, possibly because of the Zofran she received in the ER. She received 1 L of normal saline initially and then kept on normal saline at 100 cc/h for maintenance IV fluids. CT abdomen pelvis without contrast showed right hydronephrosis Secondary to 6 mm right proximal ureteral obstructing calculus  Plus bilateral nephrolithiasis  Started IV Rocephin on  by urology. Status post cystoscopy panendoscopy, retrograde pyelogram right-sided with right-sided stent insertion. SUBJECTIVE:    Patient seen and examined in her room. Patient appears comfortable. Complains of slight exacerbation of her left shoulder pain. Was noticed to have bright red blood in the urine last night. Now complains of some cough. Records reviewed. Stable overnight. No other overnight issues reported. Temp (24hrs), Av.2 °F (36.8 °C), Min:97.8 °F (36.6 °C), Max:98.6 °F (37 °C)    DIET: ADULT DIET; Regular  CODE: Full Code    Intake/Output Summary (Last 24 hours) at 2021 0755  Last data filed at 2021 0528  Gross per 24 hour   Intake 960 ml   Output 1380 ml   Net -420 ml       OBJECTIVE:    /87   Pulse 96   Temp 98.3 °F (36.8 °C) (Oral)   Resp 20   Ht 5' 2.5\" (1.588 m)   Wt 170 lb (77.1 kg)   SpO2 93%   BMI 30.60 kg/m²     General appearance: No apparent distress, appears stated age and cooperative. HEENT:  Conjunctivae/corneas clear. Neck: Supple. No jugular venous distention.    Respiratory: Clear to auscultation bilaterally, normal respiratory effort  Cardiovascular: Regular rate rhythm, normal S1-S2  Abdomen: Soft, nontender, nondistended  Musculoskeletal: No clubbing, cyanosis, no bilateral lower extremity edema. Brisk capillary refill. Skin:  No rashes  on visible skin  Neurologic: awake, alert and following commands     ASSESSMENT & PLAN:    Acute kidney injury  Nonoliguric REN, likely volume depletion with continued intake of Lasix and Aldactone  Baseline creatinine 1.1  Admitted with creatinine 3.0>>3.1>>2.5>>2.1  IV fluids discontinued on 11/26  Hold Lasix and Aldactone, resumed on 11/27  Consult nephrology, follow recommendations    Right hydronephrosis  Secondary to 6 mm right proximal ureteral obstructing calculus  Plus bilateral nephrolithiasis  Started IV Rocephin on 11/24 by urology  Urine culture showed mixed fidelina   Status post cystoscopy panendoscopy, retrograde pyelogram right-sided with right-sided stent insertion. Hematuria  Post procedure, cystoscopy  Held Eliquis on 11/28 because of hematuria, can be resumed 1 week hence, on 12/5     Pulmonary embolism  Diagnosed on 11/3/2021  Started on full dose Eliquis, continue for now for 3 months     Cirrhosis  Chronic cirrhosis, etiology unclear  Continue home dose rifaximin and lactulose  No encephalopathy noted  No SBP suspected     Hypertension  Blood pressure well controlled  Currently on amlodipine 2.5 mg p.o. daily and Cardura 1 mg p.o. at night, continue for now  Hold Lasix and Aldactone     Depression  Continue venlafaxine     Idiopathic acute pancreatitis  Resolved during last admission     Pleural effusion  Status post thoracentesis on 10/31/2021  Seen by cardiothoracic surgery who recommended no intervention  Monitor closely for now     DISPOSITION:   Discharge on Monday.     Medications:  REVIEWED DAILY    Infusion Medications    sodium chloride       Scheduled Medications    ferrous sulfate  325 mg Oral BID WC    spironolactone  100 mg Oral Daily    furosemide  40 mg Oral Daily    apixaban  5 mg Oral BID    amLODIPine  2.5 mg Oral Daily    doxazosin  1 mg Oral Nightly    lactulose  20 g Oral TID    pantoprazole  40 mg Oral Daily    venlafaxine  75 mg Oral Daily    rifaximin  550 mg Oral BID    sodium chloride flush  5-40 mL IntraVENous 2 times per day    budesonide  0.5 mg Nebulization BID    And    Arformoterol Tartrate  15 mcg Nebulization BID    cefTRIAXone (ROCEPHIN) IV  1,000 mg IntraVENous Q24H    ondansetron  4 mg Oral Once     PRN Meds: sodium chloride flush, sodium chloride, ondansetron **OR** ondansetron, polyethylene glycol, acetaminophen **OR** acetaminophen, albuterol    Labs:     Recent Labs     11/27/21  0535 11/27/21  1619 11/28/21  0445   WBC 4.6 6.1 7.5   HGB 9.7* 10.0* 9.9*   HCT 30.7* 31.2* 31.8*    195 173       Recent Labs     11/26/21  0500 11/27/21  0535 11/28/21  0445    137 137   K 4.3 4.2 4.5    106 105   CO2 22 20* 18*   BUN 22 19 15   CREATININE 2.1* 1.7* 1.6*   CALCIUM 9.9 9.5 9.3       Recent Labs     11/27/21  0535   PROT 6.1*   ALKPHOS 114*   ALT 10   AST 27   BILITOT 0.5       No results for input(s): INR in the last 72 hours. No results for input(s): Prentis Revels in the last 72 hours. Chronic labs:    Lab Results   Component Value Date    CHOL 94 02/27/2021    TRIG 65 02/27/2021    HDL 37 02/27/2021    LDLCALC 44 02/27/2021    TSH 2.480 11/25/2021    INR 1.3 08/17/2021    LABA1C 5.3 10/19/2021       Radiology: REVIEWED DAILY    +++++++++++++++++++++++++++++++++++++++++++++++++  Kirstie Cadet MD  [facility] - [address] [facility], [facility]  +++++++++++++++++++++++++++++++++++++++++++++++++  NOTE: This report was transcribed using voice recognition software. Every effort was made to ensure accuracy; however, inadvertent computerized transcription errors may be present.

## 2021-11-28 NOTE — PROGRESS NOTES
Nephrology Progress Note  Patient's Name: Glenna Lee    Nephrologist: Keysha Langley  Reason for Consult: REN      History of Present Ilness from the 11/25/21 note: Glenna Lee is a 68 y.o. female with prior history of CKD G3A with a baseline serum cr 1.1mg/dl with an e-GFR=48ml/min presumed sec to DM Nephropathy who was seen by Dr. Zain Aldrich in the office. The pt was hospitalized 10/29/21-11/3/21 for SOB. 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 She was also found to have subsegmental Pe's and started on Eliquis. She had a CT Scan of the Abd and Pelvis which did not show any hydronephrosis. SHe presented back to the ED 11/23/21 for RLQ pain radiating to the back with associated N/V unrelieved by oral anti-emetics. At home T 99. She notes intermittent hematuria. Cr in the ED 3.0mg/dl with K+ 5.4. At home she was being maintained on loop+ aldactone. CT Scan in the ED revealed R hydro and Bilat calculus.    This Am she notes mild midepigastric pain and ongoing nausea    11/28/21: Pt awake alert and states she has nausea and when she urinates she has dysuria    Past Medical History:   Diagnosis Date    Breast cancer ([address]     Cirrhosis ([address]     Essential hypertension     Hx of blood clots     Hyperlipidemia     Osteopenia     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 ([address]     Sleep apnea     Spinal stenosis     Type 2 diabetes mellitus ([address]        Past Surgical History:   Procedure Laterality Date    BREAST LUMPECTOMY      lumpectomy [id number]    CARDIOVASCULAR STRESS TEST      Lexiscan stress test    CARPAL TUNNEL RELEASE      COLONOSCOPY  01/31/2017    multiple polyps; diverticula--jerod    COLONOSCOPY  04/23/2019    polyps; diverticula; hemorrhoids--jerod    COLONOSCOPY N/A 04/23/2019    COLONOSCOPY POLYPECTOMY SNARE/COLD BIOPSY performed by Rusty Funez MD at [place] [address]  04/23/2019    COLONOSCOPY WITH BIOPSY performed by Rusty Funez MD at [address] [place] [address]  HYSTERECTOMY      LITHOTRIPSY Left 05/04/2016    C-R STENT PLACEMENT    SHOULDER ARTHROPLASTY Left 12/21/2020    LEFT REVERSE TOTAL SHOULDER  ARTHROPLASTY -- DEPUY performed by Ashley Finley MD at [facility]  04/23/2019    gastritis--jerod    UPPER GASTROINTESTINAL ENDOSCOPY N/A 04/23/2019    EGD BIOPSY performed by Ayde Crawford MD at [facility] History   Problem Relation Age of Onset    Arthritis Mother     COPD Father     Heart Attack Father     Cancer Other 48        colon        reports that she has never smoked. She has never used smokeless tobacco. She reports that she does not drink alcohol and does not use drugs.     Allergies:  Lisinopril    Current Medications:    ferrous sulfate (IRON 325) tablet 325 mg, BID WC  spironolactone (ALDACTONE) tablet 100 mg, Daily  furosemide (LASIX) tablet 40 mg, Daily  [Held by provider] apixaban (ELIQUIS) tablet 5 mg, BID  amLODIPine (NORVASC) tablet 2.5 mg, Daily  doxazosin (CARDURA) tablet 1 mg, Nightly  lactulose (CHRONULAC) 10 GM/15ML solution 20 g, TID  pantoprazole (PROTONIX) tablet 40 mg, Daily  venlafaxine (EFFEXOR XR) extended release capsule 75 mg, Daily  rifaximin (XIFAXAN) tablet 550 mg, BID  sodium chloride flush 0.9 % injection 5-40 mL, 2 times per day  sodium chloride flush 0.9 % injection 5-40 mL, PRN  0.9 % sodium chloride infusion, PRN  ondansetron (ZOFRAN-ODT) disintegrating tablet 4 mg, Q8H PRN   Or  ondansetron (ZOFRAN) injection 4 mg, Q6H PRN  polyethylene glycol (GLYCOLAX) packet 17 g, Daily PRN  acetaminophen (TYLENOL) tablet 650 mg, Q6H PRN   Or  acetaminophen (TYLENOL) suppository 650 mg, Q6H PRN  albuterol (PROVENTIL) nebulizer solution 2.5 mg, 4x Daily PRN  budesonide (PULMICORT) nebulizer suspension 500 mcg, BID   And  Arformoterol Tartrate (BROVANA) nebulizer solution 15 mcg, BID  cefTRIAXone (ROCEPHIN) 1,000 mg in sterile water 10 mL IV syringe, Q24H  ondansetron (ZOFRAN-ODT) disintegrating tablet 4 mg, Once        Review of Systems:   Pertinent items are noted in HPI.     Physical exam:   Constitutional: Elderly female in NAD   Vitals:   VITALS:  /87   Pulse 96   Temp 98.3 °F (36.8 °C) (Oral)   Resp 20   Ht 5' 2.5\" (1.588 m)   Wt 170 lb (77.1 kg)   SpO2 93%   BMI 30.60 kg/m²   24HR INTAKE/OUTPUT:      Intake/Output Summary (Last 24 hours) at 11/28/2021 1001  Last data filed at 11/28/2021 5100  Gross per 24 hour   Intake 840 ml   Output 1380 ml   Net -540 ml     URINARY CATHETER OUTPUT (Engel):     DRAIN/TUBE OUTPUT:     VENT SETTINGS:  Vent Information  SpO2: 93 %  Additional Respiratory  Assessments  Pulse: 96  Resp: 20  SpO2: 93 %    Skin: no rash, turgor wnl  Heent:  eomi, mmm  Neck: no bruits or jvd noted  Cardiovascular: PMI not lat displaced S1, S2 without m/r/g  Respiratory: CTA B without w/r/r  Abdomen:  +bs, soft, nd, tender in the midepigastrium, no HSM  Ext: (-) bilat lower extremity edema  Psychiatric: mood and affect appropriate, cr nr 2-12 grossly intact  Musculoskeletal:  Rom, muscular strength intact    Data:   Labs:  CBC:   Lab Results   Component Value Date    WBC 7.5 11/28/2021    RBC 3.08 11/28/2021    HGB 9.9 11/28/2021    HCT 31.8 11/28/2021    .2 11/28/2021    MCH 32.1 11/28/2021    MCHC 31.1 11/28/2021    RDW 19.8 11/28/2021     11/28/2021    MPV 10.6 11/28/2021     CBC with Differential:    Lab Results   Component Value Date    WBC 7.5 11/28/2021    RBC 3.08 11/28/2021    HGB 9.9 11/28/2021    HCT 31.8 11/28/2021     11/28/2021    .2 11/28/2021    MCH 32.1 11/28/2021    MCHC 31.1 11/28/2021    RDW 19.8 11/28/2021    LYMPHOPCT 0.9 11/23/2021    PROMYELOPCT 0.9 11/23/2021    MONOPCT 3.5 11/23/2021    MYELOPCT 0.9 04/15/2021    BASOPCT 0.8 11/23/2021    MONOSABS 0.40 11/23/2021    LYMPHSABS 0.10 11/23/2021    EOSABS 0.00 11/23/2021    BASOSABS 0.08 11/23/2021     Hemoglobin/Hematocrit:    Lab Results   Component Value Date    HGB 9.9 11/28/2021    HCT 31.8 11/28/2021     CMP:    Lab Results   Component Value Date     11/28/2021    K 4.5 11/28/2021    K 4.0 11/03/2021     11/28/2021    CO2 18 11/28/2021    BUN 15 11/28/2021    CREATININE 1.6 11/28/2021    GFRAA 38 11/28/2021    LABGLOM 31 11/28/2021    GLUCOSE 136 11/28/2021    PROT 6.1 11/27/2021    LABALBU 2.9 11/27/2021    CALCIUM 9.3 11/28/2021    BILITOT 0.5 11/27/2021    ALKPHOS 114 11/27/2021    AST 27 11/27/2021    ALT 10 11/27/2021     BMP:    Lab Results   Component Value Date     11/28/2021    K 4.5 11/28/2021    K 4.0 11/03/2021     11/28/2021    CO2 18 11/28/2021    BUN 15 11/28/2021    LABALBU 2.9 11/27/2021    CREATININE 1.6 11/28/2021    CALCIUM 9.3 11/28/2021    GFRAA 38 11/28/2021    LABGLOM 31 11/28/2021    GLUCOSE 136 11/28/2021     BUN/Creatinine:    Lab Results   Component Value Date    BUN 15 11/28/2021    CREATININE 1.6 11/28/2021     Hepatic Function Panel:    Lab Results   Component Value Date    ALKPHOS 114 11/27/2021    ALT 10 11/27/2021    AST 27 11/27/2021    PROT 6.1 11/27/2021    BILITOT 0.5 11/27/2021    BILIDIR 0.6 11/23/2021    IBILI 0.9 11/23/2021    LABALBU 2.9 11/27/2021     Albumin:    Lab Results   Component Value Date    LABALBU 2.9 11/27/2021     Calcium:    Lab Results   Component Value Date    CALCIUM 9.3 11/28/2021     Ionized Calcium:  No results found for: IONCA  Magnesium:    Lab Results   Component Value Date    MG 1.7 02/04/2021     Phosphorus:    Lab Results   Component Value Date    PHOS 2.7 02/27/2021     LDH:    Lab Results   Component Value Date     11/01/2021     Uric Acid:    Lab Results   Component Value Date    LABURIC 8.0 02/27/2021     PT/INR:    Lab Results   Component Value Date    PROTIME 14.9 08/17/2021    INR 1.3 08/17/2021     PTT:    Lab Results   Component Value Date    APTT 32.7 11/28/2021   [APTT}  Troponin:    Lab Results   Component Value Date    TROPONINI <0.01 02/04/2021 U/A:    Lab Results   Component Value Date    NITRITE Neg 10/29/2018    COLORU Yellow 11/24/2021    PROTEINU Negative 11/24/2021    PHUR 7.0 11/24/2021    WBCUA 1-3 11/24/2021    RBCUA 10-20 11/24/2021    YEAST MODERATE 08/22/2017    BACTERIA MANY 11/24/2021    CLARITYU SL CLOUDY 11/24/2021    SPECGRAV 1.010 11/24/2021    LEUKOCYTESUR TRACE 11/24/2021    UROBILINOGEN 2.0 11/24/2021    BILIRUBINUR Negative 11/24/2021    BILIRUBINUR Neg 10/29/2018    BLOODU LARGE 11/24/2021    GLUCOSEU Negative 11/24/2021     ABG:    Lab Results   Component Value Date    PCO2 37.3 08/17/2021    PO2 79.0 08/17/2021    HCO3 25.6 08/17/2021    BE 1.8 08/17/2021    O2SAT 96.1 08/17/2021     HgBA1c:    Lab Results   Component Value Date    LABA1C 5.3 10/19/2021     Microalbumen/Creatinine ratio:  No components found for: RUCREAT  FLP:    Lab Results   Component Value Date    TRIG 65 02/27/2021    HDL 37 02/27/2021    LDLCALC 44 02/27/2021    LABVLDL 13 02/27/2021     TSH:    Lab Results   Component Value Date    TSH 2.480 11/25/2021     VITAMIN B12: No components found for: B12  FOLATE:    Lab Results   Component Value Date    FOLATE 13.8 11/27/2021     Iron Saturation:  No components found for: PERCENTFE  FERRITIN:    Lab Results   Component Value Date    FERRITIN 39 11/27/2021     AMYLASE:  No results found for: AMYLASE  LIPASE:    Lab Results   Component Value Date    LIPASE 46 11/23/2021     Fibrinogen Level:  No components found for: FIB  24 Hour Urine for Protein:  No components found for: RAWUPRO, UHRS3, AYRI70BG, UTV3  24 Hour Urine for Creatinine Clearance:  No components found for: CREAT4, UHRS10, UTV10     Imaging:  CXR results:EXAMINATION:  ONE XRAY VIEW OF THE CHEST     11/24/2021 4:16 am     COMPARISON:  Two-view study from 11/19/2021.     HISTORY:  ORDERING SYSTEM PROVIDED HISTORY: vomiting fever  TECHNOLOGIST PROVIDED HISTORY:  Reason for exam:->vomiting fever  What reading provider will be dictating this exam?->CRC     FINDINGS:  The cardiopericardial silhouette size is within normal limits.     There is relative prominence of the azygous arch.     No pneumothorax.     Graded opacity within the visualized left lower lung is compatible with  layering moderate pleural effusion and associated atelectasis.     No dense consolidation within the right lower lung, however there is somewhat  geographic increased attenuation compared to both the listed prior study as  well as the portable chest radiograph from 11/03/2021, unclear whether fully  accounted for by extra thoracic soft tissue attenuation, with early  infiltrate not excluded.     The upper lungs are clear.     No acute osseous abnormality is identified.     Right glenohumeral DJD.     Stable postsurgical changes of reversed left shoulder arthroplasty.        Impression  Lower left lung opacity compatible with layering moderate pleural effusion  and associated atelectasis, with underlying infiltrate not excluded.     Cannot exclude a subtle early infiltrate within the right pulmonary base  compared to most recent prior 2 exams. ION:  CT OF THE ABDOMEN AND PELVIS WITHOUT CONTRAST 11/24/2021 8:22 am     TECHNIQUE:  CT of the abdomen and pelvis was performed without the administration of  intravenous contrast. Multiplanar reformatted images are provided for review.   Dose modulation, iterative reconstruction, and/or weight based adjustment of  the mA/kV was utilized to reduce the radiation dose to as low as reasonably  achievable.     COMPARISON:  10/29/2021     HISTORY:  ORDERING SYSTEM PROVIDED HISTORY: abdominal pain vomiting  TECHNOLOGIST PROVIDED HISTORY:  Reason for exam:->abdominal pain vomiting  Additional Contrast?->None  Decision Support Exception - unselect if not a suspected or confirmed  emergency medical condition->Emergency Medical Condition (MA)  What reading provider will be dictating this exam?->CRC     FINDINGS:  Lower Chest: Moderate to large left pleural effusion, apparently complete  atelectasis in the left lower lobe     Organs: Cirrhotic appearance of the liver, evaluation limited without  contrast, low-attenuation left lobe lesion around image 69 is present with  density favoring a cyst on this exam although prior exam showed above water  density, considering the high risk recommend follow-up MRI.  There are beam  hardening artifacts also limiting evaluation of abdominal organs. Hypodensity present at the medial spleen appears to be cystic as well by  density.  Mild nephrolithiasis.  Right hydronephrosis left-side collecting  system mildly prominent but may be compensation with parenchymal volume loss. Calculus at the proximal right ureter is 6 mm, lower nephrolithiasis on the  left present.  Mild splenomegaly.  Additional low-attenuation hepatic lesions  are less well visualized on this exam a a     GI/Bowel: No obstruction.  Diverticulosis coli present     Pelvis: Hysterectomy     Peritoneum/Retroperitoneum: There are mild atherosclerotic calcifications  present.     Bones/Soft Tissues: Umbilicus fat hernia with a small amount of.  Anterior  soft tissue nodules in the subcutaneous fat may be injection related but are  nonspecific. Virgel Coates to severe spinal degenerative changes. Prior mild  compression fractures in thoracic spine are seen        Impression  1. 6 mm right proximal ureteral calculus with moderate hydronephrosis. Bilateral nephrolithiasis  2. Hepatic cirrhosis and portal hypertension including mild ascites  3. Moderate to large left pleural effusion, complete left lower lobe  atelectasis  4.  Indeterminate hepatic lesions, recommend follow-up MRI evaluation      Assessment  1-Stage III REN in the setting of Nephrolithiasis and Obstructive Uropathy exacerbated by the outpt furosemide and spironolactone  UA cloudy, large blood, pH 7.0, LE tr, RBC >20, WBC 1-3  Cr trending down 3.0-->3.1-->2.5-->2.1-->1.7-->1.6mg/dl  UC NG  11/26/21 S/P Cystopanendoscopy; retrograde pyelogram, right; and right  stent insertion.   PLAN:  1. Follow Cr post Stent     2-CKD G3A with a baseline serum cr 1.1mg/dl with an e-GFR=48ml/min presumed sec to DM Nephropathy  PLAN:  1. Continue to monitor    3-Hyperkalemia in the setting of the REN and the Aldactone  K+ WNL  PLAN  1. Follow K+    4- Hypervolemia with Mod -Large L Pl Eff and Ascites  UO 1380ml recorded for  yesterday  PLAN:  1. Follow  With the resumption of the furosemide 40mg po qd and the Aldactone 100mg po qd    5- HTN with CKD I-IV  BP goal <130/80-BP at near goal  PLAN:  1. Monitor with the resumption  the loop+ Aldactone    6. Anemia in CKD  HgB down with the hydration but stable   Ferritin 39 and Iron Sat 9%  Folate 13.8 and Vit B12 1009  PLAN:  1. Await  SPEP and UPEP  2. Follow H/H  3.  Once off antibx will plan for IV Fe++    Thank you Dr. Francisca Lewis MD for allowing us to participate in care of Silviano Pal MD

## 2021-11-28 NOTE — PLAN OF CARE
Problem: Falls - Risk of:  Goal: Will remain free from falls  Description: Will remain free from falls  11/28/2021 0456 by Joya Michel RN  Outcome: Met This Shift     Problem: Falls - Risk of:  Goal: Absence of physical injury  Description: Absence of physical injury  11/28/2021 0456 by Joya Michel RN  Outcome: Met This Shift     Problem: Skin Integrity:  Goal: Will show no infection signs and symptoms  Description: Will show no infection signs and symptoms  11/28/2021 0456 by Joya Michel RN  Outcome: Met This Shift     Problem: Skin Integrity:  Goal: Absence of new skin breakdown  Description: Absence of new skin breakdown  11/28/2021 0456 by Joya Michel RN  Outcome: Met This Shift     Problem: Pain:  Goal: Pain level will decrease  Description: Pain level will decrease  Outcome: Met This Shift     Problem: Pain:  Goal: Control of acute pain  Description: Control of acute pain  Outcome: Met This Shift     Problem: Pain:  Goal: Control of chronic pain  Description: Control of chronic pain  Outcome: Met This Shift

## 2021-11-28 NOTE — PROGRESS NOTES
Patient frequently getting up to use bathroom. States she constantly feels like she needs to urinate. Urine color still cherry red with few blood clots. PVR obtained- 0cc.

## 2021-11-29 LAB
ADDENDUM ELECTROPHORESIS URINE RANDOM: NORMAL
ALBUMIN SERPL-MCNC: 2.6 G/DL (ref 3.5–4.7)
ALPHA-1-GLOBULIN: 0.2 G/DL (ref 0.2–0.4)
ALPHA-2-GLOBULIN: 0.6 G/DL (ref 0.5–1)
ANION GAP SERPL CALCULATED.3IONS-SCNC: 8 MMOL/L (ref 7–16)
BETA GLOBULIN: 1.1 G/DL (ref 0.8–1.3)
BUN BLDV-MCNC: 14 MG/DL (ref 6–23)
CALCIUM SERPL-MCNC: 10 MG/DL (ref 8.6–10.2)
CHLORIDE BLD-SCNC: 107 MMOL/L (ref 98–107)
CO2: 23 MMOL/L (ref 22–29)
CREAT SERPL-MCNC: 1.3 MG/DL (ref 0.5–1)
ELECTROPHORESIS: ABNORMAL
GAMMA GLOBULIN: 1.6 G/DL (ref 0.7–1.6)
GFR AFRICAN AMERICAN: 48
GFR NON-AFRICAN AMERICAN: 40 ML/MIN/1.73
GLUCOSE BLD-MCNC: 88 MG/DL (ref 74–99)
HCT VFR BLD CALC: 27.1 % (ref 34–48)
HEMOGLOBIN: 8.9 G/DL (ref 11.5–15.5)
IMMUNOFIXATION URINE: NORMAL
MCH RBC QN AUTO: 31.7 PG (ref 26–35)
MCHC RBC AUTO-ENTMCNC: 32.8 % (ref 32–34.5)
MCV RBC AUTO: 96.4 FL (ref 80–99.9)
PDW BLD-RTO: 19.9 FL (ref 11.5–15)
PLATELET # BLD: 171 E9/L (ref 130–450)
PMV BLD AUTO: 11 FL (ref 7–12)
POTASSIUM SERPL-SCNC: 4.6 MMOL/L (ref 3.5–5)
RBC # BLD: 2.81 E12/L (ref 3.5–5.5)
SODIUM BLD-SCNC: 138 MMOL/L (ref 132–146)
WBC # BLD: 6.3 E9/L (ref 4.5–11.5)

## 2021-11-29 PROCEDURE — 85027 COMPLETE CBC AUTOMATED: CPT

## 2021-11-29 PROCEDURE — 6370000000 HC RX 637 (ALT 250 FOR IP): Performed by: INTERNAL MEDICINE

## 2021-11-29 PROCEDURE — 6360000002 HC RX W HCPCS: Performed by: UROLOGY

## 2021-11-29 PROCEDURE — 80048 BASIC METABOLIC PNL TOTAL CA: CPT

## 2021-11-29 PROCEDURE — 2060000000 HC ICU INTERMEDIATE R&B

## 2021-11-29 PROCEDURE — 36415 COLL VENOUS BLD VENIPUNCTURE: CPT

## 2021-11-29 PROCEDURE — 6370000000 HC RX 637 (ALT 250 FOR IP): Performed by: UROLOGY

## 2021-11-29 PROCEDURE — 97530 THERAPEUTIC ACTIVITIES: CPT

## 2021-11-29 PROCEDURE — 2580000003 HC RX 258: Performed by: UROLOGY

## 2021-11-29 PROCEDURE — 2700000000 HC OXYGEN THERAPY PER DAY

## 2021-11-29 PROCEDURE — 94640 AIRWAY INHALATION TREATMENT: CPT

## 2021-11-29 RX ORDER — DIPHENHYDRAMINE HCL 25 MG
25 TABLET ORAL EVERY 8 HOURS PRN
Status: DISCONTINUED | OUTPATIENT
Start: 2021-11-29 | End: 2021-11-30 | Stop reason: HOSPADM

## 2021-11-29 RX ADMIN — Medication 10 ML: at 09:20

## 2021-11-29 RX ADMIN — BUDESONIDE 500 MCG: 0.5 SUSPENSION RESPIRATORY (INHALATION) at 10:28

## 2021-11-29 RX ADMIN — FERROUS SULFATE TAB 325 MG (65 MG ELEMENTAL FE) 325 MG: 325 (65 FE) TAB at 09:18

## 2021-11-29 RX ADMIN — ONDANSETRON 4 MG: 2 INJECTION INTRAMUSCULAR; INTRAVENOUS at 20:02

## 2021-11-29 RX ADMIN — SPIRONOLACTONE 100 MG: 25 TABLET ORAL at 09:18

## 2021-11-29 RX ADMIN — BUDESONIDE 500 MCG: 0.5 SUSPENSION RESPIRATORY (INHALATION) at 19:56

## 2021-11-29 RX ADMIN — FERROUS SULFATE TAB 325 MG (65 MG ELEMENTAL FE) 325 MG: 325 (65 FE) TAB at 17:21

## 2021-11-29 RX ADMIN — ONDANSETRON 4 MG: 4 TABLET, ORALLY DISINTEGRATING ORAL at 14:22

## 2021-11-29 RX ADMIN — DOXAZOSIN 1 MG: 2 TABLET ORAL at 20:02

## 2021-11-29 RX ADMIN — AMLODIPINE BESYLATE 2.5 MG: 2.5 TABLET ORAL at 09:19

## 2021-11-29 RX ADMIN — PANTOPRAZOLE SODIUM 40 MG: 40 TABLET, DELAYED RELEASE ORAL at 09:19

## 2021-11-29 RX ADMIN — Medication 10 ML: at 20:02

## 2021-11-29 RX ADMIN — RIFAXIMIN 550 MG: 550 TABLET ORAL at 20:01

## 2021-11-29 RX ADMIN — RIFAXIMIN 550 MG: 550 TABLET ORAL at 09:18

## 2021-11-29 RX ADMIN — WATER 1000 MG: 1 INJECTION INTRAMUSCULAR; INTRAVENOUS; SUBCUTANEOUS at 13:57

## 2021-11-29 RX ADMIN — ONDANSETRON 4 MG: 2 INJECTION INTRAMUSCULAR; INTRAVENOUS at 02:11

## 2021-11-29 RX ADMIN — FUROSEMIDE 40 MG: 20 TABLET ORAL at 09:18

## 2021-11-29 RX ADMIN — DIPHENHYDRAMINE HCL 25 MG: 25 TABLET ORAL at 12:01

## 2021-11-29 RX ADMIN — LACTULOSE 20 G: 20 SOLUTION ORAL at 13:57

## 2021-11-29 RX ADMIN — ARFORMOTEROL TARTRATE 15 MCG: 15 SOLUTION RESPIRATORY (INHALATION) at 19:56

## 2021-11-29 RX ADMIN — ACETAMINOPHEN 650 MG: 325 TABLET ORAL at 05:30

## 2021-11-29 RX ADMIN — VENLAFAXINE HYDROCHLORIDE 75 MG: 75 CAPSULE, EXTENDED RELEASE ORAL at 09:19

## 2021-11-29 RX ADMIN — ARFORMOTEROL TARTRATE 15 MCG: 15 SOLUTION RESPIRATORY (INHALATION) at 10:28

## 2021-11-29 ASSESSMENT — PAIN SCALES - GENERAL
PAINLEVEL_OUTOF10: 0
PAINLEVEL_OUTOF10: 6
PAINLEVEL_OUTOF10: 5
PAINLEVEL_OUTOF10: 0

## 2021-11-29 NOTE — PROGRESS NOTES
[facility] PROGRESS NOTE                                                                       11/29/2021          SUBJECTIVE:    Afebrile  Pt contiues with hematuria  Pt not very mobile and is having urge incont  Creatinine 1.3    OBJECTIVE:    BP (!) 100/56   Pulse 88   Temp 98.1 °F (36.7 °C) (Oral)   Resp 18   Ht 5' 2.5\" (1.588 m)   Wt 170 lb (77.1 kg)   SpO2 96%   BMI 30.60 kg/m²     PHYSICAL EXAMINATION:  Skin: dry, without rashes  Respirations: non-labored, intact  Abdomen: soft, non-tender, non-distended      Lab Results   Component Value Date    WBC 6.3 11/29/2021    HGB 8.9 (L) 11/29/2021    HCT 27.1 (L) 11/29/2021    MCV 96.4 11/29/2021     11/29/2021       Lab Results   Component Value Date    CREATININE 1.3 (H) 11/29/2021       No results found for: PSA    REVIEW OF SYSTEMS:    CONSTITUTIONAL: negative  HEENT: negative  HEMATOLOGIC: negative  ENDOCRINE: negative  RESPIRATORY: negative  CV: negative  GI: negative  NEURO: negative  ORTHOPEDICS: negative  PSYCHIATRIC: negative  : as above    PAST FAMILY HISTORY:    Family History   Problem Relation Age of Onset    Arthritis Mother     COPD Father     Heart Attack Father     Cancer Other 48        colon     PAST SOCIAL HISTORY:    Social History     Socioeconomic History    Marital status:      Spouse name: None    Number of children: None    Years of education: None    Highest education level: None   Occupational History    None   Tobacco Use    Smoking status: Never Smoker    Smokeless tobacco: Never Used   Vaping Use    Vaping Use: Never used   Substance and Sexual Activity    Alcohol use: Never     Alcohol/week: 0.0 standard drinks    Drug use: Never    Sexual activity: Never     Partners: Male     Comment: last in 2013   Other Topics Concern    None   Social History Narrative    Denies caffeine. Social Determinants of Health     Financial Resource Strain: High Risk    Difficulty of Paying Living Expenses: Hard   Food Insecurity: No Food Insecurity    Worried About Running Out of Food in the Last Year: Never true    Lj of Food in the Last Year: Never true   Transportation Needs:     Lack of Transportation (Medical): Not on file    Lack of Transportation (Non-Medical):  Not on file   Physical Activity:     Days of Exercise per Week: Not on file    Minutes of Exercise per Session: Not on file   Stress:     Feeling of Stress : Not on file   Social Connections:     Frequency of Communication with Friends and Family: Not on file    Frequency of Social Gatherings with Friends and Family: Not on file    Attends Latter-day Services: Not on file    Active Member of [address] [place] [address] [employer] or Organizations: Not on file    Attends Club or Organization Meetings: Not on file    Marital Status: Not on file   Intimate Partner Violence:     Fear of Current or Ex-Partner: Not on file    Emotionally Abused: Not on file    Physically Abused: Not on file    Sexually Abused: Not on file   Housing Stability:     Unable to Pay for Housing in the Last Year: Not on file    Number of [place] in the Last Year: Not on file    Unstable Housing in the Last Year: Not on file       Scheduled Meds:   ferrous sulfate  325 mg Oral BID WC    spironolactone  100 mg Oral Daily    furosemide  40 mg Oral Daily    [Held by provider] apixaban  5 mg Oral BID    amLODIPine  2.5 mg Oral Daily    doxazosin  1 mg Oral Nightly    lactulose  20 g Oral TID    pantoprazole  40 mg Oral Daily    venlafaxine  75 mg Oral Daily    rifaximin  550 mg Oral BID    sodium chloride flush  5-40 mL IntraVENous 2 times per day    budesonide  0.5 mg Nebulization BID    And    Arformoterol Tartrate  15 mcg Nebulization BID    cefTRIAXone (ROCEPHIN) IV  1,000 mg IntraVENous Q24H    ondansetron  4 mg Oral Once     Continuous Infusions:   sodium chloride       PRN Meds:.sodium chloride flush, sodium chloride, ondansetron **OR** ondansetron, polyethylene glycol, acetaminophen **OR** acetaminophen, albuterol    ASSESSMENT:    Patient Active Problem List   Diagnosis    Malignant neoplasm of left breast (HCC)    Type 2 diabetes mellitus ([facility]    Obstructive sleep apnea syndrome    Essential hypertension    Multiple thyroid nodules    Depression    Hx of adenomatous colonic polyps    Dyslipidemia    Cirrhosis (HCC)    S/P reverse total shoulder arthroplasty, left    Anemia    Pleural effusion associated with hepatic disorder    Palpitations    Rhabdomyolysis    Hyperammonemia (HCC)    Closed displaced fracture of surgical neck of right humerus    Pulmonary embolism (HCC)    Idiopathic acute pancreatitis without infection or necrosis    Asymptomatic microscopic hematuria    Breast mass, left    Anxiety disorder, unspecified    Gastro-esophageal reflux disease without esophagitis    Major depressive disorder, recurrent severe without psychotic features ([facility]    Muscle weakness (generalized)    Need for assistance with personal care    Other abnormalities of gait and mobility    Unspecified physeal fracture of upper end of humerus, left arm, subsequent encounter for fracture with routine healing    Difficulty in walking, not elsewhere classified    Acute renal failure (ARF) (HCC)       PLAN:  Will have bar inserted  Pt will need bar until ambulation increases    Farhana Sarkar MD, M.D.  11/29/2021  8:39 AM

## 2021-11-29 NOTE — PROGRESS NOTES
Hospitalist Progress Note      SYNOPSIS: Patient admitted on 2021 for complaint of nausea and vomiting, persistent. In the ER, she had stable vitals and unremarkable labs with exception of elevated creatinine of 2.0 compared to 1.1 at her baseline. Patient denies any abdominal pain, distention. No vomiting since arrival in the ER, possibly because of the Zofran she received in the ER. She received 1 L of normal saline initially and then kept on normal saline at 100 cc/h for maintenance IV fluids. CT abdomen pelvis without contrast showed right hydronephrosis Secondary to 6 mm right proximal ureteral obstructing calculus  Plus bilateral nephrolithiasis  Started IV Rocephin on  by urology. Status post cystoscopy panendoscopy, retrograde pyelogram right-sided with right-sided stent insertion. Postoperative she developed hematuria. To come to this hematuria, her Eliquis was discontinued on , with a plan to give a break for at least 5 days before resuming Eliquis. Resume Eliquis on 12/3. SUBJECTIVE:    Patient seen and examined in her room. Patient feels better. Hematuria persistent but improving. Denies any chest pain, nausea, vomiting. Now complains of some cough. Records reviewed. Stable overnight. No other overnight issues reported. Temp (24hrs), Av.2 °F (36.8 °C), Min:98 °F (36.7 °C), Max:98.4 °F (36.9 °C)    DIET: ADULT DIET; Regular  CODE: Full Code    Intake/Output Summary (Last 24 hours) at 2021 0800  Last data filed at 2021 2250  Gross per 24 hour   Intake 500 ml   Output 0 ml   Net 500 ml       OBJECTIVE:    BP (!) 110/53   Pulse 94   Temp 98.4 °F (36.9 °C) (Oral)   Resp 20   Ht 5' 2.5\" (1.588 m)   Wt 170 lb (77.1 kg)   SpO2 95%   BMI 30.60 kg/m²     General appearance: No apparent distress, appears stated age and cooperative. HEENT:  Conjunctivae/corneas clear. Neck: Supple. No jugular venous distention.    Respiratory: Clear to auscultation bilaterally, normal respiratory effort  Cardiovascular: Regular rate rhythm, normal S1-S2  Abdomen: Soft, nontender, nondistended  Musculoskeletal: No clubbing, cyanosis, no bilateral lower extremity edema. Brisk capillary refill. Skin:  No rashes  on visible skin  Neurologic: awake, alert and following commands     ASSESSMENT & PLAN:    Acute kidney injury  Nonoliguric REN, likely volume depletion with continued intake of Lasix and Aldactone  Baseline creatinine 1.1  Admitted with creatinine 3.0>>3.1>>2.5>>2.1>>1.4  IV fluids discontinued on 11/26  Hold Lasix and Aldactone, resumed on 11/27  Consult nephrology, follow recommendations    Right hydronephrosis  Secondary to 6 mm right proximal ureteral obstructing calculus  Plus bilateral nephrolithiasis  Started IV Rocephin on 11/24 by urology  Urine culture from 11/24 showed mixed fidelina, urine culture from 11/26 showed no growth   Status post cystoscopy panendoscopy, retrograde pyelogram right-sided with right-sided stent insertion. Hematuria  Post procedure, cystoscopy  Held Eliquis on 11/28 because of hematuria, can be resumed 1 week hence, on 12/5     Pulmonary embolism  Diagnosed on 11/3/2021  Started on full dose Eliquis, continue for now for 3 months     Cirrhosis  Chronic cirrhosis, etiology unclear  Continue home dose rifaximin and lactulose  No encephalopathy noted  No SBP suspected     Hypertension  Blood pressure well controlled  Currently on amlodipine 2.5 mg p.o. daily and Cardura 1 mg p.o. at night, continue for now  Resumed Aldactone and Lasix     Depression  Continue venlafaxine     Idiopathic acute pancreatitis  Resolved during last admission     Pleural effusion  Status post thoracentesis on 10/31/2021  Seen by cardiothoracic surgery who recommended no intervention  Monitor closely for now     DISPOSITION:   Okay for discharge to subacute rehab on 11/30.   Will prefer to observe more improvement in hematuria before discharge. Medications:  REVIEWED DAILY    Infusion Medications    sodium chloride       Scheduled Medications    ferrous sulfate  325 mg Oral BID WC    spironolactone  100 mg Oral Daily    furosemide  40 mg Oral Daily    [Held by provider] apixaban  5 mg Oral BID    amLODIPine  2.5 mg Oral Daily    doxazosin  1 mg Oral Nightly    lactulose  20 g Oral TID    pantoprazole  40 mg Oral Daily    venlafaxine  75 mg Oral Daily    rifaximin  550 mg Oral BID    sodium chloride flush  5-40 mL IntraVENous 2 times per day    budesonide  0.5 mg Nebulization BID    And    Arformoterol Tartrate  15 mcg Nebulization BID    cefTRIAXone (ROCEPHIN) IV  1,000 mg IntraVENous Q24H    ondansetron  4 mg Oral Once     PRN Meds: sodium chloride flush, sodium chloride, ondansetron **OR** ondansetron, polyethylene glycol, acetaminophen **OR** acetaminophen, albuterol    Labs:     Recent Labs     11/27/21  1619 11/28/21  0445 11/29/21  0455   WBC 6.1 7.5 6.3   HGB 10.0* 9.9* 8.9*   HCT 31.2* 31.8* 27.1*    173 171       Recent Labs     11/27/21  0535 11/28/21  0445 11/29/21  0455    137 138   K 4.2 4.5 4.6    105 107   CO2 20* 18* 23   BUN 19 15 14   CREATININE 1.7* 1.6* 1.3*   CALCIUM 9.5 9.3 10.0       Recent Labs     11/27/21  0535   PROT 6.1*   ALKPHOS 114*   ALT 10   AST 27   BILITOT 0.5       No results for input(s): INR in the last 72 hours. No results for input(s): Lisa Fuchs in the last 72 hours.     Chronic labs:    Lab Results   Component Value Date    CHOL 94 02/27/2021    TRIG 65 02/27/2021    HDL 37 02/27/2021    LDLCALC 44 02/27/2021    TSH 2.480 11/25/2021    INR 1.3 08/17/2021    LABA1C 5.3 10/19/2021       Radiology: REVIEWED DAILY    +++++++++++++++++++++++++++++++++++++++++++++++++  Navya Woodson MD  [facility] Physician - [address] [facility], [place]  +++++++++++++++++++++++++++++++++++++++++++++++++  NOTE: This report was transcribed using voice recognition software. Every effort was made to ensure accuracy; however, inadvertent computerized transcription errors may be present.

## 2021-11-29 NOTE — PROGRESS NOTES
education  Pt educated on safety and technique with bed mobility, transfers and technique    Patient response to education:   Pt verbalized understanding Pt demonstrated skill Pt requires further education in this area   yes yes reinforce     ASSESSMENT:    Conditions Requiring Skilled Therapeutic Intervention:    [x]Decreased strength     [x]Decreased ROM  [x]Decreased functional mobility  [x]Decreased balance   [x]Decreased endurance   []Decreased posture  []Decreased sensation  []Decreased coordination   []Decreased vision  [x]Decreased safety awareness   []Increased pain       Comments:      Pt supine in bed upon entry and agreed to PT session. Pt reported no pain at this time reports pain 8/10 L shoulder, chronic. Pt O2 levels monitored throughout tx. Pt at 93% on room air supine in bed. Pt completed rolling and scooting CGA and supine to sit Min A to EOB. Pt completed STS transfers Min A. Pt required cues for hand placement, upright posture and safety during transfers. Pt ambulated 75 feet x2 Min A with [employer]. During ambulation pt required cues for [employer] maneuvering, keeping feet inside the walker and upright posture. Pt returned to room. Pt completed stand pivot transfer Min A with [employer] to bedside chair. Pt required cues to maneuver the [employer], to be patient with transitions and for hand placement when descending. Pt O2 at 93-94% on room air while sitting in chair. Pt completed therapeutic exercises while in chair and educated on continuation of exercises throughout the day intermittent within tolerance. Pt declined ambulation with sbqc due to shoulder pain L LE. Pt educated on OOB safety and use of call light button. Pt left comfortable in chair with call light in reach and all needs met. Try to progress pt to [facility] tx if appropriate. Treatment:  Patient practiced and was instructed in the following treatment:    Bed mobility - Rolling and scooting CGA and supine to sit Min A to EOB.   Transfers -STS transfers Min A and stand pivot transfer Min A with [employer]. Cues for hand placement, upright posture and safety during transfers. Ambulation - 75 feet x2 Min A with [employer]. Cues for [employer] maneuvering, keeping feet inside the walker and upright posture. Pt's/ family goals   1. Return home    Prognosis is good for reaching above PT goals. Patient and or family understand(s) diagnosis, prognosis, and plan of care. yes    PHYSICAL THERAPY PLAN OF CARE:    PT POC is established based on physician order and patient diagnosis     Referring provider/PT Order:  Tess Davenport MD  Diagnosis:  Acute renal failure (ARF) ([facility] [N17.9]  Acute kidney injury ([facility] [N17.9]  Intractable nausea and vomiting [R11.2]  Specific instructions for next treatment:  Progress as tolerated, gait training    Current Treatment Recommendations:     [x] Strengthening to improve independence with functional mobility   [x] ROM to improve independence with functional mobility   [x] Balance Training to improve static/dynamic balance and to reduce fall risk  [x] Endurance Training to improve activity tolerance during functional mobility   [x] Transfer Training to improve safety and independence with all functional transfers   [x] Gait Training to improve gait mechanics, endurance and assess need for appropriate assistive device  [] Stair Training in preparation for safe discharge home and/or into the community   [] Positioning to prevent skin breakdown and contractures  [x] Safety and Education Training   [x] Patient/Caregiver Education   [] HEP  [] Other     PT long term treatment goals are located in above grid    Frequency of treatments: 2-5x/week x 1-2 weeks.     Time in : 1510  Time out  1540    Total Treatment Time  30 minutes     Evaluation Time includes thorough review of current medical information, gathering information on past medical history/social history and prior level of function, completion of standardized testing/informal observation of tasks, assessment of data and education on plan of care and goals.     CPT codes:  [] Low Complexity PT evaluation 20138  [] Moderate Complexity PT evaluation 91907  [] High Complexity PT evaluation 47293  [] PT Re-evaluation 50645  [] Gait training 33185 10 minutes  [] Manual therapy [id number] -- minutes  [x] Therapeutic activities 84086 -- 30 minutes  [] Therapeutic exercises 35140 -- minutes  [] Neuromuscular reeducation 21854 -- minutes     [place]or[address]

## 2021-11-29 NOTE — PROGRESS NOTES
Nephrology Progress Note  Patient's Name: Christian Moraes    Nephrologist: Franco Almazan  Reason for Consult: REN      History of Present Ilness from the 11/25/21 note: Christian Moraes is a 68 y.o. female with prior history of CKD G3A with a baseline serum cr 1.1mg/dl with an e-GFR=48ml/min presumed sec to DM Nephropathy who was seen by Dr. Shanice Hwang in the office. The pt was hospitalized 10/29/21-11/3/21 for SOB. 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 She was also found to have subsegmental Pe's and started on Eliquis. She had a CT Scan of the Abd and Pelvis which did not show any hydronephrosis. SHe presented back to the ED 11/23/21 for RLQ pain radiating to the back with associated N/V unrelieved by oral anti-emetics. At home T 99. She notes intermittent hematuria. Cr in the ED 3.0mg/dl with K+ 5.4. At home she was being maintained on loop+ aldactone. CT Scan in the ED revealed R hydro and Bilat calculus.    This Am she notes mild midepigastric pain and ongoing nausea    11/28/21: Pt awake alert and states she has nausea and when she urinates she has dysuria    11/29: pt seen in [facility] with hematuria    Past Medical History:   Diagnosis Date    Breast cancer ([address]     Cirrhosis ([address]     Essential hypertension     Hx of blood clots     Hyperlipidemia     Osteopenia     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 ([address]     Sleep apnea     Spinal stenosis     Type 2 diabetes mellitus ([address]        Past Surgical History:   Procedure Laterality Date    BLADDER SURGERY Right 11/26/2021    CYSTOSCOPY RETROGRADE PYELOGRAM RIGHT  STENT INSERTION performed by Jess Metcalf MD at [address] LUMPECTOMY      lumpectomy [id number]    CARDIOVASCULAR STRESS TEST      Lexiscan stress test    CARPAL TUNNEL RELEASE      COLONOSCOPY  01/31/2017    multiple polyps; diverticula--jerod    COLONOSCOPY  04/23/2019    polyps; diverticula; hemorrhoids--jerod    COLONOSCOPY N/A 04/23/2019 COLONOSCOPY POLYPECTOMY SNARE/COLD BIOPSY performed by Izetta Carrel, MD at [address] [facility] COLONOSCOPY  04/23/2019    COLONOSCOPY WITH BIOPSY performed by Izetta Carrel, MD at [address] LITHOTRIPSY Left 05/04/2016    C-R STENT PLACEMENT    SHOULDER ARTHROPLASTY Left 12/21/2020    LEFT REVERSE TOTAL SHOULDER  ARTHROPLASTY -- DEPUY performed by Kirk Pruitt MD at [facility]  04/23/2019    gastritis--jerod    UPPER GASTROINTESTINAL ENDOSCOPY N/A 04/23/2019    EGD BIOPSY performed by Izetta Carrel, MD at [facility] History   Problem Relation Age of Onset    Arthritis Mother     COPD Father     Heart Attack Father     Cancer Other 48        colon        reports that she has never smoked. She has never used smokeless tobacco. She reports that she does not drink alcohol and does not use drugs.     Allergies:  Lisinopril    Current Medications:    diphenhydrAMINE (BENADRYL) tablet 25 mg, Q8H PRN  ferrous sulfate (IRON 325) tablet 325 mg, BID WC  spironolactone (ALDACTONE) tablet 100 mg, Daily  furosemide (LASIX) tablet 40 mg, Daily  [Held by provider] apixaban (ELIQUIS) tablet 5 mg, BID  amLODIPine (NORVASC) tablet 2.5 mg, Daily  doxazosin (CARDURA) tablet 1 mg, Nightly  lactulose (CHRONULAC) 10 GM/15ML solution 20 g, TID  pantoprazole (PROTONIX) tablet 40 mg, Daily  venlafaxine (EFFEXOR XR) extended release capsule 75 mg, Daily  rifaximin (XIFAXAN) tablet 550 mg, BID  sodium chloride flush 0.9 % injection 5-40 mL, 2 times per day  sodium chloride flush 0.9 % injection 5-40 mL, PRN  0.9 % sodium chloride infusion, PRN  ondansetron (ZOFRAN-ODT) disintegrating tablet 4 mg, Q8H PRN   Or  ondansetron (ZOFRAN) injection 4 mg, Q6H PRN  polyethylene glycol (GLYCOLAX) packet 17 g, Daily PRN  acetaminophen (TYLENOL) tablet 650 mg, Q6H PRN   Or  acetaminophen (TYLENOL) suppository 650 mg, Q6H PRN  albuterol (PROVENTIL) nebulizer solution 2.5 mg, 4x Daily PRN  budesonide (PULMICORT) nebulizer suspension 500 mcg, BID   And  Arformoterol Tartrate (BROVANA) nebulizer solution 15 mcg, BID  cefTRIAXone (ROCEPHIN) 1,000 mg in sterile water 10 mL IV syringe, Q24H  ondansetron (ZOFRAN-ODT) disintegrating tablet 4 mg, Once        Review of Systems:   Pertinent items are noted in HPI.     Physical exam:   Constitutional: Elderly female in NAD   Vitals:   VITALS:  BP (!) 106/55   Pulse 88   Temp 98.1 °F (36.7 °C) (Oral)   Resp 20   Ht 5' 2.5\" (1.588 m)   Wt 170 lb (77.1 kg)   SpO2 98%   BMI 30.60 kg/m²   24HR INTAKE/OUTPUT:      Intake/Output Summary (Last 24 hours) at 11/29/2021 1206  Last data filed at 11/28/2021 2250  Gross per 24 hour   Intake 500 ml   Output 0 ml   Net 500 ml     URINARY CATHETER OUTPUT (Engel):     DRAIN/TUBE OUTPUT:     VENT SETTINGS:  Vent Information  SpO2: 98 %  Additional Respiratory  Assessments  Pulse: 88  Resp: 20  SpO2: 98 %    Skin: no rash, turgor wnl  Heent:  eomi, mmm  Neck: no bruits or jvd noted  Cardiovascular: PMI not lat displaced S1, S2 without m/r/g  Respiratory: CTA B without w/r/r  Abdomen:  +bs, soft, nd, tender in the midepigastrium, no HSM  Ext: (-) bilat lower extremity edema  Psychiatric: mood and affect appropriate, cr nr 2-12 grossly intact  Musculoskeletal:  Rom, muscular strength intact    Data:   Labs:  CBC:   Lab Results   Component Value Date    WBC 6.3 11/29/2021    RBC 2.81 11/29/2021    HGB 8.9 11/29/2021    HCT 27.1 11/29/2021    MCV 96.4 11/29/2021    MCH 31.7 11/29/2021    MCHC 32.8 11/29/2021    RDW 19.9 11/29/2021     11/29/2021    MPV 11.0 11/29/2021     CBC with Differential:    Lab Results   Component Value Date    WBC 6.3 11/29/2021    RBC 2.81 11/29/2021    HGB 8.9 11/29/2021    HCT 27.1 11/29/2021     11/29/2021    MCV 96.4 11/29/2021    MCH 31.7 11/29/2021    MCHC 32.8 11/29/2021    RDW 19.9 11/29/2021    LYMPHOPCT 0.9 11/23/2021    PROMYELOPCT 0.9 11/23/2021    MONOPCT 3.5 11/23/2021    MYELOPCT 0.9 04/15/2021    BASOPCT 0.8 11/23/2021    MONOSABS 0.40 11/23/2021    LYMPHSABS 0.10 11/23/2021    EOSABS 0.00 11/23/2021    BASOSABS 0.08 11/23/2021     Hemoglobin/Hematocrit:    Lab Results   Component Value Date    HGB 8.9 11/29/2021    HCT 27.1 11/29/2021     CMP:    Lab Results   Component Value Date     11/29/2021    K 4.6 11/29/2021    K 4.0 11/03/2021     11/29/2021    CO2 23 11/29/2021    BUN 14 11/29/2021    CREATININE 1.3 11/29/2021    GFRAA 48 11/29/2021    LABGLOM 40 11/29/2021    GLUCOSE 88 11/29/2021    PROT 6.1 11/27/2021    LABALBU 2.9 11/27/2021    CALCIUM 10.0 11/29/2021    BILITOT 0.5 11/27/2021    ALKPHOS 114 11/27/2021    AST 27 11/27/2021    ALT 10 11/27/2021     BMP:    Lab Results   Component Value Date     11/29/2021    K 4.6 11/29/2021    K 4.0 11/03/2021     11/29/2021    CO2 23 11/29/2021    BUN 14 11/29/2021    LABALBU 2.9 11/27/2021    CREATININE 1.3 11/29/2021    CALCIUM 10.0 11/29/2021    GFRAA 48 11/29/2021    LABGLOM 40 11/29/2021    GLUCOSE 88 11/29/2021     BUN/Creatinine:    Lab Results   Component Value Date    BUN 14 11/29/2021    CREATININE 1.3 11/29/2021     Hepatic Function Panel:    Lab Results   Component Value Date    ALKPHOS 114 11/27/2021    ALT 10 11/27/2021    AST 27 11/27/2021    PROT 6.1 11/27/2021    BILITOT 0.5 11/27/2021    BILIDIR 0.6 11/23/2021    IBILI 0.9 11/23/2021    LABALBU 2.9 11/27/2021     Albumin:    Lab Results   Component Value Date    LABALBU 2.9 11/27/2021     Calcium:    Lab Results   Component Value Date    CALCIUM 10.0 11/29/2021     Ionized Calcium:  No results found for: IONCA  Magnesium:    Lab Results   Component Value Date    MG 1.7 02/04/2021     Phosphorus:    Lab Results   Component Value Date    PHOS 2.7 02/27/2021     LDH:    Lab Results   Component Value Date     11/01/2021     Uric Acid:    Lab Results   Component Value Date    LABURIC 8.0 02/27/2021     PT/INR:    Lab Results   Component Value Date    PROTIME 14.9 08/17/2021    INR 1.3 08/17/2021     PTT:    Lab Results   Component Value Date    APTT 32.0 11/28/2021   [APTT}  Troponin:    Lab Results   Component Value Date    TROPONINI <0.01 02/04/2021     U/A:    Lab Results   Component Value Date    NITRITE Neg 10/29/2018    COLORU Yellow 11/24/2021    PROTEINU Negative 11/24/2021    PHUR 7.0 11/24/2021    WBCUA 1-3 11/24/2021    RBCUA 10-20 11/24/2021    YEAST MODERATE 08/22/2017    BACTERIA MANY 11/24/2021    CLARITYU SL CLOUDY 11/24/2021    SPECGRAV 1.010 11/24/2021    LEUKOCYTESUR TRACE 11/24/2021    UROBILINOGEN 2.0 11/24/2021    BILIRUBINUR Negative 11/24/2021    BILIRUBINUR Neg 10/29/2018    BLOODU LARGE 11/24/2021    GLUCOSEU Negative 11/24/2021     ABG:    Lab Results   Component Value Date    PCO2 37.3 08/17/2021    PO2 79.0 08/17/2021    HCO3 25.6 08/17/2021    BE 1.8 08/17/2021    O2SAT 96.1 08/17/2021     HgBA1c:    Lab Results   Component Value Date    LABA1C 5.3 10/19/2021     Microalbumen/Creatinine ratio:  No components found for: RUCREAT  FLP:    Lab Results   Component Value Date    TRIG 65 02/27/2021    HDL 37 02/27/2021    LDLCALC 44 02/27/2021    LABVLDL 13 02/27/2021     TSH:    Lab Results   Component Value Date    TSH 2.480 11/25/2021     VITAMIN B12: No components found for: B12  FOLATE:    Lab Results   Component Value Date    FOLATE 13.8 11/27/2021     Iron Saturation:  No components found for: PERCENTFE  FERRITIN:    Lab Results   Component Value Date    FERRITIN 39 11/27/2021     AMYLASE:  No results found for: AMYLASE  LIPASE:    Lab Results   Component Value Date    LIPASE 46 11/23/2021     Fibrinogen Level:  No components found for: FIB  24 Hour Urine for Protein:  No components found for: RAWUPRO, UHRS3, PCFV44IF, UTV3  24 Hour Urine for Creatinine Clearance:  No components found for: CREAT4, UHRS10, UTV10     Imaging:  CXR results:EXAMINATION:  ONE XRAY VIEW OF THE CHEST     11/24/2021 4:16 Support Exception - unselect if not a suspected or confirmed  emergency medical condition->Emergency Medical Condition (MA)  What reading provider will be dictating this exam?->CRC     FINDINGS:  Lower Chest: Moderate to large left pleural effusion, apparently complete  atelectasis in the left lower lobe     Organs: Cirrhotic appearance of the liver, evaluation limited without  contrast, low-attenuation left lobe lesion around image 69 is present with  density favoring a cyst on this exam although prior exam showed above water  density, considering the high risk recommend follow-up MRI.  There are beam  hardening artifacts also limiting evaluation of abdominal organs. Hypodensity present at the medial spleen appears to be cystic as well by  density.  Mild nephrolithiasis.  Right hydronephrosis left-side collecting  system mildly prominent but may be compensation with parenchymal volume loss. Calculus at the proximal right ureter is 6 mm, lower nephrolithiasis on the  left present.  Mild splenomegaly.  Additional low-attenuation hepatic lesions  are less well visualized on this exam a a     GI/Bowel: No obstruction.  Diverticulosis coli present     Pelvis: Hysterectomy     Peritoneum/Retroperitoneum: There are mild atherosclerotic calcifications  present.     Bones/Soft Tissues: Umbilicus fat hernia with a small amount of.  Anterior  soft tissue nodules in the subcutaneous fat may be injection related but are  nonspecific. Iain Gross to severe spinal degenerative changes. Prior mild  compression fractures in thoracic spine are seen        Impression  1. 6 mm right proximal ureteral calculus with moderate hydronephrosis. Bilateral nephrolithiasis  2. Hepatic cirrhosis and portal hypertension including mild ascites  3. Moderate to large left pleural effusion, complete left lower lobe  atelectasis  4.  Indeterminate hepatic lesions, recommend follow-up MRI evaluation      Assessment  1-Stage III REN  Nephrolithiasis and Obstructive Uropathy  exacerbated by the outpt furosemide and spironolactone  UA cloudy, large blood, pH 7.0, LE tr, RBC >20, WBC 1-3  Cr trending down 3.0-->3.1-->2.5-->2.1-->1.7-->1.6mg/dl  UC NG  11/26/21 S/P Cystopanendoscopy; retrograde pyelogram, right stent insertion. Cr improving 2.5>1.3  uo ?    2-CKD G3A  baseline serum cr 1.1mg/dl  presumed sec to DM Nephropathy  Continue to monitor    3-Hyperkalemia  setting of the REN and the Aldactone  K+ WNL  follow K+    4- Hypervolemia with Mod -Large L Pl Eff and Ascites  Follow  With the resumption of the furosemide 40mg po qd and the Aldactone 100mg po qd    5- HTN with CKD I-IV  BP goal <130/80-BP at near goal  Monitor with the resumption  the loop+ Aldactone    6.  Anemia in CKD  HgB down with the hydration but stable   Ferritin 39 and Iron Sat 9%  Folate 13.8 and Vit B12 1009  Once off antibx will plan for IV Fe++  upep spep p    Thank you Dr. Radha Palmer MD for allowing us to participate in care of Riya Arreaga MD

## 2021-11-30 VITALS
WEIGHT: 170 LBS | OXYGEN SATURATION: 91 % | TEMPERATURE: 98.1 F | SYSTOLIC BLOOD PRESSURE: 121 MMHG | RESPIRATION RATE: 14 BRPM | DIASTOLIC BLOOD PRESSURE: 64 MMHG | HEIGHT: 63 IN | BODY MASS INDEX: 30.12 KG/M2 | HEART RATE: 86 BPM

## 2021-11-30 LAB
ANION GAP SERPL CALCULATED.3IONS-SCNC: 9 MMOL/L (ref 7–16)
BUN BLDV-MCNC: 13 MG/DL (ref 6–23)
CALCIUM SERPL-MCNC: 9.3 MG/DL (ref 8.6–10.2)
CHLORIDE BLD-SCNC: 106 MMOL/L (ref 98–107)
CO2: 25 MMOL/L (ref 22–29)
CREAT SERPL-MCNC: 1.5 MG/DL (ref 0.5–1)
GFR AFRICAN AMERICAN: 41
GFR NON-AFRICAN AMERICAN: 34 ML/MIN/1.73
GLUCOSE BLD-MCNC: 77 MG/DL (ref 74–99)
HCT VFR BLD CALC: 28.6 % (ref 34–48)
HEMOGLOBIN: 9.1 G/DL (ref 11.5–15.5)
MCH RBC QN AUTO: 32.5 PG (ref 26–35)
MCHC RBC AUTO-ENTMCNC: 31.8 % (ref 32–34.5)
MCV RBC AUTO: 102.1 FL (ref 80–99.9)
PDW BLD-RTO: 19.9 FL (ref 11.5–15)
PLATELET # BLD: 175 E9/L (ref 130–450)
PMV BLD AUTO: 10.4 FL (ref 7–12)
POTASSIUM SERPL-SCNC: 4.5 MMOL/L (ref 3.5–5)
RBC # BLD: 2.8 E12/L (ref 3.5–5.5)
SODIUM BLD-SCNC: 140 MMOL/L (ref 132–146)
WBC # BLD: 5.4 E9/L (ref 4.5–11.5)

## 2021-11-30 PROCEDURE — 6370000000 HC RX 637 (ALT 250 FOR IP): Performed by: INTERNAL MEDICINE

## 2021-11-30 PROCEDURE — 97530 THERAPEUTIC ACTIVITIES: CPT

## 2021-11-30 PROCEDURE — 80048 BASIC METABOLIC PNL TOTAL CA: CPT

## 2021-11-30 PROCEDURE — 94640 AIRWAY INHALATION TREATMENT: CPT

## 2021-11-30 PROCEDURE — 97535 SELF CARE MNGMENT TRAINING: CPT

## 2021-11-30 PROCEDURE — 6370000000 HC RX 637 (ALT 250 FOR IP): Performed by: UROLOGY

## 2021-11-30 PROCEDURE — 2580000003 HC RX 258: Performed by: UROLOGY

## 2021-11-30 PROCEDURE — 85027 COMPLETE CBC AUTOMATED: CPT

## 2021-11-30 PROCEDURE — 36415 COLL VENOUS BLD VENIPUNCTURE: CPT

## 2021-11-30 PROCEDURE — 6360000002 HC RX W HCPCS: Performed by: UROLOGY

## 2021-11-30 RX ORDER — FERROUS SULFATE 325(65) MG
325 TABLET ORAL 2 TIMES DAILY WITH MEALS
Qty: 30 TABLET | Refills: 3 | Status: ON HOLD | OUTPATIENT
Start: 2021-11-30 | End: 2021-12-15

## 2021-11-30 RX ADMIN — ARFORMOTEROL TARTRATE 15 MCG: 15 SOLUTION RESPIRATORY (INHALATION) at 08:22

## 2021-11-30 RX ADMIN — RIFAXIMIN 550 MG: 550 TABLET ORAL at 09:11

## 2021-11-30 RX ADMIN — FUROSEMIDE 40 MG: 20 TABLET ORAL at 09:10

## 2021-11-30 RX ADMIN — BUDESONIDE 500 MCG: 0.5 SUSPENSION RESPIRATORY (INHALATION) at 08:22

## 2021-11-30 RX ADMIN — FERROUS SULFATE TAB 325 MG (65 MG ELEMENTAL FE) 325 MG: 325 (65 FE) TAB at 09:11

## 2021-11-30 RX ADMIN — VENLAFAXINE HYDROCHLORIDE 75 MG: 75 CAPSULE, EXTENDED RELEASE ORAL at 09:11

## 2021-11-30 RX ADMIN — AMLODIPINE BESYLATE 2.5 MG: 2.5 TABLET ORAL at 09:11

## 2021-11-30 RX ADMIN — Medication 10 ML: at 09:14

## 2021-11-30 RX ADMIN — SPIRONOLACTONE 100 MG: 25 TABLET ORAL at 09:10

## 2021-11-30 RX ADMIN — PANTOPRAZOLE SODIUM 40 MG: 40 TABLET, DELAYED RELEASE ORAL at 09:11

## 2021-11-30 ASSESSMENT — PAIN SCALES - GENERAL
PAINLEVEL_OUTOF10: 0
PAINLEVEL_OUTOF10: 0

## 2021-11-30 NOTE — PROGRESS NOTES
[facility] PROGRESS NOTE                                                                       11/30/2021          SUBJECTIVE:    Pt comfortable with bar  Urine clearing  Creatinine 1.5  Pt most likely will go to rehab    OBJECTIVE:    /64   Pulse 86   Temp 98.1 °F (36.7 °C) (Oral)   Resp 20   Ht 5' 2.5\" (1.588 m)   Wt 170 lb (77.1 kg)   SpO2 92%   BMI 30.60 kg/m²     PHYSICAL EXAMINATION:  Skin: dry, without rashes  Respirations: non-labored, intact  Abdomen: soft, non-tender, non-distended      Lab Results   Component Value Date    WBC 5.4 11/30/2021    HGB 9.1 (L) 11/30/2021    HCT 28.6 (L) 11/30/2021    .1 (H) 11/30/2021     11/30/2021       Lab Results   Component Value Date    CREATININE 1.5 (H) 11/30/2021       No results found for: PSA    REVIEW OF SYSTEMS:    CONSTITUTIONAL: negative  HEENT: negative  HEMATOLOGIC: negative  ENDOCRINE: negative  RESPIRATORY: negative  CV: negative  GI: negative  NEURO: negative  ORTHOPEDICS: negative  PSYCHIATRIC: negative  : as above    PAST FAMILY HISTORY:    Family History   Problem Relation Age of Onset    Arthritis Mother     COPD Father     Heart Attack Father     Cancer Other 48        colon     PAST SOCIAL HISTORY:    Social History     Socioeconomic History    Marital status:      Spouse name: None    Number of children: None    Years of education: None    Highest education level: None   Occupational History    None   Tobacco Use    Smoking status: Never Smoker    Smokeless tobacco: Never Used   Vaping Use    Vaping Use: Never used   Substance and Sexual Activity    Alcohol use: Never     Alcohol/week: 0.0 standard drinks    Drug use: Never    Sexual activity: Never     Partners: Male     Comment: last in 2013   Other Topics Concern    None   Social History Narrative    Denies caffeine. Social Determinants of Health     Financial Resource Strain: High Risk    Difficulty of Paying Living Expenses: Hard   Food Insecurity: No Food Insecurity    Worried About Running Out of Food in the Last Year: Never true    Lj of Food in the Last Year: Never true   Transportation Needs:     Lack of Transportation (Medical): Not on file    Lack of Transportation (Non-Medical):  Not on file   Physical Activity:     Days of Exercise per Week: Not on file    Minutes of Exercise per Session: Not on file   Stress:     Feeling of Stress : Not on file   Social Connections:     Frequency of Communication with Friends and Family: Not on file    Frequency of Social Gatherings with Friends and Family: Not on file    Attends Roman [employer] Services: Not on file    Active Member of [address] [place] [address] [employer] or Organizations: Not on file    Attends Club or Organization Meetings: Not on file    Marital Status: Not on file   Intimate Partner Violence:     Fear of Current or Ex-Partner: Not on file    Emotionally Abused: Not on file    Physically Abused: Not on file    Sexually Abused: Not on file   Housing Stability:     Unable to Pay for Housing in the Last Year: Not on file    Number of [place] in the Last Year: Not on file    Unstable Housing in the Last Year: Not on file       Scheduled Meds:   ferrous sulfate  325 mg Oral BID WC    spironolactone  100 mg Oral Daily    furosemide  40 mg Oral Daily    [Held by provider] apixaban  5 mg Oral BID    amLODIPine  2.5 mg Oral Daily    doxazosin  1 mg Oral Nightly    lactulose  20 g Oral TID    pantoprazole  40 mg Oral Daily    venlafaxine  75 mg Oral Daily    rifaximin  550 mg Oral BID    sodium chloride flush  5-40 mL IntraVENous 2 times per day    budesonide  0.5 mg Nebulization BID    And    Arformoterol Tartrate  15 mcg Nebulization BID    cefTRIAXone (ROCEPHIN) IV  1,000 mg IntraVENous Q24H    ondansetron  4 mg Oral Once     Continuous Infusions:   sodium chloride       PRN Meds:.diphenhydrAMINE, sodium chloride flush, sodium chloride, ondansetron **OR** ondansetron, polyethylene glycol, acetaminophen **OR** acetaminophen, albuterol    ASSESSMENT:    Patient Active Problem List   Diagnosis    Malignant neoplasm of left breast (HCC)    Type 2 diabetes mellitus ([facility]    Obstructive sleep apnea syndrome    Essential hypertension    Multiple thyroid nodules    Depression    Hx of adenomatous colonic polyps    Dyslipidemia    Cirrhosis (HCC)    S/P reverse total shoulder arthroplasty, left    Anemia    Pleural effusion associated with hepatic disorder    Palpitations    Rhabdomyolysis    Hyperammonemia (HCC)    Closed displaced fracture of surgical neck of right humerus    Pulmonary embolism (HCC)    Idiopathic acute pancreatitis without infection or necrosis    Asymptomatic microscopic hematuria    Breast mass, left    Anxiety disorder, unspecified    Gastro-esophageal reflux disease without esophagitis    Major depressive disorder, recurrent severe without psychotic features ([facility]    Muscle weakness (generalized)    Need for assistance with personal care    Other abnormalities of gait and mobility    Unspecified physeal fracture of upper end of humerus, left arm, subsequent encounter for fracture with routine healing    Difficulty in walking, not elsewhere classified    Acute renal failure (ARF) ([facility]       PLAN:  Pt should be discharged to rehab with bar  Will need to do repeat ureteroscopy and stone removal in future when pt is stable medically    Lani Mcardle, MD, [facility].  11/30/2021  8:12 AM

## 2021-11-30 NOTE — PROGRESS NOTES
Occupational Therapy  OCCUPATIONAL THERAPY BEDSIDE TREATMENT NOTE   [address]     FDJU:  Patient Name: Gaurav Petit  MRN: [id number]  : 1945  Room: [address] [place] [address]     Evaluating OT: RACIEL Lino, OTR/L [id number]  Referring Lavell Ricci MD  Specific Provider Orders: OT eval and treat   Recommended Adaptive Equipment: AE for LB dressing     Diagnosis: acute renal failure   Surgery:  CYSTOSCOPY RETROGRADE PYELOGRAM RIGHT  STENT INSERTION  Pertinent Medical History: breast CA, HTN, HLD, DM II, radiation induced neuropathy, R humerus fx (2021) , cirrhosis  Precautions:  Fall Risk     Assessment of current deficits   [x] Functional mobility           [x]ADLs           [x] Strength                  [x]Cognition   [x] Functional transfers         [x] IADLs         [x] Safety Awareness   [x]Endurance   [] Fine Coordination                         [x] Balance      [] Vision/perception   [x]Sensation     []Gross Motor Coordination             [] ROM           [] Delirium                   [] Motor Control      OT PLAN OF CARE   OT POC based on physician orders, patient diagnosis and results of clinical assessment     Frequency/Duration: 2-3 days/wk for 2 weeks PRN   Specific OT Treatment to include:   * Instruction/training on adapted ADL techniques and AE recommendations to increase functional independence within precautions       * Training on energy conservation strategies, correct breathing pattern and techniques to improve independence/tolerance for self-care routine  * Functional transfer/mobility training/DME recommendations for increased independence, safety, and fall prevention  * Patient/Family education to increase follow through with safety techniques and functional independence  * Recommendation of environmental modifications for increased safety with functional transfers/mobility and ADLs  * Therapeutic exercise to improve motor endurance, ROM, and functional strength for ADLs/functional transfers  * Therapeutic activities to facilitate/challenge dynamic balance, stand tolerance for increased safety and independence with ADLs  * Neuro-muscular re-education: facilitation of righting/equilibrium reactions, midline orientation, scapular stability/mobility, normalization of muscle tone, and facilitation of volitional active controled movement     Home Living: Pt lives alone in an apartment with step(s) to enter; bed/bath on 1 level when entering apartment   Bathroom setup: tub shower unit  Equipment owned: shower cahir, toilet riser   Prior Level of Function: IND with ADLs/ IADLs; using SBQC for functional mobility   Driving: no     Pain Level: 0/10   Cognition: A&O: 4/4; Follows 1-2 step directions              Memory:  fair+              Sequencing:  fair+              Problem solving:  fair              Judgement/safety:  fair      Functional Assessment:  AM-PAC Daily Activity Raw Score: 17/24    Initial Eval Status  Date: 11/26/21 Treatment Status  Date: 11/30/21 STGs=LTGs  Time Frame: 10-14 days   Feeding SUP   Setup; To complete self feeding while sitting up in the chair. IND   Grooming SBA (standing at sink for hand hygiene)   SBA; To complete washing face, brushing teeth, and completing hair hygiene while standing and sitting at sink. IND   UB Dressing Min A (due to limited ROM)  Min A;    To albert/doff gown while sitting up in the chair. SBA   LB Dressing Max A (assist with socks/brief) Min A; To sit up in the chair to albert/doff socks while sitting up in the chair with increased time. Min A    Bathing Mod A (simulated)  N/T (staff just completed with patient)   Min A    Toileting Min A (assist with hygiene)  Min A; To perform transfer on/off commode to practice safety with verbal prompting for hand placement.     SBA   Bed Mobility  Log roll: SBA  Supine to sit: SBA Sit to supine: NT  Log roll: N/T  Supine to sit:N/T  Sit to supine: N/T    Pt sitting up in the chair when arriving to room. Pt agreeable to stay up in the chair after session. Log roll IND  Supine to sit: IND   Sit to supine: IND   Functional Transfers Sit to stand:SBA   Stand to sit:SBA  Commode: SBA  Sit to stand:SBA   Stand to sit:SBA  Commode: SBA   IND   Functional Mobility SBA (using SBQC, to/from bathroom)  SBA with SBQC with verbal prompting on increasing safety of use and balance while performing ambulation. IND   Balance Sitting: SBA  Standing: SBA  Sitting: Indep  Standing: SBA       Activity Tolerance Fair+  Fair+     Visual/  Perceptual Glasses: none present                               UE ROM:        BUE: elbow flex WFL, shoulder flex grossly 90'  Strength:        RUE: grossly 3+/5       LUE: grossly 3+/5   Strength: B WFL  Fine Motor Coordination:  WFL      Comments: Upon arrival, patient sitting up in the bedside chair and agreeable to OT session.  At end of session, patient return to sitting in chair with call light and phone within reach, all lines and tubes intact, and tray table in front of her.     -pt has made fair progress toward goals  -continue current POC     Time In: 0910       Time Out: 0937  Total treatment time: 27 mins     Treatment Charges: Mins Units   OT Eval Low [id number]      OT Eval Medium [id number]       OT Eval High [id number]       OT Re-Eval 86998       Ther Ex  [id number]       Manual Therapy [address] 10  1    ADL/Home Mgt [id number] 17 1   Neuro Re-ed [address] manage/training  94007       Non-Billable Time         Gene Keen [address], [address]

## 2021-11-30 NOTE — PROGRESS NOTES
Nephrology Progress Note  Patient's Name: Ana María Case    Nephrologist: Jeremy Rivas  Reason for Consult: REN      History of Present Joie from the 11/25/21 note: Ana María Case is a 68 y.o. female with prior history of CKD G3A with a baseline serum cr 1.1mg/dl with an e-GFR=48ml/min presumed sec to DM Nephropathy who was seen by Dr. Brent Laureano in the office. The pt was hospitalized 10/29/21-11/3/21 for SOB. 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 She was also found to have subsegmental Pe's and started on Eliquis. She had a CT Scan of the Abd and Pelvis which did not show any hydronephrosis. SHe presented back to the ED 11/23/21 for RLQ pain radiating to the back with associated N/V unrelieved by oral anti-emetics. At home T 99. She notes intermittent hematuria. Cr in the ED 3.0mg/dl with K+ 5.4. At home she was being maintained on loop+ aldactone. CT Scan in the ED revealed R hydro and Bilat calculus.    This Am she notes mild midepigastric pain and ongoing nausea    11/28/21: Pt awake alert and states she has nausea and when she urinates she has dysuria    11/29: pt seen in room, yosvany with hematuria    11/30: pt seen in room, eating ok, for dc today    Past Medical History:   Diagnosis Date    Breast cancer ([address]     Cirrhosis ([address]     Essential hypertension     Hx of blood clots     Hyperlipidemia     Osteopenia     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 ([address]     Sleep apnea     Spinal stenosis     Type 2 diabetes mellitus ([address]        Past Surgical History:   Procedure Laterality Date    BLADDER SURGERY Right 11/26/2021    CYSTOSCOPY RETROGRADE PYELOGRAM RIGHT  STENT INSERTION performed by Jorge Kelley MD at [address] LUMPECTOMY      lumpectomy [id number]    CARDIOVASCULAR STRESS TEST      Lexiscan stress test    CARPAL TUNNEL RELEASE      COLONOSCOPY  01/31/2017    multiple polyps; diverticula--jerod    COLONOSCOPY  04/23/2019    polyps; diverticula; hemorrhoids--jerod    COLONOSCOPY N/A 04/23/2019    COLONOSCOPY POLYPECTOMY SNARE/COLD BIOPSY performed by Dawson Oakes MD at [address] COLONOSCOPY  04/23/2019    COLONOSCOPY WITH BIOPSY performed by Dawson Oakes MD at [address] [facility] LITHOTRIPSY Left 05/04/2016    C-R STENT PLACEMENT    SHOULDER ARTHROPLASTY Left 12/21/2020    LEFT REVERSE TOTAL SHOULDER  ARTHROPLASTY -- DEPUY performed by Padma Rivera MD at [address] [facility]  04/23/2019    gastritis--jerod    UPPER GASTROINTESTINAL ENDOSCOPY N/A 04/23/2019    EGD BIOPSY performed by Dawson Oakes MD at [facility] History   Problem Relation Age of Onset    Arthritis Mother     COPD Father     Heart Attack Father     Cancer Other 48        colon        reports that she has never smoked. She has never used smokeless tobacco. She reports that she does not drink alcohol and does not use drugs.     Allergies:  Lisinopril    Current Medications:    diphenhydrAMINE (BENADRYL) tablet 25 mg, Q8H PRN  ferrous sulfate (IRON 325) tablet 325 mg, BID WC  spironolactone (ALDACTONE) tablet 100 mg, Daily  furosemide (LASIX) tablet 40 mg, Daily  [Held by provider] apixaban (ELIQUIS) tablet 5 mg, BID  amLODIPine (NORVASC) tablet 2.5 mg, Daily  doxazosin (CARDURA) tablet 1 mg, Nightly  lactulose (CHRONULAC) 10 GM/15ML solution 20 g, TID  pantoprazole (PROTONIX) tablet 40 mg, Daily  venlafaxine (EFFEXOR XR) extended release capsule 75 mg, Daily  rifaximin (XIFAXAN) tablet 550 mg, BID  sodium chloride flush 0.9 % injection 5-40 mL, 2 times per day  sodium chloride flush 0.9 % injection 5-40 mL, PRN  0.9 % sodium chloride infusion, PRN  ondansetron (ZOFRAN-ODT) disintegrating tablet 4 mg, Q8H PRN   Or  ondansetron (ZOFRAN) injection 4 mg, Q6H PRN  polyethylene glycol (GLYCOLAX) packet 17 g, Daily PRN  acetaminophen (TYLENOL) tablet 650 mg, Q6H PRN   Or  acetaminophen (TYLENOL) suppository 650 mg, Q6H PRN  albuterol (PROVENTIL) nebulizer solution 2.5 mg, 4x Daily PRN  budesonide (PULMICORT) nebulizer suspension 500 mcg, BID   And  Arformoterol Tartrate (BROVANA) nebulizer solution 15 mcg, BID  cefTRIAXone (ROCEPHIN) 1,000 mg in sterile water 10 mL IV syringe, Q24H  ondansetron (ZOFRAN-ODT) disintegrating tablet 4 mg, Once        Review of Systems:   Pertinent items are noted in HPI.     Physical exam:   Constitutional: Elderly female in NAD   Vitals:   VITALS:  /64   Pulse 86   Temp 98.1 °F (36.7 °C) (Oral)   Resp 14   Ht 5' 2.5\" (1.588 m)   Wt 170 lb (77.1 kg)   SpO2 91%   BMI 30.60 kg/m²   24HR INTAKE/OUTPUT:      Intake/Output Summary (Last 24 hours) at 11/30/2021 1142  Last data filed at 11/30/2021 0620  Gross per 24 hour   Intake 160 ml   Output 1925 ml   Net -1765 ml     URINARY CATHETER OUTPUT (Engel):  Urethral Catheter-Output (mL): 525 mL  DRAIN/TUBE OUTPUT:     VENT SETTINGS:  Vent Information  SpO2: 91 %  Additional Respiratory  Assessments  Pulse: 86  Resp: 14  SpO2: 91 %    Skin: no rash, turgor wnl  Heent:  eomi, mmm  Neck: no bruits or jvd noted  Cardiovascular: PMI not lat displaced S1, S2 without m/r/g  Respiratory: CTA B without w/r/r  Abdomen:  +bs, soft, nd, tender in the midepigastrium, no HSM  Ext: (-) bilat lower extremity edema  Psychiatric: mood and affect appropriate, cr nr 2-12 grossly intact  Musculoskeletal:  Rom, muscular strength intact    Data:   Labs:  CBC:   Lab Results   Component Value Date    WBC 5.4 11/30/2021    RBC 2.80 11/30/2021    HGB 9.1 11/30/2021    HCT 28.6 11/30/2021    .1 11/30/2021    MCH 32.5 11/30/2021    MCHC 31.8 11/30/2021    RDW 19.9 11/30/2021     11/30/2021    MPV 10.4 11/30/2021     CBC with Differential:    Lab Results   Component Value Date    WBC 5.4 11/30/2021    RBC 2.80 11/30/2021    HGB 9.1 11/30/2021    HCT 28.6 11/30/2021     11/30/2021    .1 11/30/2021    MCH 32.5 11/30/2021    MCHC 31.8 11/30/2021 RDW 19.9 11/30/2021    LYMPHOPCT 0.9 11/23/2021    PROMYELOPCT 0.9 11/23/2021    MONOPCT 3.5 11/23/2021    MYELOPCT 0.9 04/15/2021    BASOPCT 0.8 11/23/2021    MONOSABS 0.40 11/23/2021    LYMPHSABS 0.10 11/23/2021    EOSABS 0.00 11/23/2021    BASOSABS 0.08 11/23/2021     Hemoglobin/Hematocrit:    Lab Results   Component Value Date    HGB 9.1 11/30/2021    HCT 28.6 11/30/2021     CMP:    Lab Results   Component Value Date     11/30/2021    K 4.5 11/30/2021    K 4.0 11/03/2021     11/30/2021    CO2 25 11/30/2021    BUN 13 11/30/2021    CREATININE 1.5 11/30/2021    GFRAA 41 11/30/2021    LABGLOM 34 11/30/2021    GLUCOSE 77 11/30/2021    PROT 6.1 11/27/2021    LABALBU 2.9 11/27/2021    LABALBU 2.6 11/27/2021    CALCIUM 9.3 11/30/2021    BILITOT 0.5 11/27/2021    ALKPHOS 114 11/27/2021    AST 27 11/27/2021    ALT 10 11/27/2021     BMP:    Lab Results   Component Value Date     11/30/2021    K 4.5 11/30/2021    K 4.0 11/03/2021     11/30/2021    CO2 25 11/30/2021    BUN 13 11/30/2021    LABALBU 2.9 11/27/2021    LABALBU 2.6 11/27/2021    CREATININE 1.5 11/30/2021    CALCIUM 9.3 11/30/2021    GFRAA 41 11/30/2021    LABGLOM 34 11/30/2021    GLUCOSE 77 11/30/2021     BUN/Creatinine:    Lab Results   Component Value Date    BUN 13 11/30/2021    CREATININE 1.5 11/30/2021     Hepatic Function Panel:    Lab Results   Component Value Date    ALKPHOS 114 11/27/2021    ALT 10 11/27/2021    AST 27 11/27/2021    PROT 6.1 11/27/2021    BILITOT 0.5 11/27/2021    BILIDIR 0.6 11/23/2021    IBILI 0.9 11/23/2021    LABALBU 2.9 11/27/2021    LABALBU 2.6 11/27/2021     Albumin:    Lab Results   Component Value Date    LABALBU 2.9 11/27/2021    LABALBU 2.6 11/27/2021     Calcium:    Lab Results   Component Value Date    CALCIUM 9.3 11/30/2021     Ionized Calcium:  No results found for: IONCA  Magnesium:    Lab Results   Component Value Date    MG 1.7 02/04/2021     Phosphorus:    Lab Results   Component Value Date PHOS 2.7 02/27/2021     LDH:    Lab Results   Component Value Date     11/01/2021     Uric Acid:    Lab Results   Component Value Date    LABURIC 8.0 02/27/2021     PT/INR:    Lab Results   Component Value Date    PROTIME 14.9 08/17/2021    INR 1.3 08/17/2021     PTT:    Lab Results   Component Value Date    APTT 32.0 11/28/2021   [APTT}  Troponin:    Lab Results   Component Value Date    TROPONINI <0.01 02/04/2021     U/A:    Lab Results   Component Value Date    NITRITE Neg 10/29/2018    COLORU Yellow 11/24/2021    PROTEINU Negative 11/24/2021    PHUR 7.0 11/24/2021    WBCUA 1-3 11/24/2021    RBCUA 10-20 11/24/2021    YEAST MODERATE 08/22/2017    BACTERIA MANY 11/24/2021    CLARITYU SL CLOUDY 11/24/2021    SPECGRAV 1.010 11/24/2021    LEUKOCYTESUR TRACE 11/24/2021    UROBILINOGEN 2.0 11/24/2021    BILIRUBINUR Negative 11/24/2021    BILIRUBINUR Neg 10/29/2018    BLOODU LARGE 11/24/2021    GLUCOSEU Negative 11/24/2021     ABG:    Lab Results   Component Value Date    PCO2 37.3 08/17/2021    PO2 79.0 08/17/2021    HCO3 25.6 08/17/2021    BE 1.8 08/17/2021    O2SAT 96.1 08/17/2021     HgBA1c:    Lab Results   Component Value Date    LABA1C 5.3 10/19/2021     Microalbumen/Creatinine ratio:  No components found for: RUCREAT  FLP:    Lab Results   Component Value Date    TRIG 65 02/27/2021    HDL 37 02/27/2021    LDLCALC 44 02/27/2021    LABVLDL 13 02/27/2021     TSH:    Lab Results   Component Value Date    TSH 2.480 11/25/2021     VITAMIN B12: No components found for: B12  FOLATE:    Lab Results   Component Value Date    FOLATE 13.8 11/27/2021     Iron Saturation:  No components found for: PERCENTFE  FERRITIN:    Lab Results   Component Value Date    FERRITIN 39 11/27/2021     AMYLASE:  No results found for: AMYLASE  LIPASE:    Lab Results   Component Value Date    LIPASE 46 11/23/2021     Fibrinogen Level:  No components found for: FIB  24 Hour Urine for Protein:  No components found for: Ton Johnston, Ivania Bonner  24 Hour Urine for Creatinine Clearance:  No components found for: CREAT4, UHRS10, UTV10     Imaging:  CXR results:EXAMINATION:  ONE XRAY VIEW OF THE CHEST     11/24/2021 4:16 am     COMPARISON:  Two-view study from 11/19/2021.     HISTORY:  ORDERING SYSTEM PROVIDED HISTORY: vomiting fever  TECHNOLOGIST PROVIDED HISTORY:  Reason for exam:->vomiting fever  What reading provider will be dictating this exam?->CRC     FINDINGS:  The cardiopericardial silhouette size is within normal limits.     There is relative prominence of the azygous arch.     No pneumothorax.     Graded opacity within the visualized left lower lung is compatible with  layering moderate pleural effusion and associated atelectasis.     No dense consolidation within the right lower lung, however there is somewhat  geographic increased attenuation compared to both the listed prior study as  well as the portable chest radiograph from 11/03/2021, unclear whether fully  accounted for by extra thoracic soft tissue attenuation, with early  infiltrate not excluded.     The upper lungs are clear.     No acute osseous abnormality is identified.     Right glenohumeral DJD.     Stable postsurgical changes of reversed left shoulder arthroplasty.        Impression  Lower left lung opacity compatible with layering moderate pleural effusion  and associated atelectasis, with underlying infiltrate not excluded.     Cannot exclude a subtle early infiltrate within the right pulmonary base  compared to most recent prior 2 exams. ION:  CT OF THE ABDOMEN AND PELVIS WITHOUT CONTRAST 11/24/2021 8:22 am     TECHNIQUE:  CT of the abdomen and pelvis was performed without the administration of  intravenous contrast. Multiplanar reformatted images are provided for review.   Dose modulation, iterative reconstruction, and/or weight based adjustment of  the mA/kV was utilized to reduce the radiation dose to as low as reasonably  achievable.     COMPARISON:  10/29/2021     HISTORY:  ORDERING SYSTEM PROVIDED HISTORY: abdominal pain vomiting  TECHNOLOGIST PROVIDED HISTORY:  Reason for exam:->abdominal pain vomiting  Additional Contrast?->None  Decision Support Exception - unselect if not a suspected or confirmed  emergency medical condition->Emergency Medical Condition (MA)  What reading provider will be dictating this exam?->CRC     FINDINGS:  Lower Chest: Moderate to large left pleural effusion, apparently complete  atelectasis in the left lower lobe     Organs: Cirrhotic appearance of the liver, evaluation limited without  contrast, low-attenuation left lobe lesion around image 69 is present with  density favoring a cyst on this exam although prior exam showed above water  density, considering the high risk recommend follow-up MRI.  There are beam  hardening artifacts also limiting evaluation of abdominal organs. Hypodensity present at the medial spleen appears to be cystic as well by  density.  Mild nephrolithiasis.  Right hydronephrosis left-side collecting  system mildly prominent but may be compensation with parenchymal volume loss. Calculus at the proximal right ureter is 6 mm, lower nephrolithiasis on the  left present.  Mild splenomegaly.  Additional low-attenuation hepatic lesions  are less well visualized on this exam a a     GI/Bowel: No obstruction.  Diverticulosis coli present     Pelvis: Hysterectomy     Peritoneum/Retroperitoneum: There are mild atherosclerotic calcifications  present.     Bones/Soft Tissues: Umbilicus fat hernia with a small amount of.  Anterior  soft tissue nodules in the subcutaneous fat may be injection related but are  nonspecific. Phyllistine Lias to severe spinal degenerative changes. Prior mild  compression fractures in thoracic spine are seen        Impression  1. 6 mm right proximal ureteral calculus with moderate hydronephrosis. Bilateral nephrolithiasis  2.  Hepatic cirrhosis and portal hypertension including mild ascites  3. Moderate to large left pleural effusion, complete left lower lobe  atelectasis  4. Indeterminate hepatic lesions, recommend follow-up MRI evaluation      Assessment  1-Stage III REN  Nephrolithiasis and Obstructive Uropathy  exacerbated by the outpt furosemide and spironolactone  UA cloudy, large blood, pH 7.0, LE tr, RBC >20, WBC 1-3  Cr  3.0-->3.1-->2.5-->2.1-->1.7-->1.6>1.5  UC NG  11/26/21 S/P Cystopanendoscopy; retrograde pyelogram, right stent insertion. Cr improving 2.5>1.3  uo ?    2-CKD G3A  baseline serum cr 1.1mg/dl  presumed sec to DM Nephropathy  Continue to monitor    3-Hyperkalemia  setting of the REN and the Aldactone  K+ WNL  follow K+    4- Hypervolemia with Mod -Large L Pl Eff and Ascites  Follow  With the resumption of the furosemide 40mg po qd and the Aldactone 100mg po qd    5- HTN with CKD I-IV  BP goal <130/80-BP at near goal  Monitor with the resumption  the loop+ Aldactone    6.  Anemia in CKD  HgB down with the hydration but stable   Ferritin 39 and Iron Sat 9%  Folate 13.8 and Vit B12 1009  Once off antibx will plan for IV Fe++  upep spep p    Thank you Dr. Ar Singleton MD for allowing us to participate in care of Mazin Daniel MD

## 2021-11-30 NOTE — CARE COORDINATION
Spoke with patient. She is doing too well with therapy to qualify for inpatient rehab. Discussed home with homecare. She is agreeable to this. Also requesting a wheeled walker for home. [facility]iced for [facility], they will deliver walker to the room today. [facility] notified of resume orders. She will call son for transport home. For questions I can be reached at [phone]. [place], [place]    The Plan for Transition of Care is related to the following treatment goals: discharge planning when stable     The Patient and/or patient representative Dahiana Hawkins was provided with a choice of provider and agrees   with the discharge plan. [x] Yes [] No    Freedom of choice list was provided with basic dialogue that supports the patient's individualized plan of care/goals, treatment preferences and shares the quality data associated with the providers.  [x] Yes [] No

## 2021-11-30 NOTE — DISCHARGE SUMMARY
Hospitalist Discharge Summary    Patient ID: Yung Andrews   Patient : 1945  Patient's PCP: Ruby Hill MD    Admit Date: 2021   Admitting Physician: Danny Little MD    Discharge Date:  2021   Discharge Physician: Zafar Jacques MD   Discharge Condition: Stable  Discharge Disposition: Home with [address] [facility] course in brief:    Patient admitted on 2021 for complaint of nausea and vomiting, persistent. In the ER, she had stable vitals and unremarkable labs with exception of elevated creatinine of 2.0 compared to 1.1 at her baseline.  Patient denies any abdominal pain, distention.  No vomiting since arrival in the ER, possibly because of the Zofran she received in the ER.  She received 1 L of normal saline initially and then kept on normal saline at 100 cc/h for maintenance IV fluids. CT abdomen pelvis without contrast showed right hydronephrosis Secondary to 6 mm right proximal ureteral obstructing calculus  Plus bilateral nephrolithiasis  Started IV Rocephin on  by urology. Status post cystoscopy panendoscopy, retrograde pyelogram right-sided with right-sided stent insertion. Her urine culture remain negative. She received IV ceftriaxone for six or 7 days. No more antibiotics recommended. Postoperative she developed hematuria. To come to this hematuria, her Eliquis was discontinued on , with a plan to resume Eliquis on . A Engel catheter was placed on , and urology recommended discharging the patient with Engel catheter, with a plan to follow-up with urology for repeat cystoscopy and further evaluation.     Consults:   IP CONSULT TO FAMILY MEDICINE  IP CONSULT TO INTERNAL MEDICINE  IP CONSULT TO NEPHROLOGY  IP CONSULT TO UROLOGY  IP CONSULT TO NEPHROLOGY  IP CONSULT TO SOCIAL WORK    Discharge Diagnoses:    Acute kidney injury  Nonoliguric REN, likely volume depletion with continued intake of Lasix and Aldactone  Baseline creatinine 1.1  Admitted with creatinine 3.0>>3.1>>2.5>>2.1>>1.4  IV fluids discontinued on 11/26  Hold Lasix and Aldactone, resumed on 11/27  Consult nephrology, follow recommendations     Right hydronephrosis  Secondary to 6 mm right proximal ureteral obstructing calculus  Plus bilateral nephrolithiasis  Started IV Rocephin on 11/24 by urology  Urine culture from 11/24 showed mixed fidelina, urine culture from 11/26 showed no growth   Status post cystoscopy panendoscopy, retrograde pyelogram right-sided with right-sided stent insertion.     Hematuria  Post procedure, cystoscopy  Held Eliquis on 11/28 because of hematuria, plan to resume Eliquis on 12/1.   Engel catheter placed on 11/29, discharged with Engel     Pulmonary embolism  Diagnosed on 11/3/2021  Started on full dose Eliquis, continue for now for 3 months  Risk of bleeding more than clotting     Cirrhosis  Chronic cirrhosis, etiology unclear  Continue home dose rifaximin and lactulose  No encephalopathy noted  No SBP suspected     Hypertension  Blood pressure well controlled  Currently on amlodipine 2.5 mg p.o. daily and Cardura 1 mg p.o. at night, continue for now  Resumed Aldactone and Lasix     Depression  Continue venlafaxine     Idiopathic acute pancreatitis  Resolved during last admission     Pleural effusion  Status post thoracentesis on 10/31/2021  Seen by cardiothoracic surgery who recommended no intervention  Monitor closely for now    Discharge Instructions / Follow up:    Future Appointments   Date Time Provider Geraldine Rader   12/14/2021  3:00 PM [facility] MRI ROOM [facility] MRI/AUS [facility]   1/18/2022 10:15 AM Daysi Byers Junior, MD ACC Pulm [facility]   1/20/2022  2:00 PM [facility]   3/15/2022 10:00 AM Thurl Homans, APRN - CNP [facility]       Continued appropriate risk factor modification of blood pressure, diabetes and serum lipids will remain essential to reducing risk of future atherosclerotic development    Activity: activity as tolerated    Significant labs:  CBC:   Recent Labs     11/28/21  0445 11/29/21  0455 11/30/21  0440   WBC 7.5 6.3 5.4   RBC 3.08* 2.81* 2.80*   HGB 9.9* 8.9* 9.1*   HCT 31.8* 27.1* 28.6*   .2* 96.4 102.1*   RDW 19.8* 19.9* 19.9*    171 175     BMP:   Recent Labs     11/28/21  0445 11/29/21  0455 11/30/21  0440    138 140   K 4.5 4.6 4.5    107 106   CO2 18* 23 25   BUN 15 14 13   CREATININE 1.6* 1.3* 1.5*     LFT:  No results for input(s): PROT, ALB, ALKPHOS, ALT, AST, BILITOT, AMYLASE, LIPASE in the last 72 hours. PT/INR:   Recent Labs     11/27/21  1927 11/28/21  0159 11/28/21  1121   APTT 32.6 32.7 32.0     BNP: No results for input(s): BNP in the last 72 hours. Hgb A1C:   Lab Results   Component Value Date    LABA1C 5.3 10/19/2021     Folate and B12:   Lab Results   Component Value Date    LFLFCROL96 1532 (H) 11/27/2021   ,   Lab Results   Component Value Date    FOLATE 13.8 11/27/2021     Thyroid Studies:   Lab Results   Component Value Date    TSH 2.480 11/25/2021       Urinalysis:    Lab Results   Component Value Date    NITRU Negative 11/24/2021    WBCUA 1-3 11/24/2021    BACTERIA MANY 11/24/2021    RBCUA 10-20 11/24/2021    BLOODU LARGE 11/24/2021    SPECGRAV 1.010 11/24/2021    GLUCOSEU Negative 11/24/2021       Imaging:  CT ABDOMEN PELVIS WO CONTRAST Additional Contrast? None    Result Date: 11/24/2021  EXAMINATION: CT OF THE ABDOMEN AND PELVIS WITHOUT CONTRAST 11/24/2021 8:22 am TECHNIQUE: CT of the abdomen and pelvis was performed without the administration of intravenous contrast. Multiplanar reformatted images are provided for review. Dose modulation, iterative reconstruction, and/or weight based adjustment of the mA/kV was utilized to reduce the radiation dose to as low as reasonably achievable.  COMPARISON: 10/29/2021 HISTORY: ORDERING SYSTEM PROVIDED HISTORY: abdominal pain vomiting TECHNOLOGIST PROVIDED HISTORY: Reason for exam:->abdominal pain vomiting Additional Contrast?->None Decision Support Exception - unselect if not a suspected or confirmed emergency medical condition->Emergency Medical Condition (MA) What reading provider will be dictating this exam?->CRC FINDINGS: Lower Chest: Moderate to large left pleural effusion, apparently complete atelectasis in the left lower lobe Organs: Cirrhotic appearance of the liver, evaluation limited without contrast, low-attenuation left lobe lesion around image 69 is present with density favoring a cyst on this exam although prior exam showed above water density, considering the high risk recommend follow-up MRI. There are beam hardening artifacts also limiting evaluation of abdominal organs. Hypodensity present at the medial spleen appears to be cystic as well by density. Mild nephrolithiasis. Right hydronephrosis left-side collecting system mildly prominent but may be compensation with parenchymal volume loss. Calculus at the proximal right ureter is 6 mm, lower nephrolithiasis on the left present. Mild splenomegaly. Additional low-attenuation hepatic lesions are less well visualized on this exam a a GI/Bowel: No obstruction. Diverticulosis coli present Pelvis: Hysterectomy Peritoneum/Retroperitoneum: There are mild atherosclerotic calcifications present. Bones/Soft Tissues: Umbilicus fat hernia with a small amount of. Anterior soft tissue nodules in the subcutaneous fat may be injection related but are nonspecific. .  Moderate to severe spinal degenerative changes. Prior mild compression fractures in thoracic spine are seen     1. 6 mm right proximal ureteral calculus with moderate hydronephrosis. Bilateral nephrolithiasis 2. Hepatic cirrhosis and portal hypertension including mild ascites 3. Moderate to large left pleural effusion, complete left lower lobe atelectasis 4.  Indeterminate hepatic lesions, recommend follow-up MRI evaluation     XR CHEST (2 VW)    Result Date: 11/20/2021  EXAMINATION: TWO XRAY VIEWS OF THE CHEST 11/19/2021 4:18 pm COMPARISON: November 3, 2021 HISTORY: ORDERING SYSTEM PROVIDED HISTORY: Pleural effusion TECHNOLOGIST PROVIDED HISTORY: Reason for exam:->eval pleural effusion FINDINGS: Increased opacities in left lung base silhouetting left hemidiaphragm. The heart appears to be normal in size. No pneumothorax. Increased opacities in left lung base related to atelectasis, pneumonia, or pleural effusion. XR SHOULDER RIGHT (MIN 2 VIEWS)    Result Date: 11/20/2021  EXAMINATION: THREE XRAY VIEWS OF THE RIGHT SHOULDER 11/19/2021 4:18 pm COMPARISON: September 29, 2021 HISTORY: ORDERING SYSTEM PROVIDED HISTORY: Closed displaced fracture of surgical neck of right humerus with routine healing, unspecified fracture morphology, subsequent encounter TECHNOLOGIST PROVIDED HISTORY: Reason for exam:->fx FINDINGS: Redemonstration of impacted humeral neck fracture. There is stable alignment. No acute fracture or dislocation. Acromioclavicular joint is intact. Stable alignment of impacted right humeral neck fracture. XR SHOULDER LEFT (MIN 2 VIEWS)    Result Date: 11/20/2021  EXAMINATION: THREE XRAY VIEWS OF THE LEFT SHOULDER 11/19/2021 4:18 pm COMPARISON: September 29, 2021 HISTORY: ORDERING SYSTEM PROVIDED HISTORY: S/P reverse total shoulder arthroplasty, left TECHNOLOGIST PROVIDED HISTORY: Reason for exam:->TSA FINDINGS: Stable alignment of left shoulder arthroplasty. No evidence of acute fracture or loosening. Redemonstration of remote humeral neck fracture along margins of proximal humerus. Acromioclavicular joint is intact. Stable alignment of left shoulder arthroplasty. XR CHEST PORTABLE    Result Date: 11/24/2021  EXAMINATION: ONE XRAY VIEW OF THE CHEST 11/24/2021 4:16 am COMPARISON: Two-view study from 11/19/2021.  HISTORY: ORDERING SYSTEM PROVIDED HISTORY: vomiting fever TECHNOLOGIST PROVIDED HISTORY: Reason for exam:->vomiting fever What reading provider will be dictating this exam?->CRC FINDINGS: The cardiopericardial silhouette size is within normal limits. There is relative prominence of the azygous arch. No pneumothorax. Graded opacity within the visualized left lower lung is compatible with layering moderate pleural effusion and associated atelectasis. No dense consolidation within the right lower lung, however there is somewhat geographic increased attenuation compared to both the listed prior study as well as the portable chest radiograph from 11/03/2021, unclear whether fully accounted for by extra thoracic soft tissue attenuation, with early infiltrate not excluded. The upper lungs are clear. No acute osseous abnormality is identified. Right glenohumeral DJD. Stable postsurgical changes of reversed left shoulder arthroplasty. Lower left lung opacity compatible with layering moderate pleural effusion and associated atelectasis, with underlying infiltrate not excluded. Cannot exclude a subtle early infiltrate within the right pulmonary base compared to most recent prior 2 exams. XR CHEST PORTABLE    Result Date: 11/3/2021  EXAMINATION: ONE XRAY VIEW OF THE CHEST 11/3/2021 7:53 am COMPARISON: 10/31/2021 HISTORY: ORDERING SYSTEM PROVIDED HISTORY: eval pleural effusion TECHNOLOGIST PROVIDED HISTORY: Reason for exam:->eval pleural effusion What reading provider will be dictating this exam?->CRC FINDINGS: The cardiomediastinal silhouette is stable. No infiltrate, effusion, or pneumothorax. No acute osseous abnormality.      No radiographic evidence of acute abnormality     XR CHEST PORTABLE    Result Date: 10/31/2021  EXAMINATION: ONE XRAY VIEW OF THE CHEST 10/31/2021 2:04 pm COMPARISON: CT and chest radiograph October 29, 2021 HISTORY: ORDERING SYSTEM PROVIDED HISTORY: Thoracentesis TECHNOLOGIST PROVIDED HISTORY: Reason for exam:->Thoracentesis What reading provider will be dictating this exam?->CRC FINDINGS: There is borderline cardiac size.  There is significant improvement in the previously noted left pleural effusion. There is no pneumothorax. There is small amount of residual atelectasis and pleural effusions in the lung bases. Significant improvement in the left pleural effusion with persistent minimal atelectasis and pleural effusion bilaterally. There is no pneumothorax. US THORACENTESIS Which side should the procedure be performed? Left    Result Date: 10/31/2021  PROCEDURE: Dejuan Perez LEFT THORACENTESIS 10/31/2021 HISTORY: ORDERING SYSTEM PROVIDED HISTORY: call dr Osorio at 1 pm at [address]: Reason for exam:->call dr Osorio at 1 pm at [address] side should the procedure be performed? ->Left What reading provider will be dictating this exam?->CRC TECHNIQUE: Ultrasound-guided left thoracentesis performed by Dr. Arty Lennox. The radiologist was not present. FINDINGS: Left pleural effusion was 1st localized with ultrasound and the overlying skin marked. Left thoracentesis then performed by Dr. Debbi Barnett with 950 mL fluid removed. 1.  Successful ultrasound-guided left thoracentesis performed by Dr. Debbi Barnett. 2.  Please see separate procedure report for details. FLUORO FOR SURGICAL PROCEDURES    Result Date: 11/26/2021  EXAMINATION: SPOT FLUOROSCOPIC IMAGES 11/26/2021 8:30 am TECHNIQUE: Fluoroscopy was provided by the radiology department for procedure. Radiologist was not present during examination. FLUOROSCOPY DOSE AND TYPE OR TIME AND EXPOSURES: Fluoroscopy time equals 0.5 minutes. Total dose equals 7.31 mGy COMPARISON: None HISTORY: ORDERING SYSTEM PROVIDED HISTORY: cysto TECHNOLOGIST PROVIDED HISTORY: Reason for exam:->cysto What reading provider will be dictating this exam?->CRC Intraprocedural imaging. FINDINGS: 4 spot images of the abdomen were obtained. Note is made of a right ureteral stent. Intraprocedural fluoroscopic spot images as above.   See separate procedure report for more information. Discharge Medications:      Medication List      START taking these medications    apixaban 5 MG Tabs tablet  Commonly known as: Eliquis  Take 1 tablet by mouth 2 times daily Start on 12/1.   If blood in the urine gets worse, stop taking this medication and call the urologist.  Replaces: apixaban starter pack 5 MG Tbpk tablet     ferrous sulfate 325 (65 Fe) MG tablet  Commonly known as: IRON 325  Take 1 tablet by mouth 2 times daily (with meals)        CONTINUE taking these medications    albuterol sulfate  (90 Base) MCG/ACT inhaler  Commonly known as: Ventolin HFA  Inhale 2 puffs into the lungs 4 times daily as needed for Wheezing     amLODIPine 2.5 MG tablet  Commonly known as: NORVASC     Biotin 96378 MCG Tabs     budesonide-formoterol 160-4.5 MCG/ACT Aero  Commonly known as: Symbicort  Inhale 2 puffs into the lungs 2 times daily     doxazosin 1 MG tablet  Commonly known as: CARDURA  Take 1 tablet by mouth nightly     fluticasone-salmeterol 250-50 MCG/DOSE Aepb  Commonly known as: ADVAIR     furosemide 40 MG tablet  Commonly known as: LASIX  Take 1 tablet by mouth daily     lactulose 10 GM/15ML solution  Commonly known as: CHRONULAC  Take 30 mLs by mouth 3 times daily     pantoprazole 40 MG tablet  Commonly known as: PROTONIX  Take 1 tablet by mouth daily     spironolactone 100 MG tablet  Commonly known as: ALDACTONE  Take 1 tablet by mouth daily     venlafaxine 75 MG extended release capsule  Commonly known as: EFFEXOR XR  Take 1 capsule by mouth daily     vitamin D3 25 MCG (1000 UT) Tabs tablet  Commonly known as: CHOLECALCIFEROL     Xifaxan 550 MG tablet  Generic drug: rifaximin  TAKE 1 TABLET BY MOUTH TWICE DAILY        STOP taking these medications    apixaban starter pack 5 MG Tbpk tablet  Commonly known as: Eliquis DVT/PE Starter Pack  Replaced by: apixaban 5 MG Tabs tablet           Where to Get Your Medications      These medications were sent to [facility] [address] [facility] [place], [address] [facility] [address]    Phone: [phone]   · apixaban 5 MG Tabs tablet  · ferrous sulfate 325 (65 Fe) MG tablet         Time Spent on discharge is more than 45 minutes in the examination, evaluation, counseling and review of medications and discharge plan.    +++++++++++++++++++++++++++++++++++++++++++++++++  Sunita Kaye MD  [place], [place]  +++++++++++++++++++++++++++++++++++++++++++++++++  NOTE: This report was transcribed using voice recognition software. Every effort was made to ensure accuracy; however, inadvertent computerized transcription errors may be present.

## 2021-11-30 NOTE — PROGRESS NOTES
Hospitalist Progress Note      SYNOPSIS: Patient admitted on 2021 for complaint of nausea and vomiting, persistent. In the ER, she had stable vitals and unremarkable labs with exception of elevated creatinine of 2.0 compared to 1.1 at her baseline. Patient denies any abdominal pain, distention. No vomiting since arrival in the ER, possibly because of the Zofran she received in the ER. She received 1 L of normal saline initially and then kept on normal saline at 100 cc/h for maintenance IV fluids. CT abdomen pelvis without contrast showed right hydronephrosis Secondary to 6 mm right proximal ureteral obstructing calculus  Plus bilateral nephrolithiasis  Started IV Rocephin on  by urology. Status post cystoscopy panendoscopy, retrograde pyelogram right-sided with right-sided stent insertion. Postoperative she developed hematuria. To come to this hematuria, her Eliquis was discontinued on , with a plan to resume Eliquis on . A Engel catheter was placed on , and urology recommended discharging the patient with Engel catheter, with a plan to follow-up with urology for repeat cystoscopy and further evaluation. SUBJECTIVE:    Patient seen and examined in her room. Dysuria improved. Denies any chest pain, nausea, vomiting. Records reviewed. Stable overnight. No other overnight issues reported. Temp (24hrs), Av.1 °F (36.7 °C), Min:97.8 °F (36.6 °C), Max:98.4 °F (36.9 °C)    DIET: ADULT DIET; Regular  CODE: Full Code    Intake/Output Summary (Last 24 hours) at 2021 0820  Last data filed at 2021 0620  Gross per 24 hour   Intake 160 ml   Output 1925 ml   Net -1765 ml       OBJECTIVE:    /64   Pulse 86   Temp 98.1 °F (36.7 °C) (Oral)   Resp 20   Ht 5' 2.5\" (1.588 m)   Wt 170 lb (77.1 kg)   SpO2 92%   BMI 30.60 kg/m²     General appearance: No apparent distress, appears stated age and cooperative. HEENT:  Conjunctivae/corneas clear. Neck: Supple. No jugular venous distention. Respiratory: Clear to auscultation bilaterally, normal respiratory effort  Cardiovascular: Regular rate rhythm, normal S1-S2  Abdomen: Soft, nontender, nondistended  Musculoskeletal: No clubbing, cyanosis, no bilateral lower extremity edema. Brisk capillary refill. Skin:  No rashes  on visible skin  Neurologic: awake, alert and following commands     ASSESSMENT & PLAN:    Acute kidney injury  Nonoliguric REN, likely volume depletion with continued intake of Lasix and Aldactone  Baseline creatinine 1.1  Admitted with creatinine 3.0>>3.1>>2.5>>2.1>>1.4  IV fluids discontinued on 11/26  Hold Lasix and Aldactone, resumed on 11/27  Consult nephrology, follow recommendations    Right hydronephrosis  Secondary to 6 mm right proximal ureteral obstructing calculus  Plus bilateral nephrolithiasis  Started IV Rocephin on 11/24 by urology  Urine culture from 11/24 showed mixed fidelina, urine culture from 11/26 showed no growth   Status post cystoscopy panendoscopy, retrograde pyelogram right-sided with right-sided stent insertion. Hematuria  Post procedure, cystoscopy  Held Eliquis on 11/28 because of hematuria, plan to resume Eliquis on 12/1.   Engel catheter placed on 11/29, discharged with Engel     Pulmonary embolism  Diagnosed on 11/3/2021  Started on full dose Eliquis, continue for now for 3 months  Risk of bleeding more than clotting     Cirrhosis  Chronic cirrhosis, etiology unclear  Continue home dose rifaximin and lactulose  No encephalopathy noted  No SBP suspected     Hypertension  Blood pressure well controlled  Currently on amlodipine 2.5 mg p.o. daily and Cardura 1 mg p.o. at night, continue for now  Resumed Aldactone and Lasix     Depression  Continue venlafaxine     Idiopathic acute pancreatitis  Resolved during last admission     Pleural effusion  Status post thoracentesis on 10/31/2021  Seen by cardiothoracic surgery who recommended no intervention  Monitor closely for now     DISPOSITION:   Medically stable for discharge once we have place for rehab. Resume Eliquis on 12/1. Medications:  REVIEWED DAILY    Infusion Medications    sodium chloride       Scheduled Medications    ferrous sulfate  325 mg Oral BID WC    spironolactone  100 mg Oral Daily    furosemide  40 mg Oral Daily    [Held by provider] apixaban  5 mg Oral BID    amLODIPine  2.5 mg Oral Daily    doxazosin  1 mg Oral Nightly    lactulose  20 g Oral TID    pantoprazole  40 mg Oral Daily    venlafaxine  75 mg Oral Daily    rifaximin  550 mg Oral BID    sodium chloride flush  5-40 mL IntraVENous 2 times per day    budesonide  0.5 mg Nebulization BID    And    Arformoterol Tartrate  15 mcg Nebulization BID    cefTRIAXone (ROCEPHIN) IV  1,000 mg IntraVENous Q24H    ondansetron  4 mg Oral Once     PRN Meds: diphenhydrAMINE, sodium chloride flush, sodium chloride, ondansetron **OR** ondansetron, polyethylene glycol, acetaminophen **OR** acetaminophen, albuterol    Labs:     Recent Labs     11/28/21 0445 11/29/21 0455 11/30/21  0440   WBC 7.5 6.3 5.4   HGB 9.9* 8.9* 9.1*   HCT 31.8* 27.1* 28.6*    171 175       Recent Labs     11/28/21 0445 11/29/21 0455 11/30/21  0440    138 140   K 4.5 4.6 4.5    107 106   CO2 18* 23 25   BUN 15 14 13   CREATININE 1.6* 1.3* 1.5*   CALCIUM 9.3 10.0 9.3       No results for input(s): PROT, ALB, ALKPHOS, ALT, AST, BILITOT, AMYLASE, LIPASE in the last 72 hours. No results for input(s): INR in the last 72 hours. No results for input(s): Corina Gupta in the last 72 hours.     Chronic labs:    Lab Results   Component Value Date    CHOL 94 02/27/2021    TRIG 65 02/27/2021    HDL 37 02/27/2021    LDLCALC 44 02/27/2021    TSH 2.480 11/25/2021    INR 1.3 08/17/2021    LABA1C 5.3 10/19/2021       Radiology: REVIEWED DAILY    +++++++++++++++++++++++++++++++++++++++++++++++++  Antonella Marks MD  [facility] Physician  [place]  [address] [address]  +++++++++++++++++++++++++++++++++++++++++++++++++  NOTE: This report was transcribed using voice recognition software. Every effort was made to ensure accuracy; however, inadvertent computerized transcription errors may be present.

## 2021-12-01 ENCOUNTER — CARE COORDINATION (OUTPATIENT)
Dept: CASE MANAGEMENT | Age: 76
End: 2021-12-01

## 2021-12-01 DIAGNOSIS — N17.9 ACUTE RENAL FAILURE, UNSPECIFIED ACUTE RENAL FAILURE TYPE (HCC): Primary | ICD-10-CM

## 2021-12-01 PROCEDURE — 1111F DSCHRG MED/CURRENT MED MERGE: CPT | Performed by: FAMILY MEDICINE

## 2021-12-01 NOTE — CARE COORDINATION
Donovan [facility] Transitions Initial Follow Up Call    Call within 2 business days of discharge: Yes    Patient: Glenna Lee Patient : 1945   MRN: [id number]  Reason for Admission: REN   Discharge Date: 21 RARS: Readmission Risk Score: 25 ( )      Last Discharge [address] [address]       Complaint Diagnosis Description Type Department Provider    21 Nausea Acute kidney injury ([facility] [address] . .. ED to Hosp-Admission (Discharged) (ADMITTED) SEY[facility] 4S PICU Leilani Sandifer, MD; Maddie Ramos,... Spoke with: [facility]: [facility]    Non-face-to-face services provided:  Obtained and reviewed discharge summary and/or continuity of care documents    Care Transitions 24 Hour Call    Do you have any ongoing symptoms?: No  Do you have a copy of your discharge instructions?: Yes  Do you have all of your prescriptions and are they filled?: Yes  Have you been contacted by a [address] [employer] Pharmacist?: No  Have you scheduled your follow up appointment?: Yes  How are you going to get to your appointment?: Car - family or friend to transport  Were you discharged with any Home Care or Post Acute Services: Yes  Post Acute Services: Home Health (Comment: [facility] )  Do you feel like you have everything you need to keep you well at home?: Yes  Care Transitions Interventions  No Identified Needs     Spoke with Deon Wilks for initial care transition call post hospital discharge. She reports that she is doing \"very good so far. \" She denies nausea since returning home. She is maneuvering her home with her new walker. She reports that her urine remains light red in the Engel bag. She stated her son is concern about her getting a UTI from the Engel. CTN explained the importance of cleansing the area; gently wash the catheter tubing with soap and water. With a separate washcloth wash the entire pubic area. Be careful not to pull on the catheter tubing.  Rinse well to remove all the soap, and pat the entire area dry to prevent skin breakdown, she verbalized understanding. Med review completed, 1111F entered. Explained the importance of monitoring her urine for an increase in blood since she is restarting the Eliquis today and to notify Dr. Rambo Best office. [facility] reports that her son is managing scheduling her appointments. She stated she heard from [facility] yesterday. [facility] denies any needs, questions, or concerns at this time.     Follow Up  Future Appointments   Date Time Provider Geraldine Rader   12/7/2021  3:30 PM [facility]   12/14/2021  3:00 PM [facility] MRI ROOM [facility] MRI/AUS [facility]   1/18/2022 10:15 AM [facility]   1/20/2022  2:00 PM [facility]   3/15/2022 10:00 AM XENIA Sanabria - CNP [facility]       Seun Sarmiento RN

## 2021-12-09 ENCOUNTER — CARE COORDINATION (OUTPATIENT)
Dept: CASE MANAGEMENT | Age: 76
End: 2021-12-09

## 2021-12-10 ENCOUNTER — CARE COORDINATION (OUTPATIENT)
Dept: CASE MANAGEMENT | Age: 76
End: 2021-12-10

## 2021-12-13 ENCOUNTER — APPOINTMENT (OUTPATIENT)
Dept: CT IMAGING | Age: 76
DRG: 689 | End: 2021-12-13
Payer: MEDICARE

## 2021-12-13 ENCOUNTER — HOSPITAL ENCOUNTER (INPATIENT)
Age: 76
LOS: 3 days | Discharge: HOME HEALTH | DRG: 689 | End: 2021-12-17
Attending: STUDENT IN AN ORGANIZED HEALTH CARE EDUCATION/TRAINING PROGRAM | Admitting: FAMILY MEDICINE
Payer: MEDICARE

## 2021-12-13 ENCOUNTER — APPOINTMENT (OUTPATIENT)
Dept: GENERAL RADIOLOGY | Age: 76
DRG: 689 | End: 2021-12-13
Payer: MEDICARE

## 2021-12-13 DIAGNOSIS — N39.0 COMPLICATED UTI (URINARY TRACT INFECTION): ICD-10-CM

## 2021-12-13 DIAGNOSIS — D72.829 LEUKOCYTOSIS, UNSPECIFIED TYPE: ICD-10-CM

## 2021-12-13 DIAGNOSIS — A41.9 SEPTICEMIA (HCC): Primary | ICD-10-CM

## 2021-12-13 LAB
ALBUMIN SERPL-MCNC: 2.9 G/DL (ref 3.5–5.2)
ALP BLD-CCNC: 132 U/L (ref 35–104)
ALT SERPL-CCNC: 10 U/L (ref 0–32)
ANION GAP SERPL CALCULATED.3IONS-SCNC: 11 MMOL/L (ref 7–16)
ANISOCYTOSIS: ABNORMAL
APTT: 25 SEC (ref 24.5–35.1)
AST SERPL-CCNC: 41 U/L (ref 0–31)
BACTERIA: ABNORMAL /HPF
BASOPHILS ABSOLUTE: 0.07 E9/L (ref 0–0.2)
BASOPHILS RELATIVE PERCENT: 0.5 % (ref 0–2)
BILIRUB SERPL-MCNC: 1.5 MG/DL (ref 0–1.2)
BILIRUBIN URINE: NEGATIVE
BLOOD, URINE: ABNORMAL
BUN BLDV-MCNC: 20 MG/DL (ref 6–23)
CALCIUM SERPL-MCNC: 10.1 MG/DL (ref 8.6–10.2)
CHLORIDE BLD-SCNC: 99 MMOL/L (ref 98–107)
CLARITY: ABNORMAL
CO2: 20 MMOL/L (ref 22–29)
COLOR: YELLOW
CREAT SERPL-MCNC: 1.6 MG/DL (ref 0.5–1)
EOSINOPHILS ABSOLUTE: 0.07 E9/L (ref 0.05–0.5)
EOSINOPHILS RELATIVE PERCENT: 0.5 % (ref 0–6)
GFR AFRICAN AMERICAN: 38
GFR NON-AFRICAN AMERICAN: 31 ML/MIN/1.73
GLUCOSE BLD-MCNC: 104 MG/DL (ref 74–99)
GLUCOSE URINE: NEGATIVE MG/DL
HCT VFR BLD CALC: 34.9 % (ref 34–48)
HEMOGLOBIN: 11.7 G/DL (ref 11.5–15.5)
HYPOCHROMIA: ABNORMAL
IMMATURE GRANULOCYTES #: 0.08 E9/L
IMMATURE GRANULOCYTES %: 0.5 % (ref 0–5)
INR BLD: 1.5
KETONES, URINE: ABNORMAL MG/DL
LACTIC ACID, SEPSIS: 1.8 MMOL/L (ref 0.5–1.9)
LEUKOCYTE ESTERASE, URINE: ABNORMAL
LIPASE: 26 U/L (ref 13–60)
LYMPHOCYTES ABSOLUTE: 0.93 E9/L (ref 1.5–4)
LYMPHOCYTES RELATIVE PERCENT: 6 % (ref 20–42)
MCH RBC QN AUTO: 33.2 PG (ref 26–35)
MCHC RBC AUTO-ENTMCNC: 33.5 % (ref 32–34.5)
MCV RBC AUTO: 99.1 FL (ref 80–99.9)
MONOCYTES ABSOLUTE: 2.32 E9/L (ref 0.1–0.95)
MONOCYTES RELATIVE PERCENT: 15 % (ref 2–12)
NEUTROPHILS ABSOLUTE: 11.98 E9/L (ref 1.8–7.3)
NEUTROPHILS RELATIVE PERCENT: 77.5 % (ref 43–80)
NITRITE, URINE: NEGATIVE
PDW BLD-RTO: 20.1 FL (ref 11.5–15)
PH UA: 6 (ref 5–9)
PLATELET # BLD: 204 E9/L (ref 130–450)
PMV BLD AUTO: 11.8 FL (ref 7–12)
POLYCHROMASIA: ABNORMAL
POTASSIUM REFLEX MAGNESIUM: 4.8 MMOL/L (ref 3.5–5)
PROTEIN UA: 30 MG/DL
PROTHROMBIN TIME: 16.3 SEC (ref 9.3–12.4)
RBC # BLD: 3.52 E12/L (ref 3.5–5.5)
RBC UA: >20 /HPF (ref 0–2)
SARS-COV-2, NAAT: NOT DETECTED
SODIUM BLD-SCNC: 130 MMOL/L (ref 132–146)
SPECIFIC GRAVITY UA: 1.02 (ref 1–1.03)
TOTAL PROTEIN: 7.1 G/DL (ref 6.4–8.3)
UROBILINOGEN, URINE: 1 E.U./DL
WBC # BLD: 15.5 E9/L (ref 4.5–11.5)
WBC UA: ABNORMAL /HPF (ref 0–5)

## 2021-12-13 PROCEDURE — 51702 INSERT TEMP BLADDER CATH: CPT

## 2021-12-13 PROCEDURE — 83690 ASSAY OF LIPASE: CPT

## 2021-12-13 PROCEDURE — 96365 THER/PROPH/DIAG IV INF INIT: CPT

## 2021-12-13 PROCEDURE — 99284 EMERGENCY DEPT VISIT MOD MDM: CPT

## 2021-12-13 PROCEDURE — 87635 SARS-COV-2 COVID-19 AMP PRB: CPT

## 2021-12-13 PROCEDURE — 2580000003 HC RX 258: Performed by: STUDENT IN AN ORGANIZED HEALTH CARE EDUCATION/TRAINING PROGRAM

## 2021-12-13 PROCEDURE — 85025 COMPLETE CBC W/AUTO DIFF WBC: CPT

## 2021-12-13 PROCEDURE — 36415 COLL VENOUS BLD VENIPUNCTURE: CPT

## 2021-12-13 PROCEDURE — 74176 CT ABD & PELVIS W/O CONTRAST: CPT

## 2021-12-13 PROCEDURE — 81001 URINALYSIS AUTO W/SCOPE: CPT

## 2021-12-13 PROCEDURE — 85610 PROTHROMBIN TIME: CPT

## 2021-12-13 PROCEDURE — 87088 URINE BACTERIA CULTURE: CPT

## 2021-12-13 PROCEDURE — 6360000002 HC RX W HCPCS: Performed by: STUDENT IN AN ORGANIZED HEALTH CARE EDUCATION/TRAINING PROGRAM

## 2021-12-13 PROCEDURE — 96375 TX/PRO/DX INJ NEW DRUG ADDON: CPT

## 2021-12-13 PROCEDURE — 71045 X-RAY EXAM CHEST 1 VIEW: CPT

## 2021-12-13 PROCEDURE — 85730 THROMBOPLASTIN TIME PARTIAL: CPT

## 2021-12-13 PROCEDURE — 6370000000 HC RX 637 (ALT 250 FOR IP): Performed by: STUDENT IN AN ORGANIZED HEALTH CARE EDUCATION/TRAINING PROGRAM

## 2021-12-13 PROCEDURE — 83605 ASSAY OF LACTIC ACID: CPT

## 2021-12-13 PROCEDURE — 80053 COMPREHEN METABOLIC PANEL: CPT

## 2021-12-13 PROCEDURE — 87040 BLOOD CULTURE FOR BACTERIA: CPT

## 2021-12-13 RX ORDER — 0.9 % SODIUM CHLORIDE 0.9 %
1000 INTRAVENOUS SOLUTION INTRAVENOUS ONCE
Status: COMPLETED | OUTPATIENT
Start: 2021-12-13 | End: 2021-12-13

## 2021-12-13 RX ORDER — HEPARIN SODIUM 1000 [USP'U]/ML
60 INJECTION, SOLUTION INTRAVENOUS; SUBCUTANEOUS ONCE
Status: COMPLETED | OUTPATIENT
Start: 2021-12-13 | End: 2021-12-13

## 2021-12-13 RX ORDER — HEPARIN SODIUM 1000 [USP'U]/ML
30 INJECTION, SOLUTION INTRAVENOUS; SUBCUTANEOUS PRN
Status: DISCONTINUED | OUTPATIENT
Start: 2021-12-13 | End: 2021-12-15

## 2021-12-13 RX ORDER — HEPARIN SODIUM 10000 [USP'U]/100ML
5-30 INJECTION, SOLUTION INTRAVENOUS CONTINUOUS
Status: DISCONTINUED | OUTPATIENT
Start: 2021-12-13 | End: 2021-12-15

## 2021-12-13 RX ORDER — HEPARIN SODIUM 1000 [USP'U]/ML
60 INJECTION, SOLUTION INTRAVENOUS; SUBCUTANEOUS PRN
Status: DISCONTINUED | OUTPATIENT
Start: 2021-12-13 | End: 2021-12-15

## 2021-12-13 RX ORDER — ACETAMINOPHEN 500 MG
1000 TABLET ORAL ONCE
Status: COMPLETED | OUTPATIENT
Start: 2021-12-13 | End: 2021-12-13

## 2021-12-13 RX ADMIN — SODIUM CHLORIDE 1000 ML: 9 INJECTION, SOLUTION INTRAVENOUS at 22:28

## 2021-12-13 RX ADMIN — ACETAMINOPHEN 1000 MG: 500 TABLET ORAL at 22:04

## 2021-12-13 RX ADMIN — HEPARIN SODIUM 4630 UNITS: 1000 INJECTION INTRAVENOUS; SUBCUTANEOUS at 23:21

## 2021-12-13 RX ADMIN — HEPARIN SODIUM 12 UNITS/KG/HR: 10000 INJECTION, SOLUTION INTRAVENOUS at 23:24

## 2021-12-13 ASSESSMENT — PAIN SCALES - GENERAL: PAINLEVEL_OUTOF10: 9

## 2021-12-13 NOTE — Clinical Note
Patient Class: Inpatient [101]   REQUIRED: Diagnosis: Pyelonephritis [466209]   Estimated Length of Stay: Estimated stay of more than 2 midnights   Admitting Provider: Waqar Bass [[id number]]

## 2021-12-13 NOTE — LETTER
[address] Medicaid  CERTIFICATION OF NECESSITY  FOR TRANSPORTATION   BY WHEELCHAIR VAN   Individual Information   1. Name: Radha Mcdonald 2. [place] Medicaid Billing Number:    3. Address: [address] [place] [address] [address] Verónica Mazariegos [address] [place] [address] [id number] [place] [address]   Transportation Provider Information   4. Provider Name:    5. [place] Medicaid Provider Number:  National Provider Identifier (NPI):      Certification  7. Criteria:  By signing this document, the practitioner certifies that two statements are true:  A. This individual must be accompanied by a mobility-related assistive device from the point of pick-up to the point of drop-off. B. Transport of this individual by standard passenger vehicle or common carrier is precluded or contraindicated. 8. Period Beginning Date:12/15/2021     9. Length  [x] Not more than 10 day(s)  [] One Year     Additional Information Relevant to Certification   10. Comments or Explanations, If Necessary or Appropriate   Back pain, Type2 D.M. ,  Obstructive Sleep apnea,  Cirrohsis,  REN,  Hx of Breast Cancer,  Anxiety, Major Depressive Disorder       Certifying Practitioner Information   11. Name of Practitioner:  Dr. Samy Frederick MD   12. [place] Medicaid Provider Number, If Applicable:  [address] Provider Identifier (NPI):      Signature Information   14. Date of Signature: 12/15/2021   15. Name of Person Signing:   Palmer Orlando   16. Signature and Professional Designation:   Electronically signed by LAURITA Orlando on 12/15/2021 at 2:29 PM       OD [address]  Rev. 7/2015          [address] Encounter Date/Time: 12/13/2021 2022    Hospital Account: [de-identified]    MRN: [id number]    Patient: [address] [place] [address] Drive Serial #: [id number]      ENCOUNTER          Patient Class: I Private Enc? No Unit RM BD: SEYZ 8WE 8415/8415-B   Hospital Service:  INM   Encounter DX: Pyelonephritis [N12]   ADM Provider: Mel Arredondo MD

## 2021-12-14 PROBLEM — N12 PYELONEPHRITIS: Status: ACTIVE | Noted: 2021-01-01

## 2021-12-14 LAB
APTT: 206.4 SEC (ref 24.5–35.1)
APTT: 37.4 SEC (ref 24.5–35.1)
APTT: 45 SEC (ref 24.5–35.1)
APTT: 62.4 SEC (ref 24.5–35.1)
BACTERIA: ABNORMAL /HPF
BILIRUBIN URINE: NEGATIVE
BLOOD, URINE: ABNORMAL
CLARITY: ABNORMAL
COLOR: YELLOW
GLUCOSE URINE: NEGATIVE MG/DL
HCT VFR BLD CALC: 30.2 % (ref 34–48)
HEMOGLOBIN: 9.9 G/DL (ref 11.5–15.5)
KETONES, URINE: NEGATIVE MG/DL
LEUKOCYTE ESTERASE, URINE: ABNORMAL
MCH RBC QN AUTO: 32.6 PG (ref 26–35)
MCHC RBC AUTO-ENTMCNC: 32.8 % (ref 32–34.5)
MCV RBC AUTO: 99.3 FL (ref 80–99.9)
NITRITE, URINE: NEGATIVE
PDW BLD-RTO: 19.9 FL (ref 11.5–15)
PH UA: 5.5 (ref 5–9)
PLATELET # BLD: 176 E9/L (ref 130–450)
PMV BLD AUTO: 10.2 FL (ref 7–12)
PROTEIN UA: 100 MG/DL
RBC # BLD: 3.04 E12/L (ref 3.5–5.5)
RBC UA: >20 /HPF (ref 0–2)
SPECIFIC GRAVITY UA: 1.02 (ref 1–1.03)
UROBILINOGEN, URINE: 1 E.U./DL
WBC # BLD: 11.3 E9/L (ref 4.5–11.5)
WBC UA: ABNORMAL /HPF (ref 0–5)

## 2021-12-14 PROCEDURE — 36415 COLL VENOUS BLD VENIPUNCTURE: CPT

## 2021-12-14 PROCEDURE — 85730 THROMBOPLASTIN TIME PARTIAL: CPT

## 2021-12-14 PROCEDURE — 6360000002 HC RX W HCPCS: Performed by: STUDENT IN AN ORGANIZED HEALTH CARE EDUCATION/TRAINING PROGRAM

## 2021-12-14 PROCEDURE — 2580000003 HC RX 258: Performed by: STUDENT IN AN ORGANIZED HEALTH CARE EDUCATION/TRAINING PROGRAM

## 2021-12-14 PROCEDURE — 2580000003 HC RX 258: Performed by: INTERNAL MEDICINE

## 2021-12-14 PROCEDURE — 97535 SELF CARE MNGMENT TRAINING: CPT

## 2021-12-14 PROCEDURE — 94640 AIRWAY INHALATION TREATMENT: CPT

## 2021-12-14 PROCEDURE — 97530 THERAPEUTIC ACTIVITIES: CPT

## 2021-12-14 PROCEDURE — 97165 OT EVAL LOW COMPLEX 30 MIN: CPT

## 2021-12-14 PROCEDURE — 1200000000 HC SEMI PRIVATE

## 2021-12-14 PROCEDURE — 85027 COMPLETE CBC AUTOMATED: CPT

## 2021-12-14 PROCEDURE — 6360000002 HC RX W HCPCS: Performed by: INTERNAL MEDICINE

## 2021-12-14 PROCEDURE — 81001 URINALYSIS AUTO W/SCOPE: CPT

## 2021-12-14 PROCEDURE — 97161 PT EVAL LOW COMPLEX 20 MIN: CPT

## 2021-12-14 PROCEDURE — 6370000000 HC RX 637 (ALT 250 FOR IP): Performed by: INTERNAL MEDICINE

## 2021-12-14 RX ORDER — SODIUM CHLORIDE 9 MG/ML
25 INJECTION, SOLUTION INTRAVENOUS PRN
Status: DISCONTINUED | OUTPATIENT
Start: 2021-12-14 | End: 2021-12-17 | Stop reason: HOSPADM

## 2021-12-14 RX ORDER — BUDESONIDE AND FORMOTEROL FUMARATE DIHYDRATE 160; 4.5 UG/1; UG/1
2 AEROSOL RESPIRATORY (INHALATION) 2 TIMES DAILY
Status: DISCONTINUED | OUTPATIENT
Start: 2021-12-14 | End: 2021-12-14 | Stop reason: ALTCHOICE

## 2021-12-14 RX ORDER — LACTULOSE 10 G/15ML
20 SOLUTION ORAL 3 TIMES DAILY
Status: DISCONTINUED | OUTPATIENT
Start: 2021-12-14 | End: 2021-12-17 | Stop reason: HOSPADM

## 2021-12-14 RX ORDER — SODIUM CHLORIDE 9 MG/ML
INJECTION, SOLUTION INTRAVENOUS CONTINUOUS
Status: DISCONTINUED | OUTPATIENT
Start: 2021-12-14 | End: 2021-12-17 | Stop reason: HOSPADM

## 2021-12-14 RX ORDER — DOXAZOSIN 2 MG/1
1 TABLET ORAL NIGHTLY
Status: DISCONTINUED | OUTPATIENT
Start: 2021-12-14 | End: 2021-12-14

## 2021-12-14 RX ORDER — SODIUM CHLORIDE 0.9 % (FLUSH) 0.9 %
5-40 SYRINGE (ML) INJECTION PRN
Status: DISCONTINUED | OUTPATIENT
Start: 2021-12-14 | End: 2021-12-17 | Stop reason: HOSPADM

## 2021-12-14 RX ORDER — ACETAMINOPHEN 650 MG/1
650 SUPPOSITORY RECTAL EVERY 6 HOURS PRN
Status: DISCONTINUED | OUTPATIENT
Start: 2021-12-14 | End: 2021-12-17 | Stop reason: HOSPADM

## 2021-12-14 RX ORDER — ONDANSETRON 2 MG/ML
4 INJECTION INTRAMUSCULAR; INTRAVENOUS EVERY 6 HOURS PRN
Status: DISCONTINUED | OUTPATIENT
Start: 2021-12-14 | End: 2021-12-17 | Stop reason: HOSPADM

## 2021-12-14 RX ORDER — SODIUM CHLORIDE 0.9 % (FLUSH) 0.9 %
5-40 SYRINGE (ML) INJECTION EVERY 12 HOURS SCHEDULED
Status: DISCONTINUED | OUTPATIENT
Start: 2021-12-14 | End: 2021-12-17 | Stop reason: HOSPADM

## 2021-12-14 RX ORDER — BUDESONIDE 0.5 MG/2ML
0.5 INHALANT ORAL 2 TIMES DAILY
Status: DISCONTINUED | OUTPATIENT
Start: 2021-12-14 | End: 2021-12-17 | Stop reason: HOSPADM

## 2021-12-14 RX ORDER — ACETAMINOPHEN 325 MG/1
650 TABLET ORAL EVERY 6 HOURS PRN
Status: DISCONTINUED | OUTPATIENT
Start: 2021-12-14 | End: 2021-12-17 | Stop reason: HOSPADM

## 2021-12-14 RX ORDER — ONDANSETRON 4 MG/1
4 TABLET, ORALLY DISINTEGRATING ORAL EVERY 8 HOURS PRN
Status: DISCONTINUED | OUTPATIENT
Start: 2021-12-14 | End: 2021-12-17 | Stop reason: HOSPADM

## 2021-12-14 RX ORDER — PANTOPRAZOLE SODIUM 40 MG/1
40 TABLET, DELAYED RELEASE ORAL DAILY
Status: DISCONTINUED | OUTPATIENT
Start: 2021-12-14 | End: 2021-12-17 | Stop reason: HOSPADM

## 2021-12-14 RX ORDER — ARFORMOTEROL TARTRATE 15 UG/2ML
15 SOLUTION RESPIRATORY (INHALATION) 2 TIMES DAILY
Status: DISCONTINUED | OUTPATIENT
Start: 2021-12-14 | End: 2021-12-17 | Stop reason: HOSPADM

## 2021-12-14 RX ORDER — AMLODIPINE BESYLATE 2.5 MG/1
2.5 TABLET ORAL DAILY
Status: DISCONTINUED | OUTPATIENT
Start: 2021-12-14 | End: 2021-12-17 | Stop reason: HOSPADM

## 2021-12-14 RX ADMIN — ACETAMINOPHEN 650 MG: 325 TABLET ORAL at 16:11

## 2021-12-14 RX ADMIN — AMLODIPINE BESYLATE 2.5 MG: 2.5 TABLET ORAL at 13:45

## 2021-12-14 RX ADMIN — PANTOPRAZOLE SODIUM 40 MG: 40 TABLET, DELAYED RELEASE ORAL at 13:45

## 2021-12-14 RX ADMIN — HEPARIN SODIUM 8.95 UNITS/KG/HR: 10000 INJECTION, SOLUTION INTRAVENOUS at 07:12

## 2021-12-14 RX ADMIN — PIPERACILLIN AND TAZOBACTAM 3375 MG: 3; .375 INJECTION, POWDER, LYOPHILIZED, FOR SOLUTION INTRAVENOUS at 13:45

## 2021-12-14 RX ADMIN — SODIUM CHLORIDE, PRESERVATIVE FREE 10 ML: 5 INJECTION INTRAVENOUS at 14:06

## 2021-12-14 RX ADMIN — LACTULOSE 20 G: 20 SOLUTION ORAL at 13:45

## 2021-12-14 RX ADMIN — RIFAXIMIN 550 MG: 550 TABLET ORAL at 13:45

## 2021-12-14 RX ADMIN — RIFAXIMIN 550 MG: 550 TABLET ORAL at 23:57

## 2021-12-14 RX ADMIN — BUDESONIDE 500 MCG: 0.5 SUSPENSION RESPIRATORY (INHALATION) at 21:09

## 2021-12-14 RX ADMIN — HEPARIN SODIUM 2310 UNITS: 1000 INJECTION INTRAVENOUS; SUBCUTANEOUS at 14:04

## 2021-12-14 RX ADMIN — ARFORMOTEROL TARTRATE 15 MCG: 15 SOLUTION RESPIRATORY (INHALATION) at 21:09

## 2021-12-14 RX ADMIN — CEFTRIAXONE SODIUM 2000 MG: 2 INJECTION, POWDER, FOR SOLUTION INTRAMUSCULAR; INTRAVENOUS at 00:50

## 2021-12-14 RX ADMIN — SODIUM CHLORIDE: 9 INJECTION, SOLUTION INTRAVENOUS at 14:16

## 2021-12-14 RX ADMIN — PIPERACILLIN AND TAZOBACTAM 3375 MG: 3; .375 INJECTION, POWDER, LYOPHILIZED, FOR SOLUTION INTRAVENOUS at 21:01

## 2021-12-14 ASSESSMENT — PAIN SCALES - GENERAL
PAINLEVEL_OUTOF10: 4
PAINLEVEL_OUTOF10: 6
PAINLEVEL_OUTOF10: 6
PAINLEVEL_OUTOF10: 0

## 2021-12-14 ASSESSMENT — PAIN DESCRIPTION - LOCATION
LOCATION: HEAD
LOCATION: HEAD

## 2021-12-14 ASSESSMENT — PAIN DESCRIPTION - PROGRESSION: CLINICAL_PROGRESSION: NOT CHANGED

## 2021-12-14 ASSESSMENT — PAIN DESCRIPTION - PAIN TYPE
TYPE: ACUTE PAIN
TYPE: ACUTE PAIN

## 2021-12-14 ASSESSMENT — PAIN DESCRIPTION - FREQUENCY: FREQUENCY: CONTINUOUS

## 2021-12-14 ASSESSMENT — PAIN DESCRIPTION - DESCRIPTORS: DESCRIPTORS: BURNING

## 2021-12-14 NOTE — PROGRESS NOTES
OCCUPATIONAL THERAPY INITIAL EVALUATION    GENE [place] [employer] [address] [address]       NDVJ:                                                  Patient Name: Senait Davidson  MRN: [id number]  : 1945  Room: [address] [place] [address]    Evaluating OT: Lawernce Quince OTR/L #[id number]    Referring Provider and Specific Provider Orders/Date:      21 1200  OT eval and treat  Start:  21 1200,   End:  21 1200,   ONE TIME,   Standing Count:  1 Occurrences,   Heriberto Brooks MD     Diagnosis: Pyelonephritis [N12]  Septicemia ([facility] [address] [W87.7]  Complicated UTI (urinary tract infection) [N39.0]  Leukocytosis, unspecified type [D72.829]     Surgery: None      Pertinent Medical History:  has a past medical history of Breast cancer ([facility] [address] Cirrhosis ([facility] [address] Essential hypertension, Hx of blood clots, Hyperlipidemia, Osteopenia, 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 ([facility] [address] Sleep apnea, Spinal stenosis, and Type 2 diabetes mellitus ([facility] [address] Precautions:  Fall Risk, confusion.      Assessment of current deficits   [x] Functional mobility  [x]ADLs  [x] Strength               [x]Cognition   [x] Functional transfers   [x] IADLs         [x] Safety Awareness   []Endurance   [] Fine Coordination              [x] Balance      [] Vision/perception   []Sensation    []Gross Motor Coordination  [x] ROM  [] Delirium                   [] Motor Control     OT PLAN OF CARE   OT POC based on physician orders, patient diagnosis and results of clinical assessment    Frequency/Duration:  1-3 days/wk for 2 weeks PRN   Specific OT Treatment to include:   * Instruction/training on adapted ADL techniques and AE recommendations to increase functional independence within precautions       * Training on energy conservation strategies, correct breathing pattern and techniques to improve independence/tolerance for self-care routine  * Functional transfer/mobility training/DME recommendations for increased independence, safety, and fall prevention  * Patient/Family education to increase follow through with safety techniques and functional independence  * Recommendation of environmental modifications for increased safety with functional transfers/mobility and ADLs  * Cognitive retraining/development of therapeutic activities to improve problem solving, judgement, memory, and attention for increased safety/participation in ADL/IADL tasks  * Therapeutic exercise to improve motor endurance, ROM, and functional strength for ADLs/functional transfers  * Therapeutic activities to facilitate/challenge dynamic balance, stand tolerance for increased safety and independence with ADLs    Recommended Adaptive Equipment: pt has needed equipment      Home Living: Lives alone, apartment, 1 story, No steps to enter. Bathroom set-up: tub/shower         Equipment owned: quad cane, wheeled walker, bedside commode, elevated commode, shower chair    Prior Level of Function: Independent with ADLs , prepares light meal but has aides 3x/wk to assist with laundry and son completes grocery shopping; ambulated with cane at baseline. Driving: no   Occupation: n/a  Enjoys: n/a       Pain Level: pt denied pain. Cognition: A&O: 2/4 - person and month only; Follows 2 step directions   Memory: impaired    Sequencing: fair    Problem solving: fair    Judgement/safety: fair     Functional Assessment:  AM-PAC Daily Activity Raw Score: 14/24   Initial Eval Status  Date: 12/14/21 Treatment Status  Date: STGs = LTGs  Time frame: 10-14 days   Feeding NPO  Independent    Grooming Minimal Assist     Moderate [place]    UB Dressing Moderate Assist   To thread gown over shoulders and manage IV line. Limited by left UE AROM.    Moderate [place]    LB Dressing Maximal Assist   To don socks over feet while seated at EOB  Moderate [place]    Bathing Moderate/Maximal Assist     Moderate [place]    Toileting Moderate/Maximal Assist   Pt has a bar catheter   Moderate [place]    Bed Mobility  Supine to sit: NT   Sit to supine: NT    Supine to sit: Independent   Sit to supine: Independent    Functional Transfers Sit to stand: SBA from EOB, MIN A at commode  Stand to sit: SBA at chair, MIN A at commode     Transfer training with verbal cues for hand placement to improve safety. Moderate [place] with use of walker    Functional Mobility Stand by Assist with walker to improve balance, verbal cues for walker sequence and safety. Moderate [place] with use of walker    Balance Sitting:     Static: SBA    Dynamic: SBA  Standing: SBA/CGA with walker   Sitting:     Static: IND    Dynamic: IND   Standing: IND with walker    Activity Tolerance fair    Increase standing tolerance >3 minutes for improved engagement with functional transfers and indep in ADLs   Visual/  Perceptual Glasses: Yes     Reports changes in vision since admission: No      NA      Hand Dominance: Right      AROM (PROM) Strength Additional Info:    RUE  WFL 4-/5 Good  and wfl FMC/dexterity noted during ADL tasks     LUE WFL 2+/5 Good  and wfl FMC/dexterity noted during ADL tasks       Hearing:  WFL   Sensation:   No c/o numbness or tingling  Tone:  WFL   Edema:      Vitals:   HR at rest:  HR with activity:  HR at end of session:    SpO2 at rest:  SpO2 with activity:  SpO2 at end of session:    BP at rest:  BP with activity:  BP at end of session:        Comments: RN cleared patient for OT. Upon arrival patient at EOB. Therapist facilitated and instructed pt on adapted  techniques & compensatory strategies to improve safety and independence with basic ADLs, bed mobility, functional transfers and mobility to allow pt to achieve highest level of independence and safely. Pt demonstrated fair understanding of education & follow through.   At end of session, patient was in chair with call light and phone within reach, all lines and tubes intact. Overall, patient demonstrated  decreased independence and safety during completion of ADL tasks. Pt would benefit from continued skilled OT to increase safety and independence with completion of ADL tasks and functional mobility for improved quality of life. Treatment: OT treatment provided this date includes:    Instruction/training on safety and adapted techniques for completion of ADLs   Instruction/training on safe functional mobility/transfer techniques   Instruction/training on energy conservation/work simplification for completion of ADLs    Rehab Potential: Good for established goals. LTG: maximize independence with ADLs to return to PLOF     Patient / Family Goal: not stated       Patient and/or family were instructed on functional diagnosis, prognosis/goals and OT plan of care. Demonstrated fair understanding. Eval Complexity: Low    Time In: 1:10 PM   Time Out: 1:35 PM    Total Treatment Time: 10      Min Units   OT Eval Low 97165  X  1    OT Eval Medium 81239      OT Eval High 16866      OT Re-Eval [id number]            ADL/Self Care 72619 8 1   Therapeutic Activities 49986       Therapeutic Ex 42210       Orthotic Management 17936       Manual 23903     Neuro Re-Ed 39887       Non-Billable Time        Evaluation Time additionally includes thorough review of current medical information, gathering information on past medical history/social history and prior level of function, interpretation of standardized testing/informal observation of tasks, assessment of data and development of plan of care and goals.         Evaluating OT: Olya Baxter OTR/L #[id number]

## 2021-12-14 NOTE — CONSULTS
INPATIENT CONSULTATION RECORD FOR  12/14/2021      [facility]  [address] [facility]. [facility], [address] [place] [address]  [phone]        REASON FOR CONSULTATION:  Right proximal ureteral calculi/Right hydronephrosis/Bilateral nonobstructing renal calculi/UTI      HISTORY OF PRESENT ILLNESS:      The patient is a 68 y.o. female patient who was admitted with right flank pain. She is known to Dr. Gaurav Galloway. She has recurrent nephrolithiasis. She had a right ureteral stent placed at the end of last month. She is in the process of being scheduled for outpatient right ureteroscopy with laser lithotripsy to the residual stones. She had another CT scan performed yesterday showing a well-positioned right ureteral stent without hydronephrosis, and stable bilateral nephrolithiasis as well as a Engel catheter within the bladder. There is also hepatic cirrhosis and ascites present. She has anterior midepigastric abdominal discomfort. She denies any flank pain at this time. Most recent urine culture on 11/26/2021 was negative. Repeat urine culture is pending. She has been started on broad-spectrum antibiotics. She is taking Cardura 1 mg for some reason. She has a Engel. She is tolerating this well. She typically voids comfortably but often. She denies hematuria or dysuria. She has been started on a heparin drip as well as Eliquis once again.       Past Medical History:   Diagnosis Date    Breast cancer ([address]     Cirrhosis ([address]     Essential hypertension     Hx of blood clots     Hyperlipidemia     Osteopenia     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 ([address]     Sleep apnea     Spinal stenosis     Type 2 diabetes mellitus ([address]          Past Surgical History:   Procedure Laterality Date    BLADDER SURGERY Right 11/26/2021    CYSTOSCOPY RETROGRADE PYELOGRAM RIGHT  STENT INSERTION performed by Anamaria Stout MD at . [address] LUMPECTOMY      lumpectomy [id number]    CARDIOVASCULAR STRESS TEST Lexiscan stress test    CARPAL TUNNEL RELEASE      COLONOSCOPY  01/31/2017    multiple polyps; diverticula--jerod    COLONOSCOPY  04/23/2019    polyps; diverticula; hemorrhoids--jerod    COLONOSCOPY N/A 04/23/2019    COLONOSCOPY POLYPECTOMY SNARE/COLD BIOPSY performed by Chevy Pulido MD at [address] [facility] COLONOSCOPY  04/23/2019    COLONOSCOPY WITH BIOPSY performed by Chevy Pulido MD at [address] [facility] LITHOTRIPSY Left 05/04/2016    C-R [address] ARTHROPLASTY Left 12/21/2020    LEFT REVERSE TOTAL SHOULDER  ARTHROPLASTY -- DEPUY performed by Spike Lewis MD at [address]  04/23/2019    gastritis--jerod    UPPER GASTROINTESTINAL ENDOSCOPY N/A 04/23/2019    EGD BIOPSY performed by Chevy Pulido MD at [address]       Medications Prior to Admission:    Medications Prior to Admission: ferrous sulfate (IRON 325) 325 (65 Fe) MG tablet, Take 1 tablet by mouth 2 times daily (with meals)  apixaban (ELIQUIS) 5 MG TABS tablet, Take 1 tablet by mouth 2 times daily Start on 12/1.   If blood in the urine gets worse, stop taking this medication and call the urologist.  amLODIPine (NORVASC) 2.5 MG tablet, TAKE ONE TABLET BY MOUTH ONCE DAILY AT 9AM  fluticasone-salmeterol (ADVAIR) 250-50 MCG/DOSE AEPB, INHALE 1 PUFF EVERY TWELVE HOURS  XIFAXAN 550 MG tablet, TAKE 1 TABLET BY MOUTH TWICE DAILY  pantoprazole (PROTONIX) 40 MG tablet, Take 1 tablet by mouth daily  Biotin 63666 MCG TABS, Take by mouth every 7 days  vitamin D3 (CHOLECALCIFEROL) 25 MCG (1000 UT) TABS tablet, Take 1,000 Units by mouth daily  venlafaxine (EFFEXOR XR) 75 MG extended release capsule, Take 1 capsule by mouth daily  spironolactone (ALDACTONE) 100 MG tablet, Take 1 tablet by mouth daily  albuterol sulfate HFA (VENTOLIN HFA) 108 (90 Base) MCG/ACT inhaler, Inhale 2 puffs into the lungs 4 times daily as needed for Wheezing  budesonide-formoterol (SYMBICORT) 160-4.5 MCG/ACT AERO, Inhale 2 puffs into the lungs 2 times daily  lactulose (CHRONULAC) 10 GM/15ML solution, Take 30 mLs by mouth 3 times daily  furosemide (LASIX) 40 MG tablet, Take 1 tablet by mouth daily  doxazosin (CARDURA) 1 MG tablet, Take 1 tablet by mouth nightly    Allergies:    Lisinopril    Social History:   She  reports that she has never smoked. She has never used smokeless tobacco. She reports that she does not drink alcohol and does not use drugs. She is  and retired. She has 3 sons    Family History:   Non-contributory to this Urological problem  family history includes Arthritis in her mother; COPD in her father; Cancer (age of onset: 48) in an other family member; Heart Attack in her father. REVIEW OF SYSTEMS:  Respiratory: denies any symptoms of a productive cough, shortness of breath, or hemoptysis  Cardiovascular: denies chest pain, dyspnea, or cardiac murmur  Gastrointestinal: negative for abdominal pain, diarrhea, or constipation  Dermatologic: denies any rashes, skin lesion(s), or pruritis  Neurological: negative for headaches, seizures, and tremors  Endocrine: negative for diabetic symptoms including polydipsia and polyuria or thyroid dysfunction  Ocular: denies retinopathy or glaucoma  ENT: denies sinusitis or epistaxis  Constitutional: denies nausea, vomiting, fever, or chills  Psychiatric: denies anxiety or depression  : denies gross hematuria. She voids frequently and often typically without a catheter    PHYSICAL EXAM:    Vitals:  /70   Pulse 89   Temp 98.5 °F (36.9 °C) (Oral)   Resp 18   Ht 5' 2.5\" (1.588 m)   Wt 188 lb (85.3 kg)   SpO2 96%   BMI 33.84 kg/m²     General:  Awake, alert, oriented X 3. Well developed, well nourished, well groomed. No apparent distress. HEENT:  Normocephalic, atraumatic. Pupils equal, round. No scleral icterus. No conjunctival injection. Normal lips, teeth, and gums. No nasal discharge.   Neck:  Supple, no masses. Heart:  RRR. Lungs:  No audible wheezing. Respirations symmetric and non-labored. Clear bilaterally. Abdomen:  Soft, nontender, no masses, no organomegaly, no peritoneal signs. Active bowel sounds. No rebound or guarding. No flank or CVA tenderness. She is obese  Extremities:  No clubbing, cyanosis, or edema. Brisk peripheral pulses. Skin:  Warm and dry, no open lesions or rashes. Neuro:  Cranial nerves 2-12 intact, no focal motor or sensory deficits. Alert and oriented. Rectal: Deferred  Genitalia: 16 Bulgarian Engel draining clear, yellow urine into a gravity bag    LABS:    Lab Results   Component Value Date    WBC 11.3 12/14/2021    HGB 9.9 (L) 12/14/2021    HCT 30.2 (L) 12/14/2021    MCV 99.3 12/14/2021     12/14/2021       Lab Results   Component Value Date    BUN 20 12/13/2021    CREATININE 1.6 (H) 12/13/2021               ASSESSMENT / PLAN:    Right proximal ureteral calculi/Right hydronephrosis/Bilateral nonobstructing renal calculi/UTI  She is hemodynamically stable. She will continue empiric antibiotics. Cultures are pending. There is no sign of gross hematuria despite anticoagulation with Eliquis and heparin. Her catheter will be continued until she is ambulatory and medically stable. She has stones in the right kidney and proximal ureter which again will be addressed with ureteroscopy once she is medically stable and safely off anticoagulation for the procedure. I do not feel ESWL would be a good idea given her large body habitus. The stones in her left kidney are not causing her symptoms and will be monitored. The left kidney on imaging does seem somewhat smaller than the right and has an abnormal position. Doxazosin will be discontinued. We will continue to follow her during her hospital stay. Thank you for allow me to assist in her care once again.   Sincerely,    Johnny Lr MD  3:43 PM  12/14/2021

## 2021-12-14 NOTE — ED PROVIDER NOTES
Department of Emergency Medicine   ED  Provider Note  Admit Date/RoomTime: 12/13/2021  8:22 PM  ED Room: 13/13      History of Present Illness:  12/13/21, Time: 8:57 PM EST  Chief Complaint   Patient presents with    Back Pain     back pain head pain was released from [facility] on November 30 for kidney stones and uti was suppose to go back and has not gotten called yet       Roxana Hernandez is a 68 y.o. female presenting to the ED for Multiple complaints including right flank pain, headache, generalized malaise. The patient of note had a hospital admission was discharged November 30 after an acute kidney injury with right hydronephrosis and 6 mm proximal right ureteral obstructing calculus that subsequently underwent right-sided stent insertion as well as right pyelogram with Dr. Toña Boyce. She was on antibiotics and subsequently discharged. She states she really has not felt particularly well for the past couple days. She also endorses a cough as well as intermittent shortness of breath. Of note, the patient was seen and had been started on Eliquis to continue for 3 months. However, about a week ago the patient stopped taking her Eliquis as she stated it was giving her hematuria. She denies any nausea or emesis as well as formal abdominal pain but does have some right inguinal tenderness. Symptoms are moderate intensity relieved by nothing. She has subjective fevers and chills at home as well. Review of Systems:  Review of systems obtained and negative unless stated otherwise above in the HPI.    --------------------------------------------- PAST HISTORY ---------------------------------------------  Past Medical History:  has a past medical history of Breast cancer ([facility] [address] Cirrhosis ([facility] [address] Essential hypertension, Hx of blood clots, Hyperlipidemia, Osteopenia, 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 ([facility] [address] Sleep apnea, Spinal stenosis, and Type 2 diabetes mellitus ([facility] [address]     Past Surgical History:  has a past surgical history that includes Hysterectomy; Carpal tunnel release; Lithotripsy (Left, 05/04/2016); Colonoscopy (01/31/2017); Breast lumpectomy; Upper gastrointestinal endoscopy (04/23/2019); Colonoscopy (04/23/2019); Upper gastrointestinal endoscopy (N/A, 04/23/2019); Colonoscopy (N/A, 04/23/2019); Colonoscopy (04/23/2019); Shoulder Arthroplasty (Left, 12/21/2020); cardiovascular stress test; and Bladder surgery (Right, 11/26/2021). Social History:  reports that she has never smoked. She has never used smokeless tobacco. She reports that she does not drink alcohol and does not use drugs. Family History: family history includes Arthritis in her mother; COPD in her father; Cancer (age of onset: 48) in an other family member; Heart Attack in her father. . Unless otherwise noted, family history is non contributory    The patients home medications have been reviewed. Allergies: Lisinopril    I have reviewed the past medical history, past surgical history, social history, and family history    ---------------------------------------------------PHYSICAL EXAM--------------------------------------    Constitutional: Appears in no distress  Head: Normocephalic, atraumatic  Eyes: Non-icteric slcera, no conjunctival injection  ENT: Moist mucous membranes,  Neck: Trachea midline, no JVD, no nuchal rigidity or meningismus on exam  Respiratory: Nonlabored respirations. Lungs clear to auscultation bilaterally, no wheezes, rales, or rhonchi. Cardiovascular: Regular rhythm, tachycardic no S3 or S4 no rub or murmur is heard  Gastrointestinal: Abdomen Soft, Non tender, Non distended. No rebound tenderness, guarding, or rigidity. Extremities: No lower extremity edema  Genitourinary: Bilateral CVA tenderness worse on the right, no suprapubic tenderness  Musculoskeletal: Moves all extremities, no deformity  Skin: Pink, warm, dry without rash.   Neurologic: Alert, symmetric facies, no aphasia    -------------------------------------------------- RESULTS -------------------------------------------------  I have personally reviewed all laboratory and imaging results for this patient. Results are listed below.      LABS: (Lab results interpreted by me)  Results for orders placed or performed during the hospital encounter of 12/13/21   COVID-19, Rapid    Specimen: Nasopharyngeal Swab   Result Value Ref Range    SARS-CoV-2, NAAT Not Detected Not Detected   Lactate, Sepsis   Result Value Ref Range    Lactic Acid, Sepsis 1.8 0.5 - 1.9 mmol/L   Comprehensive Metabolic Panel w/ Reflex to MG   Result Value Ref Range    Sodium 130 (L) 132 - 146 mmol/L    Potassium reflex Magnesium 4.8 3.5 - 5.0 mmol/L    Chloride 99 98 - 107 mmol/L    CO2 20 (L) 22 - 29 mmol/L    Anion Gap 11 7 - 16 mmol/L    Glucose 104 (H) 74 - 99 mg/dL    BUN 20 6 - 23 mg/dL    CREATININE 1.6 (H) 0.5 - 1.0 mg/dL    GFR Non-African American 31 >=60 mL/min/1.73    GFR African American 38     Calcium 10.1 8.6 - 10.2 mg/dL    Total Protein 7.1 6.4 - 8.3 g/dL    Albumin 2.9 (L) 3.5 - 5.2 g/dL    Total Bilirubin 1.5 (H) 0.0 - 1.2 mg/dL    Alkaline Phosphatase 132 (H) 35 - 104 U/L    ALT 10 0 - 32 U/L    AST 41 (H) 0 - 31 U/L   CBC auto differential   Result Value Ref Range    WBC 15.5 (H) 4.5 - 11.5 E9/L    RBC 3.52 3.50 - 5.50 E12/L    Hemoglobin 11.7 11.5 - 15.5 g/dL    Hematocrit 34.9 34.0 - 48.0 %    MCV 99.1 80.0 - 99.9 fL    MCH 33.2 26.0 - 35.0 pg    MCHC 33.5 32.0 - 34.5 %    RDW 20.1 (H) 11.5 - 15.0 fL    Platelets 082 612 - 103 E9/L    MPV 11.8 7.0 - 12.0 fL    Neutrophils % 77.5 43.0 - 80.0 %    Immature Granulocytes % 0.5 0.0 - 5.0 %    Lymphocytes % 6.0 (L) 20.0 - 42.0 %    Monocytes % 15.0 (H) 2.0 - 12.0 %    Eosinophils % 0.5 0.0 - 6.0 %    Basophils % 0.5 0.0 - 2.0 %    Neutrophils Absolute 11.98 (H) 1.80 - 7.30 E9/L    Immature Granulocytes # 0.08 E9/L    Lymphocytes Absolute 0.93 (L) 1.50 - 4.00 E9/L    Monocytes Absolute 2.32 (H) 0.10 - 0.95 E9/L    Eosinophils Absolute 0.07 0.05 - 0.50 E9/L    Basophils Absolute 0.07 0.00 - 0.20 E9/L    Anisocytosis 2+     Polychromasia 1+     Hypochromia 1+    Lipase   Result Value Ref Range    Lipase 26 13 - 60 U/L   Protime-INR   Result Value Ref Range    Protime 16.3 (H) 9.3 - 12.4 sec    INR 1.5    APTT   Result Value Ref Range    aPTT 25.0 24.5 - 35.1 sec   Urinalysis with Microscopic   Result Value Ref Range    Color, UA Yellow Straw/Yellow    Clarity, UA CLOUDY (A) Clear    Glucose, Ur Negative Negative mg/dL    Bilirubin Urine Negative Negative    Ketones, Urine TRACE (A) Negative mg/dL    Specific Gravity, UA 1.020 1.005 - 1.030    Blood, Urine LARGE (A) Negative    pH, UA 6.0 5.0 - 9.0    Protein, UA 30 (A) Negative mg/dL    Urobilinogen, Urine 1.0 <2.0 E.U./dL    Nitrite, Urine Negative Negative    Leukocyte Esterase, Urine LARGE (A) Negative    WBC, UA PACKED (A) 0 - 5 /HPF    RBC, UA >20 0 - 2 /HPF    Bacteria, UA MANY (A) None Seen /HPF   ,       RADIOLOGY:  Interpreted by Radiologist unless otherwise specified  CT ABDOMEN PELVIS WO CONTRAST Additional Contrast? None   Final Result   No acute specific abdominopelvic process is identified. Bilateral nephrolithiasis. No obstructive uropathy. No noncontrast CT evidence of ascending infection/pyelonephritis, with the   caveat that sensitivity for early changes of pyelonephritis or relatively low   on noncontrast imaging. The right ureteral stent remains appropriately positioned. Iatrogenic gas is seen spanning from the urinary bladder within the urinary   bladder, right ureter and right intrarenal collecting system, in the context   of a Engel catheter. Hepatic cirrhosis, with spleno renal and paraesophageal varices, as well as   associated small to moderate volume ascites. RECOMMENDATIONS:   Unavailable         XR CHEST 1 VIEW   Final Result   No acute process.                ------------------------- NURSING NOTES AND VITALS REVIEWED ---------------------------   The nursing notes within the ED encounter and vital signs as below have been reviewed by myself  BP (!) 144/90   Pulse 120   Temp 99.3 °F (37.4 °C) (Oral)   Resp 16   Ht 5' 2.5\" (1.588 m)   Wt 170 lb (77.1 kg)   SpO2 94%   BMI 30.60 kg/m²     Oxygen Saturation Interpretation: Normal    The patients available past medical records and past encounters were reviewed. ------------------------------ ED COURSE/MEDICAL DECISION MAKING----------------------  Medications   0.9 % sodium chloride bolus (1,000 mLs IntraVENous New Bag 12/13/21 2228)   heparin (porcine) injection 4,630 Units (has no administration in time range)   heparin (porcine) injection 4,630 Units (has no administration in time range)   heparin (porcine) injection 2,310 Units (has no administration in time range)   heparin 25,000 units in dextrose 5% 250 mL (premix) infusion (has no administration in time range)   cefTRIAXone (ROCEPHIN) 2,000 mg in sterile water 20 mL IV syringe (has no administration in time range)   acetaminophen (TYLENOL) tablet 1,000 mg (1,000 mg Oral Given 12/13/21 2204)        Re-Evaluations: This patient's ED course included:a personal history and physicial examination, re-evaluation prior to disposition, multiple bedside re-evaluations, IV medications, cardiac monitoring, continuous pulse oximetry and complex medical decision making and emergency management    This patient has remained hemodynamically stable and improved during their ED course. Consultations:  Internal Medicine    Medical Decision Making:   Patient presents emerge department with symptomatology concerning for acute infection. Vital signs interpreted myself as hypertensive, tachycardic, mildly hypoxic at 94% and borderline febrile at 37.8. History and physical examination findings consistent with sepsis uncertain etiology.   Broad work-up was initiated including sepsis work-up. She is given Tylenol as well as IV fluid. Labs/imaging: Leukocytosis of 15,000 is noted. She has chronic elevation of alkaline phosphatase as well as her AST and bilirubin that are unchanged from previous. These were noted back in November of the 23rd during her recent hospital admission. She is mildly hyponatremic. She has mild elevation in her creatinine not significantly above her normal baseline at 1.6. Covid swab is negative. Plain film of the chest is unremarkable for any acute cardiopulmonary process. CT abdomen pelvis unremarkable for any obstructive uropathy. No stones are identified. Proper stent placements is noted. Other chronic findings as noted. Contrast was not used and subsequently largely unable to identify any fat stranding. Urinalysis reveals evidence of urinary tract infection. Engel was replaced and subsequent urinalysis to be obtained. Patient started on ceftriaxone for suspected complicated urinary tract infection. I reviewed the patient's prior urine culture results which were unremarkable for any primary bacteria that were able to be cultured nor were the sensitivities obtained. IV access was difficult so I establish IV access myself under ultrasound guidance in the [facility] with obtaining cultures as well. Patient had had multiple subsegmental pulmonary emboli and I did start her on heparin as she has been off of her Eliquis as well. Patient will require transfer and admission. She requests transfer to [facility] and was previously admitted to [facility] physicians so she will be readmitted to [facility].     Critical Care:  Upon my evaluation, this patient had a high probability of imminent or life-threatening deterioration due to multiple subsegmental pulmonary emboli noncompliant with Eliquis requiring IV heparin, sepsis requiring IV fluid as well as IV antibiotics secondary to complicated UTI, which required my direct attention, intervention, and personal management. I have personally provided 45 minutes of critical care time excluding time spent on separately billable procedures. Time includes review of laboratory data, radiology results, discussion with consultants, and monitoring for potential decompensation. Interventions were performed as documented above. Counseling: The emergency provider has spoken with the patient and discussed todays results, in addition to providing specific details for the plan of care and counseling regarding the diagnosis and prognosis. Questions are answered at this time and they are agreeable with the plan.       --------------------------------- IMPRESSION AND DISPOSITION ---------------------------------    IMPRESSION  1. Septicemia ([address]    2. Complicated UTI (urinary tract infection)    3. Leukocytosis, unspecified type        DISPOSITION  Disposition: Transfer to [facility]  Patient condition is stable    IDr. Poppy, am the primary provider of record    Poppy Clark DO  Emergency Medicine    NOTE: This report was transcribed using voice recognition software.  Every effort was made to ensure accuracy; however, inadvertent computerized transcription errors may be present         Katia Werner DO  12/13/21 [id number]

## 2021-12-14 NOTE — ED PROVIDER NOTES
ADDENDUM NOTE:  11      11:45 PM EST  I received this patient at sign out from Dr. Armin Suarez. I have discussed the patient's initial exam, treatment and plan of care with the out going physician. I have introduced my self to the patient / family and have answered their questions to this point. I have examined the patient myself and reviewed ordered tests / medications and  reviewed any available results to this point. Patient was endorsed to me by Dr. Shira Guy for discussions with the [facility] physician at [facility].  I spoke with Dr. Carola Menard (see chart notes for times) she accepted the patient for admission to her service.       Barron Hinton MD  12/14/21 [id number]

## 2021-12-14 NOTE — H&P
COLONOSCOPY WITH BIOPSY performed by Rusty Funez MD at [id number] [place] [address] LITHOTRIPSY Left 05/04/2016    C-R STENT PLACEMENT    SHOULDER ARTHROPLASTY Left 12/21/2020    LEFT REVERSE TOTAL SHOULDER  ARTHROPLASTY -- DEPUY performed by Staci Paulino MD at [address]  04/23/2019    gastritis--jerod    UPPER GASTROINTESTINAL ENDOSCOPY N/A 04/23/2019    EGD BIOPSY performed by Rusty Funez MD at [facility]       Medications Prior to Admission:    Medications Prior to Admission: ferrous sulfate (IRON 325) 325 (65 Fe) MG tablet, Take 1 tablet by mouth 2 times daily (with meals)  apixaban (ELIQUIS) 5 MG TABS tablet, Take 1 tablet by mouth 2 times daily Start on 12/1.   If blood in the urine gets worse, stop taking this medication and call the urologist.  amLODIPine (NORVASC) 2.5 MG tablet, TAKE ONE TABLET BY MOUTH ONCE DAILY AT 9AM  fluticasone-salmeterol (ADVAIR) 250-50 MCG/DOSE AEPB, INHALE 1 PUFF EVERY TWELVE HOURS  XIFAXAN 550 MG tablet, TAKE 1 TABLET BY MOUTH TWICE DAILY  pantoprazole (PROTONIX) 40 MG tablet, Take 1 tablet by mouth daily  Biotin 20141 MCG TABS, Take by mouth every 7 days  vitamin D3 (CHOLECALCIFEROL) 25 MCG (1000 UT) TABS tablet, Take 1,000 Units by mouth daily  venlafaxine (EFFEXOR XR) 75 MG extended release capsule, Take 1 capsule by mouth daily  spironolactone (ALDACTONE) 100 MG tablet, Take 1 tablet by mouth daily  albuterol sulfate HFA (VENTOLIN HFA) 108 (90 Base) MCG/ACT inhaler, Inhale 2 puffs into the lungs 4 times daily as needed for Wheezing  budesonide-formoterol (SYMBICORT) 160-4.5 MCG/ACT AERO, Inhale 2 puffs into the lungs 2 times daily  lactulose (CHRONULAC) 10 GM/15ML solution, Take 30 mLs by mouth 3 times daily  furosemide (LASIX) 40 MG tablet, Take 1 tablet by mouth daily  doxazosin (CARDURA) 1 MG tablet, Take 1 tablet by mouth nightly    Allergies:    Lisinopril    Social History:    reports that she has never smoked. She has never used smokeless tobacco. She reports that she does not drink alcohol and does not use drugs. Family History:   family history includes Arthritis in her mother; COPD in her father; Cancer (age of onset: 48) in an other family member; Heart Attack in her father. REVIEW OF SYSTEMS:  As above in the HPI, otherwise negative    PHYSICAL EXAM:    Vitals:  /70   Pulse 89   Temp 98.5 °F (36.9 °C) (Oral)   Resp 18   Ht 5' 2.5\" (1.588 m)   Wt 170 lb (77.1 kg)   SpO2 96%   BMI 30.60 kg/m²     General:  Awake, alert, oriented X 3. Well developed, well nourished, well groomed. No apparent distress. HEENT:  Normocephalic, atraumatic. Pupils equal, round, reactive to light. No scleral icterus. No conjunctival injection. Normal lips, teeth, and gums. No nasal discharge. Neck:  Supple  Heart:  RRR, no murmurs, gallops, rubs  Lungs:  CTA bilaterally, bilat symmetrical expansion, no wheeze, rales, or rhonchi  Abdomen:   Bowel sounds present, soft, nontender, no masses, no organomegaly, no peritoneal signs  Extremities:  No clubbing, cyanosis, or edema  Skin:  Warm and dry, no open lesions or rash  Neuro:  Cranial nerves 2-12 intact, no focal deficits  Breast: deferred  Rectal: deferred  Genitalia:  deferred    LABS:    CBC with Differential:    Lab Results   Component Value Date    WBC 11.3 12/14/2021    RBC 3.04 12/14/2021    HGB 9.9 12/14/2021    HCT 30.2 12/14/2021     12/14/2021    MCV 99.3 12/14/2021    MCH 32.6 12/14/2021    MCHC 32.8 12/14/2021    RDW 19.9 12/14/2021    LYMPHOPCT 6.0 12/13/2021    PROMYELOPCT 0.9 11/23/2021    MONOPCT 15.0 12/13/2021    MYELOPCT 0.9 04/15/2021    BASOPCT 0.5 12/13/2021    MONOSABS 2.32 12/13/2021    LYMPHSABS 0.93 12/13/2021    EOSABS 0.07 12/13/2021    BASOSABS 0.07 12/13/2021     CMP:    Lab Results   Component Value Date     12/13/2021    K 4.8 12/13/2021    CL 99 12/13/2021    CO2 20 12/13/2021    BUN 20 12/13/2021    CREATININE 1.6 12/13/2021    GFRAA 38 12/13/2021    LABGLOM 31 12/13/2021    GLUCOSE 104 12/13/2021    PROT 7.1 12/13/2021    LABALBU 2.9 12/13/2021    CALCIUM 10.1 12/13/2021    BILITOT 1.5 12/13/2021    ALKPHOS 132 12/13/2021    AST 41 12/13/2021    ALT 10 12/13/2021     Magnesium:    Lab Results   Component Value Date    MG 1.7 02/04/2021     Phosphorus:    Lab Results   Component Value Date    PHOS 2.7 02/27/2021     PT/INR:    Lab Results   Component Value Date    PROTIME 16.3 12/13/2021    INR 1.5 12/13/2021     Last 3 Troponin:    Lab Results   Component Value Date    TROPONINI <0.01 02/04/2021    TROPONINI <0.01 02/03/2021    TROPONINI <0.01 02/03/2021     U/A:    Lab Results   Component Value Date    NITRITE Neg 10/29/2018    COLORU Yellow 12/14/2021    PROTEINU 100 12/14/2021    PHUR 5.5 12/14/2021    WBCUA PACKED 12/14/2021    RBCUA >20 12/14/2021    YEAST MODERATE 08/22/2017    BACTERIA MANY 12/14/2021    CLARITYU CLOUDY 12/14/2021    SPECGRAV 1.020 12/14/2021    LEUKOCYTESUR MODERATE 12/14/2021    UROBILINOGEN 1.0 12/14/2021    BILIRUBINUR Negative 12/14/2021    BILIRUBINUR Neg 10/29/2018    BLOODU LARGE 12/14/2021    GLUCOSEU Negative 12/14/2021     ABG:    Lab Results   Component Value Date    PCO2 37.3 08/17/2021    PO2 79.0 08/17/2021    HCO3 25.6 08/17/2021    BE 1.8 08/17/2021    O2SAT 96.1 08/17/2021     HgBA1c:    Lab Results   Component Value Date    LABA1C 5.3 10/19/2021     FLP:    Lab Results   Component Value Date    TRIG 65 02/27/2021    HDL 37 02/27/2021    LDLCALC 44 02/27/2021    LABVLDL 13 02/27/2021     TSH:    Lab Results   Component Value Date    TSH 2.480 11/25/2021       CT ABDOMEN PELVIS WO CONTRAST Additional Contrast? None   Final Result   No acute specific abdominopelvic process is identified. Bilateral nephrolithiasis. No obstructive uropathy.       No noncontrast CT evidence of ascending infection/pyelonephritis, with the   caveat that sensitivity for early changes of

## 2021-12-14 NOTE — PROGRESS NOTES
Physical Therapy   Initial Assessment     Name: Yung Andrews  : 1945  MRN: [id number]      Date of Service: 2021    Evaluating PT:  Samira Hood. Teddy Peña, [id number]    Room #:  4057/3180-N  Diagnosis:  Pyelonephritis [N12]  Septicemia [facility]) [J87.6]  Complicated UTI (urinary tract infection) [N39.0]  Leukocytosis, unspecified type [D72.829]  PMHx/PSHx:     has a past medical history of Breast cancer ([facility] Cirrhosis ([facility] Essential hypertension, Hx of blood clots, Hyperlipidemia, Osteopenia, 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 ([facility] Sleep apnea, Spinal stenosis, and Type 2 diabetes mellitus ([facility] has a past surgical history that includes Hysterectomy; Carpal tunnel release; Lithotripsy (Left, 2016); Colonoscopy (2017); Breast lumpectomy; Upper gastrointestinal endoscopy (2019); Colonoscopy (2019); Upper gastrointestinal endoscopy (N/A, 2019); Colonoscopy (N/A, 2019); Colonoscopy (2019); Shoulder Arthroplasty (Left, 2020); cardiovascular stress test; and Bladder surgery (Right, 2021). Precautions:  Fall risk, NPO    SUBJECTIVE:    Pt lives alone in a 1 story home with level entry. Pt ambulated with a QC PTA, owns a [address].    OBJECTIVE:   Initial Evaluation  Date: 21 Treatment Short Term/ Long Term   Goals   AM-PAC 6 Clicks 81/11     Was pt agreeable to Eval/treatment? Yes     Does pt have pain?  Denied     Bed Mobility  Rolling: NT  Supine to sit: NT  Sit to supine: NT  Scooting: NT  Independent   Transfers Sit to stand: Supervision  Stand to sit: Supervision  Stand pivot: Supervision  Independent   Ambulation    200 feet with WW with Supervision  >400 feet with AAD with Modified [place]   Stair negotiation: ascended and descended  NT  4 steps with 1 rail with Modified [place]   BLE ROM WFL     BLE Strength Grossly 4/5     Balance Sitting: Independent  Standing: Supervision with AD  Modified Independent     Pt is A & O x 4    Therapeutic Exercises:    Ambulation: 2x20 feet with no AD with CGA  Sit<>stand x5 with Supervision    Patient education  Pt educated on need for walker at this time, technique when ambulating with walker to increase safety, fall prevention. Patient response to education:   Pt verbalized understanding Pt demonstrated skill Pt requires further education in this area   Yes Yes Reinforce      ASSESSMENT:    Conditions Requiring Skilled Therapeutic Intervention:    [x]Decreased strength     []Decreased ROM  [x]Decreased functional mobility  [x]Decreased balance   [x]Decreased endurance   []Decreased posture  []Decreased sensation  []Decreased coordination   []Decreased vision  [x]Decreased safety awareness   []Increased pain       Comments: The pt was seen sitting up at EOB, she was able to ambulate with a walker but required cues to stay within the base of the walker as she frequently let the walker roll ahead of her and stepped outside the walker when she turned. The pt was able to ambulate for short distances with no AD, however she was noted to reach for the wall and was unsteady. The pt was able to repeat sit<>stand transfers and increased her confidence as she continued to perform the transfers. Discussed fall prevention with the pt and she denied any falls at home. Treatment:  Patient practiced and was instructed in the following treatment:     Gait training: verbal cues for step sequencing and safety, verbal cues for appropriate step length when turning and positioning within walker with dynamic activities   Transfer training: verbal cues for hand placement sit to stand transfer    Pt's/ family goals   1. To return home before Coni    Prognosis is good for reaching above PT goals. Patient and or family understand(s) diagnosis, prognosis, and plan of care.   Yes    PHYSICAL THERAPY PLAN OF CARE:    PT POC is established based on physician order and patient diagnosis     Referring provider/PT Order: Tonya Dhaliwal MD  Diagnosis:  Pyelonephritis [N12]  Septicemia ([address] [Y36.7]  Complicated UTI (urinary tract infection) [N39.0]  Leukocytosis, unspecified type [D72.829]  Specific instructions for next treatment:  Progress ambulation, attempt stair negotiation    Current Treatment Recommendations:     [x] Strengthening to improve independence with functional mobility   [] ROM to improve independence with functional mobility   [x] Balance Training to improve static/dynamic balance and to reduce fall risk  [x] Endurance Training to improve activity tolerance during functional mobility   [x] Transfer Training to improve safety and independence with all functional transfers   [x] Gait Training to improve gait mechanics, endurance and assess need for appropriate assistive device  [x] Stair Training in preparation for safe discharge home and/or into the community   [] Positioning to prevent skin breakdown and contractures  [x] Safety and Education Training   [] Patient/Caregiver Education   [] HEP  [] Other     PT long term treatment goals are located in above grid    Frequency of treatments: 2-5x/week x 1-2 weeks. Time in  1320  Time out  1338    Total Treatment Time  8 minutes     Evaluation Time includes thorough review of current medical information, gathering information on past medical history/social history and prior level of function, completion of standardized testing/informal observation of tasks, assessment of data and education on plan of care and goals. CPT codes:  [x] Low Complexity PT evaluation 75183  [] Moderate Complexity PT evaluation 85272  [] High Complexity PT evaluation 53042  [] PT Re-evaluation 35070  [] Gait training 55002 0 minutes  [] Manual therapy 92704 0 minutes  [x] Therapeutic activities 94765 8 minutes  [] Therapeutic exercises 40369 0 minutes  [] Neuromuscular reeducation 41333 0 minutes     Daryle Spruce.  [place], [place]  [id number]

## 2021-12-15 DIAGNOSIS — R18.8 CIRRHOSIS OF LIVER WITH ASCITES, UNSPECIFIED HEPATIC CIRRHOSIS TYPE (HCC): ICD-10-CM

## 2021-12-15 DIAGNOSIS — K74.60 CIRRHOSIS OF LIVER WITH ASCITES, UNSPECIFIED HEPATIC CIRRHOSIS TYPE (HCC): ICD-10-CM

## 2021-12-15 LAB
ALBUMIN SERPL-MCNC: 2.4 G/DL (ref 3.5–5.2)
ALP BLD-CCNC: 126 U/L (ref 35–104)
ALT SERPL-CCNC: 14 U/L (ref 0–32)
ANION GAP SERPL CALCULATED.3IONS-SCNC: 11 MMOL/L (ref 7–16)
AST SERPL-CCNC: 48 U/L (ref 0–31)
BILIRUB SERPL-MCNC: 0.8 MG/DL (ref 0–1.2)
BUN BLDV-MCNC: 19 MG/DL (ref 6–23)
CALCIUM SERPL-MCNC: 9.6 MG/DL (ref 8.6–10.2)
CHLORIDE BLD-SCNC: 105 MMOL/L (ref 98–107)
CO2: 15 MMOL/L (ref 22–29)
CREAT SERPL-MCNC: 1.7 MG/DL (ref 0.5–1)
GFR AFRICAN AMERICAN: 35
GFR NON-AFRICAN AMERICAN: 29 ML/MIN/1.73
GLUCOSE BLD-MCNC: 101 MG/DL (ref 74–99)
HCT VFR BLD CALC: 28.1 % (ref 34–48)
HEMOGLOBIN: 9.2 G/DL (ref 11.5–15.5)
MCH RBC QN AUTO: 33.7 PG (ref 26–35)
MCHC RBC AUTO-ENTMCNC: 32.7 % (ref 32–34.5)
MCV RBC AUTO: 102.9 FL (ref 80–99.9)
PDW BLD-RTO: 20 FL (ref 11.5–15)
PLATELET # BLD: 166 E9/L (ref 130–450)
PMV BLD AUTO: 10.4 FL (ref 7–12)
POTASSIUM SERPL-SCNC: 5 MMOL/L (ref 3.5–5)
RBC # BLD: 2.73 E12/L (ref 3.5–5.5)
SODIUM BLD-SCNC: 131 MMOL/L (ref 132–146)
TOTAL PROTEIN: 6.6 G/DL (ref 6.4–8.3)
URINE CULTURE, ROUTINE: NORMAL
WBC # BLD: 9.9 E9/L (ref 4.5–11.5)

## 2021-12-15 PROCEDURE — 6360000002 HC RX W HCPCS: Performed by: INTERNAL MEDICINE

## 2021-12-15 PROCEDURE — 94640 AIRWAY INHALATION TREATMENT: CPT

## 2021-12-15 PROCEDURE — 1200000000 HC SEMI PRIVATE

## 2021-12-15 PROCEDURE — 36415 COLL VENOUS BLD VENIPUNCTURE: CPT

## 2021-12-15 PROCEDURE — 80053 COMPREHEN METABOLIC PANEL: CPT

## 2021-12-15 PROCEDURE — 2580000003 HC RX 258: Performed by: INTERNAL MEDICINE

## 2021-12-15 PROCEDURE — 6370000000 HC RX 637 (ALT 250 FOR IP): Performed by: INTERNAL MEDICINE

## 2021-12-15 PROCEDURE — 6360000002 HC RX W HCPCS: Performed by: STUDENT IN AN ORGANIZED HEALTH CARE EDUCATION/TRAINING PROGRAM

## 2021-12-15 PROCEDURE — 85027 COMPLETE CBC AUTOMATED: CPT

## 2021-12-15 PROCEDURE — 85730 THROMBOPLASTIN TIME PARTIAL: CPT

## 2021-12-15 RX ORDER — FERROUS SULFATE 325(65) MG
TABLET ORAL
Qty: 30 TABLET | Refills: 5 | Status: SHIPPED
Start: 2021-12-15 | End: 2022-05-26

## 2021-12-15 RX ORDER — DOXAZOSIN MESYLATE 1 MG/1
TABLET ORAL
Qty: 30 TABLET | Refills: 5 | Status: SHIPPED
Start: 2021-12-15 | End: 2022-05-27

## 2021-12-15 RX ORDER — RIFAXIMIN 550 MG/1
TABLET ORAL
Qty: 60 TABLET | Refills: 5 | Status: SHIPPED
Start: 2021-12-15 | End: 2022-05-27

## 2021-12-15 RX ORDER — AMLODIPINE BESYLATE 2.5 MG/1
TABLET ORAL
Qty: 30 TABLET | Refills: 5 | Status: SHIPPED
Start: 2021-12-15 | End: 2022-05-26

## 2021-12-15 RX ORDER — SPIRONOLACTONE 100 MG/1
TABLET, FILM COATED ORAL
Qty: 30 TABLET | Refills: 5 | Status: SHIPPED
Start: 2021-12-15 | End: 2022-05-27

## 2021-12-15 RX ORDER — FUROSEMIDE 40 MG/1
TABLET ORAL
Qty: 30 TABLET | Refills: 5 | Status: SHIPPED
Start: 2021-12-15 | End: 2022-05-27

## 2021-12-15 RX ORDER — PANTOPRAZOLE SODIUM 40 MG/1
TABLET, DELAYED RELEASE ORAL
Qty: 30 TABLET | Refills: 5 | Status: SHIPPED
Start: 2021-12-15 | End: 2022-05-27

## 2021-12-15 RX ORDER — LACTULOSE 10 G/15ML
SOLUTION ORAL
Qty: 2700 ML | Refills: 5 | Status: SHIPPED
Start: 2021-12-15 | End: 2022-05-12

## 2021-12-15 RX ADMIN — RIFAXIMIN 550 MG: 550 TABLET ORAL at 20:54

## 2021-12-15 RX ADMIN — HEPARIN SODIUM 10.89 UNITS/KG/HR: 10000 INJECTION, SOLUTION INTRAVENOUS at 04:22

## 2021-12-15 RX ADMIN — APIXABAN 5 MG: 5 TABLET, FILM COATED ORAL at 20:54

## 2021-12-15 RX ADMIN — PIPERACILLIN AND TAZOBACTAM 3375 MG: 3; .375 INJECTION, POWDER, LYOPHILIZED, FOR SOLUTION INTRAVENOUS at 20:54

## 2021-12-15 RX ADMIN — RIFAXIMIN 550 MG: 550 TABLET ORAL at 09:10

## 2021-12-15 RX ADMIN — PANTOPRAZOLE SODIUM 40 MG: 40 TABLET, DELAYED RELEASE ORAL at 09:11

## 2021-12-15 RX ADMIN — Medication 10 ML: at 21:28

## 2021-12-15 RX ADMIN — ACETAMINOPHEN 650 MG: 325 TABLET ORAL at 20:54

## 2021-12-15 RX ADMIN — LACTULOSE 20 G: 20 SOLUTION ORAL at 20:54

## 2021-12-15 RX ADMIN — SODIUM CHLORIDE: 9 INJECTION, SOLUTION INTRAVENOUS at 08:08

## 2021-12-15 RX ADMIN — PIPERACILLIN AND TAZOBACTAM 3375 MG: 3; .375 INJECTION, POWDER, LYOPHILIZED, FOR SOLUTION INTRAVENOUS at 12:54

## 2021-12-15 RX ADMIN — BUDESONIDE 500 MCG: 0.5 SUSPENSION RESPIRATORY (INHALATION) at 11:06

## 2021-12-15 RX ADMIN — Medication 10 ML: at 09:11

## 2021-12-15 RX ADMIN — ARFORMOTEROL TARTRATE 15 MCG: 15 SOLUTION RESPIRATORY (INHALATION) at 11:05

## 2021-12-15 RX ADMIN — PIPERACILLIN AND TAZOBACTAM 3375 MG: 3; .375 INJECTION, POWDER, LYOPHILIZED, FOR SOLUTION INTRAVENOUS at 04:22

## 2021-12-15 RX ADMIN — ARFORMOTEROL TARTRATE 15 MCG: 15 SOLUTION RESPIRATORY (INHALATION) at 20:48

## 2021-12-15 RX ADMIN — AMLODIPINE BESYLATE 2.5 MG: 2.5 TABLET ORAL at 09:12

## 2021-12-15 RX ADMIN — BUDESONIDE 500 MCG: 0.5 SUSPENSION RESPIRATORY (INHALATION) at 20:48

## 2021-12-15 ASSESSMENT — PAIN SCALES - GENERAL
PAINLEVEL_OUTOF10: 0
PAINLEVEL_OUTOF10: 4

## 2021-12-15 NOTE — CARE COORDINATION
[facility] Pt but needs to have pre-cert approved before Pt is admitted d/t her walking 200 feet. Requested Amie Ambrosio start pre-cert. Blanche Mead is active with [facility]. Discharge Plan is to [facility] when medically stable. SW/CM to follow for discharge needs. Completed Envelope and Ambulette form. See soft chart. Started HENS Exemption.     Jessica Christensen, L.S.W.  [phone]

## 2021-12-15 NOTE — CARE COORDINATION
Spoke with Pt about Transition Plan of Care. Pt lives alone with no steps to enter. Pt uses a ww and c-pap at home. PCP: Dr. Garen Hodgkin. [facility] [place] Pt wants to go to  [facility] ([facility] for rehab at discharge. SW/CM to follow for discharge needs. Referral made to [facility]. Brittany Chris to review and call back. SW/CM to follow for discharge needs.    Abdirahman Arias, L.S.W.  [phone]

## 2021-12-15 NOTE — HOME CARE
Patient is active with [facility] for skilled nursing, pt, ot. Will need home care orders at discharge.  Rachel Duran lpn

## 2021-12-15 NOTE — PLAN OF CARE
Problem: Falls - Risk of:  Goal: Will remain free from falls  Description: Will remain free from falls  12/15/2021 1315 by Saul Mc RN  Outcome: Met This Shift  12/15/2021 0105 by Simona Orozco RN  Outcome: Ongoing  Goal: Absence of physical injury  Description: Absence of physical injury  12/15/2021 1315 by Saul Mc RN  Outcome: Met This Shift  12/15/2021 0105 by Simona Orozco RN  Outcome: Ongoing

## 2021-12-15 NOTE — PROGRESS NOTES
Physician Progress Note      PATIENTElouise Proper  CSN #:                  [id number]  :                       1945  ADMIT DATE:       2021 8:22 PM  DISCH DATE:  RESPONDING  PROVIDER #:        ANTONIETA FORTUNE          QUERY TEXT:    Patient admitted with pyonephrosis, noted to have historical documentation of   CKD with documentation of REN on admission. If possible, please document in   progress notes and discharge summary if you are evaluating and/or treating any   of the following: The medical record reflects the following:  Risk Factors: per Historical documentation 2021: Gorge Patches with a   baseline serum cr 1.1mg/dl with an e-GFR=48ml/min'  Clinical Indicators: sCr/GFR during admission: 1.6; Historical   documentation sCr/GFR:  1.7, 1.6, 1.3 & 1.5  Treatment: IVF, I&O, labs and monitoring    Thank you,  Tigist Wilson RN BSN CDS  Options provided:  -- CKD Stage 3a GFR 45-59  -- CKD Stage 3b GFR 30-44  -- REN on CKD Stage 3a GFR 45-59  -- REN on CKD Stage 3b GFR 30-44  -- Other - I will add my own diagnosis  -- Disagree - Not applicable / Not valid  -- Disagree - Clinically unable to determine / Unknown  -- Refer to Clinical Documentation Reviewer    PROVIDER RESPONSE TEXT:    This patient has CKD Stage 3a with REN ruled out. .    Query created by: Camila Arora on 12/15/2021 2:31 PM      Electronically signed by:  Reynaldo Marlow 12/15/2021 2:43 PM

## 2021-12-15 NOTE — PROGRESS NOTES
12/15/2021 11:41 AM  Service: Urology  Group: [facility] (Rinku/Wendy/Deepak)    Flossie Brittle  [id number]    Subjective:    She is awake and alert  Bar is draining yellow urine  No family present   No fevers       Review of Systems  Constitutional: No fever or chills   Respiratory: negative for cough and hemoptysis  Cardiovascular: negative for chest pain and dyspnea  Gastrointestinal: negative for abdominal pain, diarrhea, nausea and vomiting   : See above  Derm: negative for rash and skin lesion(s)  Neurological: negative for seizures and tremors  Musculoskeletal: Negative    Psychiatric: Negative   All other reviews are negative      Scheduled Meds:   amLODIPine  2.5 mg Oral Daily    lactulose  20 g Oral TID    pantoprazole  40 mg Oral Daily    rifaximin  550 mg Oral BID    sodium chloride flush  5-40 mL IntraVENous 2 times per day    piperacillin-tazobactam  3,375 mg IntraVENous Q8H    Arformoterol Tartrate  15 mcg Nebulization BID    budesonide  0.5 mg Nebulization BID       Objective:  Vitals:    12/15/21 0915   BP: 110/67   Pulse: 103   Resp: 16   Temp: 97.9 °F (36.6 °C)   SpO2: 96%         Allergies: Lisinopril    General Appearance: alert and oriented to person, place and time and in no acute distress  Skin: no rash or erythema  Head: normocephalic and atraumatic  Pulmonary/Chest: normal air movement, no respiratory distress  Abdomen: soft, non-tender, non-distended  Genitourinary: bar draining yellow urine   Extremities: no cyanosis, clubbing or edema         Labs:     Recent Labs     12/13/21  2150   *   K 4.8   CL 99   CO2 20*   BUN 20   CREATININE 1.6*   GLUCOSE 104*   CALCIUM 10.1       Lab Results   Component Value Date    HGB 9.2 12/15/2021    HCT 28.1 12/15/2021       No results found for: PSA      Assessment/Plan:  Right Proximal Ureteral Calculi S/P right ureteral stent placement on 11/27/21  Right Hydronephrosis   Bilateral Nonobstructing Renal Calculi   UTI     Cont to watch the creatinine  CTAP shows stent in good position   Cont the antibiotics per primary   Cont the bar catheter  Voiding trial when fully ambulatory and medically stable   Cont the right ureteral stent   She will require ureteroscopy with laser lithotripsy  once she is off of anticoagulation and medically stable as an outpatient  There are no further acute  interventions planned at this time  Please call with additional questions or concerns     Stevenson Other, APRN - CNP   [facility]  Urology

## 2021-12-15 NOTE — PROGRESS NOTES
Hospitalist Progress Note      SYNOPSIS: Patient admitted on 2021 for Pyelonephritis      SUBJECTIVE:    Patient seen and examined at the bedside. She still report pain in her right side but she stated that is better than yesterday. She denies any chest pain or shortness of breath  Records reviewed. Stable overnight. No other overnight issues reported. Temp (24hrs), Av.3 °F (36.8 °C), Min:97.9 °F (36.6 °C), Max:98.7 °F (37.1 °C)    DIET: ADULT DIET; Regular  CODE: Full Code    Intake/Output Summary (Last 24 hours) at 12/15/2021 1717  Last data filed at 12/15/2021 1538  Gross per 24 hour   Intake 348 ml   Output --   Net 348 ml       OBJECTIVE:    /67   Pulse 103   Temp 97.9 °F (36.6 °C) (Temporal)   Resp 16   Ht 5' 2.5\" (1.588 m)   Wt 188 lb (85.3 kg)   SpO2 96%   BMI 33.84 kg/m²     General appearance: No apparent distress, appears stated age and cooperative. HEENT:  Conjunctivae/corneas clear. Neck: Supple. No jugular venous distention. Respiratory: Clear to auscultation bilaterally, normal respiratory effort  Cardiovascular: Regular rate rhythm, normal S1-S2  Abdomen: Soft, nontender, nondistended  Musculoskeletal: No clubbing, cyanosis, no bilateral lower extremity edema. Brisk capillary refill.    Skin:  No rashes  on visible skin  Neurologic: awake, alert and following commands     Assessment and plan  Right Proximal Ureteral Calculi S/P right ureteral stent placement on 21  Right Hydronephrosis   Bilateral Nonobstructing Renal Calculi   -Cont to watch the creatinine  -Per urology CTAP shows stent in good position   Cont the antibiotics per primary   Cont the bar catheter  Voiding trial when fully ambulatory and medically stable   Cont the right ureteral stent   She will require ureteroscopy with laser lithotripsy  once she is off of anticoagulation and urology not planning any intervention at this point  Resume IV antibiotic    #History of liver cirrhosis  -Diuretics on hold due to acute kidney injury  -Resume lactulose and Xifaxan     #Pulmonary embolism  Diagnosed on 11/3/2021  On Eliquis for 3 months-patient has not taken in 1 week due to hematuria  -Resume Eliquis since urology not planning any procedure    #Hypertension  -Resume Norvasc    #REN  IV fluids  Avoid nephrotoxins  Monitor    #Obesity BMI 33         DISPOSITION:     Medications:  REVIEWED DAILY    Infusion Medications    sodium chloride 75 mL/hr at 12/15/21 0808    sodium chloride      heparin (PORCINE) Infusion 10.895 Units/kg/hr (12/15/21 1538)     Scheduled Medications    amLODIPine  2.5 mg Oral Daily    lactulose  20 g Oral TID    pantoprazole  40 mg Oral Daily    rifaximin  550 mg Oral BID    sodium chloride flush  5-40 mL IntraVENous 2 times per day    piperacillin-tazobactam  3,375 mg IntraVENous Q8H    Arformoterol Tartrate  15 mcg Nebulization BID    budesonide  0.5 mg Nebulization BID     PRN Meds: sodium chloride flush, sodium chloride, ondansetron **OR** ondansetron, acetaminophen **OR** acetaminophen, magnesium hydroxide, heparin (porcine), heparin (porcine)    Labs:     Recent Labs     12/13/21  2150 12/14/21  0515 12/15/21  0538   WBC 15.5* 11.3 9.9   HGB 11.7 9.9* 9.2*   HCT 34.9 30.2* 28.1*    176 166       Recent Labs     12/13/21  2150   *   K 4.8   CL 99   CO2 20*   BUN 20   CREATININE 1.6*   CALCIUM 10.1       Recent Labs     12/13/21  2150   PROT 7.1   ALKPHOS 132*   ALT 10   AST 41*   BILITOT 1.5*   LIPASE 26       Recent Labs     12/13/21  2150   INR 1.5       No results for input(s): Rhae Childes in the last 72 hours.     Chronic labs:    Lab Results   Component Value Date    CHOL 94 02/27/2021    TRIG 65 02/27/2021    HDL 37 02/27/2021    LDLCALC 44 02/27/2021    TSH 2.480 11/25/2021    INR 1.5 12/13/2021    LABA1C 5.3 10/19/2021       Radiology: REVIEWED DAILY    +++++++++++++++++++++++++++++++++++++++++++++++++  George Clark MD  [employer] - [address] [facility], [place]  +++++++++++++++++++++++++++++++++++++++++++++++++  NOTE: This report was transcribed using voice recognition software. Every effort was made to ensure accuracy; however, inadvertent computerized transcription errors may be present.

## 2021-12-16 LAB
ALBUMIN SERPL-MCNC: 2.3 G/DL (ref 3.5–5.2)
ALP BLD-CCNC: 101 U/L (ref 35–104)
ALT SERPL-CCNC: 11 U/L (ref 0–32)
ANION GAP SERPL CALCULATED.3IONS-SCNC: 9 MMOL/L (ref 7–16)
ANISOCYTOSIS: ABNORMAL
APTT: 31.9 SEC (ref 24.5–35.1)
APTT: 34.8 SEC (ref 24.5–35.1)
AST SERPL-CCNC: 26 U/L (ref 0–31)
BASOPHILS ABSOLUTE: 0.05 E9/L (ref 0–0.2)
BASOPHILS RELATIVE PERCENT: 0.7 % (ref 0–2)
BILIRUB SERPL-MCNC: 0.8 MG/DL (ref 0–1.2)
BUN BLDV-MCNC: 15 MG/DL (ref 6–23)
BURR CELLS: ABNORMAL
CALCIUM SERPL-MCNC: 9.6 MG/DL (ref 8.6–10.2)
CHLORIDE BLD-SCNC: 110 MMOL/L (ref 98–107)
CO2: 17 MMOL/L (ref 22–29)
CREAT SERPL-MCNC: 1.3 MG/DL (ref 0.5–1)
EOSINOPHILS ABSOLUTE: 0.23 E9/L (ref 0.05–0.5)
EOSINOPHILS RELATIVE PERCENT: 3.4 % (ref 0–6)
GFR AFRICAN AMERICAN: 48
GFR NON-AFRICAN AMERICAN: 40 ML/MIN/1.73
GLUCOSE BLD-MCNC: 100 MG/DL (ref 74–99)
HCT VFR BLD CALC: 32.9 % (ref 34–48)
HEMOGLOBIN: 10.2 G/DL (ref 11.5–15.5)
IMMATURE GRANULOCYTES #: 0.03 E9/L
IMMATURE GRANULOCYTES %: 0.4 % (ref 0–5)
INR BLD: 1.7
LYMPHOCYTES ABSOLUTE: 0.96 E9/L (ref 1.5–4)
LYMPHOCYTES RELATIVE PERCENT: 14.1 % (ref 20–42)
MAGNESIUM: 2.3 MG/DL (ref 1.6–2.6)
MCH RBC QN AUTO: 32.1 PG (ref 26–35)
MCHC RBC AUTO-ENTMCNC: 31 % (ref 32–34.5)
MCV RBC AUTO: 103.5 FL (ref 80–99.9)
MONOCYTES ABSOLUTE: 0.84 E9/L (ref 0.1–0.95)
MONOCYTES RELATIVE PERCENT: 12.3 % (ref 2–12)
NEUTROPHILS ABSOLUTE: 4.7 E9/L (ref 1.8–7.3)
NEUTROPHILS RELATIVE PERCENT: 69.1 % (ref 43–80)
OVALOCYTES: ABNORMAL
PDW BLD-RTO: 20.1 FL (ref 11.5–15)
PLATELET # BLD: 194 E9/L (ref 130–450)
PMV BLD AUTO: 10.3 FL (ref 7–12)
POIKILOCYTES: ABNORMAL
POLYCHROMASIA: ABNORMAL
POTASSIUM SERPL-SCNC: 4.6 MMOL/L (ref 3.5–5)
PROTHROMBIN TIME: 18.8 SEC (ref 9.3–12.4)
RBC # BLD: 3.18 E12/L (ref 3.5–5.5)
SODIUM BLD-SCNC: 136 MMOL/L (ref 132–146)
TOTAL PROTEIN: 6.2 G/DL (ref 6.4–8.3)
WBC # BLD: 6.8 E9/L (ref 4.5–11.5)

## 2021-12-16 PROCEDURE — 94640 AIRWAY INHALATION TREATMENT: CPT

## 2021-12-16 PROCEDURE — 80053 COMPREHEN METABOLIC PANEL: CPT

## 2021-12-16 PROCEDURE — 6360000002 HC RX W HCPCS: Performed by: INTERNAL MEDICINE

## 2021-12-16 PROCEDURE — 85610 PROTHROMBIN TIME: CPT

## 2021-12-16 PROCEDURE — 36415 COLL VENOUS BLD VENIPUNCTURE: CPT

## 2021-12-16 PROCEDURE — 6370000000 HC RX 637 (ALT 250 FOR IP): Performed by: INTERNAL MEDICINE

## 2021-12-16 PROCEDURE — 85025 COMPLETE CBC W/AUTO DIFF WBC: CPT

## 2021-12-16 PROCEDURE — 2580000003 HC RX 258: Performed by: INTERNAL MEDICINE

## 2021-12-16 PROCEDURE — 85730 THROMBOPLASTIN TIME PARTIAL: CPT

## 2021-12-16 PROCEDURE — 83735 ASSAY OF MAGNESIUM: CPT

## 2021-12-16 PROCEDURE — 1200000000 HC SEMI PRIVATE

## 2021-12-16 RX ADMIN — BUDESONIDE 500 MCG: 0.5 SUSPENSION RESPIRATORY (INHALATION) at 11:41

## 2021-12-16 RX ADMIN — CEFEPIME HYDROCHLORIDE 2000 MG: 2 INJECTION, POWDER, FOR SOLUTION INTRAVENOUS at 15:03

## 2021-12-16 RX ADMIN — LACTULOSE 20 G: 20 SOLUTION ORAL at 20:37

## 2021-12-16 RX ADMIN — Medication 10 ML: at 20:37

## 2021-12-16 RX ADMIN — AMLODIPINE BESYLATE 2.5 MG: 2.5 TABLET ORAL at 10:54

## 2021-12-16 RX ADMIN — RIFAXIMIN 550 MG: 550 TABLET ORAL at 10:54

## 2021-12-16 RX ADMIN — SODIUM CHLORIDE, PRESERVATIVE FREE 10 ML: 5 INJECTION INTRAVENOUS at 15:04

## 2021-12-16 RX ADMIN — APIXABAN 5 MG: 5 TABLET, FILM COATED ORAL at 10:54

## 2021-12-16 RX ADMIN — APIXABAN 5 MG: 5 TABLET, FILM COATED ORAL at 20:37

## 2021-12-16 RX ADMIN — Medication 10 ML: at 10:54

## 2021-12-16 RX ADMIN — RIFAXIMIN 550 MG: 550 TABLET ORAL at 20:37

## 2021-12-16 RX ADMIN — BUDESONIDE 500 MCG: 0.5 SUSPENSION RESPIRATORY (INHALATION) at 21:39

## 2021-12-16 RX ADMIN — ARFORMOTEROL TARTRATE 15 MCG: 15 SOLUTION RESPIRATORY (INHALATION) at 21:39

## 2021-12-16 RX ADMIN — ACETAMINOPHEN 650 MG: 325 TABLET ORAL at 20:37

## 2021-12-16 RX ADMIN — PANTOPRAZOLE SODIUM 40 MG: 40 TABLET, DELAYED RELEASE ORAL at 10:54

## 2021-12-16 RX ADMIN — ARFORMOTEROL TARTRATE 15 MCG: 15 SOLUTION RESPIRATORY (INHALATION) at 11:40

## 2021-12-16 RX ADMIN — LACTULOSE 20 G: 20 SOLUTION ORAL at 10:54

## 2021-12-16 RX ADMIN — PIPERACILLIN AND TAZOBACTAM 3375 MG: 3; .375 INJECTION, POWDER, LYOPHILIZED, FOR SOLUTION INTRAVENOUS at 04:08

## 2021-12-16 ASSESSMENT — PAIN DESCRIPTION - PAIN TYPE: TYPE: ACUTE PAIN

## 2021-12-16 ASSESSMENT — PAIN DESCRIPTION - DESCRIPTORS: DESCRIPTORS: ACHING

## 2021-12-16 ASSESSMENT — PAIN DESCRIPTION - FREQUENCY: FREQUENCY: INTERMITTENT

## 2021-12-16 ASSESSMENT — PAIN SCALES - GENERAL
PAINLEVEL_OUTOF10: 0
PAINLEVEL_OUTOF10: 9

## 2021-12-16 ASSESSMENT — PAIN DESCRIPTION - LOCATION: LOCATION: ABDOMEN

## 2021-12-16 NOTE — PROGRESS NOTES
Hospitalist Progress Note      SYNOPSIS: Patient admitted on 2021 for Pyelonephritis      SUBJECTIVE:    Patient seen and examined at the bedside. She still report pain in her right side but she stated that is better than yesterday. She denies any chest pain or shortness of breath  Records reviewed. Stable overnight. No other overnight issues reported. Temp (24hrs), Av.3 °F (36.8 °C), Min:98.2 °F (36.8 °C), Max:98.4 °F (36.9 °C)    DIET: ADULT DIET; Regular  CODE: Full Code    Intake/Output Summary (Last 24 hours) at 2021 1758  Last data filed at 2021 0552  Gross per 24 hour   Intake 530 ml   Output 1100 ml   Net -570 ml       OBJECTIVE:    /69   Pulse 88   Temp 98.2 °F (36.8 °C) (Oral)   Resp 18   Ht 5' 2.5\" (1.588 m)   Wt 188 lb (85.3 kg)   SpO2 98%   BMI 33.84 kg/m²     General appearance: No apparent distress, appears stated age and cooperative. HEENT:  Conjunctivae/corneas clear. Neck: Supple. No jugular venous distention. Respiratory: Clear to auscultation bilaterally, normal respiratory effort  Cardiovascular: Regular rate rhythm, normal S1-S2  Abdomen: Soft, nontender, nondistended  Musculoskeletal: No clubbing, cyanosis, no bilateral lower extremity edema. Brisk capillary refill.    Skin:  No rashes  on visible skin  Neurologic: awake, alert and following commands     Assessment and plan  Right Proximal Ureteral Calculi S/P right ureteral stent placement on 21  Right Hydronephrosis   Bilateral Nonobstructing Renal Calculi   -Cont to watch the creatinine  -Per urology CTAP shows stent in good position   Cont the antibiotics per primary   Cont the bar catheter  Voiding trial when fully ambulatory and medically stable   Cont the right ureteral stent   She will require ureteroscopy with laser lithotripsy  once she is off of anticoagulation and urology not planning any intervention at this point  Infectious disease service was consulted for antibiotic management. ID recommended to hold off antibiotics and check CRP and sed rate    #History of liver cirrhosis   -Diuretics on hold due to acute kidney injury  -Resume lactulose and Xifaxan     #Pulmonary embolism  Diagnosed on 11/3/2021  On Eliquis for 3 months-patient has not taken in 1 week due to hematuria  -Resume Eliquis since urology not planning any procedure    #Hypertension  -Resume Norvasc    #REN  -Improving with IV fluids  -Avoid nephrotoxins  -Monitor    #Obesity BMI 33         DISPOSITION:     Medications:  REVIEWED DAILY    Infusion Medications    sodium chloride 75 mL/hr at 12/15/21 0808    sodium chloride       Scheduled Medications    sodium chloride   IntraVENous Q12H    apixaban  5 mg Oral BID    amLODIPine  2.5 mg Oral Daily    lactulose  20 g Oral TID    pantoprazole  40 mg Oral Daily    rifaximin  550 mg Oral BID    sodium chloride flush  5-40 mL IntraVENous 2 times per day    Arformoterol Tartrate  15 mcg Nebulization BID    budesonide  0.5 mg Nebulization BID     PRN Meds: sodium chloride flush, sodium chloride, ondansetron **OR** ondansetron, acetaminophen **OR** acetaminophen, magnesium hydroxide    Labs:     Recent Labs     12/14/21  0515 12/15/21  0538 12/16/21  0618   WBC 11.3 9.9 6.8   HGB 9.9* 9.2* 10.2*   HCT 30.2* 28.1* 32.9*    166 194       Recent Labs     12/13/21 2150 12/15/21  1751 12/16/21  0909   * 131* 136   K 4.8 5.0 4.6   CL 99 105 110*   CO2 20* 15* 17*   BUN 20 19 15   CREATININE 1.6* 1.7* 1.3*   CALCIUM 10.1 9.6 9.6       Recent Labs     12/13/21  2150 12/15/21  1751 12/16/21  0909   PROT 7.1 6.6 6.2*   ALKPHOS 132* 126* 101   ALT 10 14 11   AST 41* 48* 26   BILITOT 1.5* 0.8 0.8   LIPASE 26  --   --        Recent Labs     12/13/21  2150 12/16/21  0618   INR 1.5 1.7       No results for input(s): Kalie Melo in the last 72 hours.     Chronic labs:    Lab Results   Component Value Date    CHOL 94 02/27/2021    TRIG 65 02/27/2021    HDL 37 02/27/2021    LDLCALC 44 02/27/2021    TSH 2.480 11/25/2021    INR 1.7 12/16/2021    LABA1C 5.3 10/19/2021       Radiology: REVIEWED DAILY    +++++++++++++++++++++++++++++++++++++++++++++++++  Bentley Gallardo MD  [facility] Physician - [address] [place] [address]  +++++++++++++++++++++++++++++++++++++++++++++++++  NOTE: This report was transcribed using voice recognition software. Every effort was made to ensure accuracy; however, inadvertent computerized transcription errors may be present.

## 2021-12-16 NOTE — CONSULTS
[address] [place] [address] [facility]    Consultation Note     Admit Date: 12/13/2021  8:22 PM    Reason for Consult:      Attending Physician:  Mian Beckham MD     Chief Complaint: Back pain right flank history of renal lithiasis    HISTORY OF PRESENT ILLNESS:   The patient is a 68 y.o.  woman not known to the infectious disease service. She presents with who presented with complaints of right flank pain headache generalized malaise. On November 30 she was admitted with acute kidney injury hydronephrosis and a right-sided 6 mm proximal obstructing calculus. She underwent right-sided stent insertion. Patient started to have hematuria about a week ago as part of her complications of being on Eliquis. Her 12/14/2021 urine analysis shows  hematuria. Her current urine culture from 12/13/2021 growing mixed fidelina including what appears to be Enterococcus staph and corynebacterium. 11/26/2021 urine culture was - 11/24/2021 urine culture had mixed fidelina. Patient apparently was discharged with the Engel because of hematuria. Some of her confounding conditions include cirrhosis hypertension depression idiopathic pancreatitis and had a pleural effusion which was drained on 10/31/2021. There is no hydronephrosis. She has stones on both kidneys. Currently Engel is in place and she has gross hematuria. Her white count was 15 on 12/13/2020 now at 6.8 her hemoglobin did drop from 1172 920. Her BUN and creatinine is 15 and 1.3. Inspection of current cultures of mixed fidelina related symptoms include nonhemolytic strep chronic back. Staph species.               Past Medical History:        Diagnosis Date    Breast cancer ([address]     Cirrhosis ([address]     Essential hypertension     Hx of blood clots     Hyperlipidemia     Osteopenia     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 ([address]     Sleep apnea     Spinal stenosis     Type 2 diabetes mellitus ([address]      Past Surgical History:        Procedure Laterality Date    BLADDER SURGERY Right 11/26/2021    CYSTOSCOPY RETROGRADE PYELOGRAM RIGHT  STENT INSERTION performed by Danette Worthy MD at [address] LUMPECTOMY      lumpectomy [address] TEST      Lexiscan stress test    CARPAL TUNNEL RELEASE      COLONOSCOPY  01/31/2017    multiple polyps; diverticula--jerod    COLONOSCOPY  04/23/2019    polyps; diverticula; hemorrhoids--jerod    COLONOSCOPY N/A 04/23/2019    COLONOSCOPY POLYPECTOMY SNARE/COLD BIOPSY performed by Rach Avila MD at [facility] 9  04/23/2019    COLONOSCOPY WITH BIOPSY performed by Rach Avila MD at [place] [address] LITHOTRIPSY Left 05/04/2016    C-R STENT PLACEMENT    SHOULDER ARTHROPLASTY Left 12/21/2020    LEFT REVERSE TOTAL SHOULDER  ARTHROPLASTY -- DEPUY performed by Laquita Foss MD at [address]  04/23/2019    gastritis--jerod    UPPER GASTROINTESTINAL ENDOSCOPY N/A 04/23/2019    EGD BIOPSY performed by Rach Avila MD at [address] [place] [address]     Current Medications:   Scheduled Meds:   sodium chloride   IntraVENous Q12H    cefepime  2,000 mg IntraVENous Q12H    apixaban  5 mg Oral BID    amLODIPine  2.5 mg Oral Daily    lactulose  20 g Oral TID    pantoprazole  40 mg Oral Daily    rifaximin  550 mg Oral BID    sodium chloride flush  5-40 mL IntraVENous 2 times per day    Arformoterol Tartrate  15 mcg Nebulization BID    budesonide  0.5 mg Nebulization BID     Continuous Infusions:   sodium chloride 75 mL/hr at 12/15/21 0808    sodium chloride       PRN Meds:sodium chloride flush, sodium chloride, ondansetron **OR** ondansetron, acetaminophen **OR** acetaminophen, magnesium hydroxide    Allergies:  Lisinopril    Social History:   Social History     Socioeconomic History    Marital status:      Spouse name: None    Number of children: None    Years of education: None    Highest education level: None   Occupational History    None   Tobacco Use    Smoking status: Never Smoker    Smokeless tobacco: Never Used   Vaping Use    Vaping Use: Never used   Substance and Sexual Activity    Alcohol use: Never     Alcohol/week: 0.0 standard drinks    Drug use: Never    Sexual activity: Never     Partners: Male     Comment: last in 2013   Other Topics Concern    None   Social History Narrative    Denies caffeine. Social Determinants of Health     Financial Resource Strain: High Risk    Difficulty of Paying Living Expenses: Hard   Food Insecurity: No Food Insecurity    Worried About Running Out of Food in the Last Year: Never true    Lj of Food in the Last Year: Never true   Transportation Needs:     Lack of Transportation (Medical): Not on file    Lack of Transportation (Non-Medical): Not on file   Physical Activity:     Days of Exercise per Week: Not on file    Minutes of Exercise per Session: Not on file   Stress:     Feeling of Stress : Not on file   Social Connections:     Frequency of Communication with Friends and Family: Not on file    Frequency of Social Gatherings with Friends and Family: Not on file    Attends [employer] Services: Not on file    Active Member of [address] [place] [address] or Organizations: Not on file    Attends Club or Organization Meetings: Not on file    Marital Status: Not on file   Intimate Partner Violence:     Fear of Current or Ex-Partner: Not on file    Emotionally Abused: Not on file    Physically Abused: Not on file    Sexually Abused: Not on file   Housing Stability:     Unable to Pay for Housing in the Last Year: Not on file    Number of [place] in the Last Year: Not on file    Unstable Housing in the Last Year: Not on file       Family History:       Problem Relation Age of Onset    Arthritis Mother     COPD Father     Heart Attack Father     Cancer Other 50        colon   . Otherwise non-pertinent to the chief complaint.     REVIEW OF SYSTEMS: CONSTITUTIONAL:  No chills, fevers or night sweats. No loss of weight. EYES:  No double vision or drainage from eyes, ears or throat. HEENT:  No neck stiffness. No dysphagia. No drainage from eyes, ears or throat  RESPIRATORY:  No cough, productive sputum or hemoptysis. CARDIOVASCULAR:  No chest pain, palpitations, orthopnea or dyspnea on exertion. GASTROINTESTINAL:  No nausea, vomiting, diarrhea or constipation or hematochezia   GENITOURINARY: Engel in place with hematuria  INTEGUMENT/BREAST:  No rash or breast masses. HEMATOLOGIC/LYMPHATIC:  No lymphadenopathy or blood dyscrasics. ALLERGIC/IMMUNOLOGIC:  No anaphylaxis. ENDOCRINE:  No polyuria or polydipsia or temperature intolerance. MUSCULOSKELETAL:  No myalgia or arthralgia. Full ROM. NEUROLOGICAL:  No focal motor sensory deficit. BEHAVIOR/PSYCH:  No psychosis. PHYSICAL EXAM:    Vitals:    /69   Pulse 88   Temp 98.2 °F (36.8 °C) (Oral)   Resp 18   Ht 5' 2.5\" (1.588 m)   Wt 188 lb (85.3 kg)   SpO2 98%   BMI 33.84 kg/m²   Constitutional: The patient is awake, alert, and oriented. Skin: Warm and dry. No rashes were noted. No jaundice. HEENT: Eyes show round, and reactive pupils. Moist mucous membranes, no ulcerations, no thrush. Neck: Supple to movements. No lymphadenopathy. Chest: No use of accessory muscles to breathe. Symmetrical expansion. Auscultation reveals no wheezing, crackles, or rhonchi. Cardiovascular: S1 and S2 are rhythmic and regular. No murmurs appreciated. Abdomen: Positive bowel sounds to auscultation. Benign to palpation. No masses felt. No hepatosplenomegaly. Ascites  Genitourinary: Right-sided flank pain. Engel in place  Extremities: No clubbing, no cyanosis, no edema.   Musculoskeletal: Equal and symmetrical  Neurological: No focal  Lines: peripheral      CBC+dif:  Recent Labs     12/14/21  0515 12/14/21  0515 12/15/21  0538 12/15/21  0538 12/16/21  0618   WBC 11.3  --  9.9  --  6.8   HGB 9.9*   < > 9.2*   < > 10.2*   HCT 30.2*   < > 28.1*   < > 32.9*   MCV 99.3   < > 102.9*   < > 103.5*      < > 166   < > 194   NEUTROABS  --   --   --   --  4.70    < > = values in this interval not displayed. No results found for: CRP  No results found for: CRP  Lab Results   Component Value Date    SEDRATE 16 01/21/2016     Lab Results   Component Value Date    ALT 11 12/16/2021    AST 26 12/16/2021    ALKPHOS 101 12/16/2021    BILITOT 0.8 12/16/2021     Lab Results   Component Value Date     12/16/2021    K 4.6 12/16/2021    K 4.8 12/13/2021     12/16/2021    CO2 17 12/16/2021    BUN 15 12/16/2021    CREATININE 1.3 12/16/2021    GFRAA 48 12/16/2021    LABGLOM 40 12/16/2021    GLUCOSE 100 12/16/2021    PROT 6.2 12/16/2021    LABALBU 2.3 12/16/2021    CALCIUM 9.6 12/16/2021    BILITOT 0.8 12/16/2021    ALKPHOS 101 12/16/2021    AST 26 12/16/2021    ALT 11 12/16/2021       Lab Results   Component Value Date    PROTIME 18.8 12/16/2021    INR 1.7 12/16/2021       Lab Results   Component Value Date    TSH 2.480 11/25/2021       Lab Results   Component Value Date    NITRITE Neg 10/29/2018    COLORU Yellow 12/14/2021    PHUR 5.5 12/14/2021    WBCUA PACKED 12/14/2021    RBCUA >20 12/14/2021    YEAST MODERATE 08/22/2017    BACTERIA MANY 12/14/2021    CLARITYU CLOUDY 12/14/2021    SPECGRAV 1.020 12/14/2021    LEUKOCYTESUR MODERATE 12/14/2021    UROBILINOGEN 1.0 12/14/2021    BILIRUBINUR Negative 12/14/2021    BILIRUBINUR Neg 10/29/2018    BLOODU LARGE 12/14/2021    GLUCOSEU Negative 12/14/2021       No results found for: IDG1QPX, BEART, G3BGPVLT, PHART, THGBART, AJX7FXC, PO2ART, YYI9STQ  Radiology:  CT ABDOMEN PELVIS WO CONTRAST Additional Contrast? None   Final Result   No acute specific abdominopelvic process is identified. Bilateral nephrolithiasis. No obstructive uropathy.       No noncontrast CT evidence of ascending infection/pyelonephritis, with the   caveat that sensitivity for early changes of pyelonephritis or relatively low   on noncontrast imaging. The right ureteral stent remains appropriately positioned. Iatrogenic gas is seen spanning from the urinary bladder within the urinary   bladder, right ureter and right intrarenal collecting system, in the context   of a Engel catheter. Hepatic cirrhosis, with spleno renal and paraesophageal varices, as well as   associated small to moderate volume ascites. RECOMMENDATIONS:   Unavailable         XR CHEST 1 VIEW   Final Result   No acute process. Microbiology:  Pending  Recent Labs     12/13/21  2200   BC 24 Hours no growth     No results for input(s): ORG in the last 72 hours. Recent Labs     12/13/21  2200   BLOODCULT2 24 Hours no growth     No results for input(s): STREPNEUMAGU in the last 72 hours. No results for input(s): LP1UAG in the last 72 hours. No results for input(s): ASO in the last 72 hours. No results for input(s): CULTRESP in the last 72 hours. Assessment:  · Doubtful she has a urinary tract infection; although cultures previously indicated        The organisms on the Gram stain would not be addressed by cefepime indicated to be with probably not a urinary tract infection. · Leukocytosis possibly due to regulatory    Plan:    · Stop cefepime  · Check cultures  · Baseline ESR, CRP  · Monitor labs  · Will follow with you    Thank you for having us see this patient in consultation. I will be discussing this case with the treating physicians.       Electronically signed by Dorcas Brown MD on 12/16/2021 at 3:16 PM

## 2021-12-17 VITALS
DIASTOLIC BLOOD PRESSURE: 69 MMHG | WEIGHT: 188 LBS | HEIGHT: 63 IN | RESPIRATION RATE: 18 BRPM | SYSTOLIC BLOOD PRESSURE: 127 MMHG | BODY MASS INDEX: 33.31 KG/M2 | TEMPERATURE: 97.9 F | HEART RATE: 88 BPM | OXYGEN SATURATION: 98 %

## 2021-12-17 LAB
ACANTHOCYTES: ABNORMAL
ALBUMIN SERPL-MCNC: 2 G/DL (ref 3.5–5.2)
ALP BLD-CCNC: 97 U/L (ref 35–104)
ALT SERPL-CCNC: 12 U/L (ref 0–32)
ANION GAP SERPL CALCULATED.3IONS-SCNC: 9 MMOL/L (ref 7–16)
ANISOCYTOSIS: ABNORMAL
AST SERPL-CCNC: 32 U/L (ref 0–31)
BASOPHILS ABSOLUTE: 0 E9/L (ref 0–0.2)
BASOPHILS RELATIVE PERCENT: 0.8 % (ref 0–2)
BILIRUB SERPL-MCNC: 0.5 MG/DL (ref 0–1.2)
BUN BLDV-MCNC: 14 MG/DL (ref 6–23)
CALCIUM SERPL-MCNC: 8.9 MG/DL (ref 8.6–10.2)
CHLORIDE BLD-SCNC: 113 MMOL/L (ref 98–107)
CO2: 17 MMOL/L (ref 22–29)
CREAT SERPL-MCNC: 1.4 MG/DL (ref 0.5–1)
EOSINOPHILS ABSOLUTE: 0.38 E9/L (ref 0.05–0.5)
EOSINOPHILS RELATIVE PERCENT: 4.3 % (ref 0–6)
GFR AFRICAN AMERICAN: 44
GFR NON-AFRICAN AMERICAN: 37 ML/MIN/1.73
GLUCOSE BLD-MCNC: 89 MG/DL (ref 74–99)
HCT VFR BLD CALC: 32.7 % (ref 34–48)
HEMOGLOBIN: 9.9 G/DL (ref 11.5–15.5)
HYPOCHROMIA: ABNORMAL
INR BLD: 1.6
LYMPHOCYTES ABSOLUTE: 1.32 E9/L (ref 1.5–4)
LYMPHOCYTES RELATIVE PERCENT: 14.8 % (ref 20–42)
MAGNESIUM: 2.2 MG/DL (ref 1.6–2.6)
MCH RBC QN AUTO: 32.8 PG (ref 26–35)
MCHC RBC AUTO-ENTMCNC: 30.3 % (ref 32–34.5)
MCV RBC AUTO: 108.3 FL (ref 80–99.9)
METAMYELOCYTES RELATIVE PERCENT: 0.9 % (ref 0–1)
MONOCYTES ABSOLUTE: 0.79 E9/L (ref 0.1–0.95)
MONOCYTES RELATIVE PERCENT: 8.7 % (ref 2–12)
NEUTROPHILS ABSOLUTE: 6.34 E9/L (ref 1.8–7.3)
NEUTROPHILS RELATIVE PERCENT: 71.3 % (ref 43–80)
OVALOCYTES: ABNORMAL
PDW BLD-RTO: 20.2 FL (ref 11.5–15)
PLATELET # BLD: 155 E9/L (ref 130–450)
PMV BLD AUTO: 10.8 FL (ref 7–12)
POIKILOCYTES: ABNORMAL
POLYCHROMASIA: ABNORMAL
POTASSIUM SERPL-SCNC: 4.3 MMOL/L (ref 3.5–5)
PROTHROMBIN TIME: 17.1 SEC (ref 9.3–12.4)
RBC # BLD: 3.02 E12/L (ref 3.5–5.5)
SCHISTOCYTES: ABNORMAL
SODIUM BLD-SCNC: 139 MMOL/L (ref 132–146)
TARGET CELLS: ABNORMAL
TOTAL PROTEIN: 5.6 G/DL (ref 6.4–8.3)
WBC # BLD: 8.8 E9/L (ref 4.5–11.5)

## 2021-12-17 PROCEDURE — 2580000003 HC RX 258: Performed by: INTERNAL MEDICINE

## 2021-12-17 PROCEDURE — 85025 COMPLETE CBC W/AUTO DIFF WBC: CPT

## 2021-12-17 PROCEDURE — 6370000000 HC RX 637 (ALT 250 FOR IP): Performed by: INTERNAL MEDICINE

## 2021-12-17 PROCEDURE — 80053 COMPREHEN METABOLIC PANEL: CPT

## 2021-12-17 PROCEDURE — 6360000002 HC RX W HCPCS: Performed by: INTERNAL MEDICINE

## 2021-12-17 PROCEDURE — 94640 AIRWAY INHALATION TREATMENT: CPT

## 2021-12-17 PROCEDURE — 36415 COLL VENOUS BLD VENIPUNCTURE: CPT

## 2021-12-17 PROCEDURE — 85610 PROTHROMBIN TIME: CPT

## 2021-12-17 PROCEDURE — 83735 ASSAY OF MAGNESIUM: CPT

## 2021-12-17 RX ORDER — ALBUTEROL SULFATE 2.5 MG/3ML
2.5 SOLUTION RESPIRATORY (INHALATION) 4 TIMES DAILY PRN
Status: DISCONTINUED | OUTPATIENT
Start: 2021-12-17 | End: 2021-12-17 | Stop reason: HOSPADM

## 2021-12-17 RX ORDER — VENLAFAXINE HYDROCHLORIDE 75 MG/1
75 CAPSULE, EXTENDED RELEASE ORAL DAILY
Status: DISCONTINUED | OUTPATIENT
Start: 2021-12-17 | End: 2021-12-17 | Stop reason: HOSPADM

## 2021-12-17 RX ORDER — BUDESONIDE AND FORMOTEROL FUMARATE DIHYDRATE 160; 4.5 UG/1; UG/1
2 AEROSOL RESPIRATORY (INHALATION) 2 TIMES DAILY
Status: DISCONTINUED | OUTPATIENT
Start: 2021-12-17 | End: 2021-12-17 | Stop reason: CLARIF

## 2021-12-17 RX ADMIN — LACTULOSE 20 G: 20 SOLUTION ORAL at 14:51

## 2021-12-17 RX ADMIN — LACTULOSE 20 G: 20 SOLUTION ORAL at 09:18

## 2021-12-17 RX ADMIN — Medication 10 ML: at 09:18

## 2021-12-17 RX ADMIN — RIFAXIMIN 550 MG: 550 TABLET ORAL at 09:18

## 2021-12-17 RX ADMIN — ALBUTEROL SULFATE 2.5 MG: 2.5 SOLUTION RESPIRATORY (INHALATION) at 14:06

## 2021-12-17 RX ADMIN — AMLODIPINE BESYLATE 2.5 MG: 2.5 TABLET ORAL at 09:18

## 2021-12-17 RX ADMIN — PANTOPRAZOLE SODIUM 40 MG: 40 TABLET, DELAYED RELEASE ORAL at 09:18

## 2021-12-17 RX ADMIN — ONDANSETRON 4 MG: 2 INJECTION INTRAMUSCULAR; INTRAVENOUS at 10:23

## 2021-12-17 RX ADMIN — APIXABAN 5 MG: 5 TABLET, FILM COATED ORAL at 09:18

## 2021-12-17 RX ADMIN — SODIUM CHLORIDE, PRESERVATIVE FREE 10 ML: 5 INJECTION INTRAVENOUS at 10:23

## 2021-12-17 RX ADMIN — SODIUM CHLORIDE: 9 INJECTION, SOLUTION INTRAVENOUS at 07:09

## 2021-12-17 ASSESSMENT — PAIN SCALES - GENERAL: PAINLEVEL_OUTOF10: 0

## 2021-12-17 NOTE — PROGRESS NOTES
hematuria  Extremities: No clubbing, no cyanosis, no edema. Lines: peripheral    Laboratory and Tests Review:  Lab Results   Component Value Date    WBC 8.8 12/17/2021    WBC 6.8 12/16/2021    WBC 9.9 12/15/2021    HGB 9.9 (L) 12/17/2021    HCT 32.7 (L) 12/17/2021    .3 (H) 12/17/2021     12/17/2021     Lab Results   Component Value Date    NEUTROABS 6.34 12/17/2021    NEUTROABS 4.70 12/16/2021    NEUTROABS 11.98 (H) 12/13/2021     No results found for: CRPHS  Lab Results   Component Value Date    ALT 12 12/17/2021    AST 32 (H) 12/17/2021    ALKPHOS 97 12/17/2021    BILITOT 0.5 12/17/2021     Lab Results   Component Value Date     12/17/2021    K 4.3 12/17/2021    K 4.8 12/13/2021     12/17/2021    CO2 17 12/17/2021    BUN 14 12/17/2021    CREATININE 1.4 12/17/2021    CREATININE 1.3 12/16/2021    CREATININE 1.7 12/15/2021    GFRAA 44 12/17/2021    LABGLOM 37 12/17/2021    GLUCOSE 89 12/17/2021    PROT 5.6 12/17/2021    LABALBU 2.0 12/17/2021    CALCIUM 8.9 12/17/2021    BILITOT 0.5 12/17/2021    ALKPHOS 97 12/17/2021    AST 32 12/17/2021    ALT 12 12/17/2021     No results found for: CRP  Lab Results   Component Value Date    SEDRATE 16 01/21/2016     Radiology:      Microbiology:   Lab Results   Component Value Date    BC 24 Hours no growth 12/13/2021    BC 5 Days no growth 10/29/2021    BC 5 Days no growth 08/17/2021    ORG Escherichia coli 12/29/2020    ORG Escherichia coli 12/29/2020    ORG Candida albicans 10/29/2018     Lab Results   Component Value Date    BLOODCULT2 24 Hours no growth 12/13/2021    BLOODCULT2 5 Days no growth 10/29/2021    BLOODCULT2 5 Days no growth 08/17/2021    ORG Escherichia coli 12/29/2020    ORG Escherichia coli 12/29/2020    ORG Candida albicans 10/29/2018     No results found for: WNDABS  No results found for: RESPSMEAR      Component Value Date/Time    AFBCX  10/31/2021 1315     No growth after 1 week/s of incubation.   No growth after 2 week/s of incubation. No growth after 3 week/s of incubation. No growth after 4 week/s of incubation. No growth after 5 week/s of incubation. AFBCX No growth after 6 weeks of incubation. 02/02/2021 1500     No results found for: CULTRESP  No results found for: CXCATHTIP  Body Fluid Culture, Sterile   Date Value Ref Range Status   10/31/2021 Growth not present  Final     No results found for: CXSURG  Creatinine Ur POCT   Date Value Ref Range Status   12/13/2018 200  Final     Urine Culture, Routine   Date Value Ref Range Status   12/13/2021   Final    10 to 100,000 CFU/mL  Mixed fidelina isolated. Further workup and sensitivity testing  is not routinely indicated and will not be performed. Mixed fidelina isolated includes:  Mixed gram positive organisms  Nonhemolytic Strep species  Corynebacterium species  Staph species     11/26/2021 Growth not present  Final   11/24/2021   Final    10 to 100,000 CFU/mL  Mixed fidelina isolated. Further workup and sensitivity testing  is not routinely indicated and will not be performed. Mixed fidelina isolated includes:  Mixed gram negative rods  Mixed gram positive organisms       MRSA Culture Only   Date Value Ref Range Status   12/14/2020 Methicillin resistant Staph aureus not isolated  Final      Assessment:  · Doubtful she has a urinary tract infection; although cultures previously indicated        The organisms on the Gram stain would not be addressed by cefepime indicated to be with probably not a urinary tract infection. · Leukocytosis trending down     Plan:    · Monitor off antibiotic  · Check cultures  · Monitor labs      Pt seen and examined. Above discussed agree with advanced practice nurse. Labs, cultures, and radiographs reviewed. Face to Face encounter occurred. Changes made as necessary.      Kvng Amaya MD  Electronically signed by XENIA Pal CNP on 12/17/2021 at 9:22 AM

## 2021-12-17 NOTE — PROGRESS NOTES
Call placed to answering service for urology per Dr. Hill Shoulder request to confirm patient is okay for discharge and no plans for inpatient interventions at this time as patient is complaining of abdominal pain. Received call back from Natalie Diallo NP who was made aware of ABD pain and reports no additional plans for patient during stay OK for discharge from their POV.

## 2021-12-17 NOTE — PROGRESS NOTES
Perfect serve message sent to Dr. Latrice Tay for patient requesting home medication of effexor to be ordered. Patient also complaining of some wheezing this morning- requesting orders for home inhaler medications.

## 2021-12-17 NOTE — DISCHARGE SUMMARY
Hospitalist Discharge Summary    Patient ID: Christian Moraes   Patient : 1945  Patient's PCP: Kia Clarke MD    Admit Date: 2021   Admitting Physician: Clara Aldrich MD    Discharge Date:  2021  Discharge Physician: Rivera Reynolds MD   Discharge Condition: Stable  Discharge Disposition: Home with  [facility]    History of presenting illness:  he patient is a 68 y.o. female patient of Dr Junior Chan hx cirrhosis, DM2, KATE recently DC  after  R ureteral stent for ureteralithiasis/ hydronephrosis who presents with lank pain. Previous R hydro Secondary to 6 mm right proximal ureteral obstructing calculus sp stent . treated IV Rocephin  Urine culture from  showed mixed fidelina, urine culture from  showed no growth. Status post cystoscopy panendoscopy, retrograde pyelogram right-sided with right-sided stent insertion. Returns to [facility] ED w R Flank pain. No F/C. No exac relief. +assoc headache, generalized malaise.  She states she really has not felt particularly well for the past couple days. Cayla Juanjose also endorses a cough as well as intermittent shortness of breath.    Patient states she has not taken her Eliquis in 1 week as she noted bloody urine.    States pain is similar to previous kidney stone    Hospital course in brief:  (Please refer to daily progress notes for a comprehensive review of the hospitalization by requesting medical records)    Right Proximal Ureteral Calculi S/P right ureteral stent placement on 21  Right Hydronephrosis   Bilateral Nonobstructing Renal Calculi   -Cont to watch the creatinine  -Per urology CTAP shows stent in good position   Cont the antibiotics per primary   Cont the bar catheter  Voiding trial when fully ambulatory and medically stable   Cont the right ureteral stent   She will require ureteroscopy with laser lithotripsy  once she is off of anticoagulation and urology not planning any intervention at this point  Infectious disease service was consulted for antibiotic management.     ID recommended to hold off antibiotics     #History of liver cirrhosis   -Diuretics on hold due to acute kidney injury  -Resume lactulose and Xifaxan     #Pulmonary embolism  Diagnosed on 11/3/2021  On Eliquis for 3 months-patient has not taken in 1 week due to hematuria  -Resume Eliquis since urology not planning any procedure     #Hypertension  -Resume Norvasc     #REN  -Improving with IV fluids  -Avoid nephrotoxins  -Monitor     #Obesity BMI 33    Consults:   IP CONSULT TO INTERNAL MEDICINE  IP CONSULT TO INTERNAL MEDICINE  IP CONSULT TO SOCIAL WORK  IP CONSULT TO UROLOGY  IP CONSULT TO INFECTIOUS DISEASES  IP CONSULT TO HOME CARE NEEDS    Discharge Diagnoses:  Right Proximal Ureteral Calculi S/P right ureteral stent placement on 11/27/21  Right Hydronephrosis   Bilateral Nonobstructing Renal Calculi   #History of liver cirrhosis   #Pulmonary embolism  #Hypertension  #REN  #Obesity BMI 33        Discharge Instructions / Follow up:  PCP  Urology    Continued appropriate risk factor modification of blood pressure, diabetes and serum lipids will remain essential to reducing risk of future atherosclerotic development    Activity: activity as tolerated    Significant labs:  CBC:   Recent Labs     12/15/21  0538 12/16/21  0618 12/17/21  0501   WBC 9.9 6.8 8.8   RBC 2.73* 3.18* 3.02*   HGB 9.2* 10.2* 9.9*   HCT 28.1* 32.9* 32.7*   .9* 103.5* 108.3*   RDW 20.0* 20.1* 20.2*    194 155     BMP:   Recent Labs     12/15/21  1751 12/16/21  0909 12/17/21  0501   * 136 139   K 5.0 4.6 4.3    110* 113*   CO2 15* 17* 17*   BUN 19 15 14   CREATININE 1.7* 1.3* 1.4*   MG  --  2.3 2.2     LFT:  Recent Labs     12/15/21  1751 12/16/21  0909 12/17/21  0501   PROT 6.6 6.2* 5.6*   ALKPHOS 126* 101 97   ALT 14 11 12   AST 48* 26 32*   BILITOT 0.8 0.8 0.5     PT/INR:   Recent Labs     12/14/21  2340 12/15/21  2327 12/16/21  4613 12/17/21  0501   INR  --   --  1.7 1.6   APTT 62.4* 31.9 34.8  --      BNP: No results for input(s): BNP in the last 72 hours. Hgb A1C:   Lab Results   Component Value Date    LABA1C 5.3 10/19/2021     Folate and B12:   Lab Results   Component Value Date    HDZUQMDV05 7427 (H) 11/27/2021   ,   Lab Results   Component Value Date    FOLATE 13.8 11/27/2021     Thyroid Studies:   Lab Results   Component Value Date    TSH 2.480 11/25/2021       Urinalysis:    Lab Results   Component Value Date    NITRU Negative 12/14/2021    WBCUA PACKED 12/14/2021    BACTERIA MANY 12/14/2021    RBCUA >20 12/14/2021    BLOODU LARGE 12/14/2021    SPECGRAV 1.020 12/14/2021    GLUCOSEU Negative 12/14/2021       Imaging:  CT ABDOMEN PELVIS WO CONTRAST Additional Contrast? None    Result Date: 12/13/2021  EXAMINATION: CT OF THE ABDOMEN AND PELVIS WITHOUT CONTRAST 12/13/2021 9:02 pm TECHNIQUE: CT of the abdomen and pelvis was performed without the administration of intravenous contrast. Multiplanar reformatted images are provided for review. Dose modulation, iterative reconstruction, and/or weight based adjustment of the mA/kV was utilized to reduce the radiation dose to as low as reasonably achievable. COMPARISON: 11/24/2021 unenhanced abdominopelvic CT. Enhanced abdominopelvic CT from 10/29/2021, with suboptimal contrast bolus. HISTORY: ORDERING SYSTEM PROVIDED HISTORY: right flank pain with indwelling bar, concern for nephrolithiasis vs pyelonephritis TECHNOLOGIST PROVIDED HISTORY: Reason for exam:->right flank pain with indwelling bar, concern for nephrolithiasis vs pyelonephritis Additional Contrast?->None Decision Support Exception - unselect if not a suspected or confirmed emergency medical condition->Emergency Medical Condition (MA) FINDINGS: Lower Chest: Visualized lung bases are essentially clear.   Nodular appearing soft tissue prominence in the right paraesophageal distribution is compatible with paraesophageal varices when correlated with prior enhanced study. Organs: Exam is degraded by beam hardening artifact from arms down positioning. Roughly 13 mm hypodensity is seen within the anterior lateral left hepatic lobe segment on image 38, well-circumscribed and likely benign. There is also an 8-9 mm subcapsular hypodensity seen within the lateral right hepatic lobe on image 27. A few additional tiny, too small to characterize focal hypodensities are additionally seen within the liver on images 44 and 46. The liver again demonstrates cirrhotic morphology, with undulating/nodular borders. A few stable nonspecific but likely benign serous density hypodensities are redemonstrated within the spleen, 14 mm on image 18 and perhaps 12 mm more medially on image 33. Splenic size is within normal limits. There are spleno renal varices present. No significant abnormality is identified of the pancreas or adrenal glands. Less than 4 mm nonobstructing left nephrolith on image 67, with a stable large left lower pole nonobstructing nephrolith measuring 12 mm in long axis on image 80. There are 3 nonobstructing nephroliths on the right, the largest measuring 7.5 mm, the others less than 3 mm. There is an appropriately positioned right ureteral stent present. There is likely iatrogenic gas seen within both the bladder and right intrarenal collecting system. No obstructive uropathy. GI/Bowel: No acute gastric abnormality. Equivocal for small hiatal hernia. No acute small bowel abnormality is identified. Normal rectum. Wall thickness of the descending and distal transverse colon is thought likely within upper limits of normal for degree of luminal decompression and rather small amount of central gas, with a mild regional colitis considered less likely but possible. Uncomplicated diverticulosis of predominantly left hemicolon noted. Pelvis:  The bladder is decompressed by Engel catheter, with containing the distal aspect of the right ureteral stent, and a small amount of likely iatrogenic gas. No bladder stones, wall thickening or perivesical fat stranding. Peritoneum/Retroperitoneum: No free intraperitoneal air. Small to moderate volume ascites. No acute retroperitoneal process. Bones/Soft Tissues: No acute osseous or soft tissue abnormality is identified within the field of view. Multilevel thoracolumbar degenerative changes, with multilevel lumbar degenerative disc disease. Redemonstration of a small umbilical hernia containing fat and a small amount of fluid, similar to prior studies. No acute specific abdominopelvic process is identified. Bilateral nephrolithiasis. No obstructive uropathy. No noncontrast CT evidence of ascending infection/pyelonephritis, with the caveat that sensitivity for early changes of pyelonephritis or relatively low on noncontrast imaging. The right ureteral stent remains appropriately positioned. Iatrogenic gas is seen spanning from the urinary bladder within the urinary bladder, right ureter and right intrarenal collecting system, in the context of a Engel catheter. Hepatic cirrhosis, with spleno renal and paraesophageal varices, as well as associated small to moderate volume ascites. RECOMMENDATIONS: Unavailable     CT ABDOMEN PELVIS WO CONTRAST Additional Contrast? None    Result Date: 11/24/2021  EXAMINATION: CT OF THE ABDOMEN AND PELVIS WITHOUT CONTRAST 11/24/2021 8:22 am TECHNIQUE: CT of the abdomen and pelvis was performed without the administration of intravenous contrast. Multiplanar reformatted images are provided for review. Dose modulation, iterative reconstruction, and/or weight based adjustment of the mA/kV was utilized to reduce the radiation dose to as low as reasonably achievable.  COMPARISON: 10/29/2021 HISTORY: ORDERING SYSTEM PROVIDED HISTORY: abdominal pain vomiting TECHNOLOGIST PROVIDED HISTORY: Reason for exam:->abdominal pain vomiting Additional Contrast?->None Decision Support Exception - unselect if not a suspected or confirmed emergency medical condition->Emergency Medical Condition (MA) What reading provider will be dictating this exam?->CRC FINDINGS: Lower Chest: Moderate to large left pleural effusion, apparently complete atelectasis in the left lower lobe Organs: Cirrhotic appearance of the liver, evaluation limited without contrast, low-attenuation left lobe lesion around image 69 is present with density favoring a cyst on this exam although prior exam showed above water density, considering the high risk recommend follow-up MRI. There are beam hardening artifacts also limiting evaluation of abdominal organs. Hypodensity present at the medial spleen appears to be cystic as well by density. Mild nephrolithiasis. Right hydronephrosis left-side collecting system mildly prominent but may be compensation with parenchymal volume loss. Calculus at the proximal right ureter is 6 mm, lower nephrolithiasis on the left present. Mild splenomegaly. Additional low-attenuation hepatic lesions are less well visualized on this exam a a GI/Bowel: No obstruction. Diverticulosis coli present Pelvis: Hysterectomy Peritoneum/Retroperitoneum: There are mild atherosclerotic calcifications present. Bones/Soft Tissues: Umbilicus fat hernia with a small amount of. Anterior soft tissue nodules in the subcutaneous fat may be injection related but are nonspecific. .  Moderate to severe spinal degenerative changes. Prior mild compression fractures in thoracic spine are seen     1. 6 mm right proximal ureteral calculus with moderate hydronephrosis. Bilateral nephrolithiasis 2. Hepatic cirrhosis and portal hypertension including mild ascites 3. Moderate to large left pleural effusion, complete left lower lobe atelectasis 4.  Indeterminate hepatic lesions, recommend follow-up MRI evaluation     XR CHEST (2 VW)    Result Date: 11/20/2021  EXAMINATION: TWO XRAY VIEWS OF THE CHEST 11/19/2021 4:18 pm COMPARISON: November 3, 2021 HISTORY: ORDERING SYSTEM PROVIDED HISTORY: Pleural effusion TECHNOLOGIST PROVIDED HISTORY: Reason for exam:->eval pleural effusion FINDINGS: Increased opacities in left lung base silhouetting left hemidiaphragm. The heart appears to be normal in size. No pneumothorax. Increased opacities in left lung base related to atelectasis, pneumonia, or pleural effusion. XR SHOULDER RIGHT (MIN 2 VIEWS)    Result Date: 11/20/2021  EXAMINATION: THREE XRAY VIEWS OF THE RIGHT SHOULDER 11/19/2021 4:18 pm COMPARISON: September 29, 2021 HISTORY: ORDERING SYSTEM PROVIDED HISTORY: Closed displaced fracture of surgical neck of right humerus with routine healing, unspecified fracture morphology, subsequent encounter TECHNOLOGIST PROVIDED HISTORY: Reason for exam:->fx FINDINGS: Redemonstration of impacted humeral neck fracture. There is stable alignment. No acute fracture or dislocation. Acromioclavicular joint is intact. Stable alignment of impacted right humeral neck fracture. XR SHOULDER LEFT (MIN 2 VIEWS)    Result Date: 11/20/2021  EXAMINATION: THREE XRAY VIEWS OF THE LEFT SHOULDER 11/19/2021 4:18 pm COMPARISON: September 29, 2021 HISTORY: ORDERING SYSTEM PROVIDED HISTORY: S/P reverse total shoulder arthroplasty, left TECHNOLOGIST PROVIDED HISTORY: Reason for exam:->TSA FINDINGS: Stable alignment of left shoulder arthroplasty. No evidence of acute fracture or loosening. Redemonstration of remote humeral neck fracture along margins of proximal humerus. Acromioclavicular joint is intact. Stable alignment of left shoulder arthroplasty. XR CHEST PORTABLE    Result Date: 11/24/2021  EXAMINATION: ONE XRAY VIEW OF THE CHEST 11/24/2021 4:16 am COMPARISON: Two-view study from 11/19/2021.  HISTORY: ORDERING SYSTEM PROVIDED HISTORY: vomiting fever TECHNOLOGIST PROVIDED HISTORY: Reason for exam:->vomiting fever What reading provider will be dictating this exam?->CRC FINDINGS: The cardiopericardial silhouette size is within normal limits. There is relative prominence of the azygous arch. No pneumothorax. Graded opacity within the visualized left lower lung is compatible with layering moderate pleural effusion and associated atelectasis. No dense consolidation within the right lower lung, however there is somewhat geographic increased attenuation compared to both the listed prior study as well as the portable chest radiograph from 11/03/2021, unclear whether fully accounted for by extra thoracic soft tissue attenuation, with early infiltrate not excluded. The upper lungs are clear. No acute osseous abnormality is identified. Right glenohumeral DJD. Stable postsurgical changes of reversed left shoulder arthroplasty. Lower left lung opacity compatible with layering moderate pleural effusion and associated atelectasis, with underlying infiltrate not excluded. Cannot exclude a subtle early infiltrate within the right pulmonary base compared to most recent prior 2 exams. XR CHEST 1 VIEW    Result Date: 12/13/2021  EXAMINATION: ONE XRAY VIEW OF THE CHEST 12/13/2021 9:03 pm COMPARISON: 07/24/2021 HISTORY: ORDERING SYSTEM PROVIDED HISTORY: fever, r/o pneumonia TECHNOLOGIST PROVIDED HISTORY: Reason for exam:->fever, r/o pneumonia FINDINGS: The lungs are without acute focal process. There is no effusion or pneumothorax. The cardiomediastinal silhouette is without acute process. The osseous structures are without acute process. Left shoulder arthroplasty. No acute process. FLUORO FOR SURGICAL PROCEDURES    Result Date: 11/26/2021  EXAMINATION: SPOT FLUOROSCOPIC IMAGES 11/26/2021 8:30 am TECHNIQUE: Fluoroscopy was provided by the radiology department for procedure. Radiologist was not present during examination. FLUOROSCOPY DOSE AND TYPE OR TIME AND EXPOSURES: Fluoroscopy time equals 0.5 minutes.   Total dose equals 7.31 mGy COMPARISON: None HISTORY: ORDERING SYSTEM PROVIDED HISTORY: cysto TECHNOLOGIST PROVIDED HISTORY: Reason for exam:->cysto What reading provider will be dictating this exam?->CRC Intraprocedural imaging. FINDINGS: 4 spot images of the abdomen were obtained. Note is made of a right ureteral stent. Intraprocedural fluoroscopic spot images as above. See separate procedure report for more information. Discharge Medications:      Medication List      CHANGE how you take these medications    doxazosin 1 MG tablet  Commonly known as: CARDURA  TAKE ONE TABLET BY MOUTH ONCE DAILY AT 9AM  What changed: See the new instructions. FeroSul 325 (65 Fe) MG tablet  Generic drug: ferrous sulfate  TAKE ONE TABLET BY MOUTH ONCE DAILY AT 9AM  What changed: See the new instructions. furosemide 40 MG tablet  Commonly known as: LASIX  TAKE ONE TABLET BY MOUTH ONCE DAILY AT 9AM  What changed: See the new instructions. lactulose 10 GM/15ML solution  Commonly known as: CHRONULAC  TAKE 30ML BY MOUTH THREE TIMES DAILY  What changed: See the new instructions. pantoprazole 40 MG tablet  Commonly known as: PROTONIX  TAKE ONE TABLET BY MOUTH ONCE DAILY AT 9AM  What changed: See the new instructions. spironolactone 100 MG tablet  Commonly known as: ALDACTONE  TAKE ONE TABLET BY MOUTH ONCE DAILY AT 9AM  What changed: See the new instructions. Xifaxan 550 MG tablet  Generic drug: rifaximin  TAKE ONE TABLET BY MOUTH TWICE DAILY @ 9AM & 9PM  What changed: See the new instructions. CONTINUE taking these medications    albuterol sulfate  (90 Base) MCG/ACT inhaler  Commonly known as: Ventolin HFA  Inhale 2 puffs into the lungs 4 times daily as needed for Wheezing     amLODIPine 2.5 MG tablet  Commonly known as: NORVASC  TAKE ONE TABLET BY MOUTH ONCE DAILY AT 9AM     apixaban 5 MG Tabs tablet  Commonly known as: Eliquis  Take 1 tablet by mouth 2 times daily Start on 12/1.   If blood in the urine gets worse, stop taking this medication and call the urologist.     Biotin 04243 MCG Tabs fluticasone-salmeterol 250-50 MCG/DOSE Aepb  Commonly known as: ADVAIR     venlafaxine 75 MG extended release capsule  Commonly known as: EFFEXOR XR  Take 1 capsule by mouth daily     vitamin D3 25 MCG (1000 UT) Tabs tablet  Commonly known as: CHOLECALCIFEROL        STOP taking these medications    budesonide-formoterol 160-4.5 MCG/ACT Aero  Commonly known as: Symbicort           Where to Get Your Medications      These medications were sent to [address] [place] [address]) - STEPHANIE Soto - [address] [facility] [address] - P [phone] Jemraine López [phone]  [address] [facility] [address], [place] [address] [place] Rizo [id number]    Phone: [phone]   · amLODIPine 2.5 MG tablet  · doxazosin 1 MG tablet  · FeroSul 325 (65 Fe) MG tablet  · furosemide 40 MG tablet  · lactulose 10 GM/15ML solution  · pantoprazole 40 MG tablet  · spironolactone 100 MG tablet  · Xifaxan 550 MG tablet         Time Spent on discharge is more than 35 minutes in the examination, evaluation, counseling and review of medications and discharge plan.    +++++++++++++++++++++++++++++++++++++++++++++++++  Sachi Aviles MD  [address]  +++++++++++++++++++++++++++++++++++++++++++++++++  NOTE: This report was transcribed using voice recognition software. Every effort was made to ensure accuracy; however, inadvertent computerized transcription errors may be present.

## 2021-12-17 NOTE — CARE COORDINATION
Received return call from  [place] [facility]. Without IV antibiotics Pt has lost criteria and [facility] will not approve since she is walking over 200 feet. Spoke with Pt about discharging home since discharge order is in. Pt is ok going home with [facility]. Son worked night turn and is sleeping now but can pick her up later. Notified Myriam Wade that Pt was discharging today.    Maddy Hopkins, L.S.W.  [phone]

## 2021-12-19 LAB
BLOOD CULTURE, ROUTINE: NORMAL
CULTURE, BLOOD 2: NORMAL

## 2021-12-20 ENCOUNTER — CARE COORDINATION (OUTPATIENT)
Dept: CASE MANAGEMENT | Age: 76
End: 2021-12-20

## 2021-12-20 DIAGNOSIS — N12 PYELONEPHRITIS: Primary | ICD-10-CM

## 2021-12-20 PROCEDURE — 1111F DSCHRG MED/CURRENT MED MERGE: CPT | Performed by: FAMILY MEDICINE

## 2021-12-20 NOTE — CARE COORDINATION
[facility] 45 Transitions Initial Follow Up Call    Call within 2 business days of discharge: Yes    Patient: Lily Nathan Patient : 1945   MRN: <[id number]>  Reason for Admission: 2021 - 2021 [facility]. Right Proximal Ureteral Calculi S/P right ureteral stent placement on 21, Right Hydronephrosis, Bilateral Nonobstructing Renal Calculi. Discharge Date: 21 RARS: Readmission Risk Score: 30.3 ( )  Readmit  CT    Last Discharge [facility]       Complaint Diagnosis Description Type Department Provider    21 Back Pain Septicemia ([facility] [address] . .. ED to Hosp-Admission (Discharged) (TRANSFER) RADHA Trevino MD; Kb Shipman... [address] [facility] sos 21    PCP  2:00  Dr Danika Gonzalez  10:15    Transitions of Care Initial Call    Was this an external facility discharge? No Discharge Facility:     Challenges to be reviewed by the provider   Additional needs identified to be addressed with provider: Yes  PCP routed to request zofran order to local [facility]. for c/o nausea. Method of communication with provider : routed. Advance Care Planning:   Does patient have an Advance Directive: [address] primary decision maker [phone]. Was this a readmission? Yes  Patient stated reason for admission: Right flank pain, headache, generalized malaise. Current: Axel Russo reports she continues to feel urethral burning/urgency even w/ bar in place. States color was pink-tinged and seems to be clearing up because tubing contains yellow urine. Taking Eliquis. States she has daily loose stools d/t lactulose and advised on cleaning away from bar catheter, v/u. [address] nurse coming today. States has/taking her meds. Reviewed s/s developing UTI to report to physician, v/u. Has attempted to reach urologist and will try again today for scheduling. States no fevers, chills. Has c/o nausea w/out emesis and fair appetite.  PCP routed reques to zofran to be called to [facility]. Patients top risk factors for readmission: Risk UTI, Engel, Renal stent. Care Transition Nurse (CTN) contacted the patient by telephone to perform post hospital discharge assessment. Verified name and  with patient as identifiers. Provided introduction to self, and explanation of the CTN role. CTN reviewed discharge instructions, medical action plan and red flags with patient who verbalized understanding. Patient given an opportunity to ask questions and does not have any further questions or concerns at this time. Were discharge instructions available to patient? Yes. Reviewed appropriate site of care based on symptoms and resources available to patient including: When to call 911. The patient agrees to contact the PCP office for questions related to their healthcare. Medication reconciliation was performed with patient, who verbalizes understanding of administration of home medications. Advised obtaining a 90-day supply of all daily and as-needed medications. CTN provided contact information. Plan for follow-up call in 5-7 days based on severity of symptoms and risk factors. Plan for next call: CT FU, Renal stent, Engel, UTI risk.         Care Transitions 24 Hour Call    Do you have any ongoing symptoms?: Yes  Patient-reported symptoms: Other  Do you have all of your prescriptions and are they filled?: Yes  Have you scheduled your follow up appointment?: No  Were you discharged with any Home Care or Post Acute Services: Yes  Post Acute Services: Home Health (Comment: [facility] )  Do you feel like you have everything you need to keep you well at home?: Yes  Care Transitions Interventions         Follow Up  Future Appointments   Date Time Provider Geraldine Rader   2022 10:15 AM Pancho Cruz MD [facility]   2022  2:00 PM MD Bryan [facility]at[facility]   3/15/2022 10:00 AM XENIA Noriega - CNP [facility] Lee Vazquez RN

## 2021-12-21 LAB
AFB CULTURE (MYCOBACTERIA): NORMAL
AFB SMEAR: NORMAL

## 2021-12-22 DIAGNOSIS — R11.0 NAUSEA: Primary | ICD-10-CM

## 2021-12-22 RX ORDER — ONDANSETRON 4 MG/1
4 TABLET, FILM COATED ORAL 3 TIMES DAILY PRN
Qty: 15 TABLET | Refills: 0 | Status: SHIPPED
Start: 2021-12-22 | End: 2022-04-15

## 2021-12-30 ENCOUNTER — TELEPHONE (OUTPATIENT)
Dept: CARE COORDINATION | Age: 76
End: 2021-12-30

## 2021-12-30 ENCOUNTER — TELEPHONE (OUTPATIENT)
Dept: FAMILY MEDICINE CLINIC | Age: 76
End: 2021-12-30

## 2021-12-30 ENCOUNTER — CARE COORDINATION (OUTPATIENT)
Dept: CASE MANAGEMENT | Age: 76
End: 2021-12-30

## 2021-12-30 NOTE — TELEPHONE ENCOUNTER
Pt calling and states that she just had a catheter removed a couple of days ago and now she thinks she has a UTI. She states she has burning with urination. She cannot come in to be seen because her son that gives her a ride possibly has covid. She wants to know if an antibiotic can just be called in for her. Please advise.

## 2021-12-30 NOTE — TELEPHONE ENCOUNTER
----- Message from Dwaine Wallace RN sent at 12/30/2021  3:40 PM EST -----  Regarding: Needs appt  Chay Alegre Pt needs appt scheduled w/ Dr Luna Garcia for hosp fu. She has attempted multiple times to outreach and no one is returning her calls. Outreaching her PCP today for concern for UTI. Her bar was removed. Has [address]. Needs UAC&S. 12/13/2021 - 12/17/2021 [facility]. Right Proximal Ureteral Calculi S/P right ureteral stent placement on 11/27/21, Right Hydronephrosis, Bilateral Nonobstructing Renal Calculi.  TY!  //URI RN CTN

## 2021-12-30 NOTE — CARE COORDINATION
[facility] Transitions Follow Up Call    2021    Patient: Yung Andrews  Patient : 1945   MRN: <[id number]>  Reason for Admission: 2021 - 2021 [facility]. Right Proximal Ureteral Calculi S/P right ureteral stent placement on 21, Right Hydronephrosis, Bilateral Nonobstructing Renal Calculi. Discharge Date: 21 RARS: Readmission Risk Score: 30.3 ( )  RA  CT    [address] sos 21     PCP  2:00  Dr Shayla Roth  10:15    Care Transitions Follow Up Call    Needs to be reviewed by the provider   Additional needs identified to be addressed with provider: Yes  PCP routed high priority for UAC&S. Concern for UTI. Has urethral burning, dk yellow urine w/ strong odor, urine leakage, right back pain 8/10, and chills. Decreased appetite w/ daily nausea and uses zofram 1-2 times daily. Method of communication with provider : chart routing high priority      Care Transition Nurse (CTN) contacted the patient by telephone to follow up after admission. Verified name and  with patient as identifiers. Addressed changes since last contact: Meghan shares she had bar out 1-2 days ago. She is having lower right back pain rated 8/10, urethral burning w/ each urine attempt, dk yellow urine w/ strong odor, decreased appetite, nausea and using daily zofran 1-2 times (no emesis), urinary leakage, exertional SOB and states rescue inhaler effective, chills. Has not checked temp to note fever but states she does not feel hot to self. Enc to obtain thermometer and check for accuracy, v/u. Advised to report any fevers 100.6 F or higher to her PCP office, v/u. Does not note any edema to legs or pink-tinge/blood t urine. Discussed follow-up appointments. If no appointment was previously scheduled, appointment scheduling offered: Yes. Is follow up appointment scheduled within 7 days of discharge?  No.    Advance Care Planning:   Does patient have an Advance Directive: Child/Stewart Rivas III primary decision maker [phone]. CTN reviewed discharge instructions, medical action plan and red flags with patient and discussed any barriers to care and/or understanding of plan of care after discharge. Discussed appropriate site of care based on symptoms and resources available to patient including: When to call 911. The patient agrees to contact the PCP office for questions related to their healthcare. Patients top risk factors for readmission: UTI  Interventions to address risk factors: PCP advised request for [facility] to perform UAC&S. Reviewed s/s UTI to report to [de-identified] to ED. Non-[facility] follow up appointment(s):     CTN provided contact information for future needs. Plan for follow-up call in 3-5 days based on severity of symptoms and risk factors. Plan for next call: Concer for UTI after removal of bar. PCP routed. BG to schedule logan Dr Vince Christianson. Care Transitions Subsequent and Final Call    Subsequent and Final Calls  Do you have any ongoing symptoms?: Yes  Patient-reported symptoms: Pain, Other, Nausea  Have your medications changed?: No  Do you have any questions related to your medications?: No  Do you currently have any active services?: Yes  Are you currently active with any services?: Home Health  Identified Barriers: Lack of Education  Care Transitions Interventions  Other Interventions:            Follow Up  Future Appointments   Date Time Provider Geraldine Rader   1/18/2022 10:15 AM Ratna Higgins MD [facility]   1/20/2022  2:00 PM [facility]   3/15/2022 10:00 AM Elease Bosworth, APRN - CNP [facility] Yuridia Darby RN

## 2021-12-31 DIAGNOSIS — R30.0 DYSURIA: Primary | ICD-10-CM

## 2021-12-31 RX ORDER — CIPROFLOXACIN 500 MG/1
500 TABLET, FILM COATED ORAL 2 TIMES DAILY
Qty: 14 TABLET | Refills: 0 | Status: SHIPPED | OUTPATIENT
Start: 2021-12-31 | End: 2022-01-07

## 2022-01-01 DIAGNOSIS — N39.0 URINARY TRACT INFECTION IN FEMALE: ICD-10-CM

## 2022-01-01 DIAGNOSIS — R31.9 HEMATURIA, UNSPECIFIED TYPE: ICD-10-CM

## 2022-01-01 RX ORDER — LACTULOSE 10 G/15ML
SOLUTION ORAL
Refills: 5 | OUTPATIENT
Start: 2022-01-01

## 2022-01-04 ENCOUNTER — TELEPHONE (OUTPATIENT)
Dept: CARE COORDINATION | Age: 77
End: 2022-01-04

## 2022-01-04 ENCOUNTER — CARE COORDINATION (OUTPATIENT)
Dept: CASE MANAGEMENT | Age: 77
End: 2022-01-04

## 2022-01-04 NOTE — TELEPHONE ENCOUNTER
----- Message from Lanny Levine RN sent at 12/30/2021  3:40 PM EST -----  Regarding: Needs appt  Peterson Likes, Pt needs appt scheduled w/ Dr Hernan Acevedo for hosp fu. She has attempted multiple times to outreach and no one is returning her calls. Outreaching her PCP today for concern for UTI. Her bar was removed. [facility]. [facility]eds [facility]. 12/13/2021 - 12/17/2021 [facility]. Right Proximal Ureteral Calculi S/P right ureteral stent placement on 11/27/21, Right Hydronephrosis, Bilateral Nonobstructing Renal Calculi.  TY!  //SP, RN CTN

## 2022-01-04 NOTE — CARE COORDINATION
[facility] Transitions Follow Up Call    2022    Patient: Hector Downing  Patient : 1945   MRN: <[id number]>  Reason for Admission: 2021 - 2021 [address] Proximal Ureteral Calculi S/P right ureteral stent placement on 21, Right Hydronephrosis, Bilateral Nonobstructing Renal Calculi. Discharge Date: 21 RARS: Readmission Risk Score: 30.3 ( )  RA  CT     HC sos 21     PCP  2:00  Dr Deng Randolph  10:15     . Care Transitions Follow Up Call    Needs to be reviewed by the provider   Additional needs identified to be addressed with provider: No  none             Method of communication with provider : none      Care Transition Nurse (CTN) contacted the patient by telephone to follow up after admission. Verified name and  with patient as identifiers. Addressed changes since last contact: [facility] reports she continues to feel cold and have occasional chills. Reports urine is dk yellow but notes has less odor. No fevers, sweats, or flu-like symptoms. Pt is starting Cipro therapy today and states her g-son is picking it up from pharmacy now. PTOT completed. [facility] nurse visits for 6 more occasions. Continues to have some urine leakage and mildly decreased appetite. Denies any home or med needs. Discussed follow-up appointments. If no appointment was previously scheduled, appointment scheduling offered: Yes. Is follow up appointment scheduled within 7 days of discharge? No.    Advance Care Planning:   Does patient have an Advance Directive: [address] [facility] primary decision maker [phone]. CTN reviewed discharge instructions, medical action plan and red flags with patient and discussed any barriers to care and/or understanding of plan of care after discharge. Discussed appropriate site of care based on symptoms and resources available to patient including: When to call 911.  The patient agrees to contact the PCP office for questions related to their healthcare. Patients top risk factors for readmission: UTI  Interventions to address risk factors: Pt on Cipro therapy. Reviewed s/s UTI to report to physician/return to ED. Non-[facility] follow up appointment(s):     CTN provided contact information for future needs. No further follow-up call indicated based on severity of symptoms and risk factors. Care Transitions Subsequent and Final Call    Subsequent and Final Calls  Do you have any ongoing symptoms?: Yes  Patient-reported symptoms: Other  Do you have any questions related to your medications?: No  Do you currently have any active services?: Yes  Are you currently active with any services?: Home Health  Do you have any needs or concerns that I can assist you with?: No  Identified Barriers: Lack of Education  Care Transitions Interventions  Other Interventions:            Follow Up  Future Appointments   Date Time Provider Geraldine Rader   1/18/2022 10:15 AM Maryjo Garrison MD [facility]   1/20/2022  2:00 PM [facility]   3/15/2022 10:00 AM XENIA Ulloa - CNP [facility] ENT Chnia Arshad RN

## 2022-01-04 NOTE — TELEPHONE ENCOUNTER
Attempted to reach Dr. Abel Bowles office again. Had to leave a vm this time to contact the patient or to contact me back.

## 2022-01-07 ENCOUNTER — TELEPHONE (OUTPATIENT)
Dept: CARE COORDINATION | Age: 77
End: 2022-01-07

## 2022-01-07 NOTE — TELEPHONE ENCOUNTER
----- Message from Dino Munoz RN sent at 12/30/2021  3:40 PM EST -----  Regarding: Needs appt  Caro Grigsby Pt needs appt scheduled w/ Dr Sapna Gaston for hosp fu. She has attempted multiple times to outreach and no one is returning her calls. Outreaching her PCP today for concern for UTI. Her bar was removed. Has [address]. Needs UAC&S. 12/13/2021 - 12/17/2021 [facility]. Right Proximal Ureteral Calculi S/P right ureteral stent placement on 11/27/21, Right Hydronephrosis, Bilateral Nonobstructing Renal Calculi.  TY!  //SP, RN CTN

## 2022-01-07 NOTE — TELEPHONE ENCOUNTER
Attempted to reach Dr. Garcia Citizen office, I have left multiple message's. Contacted patient and the office has not contacted her either. Attempted again and finally got a hold of someone. Office states Dr. Marda Rubinstein is out until february. She is sending a message to the nurse to see who they can get her in with. Then they will call the patient to let her know what they can do. Contacted patient, advised her what I found out, she was agreeable to this.

## 2022-01-10 DIAGNOSIS — F32.A DEPRESSION, UNSPECIFIED DEPRESSION TYPE: ICD-10-CM

## 2022-01-10 DIAGNOSIS — R06.02 SOB (SHORTNESS OF BREATH): ICD-10-CM

## 2022-01-10 DIAGNOSIS — F41.9 ANXIETY: ICD-10-CM

## 2022-01-10 DIAGNOSIS — F32.9 MAJOR DEPRESSIVE DISORDER, REMISSION STATUS UNSPECIFIED, UNSPECIFIED WHETHER RECURRENT: ICD-10-CM

## 2022-01-12 RX ORDER — ALBUTEROL SULFATE 90 UG/1
AEROSOL, METERED RESPIRATORY (INHALATION)
Qty: 8.5 G | Refills: 5 | Status: SHIPPED
Start: 2022-01-12 | End: 2022-05-27

## 2022-01-12 RX ORDER — VENLAFAXINE HYDROCHLORIDE 75 MG/1
CAPSULE, EXTENDED RELEASE ORAL
Qty: 30 CAPSULE | Refills: 5 | Status: SHIPPED
Start: 2022-01-12 | End: 2022-05-27

## 2022-01-18 ENCOUNTER — OFFICE VISIT (OUTPATIENT)
Dept: PRIMARY CARE CLINIC | Age: 77
End: 2022-01-18
Payer: MEDICARE

## 2022-01-18 VITALS
HEIGHT: 63 IN | RESPIRATION RATE: 20 BRPM | HEART RATE: 107 BPM | WEIGHT: 188 LBS | DIASTOLIC BLOOD PRESSURE: 87 MMHG | OXYGEN SATURATION: 98 % | SYSTOLIC BLOOD PRESSURE: 146 MMHG | BODY MASS INDEX: 33.31 KG/M2 | TEMPERATURE: 98.2 F

## 2022-01-18 DIAGNOSIS — R05.8 PRODUCTIVE COUGH: ICD-10-CM

## 2022-01-18 DIAGNOSIS — R06.02 SOB (SHORTNESS OF BREATH): Primary | ICD-10-CM

## 2022-01-18 DIAGNOSIS — N39.0 URINARY TRACT INFECTION IN FEMALE: ICD-10-CM

## 2022-01-18 DIAGNOSIS — R31.9 HEMATURIA, UNSPECIFIED TYPE: ICD-10-CM

## 2022-01-18 LAB
BILIRUBIN, POC: NORMAL
BLOOD URINE, POC: NORMAL
CLARITY, POC: NORMAL
COLOR, POC: NORMAL
GLUCOSE URINE, POC: NEGATIVE
KETONES, POC: NEGATIVE
LEUKOCYTE EST, POC: NEGATIVE
Lab: NORMAL
NITRITE, POC: NORMAL
PERFORMING INSTRUMENT: NORMAL
PH, POC: 6
PROTEIN, POC: NORMAL
QC PASS/FAIL: NORMAL
SARS-COV-2, POC: NORMAL
SPECIFIC GRAVITY, POC: 1.02
UROBILINOGEN, POC: NORMAL

## 2022-01-18 PROCEDURE — 1036F TOBACCO NON-USER: CPT | Performed by: NURSE PRACTITIONER

## 2022-01-18 PROCEDURE — G8399 PT W/DXA RESULTS DOCUMENT: HCPCS | Performed by: NURSE PRACTITIONER

## 2022-01-18 PROCEDURE — 96372 THER/PROPH/DIAG INJ SC/IM: CPT | Performed by: NURSE PRACTITIONER

## 2022-01-18 PROCEDURE — G8484 FLU IMMUNIZE NO ADMIN: HCPCS | Performed by: NURSE PRACTITIONER

## 2022-01-18 PROCEDURE — 1123F ACP DISCUSS/DSCN MKR DOCD: CPT | Performed by: NURSE PRACTITIONER

## 2022-01-18 PROCEDURE — G8417 CALC BMI ABV UP PARAM F/U: HCPCS | Performed by: NURSE PRACTITIONER

## 2022-01-18 PROCEDURE — 99213 OFFICE O/P EST LOW 20 MIN: CPT | Performed by: NURSE PRACTITIONER

## 2022-01-18 PROCEDURE — 4040F PNEUMOC VAC/ADMIN/RCVD: CPT | Performed by: NURSE PRACTITIONER

## 2022-01-18 PROCEDURE — 81002 URINALYSIS NONAUTO W/O SCOPE: CPT | Performed by: NURSE PRACTITIONER

## 2022-01-18 PROCEDURE — G8427 DOCREV CUR MEDS BY ELIG CLIN: HCPCS | Performed by: NURSE PRACTITIONER

## 2022-01-18 PROCEDURE — 1090F PRES/ABSN URINE INCON ASSESS: CPT | Performed by: NURSE PRACTITIONER

## 2022-01-18 PROCEDURE — 87426 SARSCOV CORONAVIRUS AG IA: CPT | Performed by: NURSE PRACTITIONER

## 2022-01-18 RX ORDER — CEFTRIAXONE SODIUM 250 MG/1
250 INJECTION, POWDER, FOR SOLUTION INTRAMUSCULAR; INTRAVENOUS ONCE
Status: COMPLETED | OUTPATIENT
Start: 2022-01-18 | End: 2022-01-18

## 2022-01-18 RX ORDER — CEPHALEXIN 500 MG/1
500 CAPSULE ORAL 2 TIMES DAILY
Qty: 20 CAPSULE | Refills: 0 | Status: SHIPPED | OUTPATIENT
Start: 2022-01-18 | End: 2022-01-28

## 2022-01-18 RX ADMIN — CEFTRIAXONE SODIUM 250 MG: 250 INJECTION, POWDER, FOR SOLUTION INTRAMUSCULAR; INTRAVENOUS at 15:52

## 2022-01-18 NOTE — PROGRESS NOTES
Chief Complaint:   Shortness of Breath (for 2 weeks ), Cough, and Hematuria      History of Present Illness   Source of history provided by:  patient. Griselda Oro is a 68 y.o. old female with a past medical history of:   Past Medical History:   Diagnosis Date    [facility] cancer [facility]     Cirrhosis ([facility]     Essential hypertension     Hx of blood clots     Hyperlipidemia     Osteopenia     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 ([facility]     Sleep apnea     Spinal stenosis     Type 2 diabetes mellitus ([facility] [address]         Pt presents to the [facility] care with a cough/SOB/Bloody Urine for the past several days. States the cough is  productive with yellow sputum. Subjective fever noted. Denies any N/V/D, abdominal pain, CP, , dizziness, or lethargy. Pt has a h/o Restrictive Lung Disease-she is compliant with inhalers as ordered        ROS    Unless otherwise stated in this report or unable to obtain because of the patient's clinical or mental status as evidenced by the medical record, this patients's positive and negative responses for Review of Systems, constitutional, psych, eyes, ENT, cardiovascular, respiratory, gastrointestinal, neurological, genitourinary, musculoskeletal, integument systems and systems related to the presenting problem are either stated in the preceding or were not pertinent or were negative for the symptoms and/or complaints related to the medical problem. Past Surgical History:  has a past surgical history that includes Hysterectomy; Carpal tunnel release; Lithotripsy (Left, 05/04/2016); Colonoscopy (01/31/2017); Breast lumpectomy; Upper gastrointestinal endoscopy (04/23/2019); Colonoscopy (04/23/2019); Upper gastrointestinal endoscopy (N/A, 04/23/2019); Colonoscopy (N/A, 04/23/2019); Colonoscopy (04/23/2019); Shoulder Arthroplasty (Left, 12/21/2020); cardiovascular stress test; and Bladder surgery (Right, 11/26/2021). Social History:  reports that she has never smoked.  She has never used smokeless tobacco. She reports that she does not drink alcohol and does not use drugs. Family History: family history includes Arthritis in her mother; COPD in her father; Cancer (age of onset: 48) in an other family member; Heart Attack in her father. Allergies: Lisinopril    Physical Exam         VS:  BP (!) 146/87 (Site: Left Upper Arm, Position: Sitting, Cuff Size: Large Adult)   Pulse 107   Temp 98.2 °F (36.8 °C) (Temporal)   Resp 20   Ht 5' 2.5\" (1.588 m)   Wt 188 lb (85.3 kg)   SpO2 98%   BMI 33.84 kg/m²    Oxygen Saturation Interpretation: Normal.    Constitutional:  Alert, development consistent with age. Ears:  External Ears: Normal bilateral pinna. TM's & External Canals: TM's normal bilaterally without perforation. Canals without erythema or drainage. Nose:   There is no obvious septal defect. Mild redness/edema  Mouth:  Moist bucca mucosa and normal tongue. Throat: Mild posterior pharyngeal erythema without exudates or lesions. Neck:  Supple. There is no obvious adenopathy or neck tenderness. Lungs:   Breath sounds: Normal chest expansion and breath sounds noted -mildly decreased bilaterally  Heart:  Regular rate and rhythm, normal heart sounds, without pathological murmurs, ectopy, gallops, or rubs. Abdomen:  Soft, suprapubic tenderness,  No firm or pulsatile mass. Skin:  Normal turgor. Warm, dry, without visible rash. Neurological:  Oriented. Motor functions intact. Lab / Imaging Results   (All laboratory and radiology results have been personally reviewed by myself)  Labs:  Results for orders placed or performed in visit on 01/18/22   POCT COVID-19, Antigen   Result Value Ref Range    SARS-COV-2, POC Not-Detected Not Detected    Lot Number [id number]     QC Pass/Fail Pass     Performing Instrument BD Veritor        Imaging: All Radiology results interpreted by Radiologist unless otherwise noted.   No orders to display         Assessment / Plan Impression(s):  1. SOB (shortness of breath)    2. Productive cough    3. Urinary tract infection in female    4. Hematuria, unspecified type      Disposition:  Disposition: Advised covid test is Negative, Advised UA was positive for UTI, urine culture to lab, Rocephin injection now, start Keflex as ordered    . ER if changes or worse. Advised to take  medication as directed.

## 2022-01-20 ENCOUNTER — TELEPHONE (OUTPATIENT)
Dept: FAMILY MEDICINE CLINIC | Age: 77
End: 2022-01-20

## 2022-01-20 DIAGNOSIS — R06.02 SOB (SHORTNESS OF BREATH): Primary | ICD-10-CM

## 2022-01-20 LAB — URINE CULTURE, ROUTINE: NORMAL

## 2022-01-20 RX ORDER — ALBUTEROL SULFATE 90 UG/1
2 AEROSOL, METERED RESPIRATORY (INHALATION) 4 TIMES DAILY PRN
Qty: 54 G | Refills: 1 | Status: ON HOLD
Start: 2022-01-20 | End: 2022-08-15

## 2022-01-20 NOTE — TELEPHONE ENCOUNTER
Pt called she just used our [facility], still has cough shortness of breath but now the pt has developed a wheeze can we send something in for the pt to [facility]

## 2022-02-28 ENCOUNTER — HOSPITAL ENCOUNTER (OUTPATIENT)
Age: 77
Discharge: HOME | End: 2022-03-02

## 2022-02-28 PROCEDURE — 82365 CALCULUS SPECTROSCOPY: CPT

## 2022-02-28 PROCEDURE — 88300 SURGICAL PATH GROSS: CPT

## 2022-03-04 LAB
CALCULI COMPOSITION: NORMAL
MASS: 81 MG
STONE DESCRIPTION: NORMAL

## 2022-03-14 ENCOUNTER — TELEPHONE (OUTPATIENT)
Dept: ADMINISTRATIVE | Age: 77
End: 2022-03-14

## 2022-03-14 NOTE — TELEPHONE ENCOUNTER
Patient rescheduled 3/15 appointment to afternoon due to transportation. Requesting for Audiology to be changed as well.  Please advise

## 2022-04-02 DIAGNOSIS — I10 ESSENTIAL HYPERTENSION: ICD-10-CM

## 2022-04-04 RX ORDER — FELODIPINE 2.5 MG/1
2.5 TABLET, EXTENDED RELEASE ORAL DAILY
Qty: 90 TABLET | Refills: 0 | Status: SHIPPED
Start: 2022-04-04 | End: 2022-05-27

## 2022-04-14 DIAGNOSIS — R11.0 NAUSEA: ICD-10-CM

## 2022-04-15 RX ORDER — ONDANSETRON 4 MG/1
TABLET, FILM COATED ORAL
Qty: 15 TABLET | Refills: 0 | Status: SHIPPED
Start: 2022-04-15 | End: 2022-05-17 | Stop reason: SDUPTHER

## 2022-04-15 NOTE — TELEPHONE ENCOUNTER
Last Appointment:  10/19/2021  Future Appointments   Date Time Provider Geraldine Rader   5/17/2022  1:30 PM MD Bryan Millermatthias [facility]

## 2022-05-03 ENCOUNTER — TELEPHONE (OUTPATIENT)
Dept: SURGERY | Age: 77
End: 2022-05-03

## 2022-05-03 NOTE — TELEPHONE ENCOUNTER
----- Message from Zoe Sousa sent at 4/30/2019  2:21 PM EDT -----  Please schedule patient for a 3 year repeat colonoscopy appointment with Dr. Val Lee.

## 2022-05-12 RX ORDER — LACTULOSE 10 G/15ML
SOLUTION ORAL
Qty: 1892 ML | Refills: 5 | Status: SHIPPED
Start: 2022-05-12 | End: 2022-05-27

## 2022-05-17 ENCOUNTER — TELEPHONE (OUTPATIENT)
Dept: FAMILY MEDICINE CLINIC | Age: 77
End: 2022-05-17

## 2022-05-17 DIAGNOSIS — R11.0 NAUSEA: ICD-10-CM

## 2022-05-17 RX ORDER — ONDANSETRON 4 MG/1
TABLET, FILM COATED ORAL
Qty: 15 TABLET | Refills: 0 | Status: SHIPPED
Start: 2022-05-17 | End: 2022-05-27

## 2022-05-17 NOTE — TELEPHONE ENCOUNTER
----- Message from Rach Cevallos sent at 5/17/2022  9:57 AM EDT -----  Subject: Message to Provider    QUESTIONS  Information for Provider? Pt feeling nausea wanted to know if can get some   pills prescribed. had to r/s due to transportation.   ---------------------------------------------------------------------------  --------------  CALL BACK INFO  What is the best way for the office to contact you? OK to leave message on   voicemail  Preferred Call Back Phone Number? [phone]  ---------------------------------------------------------------------------  --------------  SCRIPT ANSWERS  Relationship to Patient? Self  Have your symptoms changed? No  (Patient requests to see provider urgently. )? No  Do you have any questions for your primary care provider that need to be   answered prior to your appointment?  Yes

## 2022-05-26 ENCOUNTER — APPOINTMENT (OUTPATIENT)
Dept: GENERAL RADIOLOGY | Age: 77
DRG: 433 | End: 2022-05-26
Payer: MEDICARE

## 2022-05-26 ENCOUNTER — HOSPITAL ENCOUNTER (INPATIENT)
Age: 77
LOS: 1 days | Discharge: HOME | DRG: 433 | End: 2022-05-28
Attending: STUDENT IN AN ORGANIZED HEALTH CARE EDUCATION/TRAINING PROGRAM | Admitting: FAMILY MEDICINE
Payer: MEDICARE

## 2022-05-26 ENCOUNTER — APPOINTMENT (OUTPATIENT)
Dept: CT IMAGING | Age: 77
DRG: 433 | End: 2022-05-26
Payer: MEDICARE

## 2022-05-26 ENCOUNTER — OFFICE VISIT (OUTPATIENT)
Dept: FAMILY MEDICINE CLINIC | Age: 77
End: 2022-05-26
Payer: MEDICARE

## 2022-05-26 VITALS
OXYGEN SATURATION: 96 % | HEART RATE: 105 BPM | DIASTOLIC BLOOD PRESSURE: 80 MMHG | WEIGHT: 193 LBS | BODY MASS INDEX: 34.2 KG/M2 | TEMPERATURE: 97.9 F | RESPIRATION RATE: 20 BRPM | HEIGHT: 63 IN | SYSTOLIC BLOOD PRESSURE: 140 MMHG

## 2022-05-26 DIAGNOSIS — K72.90 DECOMPENSATED HEPATIC CIRRHOSIS (HCC): Primary | ICD-10-CM

## 2022-05-26 DIAGNOSIS — R18.8 CIRRHOSIS OF LIVER WITH ASCITES, UNSPECIFIED HEPATIC CIRRHOSIS TYPE (HCC): ICD-10-CM

## 2022-05-26 DIAGNOSIS — I10 ESSENTIAL HYPERTENSION: ICD-10-CM

## 2022-05-26 DIAGNOSIS — R06.02 SOB (SHORTNESS OF BREATH): ICD-10-CM

## 2022-05-26 DIAGNOSIS — K74.60 DECOMPENSATED HEPATIC CIRRHOSIS (HCC): Primary | ICD-10-CM

## 2022-05-26 DIAGNOSIS — R18.8 OTHER ASCITES: ICD-10-CM

## 2022-05-26 DIAGNOSIS — N18.30 STAGE 3 CHRONIC KIDNEY DISEASE, UNSPECIFIED WHETHER STAGE 3A OR 3B CKD (HCC): ICD-10-CM

## 2022-05-26 DIAGNOSIS — K74.60 NON-ALCOHOLIC CIRRHOSIS (HCC): Primary | ICD-10-CM

## 2022-05-26 DIAGNOSIS — K74.60 CIRRHOSIS OF LIVER WITH ASCITES, UNSPECIFIED HEPATIC CIRRHOSIS TYPE (HCC): ICD-10-CM

## 2022-05-26 DIAGNOSIS — R10.84 GENERALIZED ABDOMINAL PAIN: ICD-10-CM

## 2022-05-26 LAB
ALBUMIN SERPL-MCNC: 2.8 G/DL (ref 3.5–5.2)
ALP BLD-CCNC: 91 U/L (ref 35–104)
ALT SERPL-CCNC: 8 U/L (ref 0–32)
AMMONIA: 47 UMOL/L (ref 11–51)
ANION GAP SERPL CALCULATED.3IONS-SCNC: 8 MMOL/L (ref 7–16)
AST SERPL-CCNC: 23 U/L (ref 0–31)
BACTERIA: ABNORMAL /HPF
BASOPHILS ABSOLUTE: 0.04 E9/L (ref 0–0.2)
BASOPHILS RELATIVE PERCENT: 0.7 % (ref 0–2)
BILIRUB SERPL-MCNC: 1.5 MG/DL (ref 0–1.2)
BILIRUBIN DIRECT: 0.6 MG/DL (ref 0–0.3)
BILIRUBIN URINE: NEGATIVE
BILIRUBIN, INDIRECT: 0.9 MG/DL (ref 0–1)
BLOOD, URINE: NEGATIVE
BUN BLDV-MCNC: 11 MG/DL (ref 6–23)
CALCIUM SERPL-MCNC: 9.7 MG/DL (ref 8.6–10.2)
CHLORIDE BLD-SCNC: 109 MMOL/L (ref 98–107)
CLARITY: CLEAR
CO2: 22 MMOL/L (ref 22–29)
COLOR: YELLOW
CREAT SERPL-MCNC: 0.9 MG/DL (ref 0.5–1)
EOSINOPHILS ABSOLUTE: 0.17 E9/L (ref 0.05–0.5)
EOSINOPHILS RELATIVE PERCENT: 3 % (ref 0–6)
EPITHELIAL CELLS, UA: ABNORMAL /HPF
GFR AFRICAN AMERICAN: >60
GFR NON-AFRICAN AMERICAN: >60 ML/MIN/1.73
GLUCOSE BLD-MCNC: 77 MG/DL (ref 74–99)
GLUCOSE URINE: NEGATIVE MG/DL
HCT VFR BLD CALC: 30.5 % (ref 34–48)
HEMOGLOBIN: 10.1 G/DL (ref 11.5–15.5)
IMMATURE GRANULOCYTES #: 0.02 E9/L
IMMATURE GRANULOCYTES %: 0.4 % (ref 0–5)
INR BLD: 1.6
KETONES, URINE: NEGATIVE MG/DL
LEUKOCYTE ESTERASE, URINE: NEGATIVE
LIPASE: 155 U/L (ref 13–60)
LYMPHOCYTES ABSOLUTE: 0.93 E9/L (ref 1.5–4)
LYMPHOCYTES RELATIVE PERCENT: 16.3 % (ref 20–42)
MCH RBC QN AUTO: 32.6 PG (ref 26–35)
MCHC RBC AUTO-ENTMCNC: 33.1 % (ref 32–34.5)
MCV RBC AUTO: 98.4 FL (ref 80–99.9)
MONOCYTES ABSOLUTE: 0.75 E9/L (ref 0.1–0.95)
MONOCYTES RELATIVE PERCENT: 13.2 % (ref 2–12)
NEUTROPHILS ABSOLUTE: 3.79 E9/L (ref 1.8–7.3)
NEUTROPHILS RELATIVE PERCENT: 66.4 % (ref 43–80)
NITRITE, URINE: NEGATIVE
PDW BLD-RTO: 17.2 FL (ref 11.5–15)
PH UA: 6 (ref 5–9)
PLATELET # BLD: 231 E9/L (ref 130–450)
PMV BLD AUTO: 10.3 FL (ref 7–12)
POTASSIUM REFLEX MAGNESIUM: 3.9 MMOL/L (ref 3.5–5)
PRO-BNP: 88 PG/ML (ref 0–450)
PROTEIN UA: NEGATIVE MG/DL
PROTHROMBIN TIME: 17.8 SEC (ref 9.3–12.4)
RBC # BLD: 3.1 E12/L (ref 3.5–5.5)
RBC UA: ABNORMAL /HPF (ref 0–2)
SODIUM BLD-SCNC: 139 MMOL/L (ref 132–146)
SPECIFIC GRAVITY UA: 1.02 (ref 1–1.03)
TOTAL PROTEIN: 6.6 G/DL (ref 6.4–8.3)
TROPONIN, HIGH SENSITIVITY: 15 NG/L (ref 0–9)
TROPONIN, HIGH SENSITIVITY: 15 NG/L (ref 0–9)
UROBILINOGEN, URINE: 2 E.U./DL
WBC # BLD: 5.7 E9/L (ref 4.5–11.5)
WBC UA: ABNORMAL /HPF (ref 0–5)

## 2022-05-26 PROCEDURE — G8399 PT W/DXA RESULTS DOCUMENT: HCPCS | Performed by: FAMILY MEDICINE

## 2022-05-26 PROCEDURE — 82140 ASSAY OF AMMONIA: CPT

## 2022-05-26 PROCEDURE — 71045 X-RAY EXAM CHEST 1 VIEW: CPT

## 2022-05-26 PROCEDURE — 99285 EMERGENCY DEPT VISIT HI MDM: CPT

## 2022-05-26 PROCEDURE — 1123F ACP DISCUSS/DSCN MKR DOCD: CPT | Performed by: FAMILY MEDICINE

## 2022-05-26 PROCEDURE — 84484 ASSAY OF TROPONIN QUANT: CPT

## 2022-05-26 PROCEDURE — 74177 CT ABD & PELVIS W/CONTRAST: CPT

## 2022-05-26 PROCEDURE — 80048 BASIC METABOLIC PNL TOTAL CA: CPT

## 2022-05-26 PROCEDURE — 80076 HEPATIC FUNCTION PANEL: CPT

## 2022-05-26 PROCEDURE — 93005 ELECTROCARDIOGRAM TRACING: CPT | Performed by: PHYSICIAN ASSISTANT

## 2022-05-26 PROCEDURE — 99215 OFFICE O/P EST HI 40 MIN: CPT | Performed by: FAMILY MEDICINE

## 2022-05-26 PROCEDURE — 83880 ASSAY OF NATRIURETIC PEPTIDE: CPT

## 2022-05-26 PROCEDURE — 71275 CT ANGIOGRAPHY CHEST: CPT

## 2022-05-26 PROCEDURE — G8427 DOCREV CUR MEDS BY ELIG CLIN: HCPCS | Performed by: FAMILY MEDICINE

## 2022-05-26 PROCEDURE — 1036F TOBACCO NON-USER: CPT | Performed by: FAMILY MEDICINE

## 2022-05-26 PROCEDURE — 85610 PROTHROMBIN TIME: CPT

## 2022-05-26 PROCEDURE — 83690 ASSAY OF LIPASE: CPT

## 2022-05-26 PROCEDURE — 85025 COMPLETE CBC W/AUTO DIFF WBC: CPT

## 2022-05-26 PROCEDURE — 6360000004 HC RX CONTRAST MEDICATION: Performed by: RADIOLOGY

## 2022-05-26 PROCEDURE — 1090F PRES/ABSN URINE INCON ASSESS: CPT | Performed by: FAMILY MEDICINE

## 2022-05-26 PROCEDURE — 81001 URINALYSIS AUTO W/SCOPE: CPT

## 2022-05-26 PROCEDURE — G8417 CALC BMI ABV UP PARAM F/U: HCPCS | Performed by: FAMILY MEDICINE

## 2022-05-26 RX ADMIN — IOPAMIDOL 90 ML: 755 INJECTION, SOLUTION INTRAVENOUS at 21:42

## 2022-05-26 NOTE — PROGRESS NOTES
FM Progress Note    Subjective:   Cirrhosis. Lasix 40 daily and aldactone 100. On xifaxan. Abdominal distension. 2 BMs per day. On lactulose. HTN. Not taking iron. No longer taking eliquis      Health Maintenance Due   Topic Date Due    Hepatitis A vaccine (1 of 2 - Risk 2-dose series) Never done    COVID-19 Vaccine (3 - Booster for Pfizer series) 09/16/2021    Depression Monitoring  01/12/2022    Annual Wellness Visit (AWV)  01/13/2022    Colorectal Cancer Screen  04/23/2022           Objective:   BP (!) 140/80 (Site: Right Upper Arm, Position: Sitting, Cuff Size: Medium Adult)   Pulse (!) 105   Temp 97.9 °F (36.6 °C) (Temporal)   Resp 20   Ht 5' 2.5\" (1.588 m)   Wt 193 lb (87.5 kg)   SpO2 96%   BMI 34.74 kg/m²   General appearance: NAD, alert and interacting appropriately  HEENT: NCAT, PERRLA, EOMI   Resp: bibasilar crackles R > L  CVS: tachy, no MRG. No JVD  Abdomen: BS +. Significant abdominal distension. Extremities: No clubbing, cyanosis. 1+ ble edema. Warm. Dry. I have reviewed this patient's previous records. I have reviewed this patient's labs. I have reviewed this patient's medications. Spoke with ER re pt    Assessment/Plan: Francisca Post was seen today for edema. Diagnoses and all orders for this visit:    Decompensated hepatic cirrhosis ([facility] [address]  -     Tono Ward MD, [facility] Pancreatic Surgery, [place]    SOB (shortness of breath)    Essential hypertension    Cirrhosis of liver with ascites, unspecified hepatic cirrhosis type [facility])  -     Tono Ward MD, HPB & Pancreatic Surgery, [place]    Stage 3 chronic kidney disease, unspecified whether stage 3a or 3b CKD ([facility] [address]    send to ER      There are no Patient Instructions on file for this visit. No follow-ups on file.         Electronically signed by Hamida Sheikh MD on 5/26/2022 at 4:42 PM

## 2022-05-26 NOTE — ED PROVIDER NOTES
ED  Provider Note  Admit Date/RoomTime: 5/26/2022  5:51 PM  ED Room: 08/08      History of Present Illness:  5/26/22, Time: 6:08 PM EDT  Chief Complaint   Patient presents with    Abdominal Pain     hx cirrhosis, pt sent in by Dr. Guerita Thompson to have fluid removed. pt states she has not been able to sleep    Shortness of Breath     started a couple weeks ago          Fuentes Hopkins is a 68 y.o. female presenting to the ED for abdominal distension and tightness, diarrhea secondary to lactulose, no fevers or chills, abdominal pain and nausea, severe, no relief with zofran, pain is diffuse, constant for multiple weeks to months, last paracentesis one year ago, no cp, no arm jaw back pain, SOB moderate, constant. No hypoxia or tachypnea/tachycardia. No dysuria or hematuria, no urinary frequency. No weakness or numbness of lower extremities, no saddle anaesthesia, no bowel or bladder incontinence, no urinary retention. Nausea is improved with zofran. Review of Systems:   A complete review of systems was performed and pertinent positives and negatives are stated within HPI, all other systems reviewed and are negative.        --------------------------------------------- PATIENT HISTORY--------------------------------------------  Past Medical History:  has a past medical history of Breast cancer ([facility] [address] Cirrhosis ([facility] [address] Essential hypertension, Hx of blood clots, Hyperlipidemia, Osteopenia, 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 ([facility] [address] Sleep apnea, Spinal stenosis, and Type 2 diabetes mellitus ([facility] [address] Past Surgical History:  has a past surgical history that includes Hysterectomy; Carpal tunnel release; Lithotripsy (Left, 05/04/2016); Colonoscopy (01/31/2017); Breast lumpectomy; Upper gastrointestinal endoscopy (04/23/2019); Colonoscopy (04/23/2019); Upper gastrointestinal endoscopy (N/A, 04/23/2019); Colonoscopy (N/A, 04/23/2019); Colonoscopy (04/23/2019);  Shoulder Arthroplasty (Left, 12/21/2020); cardiovascular stress test; and Bladder surgery (Right, 11/26/2021). Family History: family history includes Arthritis in her mother; COPD in her father; Cancer (age of onset: 48) in an other family member; Heart Attack in her father. . Unless otherwise noted, family history is non contributory    Social History:  reports that she has never smoked. She has never used smokeless tobacco. She reports that she does not drink alcohol and does not use drugs. The patients home medications have been reviewed. Allergies: Lisinopril    I have reviewed the past medical history, past surgical history, social history, and family history    ---------------------------------------------------PHYSICAL EXAM--------------------------------------    Constitutional/General: Alert and oriented x3  Head: Normocephalic and atraumatic  Eyes: PERRL, EOMI, sclera non icteric  ENT: Oropharynx clear, handling secretions, no trismus  Neck: Supple, full ROM, no stridor, no meningismus  Respiratory: lctab  Cardiovascular: RRR, no R/G/M, 2+ peripheral pulses  Chest: No chest wall tenderness, equal chest rise  Gastrointestinal:  Markedly distended and soft, mildly tense not rigid, markedly tender to palpation diffusely   Musculoskeletal: Extremities warm and well perfused, moving all extremities  Skin: skin warm and dry. No rashes. Neurologic: No focal deficits, strength and sensation grossly intact   Psychiatric: Normal Affect, behavior normal           -------------------------------------------------- RESULTS -------------------------------------------------  I have personally reviewed all laboratory and imaging results for this patient. Results are listed below.      LABS: (Lab results interpreted by me)  Results for orders placed or performed during the hospital encounter of 05/26/22   CBC with Auto Differential   Result Value Ref Range    WBC 5.7 4.5 - 11.5 E9/L    RBC 3.10 (L) 3.50 - 5.50 E12/L    Hemoglobin 10.1 (L) 11.5 - 15.5 g/dL    Hematocrit 30.5 (L) 34.0 - 48.0 %    MCV 98.4 80.0 - 99.9 fL    MCH 32.6 26.0 - 35.0 pg    MCHC 33.1 32.0 - 34.5 %    RDW 17.2 (H) 11.5 - 15.0 fL    Platelets 844 389 - 948 E9/L    MPV 10.3 7.0 - 12.0 fL    Neutrophils % 66.4 43.0 - 80.0 %    Immature Granulocytes % 0.4 0.0 - 5.0 %    Lymphocytes % 16.3 (L) 20.0 - 42.0 %    Monocytes % 13.2 (H) 2.0 - 12.0 %    Eosinophils % 3.0 0.0 - 6.0 %    Basophils % 0.7 0.0 - 2.0 %    Neutrophils Absolute 3.79 1.80 - 7.30 E9/L    Immature Granulocytes # 0.02 E9/L    Lymphocytes Absolute 0.93 (L) 1.50 - 4.00 E9/L    Monocytes Absolute 0.75 0.10 - 0.95 E9/L    Eosinophils Absolute 0.17 0.05 - 0.50 E9/L    Basophils Absolute 0.04 0.00 - 0.20 E9/L   Lipase   Result Value Ref Range    Lipase 155 (H) 13 - 60 U/L   Urinalysis with Microscopic   Result Value Ref Range    Color, UA Yellow Straw/Yellow    Clarity, UA Clear Clear    Glucose, Ur Negative Negative mg/dL    Bilirubin Urine Negative Negative    Ketones, Urine Negative Negative mg/dL    Specific Gravity, UA 1.020 1.005 - 1.030    Blood, Urine Negative Negative    pH, UA 6.0 5.0 - 9.0    Protein, UA Negative Negative mg/dL    Urobilinogen, Urine 2.0 (A) <2.0 E.U./dL    Nitrite, Urine Negative Negative    Leukocyte Esterase, Urine Negative Negative    WBC, UA 2-5 0 - 5 /HPF    RBC, UA 1-3 0 - 2 /HPF    Epithelial Cells, UA FEW /HPF    Bacteria, UA RARE (A) None Seen /HPF   Hepatic Function Panel   Result Value Ref Range    Total Protein 6.6 6.4 - 8.3 g/dL    Albumin 2.8 (L) 3.5 - 5.2 g/dL    Alkaline Phosphatase 91 35 - 104 U/L    ALT 8 0 - 32 U/L    AST 23 0 - 31 U/L    Total Bilirubin 1.5 (H) 0.0 - 1.2 mg/dL    Bilirubin, Direct 0.6 (H) 0.0 - 0.3 mg/dL    Bilirubin, Indirect 0.9 0.0 - 1.0 mg/dL   Brain Natriuretic Peptide   Result Value Ref Range    Pro-BNP 88 0 - 450 pg/mL   Troponin   Result Value Ref Range    Troponin, High Sensitivity 15 (H) 0 - 9 ng/L   Ammonia   Result Value Ref Range    Ammonia 47.0 11.0 - 51.0 umol/L   Protime-INR   Result Value Ref Range    Protime 17.8 (H) 9.3 - 12.4 sec    INR 1.6    Basic Metabolic Panel w/ Reflex to MG   Result Value Ref Range    Sodium 139 132 - 146 mmol/L    Potassium reflex Magnesium 3.9 3.5 - 5.0 mmol/L    Chloride 109 (H) 98 - 107 mmol/L    CO2 22 22 - 29 mmol/L    Anion Gap 8 7 - 16 mmol/L    Glucose 77 74 - 99 mg/dL    BUN 11 6 - 23 mg/dL    CREATININE 0.9 0.5 - 1.0 mg/dL    GFR Non-African American >60 >=60 mL/min/1.73    GFR African American >60     Calcium 9.7 8.6 - 10.2 mg/dL   Troponin   Result Value Ref Range    Troponin, High Sensitivity 15 (H) 0 - 9 ng/L   EKG 12 Lead   Result Value Ref Range    Ventricular Rate 104 BPM    Atrial Rate 104 BPM    P-R Interval 150 ms    QRS Duration 64 ms    Q-T Interval 334 ms    QTc Calculation (Bazett) 439 ms    P Axis 12 degrees    R Axis -20 degrees    T Axis 11 degrees   ,       RADIOLOGY:  Imaging interpreted by Radiologist unless otherwise specified  CTA PULMONARY W CONTRAST   Final Result   No evidence of pulmonary embolism. Small left-sided pleural effusion. Small area of consolidation at the   posterior left lower lung, prior presenting atelectasis. Cirrhosis with large volume ascites. CT ABDOMEN PELVIS W IV CONTRAST Additional Contrast? None   Final Result   1. Markedly cirrhotic liver with large volume ascites and prominent varices. 2. Too small to definitively characterize hypodense lesions in the liver and   spleen, likely benign. 3. Left nephrolithiasis. No hydronephrosis. 4. Severe distal colonic diverticulosis without diverticulitis. RECOMMENDATIONS:   Unavailable         XR CHEST PORTABLE   Final Result   Atelectasis/infiltrates in the lung bases concerning for pneumonia. Mild underlying vascular congestion. EKG: EKG: This EKG is signed and interpreted by me.     Rate: 104  Rhythm: Sinus  Interpretation: sinus tachycardia  Comparison: stable as compared to patient's most recent EKG        The cardiac monitor revealed sinus tachycardia with a heart rate in the 110s as interpreted by me. The cardiac monitor was ordered secondary to patients clinical status and to monitor the patient for dysrhythmia(s), tachycardia/bradycardia and/or changes in rhythm.     ------------------------- NURSING NOTES AND VITALS REVIEWED ---------------------------  The nursing notes within the ED encounter and vital signs as below have been reviewed by myself  /87   Pulse (!) 101   Temp 98.4 °F (36.9 °C) (Oral)   Resp 20   Ht 5' 2\" (1.575 m)   Wt 193 lb (87.5 kg)   SpO2 97%   BMI 35.30 kg/m²      The patients available past medical records and past encounters were reviewed. ------------------------------ ED COURSE/MEDICAL DECISION MAKING----------------------  Medications   iopamidol (ISOVUE-370) 76 % injection 90 mL (90 mLs IntraVENous Given 5/26/22 2142)       I, Dr. Nava Saini, am the primary provider of record    Medical Decision Making:   Abd pain and distension with history of cirrhosis, will CT and then likely diagnostic paracentesis. Patient CBC was significant for mild anemia with hemoglobin of 10.1 which is within her normal limits. BMP was benign within normal limits. Hepatic function panel revealed mildly elevated bilirubin of 1.5 with direct bili Nigel of 0.6. Lipase was elevated at 155. BNP was normal.  Initial troponin was 15 with a repeat of 15 and a delta of 0. Ammonia was normal.  Urinalysis was negative for any signs of urinary tract infection. Coags revealed INR 1.6 which is also within her normal limits. CTA pulmonary revealed no evidence of pulmonary embolism with a small left-sided pleural effusion.   CT abdomen pelvis with contrast revealed markedly cirrhotic liver with large volume ascites and prominent varices with too small to characterize denies lesions in the liver and spleen with left nephrolithiasis without hydronephrosis and severe distal colonic diverticulosis without diverticulitis. Chest x-ray revealed atelectasis/infiltrates in lung bases concerning for pneumonia as well as mild underlying vascular congestion. She has been given doxycycline and Rocephin. She will be admitted at this time for therapeutic tap which she has received last year. There is low suspicion for SBP at this time. ED Counseling: This emergency provider has spoken with the patient and any family present to discuss clinical status, results, plan of care, diagnosis and prognosis as able to be determined at this time. Any questions were answered and patient and/or family/POA are agreeable with the plan.       --------------------------------- IMPRESSION AND DISPOSITION ---------------------------------    IMPRESSION  1. Non-alcoholic cirrhosis ([address]    2. Other ascites    3. Generalized abdominal pain        DISPOSITION  Disposition: Admit to telemetry  Patient condition is stable      This report was transcribed using voice recognition software. Every effort was made to ensure accuracy; however, transcription errors may be present.       Merle Lima DO  Resident  05/27/22 8300

## 2022-05-27 ENCOUNTER — APPOINTMENT (OUTPATIENT)
Dept: INTERVENTIONAL RADIOLOGY/VASCULAR | Age: 77
DRG: 433 | End: 2022-05-27
Payer: MEDICARE

## 2022-05-27 PROBLEM — R18.8 ABDOMINAL ASCITES: Status: ACTIVE | Noted: 2022-01-01

## 2022-05-27 PROBLEM — R18.8 OTHER ASCITES: Status: ACTIVE | Noted: 2022-05-27

## 2022-05-27 PROBLEM — J84.9 INTERSTITIAL LUNG DISEASE (HCC): Status: ACTIVE | Noted: 2022-01-01

## 2022-05-27 LAB
ALBUMIN FLUID: 0.5 G/DL
ALBUMIN SERPL-MCNC: 2.5 G/DL (ref 3.5–5.2)
ALP BLD-CCNC: 81 U/L (ref 35–104)
ALT SERPL-CCNC: 7 U/L (ref 0–32)
AMYLASE FLUID: 67 U/L
ANION GAP SERPL CALCULATED.3IONS-SCNC: 8 MMOL/L (ref 7–16)
APPEARANCE FLUID: NORMAL
AST SERPL-CCNC: 21 U/L (ref 0–31)
BASOPHILS ABSOLUTE: 0.04 E9/L (ref 0–0.2)
BASOPHILS RELATIVE PERCENT: 0.8 % (ref 0–2)
BILIRUB SERPL-MCNC: 1.9 MG/DL (ref 0–1.2)
BILIRUBIN DIRECT: 0.7 MG/DL (ref 0–0.3)
BILIRUBIN, INDIRECT: 1.2 MG/DL (ref 0–1)
BUN BLDV-MCNC: 10 MG/DL (ref 6–23)
CALCIUM SERPL-MCNC: 9.3 MG/DL (ref 8.6–10.2)
CELL COUNT FLUID TYPE: NORMAL
CHLORIDE BLD-SCNC: 108 MMOL/L (ref 98–107)
CO2: 21 MMOL/L (ref 22–29)
COLOR FLUID: NORMAL
CREAT SERPL-MCNC: 0.9 MG/DL (ref 0.5–1)
EKG ATRIAL RATE: 104 BPM
EKG P AXIS: 12 DEGREES
EKG P-R INTERVAL: 150 MS
EKG Q-T INTERVAL: 334 MS
EKG QRS DURATION: 64 MS
EKG QTC CALCULATION (BAZETT): 439 MS
EKG R AXIS: -20 DEGREES
EKG T AXIS: 11 DEGREES
EKG VENTRICULAR RATE: 104 BPM
EOSINOPHILS ABSOLUTE: 0.22 E9/L (ref 0.05–0.5)
EOSINOPHILS RELATIVE PERCENT: 4.2 % (ref 0–6)
FLUID TYPE: NORMAL
GFR AFRICAN AMERICAN: >60
GFR NON-AFRICAN AMERICAN: >60 ML/MIN/1.73
GLUCOSE BLD-MCNC: 67 MG/DL (ref 74–99)
GLUCOSE, FLUID: 74 MG/DL
HCT VFR BLD CALC: 30.4 % (ref 34–48)
HEMOGLOBIN: 9.8 G/DL (ref 11.5–15.5)
IMMATURE GRANULOCYTES #: 0.01 E9/L
IMMATURE GRANULOCYTES %: 0.2 % (ref 0–5)
LD, FLUID: 42 U/L
LYMPHOCYTES ABSOLUTE: 1.05 E9/L (ref 1.5–4)
LYMPHOCYTES RELATIVE PERCENT: 20.2 % (ref 20–42)
MCH RBC QN AUTO: 31.9 PG (ref 26–35)
MCHC RBC AUTO-ENTMCNC: 32.2 % (ref 32–34.5)
MCV RBC AUTO: 99 FL (ref 80–99.9)
MONOCYTE, FLUID: 88 %
MONOCYTES ABSOLUTE: 0.69 E9/L (ref 0.1–0.95)
MONOCYTES RELATIVE PERCENT: 13.3 % (ref 2–12)
NEUTROPHIL, FLUID: 12 %
NEUTROPHILS ABSOLUTE: 3.19 E9/L (ref 1.8–7.3)
NEUTROPHILS RELATIVE PERCENT: 61.3 % (ref 43–80)
NUCLEATED CELLS FLUID: 209 /UL
PDW BLD-RTO: 17.2 FL (ref 11.5–15)
PLATELET # BLD: 222 E9/L (ref 130–450)
PMV BLD AUTO: 9.7 FL (ref 7–12)
POTASSIUM REFLEX MAGNESIUM: 4 MMOL/L (ref 3.5–5)
PROCALCITONIN: 0.07 NG/ML (ref 0–0.08)
PROTEIN FLUID: 1.8 G/DL
RBC # BLD: 3.07 E12/L (ref 3.5–5.5)
RBC FLUID: NORMAL /UL
SODIUM BLD-SCNC: 137 MMOL/L (ref 132–146)
TOTAL PROTEIN: 6.1 G/DL (ref 6.4–8.3)
WBC # BLD: 5.2 E9/L (ref 4.5–11.5)

## 2022-05-27 PROCEDURE — 94664 DEMO&/EVAL PT USE INHALER: CPT

## 2022-05-27 PROCEDURE — C1729 CATH, DRAINAGE: HCPCS

## 2022-05-27 PROCEDURE — 80048 BASIC METABOLIC PNL TOTAL CA: CPT

## 2022-05-27 PROCEDURE — 96375 TX/PRO/DX INJ NEW DRUG ADDON: CPT

## 2022-05-27 PROCEDURE — 6360000002 HC RX W HCPCS: Performed by: STUDENT IN AN ORGANIZED HEALTH CARE EDUCATION/TRAINING PROGRAM

## 2022-05-27 PROCEDURE — 82150 ASSAY OF AMYLASE: CPT

## 2022-05-27 PROCEDURE — 6370000000 HC RX 637 (ALT 250 FOR IP): Performed by: STUDENT IN AN ORGANIZED HEALTH CARE EDUCATION/TRAINING PROGRAM

## 2022-05-27 PROCEDURE — 2580000003 HC RX 258: Performed by: STUDENT IN AN ORGANIZED HEALTH CARE EDUCATION/TRAINING PROGRAM

## 2022-05-27 PROCEDURE — 49083 ABD PARACENTESIS W/IMAGING: CPT

## 2022-05-27 PROCEDURE — 6360000002 HC RX W HCPCS

## 2022-05-27 PROCEDURE — 88112 CYTOPATH CELL ENHANCE TECH: CPT

## 2022-05-27 PROCEDURE — 99236 HOSP IP/OBS SAME DATE HI 85: CPT | Performed by: FAMILY MEDICINE

## 2022-05-27 PROCEDURE — 84157 ASSAY OF PROTEIN OTHER: CPT

## 2022-05-27 PROCEDURE — 88305 TISSUE EXAM BY PATHOLOGIST: CPT

## 2022-05-27 PROCEDURE — 87070 CULTURE OTHR SPECIMN AEROBIC: CPT

## 2022-05-27 PROCEDURE — 82947 ASSAY GLUCOSE BLOOD QUANT: CPT

## 2022-05-27 PROCEDURE — 80076 HEPATIC FUNCTION PANEL: CPT

## 2022-05-27 PROCEDURE — 1200000000 HC SEMI PRIVATE

## 2022-05-27 PROCEDURE — 89051 BODY FLUID CELL COUNT: CPT

## 2022-05-27 PROCEDURE — 2500000003 HC RX 250 WO HCPCS: Performed by: STUDENT IN AN ORGANIZED HEALTH CARE EDUCATION/TRAINING PROGRAM

## 2022-05-27 PROCEDURE — 84145 PROCALCITONIN (PCT): CPT

## 2022-05-27 PROCEDURE — 87205 SMEAR GRAM STAIN: CPT

## 2022-05-27 PROCEDURE — 83615 LACTATE (LD) (LDH) ENZYME: CPT

## 2022-05-27 PROCEDURE — P9047 ALBUMIN (HUMAN), 25%, 50ML: HCPCS

## 2022-05-27 PROCEDURE — 0W9G3ZZ DRAINAGE OF PERITONEAL CAVITY, PERCUTANEOUS APPROACH: ICD-10-PCS | Performed by: RADIOLOGY

## 2022-05-27 PROCEDURE — 82042 OTHER SOURCE ALBUMIN QUAN EA: CPT

## 2022-05-27 PROCEDURE — 49083 ABD PARACENTESIS W/IMAGING: CPT | Performed by: RADIOLOGY

## 2022-05-27 PROCEDURE — 94640 AIRWAY INHALATION TREATMENT: CPT

## 2022-05-27 PROCEDURE — 96365 THER/PROPH/DIAG IV INF INIT: CPT

## 2022-05-27 PROCEDURE — 85025 COMPLETE CBC W/AUTO DIFF WBC: CPT

## 2022-05-27 RX ORDER — SODIUM CHLORIDE 9 MG/ML
INJECTION, SOLUTION INTRAVENOUS PRN
Status: DISCONTINUED | OUTPATIENT
Start: 2022-05-27 | End: 2022-05-28 | Stop reason: HOSPADM

## 2022-05-27 RX ORDER — ACETAMINOPHEN 650 MG/1
650 SUPPOSITORY RECTAL EVERY 6 HOURS PRN
Status: DISCONTINUED | OUTPATIENT
Start: 2022-05-27 | End: 2022-05-28 | Stop reason: HOSPADM

## 2022-05-27 RX ORDER — FELODIPINE 2.5 MG/1
2.5 TABLET, EXTENDED RELEASE ORAL DAILY
COMMUNITY
End: 2022-07-01

## 2022-05-27 RX ORDER — ONDANSETRON 2 MG/ML
4 INJECTION INTRAMUSCULAR; INTRAVENOUS EVERY 6 HOURS PRN
Status: DISCONTINUED | OUTPATIENT
Start: 2022-05-27 | End: 2022-05-28 | Stop reason: HOSPADM

## 2022-05-27 RX ORDER — FUROSEMIDE 40 MG/1
40 TABLET ORAL DAILY
COMMUNITY
End: 2022-06-13

## 2022-05-27 RX ORDER — FLUTICASONE PROPIONATE AND SALMETEROL 250; 50 UG/1; UG/1
1 POWDER RESPIRATORY (INHALATION) 2 TIMES DAILY
Status: ON HOLD | COMMUNITY
End: 2022-07-14

## 2022-05-27 RX ORDER — IPRATROPIUM BROMIDE AND ALBUTEROL SULFATE 2.5; .5 MG/3ML; MG/3ML
1 SOLUTION RESPIRATORY (INHALATION)
Status: DISCONTINUED | OUTPATIENT
Start: 2022-05-27 | End: 2022-05-28 | Stop reason: HOSPADM

## 2022-05-27 RX ORDER — SPIRONOLACTONE 100 MG/1
100 TABLET, FILM COATED ORAL DAILY
COMMUNITY
End: 2022-06-13

## 2022-05-27 RX ORDER — VENLAFAXINE HYDROCHLORIDE 75 MG/1
75 CAPSULE, EXTENDED RELEASE ORAL DAILY
COMMUNITY
End: 2022-07-06 | Stop reason: SDUPTHER

## 2022-05-27 RX ORDER — FUROSEMIDE 20 MG/1
40 TABLET ORAL DAILY
Status: DISCONTINUED | OUTPATIENT
Start: 2022-05-27 | End: 2022-05-28 | Stop reason: HOSPADM

## 2022-05-27 RX ORDER — FENTANYL CITRATE 50 UG/ML
25 INJECTION, SOLUTION INTRAMUSCULAR; INTRAVENOUS
Status: DISCONTINUED | OUTPATIENT
Start: 2022-05-27 | End: 2022-05-28

## 2022-05-27 RX ORDER — LACTULOSE 10 G/15ML
30 SOLUTION ORAL 3 TIMES DAILY
Status: DISCONTINUED | OUTPATIENT
Start: 2022-05-27 | End: 2022-05-28 | Stop reason: HOSPADM

## 2022-05-27 RX ORDER — VENLAFAXINE HYDROCHLORIDE 75 MG/1
75 CAPSULE, EXTENDED RELEASE ORAL
Status: DISCONTINUED | OUTPATIENT
Start: 2022-05-27 | End: 2022-05-28 | Stop reason: HOSPADM

## 2022-05-27 RX ORDER — DOXAZOSIN MESYLATE 1 MG/1
1 TABLET ORAL DAILY
Status: DISCONTINUED | OUTPATIENT
Start: 2022-05-27 | End: 2022-05-28 | Stop reason: HOSPADM

## 2022-05-27 RX ORDER — SODIUM CHLORIDE 0.9 % (FLUSH) 0.9 %
10 SYRINGE (ML) INJECTION PRN
Status: DISCONTINUED | OUTPATIENT
Start: 2022-05-27 | End: 2022-05-28 | Stop reason: HOSPADM

## 2022-05-27 RX ORDER — ACETAMINOPHEN 325 MG/1
650 TABLET ORAL EVERY 6 HOURS PRN
Status: DISCONTINUED | OUTPATIENT
Start: 2022-05-27 | End: 2022-05-28 | Stop reason: HOSPADM

## 2022-05-27 RX ORDER — LACTULOSE 10 G/15ML
20 SOLUTION ORAL 3 TIMES DAILY
COMMUNITY
End: 2022-06-23 | Stop reason: SDUPTHER

## 2022-05-27 RX ORDER — BUDESONIDE 0.5 MG/2ML
0.5 INHALANT ORAL 2 TIMES DAILY
Status: DISCONTINUED | OUTPATIENT
Start: 2022-05-27 | End: 2022-05-28 | Stop reason: HOSPADM

## 2022-05-27 RX ORDER — ONDANSETRON 4 MG/1
4 TABLET, ORALLY DISINTEGRATING ORAL EVERY 8 HOURS PRN
Status: DISCONTINUED | OUTPATIENT
Start: 2022-05-27 | End: 2022-05-28 | Stop reason: HOSPADM

## 2022-05-27 RX ORDER — DOXAZOSIN MESYLATE 1 MG/1
1 TABLET ORAL NIGHTLY
Status: ON HOLD | COMMUNITY
End: 2022-08-15

## 2022-05-27 RX ORDER — ALBUMIN (HUMAN) 12.5 G/50ML
25 SOLUTION INTRAVENOUS ONCE
Status: COMPLETED | OUTPATIENT
Start: 2022-05-27 | End: 2022-05-27

## 2022-05-27 RX ORDER — ONDANSETRON 4 MG/1
4 TABLET, FILM COATED ORAL EVERY 8 HOURS PRN
COMMUNITY
End: 2022-05-31

## 2022-05-27 RX ORDER — ALBUMIN (HUMAN) 12.5 G/50ML
12.5 SOLUTION INTRAVENOUS ONCE
Status: COMPLETED | OUTPATIENT
Start: 2022-05-27 | End: 2022-05-27

## 2022-05-27 RX ORDER — PANTOPRAZOLE SODIUM 40 MG/1
40 TABLET, DELAYED RELEASE ORAL
Status: DISCONTINUED | OUTPATIENT
Start: 2022-05-27 | End: 2022-05-28 | Stop reason: HOSPADM

## 2022-05-27 RX ORDER — AMLODIPINE BESYLATE 2.5 MG/1
2.5 TABLET ORAL DAILY
Status: DISCONTINUED | OUTPATIENT
Start: 2022-05-27 | End: 2022-05-28 | Stop reason: HOSPADM

## 2022-05-27 RX ORDER — ALBUTEROL SULFATE 2.5 MG/3ML
2.5 SOLUTION RESPIRATORY (INHALATION) 4 TIMES DAILY PRN
Status: DISCONTINUED | OUTPATIENT
Start: 2022-05-27 | End: 2022-05-28 | Stop reason: HOSPADM

## 2022-05-27 RX ORDER — SPIRONOLACTONE 25 MG/1
100 TABLET ORAL DAILY
Status: DISCONTINUED | OUTPATIENT
Start: 2022-05-27 | End: 2022-05-28 | Stop reason: HOSPADM

## 2022-05-27 RX ORDER — PANTOPRAZOLE SODIUM 40 MG/1
40 TABLET, DELAYED RELEASE ORAL DAILY
Status: ON HOLD | COMMUNITY
End: 2022-08-16 | Stop reason: HOSPADM

## 2022-05-27 RX ORDER — ARFORMOTEROL TARTRATE 15 UG/2ML
15 SOLUTION RESPIRATORY (INHALATION) 2 TIMES DAILY
Status: DISCONTINUED | OUTPATIENT
Start: 2022-05-27 | End: 2022-05-28 | Stop reason: HOSPADM

## 2022-05-27 RX ORDER — ALBUTEROL SULFATE 90 UG/1
2 AEROSOL, METERED RESPIRATORY (INHALATION) 4 TIMES DAILY PRN
Status: DISCONTINUED | OUTPATIENT
Start: 2022-05-27 | End: 2022-05-27 | Stop reason: ALTCHOICE

## 2022-05-27 RX ORDER — SODIUM CHLORIDE 0.9 % (FLUSH) 0.9 %
10 SYRINGE (ML) INJECTION EVERY 12 HOURS SCHEDULED
Status: DISCONTINUED | OUTPATIENT
Start: 2022-05-27 | End: 2022-05-28 | Stop reason: HOSPADM

## 2022-05-27 RX ADMIN — ARFORMOTEROL TARTRATE 15 MCG: 15 SOLUTION RESPIRATORY (INHALATION) at 19:21

## 2022-05-27 RX ADMIN — ACETAMINOPHEN 650 MG: 325 TABLET ORAL at 17:32

## 2022-05-27 RX ADMIN — DOXYCYCLINE 100 MG: 100 INJECTION, POWDER, LYOPHILIZED, FOR SOLUTION INTRAVENOUS at 00:55

## 2022-05-27 RX ADMIN — FENTANYL CITRATE 25 MCG: 50 INJECTION, SOLUTION INTRAMUSCULAR; INTRAVENOUS at 06:27

## 2022-05-27 RX ADMIN — LACTULOSE 30 G: 20 SOLUTION ORAL at 21:35

## 2022-05-27 RX ADMIN — IPRATROPIUM BROMIDE AND ALBUTEROL SULFATE 1 AMPULE: 2.5; .5 SOLUTION RESPIRATORY (INHALATION) at 19:21

## 2022-05-27 RX ADMIN — ALBUMIN (HUMAN) 12.5 G: 0.25 INJECTION, SOLUTION INTRAVENOUS at 18:50

## 2022-05-27 RX ADMIN — Medication 10 ML: at 21:35

## 2022-05-27 RX ADMIN — IPRATROPIUM BROMIDE AND ALBUTEROL SULFATE 1 AMPULE: 2.5; .5 SOLUTION RESPIRATORY (INHALATION) at 12:02

## 2022-05-27 RX ADMIN — ONDANSETRON 4 MG: 2 INJECTION INTRAMUSCULAR; INTRAVENOUS at 21:34

## 2022-05-27 RX ADMIN — CEFTRIAXONE 1000 MG: 1 INJECTION, POWDER, FOR SOLUTION INTRAMUSCULAR; INTRAVENOUS at 00:47

## 2022-05-27 RX ADMIN — ALBUMIN (HUMAN) 25 G: 0.25 INJECTION, SOLUTION INTRAVENOUS at 17:22

## 2022-05-27 RX ADMIN — IPRATROPIUM BROMIDE AND ALBUTEROL SULFATE 1 AMPULE: 2.5; .5 SOLUTION RESPIRATORY (INHALATION) at 08:42

## 2022-05-27 RX ADMIN — FENTANYL CITRATE 25 MCG: 50 INJECTION, SOLUTION INTRAMUSCULAR; INTRAVENOUS at 02:17

## 2022-05-27 RX ADMIN — BUDESONIDE 500 MCG: 0.5 SUSPENSION RESPIRATORY (INHALATION) at 19:21

## 2022-05-27 RX ADMIN — Medication 10 ML: at 08:49

## 2022-05-27 ASSESSMENT — PAIN SCALES - GENERAL
PAINLEVEL_OUTOF10: 6
PAINLEVEL_OUTOF10: 9
PAINLEVEL_OUTOF10: 7
PAINLEVEL_OUTOF10: 9
PAINLEVEL_OUTOF10: 0

## 2022-05-27 ASSESSMENT — PAIN DESCRIPTION - DESCRIPTORS
DESCRIPTORS: BURNING
DESCRIPTORS: ACHING;DISCOMFORT;SORE

## 2022-05-27 ASSESSMENT — PAIN DESCRIPTION - PAIN TYPE: TYPE: ACUTE PAIN;SURGICAL PAIN

## 2022-05-27 ASSESSMENT — PAIN DESCRIPTION - FREQUENCY: FREQUENCY: CONTINUOUS

## 2022-05-27 ASSESSMENT — PAIN - FUNCTIONAL ASSESSMENT: PAIN_FUNCTIONAL_ASSESSMENT: ACTIVITIES ARE NOT PREVENTED

## 2022-05-27 ASSESSMENT — PAIN DESCRIPTION - LOCATION
LOCATION: ABDOMEN
LOCATION: ABDOMEN

## 2022-05-27 ASSESSMENT — PAIN DESCRIPTION - ONSET: ONSET: ON-GOING

## 2022-05-27 NOTE — H&P
[facility] - Family Medicine Resident Inpatient  History and Physical    CC: Abdominal pain, SOB    HPI: History obtained from patient, electronic medical record. Sushant Reddy is a 68 y.o. female with a PMH of Nonalcoholic cirrhosis, CKD, DM2, KATE, HTN, diverticulosis, hx PE, hx L breast lumpectomy 2013, depression, interstitial lung disease, GERD, who presents to ED for Abdominal Pain (hx cirrhosis, pt sent in by Dr. Yandel Stanley to have fluid removed. pt states she has not been able to sleep) and Shortness of Breath (started a couple weeks ago )  . Patient presented to PCP for regular check up, reported that her \"stomach hurt terribly\" and had been swelling for the past few months. She has had prior episodes of abdominal ascites secondary to cirrhosis that prevent similarly. Reports she last ate a couple of days ago due to abdominal pain, but has been able to drink water. Also has not been able to sleep due to pain and increased orthopnea. Also reports mild ankle edema. Does report increased SOB with increased use of rescue inhaler to 3 times per day. Reports productive cough with thick sputum. Denies fever, chills. Reports she has never drank alcohol, but her brother also had nonalcoholic cirrhosis. Hx of PE, not on any current anticoagulation. Does not take ASA. ED Course: The patient remained hemodynamically stable. EKG Rhythm strip sinus tachycardia. The significant laboratory data obtained by ED work up was Troponin 15->15. Pro-BNP 88, Albumin 2.8, total Bilirubin 1.5, direct Bilirubin 0.6, Lipase 155, RBC 3.10, Hgb 10.1, INR 1.6, Urobilinogen 2.0    CXR-atelectasis/infiltrates in lung bases concerning for pneumonia    CTA abdomen-cirrhosis with large volume ascites, L nephrolithiasis w/o hydronephrosis, severe distal colonic diverticulosis without diverticulitis. No acute changes of pancreas.     CTA chest-no PE, small left-sided pleural effusion     Patient was given:  Medications   fentaNYL (SUBLIMAZE) injection 25 mcg (25 mcg IntraVENous Given 5/27/22 0217)   ipratropium-albuterol (DUONEB) nebulizer solution 1 ampule (has no administration in time range)   doxazosin (CARDURA) tablet 1 mg (has no administration in time range)   amLODIPine (NORVASC) tablet 2.5 mg (has no administration in time range)   mometasone-formoterol (DULERA) 200-5 MCG/ACT inhaler 2 puff (has no administration in time range)   furosemide (LASIX) tablet 40 mg (has no administration in time range)   lactulose (CHRONULAC) 10 GM/15ML solution 30 g (has no administration in time range)   pantoprazole (PROTONIX) tablet 40 mg (has no administration in time range)   spironolactone (ALDACTONE) tablet 100 mg (has no administration in time range)   venlafaxine (EFFEXOR XR) extended release capsule 75 mg (has no administration in time range)   rifAXIMin (XIFAXAN) tablet 550 mg (has no administration in time range)   sodium chloride flush 0.9 % injection 10 mL (has no administration in time range)   sodium chloride flush 0.9 % injection 10 mL (has no administration in time range)   0.9 % sodium chloride infusion (has no administration in time range)   ondansetron (ZOFRAN-ODT) disintegrating tablet 4 mg (has no administration in time range)     Or   ondansetron (ZOFRAN) injection 4 mg (has no administration in time range)   magnesium hydroxide (MILK OF MAGNESIA) 400 MG/5ML suspension 30 mL (has no administration in time range)   acetaminophen (TYLENOL) tablet 650 mg (has no administration in time range)     Or   acetaminophen (TYLENOL) suppository 650 mg (has no administration in time range)   albuterol (PROVENTIL) nebulizer solution 2.5 mg (has no administration in time range)   iopamidol (ISOVUE-370) 76 % injection 90 mL (90 mLs IntraVENous Given 5/26/22 2142)   doxycycline (VIBRAMYCIN) 100 mg in dextrose 5 % 100 mL IVPB ([id number]) (100 mg IntraVENous New Bag 5/27/22 0055)   cefTRIAXone (ROCEPHIN) 1,000 mg in sterile water 10 mL IV syringe (1,000 mg IntraVENous Given 5/27/22 0047)        ED orders:   Orders Placed This Encounter   Procedures    XR CHEST PORTABLE    CTA PULMONARY W CONTRAST    CT ABDOMEN PELVIS W IV CONTRAST Additional Contrast? None    CBC with Auto Differential    Lipase    Urinalysis with Microscopic    Hepatic Function Panel    Brain Natriuretic Peptide    Troponin    Ammonia    Protime-INR    Basic Metabolic Panel w/ Reflex to MG    Troponin    Procalcitonin    CBC auto differential    Basic Metabolic Panel w/ Reflex to MG    Hepatic Function Panel    Diet NPO    Vital signs    Nursing communication    Vital signs per unit routine    Notify physician    Up as tolerated    Place intermittent pneumatic compression device    Telemetry monitoring - continuous duration    Full Code    Inpatient consult to [facility] Inpatient consult to IR    Initiate Oxygen Therapy Protocol    EKG 12 Lead    Saline lock IV    ADMIT TO INPATIENT       PMH:  has a past medical history of Breast cancer ([address] Cirrhosis ([address] Essential hypertension, Hx of blood clots, Hyperlipidemia, Osteopenia, 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 ([address] Sleep apnea, Spinal stenosis, and Type 2 diabetes mellitus ([address] PSH:  has a past surgical history that includes Hysterectomy; Carpal tunnel release; Lithotripsy (Left, 05/04/2016); Colonoscopy (01/31/2017); Breast lumpectomy; Upper gastrointestinal endoscopy (04/23/2019); Colonoscopy (04/23/2019); Upper gastrointestinal endoscopy (N/A, 04/23/2019); Colonoscopy (N/A, 04/23/2019); Colonoscopy (04/23/2019); Shoulder Arthroplasty (Left, 12/21/2020); cardiovascular stress test; and Bladder surgery (Right, 11/26/2021). FH: family history includes Arthritis in her mother; COPD in her father; Cancer (age of onset: 48) in an other family member; Heart Attack in her father. Social:  reports that she has never smoked.  She has never used smokeless tobacco. She reports that she does not drink alcohol and does not use drugs. Allergies: Allergies   Allergen Reactions    Lisinopril         Home Medications:   No current facility-administered medications on file prior to encounter.      Current Outpatient Medications on File Prior to Encounter   Medication Sig Dispense Refill    ondansetron (ZOFRAN) 4 MG tablet TAKE 1 TABLET BY MOUTH THREE TIMES DAILY AS NEEDED FOR NAUSEA OR VOMITING 15 tablet 0    lactulose (CHRONULAC) 10 GM/15ML solution TAKE 30ML BY MOUTH THREE TIMES DAILY 1892 mL 5    felodipine (PLENDIL) 2.5 MG extended release tablet TAKE 1 TABLET BY MOUTH DAILY 90 tablet 0    albuterol sulfate HFA (VENTOLIN HFA) 108 (90 Base) MCG/ACT inhaler Inhale 2 puffs into the lungs 4 times daily as needed for Wheezing 54 g 1    fluticasone-salmeterol (ADVAIR) 250-50 MCG/DOSE AEPB INHALE 1 PUFF EVERY TWELVE HOURS 1 each 5    albuterol sulfate  (90 Base) MCG/ACT inhaler INHALE TWO PUFFS BY MOUTH INTO THE LUNGS FOUR TIMES DAILY AS NEEDED FOR WHEEZING 8.5 g 5    venlafaxine (EFFEXOR XR) 75 MG extended release capsule TAKE ONE CAPSULE BY MOUTH ONCE DAILY AT 9AM 30 capsule 5    XIFAXAN 550 MG tablet TAKE ONE TABLET BY MOUTH TWICE DAILY @ 9AM & 9PM 60 tablet 5    doxazosin (CARDURA) 1 MG tablet TAKE ONE TABLET BY MOUTH ONCE DAILY AT 9AM 30 tablet 5    spironolactone (ALDACTONE) 100 MG tablet TAKE ONE TABLET BY MOUTH ONCE DAILY AT 9AM 30 tablet 5    pantoprazole (PROTONIX) 40 MG tablet TAKE ONE TABLET BY MOUTH ONCE DAILY AT 9AM 30 tablet 5    furosemide (LASIX) 40 MG tablet TAKE ONE TABLET BY MOUTH ONCE DAILY AT 9AM 30 tablet 5    Biotin 58961 MCG TABS Take by mouth every 7 days      vitamin D3 (CHOLECALCIFEROL) 25 MCG (1000 UT) TABS tablet Take 1,000 Units by mouth daily         ROS:  Const: No fever, chills, night sweats  HEENT: No blurred vision, double vision; no URI symptoms  Resp: No pleuritic chest pain; reports increased SOB, productive cough  Cardio: No chest pain, no exertional dyspnea, no PND, no palpitation; reports orthopnea, mild leg swelling  GI: No dysphagia, no reflux; no n/v; no c/d. No hematochezia; reports abdominal pain, abdominal swelling    : No dysuria, no frequency, hesitancy; no hematuria  MSK: no joint pain, no myalgia, no change in ROM  Neuro: no focal weakness, no slurred speech, no double vision, no numbness or tingling in extremities  Endo: no heat/cold intolerance, no polyphagia, polydipsia or polyuria  Hem: no increased bleeding, no bruising, no lymphadenopathy  Skin: no skin changes  Psych: no depressed mood, no suicidal ideation    PE:  Blood pressure 131/87, pulse (!) 101, temperature 98.4 °F (36.9 °C), temperature source Oral, resp. rate 20, height 5' 2\" (1.575 m), weight 193 lb (87.5 kg), SpO2 97 %. General: Alert, cooperative, no acute distress. HEENT: Normocephalic, atraumatic. Conjunctiva/corneas clear, EOM's intact, no pallor or icterus. Neck: Symmetrical, trachea midline. No JVD. Chest: No tenderness or deformity, full & symmetric excursion  Lung: Clear to auscultation bilaterally,  respirations unlabored. No rales/wheezing/rubs  Heart: RRR, S1 and S2 normal, no rub or gallop. Systolic murmur present  Abdomen: Soft, distended, tender to light palpation   Genital/Rectal: deferred  Extremities:  Extremities normal, atraumatic, no cyanosis or edema. Skin: Skin color, texture, turgor normal, no rashes or lesions  Musculoskeletal: No joint swelling, no muscle tenderness.   Neurologic: Alert & Oriented x3    Labs:   Results for orders placed or performed during the hospital encounter of 05/26/22   CBC with Auto Differential   Result Value Ref Range    WBC 5.7 4.5 - 11.5 E9/L    RBC 3.10 (L) 3.50 - 5.50 E12/L    Hemoglobin 10.1 (L) 11.5 - 15.5 g/dL    Hematocrit 30.5 (L) 34.0 - 48.0 %    MCV 98.4 80.0 - 99.9 fL    MCH 32.6 26.0 - 35.0 pg    MCHC 33.1 32.0 - 34.5 %    RDW 17.2 (H) 11.5 - 15.0 fL    Platelets 563 211 - 481 E9/L    MPV 10.3 7.0 - 12.0 fL Neutrophils % 66.4 43.0 - 80.0 %    Immature Granulocytes % 0.4 0.0 - 5.0 %    Lymphocytes % 16.3 (L) 20.0 - 42.0 %    Monocytes % 13.2 (H) 2.0 - 12.0 %    Eosinophils % 3.0 0.0 - 6.0 %    Basophils % 0.7 0.0 - 2.0 %    Neutrophils Absolute 3.79 1.80 - 7.30 E9/L    Immature Granulocytes # 0.02 E9/L    Lymphocytes Absolute 0.93 (L) 1.50 - 4.00 E9/L    Monocytes Absolute 0.75 0.10 - 0.95 E9/L    Eosinophils Absolute 0.17 0.05 - 0.50 E9/L    Basophils Absolute 0.04 0.00 - 0.20 E9/L   Lipase   Result Value Ref Range    Lipase 155 (H) 13 - 60 U/L   Urinalysis with Microscopic   Result Value Ref Range    Color, UA Yellow Straw/Yellow    Clarity, UA Clear Clear    Glucose, Ur Negative Negative mg/dL    Bilirubin Urine Negative Negative    Ketones, Urine Negative Negative mg/dL    Specific Gravity, UA 1.020 1.005 - 1.030    Blood, Urine Negative Negative    pH, UA 6.0 5.0 - 9.0    Protein, UA Negative Negative mg/dL    Urobilinogen, Urine 2.0 (A) <2.0 E.U./dL    Nitrite, Urine Negative Negative    Leukocyte Esterase, Urine Negative Negative    WBC, UA 2-5 0 - 5 /HPF    RBC, UA 1-3 0 - 2 /HPF    Epithelial Cells, UA FEW /HPF    Bacteria, UA RARE (A) None Seen /HPF   Hepatic Function Panel   Result Value Ref Range    Total Protein 6.6 6.4 - 8.3 g/dL    Albumin 2.8 (L) 3.5 - 5.2 g/dL    Alkaline Phosphatase 91 35 - 104 U/L    ALT 8 0 - 32 U/L    AST 23 0 - 31 U/L    Total Bilirubin 1.5 (H) 0.0 - 1.2 mg/dL    Bilirubin, Direct 0.6 (H) 0.0 - 0.3 mg/dL    Bilirubin, Indirect 0.9 0.0 - 1.0 mg/dL   Brain Natriuretic Peptide   Result Value Ref Range    Pro-BNP 88 0 - 450 pg/mL   Troponin   Result Value Ref Range    Troponin, High Sensitivity 15 (H) 0 - 9 ng/L   Ammonia   Result Value Ref Range    Ammonia 47.0 11.0 - 51.0 umol/L   Protime-INR   Result Value Ref Range    Protime 17.8 (H) 9.3 - 12.4 sec    INR 1.6    Basic Metabolic Panel w/ Reflex to MG   Result Value Ref Range    Sodium 139 132 - 146 mmol/L    Potassium reflex Magnesium 3.9 3.5 - 5.0 mmol/L    Chloride 109 (H) 98 - 107 mmol/L    CO2 22 22 - 29 mmol/L    Anion Gap 8 7 - 16 mmol/L    Glucose 77 74 - 99 mg/dL    BUN 11 6 - 23 mg/dL    CREATININE 0.9 0.5 - 1.0 mg/dL    GFR Non-African American >60 >=60 mL/min/1.73    GFR African American >60     Calcium 9.7 8.6 - 10.2 mg/dL   Troponin   Result Value Ref Range    Troponin, High Sensitivity 15 (H) 0 - 9 ng/L   EKG 12 Lead   Result Value Ref Range    Ventricular Rate 104 BPM    Atrial Rate 104 BPM    P-R Interval 150 ms    QRS Duration 64 ms    Q-T Interval 334 ms    QTc Calculation (Bazett) 439 ms    P Axis 12 degrees    R Axis -20 degrees    T Axis 11 degrees       Imaging:  CT ABDOMEN PELVIS W IV CONTRAST Additional Contrast? None    Result Date: 5/26/2022  EXAMINATION: CT OF THE ABDOMEN AND PELVIS WITH CONTRAST 5/26/2022 9:36 pm TECHNIQUE: CT of the abdomen and pelvis was performed with the administration of intravenous contrast. Multiplanar reformatted images are provided for review. Automated exposure control, iterative reconstruction, and/or weight based adjustment of the mA/kV was utilized to reduce the radiation dose to as low as reasonably achievable. COMPARISON: CT abdomen and pelvis, 12/13/2021. HISTORY: ORDERING SYSTEM PROVIDED HISTORY: abdominal distension TECHNOLOGIST PROVIDED HISTORY: Additional Contrast?->None Reason for exam:->abdominal distension Decision Support Exception - unselect if not a suspected or confirmed emergency medical condition->Emergency Medical Condition (MA) What reading provider will be dictating this exam?->CRC FINDINGS: Lower Chest: Small left pleural effusion. The lung bases otherwise clear. The heart is normal in size. Multiple varices are seen along the distal esophagus. Organs: Liver: Atrophic liver with a cirrhotic, irregular morphology. Small hypodense foci in the liver likely represent cysts. No ductal dilatation. Gallbladder: Unremarkable. Pancreas: Unremarkable.  Spleen:  Small hypodense foci in the spleen likely represent cyst or hemangioma. The spleen is top-normal in size at 13.3 cm. Adrenals: Unremarkable. Kidneys: 8 mm calculus along the lower pole of the left kidney. No hydronephrosis. GI/Bowel: Severe distal colonic diverticulosis without diverticulitis. No bowel wall thickening or obstruction. The appendix is not visualized. No pericecal inflammatory changes are seen to suggest acute appendicitis. Pelvis: The urinary bladder is unremarkable. The uterus is surgically absent. Peritoneum/Retroperitoneum: Large volume ascites. No free air prominent varices, most notably in the perisplenic region. No lymphadenopathy. The abdominal aorta and pelvic arteries are unremarkable. Bones/Soft Tissues: The visualized bones are intact without acute fracture or focal lesion. Mild chronic compression fracture deformities of the T8 and T11 vertebral bodies. Miscellaneous: Ascitic fluid is seen tracking into a small umbilical hernia. 1. Markedly cirrhotic liver with large volume ascites and prominent varices. 2. Too small to definitively characterize hypodense lesions in the liver and spleen, likely benign. 3. Left nephrolithiasis. No hydronephrosis. 4. Severe distal colonic diverticulosis without diverticulitis. RECOMMENDATIONS: Unavailable     XR CHEST PORTABLE    Result Date: 5/26/2022  EXAMINATION: ONE XRAY VIEW OF THE CHEST 5/26/2022 6:50 pm COMPARISON: December 13, 2021 HISTORY: ORDERING SYSTEM PROVIDED HISTORY: fever TECHNOLOGIST PROVIDED HISTORY: Reason for exam:->fever What reading provider will be dictating this exam?->CRC FINDINGS: There is low lung volumes. There is borderline cardiac size. There is atelectasis/infiltrates in the lung bases more on the left side. Small pleural effusions are noted. Left shoulder prostheses is noted. Atelectasis/infiltrates in the lung bases concerning for pneumonia. Mild underlying vascular congestion.      CTA PULMONARY W CONTRAST    Result Date: 5/26/2022  EXAMINATION: CTA OF THE CHEST 5/26/2022 9:36 pm TECHNIQUE: CTA of the chest was performed after the administration of intravenous contrast.  Multiplanar reformatted images are provided for review. MIP images are provided for review. Automated exposure control, iterative reconstruction, and/or weight based adjustment of the mA/kV was utilized to reduce the radiation dose to as low as reasonably achievable. COMPARISON: None. HISTORY: ORDERING SYSTEM PROVIDED HISTORY: shortness of breath TECHNOLOGIST PROVIDED HISTORY: Reason for exam:->shortness of breath Decision Support Exception - unselect if not a suspected or confirmed emergency medical condition->Emergency Medical Condition (MA) What reading provider will be dictating this exam?->CRC FINDINGS: Pulmonary Arteries: Pulmonary arteries are adequately opacified for evaluation. No evidence of intraluminal filling defect to suggest pulmonary embolism. Main pulmonary artery is normal in caliber. Mediastinum: No evidence of mediastinal lymphadenopathy. The heart and pericardium demonstrate no acute abnormality. There is no acute abnormality of the thoracic aorta. Lungs/pleura: Small left-sided pleural effusion. Small area of consolidation at the posterior left lower lung, prior presenting atelectasis. Upper Abdomen: Cirrhosis with large volume ascites. Soft Tissues/Bones: No acute bone or soft tissue abnormality. No evidence of pulmonary embolism. Small left-sided pleural effusion. Small area of consolidation at the posterior left lower lung, prior presenting atelectasis. Cirrhosis with large volume ascites.        Assessment and Plan  Principal Problem:    Other ascites  Active Problems:    Abdominal ascites    Interstitial lung disease (HCC)    Type 2 diabetes mellitus (HCC)    Obstructive sleep apnea syndrome    Essential hypertension    Dyslipidemia    Cirrhosis (HCC)    Pleural effusion    Gastro-esophageal reflux disease without esophagitis    Major depressive disorder, recurrent severe without psychotic features ([facility] [address] [facility]  Resolved Problems:    * No resolved hospital problems.  *    Ascites  Cirrhosis, nonalcoholic  · Hx of nonalcoholic cirrhosis  · Continue home xifaxin and lactulose  · Continue home lasix, spironolactone   · IR consulted for paracentesis  · Patient is not on any blood thinners or aspirin  · NPO after midnight for procedure  · Fentanyl 25 mcg q2h prn abdominal pain    SOB  Pleural effusion  · Small pleural effusion on imaging, possible infiltrates  · Given ceftriaxone and doxycycline in ED for concern of pneumonia  · No white count present, Pro-mickey ordered  · If elevated, continue ceftriaxone 1 mg QD (monitor for possible toxicity with hepatic impairment) and doxycycline 100 mg BID    Interstitial lung disease  · PFTs 4/29/21 showed severe restrictive lung disease with severe reduction in diffusion capacity  · On room air  · Continue home albuterol and advair, or formulary equivalent  · duonebs for increased SOB    Hx PE  · No current anticoagulation    CKD  · Cr 0.9 w/ GFR >60 on admission, continue to monitor    Type 2 diabetes mellitus ([address]  · Last HA1c 5.3% on 10/19/21  · Diet controlled, continue to monitor glucose levels     Obstructive sleep apnea syndrome  · If patient remains more than one night, ask if she has CPAP to bring from home    Essential hypertension  · Continue home doxazosin, spironolactone, lasix  · Home felodipine substituted with amlodipine     Gastro-esophageal reflux disease without esophagitis  · Continue home protonix    Depression  · Continue home venlafaxine      DVT / GI prophylaxis: PCDs and Protonix    Dispo - telemetry    Electronically signed by Katie De Leon DO on 5/27/2022 at 2:47 AM.  This case was discussed with attending physician, Dr. Juanito Julian

## 2022-05-27 NOTE — INTERVAL H&P NOTE
H&P Update    Patient's History and Physical  was reviewed. The patient appears likely to able to tolerate the procedure. Risk and benefits discussed including ultimate complications, possibly death and consent obtained. Patient and/family had the opportunity to ask questions. All questions were answered and patient/family wishes to proceed.      Dejuan Pantoja, II

## 2022-05-27 NOTE — CARE COORDINATION
LUCY transition of care. Pt presented to [facility] secondary to abdominal pain and shortness of breath. Pt was sent in from her doctor office. Pt admitted with ascites. Pt has PMH of cirrhosis. IR consulted for paracentesis. Met with pt at bedside to discuss d/c planning. Pt lives alone in 1st floor apartment with no steps. Pt has a quad cane and ww. She reports PTA she is independent with ADLs. Pt does not drive. Pt has hx with [facility] and [facility]. [facility]. Pt has HHA through [employer] 2 days a week for 3 hours each day. They assist with cleaning. Pt reports that her son Ethan Zamudio lives close to her and assists her when she needs it. PCP is Dr Phillip Franco and uses [facility] in [facility] for Rx. Pt plans to d/c to home when medically stable. No needs identified at this time. CM/SW to follow. Maria M Miller RN, CM.

## 2022-05-27 NOTE — PROGRESS NOTES
Patient was identified via 2 patient identifiers and arrived to pre-work up area in stable condition. The patient is here for an ultrasound-guided abdominal paracentesis. Procedure explained by this RN and Dr. Karrie Muhammad. All questions answered, patient expresses understanding and informed consent was signed. Pre-procedure routine/checklist was completed. The abdomen was scanned and marked under the guidance of ultrasound. The tray was prepared sterilely and the patient was prepped and draped sterilely. Lidocaine 2% 4  mL was injected intradermally for anesthetic and an incision was made into the LLQ of the abdomen. Time Out: 1511  Procedure Start: [place]: [address]: 1545  Fluid drained: 4900mL, pink cloudy colored fluid  Specimens were sent for testing. The patient tolerated the procedure well. The incision site cleansed and dry dressing of gauze and OpSite were applied. No bleeding, swelling or complications noted. Discharge instructions were reviewed. The patient had no questions. Vital signs remained stable and the dressing remained clean, dry, and intact. The patient left the department in stable condition, with no complaints of pain or discomfort.

## 2022-05-27 NOTE — PROGRESS NOTES
[address] [facility]  Family Medicine Attending    S: 68 y.o. female with PMH of Nonalcoholic cirrhosis, CKD, DM2, KATE, HTN, diverticulosis, hx PE, hx L breast lumpectomy 2013, depression, interstitial lung disease, GERD, who presents to ED for Abdominal Pain and Shortness of Breath (started a couple weeks ago )  Today, c/o diffuse pain and SOB. No fevers/chills. Slight cough    O: VS- Blood pressure (!) 148/79, pulse (!) 142, temperature 98.4 °F (36.9 °C), temperature source Oral, resp. rate 20, height 5' 2\" (1.575 m), weight 193 lb (87.5 kg), SpO2 97 %. Exam is as noted by resident with the following changes, additions or corrections:  Alert, oriented  Ht- rapid, regular, with 3/6 systolic murmur  Lungs- decreased BS at bases  Abdomen- distended, diffusely tender, but not tense  Ext - no edema    Impressions:   Principal Problem:    Other ascites  Active Problems:    Abdominal ascites    Interstitial lung disease (HCC)    Type 2 diabetes mellitus ([address]    Obstructive sleep apnea syndrome    Essential hypertension    Dyslipidemia    Cirrhosis ([facility]    Pleural effusion    Gastro-esophageal reflux disease without esophagitis    Major depressive disorder, recurrent severe without psychotic features ([facility]  Resolved Problems:    * No resolved hospital problems. *      Plan:   Diurese - lasix/aldactone   Consult IR for paracentesis   Continue home meds   Monitor heart rate/BP   Lactulose, check ammonia   Possible discharge within 24-48 hours after tap     Attending Physician Statement  I have reviewed the chart and seen the patient with the resident(s). I personally reviewed images, EKG's and similar tests, if present. I personally reviewed and performed key elements of the history and exam.  I have reviewed and confirmed student and/or resident history and exam with changes as indicated above. I agree with the assessment, plan and orders as documented by the resident.   Please refer to the resident and/or student note for additional information.       Lewis Live MD

## 2022-05-28 VITALS
BODY MASS INDEX: 35.51 KG/M2 | HEIGHT: 62 IN | DIASTOLIC BLOOD PRESSURE: 69 MMHG | SYSTOLIC BLOOD PRESSURE: 126 MMHG | RESPIRATION RATE: 18 BRPM | TEMPERATURE: 98.2 F | WEIGHT: 193 LBS | OXYGEN SATURATION: 95 % | HEART RATE: 102 BPM

## 2022-05-28 PROBLEM — R18.8 ABDOMINAL ASCITES: Status: RESOLVED | Noted: 2022-05-27 | Resolved: 2022-05-28

## 2022-05-28 PROBLEM — R18.8 OTHER ASCITES: Status: RESOLVED | Noted: 2022-01-01 | Resolved: 2022-01-01

## 2022-05-28 LAB
ALBUMIN SERPL-MCNC: 2.7 G/DL (ref 3.5–5.2)
ALP BLD-CCNC: 75 U/L (ref 35–104)
ALT SERPL-CCNC: 6 U/L (ref 0–32)
ANION GAP SERPL CALCULATED.3IONS-SCNC: 10 MMOL/L (ref 7–16)
AST SERPL-CCNC: 18 U/L (ref 0–31)
BASOPHILS ABSOLUTE: 0.04 E9/L (ref 0–0.2)
BASOPHILS RELATIVE PERCENT: 1 % (ref 0–2)
BILIRUB SERPL-MCNC: 1.5 MG/DL (ref 0–1.2)
BILIRUBIN DIRECT: 0.5 MG/DL (ref 0–0.3)
BILIRUBIN, INDIRECT: 1 MG/DL (ref 0–1)
BUN BLDV-MCNC: 12 MG/DL (ref 6–23)
CALCIUM SERPL-MCNC: 9.3 MG/DL (ref 8.6–10.2)
CHLORIDE BLD-SCNC: 107 MMOL/L (ref 98–107)
CO2: 21 MMOL/L (ref 22–29)
CREAT SERPL-MCNC: 0.9 MG/DL (ref 0.5–1)
EOSINOPHILS ABSOLUTE: 0.18 E9/L (ref 0.05–0.5)
EOSINOPHILS RELATIVE PERCENT: 4.6 % (ref 0–6)
GFR AFRICAN AMERICAN: >60
GFR NON-AFRICAN AMERICAN: >60 ML/MIN/1.73
GLUCOSE BLD-MCNC: 76 MG/DL (ref 74–99)
GRAM STAIN ORDERABLE: NORMAL
HCT VFR BLD CALC: 28.2 % (ref 34–48)
HEMOGLOBIN: 9.1 G/DL (ref 11.5–15.5)
IMMATURE GRANULOCYTES #: 0.02 E9/L
IMMATURE GRANULOCYTES %: 0.5 % (ref 0–5)
LYMPHOCYTES ABSOLUTE: 0.82 E9/L (ref 1.5–4)
LYMPHOCYTES RELATIVE PERCENT: 20.9 % (ref 20–42)
MCH RBC QN AUTO: 31.9 PG (ref 26–35)
MCHC RBC AUTO-ENTMCNC: 32.3 % (ref 32–34.5)
MCV RBC AUTO: 98.9 FL (ref 80–99.9)
MONOCYTES ABSOLUTE: 0.5 E9/L (ref 0.1–0.95)
MONOCYTES RELATIVE PERCENT: 12.7 % (ref 2–12)
NEUTROPHILS ABSOLUTE: 2.37 E9/L (ref 1.8–7.3)
NEUTROPHILS RELATIVE PERCENT: 60.3 % (ref 43–80)
PDW BLD-RTO: 17.5 FL (ref 11.5–15)
PLATELET # BLD: 204 E9/L (ref 130–450)
PMV BLD AUTO: 9.9 FL (ref 7–12)
POTASSIUM REFLEX MAGNESIUM: 4.1 MMOL/L (ref 3.5–5)
RBC # BLD: 2.85 E12/L (ref 3.5–5.5)
SODIUM BLD-SCNC: 138 MMOL/L (ref 132–146)
TOTAL PROTEIN: 6.2 G/DL (ref 6.4–8.3)
WBC # BLD: 3.9 E9/L (ref 4.5–11.5)

## 2022-05-28 PROCEDURE — 85025 COMPLETE CBC W/AUTO DIFF WBC: CPT

## 2022-05-28 PROCEDURE — 2580000003 HC RX 258: Performed by: STUDENT IN AN ORGANIZED HEALTH CARE EDUCATION/TRAINING PROGRAM

## 2022-05-28 PROCEDURE — 94640 AIRWAY INHALATION TREATMENT: CPT

## 2022-05-28 PROCEDURE — 6360000002 HC RX W HCPCS: Performed by: STUDENT IN AN ORGANIZED HEALTH CARE EDUCATION/TRAINING PROGRAM

## 2022-05-28 PROCEDURE — 6370000000 HC RX 637 (ALT 250 FOR IP): Performed by: STUDENT IN AN ORGANIZED HEALTH CARE EDUCATION/TRAINING PROGRAM

## 2022-05-28 PROCEDURE — 80076 HEPATIC FUNCTION PANEL: CPT

## 2022-05-28 PROCEDURE — 80048 BASIC METABOLIC PNL TOTAL CA: CPT

## 2022-05-28 PROCEDURE — 99238 HOSP IP/OBS DSCHRG MGMT 30/<: CPT | Performed by: FAMILY MEDICINE

## 2022-05-28 PROCEDURE — 36415 COLL VENOUS BLD VENIPUNCTURE: CPT

## 2022-05-28 RX ADMIN — ACETAMINOPHEN 650 MG: 325 TABLET ORAL at 08:40

## 2022-05-28 RX ADMIN — RIFAXIMIN 550 MG: 550 TABLET ORAL at 08:39

## 2022-05-28 RX ADMIN — PANTOPRAZOLE SODIUM 40 MG: 40 TABLET, DELAYED RELEASE ORAL at 06:00

## 2022-05-28 RX ADMIN — FUROSEMIDE 40 MG: 20 TABLET ORAL at 08:40

## 2022-05-28 RX ADMIN — SPIRONOLACTONE 100 MG: 25 TABLET ORAL at 08:39

## 2022-05-28 RX ADMIN — VENLAFAXINE HYDROCHLORIDE 75 MG: 75 CAPSULE, EXTENDED RELEASE ORAL at 08:39

## 2022-05-28 RX ADMIN — BUDESONIDE 500 MCG: 0.5 SUSPENSION RESPIRATORY (INHALATION) at 08:22

## 2022-05-28 RX ADMIN — IPRATROPIUM BROMIDE AND ALBUTEROL SULFATE 1 AMPULE: 2.5; .5 SOLUTION RESPIRATORY (INHALATION) at 11:56

## 2022-05-28 RX ADMIN — Medication 10 ML: at 08:40

## 2022-05-28 RX ADMIN — AMLODIPINE BESYLATE 2.5 MG: 2.5 TABLET ORAL at 08:39

## 2022-05-28 RX ADMIN — ONDANSETRON 4 MG: 4 TABLET, ORALLY DISINTEGRATING ORAL at 11:54

## 2022-05-28 RX ADMIN — ARFORMOTEROL TARTRATE 15 MCG: 15 SOLUTION RESPIRATORY (INHALATION) at 08:22

## 2022-05-28 RX ADMIN — IPRATROPIUM BROMIDE AND ALBUTEROL SULFATE 1 AMPULE: 2.5; .5 SOLUTION RESPIRATORY (INHALATION) at 08:21

## 2022-05-28 ASSESSMENT — PAIN SCALES - GENERAL
PAINLEVEL_OUTOF10: 0
PAINLEVEL_OUTOF10: 7

## 2022-05-28 ASSESSMENT — PAIN DESCRIPTION - PAIN TYPE: TYPE: ACUTE PAIN;SURGICAL PAIN

## 2022-05-28 ASSESSMENT — PAIN - FUNCTIONAL ASSESSMENT: PAIN_FUNCTIONAL_ASSESSMENT: ACTIVITIES ARE NOT PREVENTED

## 2022-05-28 ASSESSMENT — PAIN DESCRIPTION - FREQUENCY: FREQUENCY: CONTINUOUS

## 2022-05-28 ASSESSMENT — PAIN DESCRIPTION - ONSET: ONSET: ON-GOING

## 2022-05-28 ASSESSMENT — PAIN DESCRIPTION - DESCRIPTORS: DESCRIPTORS: SORE;ACHING;DISCOMFORT

## 2022-05-28 NOTE — DISCHARGE SUMMARY
Physician Discharge Summary     Ty Nelson  [id number]    Admit date: 5/26/2022    Discharge date and time: 5/28/2022    Admitting Physician: Rebeca Escobedo MD     Admission Diagnoses:   Patient Active Problem List   Diagnosis    Malignant neoplasm of left breast ([address]    Type 2 diabetes mellitus ([address]    Obstructive sleep apnea syndrome    Essential hypertension    Multiple thyroid nodules    Hx of adenomatous colonic polyps    Dyslipidemia    Cirrhosis ([address]    S/P reverse total shoulder arthroplasty, left    REN (acute kidney injury) ([address]    Anemia    Pleural effusion    Palpitations    Rhabdomyolysis    Hyperammonemia (HCC)    Closed displaced fracture of surgical neck of right humerus    Pulmonary embolism (HCC)    Idiopathic acute pancreatitis without infection or necrosis    Asymptomatic microscopic hematuria    Breast mass, left    Anxiety disorder, unspecified    Gastro-esophageal reflux disease without esophagitis    Major depressive disorder, recurrent severe without psychotic features ([address]    Muscle weakness (generalized)    Need for assistance with personal care    Other abnormalities of gait and mobility    Unspecified physeal fracture of upper end of humerus, left arm, subsequent encounter for fracture with routine healing    Difficulty in walking, not elsewhere classified    Acute renal failure (ARF) ([address]    Pyelonephritis    Stage 3 chronic kidney disease, unspecified whether stage 3a or 3b CKD ([address]    Interstitial lung disease ([address]       Discharge Diagnoses:   Patient Active Problem List   Diagnosis    Malignant neoplasm of left breast ([address]    Type 2 diabetes mellitus ([address]    Obstructive sleep apnea syndrome    Essential hypertension    Multiple thyroid nodules    Hx of adenomatous colonic polyps    Dyslipidemia    Cirrhosis ([address]    S/P reverse total shoulder arthroplasty, left    REN (acute kidney injury) ([address]    Anemia    Pleural effusion  Palpitations    Rhabdomyolysis    Hyperammonemia (HCC)    Closed displaced fracture of surgical neck of right humerus    Pulmonary embolism (HCC)    Idiopathic acute pancreatitis without infection or necrosis    Asymptomatic microscopic hematuria    Breast mass, left    Anxiety disorder, unspecified    Gastro-esophageal reflux disease without esophagitis    Major depressive disorder, recurrent severe without psychotic features ([facility]    Muscle weakness (generalized)    Need for assistance with personal care    Other abnormalities of gait and mobility    Unspecified physeal fracture of upper end of humerus, left arm, subsequent encounter for fracture with routine healing    Difficulty in walking, not elsewhere classified    Acute renal failure (ARF) (HCC)    Pyelonephritis    Stage 3 chronic kidney disease, unspecified whether stage 3a or 3b CKD ([facility]    Interstitial lung disease [facility])         Hospital Course: Sushant Reddy is a 68 y.o. female past medical history nonalcoholic cirrhosis, CKD, type 2 diabetes, KATE, hypertension, history of PE, history of breast cancer presented for abdominal pain, increased swelling, shortness of breath. Patient with history of therapeutic tap over 1 year ago. She reports progressive swelling of her abdomen with associated shortness of breath and abdominal pain over the last few months. Sent from PCPs office for therapeutic tap. Status post therapeutic tap 5/27. She reports significant improvement in her symptoms after tap, abdominal pain and swelling greatly improved. She did tolerate p.o. intake after the tap. Breathing is much improved with removal fluid. She had an otherwise uneventful course and progressed well. Pain was controlled. She was tolerating a regular diet with no nausea or vomiting, was ambulating well, and was in a suitable condition for discharge to home.     At follow-up:  Ensure patient has established with GI    Lab Results Component Value Date    WBC 3.9 05/28/2022    HGB 9.1 05/28/2022     05/28/2022     05/28/2022     05/28/2022    K 4.1 05/28/2022    BUN 12 05/28/2022    CREATININE 0.9 05/28/2022    GLUCOSE 76 05/28/2022    LABGLOM >60 05/28/2022    PROTIME 17.8 05/26/2022    INR 1.6 05/26/2022    LABALBU 2.7 05/28/2022    PROT 6.2 05/28/2022    CALCIUM 9.3 05/28/2022    MG 2.2 12/17/2021    PHOS 2.7 02/27/2021    BILITOT 1.5 05/28/2022    BILIDIR 0.5 05/28/2022     11/01/2021    ALKPHOS 75 05/28/2022    AST 18 05/28/2022    ALT 6 05/28/2022       Discharge Exam:   See progress note of the day of discharge. Disposition: home       Medication List      CONTINUE taking these medications    Advair Diskus 250-50 MCG/ACT Aepb diskus inhaler  Generic drug: fluticasone-salmeterol     albuterol sulfate  (90 Base) MCG/ACT inhaler  Commonly known as: Ventolin HFA  Inhale 2 puffs into the lungs 4 times daily as needed for Wheezing     Biotin 44462 MCG Tabs     doxazosin 1 MG tablet  Commonly known as: CARDURA     felodipine 2.5 MG extended release tablet  Commonly known as: PLENDIL     furosemide 40 MG tablet  Commonly known as: LASIX     lactulose 10 GM/15ML solution  Commonly known as: CHRONULAC     ondansetron 4 MG tablet  Commonly known as: ZOFRAN     pantoprazole 40 MG tablet  Commonly known as: PROTONIX     rifAXIMin 550 MG tablet  Commonly known as: XIFAXAN     spironolactone 100 MG tablet  Commonly known as: ALDACTONE     venlafaxine 75 MG extended release capsule  Commonly known as: EFFEXOR XR     vitamin D3 25 MCG (1000 UT) Tabs tablet  Commonly known as: CHOLECALCIFEROL              Patient Instructions: Activity: activity as tolerated  Diet: diabetic diet        Follow up:   No future appointments.     Rajni Vo, [address] [facility] [phone]    Call today  hospital follow-up    Raul Stiles [phone]    Call today  establish care after hospitalization       Signed:  Jan Harrington MD, M.D. PGY-3  5/28/2022  9:24 AM      This note was dictated with [address].

## 2022-05-28 NOTE — PROGRESS NOTES
[address] [facility]  Family Medicine Attending    S: 68 y.o. female with PMH of Nonalcoholic cirrhosis, CKD, DM2, KATE, HTN, diverticulosis, hx PE, hx L breast lumpectomy 2013, depression, interstitial lung disease, GERD, who presents to ED for Abdominal Pain and Shortness of Breath (started a couple weeks ago )    Today, feeling better after paracentesis yesterday; abdominal pain has improved, breathing better. No new symptoms or concerns aside from itching at the site of electrode patches for cardiac monitor. Would like to go home. O: VS- Blood pressure 126/69, pulse (!) 102, temperature 98.2 °F (36.8 °C), temperature source Temporal, resp. rate 18, height 5' 2\" (1.575 m), weight 193 lb (87.5 kg), SpO2 95 %. Exam is as noted by resident with the following changes, additions or corrections:  Alert, oriented  Ht- mildly tachy, regular, with 2/6 systolic murmur  Lungs- decreased BS at bases, equal, unlabored   Abdomen- soft, mildly tender diffusely  Ext - no edema    Impressions:   Principal Problem (Resolved): Other ascites  Active Problems:    Interstitial lung disease (HCC)    Type 2 diabetes mellitus (HCC)    Obstructive sleep apnea syndrome    Essential hypertension    Dyslipidemia    Cirrhosis (HCC)    Pleural effusion    Gastro-esophageal reflux disease without esophagitis    Major depressive disorder, recurrent severe without psychotic features ([facility]  Resolved Problems:    Abdominal ascites      Plan:   Diurese - lasix/aldactone, continue. Paracentesis completed; albumin given. Continue home meds. Monitor heart rate/BP, has been improving overall. Lactulose, rifaximin, ammonia level in range. Plan for discharge home today with close outpatient follow up with PCP and GI. Attending Physician Statement  I have reviewed the chart and seen the patient with the resident(s). I personally reviewed images, EKG's and similar tests, if present.   I personally reviewed and performed key elements of the history and exam.  I have reviewed and confirmed student and/or resident history and exam with changes as indicated above. I agree with the assessment, plan and orders as documented by the resident. Please refer to the resident and/or student note for additional information.       Olivia Breen, DO

## 2022-05-28 NOTE — PROGRESS NOTES
[facility] Inpatient   Resident Progress Note    S:  Hospital day: 1   Brief Synopsis: Ovi Miguel is a 68 y.o. female with past medical history nonalcoholic cirrhosis, CKD, type 2 diabetes, KATE, hypertension, history of PE, history of breast cancer presented for abdominal pain, increased swelling, shortness of breath. Patient with history of therapeutic tach over 1 year ago. She reports progressive swelling of her abdomen with associated shortness of breath and abdominal pain over the last few months. Sent from PCPs office for therapeutic tap. Status post therapeutic tap 5/27. Patient was seen and examined at bedside. Reports much improvement in abdominal pain and pressure since paracentesis yesterday. She reports that she tolerated p.o. intake well yesterday. Breathing is greatly improved since fluid removal.          Allergy: Lisinopril      Scheduled Medications:    ipratropium-albuterol, 1 ampule, Inhalation, Q4H WA    doxazosin, 1 mg, Oral, Daily    amLODIPine, 2.5 mg, Oral, Daily    furosemide, 40 mg, Oral, Daily    lactulose, 30 g, Oral, TID    pantoprazole, 40 mg, Oral, QAM AC    spironolactone, 100 mg, Oral, Daily    venlafaxine, 75 mg, Oral, Daily with breakfast    rifAXIMin, 550 mg, Oral, BID    sodium chloride flush, 10 mL, IntraVENous, 2 times per day    budesonide, 0.5 mg, Nebulization, BID **AND** Arformoterol Tartrate, 15 mcg, Nebulization, BID     PRN:   sodium chloride flush, sodium chloride, ondansetron **OR** ondansetron, magnesium hydroxide, acetaminophen **OR** acetaminophen, albuterol      Infusions:   sodium chloride      I reviewed the patient's past medical and surgical history, Medications and Allergies. O:  BP (!) 140/78   Pulse 95   Temp 98.1 °F (36.7 °C) (Temporal)   Resp 20   Ht 5' 2\" (1.575 m)   Wt 193 lb (87.5 kg)   SpO2 97%   BMI 35.30 kg/m²   24 hour I&O: I/O last 3 completed shifts:   In: 240 [P.O.:240]  Out: -   I/O this shift: In: 0   Out: 300 [Urine:300]   Physical Exam  Constitutional:       Appearance: Normal appearance. HENT:      Head: Normocephalic and atraumatic. Cardiovascular:      Rate and Rhythm: Normal rate and regular rhythm. Heart sounds: Murmur (systolic) heard. Pulmonary:      Effort: Pulmonary effort is normal.      Breath sounds: Normal breath sounds. No wheezing or rales. Abdominal:      General: Bowel sounds are normal.      Tenderness: There is no abdominal tenderness. There is no guarding or rebound. Musculoskeletal:      Cervical back: Normal range of motion and neck supple. Right lower leg: No edema. Left lower leg: No edema. Neurological:      Mental Status: She is alert. Labs:  Na/K/Cl/CO2:  137/4.0/108/21 (05/27 1091)  BUN/Cr/glu/ALT/AST/amyl/lip:  10/0.9/--/7/21/--/-- (05/27 0428)  WBC/Hgb/Hct/Plts:  3.9/9.1/28.2/204 (05/28 0525)  estimated creatinine clearance is 55 mL/min (based on SCr of 0.9 mg/dL). Other pertinent labs as noted below        A/P:  Principal Problem:    Other ascites  Active Problems:    Abdominal ascites    Interstitial lung disease (HCC)    Type 2 diabetes mellitus (HCC)    Obstructive sleep apnea syndrome    Essential hypertension    Dyslipidemia    Cirrhosis (HCC)    Pleural effusion    Gastro-esophageal reflux disease without esophagitis    Major depressive disorder, recurrent severe without psychotic features ([facility]  Resolved Problems:    * No resolved hospital problems.  *    Ascites  Cirrhosis, nonalcoholic  · Hx of nonalcoholic cirrhosis  · Continue home xifaxin and lactulose  · Continue home lasix, spironolactone   · S/p therapeutic tap 5/27       SOB  Pleural effusion  · Small pleural effusion on imaging, possible infiltrates  · Given ceftriaxone and doxycycline in ED for concern of pneumonia  · No white count present, Pro-mickey ordered  · Low suspicion for PNA, procal normal      Interstitial lung disease  · PFTs 4/29/21 showed severe restrictive lung disease with severe reduction in diffusion capacity  · On room air  · Continue home albuterol and advair, or formulary equivalent  · duonebs for increased SOB     Hx PE  · No current anticoagulation     CKD  · Cr 0.9 w/ GFR >60 on admission, continue to monitor     Type 2 diabetes mellitus ([address]  · Last HA1c 5.3% on 10/19/21  · Diet controlled, continue to monitor glucose levels      Obstructive sleep apnea syndrome  · If patient remains more than one night, ask if she has CPAP to bring from home     Essential hypertension  · Continue home doxazosin, spironolactone, lasix  · Home felodipine substituted with amlodipine     Gastro-esophageal reflux disease without esophagitis  · Continue home protonix     Depression  · Continue home venlafaxine         DVT / GI prophylaxis: lovenox 40mg SC and Protonix    Diet: General    Disposition: Possible discharge later today        Electronically signed by Darylene Cloud, MD PGY-3 on 5/28/2022 at 6:28 AM  This case was discussed with attending physician: Dr. Naima Larios        This note was dictated with [address].

## 2022-05-28 NOTE — PLAN OF CARE
Problem: Pain  Goal: Verbalizes/displays adequate comfort level or baseline comfort level  5/28/2022 0933 by Nikolay Suresh RN  Outcome: Progressing     Problem: Safety - Adult  Goal: Free from fall injury  5/28/2022 0933 by Nikolay Suresh RN  Outcome: Progressing     Problem: ABCDS Injury Assessment  Goal: Absence of physical injury  5/28/2022 0933 by Nikolay Suresh RN  Outcome: Progressing

## 2022-05-28 NOTE — CARE COORDINATION
Social Work Discharge Planning:  Discharge order noted. SW spoke with nurse anticipate patient having no needs.   Electronically signed by LAURITA Michel on 5/28/2022 at 10:08 AM

## 2022-05-31 ENCOUNTER — CARE COORDINATION (OUTPATIENT)
Dept: CASE MANAGEMENT | Age: 77
End: 2022-05-31

## 2022-05-31 LAB
BODY FLUID CULTURE, STERILE: NORMAL
GRAM STAIN RESULT: NORMAL

## 2022-05-31 RX ORDER — ONDANSETRON 4 MG/1
TABLET, FILM COATED ORAL
Qty: 15 TABLET | Refills: 3 | Status: SHIPPED
Start: 2022-05-31 | End: 2022-08-10

## 2022-05-31 NOTE — CARE COORDINATION
[facility] Transitions Initial Follow Up Call    Call within 2 business days of discharge: Yes    Patient: Minerva Perdomo Patient : 1945   MRN: [id number]  Reason for Admission: Ascites  Discharge Date: 22 RARS: Readmission Risk Score: 21.3 ( )      Last Discharge [facility]       Complaint Diagnosis Description Type Department Provider    22 Abdominal Pain; Shortness of Breath Non-alcoholic cirrhosis ([facility] . .. ED to Hosp-Admission (Discharged) (ADMITTED) RADHA Quiros MD; Devin Pastrana. .. Attempted to contact patient today 22 for TCM/hospital discharge follow up. Left message on home/mobile number requesting a return call back to CTN and provided contact information.       XENIA Lomax

## 2022-06-01 ENCOUNTER — CARE COORDINATION (OUTPATIENT)
Dept: CASE MANAGEMENT | Age: 77
End: 2022-06-01

## 2022-06-01 NOTE — LETTER
[facility] CASE MANAGEMENT  [address] [facility]  Phone: [address] [facility], APRN        June 1, 2022    Rosette Warner  [address]  [address]      Dear Trupti Tamayo:    Dear Rosette Warner    My name is Lilly Carlos, MSN, APRN and I am a Care Transition Nurse who partners with Dr. Pat Saucedo to improve patients' health. I've been trying to reach you via phone to let you know that, as a member of your care team, I will work with other providers involved in your care, offer education for your specific health conditions, and connect you with more resources as needed. This program is designed to provide you with the opportunity to have a ([facility]) care manager partner with you for the following situations:     1) if you come home from the hospital or emergency room   2) to help manage your disease   3) when you would like assistance coordinating services or appointments    This added support is provided at no additional cost to you. My primary focus is to help you achieve specific goals and improve your health. Please call me at [phone] to further discuss how I can support your health care needs. If you have any questions or concerns, please don't hesitate to call.     Sincerely,    XENIA Vazquez

## 2022-06-01 NOTE — CARE COORDINATION
[facility] Transitions Initial Follow Up Call    Call within 2 business days of discharge: Yes    Patient: Ankit Harris Patient : 1945   MRN: [id number]  Reason for Admission: Ascites  Discharge Date: 22 RARS: Readmission Risk Score: 21.3 ( )      Last Discharge [facility]       Complaint Diagnosis Description Type Department Provider    22 Abdominal Pain; Shortness of Breath Non-alcoholic cirrhosis ([facility] . .. ED to Hosp-Admission (Discharged) (ADMITTED) YZ NANDO Hough MD; Liberty Tubbs. .. Second attempt made today 22 for TCM/hospital discharge follow up. Left message on home/mobile number requesting a return call back to CTN and providing contact information. CTN signing off if no return call.       XENIA Winter

## 2022-06-03 ENCOUNTER — TELEPHONE (OUTPATIENT)
Dept: FAMILY MEDICINE CLINIC | Age: 77
End: 2022-06-03

## 2022-06-03 NOTE — TELEPHONE ENCOUNTER
----- Message from Tiffanie Means sent at 6/3/2022 12:34 PM EDT -----  Subject: Appointment Request    Reason for Call: Routine Hospital Follow Up    QUESTIONS  Type of Appointment? Established Patient  Reason for appointment request? Available appointments did not meet   patient need  Additional Information for Provider? patient needs hospital follow up   scheduled , first avail. is not until july on our schedule. patient needs   afternoon apt , any day will work but june 16th, please call to schedule   ---------------------------------------------------------------------------  --------------  CALL BACK INFO  What is the best way for the office to contact you? OK to leave message on   voicemail  Preferred Call Back Phone Number? [phone]  ---------------------------------------------------------------------------  --------------  SCRIPT ANSWERS  Relationship to Patient? Self  (Patient requests to see provider urgently. )? No  (Has the patient been discharged from the hospital within 2 business days   AND does not have a Telephone Encounter  Follow Up From [address] [place] [address]   documented in [address]?)? No  Do you have any questions for your primary care provider that need to be   answered prior to your appointment? (Use RN Triage if question pertains to   anything on the red flag list)? No  (Patient needs follow up visit after hospital discharge) Book first   available appointment within 7 days OF DISCHARGE, if no appt, proceed to   book the next available time slot within 14 days OF DISCHARGE AND Send   Message to Provider. [address] [facility] Follow Up appointment cannot be booked   beyond 14 Days and should result in a Message to Provider. ? Yes   Have you been diagnosed with, awaiting test results for, or told that you   are suspected of having COVID-19 (Coronavirus)? (If patient has tested   negative or was tested as a requirement for work, school, or travel and   not based on symptoms, answer no)?  No  Within the past 10 days have you developed any of the following symptoms   (answer no if symptoms have been present longer than 10 days or began   more than 10 days ago)? Fever or Chills, Cough, Shortness of breath or   difficulty breathing, Loss of taste or smell, Sore throat, Nasal   congestion, Sneezing or runny nose, Fatigue or generalized body aches   (answer no if pain is specific to a body part e.g. back pain), Diarrhea,   Headache? No  Have you had close contact with someone with COVID-19 in the last 7 days? No  (Service Expert  click yes below to proceed with [employer] As Usual   Scheduling)?  Yes

## 2022-06-10 DIAGNOSIS — K74.60 UNSPECIFIED CIRRHOSIS OF LIVER (HCC): ICD-10-CM

## 2022-06-13 RX ORDER — AMLODIPINE BESYLATE 2.5 MG/1
TABLET ORAL
Qty: 30 TABLET | Refills: 5 | Status: SHIPPED
Start: 2022-06-13 | End: 2022-08-10

## 2022-06-13 RX ORDER — FUROSEMIDE 40 MG/1
TABLET ORAL
Qty: 30 TABLET | Refills: 5 | Status: ON HOLD
Start: 2022-06-13 | End: 2022-07-15 | Stop reason: SDUPTHER

## 2022-06-13 RX ORDER — RIFAXIMIN 550 MG/1
TABLET ORAL
Qty: 60 TABLET | Refills: 5 | Status: SHIPPED
Start: 2022-06-13 | End: 2022-08-10

## 2022-06-13 RX ORDER — FERROUS SULFATE 325(65) MG
TABLET ORAL
Qty: 30 TABLET | Refills: 5 | OUTPATIENT
Start: 2022-06-13

## 2022-06-13 RX ORDER — SPIRONOLACTONE 100 MG/1
TABLET, FILM COATED ORAL
Qty: 30 TABLET | Refills: 5 | Status: SHIPPED
Start: 2022-06-13 | End: 2022-08-10

## 2022-06-16 ENCOUNTER — TELEPHONE (OUTPATIENT)
Dept: HEMATOLOGY | Age: 77
End: 2022-06-16

## 2022-06-16 NOTE — TELEPHONE ENCOUNTER
Patient was scheduled to see Dr. Caden Mon and per Dr. Caden Mon he would like patient to be scheduled with Dr. Leon Villegas office instead. I called Dr. Leon Villegas and spoke to [facility] and she was able to get patient an appt 6/22/22 at 11:00am at the [place] office with Trista Encinas NP. I called patient and left her a VM that we are going to cancel her appt with Dr. Caden Mon and I got her set up with another doctor and for her to call the office back to give her the appt information. Electronically signed by Danielle Cunningham RN on 6/16/2022 at 8:10 AM      I called and spoke to son [employer] and explained the above appt change to him. I gave him the date, time and location for the appt with Trista Encinas NP at [place] office. I gave him directions to the [place] office and I gave him the number to [facility] office for any further questions or need to change this appt. He verbalized understanding.     Electronically signed by Danielle Cunningham RN on 6/16/2022 at 10:29 AM

## 2022-06-22 ENCOUNTER — INITIAL CONSULT (OUTPATIENT)
Dept: GASTROENTEROLOGY | Age: 77
End: 2022-06-22
Payer: MEDICARE

## 2022-06-22 VITALS
HEIGHT: 62 IN | DIASTOLIC BLOOD PRESSURE: 71 MMHG | HEART RATE: 92 BPM | SYSTOLIC BLOOD PRESSURE: 122 MMHG | OXYGEN SATURATION: 97 % | WEIGHT: 182 LBS | BODY MASS INDEX: 33.49 KG/M2

## 2022-06-22 DIAGNOSIS — K74.60 CIRRHOSIS OF LIVER WITH ASCITES, UNSPECIFIED HEPATIC CIRRHOSIS TYPE (HCC): ICD-10-CM

## 2022-06-22 DIAGNOSIS — E46 PROTEIN-CALORIE MALNUTRITION, UNSPECIFIED SEVERITY (HCC): ICD-10-CM

## 2022-06-22 DIAGNOSIS — K74.60 CIRRHOSIS OF LIVER WITH ASCITES, UNSPECIFIED HEPATIC CIRRHOSIS TYPE (HCC): Primary | ICD-10-CM

## 2022-06-22 DIAGNOSIS — R18.8 CIRRHOSIS OF LIVER WITH ASCITES, UNSPECIFIED HEPATIC CIRRHOSIS TYPE (HCC): Primary | ICD-10-CM

## 2022-06-22 DIAGNOSIS — R18.8 CIRRHOSIS OF LIVER WITH ASCITES, UNSPECIFIED HEPATIC CIRRHOSIS TYPE (HCC): ICD-10-CM

## 2022-06-22 LAB
ALBUMIN SERPL-MCNC: 3.1 G/DL (ref 3.5–5.2)
ALP BLD-CCNC: 97 U/L (ref 35–104)
ALT SERPL-CCNC: 10 U/L (ref 0–32)
AMMONIA: 91 UMOL/L (ref 11–51)
AST SERPL-CCNC: 28 U/L (ref 0–31)
BILIRUB SERPL-MCNC: 1.8 MG/DL (ref 0–1.2)
BILIRUBIN DIRECT: 0.5 MG/DL (ref 0–0.3)
BILIRUBIN, INDIRECT: 1.3 MG/DL (ref 0–1)
GAMMA GLUTAMYL TRANSFERASE: 29 U/L (ref 6–42)
INR BLD: 1.5
PROTHROMBIN TIME: 16.8 SEC (ref 9.3–12.4)
TOTAL PROTEIN: 7.7 G/DL (ref 6.4–8.3)

## 2022-06-22 PROCEDURE — 99202 OFFICE O/P NEW SF 15 MIN: CPT | Performed by: NURSE PRACTITIONER

## 2022-06-22 PROCEDURE — 1124F ACP DISCUSS-NO DSCNMKR DOCD: CPT | Performed by: NURSE PRACTITIONER

## 2022-06-22 NOTE — PROGRESS NOTES
Mira Weston (:  1945) is a 68 y.o. female, here for evaluation of the following chief complaint(s):  New Patient (ref from dr Yolanda Quintanilla for cirrhosis)      SUBJECTIVE/OBJECTIVE:  HPI:    Davin Mcgarry is a very pleasant 68year old female with a history of nonalcoholic cirrhosis, DMII, KATE, CKD, breast cancer, HTN,and nephrolithiasis. Patient is here to establish with our practice. She is accompanied by her son, Humberto Estevez, today. Patient presented to the ER on  with progressive swelling of the abdomen, SOB, and abdominal pain. CT scan in the ER noted   Impression   1. Markedly cirrhotic liver with large volume ascites and prominent varices. 2. Too small to definitively characterize hypodense lesions in the liver and   spleen, likely benign. 3. Left nephrolithiasis.  No hydronephrosis. 4. Severe distal colonic diverticulosis without diverticulitis. Last EGD was in 2019 that showed a normal esophagus and moderate gastritis. Complains of nausea and abdominal pain \"all the time\"  Had paracentesis on  with a total of 4900 cc of colored fluid removed  Patients weight today is 182 lbs. Her weight in the ER was 193 lbs. The nausea can be with meals and randomly  There is vomiting that occurs about once a week  Ondansetron does satisfy nausea  Taking Pantoprazole 40 mg daily as directed    Patient takes Xifaxin 550 mg twice a day along with Lactulose 20 g three times a day. Denies diarrhea but admits to regular bowel movements. Also takes Furosemide 40 mg daily and Spironolactone 100 mg daily. ROS:  General: Patient denies n/v/f/c or weight loss. HEENT: Patient denies persistent postnasal drip, scleral icterus, drooling, persistent bleeding from nose/mouth. Resp: Patient denies SOB, wheezing, productive cough. Cards: Patient denies CP, palpitations, significant edema  GI: As above. Derm: Patient denies jaundice/rashes.    Musc: Patient denies diffuse/irregular joint swelling or myalgias. Objective   Wt Readings from Last 3 Encounters:   06/22/22 182 lb (82.6 kg)   05/26/22 193 lb (87.5 kg)   05/26/22 193 lb (87.5 kg)     Temp Readings from Last 3 Encounters:   05/28/22 98.2 °F (36.8 °C) (Temporal)   05/26/22 97.9 °F (36.6 °C) (Temporal)   01/18/22 98.2 °F (36.8 °C) (Temporal)     BP Readings from Last 3 Encounters:   06/22/22 122/71   05/28/22 126/69   05/26/22 (!) 140/80     Pulse Readings from Last 3 Encounters:   06/22/22 92   05/28/22 (!) 102   05/26/22 (!) 105        Physical Exam  Cardiovascular:      Heart sounds: Normal heart sounds. Pulmonary:      Breath sounds: Normal breath sounds. Abdominal:      General: Bowel sounds are normal. There is distension. Palpations: Abdomen is soft. Musculoskeletal:      Comments: Utilizes a cane for ambulation. Neurological:      Mental Status: She is alert.          Past Medical History:   Diagnosis Date    Breast cancer ([address]     Cirrhosis ([address]     Essential hypertension     Hx of blood clots     Hyperlipidemia     Osteopenia     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 ([address]     Sleep apnea     Spinal stenosis     Type 2 diabetes mellitus ([address]       Past Surgical History:   Procedure Laterality Date    BLADDER SURGERY Right 11/26/2021    CYSTOSCOPY RETROGRADE PYELOGRAM RIGHT  STENT INSERTION performed by Filipe Zhou MD at [address]UMPECTOMY      lumpectomy [address] TEST      Lexiscan stress test    CARPAL TUNNEL RELEASE      COLONOSCOPY  01/31/2017    multiple polyps; diverticula--jerod    COLONOSCOPY  04/23/2019    polyps; diverticula; hemorrhoids--jerod    COLONOSCOPY N/A 04/23/2019    COLONOSCOPY POLYPECTOMY SNARE/COLD BIOPSY performed by Юлия Hardy MD at [place] [address]  04/23/2019    COLONOSCOPY WITH BIOPSY performed by Юлия Hardy MD at [facility] [address] ([address]      LITHOTRIPSY Left 05/04/2016    C-R STENT PLACEMENT    SHOULDER ARTHROPLASTY Left 12/21/2020    LEFT REVERSE TOTAL SHOULDER  ARTHROPLASTY -- DEPUY performed by Riddhi Garg MD at [address]  04/23/2019    gastritis--jerod    UPPER GASTROINTESTINAL ENDOSCOPY N/A 04/23/2019    EGD BIOPSY performed by Paz Herrera MD at [address] History   Problem Relation Age of Onset    Arthritis Mother     COPD Father     Heart Attack Father     Cirrhosis Brother         non alcoholic    Heart Disease Brother     Cancer Other 48        colon    Prostate Cancer Child     Hypertension Child     Hypertension Child         Lab Results   Component Value Date    WBC 3.9 (L) 05/28/2022    HGB 9.1 (L) 05/28/2022    HCT 28.2 (L) 05/28/2022    MCV 98.9 05/28/2022     05/28/2022      Lab Results   Component Value Date     05/28/2022    K 4.1 05/28/2022     05/28/2022    CO2 21 (L) 05/28/2022    BUN 12 05/28/2022    CREATININE 0.9 05/28/2022    GLUCOSE 76 05/28/2022    CALCIUM 9.3 05/28/2022    PROT 6.2 (L) 05/28/2022    LABALBU 2.7 (L) 05/28/2022    BILITOT 1.5 (H) 05/28/2022    ALKPHOS 75 05/28/2022    AST 18 05/28/2022    ALT 6 05/28/2022    LABGLOM >60 05/28/2022    GFRAA >60 05/28/2022                       ASSESSMENT/PLAN:    1. Cirrhosis of liver with ascites, unspecified hepatic cirrhosis type ([facility]  -     Protime-INR; Future  -     Hepatic Function Panel; Future  -     Gamma GT; Future  -     AFP Tumor Marker; Future  -     Ammonia; Future  2. Protein-calorie malnutrition, unspecified severity ([facility]      -Routine labs ordered  -Will proceed with EGD for variceal surveillance. Patient is agreeable  -Patient will continue xifaxin, lactulose, and diuretics as directed  -MELD score 13  -[facility] surveillance every 6 months. Recent CT notes a small hypodense foci in the liver likely represents a cyst.   -Follow up secured with Dr. Boris Metzger    Return for Follow up with Dr. Boris Metzger.     An electronic signature was used to authenticate this note.     --XENIA Garcia - CNP

## 2022-06-23 ENCOUNTER — TELEPHONE (OUTPATIENT)
Dept: GASTROENTEROLOGY | Age: 77
End: 2022-06-23

## 2022-06-23 ENCOUNTER — OFFICE VISIT (OUTPATIENT)
Dept: FAMILY MEDICINE CLINIC | Age: 77
End: 2022-06-23
Payer: MEDICARE

## 2022-06-23 VITALS
DIASTOLIC BLOOD PRESSURE: 76 MMHG | HEART RATE: 93 BPM | SYSTOLIC BLOOD PRESSURE: 116 MMHG | TEMPERATURE: 97.4 F | BODY MASS INDEX: 33.34 KG/M2 | OXYGEN SATURATION: 97 % | RESPIRATION RATE: 16 BRPM | HEIGHT: 62 IN | WEIGHT: 181.2 LBS

## 2022-06-23 DIAGNOSIS — E72.20 HYPERAMMONEMIA (HCC): ICD-10-CM

## 2022-06-23 DIAGNOSIS — M25.50 POLYARTHRALGIA: Primary | ICD-10-CM

## 2022-06-23 DIAGNOSIS — K74.60 HEPATIC CIRRHOSIS, UNSPECIFIED HEPATIC CIRRHOSIS TYPE, UNSPECIFIED WHETHER ASCITES PRESENT (HCC): ICD-10-CM

## 2022-06-23 PROCEDURE — G8417 CALC BMI ABV UP PARAM F/U: HCPCS | Performed by: FAMILY MEDICINE

## 2022-06-23 PROCEDURE — 1090F PRES/ABSN URINE INCON ASSESS: CPT | Performed by: FAMILY MEDICINE

## 2022-06-23 PROCEDURE — 1036F TOBACCO NON-USER: CPT | Performed by: FAMILY MEDICINE

## 2022-06-23 PROCEDURE — 1111F DSCHRG MED/CURRENT MED MERGE: CPT | Performed by: FAMILY MEDICINE

## 2022-06-23 PROCEDURE — 99214 OFFICE O/P EST MOD 30 MIN: CPT | Performed by: FAMILY MEDICINE

## 2022-06-23 PROCEDURE — 1124F ACP DISCUSS-NO DSCNMKR DOCD: CPT | Performed by: FAMILY MEDICINE

## 2022-06-23 PROCEDURE — G8399 PT W/DXA RESULTS DOCUMENT: HCPCS | Performed by: FAMILY MEDICINE

## 2022-06-23 PROCEDURE — G8427 DOCREV CUR MEDS BY ELIG CLIN: HCPCS | Performed by: FAMILY MEDICINE

## 2022-06-23 RX ORDER — LACTULOSE 10 G/15ML
30 SOLUTION ORAL 3 TIMES DAILY
Qty: 4050 ML | Refills: 5 | Status: SHIPPED
Start: 2022-06-23 | End: 2022-06-29

## 2022-06-23 ASSESSMENT — PATIENT HEALTH QUESTIONNAIRE - PHQ9
SUM OF ALL RESPONSES TO PHQ9 QUESTIONS 1 & 2: 0
5. POOR APPETITE OR OVEREATING: 3
SUM OF ALL RESPONSES TO PHQ QUESTIONS 1-9: 6
SUM OF ALL RESPONSES TO PHQ QUESTIONS 1-9: 6
4. FEELING TIRED OR HAVING LITTLE ENERGY: 3
SUM OF ALL RESPONSES TO PHQ QUESTIONS 1-9: 6
8. MOVING OR SPEAKING SO SLOWLY THAT OTHER PEOPLE COULD HAVE NOTICED. OR THE OPPOSITE, BEING SO FIGETY OR RESTLESS THAT YOU HAVE BEEN MOVING AROUND A LOT MORE THAN USUAL: 0
7. TROUBLE CONCENTRATING ON THINGS, SUCH AS READING THE NEWSPAPER OR WATCHING TELEVISION: 0
9. THOUGHTS THAT YOU WOULD BE BETTER OFF DEAD, OR OF HURTING YOURSELF: 0
1. LITTLE INTEREST OR PLEASURE IN DOING THINGS: 0
SUM OF ALL RESPONSES TO PHQ QUESTIONS 1-9: 6
6. FEELING BAD ABOUT YOURSELF - OR THAT YOU ARE A FAILURE OR HAVE LET YOURSELF OR YOUR FAMILY DOWN: 0
3. TROUBLE FALLING OR STAYING ASLEEP: 0
10. IF YOU CHECKED OFF ANY PROBLEMS, HOW DIFFICULT HAVE THESE PROBLEMS MADE IT FOR YOU TO DO YOUR WORK, TAKE CARE OF THINGS AT HOME, OR GET ALONG WITH OTHER PEOPLE: 2
2. FEELING DOWN, DEPRESSED OR HOPELESS: 0

## 2022-06-23 NOTE — PROGRESS NOTES
Progress Note    Subjective:   Hospital follow-up for ascites. Per inpt team:  Hospital Course: Qian Crook is a 68 y.o. female past medical history nonalcoholic cirrhosis, CKD, type 2 diabetes, KATE, hypertension, history of PE, history of breast cancer presented for abdominal pain, increased swelling, shortness of breath.  Patient with history of therapeutic tap over 1 year ago. Phylicia Cunha reports progressive swelling of her abdomen with associated shortness of breath and abdominal pain over the last few months.  Sent from PCPs office for therapeutic tap.  Status post therapeutic tap 5/27. She reports significant improvement in her symptoms after tap, abdominal pain and swelling greatly improved. She did tolerate p.o. intake after the tap. Breathing is much improved with removal fluid. She had an otherwise uneventful course and progressed well. Pain was controlled. She was tolerating a regular diet with no nausea or vomiting, was ambulating well, and was in a suitable condition for discharge to home. Yesterday established with Dr. Chito Jarvis. Per his office note:  ASSESSMENT/PLAN:  1. Cirrhosis of liver with ascites, unspecified hepatic cirrhosis type ([address]  -     Protime-INR; Future  -     Hepatic Function Panel; Future  -     Gamma GT; Future  -     AFP Tumor Marker; Future  -     Ammonia; Future  2. Protein-calorie malnutrition, unspecified severity ([address]  -Routine labs ordered  -Will proceed with EGD for variceal surveillance. Patient is agreeable  -Patient will continue xifaxin, lactulose, and diuretics as directed  -MELD score 13  -[address] surveillance every 6 months. Recent CT notes a small hypodense foci in the liver likely represents a cyst.   -Follow up secured with Dr. Chito Jarvis  Return for Follow up with Dr. Chito Jarvis. An electronic signature was used to authenticate this note. --XENIA Posadas - CNP     Pt feels better since tap. Swelling controlled. Ammonia > 90.  Sometimes confusion \"if my ammonia level gets too high\". 1 BM daily. Polyarthralgia. Pos TERRELL and HLA b27. Saw Rheum in 2017, but not since. Health Maintenance Due   Topic Date Due    Hepatitis A vaccine (1 of 2 - Risk 2-dose series) Never done    COVID-19 Vaccine (3 - Booster for Pfizer series) 09/16/2021    Depression Monitoring  01/12/2022    Annual Wellness Visit (AWV)  01/13/2022    Colorectal Cancer Screen  04/23/2022           Objective:   /76 (Site: Right Upper Arm, Position: Sitting, Cuff Size: Large Adult)   Pulse 93   Temp 97.4 °F (36.3 °C)   Resp 16   Ht 5' 2\" (1.575 m)   Wt 181 lb 3.2 oz (82.2 kg)   SpO2 97%   BMI 33.14 kg/m²   General appearance: NAD, alert and interacting appropriately  HEENT: NCAT, PERRLA, EOMI   Resp: CTAB, no WRC  CVS: RRR, no MRG  Abdomen: BS +, SNDNT  Extremities: No clubbing, cyanosis, or edema. Warm. Dry. I have reviewed this patient's previous records. I have reviewed this patient's labs. I have reviewed this patient's imaging reports. I have reviewed this patient's medications. Assessment/Plan: Ricky Valdez was seen today for follow-up. Diagnoses and all orders for this visit:    [facility], Rheumatology, [place]    Hepatic cirrhosis, unspecified hepatic cirrhosis type, unspecified whether ascites present ([address]    Hyperammonemia ([address]      Cirrhosis not controlled. polyarthralgia not controlled. Hyperammonemia not controlled. Advised pt increase lactulose for the next 3 days, then return to baseline which worked for pt in the past.         There are no Patient Instructions on file for this visit. Return in about 3 months (around 9/23/2022) for polyarthralgia.         Electronically signed by Daily Guevara MD on 6/23/2022 at 3:45 PM

## 2022-06-23 NOTE — TELEPHONE ENCOUNTER
Attempted to call patient and son, Yvette Forrest. Left message on sons voicemail to return call regarding lab work.

## 2022-06-25 LAB — AFP-TUMOR MARKER: 5 NG/ML (ref 0–9)

## 2022-06-28 ENCOUNTER — TELEPHONE (OUTPATIENT)
Dept: GASTROENTEROLOGY | Age: 77
End: 2022-06-28

## 2022-06-28 NOTE — TELEPHONE ENCOUNTER
Prior Authorization Form:      DEMOGRAPHICS:                     Patient Name:  Brielle Perez  Patient :  1945            Insurance:  Payor: Celena Gan / Plan: Heathersascha Vance GOLD PLUS HMO / Product Type: *No Product type* /   Insurance ID Number:    Payor/Plan Subscr  Sex Relation Sub. Ins. ID Effective Group Num   1. [address] [place] R 1945 Female Self [id number]                                   [address] [address]   2.  Azalia Blue* [facility] R 1945 Female Self [id number] 21 [id number]                                   [address]         DIAGNOSIS & PROCEDURE:                       Procedure/Operation: EGD           CPT Code: 00255    Diagnosis:  Cirrhosis    ICD10 Code: K74.60    Location:  [facility]    Surgeon:  Dr. Garima Goncalves     SCHEDULING INFORMATION:                          Date: 2022    Time: 1130              Anesthesia:  MAC/TIVA                                                       Status:  Outpatient          Electronically signed by Kishor Pierre MA on 2022 at 11:45 AM

## 2022-06-29 RX ORDER — LACTULOSE 10 G/15ML
SOLUTION ORAL
Qty: 2000 ML | Refills: 3 | Status: ON HOLD
Start: 2022-06-29 | End: 2022-08-04 | Stop reason: HOSPADM

## 2022-07-01 RX ORDER — FELODIPINE 2.5 MG/1
TABLET, EXTENDED RELEASE ORAL
Qty: 90 TABLET | Refills: 1 | Status: ON HOLD
Start: 2022-07-01 | End: 2022-07-15 | Stop reason: HOSPADM

## 2022-07-05 RX ORDER — FERROUS SULFATE 325(65) MG
TABLET ORAL
Qty: 30 TABLET | Refills: 5 | OUTPATIENT
Start: 2022-07-05

## 2022-07-06 RX ORDER — VENLAFAXINE HYDROCHLORIDE 75 MG/1
75 CAPSULE, EXTENDED RELEASE ORAL DAILY
Qty: 30 CAPSULE | Refills: 3 | Status: ON HOLD
Start: 2022-07-06 | End: 2022-08-16 | Stop reason: HOSPADM

## 2022-07-08 RX ORDER — FERROUS SULFATE 325(65) MG
TABLET ORAL
Qty: 30 TABLET | Refills: 5 | Status: ON HOLD
Start: 2022-07-08 | End: 2022-07-12

## 2022-07-11 ENCOUNTER — APPOINTMENT (OUTPATIENT)
Dept: CT IMAGING | Age: 77
DRG: 186 | End: 2022-07-11
Payer: MEDICARE

## 2022-07-11 ENCOUNTER — HOSPITAL ENCOUNTER (INPATIENT)
Age: 77
LOS: 4 days | Discharge: HOME HEALTH | DRG: 186 | End: 2022-07-15
Attending: EMERGENCY MEDICINE | Admitting: STUDENT IN AN ORGANIZED HEALTH CARE EDUCATION/TRAINING PROGRAM
Payer: MEDICARE

## 2022-07-11 DIAGNOSIS — N18.30 STAGE 3 CHRONIC KIDNEY DISEASE, UNSPECIFIED WHETHER STAGE 3A OR 3B CKD (HCC): ICD-10-CM

## 2022-07-11 DIAGNOSIS — R18.8 CIRRHOSIS OF LIVER WITH ASCITES, UNSPECIFIED HEPATIC CIRRHOSIS TYPE (HCC): ICD-10-CM

## 2022-07-11 DIAGNOSIS — J90 PLEURAL EFFUSION ON LEFT: ICD-10-CM

## 2022-07-11 DIAGNOSIS — J90 PLEURAL EFFUSION: Primary | ICD-10-CM

## 2022-07-11 DIAGNOSIS — K74.60 CIRRHOSIS OF LIVER WITH ASCITES, UNSPECIFIED HEPATIC CIRRHOSIS TYPE (HCC): ICD-10-CM

## 2022-07-11 DIAGNOSIS — J96.01 ACUTE RESPIRATORY FAILURE WITH HYPOXIA (HCC): ICD-10-CM

## 2022-07-11 LAB
ALBUMIN SERPL-MCNC: 3.1 G/DL (ref 3.5–5.2)
ALP BLD-CCNC: 96 U/L (ref 35–104)
ALT SERPL-CCNC: 5 U/L (ref 0–32)
ANION GAP SERPL CALCULATED.3IONS-SCNC: 8 MMOL/L (ref 7–16)
APTT: 34.1 SEC (ref 24.5–35.1)
AST SERPL-CCNC: 23 U/L (ref 0–31)
BASOPHILS ABSOLUTE: 0.05 E9/L (ref 0–0.2)
BASOPHILS RELATIVE PERCENT: 0.9 % (ref 0–2)
BILIRUB SERPL-MCNC: 2.2 MG/DL (ref 0–1.2)
BILIRUBIN DIRECT: 0.6 MG/DL (ref 0–0.3)
BILIRUBIN, INDIRECT: 1.6 MG/DL (ref 0–1)
BUN BLDV-MCNC: 10 MG/DL (ref 6–23)
CALCIUM SERPL-MCNC: 10.7 MG/DL (ref 8.6–10.2)
CHLORIDE BLD-SCNC: 108 MMOL/L (ref 98–107)
CO2: 22 MMOL/L (ref 22–29)
CREAT SERPL-MCNC: 0.9 MG/DL (ref 0.5–1)
EKG ATRIAL RATE: 105 BPM
EKG P AXIS: 34 DEGREES
EKG P-R INTERVAL: 136 MS
EKG Q-T INTERVAL: 302 MS
EKG QRS DURATION: 68 MS
EKG QTC CALCULATION (BAZETT): 399 MS
EKG R AXIS: 19 DEGREES
EKG T AXIS: 11 DEGREES
EKG VENTRICULAR RATE: 105 BPM
EOSINOPHILS ABSOLUTE: 0.25 E9/L (ref 0.05–0.5)
EOSINOPHILS RELATIVE PERCENT: 4.4 % (ref 0–6)
GFR AFRICAN AMERICAN: >60
GFR NON-AFRICAN AMERICAN: >60 ML/MIN/1.73
GLUCOSE BLD-MCNC: 89 MG/DL (ref 74–99)
HCT VFR BLD CALC: 34.8 % (ref 34–48)
HEMOGLOBIN: 11.8 G/DL (ref 11.5–15.5)
IMMATURE GRANULOCYTES #: 0.02 E9/L
IMMATURE GRANULOCYTES %: 0.3 % (ref 0–5)
INR BLD: 1.6
LACTIC ACID, SEPSIS: 1.4 MMOL/L (ref 0.5–1.9)
LYMPHOCYTES ABSOLUTE: 0.66 E9/L (ref 1.5–4)
LYMPHOCYTES RELATIVE PERCENT: 11.5 % (ref 20–42)
MCH RBC QN AUTO: 32.8 PG (ref 26–35)
MCHC RBC AUTO-ENTMCNC: 33.9 % (ref 32–34.5)
MCV RBC AUTO: 96.7 FL (ref 80–99.9)
MONOCYTES ABSOLUTE: 0.64 E9/L (ref 0.1–0.95)
MONOCYTES RELATIVE PERCENT: 11.1 % (ref 2–12)
NEUTROPHILS ABSOLUTE: 4.12 E9/L (ref 1.8–7.3)
NEUTROPHILS RELATIVE PERCENT: 71.8 % (ref 43–80)
PDW BLD-RTO: 18 FL (ref 11.5–15)
PLATELET # BLD: 200 E9/L (ref 130–450)
PMV BLD AUTO: 10.6 FL (ref 7–12)
POTASSIUM REFLEX MAGNESIUM: 4 MMOL/L (ref 3.5–5)
PRO-BNP: 59 PG/ML (ref 0–450)
PROTHROMBIN TIME: 17.4 SEC (ref 9.3–12.4)
RBC # BLD: 3.6 E12/L (ref 3.5–5.5)
SARS-COV-2, NAAT: NOT DETECTED
SODIUM BLD-SCNC: 138 MMOL/L (ref 132–146)
TOTAL PROTEIN: 7.6 G/DL (ref 6.4–8.3)
TROPONIN, HIGH SENSITIVITY: 13 NG/L (ref 0–9)
TROPONIN, HIGH SENSITIVITY: 15 NG/L (ref 0–9)
WBC # BLD: 5.7 E9/L (ref 4.5–11.5)

## 2022-07-11 PROCEDURE — 36415 COLL VENOUS BLD VENIPUNCTURE: CPT

## 2022-07-11 PROCEDURE — 6370000000 HC RX 637 (ALT 250 FOR IP): Performed by: EMERGENCY MEDICINE

## 2022-07-11 PROCEDURE — 71275 CT ANGIOGRAPHY CHEST: CPT

## 2022-07-11 PROCEDURE — 85730 THROMBOPLASTIN TIME PARTIAL: CPT

## 2022-07-11 PROCEDURE — 87635 SARS-COV-2 COVID-19 AMP PRB: CPT

## 2022-07-11 PROCEDURE — 83605 ASSAY OF LACTIC ACID: CPT

## 2022-07-11 PROCEDURE — 2060000000 HC ICU INTERMEDIATE R&B

## 2022-07-11 PROCEDURE — 6360000004 HC RX CONTRAST MEDICATION: Performed by: RADIOLOGY

## 2022-07-11 PROCEDURE — 99285 EMERGENCY DEPT VISIT HI MDM: CPT

## 2022-07-11 PROCEDURE — 83880 ASSAY OF NATRIURETIC PEPTIDE: CPT

## 2022-07-11 PROCEDURE — 85025 COMPLETE CBC W/AUTO DIFF WBC: CPT

## 2022-07-11 PROCEDURE — 80076 HEPATIC FUNCTION PANEL: CPT

## 2022-07-11 PROCEDURE — 84484 ASSAY OF TROPONIN QUANT: CPT

## 2022-07-11 PROCEDURE — 93005 ELECTROCARDIOGRAM TRACING: CPT | Performed by: EMERGENCY MEDICINE

## 2022-07-11 PROCEDURE — 6370000000 HC RX 637 (ALT 250 FOR IP): Performed by: STUDENT IN AN ORGANIZED HEALTH CARE EDUCATION/TRAINING PROGRAM

## 2022-07-11 PROCEDURE — 87040 BLOOD CULTURE FOR BACTERIA: CPT

## 2022-07-11 PROCEDURE — 71045 X-RAY EXAM CHEST 1 VIEW: CPT

## 2022-07-11 PROCEDURE — 80048 BASIC METABOLIC PNL TOTAL CA: CPT

## 2022-07-11 PROCEDURE — 85610 PROTHROMBIN TIME: CPT

## 2022-07-11 RX ORDER — ACETAMINOPHEN 500 MG
1000 TABLET ORAL ONCE
Status: COMPLETED | OUTPATIENT
Start: 2022-07-11 | End: 2022-07-11

## 2022-07-11 RX ORDER — IPRATROPIUM BROMIDE AND ALBUTEROL SULFATE 2.5; .5 MG/3ML; MG/3ML
1 SOLUTION RESPIRATORY (INHALATION)
Status: COMPLETED | OUTPATIENT
Start: 2022-07-11 | End: 2022-07-11

## 2022-07-11 RX ADMIN — IOPAMIDOL 75 ML: 755 INJECTION, SOLUTION INTRAVENOUS at 15:50

## 2022-07-11 RX ADMIN — IPRATROPIUM BROMIDE AND ALBUTEROL SULFATE 1 AMPULE: 2.5; .5 SOLUTION RESPIRATORY (INHALATION) at 13:45

## 2022-07-11 RX ADMIN — IPRATROPIUM BROMIDE AND ALBUTEROL SULFATE 1 AMPULE: 2.5; .5 SOLUTION RESPIRATORY (INHALATION) at 14:00

## 2022-07-11 RX ADMIN — ACETAMINOPHEN 1000 MG: 500 TABLET ORAL at 18:57

## 2022-07-11 RX ADMIN — IPRATROPIUM BROMIDE AND ALBUTEROL SULFATE 1 AMPULE: 2.5; .5 SOLUTION RESPIRATORY (INHALATION) at 14:16

## 2022-07-11 ASSESSMENT — PAIN SCALES - GENERAL
PAINLEVEL_OUTOF10: 9
PAINLEVEL_OUTOF10: 4
PAINLEVEL_OUTOF10: 7

## 2022-07-11 ASSESSMENT — PAIN - FUNCTIONAL ASSESSMENT
PAIN_FUNCTIONAL_ASSESSMENT: 0-10
PAIN_FUNCTIONAL_ASSESSMENT: PREVENTS OR INTERFERES WITH MANY ACTIVE NOT PASSIVE ACTIVITIES
PAIN_FUNCTIONAL_ASSESSMENT: 0-10

## 2022-07-11 ASSESSMENT — PAIN DESCRIPTION - FREQUENCY: FREQUENCY: CONTINUOUS

## 2022-07-11 ASSESSMENT — PAIN DESCRIPTION - DESCRIPTORS: DESCRIPTORS: HEAVINESS

## 2022-07-11 ASSESSMENT — PAIN DESCRIPTION - LOCATION: LOCATION: CHEST

## 2022-07-11 ASSESSMENT — PAIN DESCRIPTION - ORIENTATION: ORIENTATION: MID

## 2022-07-11 NOTE — ED NOTES
Pt up to bathroom by wheelchair  Placed on oxygen on return to room     Zac Felder RN  07/11/22 [id number]

## 2022-07-11 NOTE — ED PROVIDER NOTES
HPI:  7/11/22, Time: 1:43 PM EDT         Elaina Rivera is a 68 y.o. female presenting to the ED for shortness of breath beginning a few days ago. Symptoms have been moderate to severe in severity, constant, worsened when laying flat and walking, better when sitting up and resting. Associated symptoms of a cough with clear phlegm. She denies any chest pain, fevers, or syncope. No recent travel or immobilization, leg edema, calf tenderness, or hormone use. History does indicate a history of blood clots. She is not on anticoagulation. She is a remote history of breast cancer, currently in remission. She denies abdominal pain, emesis, and diarrhea. I reviewed the patient's chart. She was recently admitted for ascites, and she underwent a therapeutic tap on 5/27/2022. She has a history of cirrhosis. She is unsure who her gastroenterologist is. Patient also is a history of asthma but states it is normally under good control. Review of Systems:   Pertinent positives and negatives are stated within HPI, all other systems reviewed and are negative.          --------------------------------------------- PAST HISTORY ---------------------------------------------  Past Medical History:  has a past medical history of Breast cancer ([facility] Cirrhosis ([facility] Essential hypertension, Hx of blood clots, Hyperlipidemia, Osteopenia, 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 ([facility] Sleep apnea, Spinal stenosis, and Type 2 diabetes mellitus ([facility] Past Surgical History:  has a past surgical history that includes Hysterectomy; Carpal tunnel release; Lithotripsy (Left, 05/04/2016); Colonoscopy (01/31/2017); Breast lumpectomy; Upper gastrointestinal endoscopy (04/23/2019); Colonoscopy (04/23/2019); Upper gastrointestinal endoscopy (N/A, 04/23/2019); Colonoscopy (N/A, 04/23/2019); Colonoscopy (04/23/2019); Shoulder Arthroplasty (Left, 12/21/2020); cardiovascular stress test; and Bladder surgery (Right, 11/26/2021).     Social History: reports that she has never smoked. She has never used smokeless tobacco. She reports that she does not drink alcohol and does not use drugs. Family History: family history includes Arthritis in her mother; COPD in her father; Cancer (age of onset: 48) in an other family member; Cirrhosis in her brother; Heart Attack in her father; Heart Disease in her brother; Hypertension in her child and child; Prostate Cancer in her child. The patients home medications have been reviewed.     Allergies: Lisinopril    -------------------------------------------------- RESULTS -------------------------------------------------  All laboratory and radiology results have been personally reviewed by myself   LABS:  Results for orders placed or performed during the hospital encounter of 07/11/22   COVID-19, Rapid    Specimen: Nasopharyngeal Swab   Result Value Ref Range    SARS-CoV-2, NAAT Not Detected Not Detected   Lactate, Sepsis   Result Value Ref Range    Lactic Acid, Sepsis 1.4 0.5 - 1.9 mmol/L   CBC with Auto Differential   Result Value Ref Range    WBC 5.7 4.5 - 11.5 E9/L    RBC 3.60 3.50 - 5.50 E12/L    Hemoglobin 11.8 11.5 - 15.5 g/dL    Hematocrit 34.8 34.0 - 48.0 %    MCV 96.7 80.0 - 99.9 fL    MCH 32.8 26.0 - 35.0 pg    MCHC 33.9 32.0 - 34.5 %    RDW 18.0 (H) 11.5 - 15.0 fL    Platelets 892 547 - 551 E9/L    MPV 10.6 7.0 - 12.0 fL    Neutrophils % 71.8 43.0 - 80.0 %    Immature Granulocytes % 0.3 0.0 - 5.0 %    Lymphocytes % 11.5 (L) 20.0 - 42.0 %    Monocytes % 11.1 2.0 - 12.0 %    Eosinophils % 4.4 0.0 - 6.0 %    Basophils % 0.9 0.0 - 2.0 %    Neutrophils Absolute 4.12 1.80 - 7.30 E9/L    Immature Granulocytes # 0.02 E9/L    Lymphocytes Absolute 0.66 (L) 1.50 - 4.00 E9/L    Monocytes Absolute 0.64 0.10 - 0.95 E9/L    Eosinophils Absolute 0.25 0.05 - 0.50 E9/L    Basophils Absolute 0.05 0.00 - 0.20 X2/U   Basic Metabolic Panel w/ Reflex to MG   Result Value Ref Range    Sodium 138 132 - 146 mmol/L    Potassium reflex Magnesium 4.0 3.5 - 5.0 mmol/L    Chloride 108 (H) 98 - 107 mmol/L    CO2 22 22 - 29 mmol/L    Anion Gap 8 7 - 16 mmol/L    Glucose 89 74 - 99 mg/dL    BUN 10 6 - 23 mg/dL    CREATININE 0.9 0.5 - 1.0 mg/dL    GFR Non-African American >60 >=60 mL/min/1.73    GFR African American >60     Calcium 10.7 (H) 8.6 - 10.2 mg/dL   APTT   Result Value Ref Range    aPTT 34.1 24.5 - 35.1 sec   Protime-INR   Result Value Ref Range    Protime 17.4 (H) 9.3 - 12.4 sec    INR 1.6    Hepatic function panel   Result Value Ref Range    Total Protein 7.6 6.4 - 8.3 g/dL    Albumin 3.1 (L) 3.5 - 5.2 g/dL    Alkaline Phosphatase 96 35 - 104 U/L    ALT 5 0 - 32 U/L    AST 23 0 - 31 U/L    Total Bilirubin 2.2 (H) 0.0 - 1.2 mg/dL    Bilirubin, Direct 0.6 (H) 0.0 - 0.3 mg/dL    Bilirubin, Indirect 1.6 (H) 0.0 - 1.0 mg/dL   Troponin   Result Value Ref Range    Troponin, High Sensitivity 15 (H) 0 - 9 ng/L   Brain Natriuretic Peptide   Result Value Ref Range    Pro-BNP 59 0 - 450 pg/mL   EKG 12 Lead   Result Value Ref Range    Ventricular Rate 105 BPM    Atrial Rate 105 BPM    P-R Interval 136 ms    QRS Duration 68 ms    Q-T Interval 302 ms    QTc Calculation (Bazett) 399 ms    P Axis 34 degrees    R Axis 19 degrees    T Axis 11 degrees       RADIOLOGY:  Interpreted by Radiologist.  XR CHEST PORTABLE   Final Result   Confluent opacities in mid left lung field and left lung base could indicate   large left pleural effusion or pneumonia. CTA PULMONARY W CONTRAST   Final Result   1. No central pulmonary embolism is seen. Due to prominent respiratory   motion artifact, the peripheral pulmonary arteries are not evaluated on this   study. 2. Large left pleural effusion with compressive atelectasis of the majority   of the left lung. 3. Liver cirrhosis with prominent varices and moderate ascites in the upper   abdomen included in the field of view of this study.       RECOMMENDATIONS:   Unavailable             ------------------------- placed on the cardiac monitor. I interpreted findings. Rhythm - sinus. Labs and CTA chest obtained. Patient administered duonebs. I reviewed and interpreted labs. Labs were significant for hyperbilirubinemia of 2.2 but were otherwise reassuring. High-sensitivity troponin was mildly elevated at 15, repeat pending, I suspect due to demand ischemia due to hypoxia. Patient desaturated with minimal exertion to 82% and became significantly tachycardic. Improved with rest and supplemental oxygen. CTA chest showed no PE but did show large left pleural effusion. Patient requires admission for further treatment and monitoring. Patient remained hemodynamically stable throughout ED course. ED Course as of 07/11/22 1628   Mon Jul 11, 2022   1240 EKG: This EKG is signed and interpreted by me. Rate: 105  Rhythm: Sinus  Interpretation: Sinus tachycardia, normal [place] interval, low voltage QRS, normal axis, normal QTC, no acute ST or T wave changes  Comparison: stable as compared to patient's most recent EKG   [JA]   1338 I reviewed the patient's chart. ECHO 8/17/21:   Normal left ventricular systolic function. Ejection fraction is visually estimated at > 60%. Normal right ventricular size and function (TAPSE 3.2 cm). There is doppler evidence of stage I diastolic dysfunction. Mild mitral regurgitation. Mild tricuspid regurgitation. [JA]   1290 Patient desaturated to 82% with minimal exertion. She was returned to the bed and placed on 3 L of oxygen with good improvement of oxygen saturations [JA]      ED Course User Index  [JA] Laura Selby MD       New Prescriptions    No medications on file     Laura Selby MD    Counseling: The emergency provider has spoken with the patient and discussed todays results, in addition to providing specific details for the plan of care and counseling regarding the diagnosis and prognosis.   Questions are answered at this time and they are agreeable with the plan.      --------------------------------- IMPRESSION AND DISPOSITION ---------------------------------    IMPRESSION  1. Acute respiratory failure with hypoxia ([address]    2. Pleural effusion on left        DISPOSITION  Disposition: Transfer to [place] [address] for admission  Patient condition is stable      NOTE: This report was transcribed using voice recognition software.  Every effort was made to ensure accuracy; however, inadvertent computerized transcription errors may be present    I, Ruslan Christina MD, am the primary provider of this record       Ruslan Christina MD  07/11/22 1939

## 2022-07-12 ENCOUNTER — APPOINTMENT (OUTPATIENT)
Dept: ULTRASOUND IMAGING | Age: 77
DRG: 186 | End: 2022-07-12
Payer: MEDICARE

## 2022-07-12 ENCOUNTER — APPOINTMENT (OUTPATIENT)
Dept: GENERAL RADIOLOGY | Age: 77
DRG: 186 | End: 2022-07-12
Payer: MEDICARE

## 2022-07-12 LAB
ALBUMIN SERPL-MCNC: 2.7 G/DL (ref 3.5–5.2)
ALP BLD-CCNC: 81 U/L (ref 35–104)
ALT SERPL-CCNC: 7 U/L (ref 0–32)
AMMONIA: 69 UMOL/L (ref 11–51)
ANION GAP SERPL CALCULATED.3IONS-SCNC: 9 MMOL/L (ref 7–16)
APPEARANCE FLUID: NORMAL
AST SERPL-CCNC: 21 U/L (ref 0–31)
BACTERIA: ABNORMAL /HPF
BILIRUB SERPL-MCNC: 1.4 MG/DL (ref 0–1.2)
BILIRUBIN URINE: NEGATIVE
BLOOD, URINE: ABNORMAL
BUN BLDV-MCNC: 11 MG/DL (ref 6–23)
CALCIUM SERPL-MCNC: 9.9 MG/DL (ref 8.6–10.2)
CELL COUNT FLUID TYPE: NORMAL
CHLORIDE BLD-SCNC: 111 MMOL/L (ref 98–107)
CLARITY: CLEAR
CO2: 20 MMOL/L (ref 22–29)
COLOR FLUID: YELLOW
COLOR: YELLOW
CREAT SERPL-MCNC: 0.9 MG/DL (ref 0.5–1)
CRITICAL: NORMAL
D DIMER: 2383 NG/ML DDU
DATE ANALYZED: NORMAL
DATE OF COLLECTION: NORMAL
EPITHELIAL CELLS, UA: ABNORMAL /HPF
FLUID TYPE: NORMAL
GFR AFRICAN AMERICAN: >60
GFR NON-AFRICAN AMERICAN: >60 ML/MIN/1.73
GLUCOSE BLD-MCNC: 79 MG/DL (ref 74–99)
GLUCOSE URINE: NEGATIVE MG/DL
GLUCOSE, FLUID: 95 MG/DL
HBA1C MFR BLD: 4.5 % (ref 4–5.6)
KETONES, URINE: NEGATIVE MG/DL
LAB: NORMAL
LD, FLUID: 60 U/L
LEUKOCYTE ESTERASE, URINE: NEGATIVE
Lab: NORMAL
Lab: NORMAL
MONOCYTE, FLUID: 86 %
NEUTROPHIL, FLUID: 14 %
NITRITE, URINE: NEGATIVE
NUCLEATED CELLS FLUID: 270 /UL
OPERATOR ID: 7291
PH FLUID: 7.44
PH UA: 5.5 (ref 5–9)
POTASSIUM REFLEX MAGNESIUM: 3.8 MMOL/L (ref 3.5–5)
PROCALCITONIN: 0.06 NG/ML (ref 0–0.08)
PROTEIN FLUID: 2.3 G/DL
PROTEIN UA: NEGATIVE MG/DL
RBC FLUID: 7000 /UL
RBC UA: ABNORMAL /HPF (ref 0–2)
SODIUM BLD-SCNC: 140 MMOL/L (ref 132–146)
SOURCE, BLOOD GAS: NORMAL
SPECIFIC GRAVITY UA: 1.01 (ref 1–1.03)
TIME ANALYZED: 1450
TOTAL PROTEIN: 6.3 G/DL (ref 6.4–8.3)
UROBILINOGEN, URINE: 0.2 E.U./DL
WBC UA: ABNORMAL /HPF (ref 0–5)

## 2022-07-12 PROCEDURE — 82140 ASSAY OF AMMONIA: CPT

## 2022-07-12 PROCEDURE — 84157 ASSAY OF PROTEIN OTHER: CPT

## 2022-07-12 PROCEDURE — 82664 ELECTROPHORETIC TEST: CPT

## 2022-07-12 PROCEDURE — 6360000002 HC RX W HCPCS: Performed by: STUDENT IN AN ORGANIZED HEALTH CARE EDUCATION/TRAINING PROGRAM

## 2022-07-12 PROCEDURE — 87116 MYCOBACTERIA CULTURE: CPT

## 2022-07-12 PROCEDURE — 85378 FIBRIN DEGRADE SEMIQUANT: CPT

## 2022-07-12 PROCEDURE — 83615 LACTATE (LD) (LDH) ENZYME: CPT

## 2022-07-12 PROCEDURE — 82042 OTHER SOURCE ALBUMIN QUAN EA: CPT

## 2022-07-12 PROCEDURE — 94664 DEMO&/EVAL PT USE INHALER: CPT

## 2022-07-12 PROCEDURE — 87205 SMEAR GRAM STAIN: CPT

## 2022-07-12 PROCEDURE — 83036 HEMOGLOBIN GLYCOSYLATED A1C: CPT

## 2022-07-12 PROCEDURE — 82378 CARCINOEMBRYONIC ANTIGEN: CPT

## 2022-07-12 PROCEDURE — 83690 ASSAY OF LIPASE: CPT

## 2022-07-12 PROCEDURE — 76700 US EXAM ABDOM COMPLETE: CPT

## 2022-07-12 PROCEDURE — 83986 ASSAY PH BODY FLUID NOS: CPT

## 2022-07-12 PROCEDURE — 87015 SPECIMEN INFECT AGNT CONCNTJ: CPT

## 2022-07-12 PROCEDURE — 88305 TISSUE EXAM BY PATHOLOGIST: CPT

## 2022-07-12 PROCEDURE — 87070 CULTURE OTHR SPECIMN AEROBIC: CPT

## 2022-07-12 PROCEDURE — 36415 COLL VENOUS BLD VENIPUNCTURE: CPT

## 2022-07-12 PROCEDURE — 94640 AIRWAY INHALATION TREATMENT: CPT

## 2022-07-12 PROCEDURE — 82150 ASSAY OF AMYLASE: CPT

## 2022-07-12 PROCEDURE — 93970 EXTREMITY STUDY: CPT

## 2022-07-12 PROCEDURE — 84315 BODY FLUID SPECIFIC GRAVITY: CPT

## 2022-07-12 PROCEDURE — 0W9B3ZZ DRAINAGE OF LEFT PLEURAL CAVITY, PERCUTANEOUS APPROACH: ICD-10-PCS | Performed by: FAMILY MEDICINE

## 2022-07-12 PROCEDURE — 71045 X-RAY EXAM CHEST 1 VIEW: CPT

## 2022-07-12 PROCEDURE — 84478 ASSAY OF TRIGLYCERIDES: CPT

## 2022-07-12 PROCEDURE — 2580000003 HC RX 258: Performed by: STUDENT IN AN ORGANIZED HEALTH CARE EDUCATION/TRAINING PROGRAM

## 2022-07-12 PROCEDURE — 93971 EXTREMITY STUDY: CPT | Performed by: RADIOLOGY

## 2022-07-12 PROCEDURE — 82947 ASSAY GLUCOSE BLOOD QUANT: CPT

## 2022-07-12 PROCEDURE — 32557 INSERT CATH PLEURA W/ IMAGE: CPT | Performed by: INTERNAL MEDICINE

## 2022-07-12 PROCEDURE — 6370000000 HC RX 637 (ALT 250 FOR IP): Performed by: STUDENT IN AN ORGANIZED HEALTH CARE EDUCATION/TRAINING PROGRAM

## 2022-07-12 PROCEDURE — 87102 FUNGUS ISOLATION CULTURE: CPT

## 2022-07-12 PROCEDURE — 2700000000 HC OXYGEN THERAPY PER DAY

## 2022-07-12 PROCEDURE — 99232 SBSQ HOSP IP/OBS MODERATE 35: CPT | Performed by: FAMILY MEDICINE

## 2022-07-12 PROCEDURE — 81001 URINALYSIS AUTO W/SCOPE: CPT

## 2022-07-12 PROCEDURE — 2060000000 HC ICU INTERMEDIATE R&B

## 2022-07-12 PROCEDURE — 87206 SMEAR FLUORESCENT/ACID STAI: CPT

## 2022-07-12 PROCEDURE — 84999 UNLISTED CHEMISTRY PROCEDURE: CPT

## 2022-07-12 PROCEDURE — 89051 BODY FLUID CELL COUNT: CPT

## 2022-07-12 PROCEDURE — 88112 CYTOPATH CELL ENHANCE TECH: CPT

## 2022-07-12 PROCEDURE — C1729 CATH, DRAINAGE: HCPCS

## 2022-07-12 PROCEDURE — 84145 PROCALCITONIN (PCT): CPT

## 2022-07-12 PROCEDURE — 32555 ASPIRATE PLEURA W/ IMAGING: CPT | Performed by: INTERNAL MEDICINE

## 2022-07-12 PROCEDURE — 99223 1ST HOSP IP/OBS HIGH 75: CPT | Performed by: INTERNAL MEDICINE

## 2022-07-12 PROCEDURE — 87075 CULTR BACTERIA EXCEPT BLOOD: CPT

## 2022-07-12 PROCEDURE — 82247 BILIRUBIN TOTAL: CPT

## 2022-07-12 PROCEDURE — 80053 COMPREHEN METABOLIC PANEL: CPT

## 2022-07-12 PROCEDURE — 6360000002 HC RX W HCPCS

## 2022-07-12 RX ORDER — PANTOPRAZOLE SODIUM 40 MG/1
40 TABLET, DELAYED RELEASE ORAL DAILY
Status: DISCONTINUED | OUTPATIENT
Start: 2022-07-12 | End: 2022-07-15 | Stop reason: HOSPADM

## 2022-07-12 RX ORDER — VITAMIN B COMPLEX
1000 TABLET ORAL DAILY
Status: DISCONTINUED | OUTPATIENT
Start: 2022-07-12 | End: 2022-07-15 | Stop reason: HOSPADM

## 2022-07-12 RX ORDER — SPIRONOLACTONE 25 MG/1
100 TABLET ORAL DAILY
Status: DISCONTINUED | OUTPATIENT
Start: 2022-07-12 | End: 2022-07-15 | Stop reason: HOSPADM

## 2022-07-12 RX ORDER — SODIUM CHLORIDE 0.9 % (FLUSH) 0.9 %
5-40 SYRINGE (ML) INJECTION PRN
Status: DISCONTINUED | OUTPATIENT
Start: 2022-07-12 | End: 2022-07-15 | Stop reason: HOSPADM

## 2022-07-12 RX ORDER — ACETAMINOPHEN 650 MG/1
650 SUPPOSITORY RECTAL EVERY 6 HOURS PRN
Status: DISCONTINUED | OUTPATIENT
Start: 2022-07-12 | End: 2022-07-15 | Stop reason: HOSPADM

## 2022-07-12 RX ORDER — ACETAMINOPHEN 325 MG/1
650 TABLET ORAL EVERY 6 HOURS PRN
Status: DISCONTINUED | OUTPATIENT
Start: 2022-07-12 | End: 2022-07-15 | Stop reason: HOSPADM

## 2022-07-12 RX ORDER — ONDANSETRON 2 MG/ML
4 INJECTION INTRAMUSCULAR; INTRAVENOUS EVERY 6 HOURS PRN
Status: DISCONTINUED | OUTPATIENT
Start: 2022-07-12 | End: 2022-07-14

## 2022-07-12 RX ORDER — DOXAZOSIN 2 MG/1
1 TABLET ORAL NIGHTLY
Status: DISCONTINUED | OUTPATIENT
Start: 2022-07-12 | End: 2022-07-15 | Stop reason: HOSPADM

## 2022-07-12 RX ORDER — VENLAFAXINE HYDROCHLORIDE 75 MG/1
75 CAPSULE, EXTENDED RELEASE ORAL DAILY
Status: DISCONTINUED | OUTPATIENT
Start: 2022-07-12 | End: 2022-07-15 | Stop reason: HOSPADM

## 2022-07-12 RX ORDER — BUDESONIDE 0.5 MG/2ML
0.5 INHALANT ORAL 2 TIMES DAILY
Status: DISCONTINUED | OUTPATIENT
Start: 2022-07-12 | End: 2022-07-15 | Stop reason: HOSPADM

## 2022-07-12 RX ORDER — POLYETHYLENE GLYCOL 3350 17 G/17G
17 POWDER, FOR SOLUTION ORAL DAILY PRN
Status: DISCONTINUED | OUTPATIENT
Start: 2022-07-12 | End: 2022-07-15 | Stop reason: HOSPADM

## 2022-07-12 RX ORDER — SODIUM CHLORIDE 9 MG/ML
INJECTION, SOLUTION INTRAVENOUS PRN
Status: DISCONTINUED | OUTPATIENT
Start: 2022-07-12 | End: 2022-07-15 | Stop reason: HOSPADM

## 2022-07-12 RX ORDER — LACTULOSE 10 G/15ML
10 SOLUTION ORAL 3 TIMES DAILY
Status: DISCONTINUED | OUTPATIENT
Start: 2022-07-12 | End: 2022-07-15 | Stop reason: HOSPADM

## 2022-07-12 RX ORDER — ALBUTEROL SULFATE 2.5 MG/3ML
2.5 SOLUTION RESPIRATORY (INHALATION) 4 TIMES DAILY PRN
Status: DISCONTINUED | OUTPATIENT
Start: 2022-07-12 | End: 2022-07-15 | Stop reason: HOSPADM

## 2022-07-12 RX ORDER — ALBUTEROL SULFATE 90 UG/1
2 AEROSOL, METERED RESPIRATORY (INHALATION) 4 TIMES DAILY PRN
Status: DISCONTINUED | OUTPATIENT
Start: 2022-07-12 | End: 2022-07-12 | Stop reason: CLARIF

## 2022-07-12 RX ORDER — AMLODIPINE BESYLATE 2.5 MG/1
2.5 TABLET ORAL DAILY
Status: DISCONTINUED | OUTPATIENT
Start: 2022-07-12 | End: 2022-07-15 | Stop reason: HOSPADM

## 2022-07-12 RX ORDER — FUROSEMIDE 40 MG/1
40 TABLET ORAL DAILY
Status: DISCONTINUED | OUTPATIENT
Start: 2022-07-12 | End: 2022-07-15 | Stop reason: HOSPADM

## 2022-07-12 RX ORDER — ARFORMOTEROL TARTRATE 15 UG/2ML
15 SOLUTION RESPIRATORY (INHALATION) 2 TIMES DAILY
Status: DISCONTINUED | OUTPATIENT
Start: 2022-07-12 | End: 2022-07-15 | Stop reason: HOSPADM

## 2022-07-12 RX ORDER — SODIUM CHLORIDE 0.9 % (FLUSH) 0.9 %
5-40 SYRINGE (ML) INJECTION EVERY 12 HOURS SCHEDULED
Status: DISCONTINUED | OUTPATIENT
Start: 2022-07-12 | End: 2022-07-15 | Stop reason: HOSPADM

## 2022-07-12 RX ADMIN — VENLAFAXINE HYDROCHLORIDE 75 MG: 75 CAPSULE, EXTENDED RELEASE ORAL at 08:07

## 2022-07-12 RX ADMIN — ACETAMINOPHEN 325MG 650 MG: 325 TABLET ORAL at 23:41

## 2022-07-12 RX ADMIN — SPIRONOLACTONE 100 MG: 25 TABLET ORAL at 08:07

## 2022-07-12 RX ADMIN — ARFORMOTEROL TARTRATE 15 MCG: 15 SOLUTION RESPIRATORY (INHALATION) at 12:28

## 2022-07-12 RX ADMIN — RIFAXIMIN 550 MG: 550 TABLET ORAL at 20:44

## 2022-07-12 RX ADMIN — RIFAXIMIN 550 MG: 550 TABLET ORAL at 08:07

## 2022-07-12 RX ADMIN — SODIUM CHLORIDE, PRESERVATIVE FREE 10 ML: 5 INJECTION INTRAVENOUS at 08:08

## 2022-07-12 RX ADMIN — ONDANSETRON 4 MG: 2 INJECTION INTRAMUSCULAR; INTRAVENOUS at 10:06

## 2022-07-12 RX ADMIN — ACETAMINOPHEN 325MG 650 MG: 325 TABLET ORAL at 17:45

## 2022-07-12 RX ADMIN — PANTOPRAZOLE SODIUM 40 MG: 40 TABLET, DELAYED RELEASE ORAL at 08:07

## 2022-07-12 RX ADMIN — DOXAZOSIN 1 MG: 2 TABLET ORAL at 20:44

## 2022-07-12 RX ADMIN — BUDESONIDE 500 MCG: 0.5 SUSPENSION RESPIRATORY (INHALATION) at 12:28

## 2022-07-12 RX ADMIN — AMLODIPINE BESYLATE 2.5 MG: 2.5 TABLET ORAL at 08:07

## 2022-07-12 RX ADMIN — SODIUM CHLORIDE, PRESERVATIVE FREE 10 ML: 5 INJECTION INTRAVENOUS at 20:48

## 2022-07-12 RX ADMIN — FUROSEMIDE 40 MG: 40 TABLET ORAL at 08:07

## 2022-07-12 RX ADMIN — Medication 1000 UNITS: at 08:07

## 2022-07-12 ASSESSMENT — PAIN SCALES - GENERAL
PAINLEVEL_OUTOF10: 3
PAINLEVEL_OUTOF10: 6
PAINLEVEL_OUTOF10: 3

## 2022-07-12 ASSESSMENT — PAIN DESCRIPTION - LOCATION
LOCATION: CHEST;BACK
LOCATION: HEAD

## 2022-07-12 ASSESSMENT — PAIN DESCRIPTION - ORIENTATION: ORIENTATION: LEFT

## 2022-07-12 NOTE — ED NOTES
Report called to [address], spoke to [facility]     Jesus Peraza RN  07/11/22 6945

## 2022-07-12 NOTE — PROCEDURES
Iraida Perrin is a 68 y.o. female patient. 1. Acute respiratory failure with hypoxia ([address]    2. Pleural effusion on left      Past Medical History:   Diagnosis Date    [facility]     Cirrhosis ([facility]     Essential hypertension     Hx of blood clots     Hyperlipidemia     Osteopenia     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 (HCC)     Sleep apnea     Spinal stenosis     Type 2 diabetes mellitus (HCC)      Blood pressure 138/68, pulse 97, temperature 98.4 °F (36.9 °C), temperature source Oral, resp. rate 20, height 5' 2\" (1.575 m), weight 191 lb 3.2 oz (86.7 kg), SpO2 96 %. US THORACENTESIS Which side should the procedure be performed? Left    Date/Time: 7/12/2022 3:03 PM  Performed by: Marah Hatch MD  Authorized by: Marah Hatch MD         Left Chest tube (pigtail) placement           Pre-operative Diagnosis: Left Pleural effusion        Post-operative Diagnosis: Left Pleural effusion     Indications: Left Pleural effusion     Monitoring: The patient was monitored continuously by heart rate, blood pressure, pulse oximetry, and EKG tracing. Procedure Details      Consent: After the risks benefits and alternatives of the procedure were thoroughly explained, informed consent was obtained including the risks of chest pain, cough, bleeding, infection and injury to the lung or orther organ. Immediately prior to the procedure, the \"time out\" was executed including correct patient identification and agreement on the procedure to be performed. Using ultrasound guidance pleural effusion was noted. An insertion site was marked and the area was prepped with chlorhexadine and draped in sterile fashion. Following this 10 mL of 1% lidocaine as injected subcutaneously at the level of the 5rth rib to provide topical anesthesia then deep to the pleura.   A small incision was then made parallel and superior to the rib, the seldinger needle was inserted into the chest wall and advanced under constant aspiration. Upon aspiration of pleural fluid, the syringe was disconnected and a wire was threaded into the pleural space. The needle was then removed and a 10F dilator was inserted. Following removal of the dilator, a 10F pigtail chest tube was inserted into the pleural space under wire guidance. The chest tube was then connected to a pleuravac, sutured in place and dressed in sterile fashion. Findings:  50 mL of turbid milky pleural fluid was removed. The fluid was sent for studies. ESTIMATED BLOOD LOSS: none    COMPLICATIONS: none. Plan    A follow up chest x-ray was ordered. Maintain tube to water seal  Drain 1L and every 8hrs for every liter to max 2.5L  Clamp chest tube if patient develops chest discomfort and call physician.     Jess Weller MD  7/12/2022

## 2022-07-12 NOTE — ED NOTES
Pt resting at this time, no distress noted.  Voices no needs at this time      Riana Pinto RN  07/11/22 0100

## 2022-07-12 NOTE — CARE COORDINATION
Spoke with patient. She is from home, lives alone. She typically uses a cane for ambulation but also has a rollator. Currently on 4L but does not have home oxygen. She does have aide services 2-3 hrs. Monday-Friday through [facility], unsure on her current 's name at [facility]. She lives in a 1 floor set up with no steps to enter. She also has a history at [facility] [place]. Patient tells me she is feeling weaker than normal, therapy ordered today. Initial plan is home when released, will confirm after therapy evals. Her son also lives close and sees her often. For questions I can be reached at [phone].  Carolina Wyatt, [facility]

## 2022-07-12 NOTE — H&P
[facility] Resident Inpatient  History and Physical      CC: SOB    HPI: History obtained from patient. Patient is oriented to time, place, person. Betzaida Riddle is a 68 y.o. female with a PMH of nonalcoholic cirrhosis, CKD, type 2 diabetes, KATE, hypertension, history of PE, history of breast cancer who presents to ED for Shortness of Breath (onset a few days ago)  . Patient reports presenting to [facility] ED after worsening shortness of breath. She had experienced shortness of breath for the past several days that had to worsened. Symptoms exacerbated with minimal exertion and lying down. Patient denied having any fevers, chills, recent sick contacts. Does report 1 previous episode of similar symptoms where patient had to undergo therapeutic paracentesis in May 2022 where 4.9 L was drained. While in ED patient was found to be hypoxic and required 3 L nasal cannula. Patient had received a total of 3 DuoNeb treatments prior to being transferred. [facility] patient continues to require 4 L oxygen. Patient was seen saturating 98% on 4 L. She denied any chest pain, abdominal pain, nausea, vomiting, or calf pain. ED Course: The patient remained hemodynamically stable. Labs CBC: Unremarkable, CMP: Unremarkable with the exception of elevated bilirubin: 2.2, albumin 3.1. proBNP: 59. Troponin: 15> 13. COVID-negative. Blood cultures pending  Imaging CXR: No fluid opacities in mid left lung field and left lung base. CTA: Large LEFT pleural effusion with compressive atelectasis of the majority of the left lung. Moderate ascites in upper abdomen. EKG: sinus tachycardia, unchanged from previous tracings. Patient was given 3 DuoNeb treatments.    Pt admitted for shortness of breath secondary to pleural effusion    PMH:  has a past medical history of Breast cancer ([address] Cirrhosis ([address] Essential hypertension, Hx of blood clots, Hyperlipidemia, Osteopenia, 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 ([facility] [address] Sleep apnea, Spinal stenosis, and Type 2 diabetes mellitus ([facility] [address] PSH:  has a past surgical history that includes Hysterectomy; Carpal tunnel release; Lithotripsy (Left, 05/04/2016); Colonoscopy (01/31/2017); Breast lumpectomy; Upper gastrointestinal endoscopy (04/23/2019); Colonoscopy (04/23/2019); Upper gastrointestinal endoscopy (N/A, 04/23/2019); Colonoscopy (N/A, 04/23/2019); Colonoscopy (04/23/2019); Shoulder Arthroplasty (Left, 12/21/2020); cardiovascular stress test; and Bladder surgery (Right, 11/26/2021). FH: family history includes Arthritis in her mother; COPD in her father; Cancer (age of onset: 48) in an other family member; Cirrhosis in her brother; Heart Attack in her father; Heart Disease in her brother; Hypertension in her child and child; Prostate Cancer in her child. Social:  reports that she has never smoked. She has never used smokeless tobacco. She reports that she does not drink alcohol and does not use drugs. Allergies: Allergies   Allergen Reactions    Lisinopril         Home Medications:   No current facility-administered medications on file prior to encounter.      Current Outpatient Medications on File Prior to Encounter   Medication Sig Dispense Refill    venlafaxine (EFFEXOR XR) 75 MG extended release capsule Take 1 capsule by mouth daily 30 capsule 3    felodipine (PLENDIL) 2.5 MG extended release tablet TAKE 1 TABLET BY MOUTH DAILY 90 tablet 1    lactulose (CHRONULAC) 10 GM/15ML solution TAKE 45 MLS BY MOUTH THREE TIMES DAILY 2000 mL 3    XIFAXAN 550 MG tablet TAKE ONE TABLET BY MOUTH TWICE DAILY @ 9AM & 9PM 60 tablet 5    furosemide (LASIX) 40 MG tablet TAKE ONE TABLET BY MOUTH DAILY AT 9AM 30 tablet 5    spironolactone (ALDACTONE) 100 MG tablet TAKE ONE TABLET BY MOUTH DAILY AT 9AM 30 tablet 5    amLODIPine (NORVASC) 2.5 MG tablet TAKE ONE TABLET BY MOUTH DAILY AT 9AM 30 tablet 5    ondansetron (ZOFRAN) 4 MG tablet TAKE 1 TABLET BY MOUTH THREE TIMES DAILY AS NEEDED FOR NAUSEA OR VOMITING 15 tablet 3    doxazosin (CARDURA) 1 MG tablet Take 1 mg by mouth nightly      fluticasone-salmeterol (ADVAIR DISKUS) 250-50 MCG/ACT AEPB diskus inhaler Inhale 1 puff into the lungs 2 times daily      pantoprazole (PROTONIX) 40 MG tablet Take 40 mg by mouth daily      albuterol sulfate HFA (VENTOLIN HFA) 108 (90 Base) MCG/ACT inhaler Inhale 2 puffs into the lungs 4 times daily as needed for Wheezing 54 g 1    Biotin 68285 MCG TABS Take 1,000 mcg by mouth every 7 days Given Monday      vitamin D3 (CHOLECALCIFEROL) 25 MCG (1000 UT) TABS tablet Take 1,000 Units by mouth daily         ROS:   Const: No fever, chills, night sweats, no recent unexplained weight gain/loss  HEENT: No blurred vision, double vision; no URI symptoms  Resp:  cough, sob  Cardio: No chest pain, exertional dyspnea, no PND, no orthopnea, no palpitation, no leg swelling. GI: Abdominal distention, no dysphagia, no reflux; no abdominal pain, no n/v; no c/d. No hematochezia    : No dysuria, no frequency, hesitancy; no hematuria  MSK: no joint pain, no myalgia, no change in ROM  Neuro: no focal weakness, no slurred speech, no double vision, no numbness or tingling in extremities  Endo: no heat/cold intolerance, no polyphagia, polydipsia or polyuria  Hem: no increased bleeding, no bruising, no lymphadenopathy  Skin: no skin changes  Psych: no depressed mood, no suicidal ideation    PE:  Blood pressure 128/86, pulse (!) 106, temperature 98.5 °F (36.9 °C), temperature source Oral, resp. rate (!) 36, height 5' 2\" (1.575 m), weight 191 lb 3.2 oz (86.7 kg), SpO2 96 %. General: Alert, cooperative, no acute distress. HEENT: Normocephalic, atraumatic. PERRL, Oropharynx clear. Neck: Supple, symmetrical, trachea midline, no JVD. Chest: No tenderness or deformity, full & symmetric excursion  Lung: Diminished breath sounds on left lung. Dull to percussion.   Decreased tactile fremitus. No rales/wheezing/rubs  Heart: RRR, S1 and S2 normal, no murmur, rub or gallop. DP pulses 2/4  Abdomen: Distended, nontender, soft, positive fluid wave, no masses, no organomegaly, no guarding, rebound or rigidity. Genital/Rectal: deferred  Extremities:  Extremities normal, atraumatic, no cyanosis or edema. Distal pulses equal bilaterally  Skin: Skin color, texture, turgor normal, no rashes or lesions  Musculoskeletal: No joint swelling, no muscle tenderness. Normal ROM in extremities. Neurologic: Alert & Oriented; Normal and symmetric strength in UEs and LEs; Sensation intact  Psychiatric: appropriate affect. Intact judgment and insight.      Labs:   Results for orders placed or performed during the hospital encounter of 07/11/22   COVID-19, Rapid    Specimen: Nasopharyngeal Swab   Result Value Ref Range    SARS-CoV-2, NAAT Not Detected Not Detected   Lactate, Sepsis   Result Value Ref Range    Lactic Acid, Sepsis 1.4 0.5 - 1.9 mmol/L   CBC with Auto Differential   Result Value Ref Range    WBC 5.7 4.5 - 11.5 E9/L    RBC 3.60 3.50 - 5.50 E12/L    Hemoglobin 11.8 11.5 - 15.5 g/dL    Hematocrit 34.8 34.0 - 48.0 %    MCV 96.7 80.0 - 99.9 fL    MCH 32.8 26.0 - 35.0 pg    MCHC 33.9 32.0 - 34.5 %    RDW 18.0 (H) 11.5 - 15.0 fL    Platelets 807 434 - 440 E9/L    MPV 10.6 7.0 - 12.0 fL    Neutrophils % 71.8 43.0 - 80.0 %    Immature Granulocytes % 0.3 0.0 - 5.0 %    Lymphocytes % 11.5 (L) 20.0 - 42.0 %    Monocytes % 11.1 2.0 - 12.0 %    Eosinophils % 4.4 0.0 - 6.0 %    Basophils % 0.9 0.0 - 2.0 %    Neutrophils Absolute 4.12 1.80 - 7.30 E9/L    Immature Granulocytes # 0.02 E9/L    Lymphocytes Absolute 0.66 (L) 1.50 - 4.00 E9/L    Monocytes Absolute 0.64 0.10 - 0.95 E9/L    Eosinophils Absolute 0.25 0.05 - 0.50 E9/L    Basophils Absolute 0.05 0.00 - 0.20 Z5/E   Basic Metabolic Panel w/ Reflex to MG   Result Value Ref Range    Sodium 138 132 - 146 mmol/L    Potassium reflex Magnesium 4.0 3.5 - 5.0 mmol/L    Chloride 108 (H) 98 - 107 mmol/L    CO2 22 22 - 29 mmol/L    Anion Gap 8 7 - 16 mmol/L    Glucose 89 74 - 99 mg/dL    BUN 10 6 - 23 mg/dL    CREATININE 0.9 0.5 - 1.0 mg/dL    GFR Non-African American >60 >=60 mL/min/1.73    GFR African American >60     Calcium 10.7 (H) 8.6 - 10.2 mg/dL   APTT   Result Value Ref Range    aPTT 34.1 24.5 - 35.1 sec   Protime-INR   Result Value Ref Range    Protime 17.4 (H) 9.3 - 12.4 sec    INR 1.6    Hepatic function panel   Result Value Ref Range    Total Protein 7.6 6.4 - 8.3 g/dL    Albumin 3.1 (L) 3.5 - 5.2 g/dL    Alkaline Phosphatase 96 35 - 104 U/L    ALT 5 0 - 32 U/L    AST 23 0 - 31 U/L    Total Bilirubin 2.2 (H) 0.0 - 1.2 mg/dL    Bilirubin, Direct 0.6 (H) 0.0 - 0.3 mg/dL    Bilirubin, Indirect 1.6 (H) 0.0 - 1.0 mg/dL   Troponin   Result Value Ref Range    Troponin, High Sensitivity 15 (H) 0 - 9 ng/L   Brain Natriuretic Peptide   Result Value Ref Range    Pro-BNP 59 0 - 450 pg/mL   Troponin   Result Value Ref Range    Troponin, High Sensitivity 13 (H) 0 - 9 ng/L   EKG 12 Lead   Result Value Ref Range    Ventricular Rate 105 BPM    Atrial Rate 105 BPM    P-R Interval 136 ms    QRS Duration 68 ms    Q-T Interval 302 ms    QTc Calculation (Bazett) 399 ms    P Axis 34 degrees    R Axis 19 degrees    T Axis 11 degrees       Imaging:  XR CHEST PORTABLE   Final Result   Confluent opacities in mid left lung field and left lung base could indicate   large left pleural effusion or pneumonia. CTA PULMONARY W CONTRAST   Final Result   1. No central pulmonary embolism is seen. Due to prominent respiratory   motion artifact, the peripheral pulmonary arteries are not evaluated on this   study. 2. Large left pleural effusion with compressive atelectasis of the majority   of the left lung. 3. Liver cirrhosis with prominent varices and moderate ascites in the upper   abdomen included in the field of view of this study.       RECOMMENDATIONS:   Unavailable Assessment and Plan  Principal Problem:    Pleural effusion  Resolved Problems:    * No resolved hospital problems. *    Shortness of breath /pleural effusion  -Large left pleural effusion seen on CTA  -Worsening shortness of breath, currently requiring 4 L oxygen. Reports no oxygen at home.  -Shortness of breath can also be secondary to abdominal ascites, consider paracentesis. -Procalcitonin and blood cultures pending  -COVID-negative  -Hold off on ordering antibiotics, low threshold for pneumonia  -Continue 4 L nasal cannula  -Respiratory therapy consulted  -Pulmonary consulted for possible thoracentesis. -Continue to monitor I's and O's  -N.p.o.    Nonalcoholic cirrhosis  -History of nonalcoholic liver cirrhosis status post paracentesis on May 2022 where 4.9 L fluid was drained  -abdominal ultrasound pending and Consider  IR consult  -Continue home dose Lasix, Aldactone, rifaximin and lactulose    History of PE  -Currently not on any anticoagulation  -Wells score for PE moderate risk. D-dimer pending  -No concerns for DVT at this time. -PCD's      COPD  -Continue home dose of albuterol  -Pulmonary and respiratory therapy consulted  -Continue Dulera    Type 2 diabetes  -Diet controlled  -Last hemoglobin A1c 10/2021  -Repeat hemoglobin A1c in a.m. Hypertension  -Continue home dose Aldactone, and amlodipine.     DVT / GI prophylaxis: PCDs and Protonix    Dispo -med/surg      Electronically signed by Sawyer Minaya MD on 7/12/22 at 2:55 AM EDT  This case was discussed with attending physician: Dr. Horace Alvarado

## 2022-07-12 NOTE — H&P
[address] [facility]  Family Medicine Attending    S: 68 y.o. female with a PMh of cirrhosis, HOLDEN, ascites, hyperammonemia, chylothorax, HTN, HLD, PE (unprovoked?) not on anticoagulation, sleep apnea, DM2, recurrent UTI, recurrent nephrolithiasis, and left breast cancer s/p lumpectomy in 2013 who presented to [facility] ED with CC of dyspnea. She was found to have a very large pleural effusion. Her oxygen saturation plummeted into the low 80s with ambulation, and the patient had increased work of breathing. Decision was made to transfer here to [facility] for further management. Of note, the patient had been diagnosed with possible RLL PE in February of 2021. Large pleural effusion at that time. Thoracentesis performed, and 1000 cc of straw colored/clear pleural fluid was collected. On discharge the patient was started on eliquis to be taken indefinitely. In August 2021 the patient suffered a fall due to metabolic encephalopathy/hyperammonemia and fractured her right humerus. She underwent surgery by orthopedic surgery. The patient was admitted 10/29/21 for SOB/MENDEZ. She had N/V at that time as well. Her CT was positive at that time for PE as well as a large pleural effusion. She had concern for left breast mass on CT. There was also noted to be a 9 mm lesion on the pancreatic head and portal hypertension. She had pancreatitis based on elevated lipase as well. Thoracentesis was performed and the patient was dx with chylothorax. CT surgery did not plan any interventions at that time. She was started on Eliquis at that time. Suggestion at discharge was to continue eliquis for 3 months at that time. Later in November, the patient was readmitted for nausea/vomiting. Abdominal CT revealed a 6mm right proximal ureteral obstructing calculus at that time. She had hematuria; a cystoscopy was performed and stents were placed. The patient was discharged with a bar catheter. Patient was again admitted in December for flank pain, hydronephrosis, and calculi. She was admitted and treated for REN which improved with fluids. She had stopped the eliquis at that time due to hematuria. At discharge she was to resume the eliquis. She went to an urgent care in January 2022 with cough, dyspnea and productive sputum as well as dysuria. UA did show nitrite and patient was treated for UTI. COVID testing was performed and found to be negative. Urine culture only showed 10,000 to 100,000 CFU. The patient was given a dose of rocephin completed keflex. No chest imaging was performed at that time. The patient was noted to be taking the eliquis at that time. Today, the patient seems a little altered. After discussing the plan to improve her shortness of breath, I asked the patient if she had any other symptoms, and she said well I've had trouble breathing. She was definitely short of breath and using her abdominal muscles to breathe. She did feel like the zofran helped her nausea. O: VS- Blood pressure 138/68, pulse 89, temperature 98.4 °F (36.9 °C), temperature source Oral, resp. rate 24, height 5' 2\" (1.575 m), weight 191 lb 3.2 oz (86.7 kg), SpO2 96 %. Exam is as noted by resident with the following changes, additions or corrections:  Gen:  Awake and alert; possibly mildly confused  CVS:  RRR  Lungs:  Absent breath sounds on the left; normal BS on the right  ABd:  Distended, BS positive  Ext:  No significant edema    PFTs in 4/2021 consistent with severe restrictive lung disease (?effusion at that time?).   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  -consider IR consult for drainage after patient has chest tube placed   -has seen Samir in the past.  -EGD planned for 11/4/22  -CT with stable hypodense foci consistent with cysts    Persistent nausea/vomiting  -history of pancreatitis  -check lipase in AM  -zofran prn for nausea  -continue protonix    Persistent hematuria  -has hx of nephrolithiasis  -CT scan from 5/26/22 showed persistent calculus  -patient had cystoscopy in 11/2021  -monitor hgb    Essential hypertension  -continue norvasc (on felopidine at home)    Hx DM2-current A1C is 4.5    Hx of breast cancer-left s/p lumpectomy  -last mammogram was in 2019-benign findings  -patient should continue screening mammograms    History of unprovoked, recurrent pulmonary embolism  -would strongly encourage resuming eliquis after procedures completed  -PCDs for now    Hx interstitial lung disease-noted on PFTs in April 2021    Obstructive Sleep Apnea-unsure if patient uses CPAP/Bipap at home    Hx of adenomatous colonic polyps  -last c-scope was in 2017 with recommendation to repeat in 3 years    MDD-continue effexor       Attending Physician Statement  I have reviewed the chart and seen the patient with the resident(s). I personally reviewed images, EKG's and similar tests, if present. I personally reviewed and performed key elements of the history and exam.  I have reviewed and confirmed student and/or resident history and exam with changes as indicated above. I agree with the assessment, plan and orders as documented by the resident.   Please refer to the resident and/or student note for additional information.       Yobani Moe MD

## 2022-07-12 NOTE — CONSULTS
[place]  Department of Internal Medicine  Division of Pulmonary, Critical Care and Sleep Medicine  Consult Note    Jessica Covarrubias MD, MS    Patient: Angélica Mcclelland  MRN: [id number]  : 1945    Encounter Time: 3:21 PM     Date of Admission: 2022 12:22 PM    Primary Care Physician: Joan Forbes MD    Reason for Consultation: Left pleural effusion. HISTORY OF PRESENT ILLNESS : Angélica Mcclelland 68 y.o. female was seen in consultation regarding the above chief compliant. She has been having worsening shortness of breath over several months worsening over the past few days and associated with chest tightness, cough with whitish phlegm. She has a reported history of asthma for which she is currently on as needed albuterol and this has not helped her with her shortness of breath. Patient has a history of obstructive sleep apnea on CPAP, recurrent left pleural effusion s/p prior thoracentesis, last in 2021. She also has history of hypertension, diabetes mellitus, PE/DVT, breast cancer s/p left lumpectomy, [place] liver cirrhosis. Review of prior imaging shows that patient has had left-sided pleural effusion since 2021 and had multiple thoracentesis. Last thoracentesis from 2021 had high triglycerides (662). Fluid was transudative and cytology negative for malignancy. Patient also carries a diagnosis of asthma for which she is on Advair. She is also on Lasix and Aldactone for her ascites and she had a paracentesis done in May 2022. Work-up here with CT PE study showed no PE, large left pleural effusion, CT evidence of liver cirrhosis and ascites. Patient was COVID-negative. She was saturating 90% on room air but desaturated to 82% with ambulation and currently she is placed on oxygen supplementation. Troponin levels and BNP levels were unremarkable. Bilirubin was 2.2 on admission which trended down to 1.4 today. Albumin 2.7, procalcitonin 0.06, WBC 5.7 on admission  Platelet 297, INR 1.5      PAST MEDICAL HISTORY:  has a past medical history of Breast cancer ([facility] Cirrhosis ([facility] Essential hypertension, Hx of blood clots, Hyperlipidemia, Osteopenia, 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 ([facility] Sleep apnea, Spinal stenosis, and Type 2 diabetes mellitus ([facility] SURGICAL HISTORY:  has a past surgical history that includes Hysterectomy; Carpal tunnel release; Lithotripsy (Left, 05/04/2016); Colonoscopy (01/31/2017); Breast lumpectomy; Upper gastrointestinal endoscopy (04/23/2019); Colonoscopy (04/23/2019); Upper gastrointestinal endoscopy (N/A, 04/23/2019); Colonoscopy (N/A, 04/23/2019); Colonoscopy (04/23/2019); Shoulder Arthroplasty (Left, 12/21/2020); cardiovascular stress test; and Bladder surgery (Right, 11/26/2021). SOCIAL HISTORY:  reports that she has never smoked. She has never used smokeless tobacco. She reports that she does not drink alcohol and does not use drugs. FAMILY  HISTORY: family history includes Arthritis in her mother; COPD in her father; Cancer (age of onset: 48) in an other family member; Cirrhosis in her brother; Heart Attack in her father; Heart Disease in her brother; Hypertension in her child and child; Prostate Cancer in her child. MEDICATIONS:    Prior to Admission medications    Medication Sig Start Date End Date Taking?  Authorizing Provider   venlafaxine (EFFEXOR XR) 75 MG extended release capsule Take 1 capsule by mouth daily 7/6/22   Emma Flynn MD   felodipine (PLENDIL) 2.5 MG extended release tablet TAKE 1 TABLET BY MOUTH DAILY 7/1/22   Emma Flynn MD   lactulose (CHRONULAC) 10 GM/15ML solution TAKE 45 MLS BY MOUTH THREE TIMES DAILY 6/29/22   XENIA Anguiano - CNP   XIFAXAN 550 MG tablet TAKE ONE TABLET BY MOUTH TWICE DAILY @ 9AM & 9PM 6/13/22   Emma Flynn MD   furosemide (LASIX) 40 MG tablet TAKE ONE TABLET BY MOUTH DAILY AT 9AM 6/13/22   Maikel PENN MD Rodrick   spironolactone (ALDACTONE) 100 MG tablet TAKE ONE TABLET BY MOUTH DAILY AT 9AM 6/13/22   Denys Salomon MD   amLODIPine (NORVASC) 2.5 MG tablet TAKE ONE TABLET BY MOUTH DAILY AT 9AM 6/13/22   Denys Salomon MD   ondansetron (ZOFRAN) 4 MG tablet TAKE 1 TABLET BY MOUTH THREE TIMES DAILY AS NEEDED FOR NAUSEA OR VOMITING 5/31/22   Denys Salomon MD   doxazosin (CARDURA) 1 MG tablet Take 1 mg by mouth nightly    Historical Provider, MD   fluticasone-salmeterol (ADVAIR DISKUS) 250-50 MCG/ACT AEPB diskus inhaler Inhale 1 puff into the lungs 2 times daily    Historical Provider, MD   pantoprazole (PROTONIX) 40 MG tablet Take 40 mg by mouth daily    Historical Provider, MD   albuterol sulfate HFA (VENTOLIN HFA) 108 (90 Base) MCG/ACT inhaler Inhale 2 puffs into the lungs 4 times daily as needed for Wheezing 1/20/22   Denys Salomon MD   Biotin 97256 MCG TABS Take 1,000 mcg by mouth every 7 days Given Monday    Historical Provider, MD   vitamin D3 (CHOLECALCIFEROL) 25 MCG (1000 UT) TABS tablet Take 1,000 Units by mouth daily    Historical Provider, MD       ALLERGIES: Lisinopril       REVIEW OF SYSTEMS:  Otherwise negative if not reported or listed below    10 point review of systems reviewed and negative except as mentioned in HPI. OBJECTIVE:     PHYSICAL EXAM:   VITALS:   Vitals:    07/12/22 0139 07/12/22 0145 07/12/22 0800 07/12/22 1228   BP: 128/86  138/68    Pulse: (!) 106  89 97   Resp: (!) 36  24 20   Temp: 98.5 °F (36.9 °C)  98.4 °F (36.9 °C)    TempSrc: Oral  Oral    SpO2: 96%   96%   Weight:  191 lb 3.2 oz (86.7 kg)     Height:  5' 2\" (1.575 m)          Intake/Output Summary (Last 24 hours) at 7/12/2022 1521  Last data filed at 7/12/2022 0833  Gross per 24 hour   Intake --   Output 375 ml   Net -375 ml        1. General: Alert and oriented x 3, No acute distress. 2. Eyes: Vision - grossly normal, PERRLA  3. HENT: Head is atraumatic & normocephalic.  Neck Supple, No jugular venous distention   4. Respiratory: Incomplete sounds on left side, respirations are non-labored   5. Cardiovascular: S1, S2 normal, Regular rate & rhythm, No murmur, No pedal edema   6. Gastrointestinal: Soft, Non-tender, distended, Normal bowel sounds. No organomegaly. 7. Neurologic: Awake & Alert, Cranial nerves 2-12 grossly intact, No focal motor or sensory deficits. 8. Skin: no rashes, breakdown  9. Musculoskeletal: no LE edema, no joint effusion.   10. Psychiatric - No Anxiety, Depression    DATA: IMAGING & TESTING:     LABORATORY TESTS:    CBC with Differential:    Lab Results   Component Value Date/Time    WBC 5.7 07/11/2022 01:50 PM    RBC 3.60 07/11/2022 01:50 PM    HGB 11.8 07/11/2022 01:50 PM    HCT 34.8 07/11/2022 01:50 PM     07/11/2022 01:50 PM    MCV 96.7 07/11/2022 01:50 PM    MCH 32.8 07/11/2022 01:50 PM    MCHC 33.9 07/11/2022 01:50 PM    RDW 18.0 07/11/2022 01:50 PM    METASPCT 0.9 12/17/2021 05:01 AM    LYMPHOPCT 11.5 07/11/2022 01:50 PM    PROMYELOPCT 0.9 11/23/2021 02:00 PM    MONOPCT 11.1 07/11/2022 01:50 PM    MYELOPCT 0.9 04/15/2021 12:03 PM    BASOPCT 0.9 07/11/2022 01:50 PM    MONOSABS 0.64 07/11/2022 01:50 PM    LYMPHSABS 0.66 07/11/2022 01:50 PM    EOSABS 0.25 07/11/2022 01:50 PM    BASOSABS 0.05 07/11/2022 01:50 PM     CMP:    Lab Results   Component Value Date/Time     07/12/2022 04:47 AM    K 3.8 07/12/2022 04:47 AM     07/12/2022 04:47 AM    CO2 20 07/12/2022 04:47 AM    BUN 11 07/12/2022 04:47 AM    CREATININE 0.9 07/12/2022 04:47 AM    GFRAA >60 07/12/2022 04:47 AM    LABGLOM >60 07/12/2022 04:47 AM    GLUCOSE 79 07/12/2022 04:47 AM    PROT 6.3 07/12/2022 04:47 AM    LABALBU 2.7 07/12/2022 04:47 AM    CALCIUM 9.9 07/12/2022 04:47 AM    BILITOT 1.4 07/12/2022 04:47 AM    ALKPHOS 81 07/12/2022 04:47 AM    AST 21 07/12/2022 04:47 AM    ALT 7 07/12/2022 04:47 AM        PRO-BNP:   Lab Results   Component Value Date    PROBNP 59 07/11/2022    PROBNP 88 05/26/2022 ABGs:   Lab Results   Component Value Date/Time    PO2 79.0 08/17/2021 06:24 PM    PCO2 37.3 08/17/2021 06:24 PM     Hemoglobin A1C: No components found for: HGBA1C    IMAGING:  Imaging tests were completed and reviewed and discussed radiology and care team involved and reveals   XR CHEST PORTABLE    Result Date: 7/11/2022  EXAMINATION: ONE XRAY VIEW OF THE CHEST 7/11/2022 3:30 pm COMPARISON: May 26, 2022 HISTORY: ORDERING SYSTEM PROVIDED HISTORY: shortness of breath, decreased breath sounds on left TECHNOLOGIST PROVIDED HISTORY: Reason for exam:->shortness of breath, decreased breath sounds on left FINDINGS: Confluent opacities in mid and lower left lung field silhouetting left heart border and left hemidiaphragm. No evidence of pneumothorax. Interstitial prominence throughout aerated left upper lung field and throughout right lung. Confluent opacities in mid left lung field and left lung base could indicate large left pleural effusion or pneumonia. CTA PULMONARY W CONTRAST    Result Date: 7/11/2022  EXAMINATION: CTA OF THE CHEST 7/11/2022 3:30 pm TECHNIQUE: CTA of the chest was performed after the administration of intravenous contrast.  Multiplanar reformatted images are provided for review. MIP images are provided for review. Automated exposure control, iterative reconstruction, and/or weight based adjustment of the mA/kV was utilized to reduce the radiation dose to as low as reasonably achievable. COMPARISON: CT angiography of the chest, 05/26/2022. CONTRAST: 75 cc Isovue-370 was injected intravenously.  HISTORY: ORDERING SYSTEM PROVIDED HISTORY: shortness of breath, hypoxia, eval for PE TECHNOLOGIST PROVIDED HISTORY: Reason for exam:->shortness of breath, hypoxia, eval for PE Decision Support Exception - unselect if not a suspected or confirmed emergency medical condition->Emergency Medical Condition (MA) FINDINGS: Pulmonary Arteries: No embolus is seen in the main pulmonary artery, its right or maintenance bronchodilators    Obstructive sleep apnea    Continue CPAP nightly     Patients son Janae Patel also at bedside. Explained to the patient and her son in detail about diagnosis, work-up and plan of care.     Javon Pablo MD MS  Pulmonary, Critical Care and Sleep Medicine

## 2022-07-13 ENCOUNTER — APPOINTMENT (OUTPATIENT)
Dept: INTERVENTIONAL RADIOLOGY/VASCULAR | Age: 77
DRG: 186 | End: 2022-07-13
Payer: MEDICARE

## 2022-07-13 ENCOUNTER — APPOINTMENT (OUTPATIENT)
Dept: GENERAL RADIOLOGY | Age: 77
DRG: 186 | End: 2022-07-13
Payer: MEDICARE

## 2022-07-13 LAB
ALBUMIN FLUID: 0.5 G/DL
ALBUMIN SERPL-MCNC: 2.5 G/DL (ref 3.5–5.2)
ALP BLD-CCNC: 83 U/L (ref 35–104)
ALT SERPL-CCNC: 7 U/L (ref 0–32)
AMYLASE FLUID: 38 U/L
ANION GAP SERPL CALCULATED.3IONS-SCNC: 10 MMOL/L (ref 7–16)
AST SERPL-CCNC: 20 U/L (ref 0–31)
BASOPHILS ABSOLUTE: 0.05 E9/L (ref 0–0.2)
BASOPHILS RELATIVE PERCENT: 1.1 % (ref 0–2)
BILIRUB SERPL-MCNC: 1.4 MG/DL (ref 0–1.2)
BILIRUBIN FLUID: 0.47 MG/DL
BUN BLDV-MCNC: 13 MG/DL (ref 6–23)
CALCIUM SERPL-MCNC: 9.4 MG/DL (ref 8.6–10.2)
CEA,FLUID: 2.2 NG/ML
CHLORIDE BLD-SCNC: 107 MMOL/L (ref 98–107)
CHOLESTEROL FLUID: 34 MG/DL
CO2: 21 MMOL/L (ref 22–29)
CREAT SERPL-MCNC: 1.1 MG/DL (ref 0.5–1)
EKG ATRIAL RATE: 163 BPM
EKG Q-T INTERVAL: 316 MS
EKG QRS DURATION: 58 MS
EKG QTC CALCULATION (BAZETT): 507 MS
EKG R AXIS: 17 DEGREES
EKG T AXIS: 51 DEGREES
EKG VENTRICULAR RATE: 155 BPM
EOSINOPHILS ABSOLUTE: 0.3 E9/L (ref 0.05–0.5)
EOSINOPHILS RELATIVE PERCENT: 6.8 % (ref 0–6)
FLUID SPECIFIC GRAVITY: 1.02
FLUID TYPE: NORMAL
GFR AFRICAN AMERICAN: 58
GFR NON-AFRICAN AMERICAN: 48 ML/MIN/1.73
GLUCOSE BLD-MCNC: 78 MG/DL (ref 74–99)
GLUCOSE, FLUID: 112 MG/DL
GRAM STAIN ORDERABLE: NORMAL
HCT VFR BLD CALC: 29.8 % (ref 34–48)
HEMOGLOBIN: 9.6 G/DL (ref 11.5–15.5)
IMMATURE GRANULOCYTES #: 0.02 E9/L
IMMATURE GRANULOCYTES %: 0.5 % (ref 0–5)
LIPASE BODY FLUID: 55 U/L
LIPASE: 55 U/L (ref 13–60)
LYMPHOCYTES ABSOLUTE: 0.78 E9/L (ref 1.5–4)
LYMPHOCYTES RELATIVE PERCENT: 17.7 % (ref 20–42)
MCH RBC QN AUTO: 32.3 PG (ref 26–35)
MCHC RBC AUTO-ENTMCNC: 32.2 % (ref 32–34.5)
MCV RBC AUTO: 100.3 FL (ref 80–99.9)
MONOCYTES ABSOLUTE: 0.57 E9/L (ref 0.1–0.95)
MONOCYTES RELATIVE PERCENT: 13 % (ref 2–12)
NEUTROPHILS ABSOLUTE: 2.68 E9/L (ref 1.8–7.3)
NEUTROPHILS RELATIVE PERCENT: 60.9 % (ref 43–80)
PDW BLD-RTO: 17.7 FL (ref 11.5–15)
PLATELET # BLD: 163 E9/L (ref 130–450)
PMV BLD AUTO: 10.6 FL (ref 7–12)
POTASSIUM REFLEX MAGNESIUM: 4 MMOL/L (ref 3.5–5)
RBC # BLD: 2.97 E12/L (ref 3.5–5.5)
SODIUM BLD-SCNC: 138 MMOL/L (ref 132–146)
TOTAL PROTEIN: 6.4 G/DL (ref 6.4–8.3)
TRIGLYCERIDES FLUID: 297 MG/DL
TRIGLYCERIDES FLUID: 481 MG/DL
WBC # BLD: 4.4 E9/L (ref 4.5–11.5)

## 2022-07-13 PROCEDURE — 49083 ABD PARACENTESIS W/IMAGING: CPT | Performed by: RADIOLOGY

## 2022-07-13 PROCEDURE — 99232 SBSQ HOSP IP/OBS MODERATE 35: CPT | Performed by: FAMILY MEDICINE

## 2022-07-13 PROCEDURE — 6360000002 HC RX W HCPCS: Performed by: INTERNAL MEDICINE

## 2022-07-13 PROCEDURE — 6360000002 HC RX W HCPCS: Performed by: STUDENT IN AN ORGANIZED HEALTH CARE EDUCATION/TRAINING PROGRAM

## 2022-07-13 PROCEDURE — 0W9G3ZZ DRAINAGE OF PERITONEAL CAVITY, PERCUTANEOUS APPROACH: ICD-10-PCS | Performed by: RADIOLOGY

## 2022-07-13 PROCEDURE — 84478 ASSAY OF TRIGLYCERIDES: CPT

## 2022-07-13 PROCEDURE — 83690 ASSAY OF LIPASE: CPT

## 2022-07-13 PROCEDURE — 97530 THERAPEUTIC ACTIVITIES: CPT

## 2022-07-13 PROCEDURE — 80053 COMPREHEN METABOLIC PANEL: CPT

## 2022-07-13 PROCEDURE — 49083 ABD PARACENTESIS W/IMAGING: CPT

## 2022-07-13 PROCEDURE — 97161 PT EVAL LOW COMPLEX 20 MIN: CPT

## 2022-07-13 PROCEDURE — 71045 X-RAY EXAM CHEST 1 VIEW: CPT

## 2022-07-13 PROCEDURE — 6370000000 HC RX 637 (ALT 250 FOR IP)

## 2022-07-13 PROCEDURE — 85025 COMPLETE CBC W/AUTO DIFF WBC: CPT

## 2022-07-13 PROCEDURE — 97165 OT EVAL LOW COMPLEX 30 MIN: CPT

## 2022-07-13 PROCEDURE — 83986 ASSAY PH BODY FLUID NOS: CPT

## 2022-07-13 PROCEDURE — 2580000003 HC RX 258: Performed by: STUDENT IN AN ORGANIZED HEALTH CARE EDUCATION/TRAINING PROGRAM

## 2022-07-13 PROCEDURE — 6370000000 HC RX 637 (ALT 250 FOR IP): Performed by: STUDENT IN AN ORGANIZED HEALTH CARE EDUCATION/TRAINING PROGRAM

## 2022-07-13 PROCEDURE — 6360000002 HC RX W HCPCS

## 2022-07-13 PROCEDURE — 94640 AIRWAY INHALATION TREATMENT: CPT

## 2022-07-13 PROCEDURE — 82947 ASSAY GLUCOSE BLOOD QUANT: CPT

## 2022-07-13 PROCEDURE — 2700000000 HC OXYGEN THERAPY PER DAY

## 2022-07-13 PROCEDURE — 97535 SELF CARE MNGMENT TRAINING: CPT

## 2022-07-13 PROCEDURE — 99233 SBSQ HOSP IP/OBS HIGH 50: CPT | Performed by: INTERNAL MEDICINE

## 2022-07-13 PROCEDURE — 36415 COLL VENOUS BLD VENIPUNCTURE: CPT

## 2022-07-13 PROCEDURE — C1729 CATH, DRAINAGE: HCPCS

## 2022-07-13 PROCEDURE — 2500000003 HC RX 250 WO HCPCS: Performed by: RADIOLOGY

## 2022-07-13 PROCEDURE — 2060000000 HC ICU INTERMEDIATE R&B

## 2022-07-13 RX ORDER — LIDOCAINE 4 G/G
1 PATCH TOPICAL DAILY PRN
Status: DISCONTINUED | OUTPATIENT
Start: 2022-07-13 | End: 2022-07-15 | Stop reason: HOSPADM

## 2022-07-13 RX ORDER — DEXTROSE MONOHYDRATE 25 G/50ML
12.5 INJECTION, SOLUTION INTRAVENOUS PRN
Status: DISCONTINUED | OUTPATIENT
Start: 2022-07-13 | End: 2022-07-15 | Stop reason: HOSPADM

## 2022-07-13 RX ORDER — DEXTROSE MONOHYDRATE 50 MG/ML
100 INJECTION, SOLUTION INTRAVENOUS PRN
Status: DISCONTINUED | OUTPATIENT
Start: 2022-07-13 | End: 2022-07-15 | Stop reason: HOSPADM

## 2022-07-13 RX ORDER — LIDOCAINE HYDROCHLORIDE 20 MG/ML
INJECTION, SOLUTION INFILTRATION; PERINEURAL
Status: COMPLETED | OUTPATIENT
Start: 2022-07-13 | End: 2022-07-13

## 2022-07-13 RX ORDER — OCTREOTIDE ACETATE 500 UG/ML
200 INJECTION, SOLUTION INTRAVENOUS; SUBCUTANEOUS EVERY 8 HOURS
Status: DISCONTINUED | OUTPATIENT
Start: 2022-07-13 | End: 2022-07-15 | Stop reason: HOSPADM

## 2022-07-13 RX ADMIN — SODIUM CHLORIDE, PRESERVATIVE FREE 10 ML: 5 INJECTION INTRAVENOUS at 07:44

## 2022-07-13 RX ADMIN — VENLAFAXINE HYDROCHLORIDE 75 MG: 75 CAPSULE, EXTENDED RELEASE ORAL at 07:43

## 2022-07-13 RX ADMIN — ACETAMINOPHEN 325MG 650 MG: 325 TABLET ORAL at 21:13

## 2022-07-13 RX ADMIN — BUDESONIDE 500 MCG: 0.5 SUSPENSION RESPIRATORY (INHALATION) at 07:38

## 2022-07-13 RX ADMIN — Medication 1000 UNITS: at 07:44

## 2022-07-13 RX ADMIN — ACETAMINOPHEN 325MG 650 MG: 325 TABLET ORAL at 11:18

## 2022-07-13 RX ADMIN — DOXAZOSIN 1 MG: 2 TABLET ORAL at 20:33

## 2022-07-13 RX ADMIN — PANTOPRAZOLE SODIUM 40 MG: 40 TABLET, DELAYED RELEASE ORAL at 07:44

## 2022-07-13 RX ADMIN — FUROSEMIDE 40 MG: 40 TABLET ORAL at 07:44

## 2022-07-13 RX ADMIN — AMLODIPINE BESYLATE 2.5 MG: 2.5 TABLET ORAL at 07:44

## 2022-07-13 RX ADMIN — ONDANSETRON 4 MG: 2 INJECTION INTRAMUSCULAR; INTRAVENOUS at 11:19

## 2022-07-13 RX ADMIN — SPIRONOLACTONE 100 MG: 25 TABLET ORAL at 07:44

## 2022-07-13 RX ADMIN — ARFORMOTEROL TARTRATE 15 MCG: 15 SOLUTION RESPIRATORY (INHALATION) at 07:38

## 2022-07-13 RX ADMIN — LACTULOSE 10 G: 20 SOLUTION ORAL at 20:32

## 2022-07-13 RX ADMIN — RIFAXIMIN 550 MG: 550 TABLET ORAL at 07:44

## 2022-07-13 RX ADMIN — LACTULOSE 10 G: 20 SOLUTION ORAL at 07:44

## 2022-07-13 RX ADMIN — SODIUM CHLORIDE, PRESERVATIVE FREE 10 ML: 5 INJECTION INTRAVENOUS at 20:33

## 2022-07-13 RX ADMIN — OCTREOTIDE ACETATE 200 MCG: 500 INJECTION, SOLUTION INTRAVENOUS; SUBCUTANEOUS at 10:38

## 2022-07-13 RX ADMIN — OCTREOTIDE ACETATE 200 MCG: 500 INJECTION, SOLUTION INTRAVENOUS; SUBCUTANEOUS at 17:44

## 2022-07-13 RX ADMIN — ARFORMOTEROL TARTRATE 15 MCG: 15 SOLUTION RESPIRATORY (INHALATION) at 19:44

## 2022-07-13 RX ADMIN — BUDESONIDE 500 MCG: 0.5 SUSPENSION RESPIRATORY (INHALATION) at 19:44

## 2022-07-13 RX ADMIN — RIFAXIMIN 550 MG: 550 TABLET ORAL at 20:33

## 2022-07-13 RX ADMIN — LIDOCAINE HYDROCHLORIDE 8 ML: 20 INJECTION, SOLUTION INFILTRATION; PERINEURAL at 14:21

## 2022-07-13 ASSESSMENT — PAIN DESCRIPTION - DESCRIPTORS
DESCRIPTORS: ACHING
DESCRIPTORS: ACHING;DISCOMFORT;SORE;THROBBING

## 2022-07-13 ASSESSMENT — PAIN DESCRIPTION - LOCATION
LOCATION: SHOULDER
LOCATION: SHOULDER;FLANK

## 2022-07-13 ASSESSMENT — PAIN SCALES - GENERAL
PAINLEVEL_OUTOF10: 7
PAINLEVEL_OUTOF10: 0
PAINLEVEL_OUTOF10: 3
PAINLEVEL_OUTOF10: 3

## 2022-07-13 ASSESSMENT — PAIN DESCRIPTION - ORIENTATION
ORIENTATION: LEFT
ORIENTATION: LEFT

## 2022-07-13 NOTE — PROGRESS NOTES
[address]      Date:2022                                                  Patient Name: Makenzie Desir  MRN: [id number]  : 1945  Room: [address] [place] [address]    Evaluating OT: RACIEL De Guzman, OTR/L  # [id number]    Referring Provider:  Jagdish Horner MD  Specific Provider Orders:  Pattricia Arch and Treat\"  22    Diagnosis: Pleural effusion [J90]  Pleural effusion on left [J90]  Acute respiratory failure with hypoxia ([address] [J96.01]    Pt was admitted w/ SOB, Hypoxia, found w/ Pleural Effusion - Pigtail Catheter/Chest Tube Placed Left Side 22    Pertinent Medical History:  Pt has a past medical history of Breast cancer ([address] Cirrhosis ([address] Essential hypertension, Hx of blood clots, Hyperlipidemia, Osteopenia, 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 ([address] Sleep apnea, Spinal stenosis, and Type 2 diabetes mellitus ([address] ,  has a past surgical history that includes Hysterectomy; Carpal tunnel release; Lithotripsy (Left, 2016); Colonoscopy (2017); Breast lumpectomy; Upper gastrointestinal endoscopy (2019); Colonoscopy (2019); Upper gastrointestinal endoscopy (N/A, 2019); Colonoscopy (N/A, 2019); Colonoscopy (2019); Shoulder Arthroplasty (Left, 2020); cardiovascular stress test; and Bladder surgery (Right, 2021).     Surgeries this admission: 22: Left Chest Tube(Pigtail) Placement      Precautions:  Fall Risk  Left Chest Tube  2L O2  Elevate HOB    Assessment of current deficits   [x] Functional mobility   [x]ADLs  [x] Strength               []Cognition   [x] Functional transfers   [x] IADLs         [x] Safety Awareness   [x]Endurance   [] Fine Coordination              [x] Balance      [] Vision/perception   []Sensation    []Gross Motor Coordination  [] ROM  [] Delirium                   [] Motor Control       OT PLAN OF CARE OT POC based on physician orders, patient diagnosis and results of clinical assessment    Frequency/Duration 1-3 days/wk for 2 weeks PRN   Specific OT Treatment to include:     * Instruction/training on adapted ADL techniques and AE recommendations to increase functional independence within precautions       * Training on energy conservation strategies, correct breathing pattern and techniques to improve independence/tolerance for self-care routine  * Functional transfer/mobility training/DME recommendations for increased independence, safety, and fall prevention  * Patient/Family education to increase follow through with safety techniques and functional independence  * Recommendation of environmental modifications for increased safety with functional transfers/mobility and ADLs  * Cognitive retraining/development of therapeutic activities to improve problem solving, judgement, memory, and attention for increased safety/participation in ADL/IADL tasks  * Therapeutic exercise to improve motor endurance, ROM, and functional strength for ADLs/functional transfers  * Therapeutic activities to facilitate/challenge dynamic balance, stand tolerance for increased safety and independence with ADLs  * Therapeutic activities to facilitate gross/fine motor skills for increased independence with ADLs  * Neuro-muscular re-education: facilitation of righting/equilibrium reactions  * Positioning to improve skin integrity, interaction with environment and functional independence  * Delirium prevention/treatment  * Manual techniques for edema management  Other:    Recommended Adaptive Equipment: TBD as pt progresses       Home Living:  Pt lives alone in a single-level apartment, level entry. No Basement. Bathroom setup:  Tub-Shower, High Commode   Equipment owned:  Landry Li, [facility], Rollator, Shower chair, [address], [employer], Long Shoe horn    Available Family Assist:  Son lives locally - provides assist PRN w/ Shopping/IADLs.   Has Aids 5 days/week for 2-3 hours/day for Home Mgmt. Provide SUP for tub transfer only - They Do Not Provide Assist for pt's self care    Prior Level of Function:  Pt reported being IND with all ADLs, Light Meal Prep/IADLs, Transfers and Mobility using QC for household ambulation. Driving:  No  Occupation:  None reported    Pain Level:  8-9/10 Sharp pain Left Lung Anteriorly and Left Shoulder (Chronic) - Mild  Relief w/ Rest and Repositioning, Nsg present and aware  Additional Complaints:  Moderately severe Nausea - requested medicine w/ RN    Cognition: A & O x 3  Cues for day/date   Able to Follow Multi-Step Commands w/ Min VCs   Memory:  good (-)   Sequencing:  good (-)   Problem solving:  good (-)   Judgement/safety:  good (-)  Additional Comments:  Pt was pleasant and cooperative. Flat Affect, Fair(+) Eye contact.   Slow moving, slightly slow to process    Vitals/Lab Values:  O2 sats w/ 2L O2:  Seated in chair prior to ax = 95%  Static standing = 93%  While ambulating to the bathroom = 89%  Improved to 95% w/ seated rest break       Functional Assessment:  AM-PAC Daily Activity Raw Score: 15/24     Initial Eval Status  Date: 7/13/22   Treatment Status  Date: STGs = LTGs  Time frame: 10-14 days   Feeding IND after set up    Seated in chair    NA   Grooming Close SUP/Set up    Washing hands/face/combing hair while standing at the sink w/ Close SUP for safety w/ standing balance     Mod I  Standing at the Sink   UB Dressing Mod A/Set up    [place] robe seated EOB, pt ed for adaptive techs r/t limited shoulder ROM L UE  Unable to tolerate item retrieval for activities    Mod I     LB Dressing Mod A/Set up    Mod A to don/doff socks seated EOB, threading LEs into undergarments seated EOB, Min A to pull pants over hips  Pt ed for safe/adaptive techs, use of adaptive equip - Uses sock aid daily at baseline    Mod I     Bathing NT    Mod I      Toileting Mod A    Min A for clothing adjustment over hips + Min A for safety w/ standing balance for task, VCs to improve safety awareness    Mod I     Bed Mobility  Supine to sit: NT   Sit to supine:  SUP     VCs for safe tech    Supine to sit: IND  Sit to supine: IND     Functional Transfers Min A    Chair, EOB, Commode  Pt ed for safety/hand placement    Mod I     Functional Mobility Min A w/ [employer]    Household distances Bed<>Bathroom and at b/s  Pt ed for safety/improved safety awareness, walker safety    Mod I     Balance Sitting:     Static:  IND in chair    Dynamic:  SUP w/ functional ax EOB/Commode     Standing:     Static:  Close SUP w/ [employer]    Dynamic:  Min A w/ functional ax/mobility w/ [employer]    Sitting:     Static:  IND    Dynamic:  IND w/ functional ax    Standing:     Static:  Mod I w/ AD PRN    Dynamic:  Mod I w/ functional ax/mobility w/ AD PRN   Activity Tolerance Fair    Limited by hypoxia - No SOB    Good(-)   Visual/  Perceptual    Hearing: WNL   Glasses: Reading    WFL   Hearing Aids:  No               Hand Dominance: Right   AROM Strength Additional Info:    RUE  WFL Grossly 4/5 Good ;   Good FMC/dexterity noted during ADL tasks     LUE AROM Shoulder flex to ~ 20*  AAROM Shoulder flex to ~ 90* - limited by discomfort of Chest Tube  Distally WFL Grossly 3/5 proximally  4/5 distally  Observed, NT d/t pain, Chest Tube Good ;    Good FMC/dexterity noted during ADL tasks       Sensation:  Denies numbness or tingling Pranay UEs   Tone: WFL Pranay UEs   Edema: None Noted Pranay UEs     Comments: Upon arrival, patient was found seated in chair. She was agreeable to participate in therapeutic ax. No Family present during session. Received permission from RN prior to engaging pt in OT services. Educated pt on role of OT services. At the end of the session, patient was properly positioned in Semi-Supine. Call light and phone within reach, all lines and tubes intact. Oriented pt to call bell.   Made all appropriate Environmental Modifications to facilitate pt's level of IND and safety. All needs met. Bed Alarm activated. RN at b/s         Overall patient demonstrated decreased independence and safety during completion of ADL/functional transfer/mobility tasks. Pt would benefit from continued skilled OT to increase safety and independence with completion of ADL/IADL tasks for functional independence and quality of life. Treatment: OT treatment provided this date includes:    Instruction/training on safety and adapted techniques for completion of ADLs, use of DME/AD/Adaptive equip:     Instruction/training on safe functional mobility/transfer techniques, use of DME/AD:     Instruction/training on energy conservation techs (EC)/Pursed-Lip Breathing (PLB)/work simplification for completion of ADLs:      Neuromuscular Reeducation to facilitate balance/righting reactions for increased function with ADLs:     Skilled positioning/alignment for Pain Mgmt, to maximize Pt's safety and ability to [facility] interact w/ his/her environment, maximize respiratory status   Activity tolerance - Sitting/Standing to improve endurance w/ functional ax    Cognitive retraining -  Oriented pt to current Date, Place and Situation; Cues for safety/safety awareness, sequencing, problem solving     Skilled monitoring of Vitals during session and pt's response to tx ax       Consulted RN, SW/CM     Made all appropriate Environmental Modifications to facilitate pt's level of IND and safety.  Recommendations for Continued Participation in OT services during Hospitalization and at D/C     Pt and/or Family verbalized/demonstrated a Good(-) understanding of education provided. Will Review PRN. Rehab Potential: Good(-) for established goals     Patient / Family Goal: Not stated at this time      Patient and/or family were instructed on functional diagnosis, prognosis/goals and OT plan of care. Demonstrated Good(-) understanding.      Eval Complexity: Low    Time In: 1029  Time Out: 1110  Total Treatment Time: 26 minutes    Min Units   OT Eval Low 97165  X  1   OT Eval Medium 60457      OT Eval High 09806      OT Re-Eval [id number]       Therapeutic Ex 88618       Therapeutic Activities 22656       ADL/Self Care 86705  26  2   Orthotic Management 91868       Manual [id number]     Neuro Re-Ed 85039       Non-Billable Time              Evaluation Time additionally includes thorough review of current medical information, gathering information on past medical history/social history and prior level of function, completion of standardized testing/informal observation of tasks, assessment of data and education on plan of care and goals.             RACIEL Stewart, OTR/L  # [id number]

## 2022-07-13 NOTE — PLAN OF CARE
Problem: Discharge Planning  Goal: Discharge to home or other facility with appropriate resources  Outcome: Progressing  Flowsheets (Taken 7/13/2022 0000)  Discharge to home or other facility with appropriate resources:   Identify barriers to discharge with patient and caregiver   Identify discharge learning needs (meds, wound care, etc)     Problem: Skin/Tissue Integrity  Goal: Absence of new skin breakdown  Description: 1. Monitor for areas of redness and/or skin breakdown  2. Assess vascular access sites hourly  3. Every 4-6 hours minimum:  Change oxygen saturation probe site  4. Every 4-6 hours:  If on nasal continuous positive airway pressure, respiratory therapy assess nares and determine need for appliance change or resting period.   Outcome: Progressing     Problem: Safety - Adult  Goal: Free from fall injury  Outcome: Progressing  Flowsheets (Taken 7/13/2022 0436)  Free From Fall Injury: Instruct family/caregiver on patient safety     Problem: ABCDS Injury Assessment  Goal: Absence of physical injury  Outcome: Progressing  Flowsheets (Taken 7/13/2022 0436)  Absence of Physical Injury: Implement safety measures based on patient assessment

## 2022-07-13 NOTE — INTERVAL H&P NOTE
H&P Update    Patient's History and Physical  was reviewed. The patient appears likely to able to tolerate the procedure. Risk and benefits discussed including ultimate complications, possibly death and consent obtained. Patient and/family had the opportunity to ask questions. All questions were answered and patient/family wishes to proceed.      Vishal Law, II

## 2022-07-13 NOTE — DISCHARGE INSTR - COC
Continuity of Care Form    Patient Name: Gaurav Petit   :  1945  MRN:  [id number]    Admit date:  2022  Discharge date:  2022    Code Status Order: Full Code   Advance Directives:      Admitting Physician:  Radha Cagle MD  PCP: Fran Duke MD    Discharging Nurse: Mary Ramirez Unit/Room#: 0275/7504-F  Discharging Unit Phone Number: [phone]    Emergency Contact:   Extended Emergency Contact Information  Primary Emergency Contact: [address] [place] [address]  Home Phone: [phone]  Mobile Phone: [phone]  Relation: Child  Preferred language: English   needed? No  Secondary Emergency Contact: Anita Haque[address] Phone: [phone]  Mobile Phone: [phone]  Relation: Other  Preferred language: English   needed?  No    Past Surgical History:  Past Surgical History:   Procedure Laterality Date    BLADDER SURGERY Right 2021    CYSTOSCOPY RETROGRADE PYELOGRAM RIGHT  STENT INSERTION performed by Olivia Terrell MD at [place] [address] LUMPECTOMY      lumpectomy [address] TEST      Lexiscan stress test    CARPAL TUNNEL RELEASE      COLONOSCOPY  2017    multiple polyps; diverticula--jerod    COLONOSCOPY  2019    polyps; diverticula; hemorrhoids--jerod    COLONOSCOPY N/A 2019    COLONOSCOPY POLYPECTOMY SNARE/COLD BIOPSY performed by Amanda Cerrato MD at [address] [facility]  2019    COLONOSCOPY WITH BIOPSY performed by Amanda Cerrato MD at [address] [place] [address] ([address] [place] [address])      LITHOTRIPSY Left 2016    C-R STENT PLACEMENT    SHOULDER ARTHROPLASTY Left 2020    LEFT REVERSE TOTAL SHOULDER  ARTHROPLASTY -- DEPUY performed by Corrie Dance, MD at [address] [facility]  2019    gastritis--jerod    UPPER GASTROINTESTINAL ENDOSCOPY N/A 2019    EGD BIOPSY performed by Amanda Cerrato MD at [facility]       Immunization History:   Immunization History   Administered Date(s) Administered    COVID-19, PFIZER PURPLE top, DILUTE for use, (age 15 y+), 30mcg/0.3mL 03/26/2021, 04/16/2021    Hepatitis B Adult (Recombivax HB) 03/12/2020    Influenza A (J1H2-22) Vaccine PF IM 12/21/2009    Influenza Vaccine, unspecified formulation 09/27/2016    Influenza Virus Vaccine 09/28/2015    Influenza Whole 09/28/2015    Influenza, High Dose (Fluzone 65 yrs and older) 09/27/2016, 09/21/2017, 10/05/2018, 10/21/2019, 12/01/2020    Influenza, [place], IM, (6 mo and older Fluzone, Flulaval, Fluarix and 3 yrs and older Afluria) 09/27/2016    Influenza, Quadv, adjuvanted, 65 yrs +, IM, PF (Fluad) 10/07/2020, 10/19/2021    Pneumococcal Conjugate 13-valent (Ubqxhvx50) 09/27/2016    Pneumococcal Polysaccharide (Whdnseelg73) 11/21/2017    Tdap (Boostrix, Adacel) 01/14/2021    Zoster Live (Zostavax) 04/01/2013    Zoster Recombinant (Shingrix) 04/12/2018, 01/14/2021       Active Problems:  Patient Active Problem List   Diagnosis Code    Malignant neoplasm of left breast ([facility] C50.912    Type 2 diabetes mellitus ([facility] E11.9    Obstructive sleep apnea syndrome G47.33    Essential hypertension I10    Multiple thyroid nodules E04.2    Hx of adenomatous colonic polyps Z86.010    Dyslipidemia E78.5    Cirrhosis (HCC) K74.60    S/P reverse total shoulder arthroplasty, 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    REN (acute kidney injury) ([facility] N17.9    Anemia D64.9    Pleural effusion J90    Palpitations R00.2    Rhabdomyolysis M62.82    Hyperammonemia (HCC) E72.20    Closed displaced fracture of surgical neck of right humerus S42.211A    Pulmonary embolism (HCC) I26.99    Idiopathic acute pancreatitis without infection or necrosis K85.00    Asymptomatic microscopic hematuria R31.21    Breast mass, left N63.20    Anxiety disorder, unspecified F41.9    Gastro-esophageal reflux disease without esophagitis K21.9    Major depressive disorder, recurrent severe without psychotic features ([facility] F33.2    Muscle weakness (generalized) M62.81    Need for assistance with personal care Z74.1    Other abnormalities of gait and mobility R26.89    Unspecified physeal fracture of upper end of humerus, left arm, subsequent encounter for fracture with routine healing S49.002D    Difficulty in walking, not elsewhere classified R26.2    Acute renal failure (ARF) ([facility]) N17.9    Pyelonephritis N12    Stage 3 chronic kidney disease, unspecified whether stage 3a or 3b CKD ([facility] N18.30    Interstitial lung disease ([facility] J84.9    Protein-calorie malnutrition, unspecified severity ([facility] E46       Isolation/Infection:   Isolation            No Isolation          Patient Infection Status       Infection Onset Added Last Indicated Last Indicated By Review Planned Expiration Resolved Resolved By    None active    Resolved    COVID-19 (Rule Out) 07/11/22 07/11/22 07/11/22 COVID-19, Rapid (Ordered)   07/11/22 Rule-Out Test Resulted    COVID-19 (Rule Out) 11/24/21 11/24/21 11/24/21 COVID-19, Rapid (Ordered)   11/24/21 Rule-Out Test Resulted    COVID-19 (Rule Out) 10/29/21 10/29/21 10/30/21 COVID-19, Rapid (Ordered)   10/30/21 Rule-Out Test Resulted    COVID-19 (Rule Out) 01/31/21 01/31/21 01/31/21 Respiratory Panel, Molecular, with COVID-19 (Restricted: peds pts or suitable admitted adults) (Ordered)   01/31/21 Rule-Out Test Resulted    COVID-19 (Rule Out) 12/29/20 12/29/20 12/29/20 COVID-19 (Ordered)   12/29/20 Rule-Out Test Resulted    COVID-19 (Rule Out) 12/14/20 12/14/20 12/14/20 COVID-19 Ambulatory (Ordered)   12/15/20 Rule-Out Test Resulted            Nurse Assessment:  Last Vital Signs: /74   Pulse 95   Temp 98.4 °F (36.9 °C) (Oral)   Resp 20   Ht 5' 2\" (1.575 m)   Wt 191 lb 3.2 oz (86.7 kg)   SpO2 93%   BMI 34.97 kg/m²     Last documented pain score (0-10 scale): Pain Level: 3  Last Weight:   Wt Readings from Last 1 Encounters:   07/12/22 191 lb 3.2 oz (86.7 kg)     Mental Status: oriented and alert    IV Access:  - None    Nursing Mobility/ADLs:  Walking   Assisted  Transfer  Assisted  Bathing  Assisted  Dressing  Assisted  Toileting  Assisted  Feeding  Independent  Med [address]  Assisted  Med Delivery   whole    Wound Care Documentation and Therapy:        Elimination:  Continence: Bowel: Yes  Bladder: Yes  Urinary Catheter: None   Colostomy/Ileostomy/Ileal Conduit: No       Date of Last BM: 07/16/2022    Intake/Output Summary (Last 24 hours) at 7/13/2022 1339  Last data filed at 7/13/2022 1056  Gross per 24 hour   Intake --   Output 4050 ml   Net -4050 ml     I/O last 3 completed shifts:  In: -   Out: 7401 [Urine:425; Chest Tube:2000]    Safety Concerns: At Risk for Falls    Impairments/Disabilities:      None    Nutrition Therapy:  Current Nutrition Therapy:   - Oral Diet:  Low Fat    Routes of Feeding: Oral  Liquids: No Restrictions  Daily Fluid Restriction: no  Last Modified Barium Swallow with Video (Video Swallowing Test): not done    Treatments at the Time of Hospital Discharge:   Respiratory Treatments: duonebs  Oxygen Therapy:  is not on home oxygen therapy.   Ventilator:    - No ventilator support    Rehab Therapies: Physical Therapy and Occupational Therapy  Weight Bearing Status/Restrictions: No weight bearing restrictions  Other Medical Equipment (for information only, NOT a DME order):  walker, bath bench, and bedside commode  Other Treatments: N/A    Patient's personal belongings (please select all that are sent with patient):  BAG OF BELONGINGS    RN SIGNATURE:  Electronically signed by Kaur Lopes RN on 7/15/22 at 3:25 PM EDT    CASE MANAGEMENT/SOCIAL WORK SECTION    Inpatient Status Date: 7/11/2022    Readmission Risk Assessment Score:  Readmission Risk              Risk of Unplanned Readmission:  25           Discharging to Facility/ Agency     Name: [address] [place] [address]  Address: [address] [place] [address] [address]  Phone: [phone]  Fax: [phone] [facility] (if applicable)   Name:  Address:  Dialysis Schedule:  Phone:  Fax:    / signature: Electronically signed by Mila Zafar RN on 7/13/2022 at 1:41 PM    PHYSICIAN SECTION    Prognosis: Good    Condition at Discharge: Stable    Rehab Potential (if transferring to Rehab): Good    Recommended Labs or Other Treatments After Discharge:   Lasix increased from 40 mg daily to 40 mg BID  Get a BMP in 3-5 days to check renal function and electrolytes  Continue taking lactulose, rifaximin and spironolactone  Started on Eliquis 5 mg BID due to history of PE  Lidocaine patches for left shoulder pain    Physician Certification: I certify the above information and transfer of Roxana Hernandez  is necessary for the continuing treatment of the diagnosis listed and that she requires [facility] for greater 30 days.      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

## 2022-07-13 NOTE — H&P
[address] [facility]  Family Medicine Attending    S: 68 y.o. female with a PMh of cirrhosis, HOLDEN, ascites, hyperammonemia, chylothorax, HTN, HLD, PE (unprovoked?) not on anticoagulation, sleep apnea, DM2, recurrent UTI, recurrent nephrolithiasis, and left breast cancer s/p lumpectomy in 2013 who presented to [facility] ED with CC of dyspnea. She was found to have a very large pleural effusion. Her oxygen saturation plummeted into the low 80s with ambulation, and the patient had increased work of breathing. Decision was made to transfer here to [facility] for further management. Today, she notes some discomfort around the chest tube drain. She denies any other chest heaviness. Her shortness of breath is at least a little better than yesterday. Her nausea is improved this morning. No new complaints. She notes that she did not have any BMs since being in the hospital.  Good output from the chest tube. Appears likely chylum once again. O: VS- Blood pressure 126/70, pulse 97, temperature 98.6 °F (37 °C), temperature source Oral, resp. rate 24, height 5' 2\" (1.575 m), weight 191 lb 3.2 oz (86.7 kg), SpO2 95 %.   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

## 2022-07-13 NOTE — CARE COORDINATION
Patient remains on PICU with O2 at 2 l with O2 sats at 93%. Pigtail cath remains. Therapy notes noted and discussed transition of care with patient. [facility] at [facility]. Referral called to [facility] who is covering for Susan Davies. Await determination. Pinky Angles CM/SW will continue to follow. Addendum : [facility] has referral and has accepted and will start precert once pig tail catheter is  Removed. Ambulette form in envelope in soft chart will need a negative covid test day of discharge. JAYESH signed by LUCY  And Matheus started. will need completed once discharge order is written.  CM/SW will cotninue to follow

## 2022-07-13 NOTE — BRIEF OP NOTE
Brief Postoperative Note    Cindy Lugo  YOB: 1945  [id number]    Pre-operative Diagnosis: ascites plan paracentesis      Post-operative Diagnosis: Same    Procedure:paracentesis    Anesthesia: Local    Estimated Blood Loss: < 10 cc    Surgeon: Storm GONZALEZ     Complications: none    Specimen obtained: Yes, fluid, serous    Findings: fluid, drained with catheter drainage      Soren Dougherty II, MD   7/13/2022 1:11 PM

## 2022-07-13 NOTE — PROGRESS NOTES
Physical Therapy  Physical Therapy Initial Assessment     Name: Princess Monreal  : 1945  MRN: [id number]      Date of Service: 2022    Evaluating PT:  Kaity Monet PT, DPT [id number]    Room #:  2410/8141-A  Diagnosis:  Pleural effusion [J90]  Pleural effusion on left [J90]  Acute respiratory failure with hypoxia (HCC) [J96.01]  PMHx/PSHx:  Breast CA, cirrhosis, HTN, HLD, osteopenia, DM, spinal stenosis  Procedure/Surgery:  None this admission  Precautions:  Falls, L chest tube, O2  Equipment Needs:  TBD, pt has rollator and QC    SUBJECTIVE:    Pt lives alone in a 1st floor apartment with level entry. Bed is on 1st floor and bath is on 1st floor. Pt ambulated with QC PTA. Pt reports having an aide 5x per week for 2-3 hours to assist with cleaning and laundry. OBJECTIVE:   Initial Evaluation  Date: 22 Treatment Short Term/ Long Term   Goals   AM-PAC 6 Clicks 38/71     Was pt agreeable to Eval/treatment? yes     Does pt have pain? 9/10 L shoulder pain     Bed Mobility  Rolling: SBA  Supine to sit: SBA  Sit to supine: NT  Scooting: SBA  Rolling: Independent   Supine to sit: Independent   Sit to supine: Independent   Scooting: Independent    Transfers Sit to stand: Chika  Stand to sit: Chika  Stand pivot: Chika with [employer]  Sit to stand: Independent   Stand to sit: Independent   Stand pivot: Mod I with [employer]   Ambulation    40 feet and 15 feet with [employer] Chika  >150 feet with [employer] Mod I   Stair negotiation: ascended and descended  NT  TBD   ROM BUE:  Defer to OT note  BLE:  WFL     Strength BUE:  Defer to OT note  BLE:  3+/5  WNL   Balance Sitting EOB:  SBA  Dynamic Standing:  Chika with [employer]  Sitting EOB:  Independent   Dynamic Standing:   Mod I with [employer]     Pt is A & O x 4  Sensation:  Denies abnormalities  Edema:  None noted    Vitals:  SPO2 on 2L throughout session: 89-94%  Patient education  Pt educated on role of PT, safety during mobility    Patient response to education:   Pt verbalized understanding Pt demonstrated skill Pt requires further education in this area   yes yes yes     ASSESSMENT:    Conditions Requiring Skilled Therapeutic Intervention:    [x]Decreased strength     [x]Decreased ROM  [x]Decreased functional mobility  [x]Decreased balance   [x]Decreased endurance   []Decreased posture  []Decreased sensation  []Decreased coordination   []Decreased vision  []Decreased safety awareness   [x]Increased pain       Comments:  Patient semi-supine in bed upon entry and agreeable to PT evaluation. Pt noted to be soiled of urine despite purewick placement. Pt able to complete bed mobility with elevated HOB and use of bed rail. Stand pivot to chair completed with [employer] and light assist for balance. After seated rest in chair and assist with new gown and linens, ambulation completed into montero with [employer]. Pt requested to use restroom once in montero. Time provided to void in seated in restroom. Pt able to stand at sink for hand hygiene reporting mild SOB. Ambulation to chair in room with similar assist. SPO2 monitored throughout reaching 89% during seated in restroom. Pt left in chair at end of session with all needs met. Education provided to use call light for assist with mobility as well as to continue ambulating to restroom with assist as needed. RN aware of pt positioned in chair. Patient to benefit from continued skilled PT at [facility] to improve functional mobility and decrease fall risk. Treatment:  Patient practiced and was instructed in the following treatment:     Bed Mobility: VCs provided for sequencing and safety during mobility.  Transfer Training: Verbal and tactile cueing provided for sequencing and safety during mobility. Manual assist provided for completion of task   Gait Training: Ambulation with [employer] and verbal cues for proper technique and safety. Manual assist provided for completion of task     Pt's/ family goals   1. None stated    Prognosis is good for reaching above PT goals.     Patient and or family understand(s) diagnosis, prognosis, and plan of care. yes    PHYSICAL THERAPY PLAN OF CARE:    PT POC is established based on physician order and patient diagnosis     Referring provider/PT Order:    07/12/22 1100  PT eval and treat  Start:  07/12/22 1100,   End:  07/12/22 1100,   ONE TIME,   Standing Count:  1 Occurrences,   R         Rachid Sanchez MD       Diagnosis:  Pleural effusion [J90]  Pleural effusion on left [J90]  Acute respiratory failure with hypoxia ([address] [J96.01]  Specific instructions for next treatment:  Progress mobility as appropriate     Current Treatment Recommendations:     [x] Strengthening to improve independence with functional mobility   [x] ROM to improve independence with functional mobility   [x] Balance Training to improve static/dynamic balance and to reduce fall risk  [x] Endurance Training to improve activity tolerance during functional mobility   [x] Transfer Training to improve safety and independence with all functional transfers   [x] Gait Training to improve gait mechanics, endurance and assess need for appropriate assistive device  [] Stair Training in preparation for safe discharge home and/or into the community   [] Positioning to prevent skin breakdown and contractures  [x] Safety and Education Training   [x] Patient/Caregiver Education   [] HEP  [x] Other     PT long term treatment goals are located in above grid    Frequency of treatments: 2-5x/week x 1-2 weeks. Time in  0840  Time out  0920    Total Treatment Time  23 minutes     Evaluation Time includes thorough review of current medical information, gathering information on past medical history/social history and prior level of function, completion of standardized testing/informal observation of tasks, assessment of data and education on plan of care and goals.     CPT codes:  [x] Low Complexity PT evaluation 02615  [] Moderate Complexity PT evaluation 06708  [] High Complexity PT evaluation 86723  [] PT Re-evaluation [id number]  [] Gait training 77357 - minutes  [] Manual therapy 57237 - minutes  [x] Therapeutic activities 08813 23 minutes  [] Therapeutic exercises 68878 - minutes  [] Neuromuscular reeducation 72140 - minutes     Marek Richards PT, DPT  [id number]

## 2022-07-13 NOTE — PROGRESS NOTES
[facility] Inpatient   Resident Progress Note    S:  Hospital day: 2    Brief Synopsis: Lashell Morrissey is a 68 y.o. 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 Does report 1 previous episode of similar symptoms where patient had to undergo therapeutic paracentesis in May 2022 where 4.9 L was drained. While in ED patient was found to be hypoxic and required 3 L nasal cannula. Patient had received a total of 3 DuoNeb treatments prior to being transferred to [address] [place] [address]. On arrival, patient requires 4 L of O2. CTA chest showed large LEFT pleural effusion with compressive atelectasis of the majority of the left lung and moderate ascites in upper abdomen. Admitted for acute hypoxic respiratory failure. No acute events overnight. Patient was seen and examined this morning. On NC 3  L O2. Her breathing is improving. Nausea improved with Zofran. Reports mild discomfort around the chest tube drain. Did not have any BM since admission but she did not get any Lactulose yesterday because she refused it. Importance of Lactulose discussed. Reports new pain in left shoulder, she had left shoulder surgery in 12/2022.        Cont meds:    dextrose      sodium chloride       Scheduled meds:    octreotide  200 mcg SubCUTAneous Q8H    amLODIPine  2.5 mg Oral Daily    doxazosin  1 mg Oral Nightly    furosemide  40 mg Oral Daily    lactulose  10 g Oral TID    pantoprazole  40 mg Oral Daily    spironolactone  100 mg Oral Daily    venlafaxine  75 mg Oral Daily    rifAXIMin  550 mg Oral BID    Vitamin D  1,000 Units Oral Daily    sodium chloride flush  5-40 mL IntraVENous 2 times per day    budesonide  0.5 mg Nebulization BID    And    Arformoterol Tartrate  15 mcg Nebulization BID     PRN meds: glucose, glucagon (rDNA), dextrose, dextrose, lidocaine, sodium chloride flush, sodium chloride, polyethylene glycol, acetaminophen **OR** acetaminophen, albuterol, ondansetron     I reviewed the patient's Past Medical and Surgical History, Medications and Allergies. O:  VS: /74   Pulse 95   Temp 98.4 °F (36.9 °C) (Oral)   Resp 20   Ht 5' 2\" (1.575 m)   Wt 191 lb 3.2 oz (86.7 kg)   SpO2 93%   BMI 34.97 kg/m²     Physical Exam:  Physical Exam  Vitals reviewed. Constitutional:       General: She is not in acute distress. Appearance: She is obese. HENT:      Head: Normocephalic and atraumatic. Nose: Nose normal. No congestion or rhinorrhea. Eyes:      Pupils: Pupils are equal, round, and reactive to light. Cardiovascular:      Rate and Rhythm: Normal rate and regular rhythm. Pulses: Normal pulses. Heart sounds: Normal heart sounds. Pulmonary:      Effort: Pulmonary effort is normal. No respiratory distress. Breath sounds: Normal breath sounds. Comments: NC 3 L O2  Diminished breath sounds on left lung and dull to percussion  Abdominal:      General: Bowel sounds are normal. There is distension (moderate). Palpations: Abdomen is soft. Tenderness: There is no abdominal tenderness. There is no guarding or rebound. Comments: Positive fluid wave   Musculoskeletal:      Cervical back: Normal range of motion and neck supple. Right lower leg: No edema. Left lower leg: No edema. Comments: Left shoulder positive TTP and decreased ROM due to pain   Skin:     General: Skin is warm and dry. Capillary Refill: Capillary refill takes less than 2 seconds. Findings: No rash. Neurological:      General: No focal deficit present. Mental Status: She is alert and oriented to person, place, and time. Sensory: No sensory deficit. Motor: No weakness. Psychiatric:         Thought Content:  Thought content normal. Judgment: Judgment normal.         Labs:  Na/K/Cl/CO2:  138/4.0/107/21 (07/13 0440)  BUN/Cr/glu/ALT/AST/amyl/lip:  13/1.1/--/7/20/--/55 (07/13 0440)  WBC/Hgb/Hct/Plts:  4.4/9.6/29.8/163 (07/13 0440)  estimated creatinine clearance is 44 mL/min (A) (based on SCr of 1.1 mg/dL (H)). Other pertinent labs as noted below    New Imaging:  XR CHEST PORTABLE   Final Result   Left pleural effusion is improved. Residual moderate left pleural effusion   identified. XR CHEST PORTABLE   Final Result   Interval placement of a left-sided Cm chest catheter, when compared to the   previous study performed 1 day earlier. No gross pneumothorax. Complete   opacification of the left hemithorax now noted. US THORACENTESIS Which side should the procedure be performed? Left   Final Result   Successful ultrasound guided placement of a left-sided pigtail catheter. US DUP LOWER EXTREMITIES BILATERAL VENOUS   Final Result   Within the visualized vessels there is no evidence for deep venous   thrombosis               US ABDOMEN COMPLETE   Final Result   Moderate ascites         XR CHEST PORTABLE   Final Result   Confluent opacities in mid left lung field and left lung base could indicate   large left pleural effusion or pneumonia. CTA PULMONARY W CONTRAST   Final Result   1. No central pulmonary embolism is seen. Due to prominent respiratory   motion artifact, the peripheral pulmonary arteries are not evaluated on this   study. 2. Large left pleural effusion with compressive atelectasis of the majority   of the left lung. 3. Liver cirrhosis with prominent varices and moderate ascites in the upper   abdomen included in the field of view of this study. RECOMMENDATIONS:   Unavailable         XR CHEST PORTABLE    (Results Pending)   IR US GUIDED PARACENTESIS    (Results Pending)       A/P:  Principal Problem:    Pleural effusion  Resolved Problems:    * No resolved hospital problems.  *      Acute hypoxic respiratory failure 2/2 large pleural effusion in left lung  · CXR showed large left pleural effusion  · CTA chest showed large left pleural effusion and moderate ascites  · US abdomen showed moderate ascites  · Covid negative  · Procal 0.06, low suspicion for PNA, no need for antibiotics at this time  · ABG not done  · BCx no growth in 24 hours  · Suspect 2/2 large left pleural effusion and ascites. Has recurrent pleural effusions. 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 Possible chylothorax with slow accumulation since 10/2021. · No O2 at home, currently on NC 3 L O2, wean O2 as tolerated to keep SpO2 > 92%  · Thoracocentesis done on 07/12/22. 50 mL of turbid milky pleural fluid was removed. Chest tube placed for drainage. Drain 1 L Q8H. Follow pleural fluid studies, suspect chylothorax with pleural fluid triglyceride > 400. 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 · Pleural fluid Cx and citology pending  · Started on Octreotide 200 mcg Q8H  · I/Os  · Monitor CXR daily  · Pulmonology following  · Consider CTS consult     Nonalcoholic liver cirrhosis with portal hypertension  · Has seen GI Dr Orly Wilcox in the past. EGD plan for 11/04/22. · History of nonalcoholic liver cirrhosis status post paracentesis on May 2022 where 4.9 L fluid was drained  · Hyperbilirubinemia and hypoalbuminemia on admission  · Lipase WNL on admission  · Ammonia 69 on admission  · US abdomen showed moderate ascites   · CTA chest showed liver cirrhosis with prominent varices and moderate ascites in the upper  · abdomen  · Continue home Lasix 40 mg daily, Aldactone 100 mg daily, Rifaximin 550 mg BID and Lactulose 10 g TID  · IR consulted for possible paracentesis. Will need Albumin post-paracentesis. Will need to check TG in the ascitic fluid.   · Monitor renal function    Chronic hematuria  · UA positive for moderate blood and 2-5 RBCs  · Has had microscopic hematuria in the past  · patient had cystoscopy in 11/2021  · Monitor Hb    Left shoulder pain  · Had left reverse total shoulder arthroplasty in 12/2022 by Dr Mojgan Mccann  · Apply Lidocaine patch for pain     History of PE  · Currently not on any anticoagulation at home  · D-dimer 2383 on admission  · DUP US negative for DVT  · CTA chest negative for central PE, unable to exclude PE in peripheral arteries  · Continue PCDs     Interstitial lung disease  · Last PFT on 04/2021  · Home meds are Albuterol and Dulera  · Start Dasia Bao, Pulmicort and Duoneb in the hospital  · Pulmonology following     DM2  · Diet controlled  · Last HbA1C 07/12/22 is 4.5%  · Hypoglycemia protocol in place     HTN  · Continue home Aldactone and Amlodipine 2.5 mg daily    KATE  · CPAP at night    MDD  · Continue home Venlafaxine 75 mg daily       PT 16/24 and OT 15/24  DVT Prophylaxis: PCDs  GI Prophylaxis: Protonix 40 gm daily  Diet: adult regular with low fat, dietitian consulted  Full code  Discharge planning. [facility].       Electronically signed by Evin Waller MD on 7/13/2022 at 1:38 PM  This case was discussed with attending physician Dr. Nieves Vicente

## 2022-07-13 NOTE — PROGRESS NOTES
Chest tube collection canister changed. Previous container was full with 2000 mL in it. Patient is due for drain to be opened at 0800 for the last 500 mL.

## 2022-07-13 NOTE — PROGRESS NOTES
Pigtail drain opened, 1 Liter of milky, orange color fluid drained over 15 minutes. RN stayed with patient throughout entire procedure. Patient tolerated well, without any chest pain or complications. Valve closed and tube clamped.

## 2022-07-13 NOTE — PROGRESS NOTES
PT tolerated procedure well, dressing left lower abdomen, Report called to bedside RN, no complications, PT put in for transport back to room

## 2022-07-13 NOTE — PROGRESS NOTES
[place]  Department of Pulmonary, Critical Care and Sleep Medicine  [address] [facility]  Department of Internal Medicine  Progress Note    SUBJECTIVE:    Patient seen and examined at bedside. Overnight events noted. None. She is sitting up in a chair. Reports improvement in shortness of breath. Tolerating pigtail well. OBJECTIVE:  Vitals:    07/12/22 1747 07/13/22 0000 07/13/22 0730 07/13/22 0751   BP: 122/70 126/70 122/60 119/74   Pulse: 94 97 95    Resp: 18 24 20    Temp: 98.2 °F (36.8 °C) 98.6 °F (37 °C) 98.4 °F (36.9 °C)    TempSrc: Oral Oral Oral    SpO2:  95% 96% 93%   Weight:       Height:           1. General: Alert and oriented x 3, No acute distress. 2. Eyes: Vision - grossly normal, PERRLA  3. HENT: Head is atraumatic & normocephalic. Neck Supple, No jugular venous distention   4. Respiratory: Improved breath sounds on left side, respirations are non-labored   5. Cardiovascular: S1, S2 normal, Regular rate & rhythm, No murmur, No pedal edema   6. Gastrointestinal: Soft, Non-tender, distended, Normal bowel sounds. No organomegaly. 7. Neurologic: Awake & Alert, Cranial nerves 2-12 grossly intact, No focal motor or sensory deficits. 8. Skin: no rashes, breakdown  9. Musculoskeletal: no LE edema, no joint effusion. 10. Psychiatric - No Anxiety, Depression    DATA:    Monitor Strips:  Reviewed & discusses with technical team. No changes noted. RADIOLOGY:  Chest x-ray reviewed personally. No evidence of pneumothorax, pigtail in good position with improved drainage.       CBC with Differential:    Lab Results   Component Value Date/Time    WBC 4.4 07/13/2022 04:40 AM    RBC 2.97 07/13/2022 04:40 AM    HGB 9.6 07/13/2022 04:40 AM    HCT 29.8 07/13/2022 04:40 AM     07/13/2022 04:40 AM    .3 07/13/2022 04:40 AM    MCH 32.3 07/13/2022 04:40 AM    MCHC 32.2 07/13/2022 04:40 AM    RDW 17.7 07/13/2022 04:40 AM    METASPCT 0.9 12/17/2021 05:01 AM    LYMPHOPCT 17.7 07/13/2022 04:40 AM    PROMYELOPCT 0.9 11/23/2021 02:00 PM    MONOPCT 13.0 07/13/2022 04:40 AM    MYELOPCT 0.9 04/15/2021 12:03 PM    BASOPCT 1.1 07/13/2022 04:40 AM    MONOSABS 0.57 07/13/2022 04:40 AM    LYMPHSABS 0.78 07/13/2022 04:40 AM    EOSABS 0.30 07/13/2022 04:40 AM    BASOSABS 0.05 07/13/2022 04:40 AM     CMP:    Lab Results   Component Value Date/Time     07/13/2022 04:40 AM    K 4.0 07/13/2022 04:40 AM     07/13/2022 04:40 AM    CO2 21 07/13/2022 04:40 AM    BUN 13 07/13/2022 04:40 AM    CREATININE 1.1 07/13/2022 04:40 AM    GFRAA 58 07/13/2022 04:40 AM    LABGLOM 48 07/13/2022 04:40 AM    GLUCOSE 78 07/13/2022 04:40 AM    PROT 6.4 07/13/2022 04:40 AM    LABALBU 2.5 07/13/2022 04:40 AM    CALCIUM 9.4 07/13/2022 04:40 AM    BILITOT 1.4 07/13/2022 04:40 AM    ALKPHOS 83 07/13/2022 04:40 AM    AST 20 07/13/2022 04:40 AM    ALT 7 07/13/2022 04:40 AM       CLINICAL ASSESMENT & PLAN:    Recurrent left pleural effusion / Chylothorax     Left-sided pigtail placed yesterday with fluid sent for diagnostic studies highly suggestive of chylothorax with pleural fluid triglyceride > 400. 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 Pigtail flushed with normal saline. We will plan to completely drain her pleural effusion and potentially remove pigtail this evening or tomorrow. Repeat chest x-ray tomorrow. Maintain low-fat diet/fat-free diet with medium chain triglyceride supplementation  Dietitian consulted  Started on subcutaneous octreotide  Plans for paracentesis noted, checking ascitic fluid triglyceride  If confirmed, he may benefit from TIPS -will defer to GI.   She has an outpatient appointment on 8/10  Alternatives to be intermittent thoracentesis vs pleurodesis  Oxygen supplementation to maintain sats above 92%     Asthma not in exacerbation     Continue maintenance bronchodilators     Obstructive sleep apnea     Continue CPAP nightly     Diagnosis and plan of care was extensively discussed with the patient as well as nursing staff.     Jazzy Reynolds MD MS  Pulmonary & Critical Care Medicine

## 2022-07-14 ENCOUNTER — APPOINTMENT (OUTPATIENT)
Dept: GENERAL RADIOLOGY | Age: 77
DRG: 186 | End: 2022-07-14
Payer: MEDICARE

## 2022-07-14 LAB
ALBUMIN SERPL-MCNC: 2.9 G/DL (ref 3.5–5.2)
ALP BLD-CCNC: 95 U/L (ref 35–104)
ALT SERPL-CCNC: 7 U/L (ref 0–32)
AMMONIA: 44 UMOL/L (ref 11–51)
ANION GAP SERPL CALCULATED.3IONS-SCNC: 11 MMOL/L (ref 7–16)
ANISOCYTOSIS: ABNORMAL
AST SERPL-CCNC: 19 U/L (ref 0–31)
BASOPHILS ABSOLUTE: 0.03 E9/L (ref 0–0.2)
BASOPHILS RELATIVE PERCENT: 0.6 % (ref 0–2)
BILIRUB SERPL-MCNC: 1.7 MG/DL (ref 0–1.2)
BUN BLDV-MCNC: 12 MG/DL (ref 6–23)
BURR CELLS: ABNORMAL
CALCIUM SERPL-MCNC: 9.6 MG/DL (ref 8.6–10.2)
CHLORIDE BLD-SCNC: 106 MMOL/L (ref 98–107)
CO2: 21 MMOL/L (ref 22–29)
CREAT SERPL-MCNC: 1.1 MG/DL (ref 0.5–1)
EOSINOPHILS ABSOLUTE: 0.15 E9/L (ref 0.05–0.5)
EOSINOPHILS RELATIVE PERCENT: 3 % (ref 0–6)
GFR AFRICAN AMERICAN: 58
GFR NON-AFRICAN AMERICAN: 48 ML/MIN/1.73
GLUCOSE BLD-MCNC: 130 MG/DL (ref 74–99)
HCT VFR BLD CALC: 34.1 % (ref 34–48)
HEMOGLOBIN: 10.8 G/DL (ref 11.5–15.5)
IMMATURE GRANULOCYTES #: 0.01 E9/L
IMMATURE GRANULOCYTES %: 0.2 % (ref 0–5)
LACTATE DEHYDROGENASE: 135 U/L (ref 135–214)
LV EF: 55 %
LVEF MODALITY: NORMAL
LYMPHOCYTES ABSOLUTE: 0.38 E9/L (ref 1.5–4)
LYMPHOCYTES RELATIVE PERCENT: 7.6 % (ref 20–42)
MCH RBC QN AUTO: 31.8 PG (ref 26–35)
MCHC RBC AUTO-ENTMCNC: 31.7 % (ref 32–34.5)
MCV RBC AUTO: 100.3 FL (ref 80–99.9)
MONOCYTES ABSOLUTE: 0.33 E9/L (ref 0.1–0.95)
MONOCYTES RELATIVE PERCENT: 6.6 % (ref 2–12)
NEUTROPHILS ABSOLUTE: 4.09 E9/L (ref 1.8–7.3)
NEUTROPHILS RELATIVE PERCENT: 82 % (ref 43–80)
PDW BLD-RTO: 17.4 FL (ref 11.5–15)
PLATELET # BLD: 175 E9/L (ref 130–450)
PMV BLD AUTO: 10.9 FL (ref 7–12)
POIKILOCYTES: ABNORMAL
POLYCHROMASIA: ABNORMAL
POTASSIUM REFLEX MAGNESIUM: 4.4 MMOL/L (ref 3.5–5)
RBC # BLD: 3.4 E12/L (ref 3.5–5.5)
REASON FOR REJECTION: NORMAL
REJECTED TEST: NORMAL
SODIUM BLD-SCNC: 138 MMOL/L (ref 132–146)
TEAR DROP CELLS: ABNORMAL
TOTAL PROTEIN: 7.1 G/DL (ref 6.4–8.3)
WBC # BLD: 5 E9/L (ref 4.5–11.5)

## 2022-07-14 PROCEDURE — 99232 SBSQ HOSP IP/OBS MODERATE 35: CPT | Performed by: FAMILY MEDICINE

## 2022-07-14 PROCEDURE — 2060000000 HC ICU INTERMEDIATE R&B

## 2022-07-14 PROCEDURE — 80053 COMPREHEN METABOLIC PANEL: CPT

## 2022-07-14 PROCEDURE — 71045 X-RAY EXAM CHEST 1 VIEW: CPT

## 2022-07-14 PROCEDURE — 99232 SBSQ HOSP IP/OBS MODERATE 35: CPT | Performed by: INTERNAL MEDICINE

## 2022-07-14 PROCEDURE — 6360000002 HC RX W HCPCS

## 2022-07-14 PROCEDURE — 82140 ASSAY OF AMMONIA: CPT

## 2022-07-14 PROCEDURE — 6360000002 HC RX W HCPCS: Performed by: INTERNAL MEDICINE

## 2022-07-14 PROCEDURE — 85025 COMPLETE CBC W/AUTO DIFF WBC: CPT

## 2022-07-14 PROCEDURE — 2580000003 HC RX 258: Performed by: STUDENT IN AN ORGANIZED HEALTH CARE EDUCATION/TRAINING PROGRAM

## 2022-07-14 PROCEDURE — 83615 LACTATE (LD) (LDH) ENZYME: CPT

## 2022-07-14 PROCEDURE — 93306 TTE W/DOPPLER COMPLETE: CPT

## 2022-07-14 PROCEDURE — 6370000000 HC RX 637 (ALT 250 FOR IP)

## 2022-07-14 PROCEDURE — 36415 COLL VENOUS BLD VENIPUNCTURE: CPT

## 2022-07-14 PROCEDURE — 6360000002 HC RX W HCPCS: Performed by: STUDENT IN AN ORGANIZED HEALTH CARE EDUCATION/TRAINING PROGRAM

## 2022-07-14 PROCEDURE — 94640 AIRWAY INHALATION TREATMENT: CPT

## 2022-07-14 PROCEDURE — 6370000000 HC RX 637 (ALT 250 FOR IP): Performed by: STUDENT IN AN ORGANIZED HEALTH CARE EDUCATION/TRAINING PROGRAM

## 2022-07-14 RX ORDER — FLUTICASONE PROPIONATE AND SALMETEROL 250; 50 UG/1; UG/1
POWDER RESPIRATORY (INHALATION)
Qty: 60 EACH | Refills: 5 | Status: SHIPPED
Start: 2022-07-14 | End: 2022-08-10

## 2022-07-14 RX ADMIN — ARFORMOTEROL TARTRATE 15 MCG: 15 SOLUTION RESPIRATORY (INHALATION) at 18:08

## 2022-07-14 RX ADMIN — RIFAXIMIN 550 MG: 550 TABLET ORAL at 08:34

## 2022-07-14 RX ADMIN — ONDANSETRON 4 MG: 2 INJECTION INTRAMUSCULAR; INTRAVENOUS at 08:41

## 2022-07-14 RX ADMIN — OCTREOTIDE ACETATE 200 MCG: 500 INJECTION, SOLUTION INTRAVENOUS; SUBCUTANEOUS at 20:47

## 2022-07-14 RX ADMIN — FUROSEMIDE 40 MG: 40 TABLET ORAL at 08:35

## 2022-07-14 RX ADMIN — SODIUM CHLORIDE, PRESERVATIVE FREE 10 ML: 5 INJECTION INTRAVENOUS at 20:47

## 2022-07-14 RX ADMIN — VENLAFAXINE HYDROCHLORIDE 75 MG: 75 CAPSULE, EXTENDED RELEASE ORAL at 08:34

## 2022-07-14 RX ADMIN — LACTULOSE 10 G: 20 SOLUTION ORAL at 08:34

## 2022-07-14 RX ADMIN — AMLODIPINE BESYLATE 2.5 MG: 2.5 TABLET ORAL at 08:34

## 2022-07-14 RX ADMIN — SODIUM CHLORIDE, PRESERVATIVE FREE 10 ML: 5 INJECTION INTRAVENOUS at 08:35

## 2022-07-14 RX ADMIN — Medication 1000 UNITS: at 08:34

## 2022-07-14 RX ADMIN — BUDESONIDE 500 MCG: 0.5 SUSPENSION RESPIRATORY (INHALATION) at 18:08

## 2022-07-14 RX ADMIN — RIFAXIMIN 550 MG: 550 TABLET ORAL at 20:48

## 2022-07-14 RX ADMIN — BUDESONIDE 500 MCG: 0.5 SUSPENSION RESPIRATORY (INHALATION) at 07:31

## 2022-07-14 RX ADMIN — OCTREOTIDE ACETATE 200 MCG: 500 INJECTION, SOLUTION INTRAVENOUS; SUBCUTANEOUS at 14:03

## 2022-07-14 RX ADMIN — SPIRONOLACTONE 100 MG: 25 TABLET ORAL at 08:35

## 2022-07-14 RX ADMIN — DOXAZOSIN 1 MG: 2 TABLET ORAL at 21:44

## 2022-07-14 RX ADMIN — PANTOPRAZOLE SODIUM 40 MG: 40 TABLET, DELAYED RELEASE ORAL at 08:35

## 2022-07-14 RX ADMIN — ACETAMINOPHEN 325MG 650 MG: 325 TABLET ORAL at 08:34

## 2022-07-14 RX ADMIN — ACETAMINOPHEN 325MG 650 MG: 325 TABLET ORAL at 20:45

## 2022-07-14 RX ADMIN — OCTREOTIDE ACETATE 200 MCG: 500 INJECTION, SOLUTION INTRAVENOUS; SUBCUTANEOUS at 06:14

## 2022-07-14 RX ADMIN — ARFORMOTEROL TARTRATE 15 MCG: 15 SOLUTION RESPIRATORY (INHALATION) at 07:31

## 2022-07-14 ASSESSMENT — PAIN SCALES - GENERAL
PAINLEVEL_OUTOF10: 8
PAINLEVEL_OUTOF10: 8
PAINLEVEL_OUTOF10: 5

## 2022-07-14 ASSESSMENT — PAIN DESCRIPTION - ORIENTATION
ORIENTATION: LEFT

## 2022-07-14 ASSESSMENT — PAIN DESCRIPTION - DESCRIPTORS
DESCRIPTORS: ACHING;DISCOMFORT;SORE
DESCRIPTORS: DISCOMFORT;SORE;THROBBING
DESCRIPTORS: ACHING;DISCOMFORT;SORE

## 2022-07-14 ASSESSMENT — PAIN DESCRIPTION - LOCATION
LOCATION: SHOULDER

## 2022-07-14 NOTE — CARE COORDINATION
Pigtail cath remains to suction, 2540ml out in last 12 hours. Will request that [facility] initiate auth when no longer to suction. Pasrr and ambulette on soft chart. For questions I can be reached at [phone].  Palmer Willis

## 2022-07-14 NOTE — PROGRESS NOTES
Nutrition Education    · Educated on MCT/fat restricted diet in the setting of chylothorax  · Learners: Patient  · Readiness: Eager  · Method: Explanation and Handout  · Response: Verbalizes Understanding  · Contact name and number provided.     Blake Mcnamara RD  Contact Number: ext [phone]

## 2022-07-14 NOTE — PROGRESS NOTES
[facility] Inpatient   Resident Progress Note    S:  Hospital day: 3    Brief Synopsis: Lily Nathan is a 68 y.o. 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 Does report 1 previous episode of similar symptoms where patient had to undergo therapeutic paracentesis in May 2022 where 4.9 L was drained. While in ED patient was found to be hypoxic and required 3 L nasal cannula. Patient had received a total of 3 DuoNeb treatments prior to being transferred to [facility]. On arrival, patient requires 4 L of O2. CTA chest showed large LEFT pleural effusion with compressive atelectasis of the majority of the left lung and moderate ascites in upper abdomen. Admitted for acute hypoxic respiratory failure. No acute events overnight. Patient was seen and examined this morning. In room air. Her breathing is improving. Eating and drinking without nausea and vomiting. Reports mild discomfort around the chest tube drain. Did not have any BM since admission. Reports pain in left shoulder that improves with Lidocaine patch, she had left shoulder surgery in 12/2022.        Cont meds:    dextrose      sodium chloride       Scheduled meds:    octreotide  200 mcg SubCUTAneous Q8H    amLODIPine  2.5 mg Oral Daily    doxazosin  1 mg Oral Nightly    furosemide  40 mg Oral Daily    lactulose  10 g Oral TID    pantoprazole  40 mg Oral Daily    spironolactone  100 mg Oral Daily    venlafaxine  75 mg Oral Daily    rifAXIMin  550 mg Oral BID    Vitamin D  1,000 Units Oral Daily    sodium chloride flush  5-40 mL IntraVENous 2 times per day    budesonide  0.5 mg Nebulization BID    And    Arformoterol Tartrate  15 mcg Nebulization BID     PRN meds: perflutren lipid microspheres, trimethobenzamide, glucose, glucagon (rDNA), dextrose, dextrose, lidocaine, sodium chloride flush, sodium chloride, polyethylene glycol, acetaminophen **OR** acetaminophen, albuterol     I reviewed the patient's Past Medical and Surgical History, Medications and Allergies. O:  VS: /60   Pulse 83   Temp 97.7 °F (36.5 °C) (Oral)   Resp 18   Ht 5' 2\" (1.575 m)   Wt 191 lb 3.2 oz (86.7 kg)   SpO2 96%   BMI 34.97 kg/m²     Physical Exam:  Physical Exam  Vitals reviewed. Constitutional:       General: She is not in acute distress. Appearance: She is obese. HENT:      Head: Normocephalic and atraumatic. Nose: Nose normal. No congestion or rhinorrhea. Eyes:      Pupils: Pupils are equal, round, and reactive to light. Cardiovascular:      Rate and Rhythm: Normal rate and regular rhythm. Pulses: Normal pulses. Comments: Click over the upper sternum  Pulmonary:      Effort: Pulmonary effort is normal. No respiratory distress. Breath sounds: Normal breath sounds. Comments: Breath sounds on left lung improved  Abdominal:      General: Bowel sounds are normal. There is distension (mild). Palpations: Abdomen is soft. Tenderness: There is no abdominal tenderness. There is no guarding or rebound. Musculoskeletal:      Cervical back: Normal range of motion and neck supple. Right lower leg: No edema. Left lower leg: No edema. Comments: Left shoulder: positive TTP over all the shoulder, decreased ROM due to pain, no warm, no swelling, no redness   Skin:     General: Skin is warm and dry. Capillary Refill: Capillary refill takes less than 2 seconds. Findings: No rash. Neurological:      General: No focal deficit present. Mental Status: She is alert and oriented to person, place, and time. Sensory: No sensory deficit. Motor: No weakness. Psychiatric:         Thought Content:  Thought content normal.         Judgment: Judgment normal. Labs:  Na/K/Cl/CO2:  138/4.4/106/21 (07/14 0422)  BUN/Cr/glu/ALT/AST/amyl/lip:  12/1.1/--/7/19/--/-- (07/14 0422)  WBC/Hgb/Hct/Plts:  5.0/10.8/34.1/175 (07/14 0422)  estimated creatinine clearance is 44 mL/min (A) (based on SCr of 1.1 mg/dL (H)). Other pertinent labs as noted below    New Imaging:  XR CHEST PORTABLE   Final Result   1. Progressive decrease in left pleural effusion, with probable small   residual effusion. 2. Small bibasilar pulmonary opacities likely representing atelectasis. IR US GUIDED PARACENTESIS   Final Result   Successful paracentesis. XR CHEST PORTABLE   Final Result   Left pleural effusion is improved. Residual moderate left pleural effusion   identified. XR CHEST PORTABLE   Final Result   Interval placement of a left-sided Cm chest catheter, when compared to the   previous study performed 1 day earlier. No gross pneumothorax. Complete   opacification of the left hemithorax now noted. US THORACENTESIS Which side should the procedure be performed? Left   Final Result   Successful ultrasound guided placement of a left-sided pigtail catheter. US DUP LOWER EXTREMITIES BILATERAL VENOUS   Final Result   Within the visualized vessels there is no evidence for deep venous   thrombosis               US ABDOMEN COMPLETE   Final Result   Moderate ascites         XR CHEST PORTABLE   Final Result   Confluent opacities in mid left lung field and left lung base could indicate   large left pleural effusion or pneumonia. CTA PULMONARY W CONTRAST   Final Result   1. No central pulmonary embolism is seen. Due to prominent respiratory   motion artifact, the peripheral pulmonary arteries are not evaluated on this   study. 2. Large left pleural effusion with compressive atelectasis of the majority   of the left lung.    3. Liver cirrhosis with prominent varices and moderate ascites in the upper   abdomen included in the field of view of this study.      RECOMMENDATIONS:   Unavailable             A/P:  Principal Problem:    Pleural effusion  Resolved Problems:    * No resolved hospital problems. *      Acute hypoxic respiratory failure 2/2 large pleural effusion in left lung  · CXR showed large left pleural effusion  · CTA chest showed large left pleural effusion and moderate ascites  · US abdomen showed moderate ascites  · Covid negative  · Procal 0.06, low suspicion for PNA, no need for antibiotics at this time  · BCx no growth in 24 hours. · Suspect 2/2 large left pleural effusion and ascites. Has recurrent pleural effusions. 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 Possible chylothorax with slow accumulation since 10/2021. · No O2 at home, currently breathing in room air  · Thoracocentesis done on 07/12/22. 50 mL of turbid milky pleural fluid was removed. Chest tube placed for drainage. 3,150 L removed from left lung since admission. Follow pleural fluid studies, suspect chylothorax with pleural fluid triglyceride > 400. 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 Pigtail removed today and plan for minimal drainage moving forward considering risk for malnutrition and immunodeficiency. · Pleural fluid Cx and citology pending  · Started on Octreotide 200 mcg Q8H to avoid chyle re-accumulation, okay to discontinue it at discharge    · Strict I/Os   · Monitor CXR daily, left pleural effusion improving  · Pulmonology following  · Consider CTS consult     Nonalcoholic liver cirrhosis with portal hypertension  · Has an outpatient on 08/10/22 with GI Dr Edenilson Treviño. EGD plan for 11/04/22.   · History of nonalcoholic liver cirrhosis status post paracentesis on May 2022 where 4.9 L fluid was drained  · Hyperbilirubinemia and hypoalbuminemia on admission  · Lipase WNL on admission  · Ammonia 69 on admission  · US abdomen showed moderate ascites   · CTA chest showed liver cirrhosis with prominent varices and moderate ascites in the upper  · abdomen  · Continue home Lasix 40 mg daily, Aldactone 100 mg daily, Rifaximin 550 mg BID and Lactulose 10 g TID  · IR US guided paracentesis done 07/13/22. 700 mL removed. Follow ascitic fluid studies. Suspect chylous ascites due to TG > 200. · Monitor renal function    Chronic hematuria  · UA positive for moderate blood and 2-5 RBCs  · Has had microscopic hematuria in the past  · Patient had cystoscopy in 11/2021  · Monitor Hb    Left shoulder pain  · Had left reverse total shoulder arthroplasty in 12/2022 by Dr Carolina Ness  · Xray left shoulder 11/04/21 showed stable alignment of left shoulder arthroplasty  · EKG on admission negative for acute ischemic changes and troponin 15 --> 13. Low suspicion for MI.  · Tylenol PRN and apply Lidocaine patch for pain control    Click on cardiac auscultation over the upper sternum  · Last Echo 08/17/2021 showed normal left ventricular systolic function with an EF >60%, normal right ventricular size and function, stage I diastolic dysfunction, mild MR and mild TR  · EKG and Echo ordered     History of PE  · Currently not on any anticoagulation at home  · D-dimer 2383 on admission  · DUP US negative for DVT  · CTA chest negative for central PE, unable to exclude PE in peripheral arteries  · Continue PCDs     Interstitial lung disease  · Last PFT on 04/2021  · Home meds are Albuterol and Dulera  · Start Brovana, Pulmicort and Duoneb in the hospital  · Pulmonology following     DM2  · Diet controlled  · Last HbA1C 07/12/22 is 4.5%  · Hypoglycemia protocol     HTN  · Continue home Aldactone and Amlodipine 2.5 mg daily    KATE  · CPAP at night    MDD  · Continue home Venlafaxine 75 mg daily       PT 16/24 and OT 15/24   DVT Prophylaxis: PCDs  GI Prophylaxis: Protonix 40 gm daily  Diet: adult regular with low fat, dietitian consulted  Full code  Discharge planning. Michelle WILKERSON. Will start the process once pigtail catheter is removed.       Electronically signed by Vasile Ugarte Gene Benson MD on 7/14/2022 at 11:53 AM  This case was discussed with attending physician Dr. Migdalia Agrawal

## 2022-07-14 NOTE — PROGRESS NOTES
[address] [facility]  Family Medicine Attending     S: 68 y.o. female with a PMh of cirrhosis, HOLDEN, ascites, hyperammonemia, chylothorax, HTN, HLD, PE (unprovoked?) not on anticoagulation, sleep apnea, DM2, recurrent UTI, recurrent nephrolithiasis, and left breast cancer s/p lumpectomy in 2013 who presented to [facility] ED with CC of dyspnea. She was found to have a very large pleural effusion. Her oxygen saturation plummeted into the low 80s with ambulation, and the patient had increased work of breathing. Decision was made to transfer here to [facility] for further management.     Today, she notes discomfort from chest tube drain that was radiating into her shoulder is improved. Pigtail catheter was pulled this morning. Per pulmonology, this is more of a GI issue related to her ascites. They are not planning any further intervention. Low TG diet and octreotide recommended. She denies any other chest heaviness. Her shortness of breath is  better than yesterday. Her nausea is improved this morning. No new complaints.        O: VS- Blood pressure 126/70, pulse 97, temperature 98.6 °F (37 °C), temperature source Oral, resp. rate 24, height 5' 2\" (1.575 m), weight 191 lb 3.2 oz (86.7 kg), SpO2 95 %.   Exam is as noted by resident with the following changes, additions or corrections:  Gen:  Awake and alert;NAD; drowsy though (notes that she did not sleep well)  CVS:  RRR, +systolic murmur with click noted best at the LUSB, improved when sitting upright; louder when laying flat  Lungs: breath sounds are now noted on the left but still mildly diminished; normal BS on the right  ABd: less Distended since paracentesis, BS positive  Ext:  No significant edema     Impressions:     Recurrent Pleural effusion-chylothorax  -previous pleural effusion (10/21) was found to be chylothorax-unclear etiology  -pulmonology consulted; they suspect related to cirrhosis  -chest tube in place  -consider CTS consult once 34 yo female c no sig pmhx presents to ED c/o pruritic rash to b/l arms, chest x 2 days.  Pt has been having nasal congestion for the past few days and has been taking OTC meds which she has taken in the past.  Pt states woke up this AM lower lip swelling.  DEnies any throat swelling, tongue swelling, difficulty breathing reviewed the chart and seen the patient with the resident(s). I personally reviewed images, EKG's and similar tests, if present. I personally reviewed and performed key elements of the history and exam.  I have reviewed and confirmed student and/or resident history and exam with changes as indicated above. I agree with the assessment, plan and orders as documented by the resident.   Please refer to the resident and/or student note for additional information.    Jerome Thrasher MD    34 yo female c no sig pmhx presents to ED c/o pruritic rash to b/l arms, chest x 2 days.  Pt has been having nasal congestion for the past few days and has been taking OTC meds which she has taken in the past.  Pt states woke up this AM lower lip swelling.  DEnies any throat swelling, tongue swelling, difficulty breathing, cp, abd pain, n/v/d.

## 2022-07-14 NOTE — PROGRESS NOTES
Dr. Sarika Wilson notified of consult.  Electronically signed by Doni Coles RN on 7/14/2022 at 4:04 PM

## 2022-07-14 NOTE — CONSULTS
History and Physical      ASSESSMENT AND PLAN:    77y/F w/ history of HOLDEN cirrhosis who presents w/ symptomatic large volume pleural effusion s/p thoracentesis and drain placement as well as ascites s/p paracentesis w/ fluid studies being consistent w/ chylothorax and chylous ascites. MELD: 16    PLAN:    Cirrhosis:  -Please obtain CBC, CMP, and INR daily  -Ensure albumin replacement following paracentesis and thoracentesis. 2. Chylothorax/Chylous Ascites:  -Current diuretic regimen 40mg Lasix and 100mg Spironolactone daily  -Increase diuretic regimen to 40mg Lasix BID and 100mg Spironolactone daily. Monitor BP and kidney function and uptitrate diuretics as tolerated. -If no improvement in recurrent effusion/ascites with increased diuresis, will plan on starting octreotide. -If still no improvement, may need to consider TIPS procedure. 3. History of Encephalopathy:  -Continue home lactulose and rifaximin. 4. Esophageal Varices:  -Variceal screen scheduled for 11/4    I will follow. Thank you for including us in the care of this patient. Please do not hesitate to contact us with any additional questions or concerns. Kristy Rizo MD  Gastroenterology/Hepatology  Advanced Endoscopy              HISTORY OF PRESENT ILLNESS:      Ms. Soha Galicia is a 77y/F w/ history of HOLDEN cirrhosis who presented to the ED w/ SOB and poor PO intake. She notably had a paracentesis on 5/27 w/ 4.9L of fluid removed. The fluid was negative for SBP. She presented to the ED w/ SOB and hypoxia and was found to have a large pleural effusion. She has a history of pleural effusion c/b chylothorax of unknown etiology w/ thoracentesis last performed in October 2021. On admission, the patient had another thoracentesis w/ drain placement and had a repeat paracentesis on 7/13 for 700cc. Fluid studies showed triglyceride count of 481 in the pleural fluid and 297 in the ascites.  The patient was significantly improved symptomatically following the thoracentesis. Past Medical History:        Diagnosis Date    Breast cancer ([address]     Cirrhosis ([address]     Essential hypertension     Hx of blood clots     Hyperlipidemia     Osteopenia     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 ([address]     Sleep apnea     Spinal stenosis     Type 2 diabetes mellitus ([address]      Past Surgical History:        Procedure Laterality Date    BLADDER SURGERY Right 11/26/2021    CYSTOSCOPY RETROGRADE PYELOGRAM RIGHT  STENT INSERTION performed by Candace Farnsworth MD at [facility]      lumpectomy [address] TEST      Lexiscan stress test    CARPAL TUNNEL RELEASE      COLONOSCOPY  01/31/2017    multiple polyps; diverticula--jerod    COLONOSCOPY  04/23/2019    polyps; diverticula; hemorrhoids--jerod    COLONOSCOPY N/A 04/23/2019    COLONOSCOPY POLYPECTOMY SNARE/COLD BIOPSY performed by Danie Go MD at [facility]  04/23/2019    COLONOSCOPY WITH BIOPSY performed by Danie Go MD at [place] [address] ([facility])      LITHOTRIPSY Left 05/04/2016    C-R STENT PLACEMENT    SHOULDER ARTHROPLASTY Left 12/21/2020    LEFT REVERSE TOTAL SHOULDER  ARTHROPLASTY -- DEPUY performed by Domitila Kent MD at [facility]  04/23/2019    gastritis--jerod    UPPER GASTROINTESTINAL ENDOSCOPY N/A 04/23/2019    EGD BIOPSY performed by Danie Go MD at [address] History:    TOBACCO:   reports that she has never smoked. She has never used smokeless tobacco.  ETOH:   reports no history of alcohol use. DRUGS:   reports no history of drug use.   Family History:       Problem Relation Age of Onset    Arthritis Mother     COPD Father     Heart Attack Father     Cirrhosis Brother         non alcoholic    Heart Disease Brother     Cancer Other 48        colon    Prostate Cancer Child     Hypertension Child     Hypertension Child          Current Facility-Administered Medications:     perflutren lipid microspheres (DEFINITY) injection 1.65 mg, 1.5 mL, IntraVENous, ONCE PRN, Pasquale Cardenas MD    trimethobenzamide Kwasi Picking) injection 200 mg, 200 mg, IntraMUSCular, Q6H PRN, Pasquale Cardenas MD    glucose chewable tablet 16 g, 4 tablet, Oral, PRN, Pasquale Cardenas MD    glucagon (rDNA) injection 1 mg, 1 mg, IntraMUSCular, PRN, Pasquale Cadrenas MD    dextrose 5 % solution, 100 mL/hr, IntraVENous, PRN, Pasquale Cardenas MD    dextrose 50 % IV solution, 12.5 g, IntraVENous, PRN, Makayla Ruff MD    lidocaine 4 % external patch 1 patch, 1 patch, TransDERmal, Daily PRN, Pasquale Cardenas MD, 1 patch at 07/14/22 0841    octreotide (SANDOSTATIN) injection 200 mcg, 200 mcg, SubCUTAneous, Q8H, Rajesh Muller MD, 200 mcg at 07/14/22 1403    amLODIPine (NORVASC) tablet 2.5 mg, 2.5 mg, Oral, Daily, Mendel Rote, MD, 2.5 mg at 07/14/22 0834    doxazosin (CARDURA) tablet 1 mg, 1 mg, Oral, Nightly, Mendel Rote, MD, 1 mg at 07/13/22 2033    furosemide (LASIX) tablet 40 mg, 40 mg, Oral, Daily, Mendel Rote, MD, 40 mg at 07/14/22 0835    lactulose (CHRONULAC) 10 GM/15ML solution 10 g, 10 g, Oral, TID, Mendel Rote, MD, 10 g at 07/14/22 0834    pantoprazole (PROTONIX) tablet 40 mg, 40 mg, Oral, Daily, Mendel Rote, MD, 40 mg at 07/14/22 2982    spironolactone (ALDACTONE) tablet 100 mg, 100 mg, Oral, Daily, Mendel Rote, MD, 100 mg at 07/14/22 0835    venlafaxine (EFFEXOR XR) extended release capsule 75 mg, 75 mg, Oral, Daily, Mendel Rote, MD, 75 mg at 07/14/22 0834    rifAXIMin (XIFAXAN) tablet 550 mg, 550 mg, Oral, BID, Mendel Rote, MD, 550 mg at 07/14/22 0126    Vitamin D (CHOLECALCIFEROL) tablet 1,000 Units, 1,000 Units, Oral, Daily, Mendel Rote, MD, 1,000 Units at 07/14/22 0834    sodium chloride flush 0.9 % injection 5-40 mL, 5-40 mL, IntraVENous, 2 times per day, Mendel Rote, MD, 10 mL at 07/14/22 0835    sodium chloride flush 0.9 % injection 5-40 mL, 5-40 mL, IntraVENous, PRN, Danika Crespo MD    0.9 % sodium chloride infusion, , IntraVENous, PRN, Danika Crespo MD    polyethylene glycol (GLYCOLAX) packet 17 g, 17 g, Oral, Daily PRN, Danika Crespo MD    acetaminophen (TYLENOL) tablet 650 mg, 650 mg, Oral, Q6H PRN, 650 mg at 07/14/22 0834 **OR** acetaminophen (TYLENOL) suppository 650 mg, 650 mg, Rectal, Q6H PRN, Danika Crespo MD    albuterol (PROVENTIL) nebulizer solution 2.5 mg, 2.5 mg, Nebulization, 4x Daily PRN, Danika Crespo MD    budesonide (PULMICORT) nebulizer suspension 500 mcg, 0.5 mg, Nebulization, BID, 500 mcg at 07/14/22 0731 **AND** Arformoterol Tartrate (BROVANA) nebulizer solution 15 mcg, 15 mcg, Nebulization, BID, Danika Crespo MD, 15 mcg at 07/14/22 0731     Allergies:  Lisinopril    ROS:  General: Patient denies f/c or weight loss. She has had nausea and poor PO intake. HEENT: Patient denies persistent postnasal drip, scleral icterus, drooling, persistent bleeding from nose/mouth. Resp: Patient denies continued SOB, wheezing, productive cough. Cards: Patient denies CP, palpitations, significant edema  GI: As above. Derm: Patient denies jaundice/rashes. Musc: Patient denies diffuse/irregular joint swelling or myalgias. PHYSICAL EXAM:  /60   Pulse 83   Temp 97.7 °F (36.5 °C) (Oral)   Resp 18   Ht 5' 2\" (1.575 m)   Wt 191 lb 3.2 oz (86.7 kg)   SpO2 96%   BMI 34.97 kg/m²   Physical Exam:  General: Overall well-appearing, NAD  HEENT: PERRLA, EOMI, Anicteric sclera, MMM, no rhinorrhea  Cards: RRR, no LE edema  Resp: Breathing comfortably on NC, good air movement, no use of accessory muscles, no audible wheezing  Abdomen: soft, NT, ND. Extremities: Moves all extremities, no effusions or bruising.   Skin: No rashes or jaundice  Neuro: A&O x 3, CN grossly intact, non-focal exam               Electronically signed by Mariusz Dawkins MD on 7/14/2022 at 4:35 PM

## 2022-07-14 NOTE — PROGRESS NOTES
[place]  Department of Pulmonary, Critical Care and Sleep Medicine  [address] [facility]  Department of Internal Medicine  Progress Note    SUBJECTIVE:    Patient seen and examined at bedside. Off Oxygen, reporting significant improvement in her shortness of breath. OBJECTIVE:  Vitals:    07/13/22 0751 07/13/22 1536 07/13/22 2000 07/14/22 0707   BP: 119/74 124/70 113/70 112/60   Pulse:  73 92 83   Resp:  20 18 18   Temp:  98.2 °F (36.8 °C) 97.3 °F (36.3 °C) 97.7 °F (36.5 °C)   TempSrc:  Oral Oral Oral   SpO2: 93% 95% 95% 96%   Weight:       Height:           1. General: Alert and oriented x 3, No acute distress. 2. Eyes: Vision - grossly normal, PERRLA  3. HENT: Head is atraumatic & normocephalic. Neck Supple, No jugular venous distention   4. Respiratory: Improved breath sounds on left side, respirations are non-labored   5. Cardiovascular: S1, S2 normal, Regular rate & rhythm, No murmur, No pedal edema   6. Gastrointestinal: Soft, Non-tender, distended, Normal bowel sounds. No organomegaly. 7. Neurologic: Awake & Alert, Cranial nerves 2-12 grossly intact, No focal motor or sensory deficits. 8. Skin: no rashes, breakdown  9. Musculoskeletal: no LE edema, no joint effusion. 10. Psychiatric - No Anxiety, Depression    DATA:    Monitor Strips:  Reviewed & discusses with technical team. No changes noted. RADIOLOGY:  Chest x-ray reviewed personally.  No evidence of pneumothorax, pigtail in good position with significantly improved drainage      CBC with Differential:    Lab Results   Component Value Date/Time    WBC 5.0 07/14/2022 04:22 AM    RBC 3.40 07/14/2022 04:22 AM    HGB 10.8 07/14/2022 04:22 AM    HCT 34.1 07/14/2022 04:22 AM     07/14/2022 04:22 AM    .3 07/14/2022 04:22 AM    MCH 31.8 07/14/2022 04:22 AM    MCHC 31.7 07/14/2022 04:22 AM    RDW 17.4 07/14/2022 04:22 AM    METASPCT 0.9 12/17/2021 05:01 AM    LYMPHOPCT 7.6 07/14/2022 04:22 AM    PROMYELOPCT 0.9 11/23/2021 02:00 PM    MONOPCT 6.6 07/14/2022 04:22 AM    MYELOPCT 0.9 04/15/2021 12:03 PM    BASOPCT 0.6 07/14/2022 04:22 AM    MONOSABS 0.33 07/14/2022 04:22 AM    LYMPHSABS 0.38 07/14/2022 04:22 AM    EOSABS 0.15 07/14/2022 04:22 AM    BASOSABS 0.03 07/14/2022 04:22 AM     CMP:    Lab Results   Component Value Date/Time     07/14/2022 04:22 AM    K 4.4 07/14/2022 04:22 AM     07/14/2022 04:22 AM    CO2 21 07/14/2022 04:22 AM    BUN 12 07/14/2022 04:22 AM    CREATININE 1.1 07/14/2022 04:22 AM    GFRAA 58 07/14/2022 04:22 AM    LABGLOM 48 07/14/2022 04:22 AM    GLUCOSE 130 07/14/2022 04:22 AM    PROT 7.1 07/14/2022 04:22 AM    LABALBU 2.9 07/14/2022 04:22 AM    CALCIUM 9.6 07/14/2022 04:22 AM    BILITOT 1.7 07/14/2022 04:22 AM    ALKPHOS 95 07/14/2022 04:22 AM    AST 19 07/14/2022 04:22 AM    ALT 7 07/14/2022 04:22 AM       CLINICAL ASSESMENT & PLAN:    Recurrent left pleural effusion / Chylothorax /chylous ascites     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 Total drainage of roughly 5 L from her left-sided effusion. Pigtail removed today and plan for minimal drainage moving forward considering risk for malnutrition and immunodeficiency. Maintain low-fat diet/fat-free diet with medium chain triglyceride supplementation  Dietitian consulted  Started on subcutaneous octreotide. Can be discontinued at time of discharge    Recommend optimizing diuretics, she may benefit from TIPS - will defer to GI.   She has an outpatient appointment on 8/10  Alternatives will be intermittent thoracentesis vs pleurodesis     Asthma not in exacerbation     Continue maintenance bronchodilators     Obstructive sleep apnea     Continue CPAP nightly     Diagnosis and plan of care was extensively discussed with the patient as well as nursing staff. Primary team notified of plan of care.     Anali Jauregui MD MS  Pulmonary & Critical Care Medicine

## 2022-07-15 VITALS
DIASTOLIC BLOOD PRESSURE: 65 MMHG | SYSTOLIC BLOOD PRESSURE: 104 MMHG | BODY MASS INDEX: 35.19 KG/M2 | HEART RATE: 99 BPM | WEIGHT: 191.2 LBS | RESPIRATION RATE: 16 BRPM | TEMPERATURE: 97.7 F | OXYGEN SATURATION: 93 % | HEIGHT: 62 IN

## 2022-07-15 PROBLEM — J90 PLEURAL EFFUSION: Status: RESOLVED | Noted: 2021-01-31 | Resolved: 2022-07-15

## 2022-07-15 LAB
ALBUMIN SERPL-MCNC: 2.6 G/DL (ref 3.5–5.2)
ALP BLD-CCNC: 89 U/L (ref 35–104)
ALT SERPL-CCNC: 6 U/L (ref 0–32)
ANION GAP SERPL CALCULATED.3IONS-SCNC: 9 MMOL/L (ref 7–16)
AST SERPL-CCNC: 21 U/L (ref 0–31)
BASOPHILS ABSOLUTE: 0.03 E9/L (ref 0–0.2)
BASOPHILS RELATIVE PERCENT: 0.5 % (ref 0–2)
BILIRUB SERPL-MCNC: 1.1 MG/DL (ref 0–1.2)
BODY FLUID CULTURE, STERILE: NORMAL
BUN BLDV-MCNC: 15 MG/DL (ref 6–23)
CALCIUM SERPL-MCNC: 9.1 MG/DL (ref 8.6–10.2)
CHLORIDE BLD-SCNC: 104 MMOL/L (ref 98–107)
CO2: 23 MMOL/L (ref 22–29)
CREAT SERPL-MCNC: 1.1 MG/DL (ref 0.5–1)
EKG ATRIAL RATE: 96 BPM
EKG P AXIS: 12 DEGREES
EKG P-R INTERVAL: 146 MS
EKG Q-T INTERVAL: 378 MS
EKG QRS DURATION: 78 MS
EKG QTC CALCULATION (BAZETT): 477 MS
EKG R AXIS: -10 DEGREES
EKG T AXIS: 2 DEGREES
EKG VENTRICULAR RATE: 96 BPM
EOSINOPHILS ABSOLUTE: 0.28 E9/L (ref 0.05–0.5)
EOSINOPHILS RELATIVE PERCENT: 4.5 % (ref 0–6)
GFR AFRICAN AMERICAN: 58
GFR NON-AFRICAN AMERICAN: 48 ML/MIN/1.73
GLUCOSE BLD-MCNC: 119 MG/DL (ref 74–99)
GRAM STAIN RESULT: NORMAL
HCT VFR BLD CALC: 33.9 % (ref 34–48)
HEMOGLOBIN: 10.8 G/DL (ref 11.5–15.5)
IMMATURE GRANULOCYTES #: 0.02 E9/L
IMMATURE GRANULOCYTES %: 0.3 % (ref 0–5)
LYMPHOCYTES ABSOLUTE: 0.72 E9/L (ref 1.5–4)
LYMPHOCYTES RELATIVE PERCENT: 11.6 % (ref 20–42)
MCH RBC QN AUTO: 31.9 PG (ref 26–35)
MCHC RBC AUTO-ENTMCNC: 31.9 % (ref 32–34.5)
MCV RBC AUTO: 100 FL (ref 80–99.9)
MONOCYTES ABSOLUTE: 0.61 E9/L (ref 0.1–0.95)
MONOCYTES RELATIVE PERCENT: 9.8 % (ref 2–12)
NEUTROPHILS ABSOLUTE: 4.56 E9/L (ref 1.8–7.3)
NEUTROPHILS RELATIVE PERCENT: 73.3 % (ref 43–80)
PDW BLD-RTO: 17.7 FL (ref 11.5–15)
PLATELET # BLD: 169 E9/L (ref 130–450)
PMV BLD AUTO: 10.6 FL (ref 7–12)
POTASSIUM REFLEX MAGNESIUM: 4.4 MMOL/L (ref 3.5–5)
RBC # BLD: 3.39 E12/L (ref 3.5–5.5)
SARS-COV-2, NAAT: NOT DETECTED
SODIUM BLD-SCNC: 136 MMOL/L (ref 132–146)
TOTAL PROTEIN: 6.5 G/DL (ref 6.4–8.3)
WBC # BLD: 6.2 E9/L (ref 4.5–11.5)

## 2022-07-15 PROCEDURE — 6370000000 HC RX 637 (ALT 250 FOR IP): Performed by: STUDENT IN AN ORGANIZED HEALTH CARE EDUCATION/TRAINING PROGRAM

## 2022-07-15 PROCEDURE — 6370000000 HC RX 637 (ALT 250 FOR IP)

## 2022-07-15 PROCEDURE — 97535 SELF CARE MNGMENT TRAINING: CPT

## 2022-07-15 PROCEDURE — 99232 SBSQ HOSP IP/OBS MODERATE 35: CPT | Performed by: FAMILY MEDICINE

## 2022-07-15 PROCEDURE — 97530 THERAPEUTIC ACTIVITIES: CPT

## 2022-07-15 PROCEDURE — 85025 COMPLETE CBC W/AUTO DIFF WBC: CPT

## 2022-07-15 PROCEDURE — 6360000002 HC RX W HCPCS: Performed by: INTERNAL MEDICINE

## 2022-07-15 PROCEDURE — 94640 AIRWAY INHALATION TREATMENT: CPT

## 2022-07-15 PROCEDURE — 80053 COMPREHEN METABOLIC PANEL: CPT

## 2022-07-15 PROCEDURE — 36415 COLL VENOUS BLD VENIPUNCTURE: CPT

## 2022-07-15 PROCEDURE — 6360000002 HC RX W HCPCS: Performed by: STUDENT IN AN ORGANIZED HEALTH CARE EDUCATION/TRAINING PROGRAM

## 2022-07-15 PROCEDURE — 2580000003 HC RX 258: Performed by: STUDENT IN AN ORGANIZED HEALTH CARE EDUCATION/TRAINING PROGRAM

## 2022-07-15 PROCEDURE — 93005 ELECTROCARDIOGRAM TRACING: CPT

## 2022-07-15 PROCEDURE — 87635 SARS-COV-2 COVID-19 AMP PRB: CPT

## 2022-07-15 PROCEDURE — 99232 SBSQ HOSP IP/OBS MODERATE 35: CPT | Performed by: INTERNAL MEDICINE

## 2022-07-15 RX ORDER — LIDOCAINE 4 G/G
1 PATCH TOPICAL DAILY PRN
Qty: 7 PATCH | Refills: 0 | Status: ON HOLD | OUTPATIENT
Start: 2022-07-15 | End: 2022-08-16 | Stop reason: HOSPADM

## 2022-07-15 RX ORDER — FUROSEMIDE 40 MG/1
40 TABLET ORAL 2 TIMES DAILY
Qty: 60 TABLET | Refills: 2 | Status: ON HOLD | OUTPATIENT
Start: 2022-07-15 | End: 2022-08-04 | Stop reason: HOSPADM

## 2022-07-15 RX ADMIN — Medication 1000 UNITS: at 09:13

## 2022-07-15 RX ADMIN — SPIRONOLACTONE 100 MG: 25 TABLET ORAL at 09:12

## 2022-07-15 RX ADMIN — FUROSEMIDE 40 MG: 40 TABLET ORAL at 09:12

## 2022-07-15 RX ADMIN — OCTREOTIDE ACETATE 200 MCG: 500 INJECTION, SOLUTION INTRAVENOUS; SUBCUTANEOUS at 14:20

## 2022-07-15 RX ADMIN — SODIUM CHLORIDE, PRESERVATIVE FREE 10 ML: 5 INJECTION INTRAVENOUS at 09:13

## 2022-07-15 RX ADMIN — RIFAXIMIN 550 MG: 550 TABLET ORAL at 09:12

## 2022-07-15 RX ADMIN — APIXABAN 5 MG: 5 TABLET, FILM COATED ORAL at 11:45

## 2022-07-15 RX ADMIN — PANTOPRAZOLE SODIUM 40 MG: 40 TABLET, DELAYED RELEASE ORAL at 09:12

## 2022-07-15 RX ADMIN — ARFORMOTEROL TARTRATE 15 MCG: 15 SOLUTION RESPIRATORY (INHALATION) at 07:49

## 2022-07-15 RX ADMIN — OCTREOTIDE ACETATE 200 MCG: 500 INJECTION, SOLUTION INTRAVENOUS; SUBCUTANEOUS at 06:17

## 2022-07-15 RX ADMIN — VENLAFAXINE HYDROCHLORIDE 75 MG: 75 CAPSULE, EXTENDED RELEASE ORAL at 09:12

## 2022-07-15 RX ADMIN — LACTULOSE 10 G: 20 SOLUTION ORAL at 09:13

## 2022-07-15 RX ADMIN — BUDESONIDE 500 MCG: 0.5 SUSPENSION RESPIRATORY (INHALATION) at 07:49

## 2022-07-15 NOTE — PROGRESS NOTES
[facility] - [facility]   Resident Progress Note    S:  Hospital day: 4    Brief Synopsis: Neptali Ch is a 68 y.o. 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 Does report 1 previous episode of similar symptoms where patient had to undergo therapeutic paracentesis in May 2022 where 4.9 L was drained. While in ED patient was found to be hypoxic and required 3 L nasal cannula. Patient had received a total of 3 DuoNeb treatments prior to being transferred to [facility]. On arrival, patient requires 4 L of O2. CTA chest showed large LEFT pleural effusion with compressive atelectasis of the majority of the left lung and moderate ascites in upper abdomen. Admitted for acute hypoxic respiratory failure. No acute events overnight. Patient was seen and examined this morning. In room air. Her breathing is improving. Eating and drinking without nausea and vomiting. Did not have any BM since admission, refusing Lactulose, importance of taking Lactulose has been discussed. Reports improving in the left shoulder pain after left chest catheter was removed, well controlled with the pain medication. Reports improving in the sore throat and muffled voice.        Cont meds:    dextrose      sodium chloride       Scheduled meds:    apixaban  5 mg Oral BID    octreotide  200 mcg SubCUTAneous Q8H    amLODIPine  2.5 mg Oral Daily    doxazosin  1 mg Oral Nightly    furosemide  40 mg Oral Daily    lactulose  10 g Oral TID    pantoprazole  40 mg Oral Daily    spironolactone  100 mg Oral Daily    venlafaxine  75 mg Oral Daily    rifAXIMin  550 mg Oral BID    Vitamin D  1,000 Units Oral Daily    sodium chloride flush  5-40 mL IntraVENous 2 times per day    budesonide  0.5 mg Nebulization BID    And Arformoterol Tartrate  15 mcg Nebulization BID     PRN meds: benzocaine-menthol, perflutren lipid microspheres, trimethobenzamide, glucose, glucagon (rDNA), dextrose, dextrose, lidocaine, sodium chloride flush, sodium chloride, polyethylene glycol, acetaminophen **OR** acetaminophen, albuterol     I reviewed the patient's Past Medical and Surgical History, Medications and Allergies. O:  VS: /65   Pulse 100   Temp 98.1 °F (36.7 °C) (Oral)   Resp 16   Ht 5' 2\" (1.575 m)   Wt 191 lb 3.2 oz (86.7 kg)   SpO2 91%   BMI 34.97 kg/m²     Physical Exam:  Physical Exam  Vitals reviewed. Constitutional:       General: She is not in acute distress. Appearance: She is obese. HENT:      Head: Normocephalic and atraumatic. Nose: Nose normal. No congestion or rhinorrhea. Mouth/Throat:      Mouth: Mucous membranes are moist.      Pharynx: Oropharynx is clear. No oropharyngeal exudate or posterior oropharyngeal erythema. Eyes:      Pupils: Pupils are equal, round, and reactive to light. Cardiovascular:      Rate and Rhythm: Normal rate and regular rhythm. Pulses: Normal pulses. Pulmonary:      Effort: Pulmonary effort is normal. No respiratory distress. Breath sounds: Normal breath sounds. Abdominal:      General: Bowel sounds are normal. There is distension (mild). Palpations: Abdomen is soft. Tenderness: There is no abdominal tenderness. There is no guarding or rebound. Musculoskeletal:      Cervical back: Normal range of motion and neck supple. Right lower leg: No edema. Left lower leg: No edema. Comments: Left shoulder: positive TTP over all the shoulder, decreased ROM due to pain, no warm, no swelling, no redness   Skin:     General: Skin is warm and dry. Capillary Refill: Capillary refill takes less than 2 seconds. Findings: No rash. Neurological:      General: No focal deficit present.       Mental Status: She is alert and oriented to person, place, and time. Sensory: No sensory deficit. Motor: No weakness. Psychiatric:         Thought Content: Thought content normal.         Judgment: Judgment normal.       Labs:  Na/K/Cl/CO2:  136/4.4/104/23 (07/15 0435)  BUN/Cr/glu/ALT/AST/amyl/lip:  15/1.1/--/6/21/--/-- (07/15 0435)  WBC/Hgb/Hct/Plts:  6.2/10.8/33.9/169 (07/15 0435)  estimated creatinine clearance is 44 mL/min (A) (based on SCr of 1.1 mg/dL (H)). Other pertinent labs as noted below    New Imaging:  XR CHEST PORTABLE   Final Result   1. Progressive decrease in left pleural effusion, with probable small   residual effusion. 2. Small bibasilar pulmonary opacities likely representing atelectasis. IR US GUIDED PARACENTESIS   Final Result   Successful paracentesis. XR CHEST PORTABLE   Final Result   Left pleural effusion is improved. Residual moderate left pleural effusion   identified. XR CHEST PORTABLE   Final Result   Interval placement of a left-sided Cm chest catheter, when compared to the   previous study performed 1 day earlier. No gross pneumothorax. Complete   opacification of the left hemithorax now noted. US THORACENTESIS Which side should the procedure be performed? Left   Final Result   Successful ultrasound guided placement of a left-sided pigtail catheter. US DUP LOWER EXTREMITIES BILATERAL VENOUS   Final Result   Within the visualized vessels there is no evidence for deep venous   thrombosis               US ABDOMEN COMPLETE   Final Result   Moderate ascites         XR CHEST PORTABLE   Final Result   Confluent opacities in mid left lung field and left lung base could indicate   large left pleural effusion or pneumonia. CTA PULMONARY W CONTRAST   Final Result   1. No central pulmonary embolism is seen. Due to prominent respiratory   motion artifact, the peripheral pulmonary arteries are not evaluated on this   study.    2. Large left pleural effusion with compressive atelectasis of the majority   of the left lung. 3. Liver cirrhosis with prominent varices and moderate ascites in the upper   abdomen included in the field of view of this study. RECOMMENDATIONS:   Unavailable             A/P:  Principal Problem:    Pleural effusion  Resolved Problems:    * No resolved hospital problems. *      Acute hypoxic respiratory failure 2/2 large pleural effusion in left lung  CXR showed large left pleural effusion  CTA chest showed large left pleural effusion and moderate ascites  US abdomen showed moderate ascites  Covid negative  Procal 0.06, low suspicion for PNA, no need for antibiotics at this time  BCx no growth  Suspect 2/2 large left pleural effusion and ascites. Has recurrent pleural effusions. 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 Possible chylothorax with slow accumulation since 10/2021. No O2 at home, currently breathing in room air  Thoracocentesis done on 07/12/22. 50 mL of turbid milky pleural fluid was removed. Chest tube placed for drainage. 3,150 L removed from left lung since admission. Follow pleural fluid studies, suspect chylothorax with pleural fluid triglyceride > 400. 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 Pigtail removed on 07/14/22  and plan for minimal drainage moving forward considering risk for malnutrition and immunodeficiency. Pleural fluid Cx and citology negative  Started on Octreotide 200 mcg Q8H to avoid chyle re-accumulation, okay to discontinue it at discharge per pulmonology    Incentive spirometry  Strict I/Os   Monitor CXR daily, left pleural effusion improving  Pulmonology following  Consider CTS consult     Nonalcoholic liver cirrhosis with portal hypertension  Has an outpatient on 08/10/22 with GI Dr Edelmira Kelly. EGD plan for 11/04/22.   History of nonalcoholic liver cirrhosis status post paracentesis on May 2022 where 4.9 L fluid was drained  Hyperbilirubinemia and hypoalbuminemia on admission  Lipase WNL on admission  Ammonia 69 --> 44  US abdomen showed moderate ascites   CTA chest showed liver cirrhosis with prominent varices and moderate ascites in the upper  abdomen  Continue home Lasix 40 mg daily, Aldactone 100 mg daily, Rifaximin 550 mg BID and Lactulose 10 g TID  IR US guided paracentesis done on 07/13/22. 700 mL removed. Follow ascitic fluid studies. Suspect chylous ascites due to TG > 200. Monitor renal function  GI Dr Noel Mcintyre consulted    Chronic hematuria  UA positive for moderate blood and 2-5 RBCs  Has had microscopic hematuria in the past  Patient had cystoscopy in 11/2021  Monitor Hb    Left shoulder pain - improving  Had left reverse total shoulder arthroplasty in 12/2022 by Dr Yessenia Fam left shoulder 11/04/21 showed stable alignment of left shoulder arthroplasty  EKG on admission negative for acute ischemic changes and troponin 15 --> 13. Low suspicion for MI.   Improved after left chest catheter removal  Tylenol PRN and apply Lidocaine patch for pain control    Click on cardiac auscultation over the upper sternum - resolved  Suspect 2/2 fluid shift after thoracocentesis for large left pleural effusion  Last Echo 08/17/2021 showed normal left ventricular systolic function with an EF >60%, normal right ventricular size and function, stage I diastolic dysfunction, mild MR and mild TR  EKG pending  Echo 07/14/22 showed normal left ventricular systolic function with EF > 55%, normal right ventricular size and function, stage I diastolic dysfunction and mild TR     Sore throat associated with muffled voice  Suspect 2/2 O2 therapy  Will start sore throat lozenges PRN     History of PE  Currently not on any anticoagulation at home  No signs / symptoms of active bleeding  D-dimer 2383 on admission  DUP US negative for DVT  CTA chest negative for central PE, unable to exclude PE in peripheral arteries  Will start Eliquis 5 mg BID     Interstitial lung disease  Last PFT on 04/2021  Home meds are Albuterol and [facility]  Start Brovana, Pulmicort and Duoneb in the hospital  Pulmonology following     DM2  Diet controlled  Last HbA1C 07/12/22 is 4.5%  Hypoglycemia protocol     HTN  Continue home Aldactone and Amlodipine 2.5 mg daily    KATE  CPAP at night    MDD  Continue home Venlafaxine 75 mg daily       PT 16/24 and OT 15/24  DVT Prophylaxis: PCDs  GI Prophylaxis: Protonix 40 gm daily  Diet: adult regular with low fat, dietitian following  Full code  Telemetry  Discharge planning. [facility]. Needs precert.       Electronically signed by Sy Mon MD on 7/15/2022 at 11:31 AM  This case was discussed with attending physician Dr. Christopher Oconnor

## 2022-07-15 NOTE — PROGRESS NOTES
(10/21) was found to be chylothorax-unclear etiology  -pulmonology consulted; they suspect related to cirrhosis  -chest tube removed 7/14/22     Cirrhosis with portal hypertension ([place])-chronic  -elevated bilirubin, hypoalbuminemia  -ammonia level normal  -Continue lasix, lactulose, spironolactone and rifaximin  -moderate ascites on CT and ultrasound abd.  -IR consulted for drainage of ascites which was performed and noted to be transudative but with high TGs.  -has seen [facility] in the past; consider consult  -CT with stable hypodense foci consistent with cysts  -Dr. Edu Morales was consulted. Episode of SVT and New abnormal heart sounds-resolved  -echo from 2021 reviewed-only left atrial enlargement and mild mitral regurg at that time; repeat echo same.   -EKG from 7/13 with SVT; sinus rhythm at bedside today  -Repeat EKG pending  -No issues on telemetry    Persistent nausea/vomiting-improved  -history of pancreatitis  -lipase is normal at 55  -zofran prn for nausea  -continue protonix  -EGD as outpatient was previously planned for 11/4/22     Persistent hematuria-chronic  -has hx of nephrolithiasis  -CT scan from 5/26/22 showed persistent calculus  -patient had cystoscopy in 11/2021  -monitor hgb, transfuse for hgb <7     Essential hypertension-controlled  -continue norvasc (on felopidine at home)     Hx DM2-current A1C is 4.5     Hx of breast cancer-left s/p lumpectomy  -last mammogram was in 2019-benign findings  -patient should continue screening mammograms     History of unprovoked, recurrent pulmonary embolism  -resume eliquis BID     Hx interstitial lung disease-noted on PFTs in April 2021     Obstructive Sleep Apnea-unsure if patient uses CPAP/Bipap at home     Hx of adenomatous colonic polyps  -last c-scope was in 2017 with recommendation to repeat in 3 years     MDD-continue effexor    Laryngitis-likely related to higher O2 flow for several days without humidification; no evidence of other causes; seems to be a little improved today compared to yesterday. Can offer lozenges prn. Attending Physician Statement  I have reviewed the chart and seen the patient with the resident(s). I personally reviewed images, EKG's and similar tests, if present. I personally reviewed and performed key elements of the history and exam.  I have reviewed and confirmed student and/or resident history and exam with changes as indicated above. I agree with the assessment, plan and orders as documented by the resident. Please refer to the resident and/or student note for additional information.        Abdullahi Ludwig MD

## 2022-07-15 NOTE — CARE COORDINATION
CT removed yesterday. Called for updated therapy notes today for precert. Requested that [facility] start auth once updated therapy available. Pasrr and ambulette on soft chart. [facility] auth obtained, message sent to family medicine in the event they would like to discharge today. Good through Sunday. Discharge to [address] [facility] arranged for [phone] with physicians wheelchair transport. Son, Kendra Villanueva notified of approval and discharge today. Facility notified of discharge time. Will need covid resulted prior to discharge. For questions I can be reached at [phone].  Manish Jane [place]

## 2022-07-15 NOTE — PROGRESS NOTES
[address] [id number] [address]      Date:7/15/2022                                                  Patient Name: Makenzie Desir  MRN: [id number]  : 1945  Room: [address] [place] [address]    Evaluating OT: RACIEL De Guzman, OTR/L  # [id number]    Referring Provider:  Jagdish Horner MD  Specific Provider Orders:  Pattricia Arch and Treat\"  22    Diagnosis: Pleural effusion [J90]  Pleural effusion on left [J90]  Acute respiratory failure with hypoxia ([address] [J96.01]    Pt was admitted w/ SOB, Hypoxia, found w/ Pleural Effusion - Pigtail Catheter/Chest Tube Placed Left Side 22    Pertinent Medical History:  Pt has a past medical history of Breast cancer ([address] Cirrhosis ([address] Essential hypertension, Hx of blood clots, Hyperlipidemia, Osteopenia, 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 ([address] Sleep apnea, Spinal stenosis, and Type 2 diabetes mellitus ([address] ,  has a past surgical history that includes Hysterectomy; Carpal tunnel release; Lithotripsy (Left, 2016); Colonoscopy (2017); Breast lumpectomy; Upper gastrointestinal endoscopy (2019); Colonoscopy (2019); Upper gastrointestinal endoscopy (N/A, 2019); Colonoscopy (N/A, 2019); Colonoscopy (2019); Shoulder Arthroplasty (Left, 2020); cardiovascular stress test; and Bladder surgery (Right, 2021).     Surgeries this admission: 22: Left Chest Tube(Pigtail) Placement;  Removed 22    Precautions:  Fall Risk  Elevate HOB    Assessment of current deficits   [x] Functional mobility   [x]ADLs  [x] Strength               []Cognition   [x] Functional transfers   [x] IADLs         [x] Safety Awareness   [x]Endurance   [] Fine Coordination              [x] Balance      [] Vision/perception   []Sensation    []Gross Motor Coordination  [] ROM  [] Delirium                   [] Motor Control       OT PLAN OF CARE   OT POC seated/Standing EOB/Commode, VCs for safety/adaptive techs Mod I     Bathing NT   NT Mod I      Toileting Mod A    Min A for clothing adjustment over hips + Min A for safety w/ standing balance for task, VCs to improve safety awareness   Mod A    Min A for hygiene/clothing adjustment over hips + Min A for safety w/ standing balance for task, VCs to improve safety awareness   Mod I     Bed Mobility  Supine to sit: NT   Sit to supine:  SUP     VCs for safe tech   Supine to sit: SUP  Sit to supine:  SUP     VCs for safe tech Supine to sit: IND  Sit to supine: IND     Functional Transfers Min A    Chair, EOB, Commode  Pt ed for safety/hand placement   Min A    EOB, Commode  Pt ed for safety/hand placement   Mod I     Functional Mobility Min A w/ [employer]    Household distances Bed<>Bathroom and at b/s  Pt ed for safety/improved safety awareness, walker safety   Min A w/ [employer]    Household distances Bed<>Bathroom and at b/s  Pt ed for safety/improved safety awareness, walker safety, PLB w/ mild SOB   Mod I     Balance Sitting:     Static:  IND in chair    Dynamic:  SUP w/ functional ax EOB/Commode     Standing:     Static:  Close SUP w/ [employer]    Dynamic:  Min A w/ functional ax/mobility w/ [employer]   Sitting:     Static:  IND in chair    Dynamic:  SUP w/ functional ax EOB/Commode     Standing:     Static:  Close SUP w/ [employer]    Dynamic:  Min A w/ functional ax/mobility w/ [employer] Sitting:     Static:  IND    Dynamic:  IND w/ functional ax    Standing:     Static:  Mod I w/ AD PRN    Dynamic:  Mod I w/ functional ax/mobility w/ AD PRN   Activity Tolerance Fair    Limited by hypoxia - No SOB   Fair(+)    Limited by mild SOB Good(-)   Visual/  Perceptual    Hearing:  WNL   Glasses: Reading    WFL   Hearing Aids:  No               Hand Dominance: Right   AROM Strength Additional Info:    RUE  WFL Grossly 4/5 Good ;   Good FMC/dexterity noted during ADL tasks     LUE AROM Shoulder flex to ~ 20*  AAROM Shoulder flex to ~ 90* - limited by discomfort of Chest Tube  Distally WFL Grossly 3/5 proximally  4/5 distally  Observed, NT d/t pain, Chest Tube Good ;    Good FMC/dexterity noted during ADL tasks       Sensation:  Denies numbness or tingling Pranay UEs   Tone: WFL Pranay UEs   Edema: None Noted Pranay UEs     Comments: Upon arrival, patient was found in supine. She was agreeable to participate in therapeutic ax. No Family present during session. Received permission from RN prior to engaging pt in OT services. Educated pt on role of OT services. At the end of the session, patient was properly positioned in Semi-Supine per pt request.  Call light and phone within reach, all lines and tubes intact. Oriented pt to call bell. Made all appropriate Environmental Modifications to facilitate pt's level of IND and safety. All needs met. Bed Alarm activated. Overall patient demonstrated decreased independence and safety during completion of ADL/functional transfer/mobility tasks. Pt would benefit from continued skilled OT to increase safety and independence with completion of ADL/IADL tasks for functional independence and quality of life.     Treatment: OT treatment provided this date includes:   Instruction/training on safety and adapted techniques for completion of ADLs, use of DME/AD/Adaptive equip:    Instruction/training on safe functional mobility/transfer techniques, use of DME/AD:    Instruction/training on energy conservation techs (EC)/Pursed-Lip Breathing (PLB)/work simplification for completion of ADLs:     Neuromuscular Reeducation to facilitate balance/righting reactions for increased function with ADLs:    Skilled positioning/alignment for Pain Mgmt, to maximize Pt's safety and ability to [facility] interact w/ his/her environment, maximize respiratory status  Activity tolerance - Sitting/Standing to improve endurance w/ functional ax   Cognitive retraining -  Oriented pt to current Date, Place and Situation; Cues for safety/safety awareness, sequencing, problem solving    Skilled monitoring of Vitals during session and pt's response to tx ax      Consulted RN, SW/CM    Made all appropriate Environmental Modifications to facilitate pt's level of IND and safety. Recommendations for Continued Participation in OT services during Hospitalization and at D/C     Pt and/or Family verbalized/demonstrated a Good(-) understanding of education provided. Will Review PRN.         Time In: 5622  Time Out: 1026  Total Treatment Time: 38 minutes    Min Units   OT Eval Low 47172       OT Eval Medium 36987      OT Eval High [id number]      OT Re-Eval [id number]       Therapeutic Ex [phone]       Therapeutic Activities 43398       ADL/Self Care 36175  38  3   Orthotic Management 98319       Manual 11467     Neuro Re-Ed 68750       Non-Billable Time            RACIEL Robles, OTR/L  # [id number]

## 2022-07-15 NOTE — PROGRESS NOTES
Physical Therapy  Physical Therapy Treatment    Name: Elaina Rivera  : 1945  MRN: [id number]      Date of Service: 7/15/2022    Evaluating PT:  Timmy Mittal PT, DPT [id number]    Room #:  9701/1314-C  Diagnosis:  Pleural effusion [J90]  Pleural effusion on left [J90]  Acute respiratory failure with hypoxia ([facility]) [J96.01]  PMHx/PSHx:  Breast CA, cirrhosis, HTN, HLD, osteopenia, DM, spinal stenosis  Procedure/Surgery:  None this admission  Precautions:  Falls,O2  Equipment Needs:  TBD, pt has rollator and QC    SUBJECTIVE:    Pt lives alone in a 1st floor apartment with level entry. Bed is on 1st floor and bath is on 1st floor. Pt ambulated with QC PTA. Pt reports having an aide 5x per week for 2-3 hours to assist with cleaning and laundry. OBJECTIVE:   Initial Evaluation  Date: 22 Treatment  Date: 7/15/22 Short Term/ Long Term   Goals   AM-PAC 6 Clicks  37/98    Was pt agreeable to Eval/treatment? yes yes    Does pt have pain? 9/10 L shoulder pain None reported     Bed Mobility  Rolling: SBA  Supine to sit: SBA  Sit to supine: NT  Scooting: SBA Rolling: SBA  Supine to sit: SBA with HOB raised  Sit to supine: NT  Scooting: SBA Rolling: Independent   Supine to sit: Independent   Sit to supine: Independent   Scooting: Independent    Transfers Sit to stand: Chika  Stand to sit: Chika  Stand pivot: Chika with [facility] Sit to stand: Chika  Stand to sit: Chika  Stand pivot: Chika with [facility] Sit to stand: Independent   Stand to sit: Independent   Stand pivot: Mod I with [facility]   Ambulation    40 feet and 15 feet with [facility] Chika 25 feet x 2 with [facility] Chika >150 feet with [facility] Mod I   Stair negotiation: ascended and descended  NT NT TBD   ROM BUE:  Defer to OT note  BLE:  WFL     Strength BUE:  Defer to OT note  BLE:  3+/5  WNL   Balance Sitting EOB:  SBA  Dynamic Standing:  Chika with [facility] Sitting EOB:  SBA  Dynamic Standing:  Chika with [facility] Sitting EOB:  Independent   Dynamic Standing:   Mod I with [facility]     Pt is A & O x 4  Sensation: Denies abnormalities  Edema:  None noted      Patient education  Pt educated on role of PT, safety during mobility    Patient response to education:   Pt verbalized understanding Pt demonstrated skill Pt requires further education in this area   yes yes yes     ASSESSMENT:    Conditions Requiring Skilled Therapeutic Intervention:    [x]Decreased strength     [x]Decreased ROM  [x]Decreased functional mobility  [x]Decreased balance   [x]Decreased endurance   []Decreased posture  []Decreased sensation  []Decreased coordination   []Decreased vision  []Decreased safety awareness   [x]Increased pain       Comments:  Pt upright in bed upon entry and agreeable to PT tx. Pt able to sit up at side of bed with good control and balance using handrail. Pt and required verbal and tactile cueing to navigate around obstacles in room during ambulation. Pt began complaining of SOB once in hallway. O2 levels monitored and read at 85% on room air. Returned to room and sat in chair. Monitored with pt until O2 levels increased to 91%. Pt left stable in chair with call light in reach and all needs met. Informed RN of pt status. Patient to benefit from continued skilled PT at [facility] to improve functional mobility and decrease fall risk. Treatment:  Patient practiced and was instructed in the following treatment:    Bed Mobility: VCs provided for sequencing and safety during mobility. Transfer Training: Verbal and tactile cueing provided for sequencing and safety during mobility. Manual assist provided for completion of task  Gait Training: Ambulation with WW and verbal cues for proper technique and safety. Manual assist provided for completion of task     PHYSICAL THERAPY PLAN OF CARE:  Pt is making good progress towards established goals. Continue PT POC.        Referring provider/PT Order:    07/12/22 1100  PT eval and treat  Start:  07/12/22 1100,   End:  07/12/22 1100,   ONE TIME,   Standing Count:  1 Occurrences,   R Patt Landau, MD       Diagnosis:  Pleural effusion [J90]  Pleural effusion on left [J90]  Acute respiratory failure with hypoxia ([facility] [J96.01]  Specific instructions for next treatment:  Progress mobility as appropriate     Time in  0810  Time out  0825    Total Treatment Time  15 minutes     CPT codes:  [] Gait training 35414 - minutes  [] Manual therapy 25100 - minutes  [x] Therapeutic activities 61115 15 minutes  [] Therapeutic exercises 58094 - minutes  [] Neuromuscular reeducation 24397 - minutes     Brianna Garcia, CHRISTINE Azevedo, PT, DPT  [id number]

## 2022-07-15 NOTE — PROGRESS NOTES
04:35 AM    RDW 17.7 07/15/2022 04:35 AM    METASPCT 0.9 12/17/2021 05:01 AM    LYMPHOPCT 11.6 07/15/2022 04:35 AM    PROMYELOPCT 0.9 11/23/2021 02:00 PM    MONOPCT 9.8 07/15/2022 04:35 AM    MYELOPCT 0.9 04/15/2021 12:03 PM    BASOPCT 0.5 07/15/2022 04:35 AM    MONOSABS 0.61 07/15/2022 04:35 AM    LYMPHSABS 0.72 07/15/2022 04:35 AM    EOSABS 0.28 07/15/2022 04:35 AM    BASOSABS 0.03 07/15/2022 04:35 AM     CMP:    Lab Results   Component Value Date/Time     07/15/2022 04:35 AM    K 4.4 07/15/2022 04:35 AM     07/15/2022 04:35 AM    CO2 23 07/15/2022 04:35 AM    BUN 15 07/15/2022 04:35 AM    CREATININE 1.1 07/15/2022 04:35 AM    GFRAA 58 07/15/2022 04:35 AM    LABGLOM 48 07/15/2022 04:35 AM    GLUCOSE 119 07/15/2022 04:35 AM    PROT 6.5 07/15/2022 04:35 AM    LABALBU 2.6 07/15/2022 04:35 AM    CALCIUM 9.1 07/15/2022 04:35 AM    BILITOT 1.1 07/15/2022 04:35 AM    ALKPHOS 89 07/15/2022 04:35 AM    AST 21 07/15/2022 04:35 AM    ALT 6 07/15/2022 04:35 AM       CLINICAL ASSESMENT & PLAN:    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 Total drainage of roughly 5 L from her left-sided effusion. Pigtail removed today and plan for minimal drainage moving forward considering risk for malnutrition and immunodeficiency. Maintain low-fat diet/fat-free diet with medium chain triglyceride supplementation  Dietitian consulted  Started on subcutaneous octreotide.  Can be discontinued at time of discharge    Recommend optimizing diuretics, she may benefit from TIPS - will defer to GI. She has an outpatient appointment on 8/10  Alternatives will be intermittent thoracentesis vs pleurodesis     Asthma not in exacerbation     Continue maintenance bronchodilators  Continue Advair and as needed albuterol on discharge     Obstructive sleep apnea     Continue CPAP nightly     Diagnosis and plan of care was extensively discussed with the patient as well as nursing staff. Counseled her on diet, regular incentive spirometry, OOB, activity per PT recommendations. Primary team notified of plan of care.     Marilyn Hinton MD MS  Pulmonary & Critical Care Medicine

## 2022-07-15 NOTE — DISCHARGE SUMMARY
Discharge Summary    Manav Vaughn  :  1945  MRN:  [id number]    Admit date:  2022  Discharge date: 07/15/22    Admitting Physician:  Emma Olmos MD    Discharge Diagnoses:    Patient Active Problem List   Diagnosis    Malignant neoplasm of left breast ([facility]    Type 2 diabetes mellitus ([address]    Obstructive sleep apnea syndrome    Essential hypertension    Multiple thyroid nodules    Hx of adenomatous colonic polyps    Dyslipidemia    Cirrhosis ([address]    S/P reverse total shoulder arthroplasty, left    REN (acute kidney injury) ([address]    Anemia    Palpitations    Rhabdomyolysis    Hyperammonemia (HCC)    Closed displaced fracture of surgical neck of right humerus    Pulmonary embolism (HCC)    Idiopathic acute pancreatitis without infection or necrosis    Asymptomatic microscopic hematuria    Breast mass, left    Anxiety disorder, unspecified    Gastro-esophageal reflux disease without esophagitis    Major depressive disorder, recurrent severe without psychotic features ([address]    Muscle weakness (generalized)    Need for assistance with personal care    Other abnormalities of gait and mobility    Unspecified physeal fracture of upper end of humerus, left arm, subsequent encounter for fracture with routine healing    Difficulty in walking, not elsewhere classified    Acute renal failure (ARF) ([address]    Pyelonephritis    Stage 3 chronic kidney disease, unspecified whether stage 3a or 3b CKD ([address]    Interstitial lung disease ([address]    Protein-calorie malnutrition, unspecified severity ([address]       Admission Condition:  stable    Discharged Condition:  good    Hospital Course: Manav Vaughn is a 68 y.o. 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  Does report 1 previous episode of similar symptoms where patient had to undergo therapeutic paracentesis in May 2022 where 4.9 L was drained. While in ED patient was found to be hypoxic and required 3 L nasal cannula. Patient had received a total of 3 DuoNeb treatments prior to being transferred to [facility]. On arrival, patient requires 4 L of O2. CTA chest showed large LEFT pleural effusion with compressive atelectasis of the majority of the left lung and moderate ascites in upper abdomen. Admitted for acute hypoxic respiratory failure. Acute hypoxic respiratory failure 2/2 large pleural effusion in left lung and moderate ascites. CXR showed large left pleural effusion. CTA chest showed large left pleural effusion and moderate ascites. Covid negative. Procal 0.06, low suspicion for PNA, no need for antibiotics. BCx no growth. Has recurrent pleural effusions. 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 Possible chylothorax with slow accumulation since 10/2021. No O2 at home, currently breathing in room air. Thoracocentesis done on 07/12/22. 50 mL of turbid milky pleural fluid was removed. Chest tube placed for drainage. 3,150 L removed from left lung since admission. Suspect chylothorax with pleural fluid triglyceride > 400. 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 Pigtail removed on 07/14/22 and plan for minimal drainage moving forward considering risk for malnutrition and immunodeficiency. Pleural fluid Cx and citology negative. Started on Octreotide 200 mcg Q8H to avoid chyle re-accumulation, okay to discontinue it at discharge per pulmonology. Monitoring CXR daily, left pleural effusion improving. Needs Pulmonology outpatient follow up. Nonalcoholic liver cirrhosis with portal hypertension status post paracentesis on May 2022 where 4.9 L fluid was drained. Hyperbilirubinemia and hypoalbuminemia on admission. Lipase WNL on admission. Ammonia 69 --> 44. US abdomen showed moderate ascites.  CTA chest showed liver cirrhosis with prominent varices and moderate ascites in the upper abdomen. IR US guided paracentesis done on 07/13/22. 700 mL removed. Suspect chylous ascites due to TG > 200. Continue home Aldactone 100 mg daily, Rifaximin 550 mg BID and Lactulose 10 g TID. Will increase Lasix to 40 mg BID. Has an outpatient follow up on 08/10/22 with GI Dr Tono Olivia. EGD plan for 11/04/22. Monitor BMP. Chronic hematuria. UA positive for moderate blood and 2-5 RBCs. Has had microscopic hematuria in the past. Patient had cystoscopy in 11/2021. Monitor CBC. Left shoulder pain. Had left reverse total shoulder arthroplasty in 12/2022 by Dr Layton Penn. Xray left shoulder 11/04/21 showed stable alignment of left shoulder arthroplasty. EKG on admission negative for acute ischemic changes and troponin 15 --> 13. Low suspicion for MI. Improved after left chest catheter removal. Tylenol PRN and apply Lidocaine patch for pain control. Click on cardiac auscultation over the upper sternum. Suspect 2/2 fluid shift after thoracocentesis for large left pleural effusion. Echo 07/14/22 showed normal left ventricular systolic function with EF > 55%, normal right ventricular size and function, stage I diastolic dysfunction and mild TR. Sore throat associated with muffled voice. Suspect 2/2 O2 therapy. History of PE. Currently not on any anticoagulation at home. No signs / symptoms of active bleeding. D-dimer 2383 on admission. DUP US negative for DVT. CTA chest negative for central PE, unable to exclude PE in peripheral arteries. Will start Eliquis 5 mg BID. Interstitial lung disease. Last PFT on 04/2021. Home meds are Albuterol and Dulera. HTN. Continue home Aldactone and Amlodipine 2.5 mg daily. MDD. Continue home Venlafaxine 75 mg daily. [facility] scores PT 16/24 and OT 15/24. Patient discharged to [facility].         Discharge Medications:         Medication List        START taking these medications apixaban 5 MG Tabs tablet  Commonly known as: ELIQUIS  Take 1 tablet by mouth in the morning and 1 tablet before bedtime. lidocaine 4 % external patch  Place 1 patch onto the skin daily as needed (Left shoulder pain)            CHANGE how you take these medications      fluticasone-salmeterol 250-50 MCG/ACT Aepb diskus inhaler  Commonly known as: ADVAIR  INHALE 1 PUFF BY MOUTH EVERY TWELVE HOURS (BULK)  What changed: See the new instructions. furosemide 40 MG tablet  Commonly known as: LASIX  Take 1 tablet by mouth in the morning and 1 tablet before bedtime. What changed: See the new instructions.             CONTINUE taking these medications      albuterol sulfate  (90 Base) MCG/ACT inhaler  Commonly known as: Ventolin HFA  Inhale 2 puffs into the lungs 4 times daily as needed for Wheezing     amLODIPine 2.5 MG tablet  Commonly known as: NORVASC  TAKE ONE TABLET BY MOUTH DAILY AT 9AM     Biotin 99740 MCG Tabs     doxazosin 1 MG tablet  Commonly known as: CARDURA     lactulose 10 GM/15ML solution  Commonly known as: CHRONULAC  TAKE 45 MLS BY MOUTH THREE TIMES DAILY     ondansetron 4 MG tablet  Commonly known as: ZOFRAN  TAKE 1 TABLET BY MOUTH THREE TIMES DAILY AS NEEDED FOR NAUSEA OR VOMITING     pantoprazole 40 MG tablet  Commonly known as: PROTONIX     spironolactone 100 MG tablet  Commonly known as: ALDACTONE  TAKE ONE TABLET BY MOUTH DAILY AT 9AM     venlafaxine 75 MG extended release capsule  Commonly known as: EFFEXOR XR  Take 1 capsule by mouth daily     vitamin D3 25 MCG (1000 UT) Tabs tablet  Commonly known as: CHOLECALCIFEROL     Xifaxan 550 MG tablet  Generic drug: rifAXIMin  TAKE ONE TABLET BY MOUTH TWICE DAILY @ 9AM & 9PM            STOP taking these medications      felodipine 2.5 MG extended release tablet  Commonly known as: PLENDIL     FeroSul 325 (65 Fe) MG tablet  Generic drug: ferrous sulfate               Where to Get Your Medications        These medications were sent to [facility] [address] [facility], [address] [place] [address]      Phone: [phone]   apixaban 5 MG Tabs tablet  furosemide 40 MG tablet  lidocaine 4 % external patch       These medications were sent to [facility], [place] - [address] [facility] [address] - P [phone] [place] [phone]  [address] [facility] [facility] [address] [facility] [address], [facility] [address]      Phone: [phone]   fluticasone-salmeterol 250-50 MCG/ACT Aepb diskus inhaler         Consults:  pulmonary/intensive care and GI    Significant Diagnostic Studies:     Labs:  Na/K/Cl/CO2:  136/4.4/104/23 (07/15 0435)  BUN/Cr/glu/ALT/AST/amyl/lip:  15/1.1/--/6/21/--/-- (07/15 0435)  WBC/Hgb/Hct/Plts:  6.2/10.8/33.9/169 (07/15 0435)  [unfilled]  estimated creatinine clearance is 44 mL/min (A) (based on SCr of 1.1 mg/dL (H)). New Imaging:    Echo Complete  Result Date: 7/14/2022  Transthoracic Echocardiography Report (TTE)  Demographics   Patient Name         Dominga King Gender                Female   Medical Record       [id number]       Room Number           5507  Number   Account #            [de-identified]      Procedure Date        07/14/2022   Corporate ID                        Ordering Physician    Lisa Loyd   Accession Number     [id number]     Referring Physician   Date of Birth        1945     Sonographer           Eliel Waterman   Age                  68 year(s)     Interpreting          Patricio Ayala MD                                      Physician                                       Any Other  Procedure Type of Study   TTE procedure:Echo Complete W/Doppler & Color Flow. Procedure Date Date: 07/14/2022 Start: 02:46 PM Study Location: Portable Technical Quality: Limited visualization due to lung interference. Indications:Heart murmur and Pleural effusion.  Patient Status: Routine Height: 62 inches Weight: 191 pounds BSA: 1.87 m^2 BMI: 34.93 kg/m^2 BP: 112/60 mmHg  Findings   Left Ventricle  Left ventricle size is normal.  Normal left ventricular systolic function. Ejection fraction is visually estimated at > 55%. There is doppler evidence of stage I diastolic dysfunction. Right Ventricle  Normal right ventricular size and function (TAPSE 2.8 cm). Left Atrium  Mildly dilated left atrium by volume index. Right Atrium  Normal sized right atrium. Mitral Valve  Physiologic and/or trace mitral regurgitation. No evidence of hemodynamically significant mitral stenosis. Tricuspid Valve  Mild tricuspid regurgitation. PASP is estimated at 33 mmHg. Aortic Valve  No evidence of hemodynamically significant aortic regurgitation or  stenosis. Pulmonic Valve  The pulmonic valve was not well visualized. Pericardial Effusion  Small pericardial effusion. Aorta  Aortic root dimension within normal limits. The inferior vena cava is normal in size with normal respiratory  variation. Conclusions   Summary  Normal left ventricular systolic function. Ejection fraction is visually estimated at > 55%. Normal right ventricular size and function (TAPSE 2.8 cm). There is doppler evidence of stage I diastolic dysfunction. Mild tricuspid regurgitation. Signature  Electronically signed by Trace Small MD(Interpreting  physician) on 07/14/2022 05:43 PM    US ABDOMEN COMPLETE  Result Date: 7/12/2022  FINDINGS: Moderate amount of ascites identified within the abdomen and pelvis   Moderate ascites     XR CHEST PORTABLE  Result Date: 7/14/2022  FINDINGS: Compared to yesterday, there is progressive decrease in the left pleural effusion. Left chest tube in situ. Mild bibasilar pulmonary opacities likely represent atelectasis. Small residual left pleural effusion cannot be excluded. No pneumothorax. The heart is grossly normal in size. The thoracic aorta is tortuous. Status post left shoulder arthroplasty. 1. Progressive decrease in left pleural effusion, with probable small residual effusion.  2. Small bibasilar pulmonary opacities likely representing atelectasis. XR CHEST PORTABLE  Result Date: 7/13/2022  FINDINGS: The evaluation is limited due to low lung volumes. Interval improvement in aeration left upper lung. Residual moderate left pleural effusion identified. Airspace opacities in bilateral lungs. No pneumothorax. The cardiomediastinal silhouette is without acute process. The osseous structures are without acute process. Left pleural effusion is improved. Residual moderate left pleural effusion identified. XR CHEST PORTABLE  Result Date: 7/12/2022  FINDINGS: There has been interval placement of a left-sided Cm chest catheter. The tip is noted in the left lower hemithorax. No gross pneumothorax is identified. There is now complete opacification of the left hemithorax. The right lung remains clear. The cardiac silhouette and mediastinal cannot be adequately assessed secondary to silhouetting by the left hemithorax opacification. There is again buckling of the trachea to the right, secondary to a prominent aortic knob. Interval placement of a left-sided Cm chest catheter, when compared to the previous study performed 1 day earlier. No gross pneumothorax. Complete opacification of the left hemithorax now noted. XR CHEST PORTABLE  Result Date: 7/11/2022  EXAMINATION: ONE XRAY VIEW OF THE CHEST 7/11/2022 3:30 pm COMPARISON: May 26, 2022 HISTORY: ORDERING SYSTEM PROVIDED HISTORY: shortness of breath, decreased breath sounds on left TECHNOLOGIST PROVIDED HISTORY: Reason for exam:->shortness of breath, decreased breath sounds on left FINDINGS: Confluent opacities in mid and lower left lung field silhouetting left heart border and left hemidiaphragm. No evidence of pneumothorax. Interstitial prominence throughout aerated left upper lung field and throughout right lung.    Confluent opacities in mid left lung field and left lung base could indicate large left pleural effusion or pneumonia. CTA PULMONARY W CONTRAST  Result Date: 2022  FINDINGS: Pulmonary Arteries: No embolus is seen in the main pulmonary artery, its right or left branches or the left lower lobe branch. Due to motion artifact, the right lower lobe artery, segmental and subsegmental arteries are not evaluated by this study. Mediastinum: The heart is normal in size No abnormal deviation of the interventricular septum or abnormal dilation of the right ventricle to indicate right heart strain. Lungs/pleura: The right lung is clear. There is a large left pleural effusion with compressive atelectasis of the majority of the lung. No pneumothorax. The central airway is clear. Upper Abdomen: The liver is cirrhotic with an irregular morphology. Small hypodense foci in the liver are stable at least as of 10/29/2021 and are nonspecific. The likely represent cysts. A neoplastic or pre malignant etiology cannot be excluded. Prominent varices are seen in the upper abdomen. Moderate volume ascites. Soft Tissues/Bones: No acute bone or soft tissue abnormality. Chronic mild compression fracture deformities are seen in the T4, T8 and T11 vertebral bodies. Status post left shoulder arthroplasty. 1. No central pulmonary embolism is seen. Due to prominent respiratory motion artifact, the peripheral pulmonary arteries are not evaluated on this study. 2. Large left pleural effusion with compressive atelectasis of the majority of the left lung. 3. Liver cirrhosis with prominent varices and moderate ascites in the upper abdomen included in the field of view of this study. RECOMMENDATIONS: Unavailable     US THORACENTESIS Which side should the procedure be performed? Left  Result Date: 2022  FINDINGS: Images provided reveal a large left-sided pleural effusion. Successful ultrasound guided placement of a left-sided pigtail catheter.      IR US GUIDED PARACENTESIS  Result Date: 2022  Patient MRN:  [id number] : 1945 Age: 68 years Gender: Female Order Date:  2022 2:00 PM EXAM: IR US GUIDED PARACENTESIS NUMBER OF IMAGES:  3 INDICATION:  Moderate ascites, patient will need paracentesis Moderate ascites, patient will need paracentesis What reading provider will be dictating this exam?->[facility] PARACENTESIS: Ultrasound was performed demonstrating ascites. Routine ultrasound and Doppler ultrasound were used. Using direct real-time ultrasound imaging  access was obtained. An image was stored and copy was transmitted to PACS. After obtaining informed consent and after the routine sterile prep and drape and after the administration of a local anesthesia, a system of needles and cannulas was guided by ultrasound control into the peritoneal cavity. Subsequently with real-time guidance a catheter was inserted. Peritoneal access was achieved. The needle was visualized with ultrasound in real-time in position within the peritoneum. The needle was seen within the peritoneum with ultrasound in real-time in position and ascites fluid. The catheter was removed at the end of the procedure. The patient tolerated the procedure well. There were no complications. The patient was released in stable condition. Time out occurred at 1420 hours. A total of 700 cc of colored fluid was removed. Successful paracentesis. US DUP LOWER EXTREMITIES BILATERAL VENOUS  Result Date: 2022  Patient MRN:  [id number] : 1945 Age: 68 years Gender: Female Order Date:  2022 10:45 AM EXAM: US DUP LOWER EXTREMITIES BILATERAL VENOUS NUMBER OF IMAGES:  39 INDICATION:  r/o DVT r/o DVT What reading provider will be dictating this exam?->[facility] COMPARISON: None Within the visualized vessels, there is no evidence for deep venous thrombosis There is good compressibility, there is good augmentation, there is good color flow.    Within the visualized vessels there is no evidence for deep venous thrombosis       Treatments:  Thoracocentesis, Paracentesis, Lactulose, Rifaximin, Lasix, Aldactone    Discharge Exam:  Constitutional:       General: She is not in acute distress. Appearance: She is obese. HENT:     Head: Normocephalic and atraumatic. Nose: Nose normal. No congestion or rhinorrhea. Mouth/Throat:     Mouth: Mucous membranes are moist.     Pharynx: Oropharynx is clear. No oropharyngeal exudate or posterior oropharyngeal erythema. Eyes:     Pupils: Pupils are equal, round, and reactive to light. Cardiovascular:     Rate and Rhythm: Normal rate and regular rhythm. Pulses: Normal pulses. Pulmonary:     Effort: Pulmonary effort is normal. No respiratory distress. Breath sounds: Normal breath sounds. Abdominal:     General: Bowel sounds are normal. There is distension (mild). Palpations: Abdomen is soft. Tenderness: There is no abdominal tenderness. There is no guarding or rebound. Musculoskeletal:     Cervical back: Normal range of motion and neck supple. Right lower leg: No edema. Left lower leg: No edema. Comments: Left shoulder: positive TTP over all the shoulder, decreased ROM due to pain, no warm, no swelling, no redness IMPROVING  Skin:     General: Skin is warm and dry. Capillary Refill: Capillary refill takes less than 2 seconds. Findings: No rash. Neurological:     General: No focal deficit present. Mental Status: She is alert and oriented to person, place, and time. Sensory: No sensory deficit. Motor: No weakness. Psychiatric:         Thought Content:  Thought content normal.         Judgment: Judgment normal.    Disposition:   [facility]    Future Appointments   Date Time Provider Department Center   8/10/2022  9:15 AM Charley Hernandez MD [address] [place] [address]   9/29/2022  2:00 PM Denys Salomon MD [facility]   11/23/2022  9:00 AM Charley Hernandez MD [facility]       More than 30 minutes was spent in preparation of this patient's discharge including, but not limited to, examination, preparation of documents, prescription preparation, counseling and coordination. Signed:   Marla Riddle MD  7/15/2022, 5:24 PM

## 2022-07-16 LAB
BLOOD CULTURE, ROUTINE: NORMAL
CULTURE, BLOOD 2: NORMAL

## 2022-07-17 LAB
Lab: NORMAL
Lab: NORMAL
REPORT: NORMAL
THIS TEST SENT TO: NORMAL

## 2022-07-18 LAB — ANAEROBIC CULTURE: NORMAL

## 2022-07-19 ENCOUNTER — TELEPHONE (OUTPATIENT)
Dept: PULMONOLOGY | Age: 77
End: 2022-07-19

## 2022-07-19 DIAGNOSIS — J90 PLEURAL EFFUSION: Primary | ICD-10-CM

## 2022-07-19 NOTE — TELEPHONE ENCOUNTER
Call to pt for hospital follow up. Left VM advising pt that Dr. De La Cruz Adjutant would like pt to have CXR as follow up in mid to late August; script to be mailed for pt to take to any [facility])  follow up appt will be arrange and card mailed out. Pt advised of office contact and to call if any questions or needs.

## 2022-07-29 ENCOUNTER — HOSPITAL ENCOUNTER (INPATIENT)
Age: 77
LOS: 3 days | Discharge: NURSING, REHAB OR LONG-TERM CARE | DRG: 442 | End: 2022-08-04
Attending: STUDENT IN AN ORGANIZED HEALTH CARE EDUCATION/TRAINING PROGRAM | Admitting: FAMILY MEDICINE
Payer: MEDICARE

## 2022-07-29 ENCOUNTER — APPOINTMENT (OUTPATIENT)
Dept: GENERAL RADIOLOGY | Age: 77
DRG: 442 | End: 2022-07-29
Payer: MEDICARE

## 2022-07-29 ENCOUNTER — TELEPHONE (OUTPATIENT)
Dept: FAMILY MEDICINE CLINIC | Age: 77
End: 2022-07-29

## 2022-07-29 ENCOUNTER — APPOINTMENT (OUTPATIENT)
Dept: CT IMAGING | Age: 77
DRG: 442 | End: 2022-07-29
Payer: MEDICARE
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
## 2022-07-29 DIAGNOSIS — E72.20 HYPERAMMONEMIA (HCC): ICD-10-CM

## 2022-07-29 DIAGNOSIS — N17.9 AKI (ACUTE KIDNEY INJURY) (HCC): ICD-10-CM

## 2022-07-29 LAB
ALBUMIN SERPL-MCNC: 3.1 G/DL (ref 3.5–5.2)
ALP BLD-CCNC: 116 U/L (ref 35–104)
ALT SERPL-CCNC: 16 U/L (ref 0–32)
AMMONIA: 163 UMOL/L (ref 11–51)
ANION GAP SERPL CALCULATED.3IONS-SCNC: 13 MMOL/L (ref 7–16)
AST SERPL-CCNC: 45 U/L (ref 0–31)
BACTERIA: ABNORMAL /HPF
BASOPHILS ABSOLUTE: 0.12 E9/L (ref 0–0.2)
BASOPHILS RELATIVE PERCENT: 1.6 % (ref 0–2)
BILIRUB SERPL-MCNC: 2.2 MG/DL (ref 0–1.2)
BILIRUBIN DIRECT: 0.5 MG/DL (ref 0–0.3)
BILIRUBIN URINE: NEGATIVE
BILIRUBIN, INDIRECT: 1.7 MG/DL (ref 0–1)
BLOOD, URINE: ABNORMAL
BUN BLDV-MCNC: 31 MG/DL (ref 6–23)
CALCIUM SERPL-MCNC: 11.7 MG/DL (ref 8.6–10.2)
CHLORIDE BLD-SCNC: 98 MMOL/L (ref 98–107)
CLARITY: CLEAR
CO2: 20 MMOL/L (ref 22–29)
COLOR: YELLOW
CREAT SERPL-MCNC: 1.7 MG/DL (ref 0.5–1)
EOSINOPHILS ABSOLUTE: 0.17 E9/L (ref 0.05–0.5)
EOSINOPHILS RELATIVE PERCENT: 2.3 % (ref 0–6)
EPITHELIAL CELLS, UA: ABNORMAL /HPF
GFR AFRICAN AMERICAN: 35
GFR NON-AFRICAN AMERICAN: 29 ML/MIN/1.73
GLUCOSE BLD-MCNC: 73 MG/DL (ref 74–99)
GLUCOSE URINE: NEGATIVE MG/DL
HCT VFR BLD CALC: 36.1 % (ref 34–48)
HEMOGLOBIN: 12.2 G/DL (ref 11.5–15.5)
IMMATURE GRANULOCYTES #: 0.04 E9/L
IMMATURE GRANULOCYTES %: 0.5 % (ref 0–5)
KETONES, URINE: NEGATIVE MG/DL
LEUKOCYTE ESTERASE, URINE: NEGATIVE
LYMPHOCYTES ABSOLUTE: 1.06 E9/L (ref 1.5–4)
LYMPHOCYTES RELATIVE PERCENT: 14.5 % (ref 20–42)
MCH RBC QN AUTO: 32.1 PG (ref 26–35)
MCHC RBC AUTO-ENTMCNC: 33.8 % (ref 32–34.5)
MCV RBC AUTO: 95 FL (ref 80–99.9)
METER GLUCOSE: 76 MG/DL (ref 74–99)
METER GLUCOSE: 77 MG/DL (ref 74–99)
MONOCYTES ABSOLUTE: 0.95 E9/L (ref 0.1–0.95)
MONOCYTES RELATIVE PERCENT: 13 % (ref 2–12)
NEUTROPHILS ABSOLUTE: 4.98 E9/L (ref 1.8–7.3)
NEUTROPHILS RELATIVE PERCENT: 68.1 % (ref 43–80)
NITRITE, URINE: NEGATIVE
PDW BLD-RTO: 18.6 FL (ref 11.5–15)
PH UA: 5.5 (ref 5–9)
PLATELET # BLD: 213 E9/L (ref 130–450)
PMV BLD AUTO: 10.5 FL (ref 7–12)
POTASSIUM REFLEX MAGNESIUM: 5.3 MMOL/L (ref 3.5–5)
PROTEIN UA: NEGATIVE MG/DL
RBC # BLD: 3.8 E12/L (ref 3.5–5.5)
RBC UA: ABNORMAL /HPF (ref 0–2)
REASON FOR REJECTION: NORMAL
REJECTED TEST: NORMAL
SODIUM BLD-SCNC: 131 MMOL/L (ref 132–146)
SPECIFIC GRAVITY UA: 1.01 (ref 1–1.03)
TOTAL PROTEIN: 7.8 G/DL (ref 6.4–8.3)
TROPONIN, HIGH SENSITIVITY: 12 NG/L (ref 0–9)
UROBILINOGEN, URINE: 2 E.U./DL
WBC # BLD: 7.3 E9/L (ref 4.5–11.5)
WBC UA: ABNORMAL /HPF (ref 0–5)

## 2022-07-29 PROCEDURE — 73070 X-RAY EXAM OF ELBOW: CPT

## 2022-07-29 PROCEDURE — 72170 X-RAY EXAM OF PELVIS: CPT

## 2022-07-29 PROCEDURE — 93005 ELECTROCARDIOGRAM TRACING: CPT | Performed by: STUDENT IN AN ORGANIZED HEALTH CARE EDUCATION/TRAINING PROGRAM

## 2022-07-29 PROCEDURE — 82140 ASSAY OF AMMONIA: CPT

## 2022-07-29 PROCEDURE — 70450 CT HEAD/BRAIN W/O DYE: CPT

## 2022-07-29 PROCEDURE — 6360000002 HC RX W HCPCS

## 2022-07-29 PROCEDURE — 90471 IMMUNIZATION ADMIN: CPT

## 2022-07-29 PROCEDURE — 72131 CT LUMBAR SPINE W/O DYE: CPT

## 2022-07-29 PROCEDURE — 80076 HEPATIC FUNCTION PANEL: CPT

## 2022-07-29 PROCEDURE — 85025 COMPLETE CBC W/AUTO DIFF WBC: CPT

## 2022-07-29 PROCEDURE — 72128 CT CHEST SPINE W/O DYE: CPT

## 2022-07-29 PROCEDURE — 80048 BASIC METABOLIC PNL TOTAL CA: CPT

## 2022-07-29 PROCEDURE — 84484 ASSAY OF TROPONIN QUANT: CPT

## 2022-07-29 PROCEDURE — 6370000000 HC RX 637 (ALT 250 FOR IP): Performed by: STUDENT IN AN ORGANIZED HEALTH CARE EDUCATION/TRAINING PROGRAM

## 2022-07-29 PROCEDURE — 99285 EMERGENCY DEPT VISIT HI MDM: CPT

## 2022-07-29 PROCEDURE — 71045 X-RAY EXAM CHEST 1 VIEW: CPT

## 2022-07-29 PROCEDURE — 90714 TD VACC NO PRESV 7 YRS+ IM: CPT

## 2022-07-29 PROCEDURE — 82962 GLUCOSE BLOOD TEST: CPT

## 2022-07-29 PROCEDURE — 72125 CT NECK SPINE W/O DYE: CPT

## 2022-07-29 PROCEDURE — 81001 URINALYSIS AUTO W/SCOPE: CPT

## 2022-07-29 RX ORDER — FERROUS SULFATE 325(65) MG
325 TABLET ORAL DAILY
Status: ON HOLD | COMMUNITY
End: 2022-08-16 | Stop reason: HOSPADM

## 2022-07-29 RX ORDER — TETANUS AND DIPHTHERIA TOXOIDS ADSORBED 2; 2 [LF]/.5ML; [LF]/.5ML
0.5 INJECTION INTRAMUSCULAR ONCE
Status: COMPLETED | OUTPATIENT
Start: 2022-07-29 | End: 2022-07-29

## 2022-07-29 RX ORDER — LACTULOSE 10 G/15ML
45 SOLUTION ORAL ONCE
Status: COMPLETED | OUTPATIENT
Start: 2022-07-29 | End: 2022-07-29

## 2022-07-29 RX ORDER — DEXTROSE MONOHYDRATE 25 G/50ML
25 INJECTION, SOLUTION INTRAVENOUS ONCE
Status: DISCONTINUED | OUTPATIENT
Start: 2022-07-29 | End: 2022-08-04 | Stop reason: HOSPADM

## 2022-07-29 RX ADMIN — TETANUS AND DIPHTHERIA TOXOIDS ADSORBED 0.5 ML: 2; 2 INJECTION INTRAMUSCULAR at 17:10

## 2022-07-29 RX ADMIN — LACTULOSE 45 G: 20 SOLUTION ORAL at 20:56

## 2022-07-29 RX ADMIN — RIFAXIMIN 200 MG: 200 TABLET ORAL at 21:12

## 2022-07-29 ASSESSMENT — ENCOUNTER SYMPTOMS
SORE THROAT: 0
COUGH: 0
SINUS PRESSURE: 0
ABDOMINAL PAIN: 0
BACK PAIN: 0
BLOOD IN STOOL: 0
SHORTNESS OF BREATH: 0
NAUSEA: 0
DIARRHEA: 0
VOMITING: 0
EYE PAIN: 0
CHEST TIGHTNESS: 0
CONSTIPATION: 1

## 2022-07-29 ASSESSMENT — PAIN - FUNCTIONAL ASSESSMENT: PAIN_FUNCTIONAL_ASSESSMENT: NONE - DENIES PAIN

## 2022-07-29 NOTE — TELEPHONE ENCOUNTER
lactulose (CHRONULAC) 10 GM/15ML solution   Pt is in [facility] care for rehab, and the facility is not watching Bakari Marr to make sure she drinks the medicine. They are putting it on her tray and walking away and she is not drinking and is getting really bad. She fell today and is being rushed to [facility] emergency. Her son wants doctor to write an order to the facility that they are required watch his mother drink the medication.

## 2022-07-29 NOTE — ED PROVIDER NOTES
[facility]  Department of Emergency Medicine     Written by: Yaakov Baltazar MD  Patient Name: Senait Davidson  Attending Provider: Neil Brown DO  Admit Date: 2022  2:43 PM  MRN: [id number]                   : 1945        Chief Complaint   Patient presents with    Fall     Mechanical fall. +thinners    - Chief complaint    Rosana Otero is a 69 yo F with a PMH of CKD3, breast CA, T2DM, cirrhosis, portal HTN, interstitial pulmonary disease, MDD, and GERD who presents after a mechanical all at her nursing home, she is on apixaban. She hit her head. She denies loss of consciousness, headache, or any pain. She has an abrasion on her right scalp and abrasion on her right elbow. She does complain of bilateral calf and abdominal pain which she states is chronic from her cirrhosis. Review of Systems   Constitutional:  Negative for chills and diaphoresis. HENT:  Negative for ear pain, sinus pressure, sore throat and tinnitus. Eyes:  Negative for pain and visual disturbance. Respiratory:  Negative for cough, chest tightness and shortness of breath. Cardiovascular:  Negative for chest pain, palpitations and leg swelling. Gastrointestinal:  Positive for constipation. Negative for abdominal pain, blood in stool, diarrhea, nausea and vomiting. Genitourinary:  Negative for difficulty urinating, dysuria, vaginal bleeding and vaginal discharge. Musculoskeletal:  Positive for myalgias (LE). Negative for arthralgias and back pain. Neurological:  Negative for dizziness, syncope, light-headedness, numbness and headaches. Physical Exam  Constitutional:       General: She is not in acute distress. Appearance: She is normal weight. HENT:      Head: Normocephalic. Comments: Blood in hair on right scalp. Small 1cm laceration with surrounding edema.       Right Ear: External ear normal.      Left Ear: External ear normal.      Mouth/Throat:      Mouth: Mucous membranes are moist.      Pharynx: Oropharynx is clear. Eyes:      Extraocular Movements: Extraocular movements intact. Conjunctiva/sclera: Conjunctivae normal.   Cardiovascular:      Rate and Rhythm: Normal rate and regular rhythm. Pulses: Normal pulses. Heart sounds: Normal heart sounds. Pulmonary:      Effort: Pulmonary effort is normal.      Breath sounds: Normal breath sounds. Abdominal:      General: Abdomen is flat. Palpations: Abdomen is soft. Tenderness: Tenderness: diffuse mild tenderness. Musculoskeletal:         General: Tenderness: bilateral LE tenderness distal to the knee. Right lower leg: Edema present. Left lower leg: Edema present. Skin:     General: Skin is warm and dry. Neurological:      General: No focal deficit present. Mental Status: She is alert and oriented to person, place, and time. Procedures       FRIDA Camp is a 69 yo female with a PMH of CKD3, breast CA, cirrhosis, portal HTN, interstitial lung disease, and GERD who presents with a mechanical fall due to loss of consciousness and altered mental status. In the ED CT of head and c-spine were negative for acute abnormalities. Labs drawn were significant for hyperammonemia with ammonia of 163 and an REN with Cr of 1.7. Plans to admit patient for management of hyperammonemia and REN. ED Course as of 08/11/22 1125   Fri Jul 29, 2022 2346 Consulted with Dr. Yosvany Coleman, [facility], who accepted for admission.   [KG]      ED Course User Index  [KG] Manuel Rosa DO       --------------------------------------------- PAST HISTORY ---------------------------------------------  Past Medical History:  has a past medical history of Breast cancer ([facility] [address] Cirrhosis ([facility] [address] Essential hypertension, Hx of blood clots, Hyperlipidemia, Osteopenia, Primary hyperparathyroidism ([facility] [address] 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 ([facility] [address] Sleep apnea, Spinal stenosis, and Type 2 diabetes mellitus ([facility] [address] Past Surgical History:  has a past surgical history that includes Hysterectomy; Carpal tunnel release; Lithotripsy (Left, 05/04/2016); Colonoscopy (01/31/2017); Breast lumpectomy; Upper gastrointestinal endoscopy (04/23/2019); Colonoscopy (04/23/2019); Upper gastrointestinal endoscopy (N/A, 04/23/2019); Colonoscopy (N/A, 04/23/2019); Colonoscopy (04/23/2019); Shoulder Arthroplasty (Left, 12/21/2020); cardiovascular stress test; and Bladder surgery (Right, 11/26/2021). Social History:  reports that she has never smoked. She has never used smokeless tobacco. She reports that she does not drink alcohol and does not use drugs. Family History: family history includes Arthritis in her mother; COPD in her father; Cancer (age of onset: 48) in an other family member; Cirrhosis in her brother; Heart Attack in her father; Heart Disease in her brother; Hypertension in her child and child; Prostate Cancer in her child. The patients home medications have been reviewed.     Allergies: Lisinopril    -------------------------------------------------- RESULTS -------------------------------------------------    LABS:  Results for orders placed or performed during the hospital encounter of 07/29/22   CBC with Auto Differential   Result Value Ref Range    WBC 7.3 4.5 - 11.5 E9/L    RBC 3.80 3.50 - 5.50 E12/L    Hemoglobin 12.2 11.5 - 15.5 g/dL    Hematocrit 36.1 34.0 - 48.0 %    MCV 95.0 80.0 - 99.9 fL    MCH 32.1 26.0 - 35.0 pg    MCHC 33.8 32.0 - 34.5 %    RDW 18.6 (H) 11.5 - 15.0 fL    Platelets 945 245 - 747 E9/L    MPV 10.5 7.0 - 12.0 fL    Neutrophils % 68.1 43.0 - 80.0 %    Immature Granulocytes % 0.5 0.0 - 5.0 %    Lymphocytes % 14.5 (L) 20.0 - 42.0 %    Monocytes % 13.0 (H) 2.0 - 12.0 %    Eosinophils % 2.3 0.0 - 6.0 %    Basophils % 1.6 0.0 - 2.0 %    Neutrophils Absolute 4.98 1.80 - 7.30 E9/L    Immature Granulocytes # 0.04 E9/L    Lymphocytes Absolute 1.06 (L) 1.50 - 4.00 E9/L    Monocytes Absolute 0.95 0.10 - 0.95 E9/L    Eosinophils Absolute 0.17 0.05 - 0.50 E9/L    Basophils Absolute 0.12 0.00 - 0.20 E9/L   Ammonia   Result Value Ref Range    Ammonia 163.0 (H) 11.0 - 51.0 umol/L   Urinalysis with Microscopic   Result Value Ref Range    Color, UA Yellow Straw/Yellow    Clarity, UA Clear Clear    Glucose, Ur Negative Negative mg/dL    Bilirubin Urine Negative Negative    Ketones, Urine Negative Negative mg/dL    Specific Gravity, UA 1.010 1.005 - 1.030    Blood, Urine MODERATE (A) Negative    pH, UA 5.5 5.0 - 9.0    Protein, UA Negative Negative mg/dL    Urobilinogen, Urine 2.0 (A) <2.0 E.U./dL    Nitrite, Urine Negative Negative    Leukocyte Esterase, Urine Negative Negative    WBC, UA 0-1 0 - 5 /HPF    RBC, UA 2-5 0 - 2 /HPF    Epithelial Cells, UA FEW /HPF    Bacteria, UA RARE (A) None Seen /HPF   Basic Metabolic Panel w/ Reflex to MG   Result Value Ref Range    Sodium 131 (L) 132 - 146 mmol/L    Potassium reflex Magnesium 5.3 (H) 3.5 - 5.0 mmol/L    Chloride 98 98 - 107 mmol/L    CO2 20 (L) 22 - 29 mmol/L    Anion Gap 13 7 - 16 mmol/L    Glucose 73 (L) 74 - 99 mg/dL    BUN 31 (H) 6 - 23 mg/dL    Creatinine 1.7 (H) 0.5 - 1.0 mg/dL    GFR Non-African American 29 >=60 mL/min/1.73    GFR African American 35     Calcium 11.7 (H) 8.6 - 10.2 mg/dL   Hepatic Function Panel   Result Value Ref Range    Total Protein 7.8 6.4 - 8.3 g/dL    Albumin 3.1 (L) 3.5 - 5.2 g/dL    Alkaline Phosphatase 116 (H) 35 - 104 U/L    ALT 16 0 - 32 U/L    AST 45 (H) 0 - 31 U/L    Total Bilirubin 2.2 (H) 0.0 - 1.2 mg/dL    Bilirubin, Direct 0.5 (H) 0.0 - 0.3 mg/dL    Bilirubin, Indirect 1.7 (H) 0.0 - 1.0 mg/dL   Troponin   Result Value Ref Range    Troponin, High Sensitivity 12 (H) 0 - 9 ng/L   SPECIMEN REJECTION   Result Value Ref Range    Rejected Test trp5     Reason for Rejection see below    CBC with Auto Differential   Result Value Ref Range    WBC 8.2 4.5 - 11.5 E9/L    RBC 3.74 3.50 - 5.50 E12/L    Hemoglobin 12.3 11.5 29 (L) 30 - 100 ng/mL   Phosphorus   Result Value Ref Range    Phosphorus 2.7 2.5 - 4.5 mg/dL   Calcium, Ionized   Result Value Ref Range    Calcium, Ionized 1.54 (H) 1.15 - 1.33 mmol/L   TSH   Result Value Ref Range    TSH 4.140 0.270 - 4.200 uIU/mL   VITAMIN A   Result Value Ref Range    Vitamin A 0.10 (L) 0.30 - 1.20 mg/L    Vitamin A, Interp See Note     RETINYL PALMITATE <0.02 0.00 - 0.10 mg/L   Protime-INR   Result Value Ref Range    Protime 24.5 (H) 9.3 - 12.4 sec    INR 2.3    APTT   Result Value Ref Range    aPTT 33.6 24.5 - 35.1 sec   Fibrinogen   Result Value Ref Range    Fibrinogen 179 (L) 200 - 400 mg/dL   Blood Gas, Arterial   Result Value Ref Range    Date Analyzed 20220730     Time Analyzed 0716     Source: Blood Arterial     pH, Blood Gas 7.455 (H) 7.350 - 7.450    PCO2 36.0 35.0 - 45.0 mmHg    PO2 75.8 75.0 - 100.0 mmHg    HCO3 24.7 22.0 - 26.0 mmol/L    B.E. 1.1 -3.0 - 3.0 mmol/L    O2 Sat 95.3 92.0 - 98.5 %    O2Hb 94.1 94.0 - 97.0 %    COHb 1.1 0.0 - 1.5 %    MetHb 0.2 0.0 - 1.5 %    O2 Content 17.1 mL/dL    HHb 4.6 0.0 - 5.0 %    tHb (est) 12.9 11.5 - 16.5 g/dL    Mode RA     Date Of Collection      Time Collected      Pt Temp 37.0 C     ID [id number]     Lab [id number]     Critical(s) Notified .  No Critical Values    Basic Metabolic Panel   Result Value Ref Range    Sodium 135 132 - 146 mmol/L    Potassium 4.7 3.5 - 5.0 mmol/L    Chloride 100 98 - 107 mmol/L    CO2 21 (L) 22 - 29 mmol/L    Anion Gap 14 7 - 16 mmol/L    Glucose 77 74 - 99 mg/dL    BUN 28 (H) 6 - 23 mg/dL    Creatinine 1.4 (H) 0.5 - 1.0 mg/dL    GFR Non-African American 36 >=60 mL/min/1.73    GFR African American 44     Calcium 12.4 (H) 8.6 - 10.2 mg/dL   Calcium, Ionized   Result Value Ref Range    Calcium, Ionized 1.54 (H) 1.15 - 1.33 mmol/L   Protein / creatinine ratio, urine   Result Value Ref Range    Protein, Ur 10 0 - 12 mg/dL    Creatinine, Ur 66 29 - 226 mg/dL    Protein/Creat Ratio 0.2 0.0 - 0.2    Protein/Creat Ratio 0.2 Sodium, urine, random   Result Value Ref Range    Sodium, Ur 52 Not Established mmol/L   Chloride, Random Urine   Result Value Ref Range    Chloride 25 Not Established mmol/L   Osmolality, Urine   Result Value Ref Range    Osmolality, Ur 393 300 - 900 mOsm/kg   CBC with Auto Differential   Result Value Ref Range    WBC 6.1 4.5 - 11.5 E9/L    RBC 3.59 3.50 - 5.50 E12/L    Hemoglobin 11.7 11.5 - 15.5 g/dL    Hematocrit 35.0 34.0 - 48.0 %    MCV 97.5 80.0 - 99.9 fL    MCH 32.6 26.0 - 35.0 pg    MCHC 33.4 32.0 - 34.5 %    RDW 19.5 (H) 11.5 - 15.0 fL    Platelets 063 391 - 745 E9/L    MPV 10.3 7.0 - 12.0 fL    Neutrophils % 62.5 43.0 - 80.0 %    Immature Granulocytes % 0.5 0.0 - 5.0 %    Lymphocytes % 17.2 (L) 20.0 - 42.0 %    Monocytes % 14.5 (H) 2.0 - 12.0 %    Eosinophils % 3.6 0.0 - 6.0 %    Basophils % 1.7 0.0 - 2.0 %    Neutrophils Absolute 3.79 1.80 - 7.30 E9/L    Immature Granulocytes # 0.03 E9/L    Lymphocytes Absolute 1.04 (L) 1.50 - 4.00 E9/L    Monocytes Absolute 0.88 0.10 - 0.95 E9/L    Eosinophils Absolute 0.22 0.05 - 0.50 E9/L    Basophils Absolute 0.10 0.00 - 0.20 E9/L   Comprehensive Metabolic Panel   Result Value Ref Range    Sodium 132 132 - 146 mmol/L    Potassium 5.7 (H) 3.5 - 5.0 mmol/L    Chloride 104 98 - 107 mmol/L    CO2 17 (L) 22 - 29 mmol/L    Anion Gap 11 7 - 16 mmol/L    Glucose 64 (L) 74 - 99 mg/dL    BUN 26 (H) 6 - 23 mg/dL    Creatinine 1.3 (H) 0.5 - 1.0 mg/dL    GFR Non-African American 40 >=60 mL/min/1.73    GFR African American 48     Calcium 11.6 (H) 8.6 - 10.2 mg/dL    Total Protein 6.9 6.4 - 8.3 g/dL    Albumin 2.6 (L) 3.5 - 5.2 g/dL    Total Bilirubin 2.3 (H) 0.0 - 1.2 mg/dL    Alkaline Phosphatase 109 (H) 35 - 104 U/L    ALT 16 0 - 32 U/L    AST 39 (H) 0 - 31 U/L   Magnesium   Result Value Ref Range    Magnesium 2.4 1.6 - 2.6 mg/dL   Phosphorus   Result Value Ref Range    Phosphorus 2.7 2.5 - 4.5 mg/dL   Urine electrolytes   Result Value Ref Range    Sodium, Ur 52 Not Lymphocytes Absolute 1.05 (L) 1.50 - 4.00 E9/L    Monocytes Absolute 0.68 0.10 - 0.95 E9/L    Eosinophils Absolute 0.24 0.05 - 0.50 E9/L    Basophils Absolute 0.08 0.00 - 0.20 E9/L   Comprehensive Metabolic Panel   Result Value Ref Range    Sodium 135 132 - 146 mmol/L    Potassium 4.9 3.5 - 5.0 mmol/L    Chloride 107 98 - 107 mmol/L    CO2 18 (L) 22 - 29 mmol/L    Anion Gap 10 7 - 16 mmol/L    Glucose 59 (L) 74 - 99 mg/dL    BUN 23 6 - 23 mg/dL    Creatinine 1.2 (H) 0.5 - 1.0 mg/dL    GFR Non-African American 44 >=60 mL/min/1.73    GFR African American 53     Calcium 10.5 (H) 8.6 - 10.2 mg/dL    Total Protein 6.6 6.4 - 8.3 g/dL    Albumin 2.6 (L) 3.5 - 5.2 g/dL    Total Bilirubin 2.3 (H) 0.0 - 1.2 mg/dL    Alkaline Phosphatase 113 (H) 35 - 104 U/L    ALT 14 0 - 32 U/L    AST 35 (H) 0 - 31 U/L   Magnesium   Result Value Ref Range    Magnesium 2.1 1.6 - 2.6 mg/dL   Ammonia   Result Value Ref Range    Ammonia 356.0 (H) 11.0 - 51.0 umol/L   Phosphorus   Result Value Ref Range    Phosphorus 2.5 2.5 - 4.5 mg/dL   CBC with Auto Differential   Result Value Ref Range    WBC 4.7 4.5 - 11.5 E9/L    RBC 2.97 (L) 3.50 - 5.50 E12/L    Hemoglobin 9.7 (L) 11.5 - 15.5 g/dL    Hematocrit 29.1 (L) 34.0 - 48.0 %    MCV 98.0 80.0 - 99.9 fL    MCH 32.7 26.0 - 35.0 pg    MCHC 33.3 32.0 - 34.5 %    RDW 19.8 (H) 11.5 - 15.0 fL    Platelets 239 462 - 242 E9/L    MPV 11.2 7.0 - 12.0 fL    Neutrophils % 62.5 43.0 - 80.0 %    Immature Granulocytes % 0.0 0.0 - 5.0 %    Lymphocytes % 19.6 (L) 20.0 - 42.0 %    Monocytes % 11.0 2.0 - 12.0 %    Eosinophils % 5.2 0.0 - 6.0 %    Basophils % 1.7 0.0 - 2.0 %    Neutrophils Absolute 2.91 1.80 - 7.30 E9/L    Immature Granulocytes # 0.00 E9/L    Lymphocytes Absolute 0.91 (L) 1.50 - 4.00 E9/L    Monocytes Absolute 0.51 0.10 - 0.95 E9/L    Eosinophils Absolute 0.24 0.05 - 0.50 E9/L    Basophils Absolute 0.08 0.00 - 0.20 E9/L   Comprehensive Metabolic Panel   Result Value Ref Range    Sodium 133 132 - 146 mmol/L    Potassium 4.6 3.5 - 5.0 mmol/L    Chloride 105 98 - 107 mmol/L    CO2 20 (L) 22 - 29 mmol/L    Anion Gap 8 7 - 16 mmol/L    Glucose 59 (L) 74 - 99 mg/dL    BUN 21 6 - 23 mg/dL    Creatinine 1.2 (H) 0.5 - 1.0 mg/dL    GFR Non-African American 44 >=60 mL/min/1.73    GFR African American 53     Calcium 9.8 8.6 - 10.2 mg/dL    Total Protein 6.4 6.4 - 8.3 g/dL    Albumin 2.5 (L) 3.5 - 5.2 g/dL    Total Bilirubin 2.0 (H) 0.0 - 1.2 mg/dL    Alkaline Phosphatase 104 35 - 104 U/L    ALT 14 0 - 32 U/L    AST 33 (H) 0 - 31 U/L   Magnesium   Result Value Ref Range    Magnesium 1.8 1.6 - 2.6 mg/dL   Ammonia   Result Value Ref Range    Ammonia 69.0 (H) 11.0 - 51.0 umol/L   CBC with Auto Differential   Result Value Ref Range    WBC 5.9 4.5 - 11.5 E9/L    RBC 3.26 (L) 3.50 - 5.50 E12/L    Hemoglobin 10.9 (L) 11.5 - 15.5 g/dL    Hematocrit 34.1 34.0 - 48.0 %    .6 (H) 80.0 - 99.9 fL    MCH 33.4 26.0 - 35.0 pg    MCHC 32.0 32.0 - 34.5 %    RDW 20.3 (H) 11.5 - 15.0 fL    Platelets 052 110 - 478 E9/L    MPV 10.7 7.0 - 12.0 fL    Neutrophils % 65.3 43.0 - 80.0 %    Immature Granulocytes % 0.2 0.0 - 5.0 %    Lymphocytes % 15.7 (L) 20.0 - 42.0 %    Monocytes % 13.1 (H) 2.0 - 12.0 %    Eosinophils % 4.5 0.0 - 6.0 %    Basophils % 1.2 0.0 - 2.0 %    Neutrophils Absolute 3.88 1.80 - 7.30 E9/L    Immature Granulocytes # 0.01 E9/L    Lymphocytes Absolute 0.93 (L) 1.50 - 4.00 E9/L    Monocytes Absolute 0.78 0.10 - 0.95 E9/L    Eosinophils Absolute 0.27 0.05 - 0.50 E9/L    Basophils Absolute 0.07 0.00 - 0.20 E9/L    Poikilocytes 1+     Ovalocytes 1+    Comprehensive Metabolic Panel   Result Value Ref Range    Sodium 130 (L) 132 - 146 mmol/L    Potassium 4.6 3.5 - 5.0 mmol/L    Chloride 103 98 - 107 mmol/L    CO2 17 (L) 22 - 29 mmol/L    Anion Gap 10 7 - 16 mmol/L    Glucose 57 (L) 74 - 99 mg/dL    BUN 19 6 - 23 mg/dL    Creatinine 1.1 (H) 0.5 - 1.0 mg/dL    GFR Non-African American 48 >=60 mL/min/1.73    GFR African American 58 Calcium 9.5 8.6 - 10.2 mg/dL    Total Protein 6.5 6.4 - 8.3 g/dL    Albumin 2.4 (L) 3.5 - 5.2 g/dL    Total Bilirubin 2.1 (H) 0.0 - 1.2 mg/dL    Alkaline Phosphatase 116 (H) 35 - 104 U/L    ALT 15 0 - 32 U/L    AST 41 (H) 0 - 31 U/L   Magnesium   Result Value Ref Range    Magnesium 2.0 1.6 - 2.6 mg/dL   Ammonia   Result Value Ref Range    Ammonia 52.0 (H) 11.0 - 51.0 umol/L   CBC with Auto Differential   Result Value Ref Range    WBC 5.1 4.5 - 11.5 E9/L    RBC 3.11 (L) 3.50 - 5.50 E12/L    Hemoglobin 10.3 (L) 11.5 - 15.5 g/dL    Hematocrit 30.6 (L) 34.0 - 48.0 %    MCV 98.4 80.0 - 99.9 fL    MCH 33.1 26.0 - 35.0 pg    MCHC 33.7 32.0 - 34.5 %    RDW 20.2 (H) 11.5 - 15.0 fL    Platelets 940 (L) 105 - 450 E9/L    MPV 10.9 7.0 - 12.0 fL    Neutrophils % 65.1 43.0 - 80.0 %    Immature Granulocytes % 0.2 0.0 - 5.0 %    Lymphocytes % 16.5 (L) 20.0 - 42.0 %    Monocytes % 12.5 (H) 2.0 - 12.0 %    Eosinophils % 4.9 0.0 - 6.0 %    Basophils % 0.8 0.0 - 2.0 %    Neutrophils Absolute 3.32 1.80 - 7.30 E9/L    Immature Granulocytes # 0.01 E9/L    Lymphocytes Absolute 0.84 (L) 1.50 - 4.00 E9/L    Monocytes Absolute 0.64 0.10 - 0.95 E9/L    Eosinophils Absolute 0.25 0.05 - 0.50 E9/L    Basophils Absolute 0.04 0.00 - 0.20 E9/L    Anisocytosis 1+     Polychromasia 1+     Poikilocytes 1+     Ovalocytes 1+    Comprehensive Metabolic Panel   Result Value Ref Range    Sodium 133 132 - 146 mmol/L    Potassium 4.4 3.5 - 5.0 mmol/L    Chloride 103 98 - 107 mmol/L    CO2 20 (L) 22 - 29 mmol/L    Anion Gap 10 7 - 16 mmol/L    Glucose 57 (L) 74 - 99 mg/dL    BUN 19 6 - 23 mg/dL    Creatinine 1.0 0.5 - 1.0 mg/dL    GFR Non-African American 54 >=60 mL/min/1.73    GFR African American >60     Calcium 9.6 8.6 - 10.2 mg/dL    Total Protein 6.2 (L) 6.4 - 8.3 g/dL    Albumin 2.5 (L) 3.5 - 5.2 g/dL    Total Bilirubin 1.9 (H) 0.0 - 1.2 mg/dL    Alkaline Phosphatase 112 (H) 35 - 104 U/L    ALT 15 0 - 32 U/L    AST 34 (H) 0 - 31 U/L   Magnesium dislocation. Osteo-degenerative changes again noted involving   the visualized lumbar spine, when compared to the previous study performed   08/17/2021. XR CHEST PORTABLE   Final Result   No acute process. EKG:  This EKG is signed and interpreted by me. EKG: This EKG is signed and interpreted by me. Rate: 92  Rhythm: Sinus  Interpretation: Normal sinus rhythm, normal axis, no acute st elevations or depressions, intervals within normal limits, qtc is 431  Comparison: stable as compared to patient's most recent EKG         ------------------------- NURSING NOTES AND VITALS REVIEWED ---------------------------  Date / Time Roomed:  7/29/2022  2:43 PM  ED Bed Assignment:  [id number]    The nursing notes within the ED encounter and vital signs as below have been reviewed. No data found. Oxygen Saturation Interpretation: Normal    ------------------------------------------ PROGRESS NOTES ------------------------------------------  Re-evaluation(s):  Patients symptoms show no change  Repeat physical examination is not changed    Counseling:  I have spoken with the patient and discussed todays results, in addition to providing specific details for the plan of care and counseling regarding the diagnosis and prognosis. Their questions are answered at this time and they are agreeable with the plan of admission.    --------------------------------- ADDITIONAL PROVIDER NOTES ---------------------------------  Spoke with Dr. Chencho Griffith. Discussed case. They will admit the patient. This patient's ED course included: a personal history and physicial examination, re-evaluation prior to disposition, multiple bedside re-evaluations, IV medications, cardiac monitoring, continuous pulse oximetry, and complex medical decision making and emergency management    This patient has remained hemodynamically stable during their ED course. Diagnosis:  1. Fall, initial encounter    2.  Hyperammonemia ([facility] [address] 3. REN (acute kidney injury) ([facility] [address]        Disposition:  Patient's disposition: Admit to telemetry  Patient's condition is stable. Patient was seen and evaluated by myself and my attending Dasia Woods DO. Assessment and Plan discussed with attending provider, please see attestation for final plan of care.      MD Dasia Hanks DO  08/11/22 1127

## 2022-07-29 NOTE — LETTER
[place] Department Medicaid  CERTIFICATION OF NECESSITY  FOR NON-EMERGENCY TRANSPORTATION   BY GROUND AMBULANCE      Individual Information   1. Name: Tamiko Walker 2. [place] Medicaid Billing Number:    3. Address: [place] [address]      Transportation Provider Information   4. Provider Name: deven   5. [place] Medicaid Provider Number:  National Provider Identifier (NPI):      Certification  7. Criteria:  During transport, this individual requires:  [x] Medical treatment or continuous     supervision by an EMT. [] The administration or regulation of oxygen by another person. [] Supervised protective restraint. 8. Period Beginning Date: 8/4/22   9. Length  [x] Not more than 10 day(s)  [] One Year     Additional Information Relevant to Certification   10. Comments or Explanations, If Necessary or Appropriate   Altered mental status A & O x 1 , Falls risk     Certifying Practitioner Information   11. Name of Practitioner: Fan Horton MD    12. [place] Medicaid Provider Number, If Applicable:  [address] Provider Identifier (NPI):      Signature Information   14. Date of Signature: 8/1/22 15. Name of Person Signing: Tez Finley   12. Signature and Professional Designation: Electronically signed by LAURITA Ozuna on 8/1/2022 at 1:26 PM     [facility] [address]  Rev. 7/2015    [address] Encounter Date/Time: 7/29/2022 Shahab Cervantes 1420 Account: [de-identified]    MRN: [id number]    Patient: [facility] Serial #: [id number]      ENCOUNTER          Patient Class: OBS Private Enc? No Unit  BDDominique Session [address] [place] [id number]-   Hospital Service: MED   Encounter DX: Hyperammonemia ([address] [E7*   ADM Provider: Amanuel Mead MD   Procedure:     ATT Provider: Amanuel Mead MD   REF Provider:        Admission DX: Hyperammonemia ([address] REN (acute kidney injury) ([address] Fall, initial encounter and DX codes: E72.20, N17.9, W19. [address]      PATIENT                 Name: Tamiko Walker : 1945 (77 yrs)   Address: [place] [address] , [address] Sex: Female   City: [address] [place] [address]         Marital Status:    Employer: RETIRED         [employer]: [employer]   Primary Care Provider: Fran Duke MD         Primary Phone: [phone]   EMERGENCY CONTACT   Contact Name Legal Guardian? Relationship to Patient Home Phone Work Phone   1. Stewart Rivas ESTEFANIA  2. Annemarie, No  No Child  Other [phone]              GUARANTOR            Guarantor: Gaurav Damaso     : 1945   Address: [address] Sex: Female     [place] [address]     Relation to Patient: Self       Home Phone: [phone]   Guarantor ID: [id number]       Work Phone:     Guarantor Employer: [employer]         Status: RETIRED      COVERAGE        PRIMARY INSURANCE   Payor: HU[employer] MEDICARE Plan: HU[employer]NA GOLD PLUS HMO   Payor Address: [facility] [id number]       Group Number: [id number] Insurance Type: [address] Name: Summer Mckenzie : 1945   Subscriber ID: [id number] Pat. Rel. to Sub: Self   SECONDARY INSURANCE   Payor: Reginaldo Boyce* Plan: Elyn Dandy M*   Payor Address: [facility] [address], [address] [facility]          [id number] Type: [place]DEMNITY   Subscriber Name: Summer Mckenzie : 1945   Subscriber ID: [id number] Pat.  Rel. to Sub: SELF           CSN: [id number]

## 2022-07-30 ENCOUNTER — APPOINTMENT (OUTPATIENT)
Dept: ULTRASOUND IMAGING | Age: 77
DRG: 442 | End: 2022-07-30
Payer: MEDICARE

## 2022-07-30 PROBLEM — E83.52 HYPERCALCEMIA: Status: ACTIVE | Noted: 2022-07-30

## 2022-07-30 PROBLEM — E87.1 HYPONATREMIA: Status: ACTIVE | Noted: 2022-07-30

## 2022-07-30 LAB
ACETAMINOPHEN LEVEL: <5 MCG/ML (ref 10–30)
ALBUMIN SERPL-MCNC: 3.3 G/DL (ref 3.5–5.2)
ALP BLD-CCNC: 122 U/L (ref 35–104)
ALT SERPL-CCNC: 17 U/L (ref 0–32)
AMMONIA: 70 UMOL/L (ref 11–51)
AMPHETAMINE SCREEN, URINE: NOT DETECTED
ANION GAP SERPL CALCULATED.3IONS-SCNC: 13 MMOL/L (ref 7–16)
ANION GAP SERPL CALCULATED.3IONS-SCNC: 14 MMOL/L (ref 7–16)
APTT: 33.6 SEC (ref 24.5–35.1)
AST SERPL-CCNC: 40 U/L (ref 0–31)
B.E.: 1.1 MMOL/L (ref -3–3)
BARBITURATE SCREEN URINE: NOT DETECTED
BASOPHILS ABSOLUTE: 0.09 E9/L (ref 0–0.2)
BASOPHILS RELATIVE PERCENT: 1.1 % (ref 0–2)
BENZODIAZEPINE SCREEN, URINE: NOT DETECTED
BILIRUB SERPL-MCNC: 2.3 MG/DL (ref 0–1.2)
BUN BLDV-MCNC: 28 MG/DL (ref 6–23)
BUN BLDV-MCNC: 30 MG/DL (ref 6–23)
CALCIUM IONIZED: 1.54 MMOL/L (ref 1.15–1.33)
CALCIUM IONIZED: 1.54 MMOL/L (ref 1.15–1.33)
CALCIUM SERPL-MCNC: 12.2 MG/DL (ref 8.6–10.2)
CALCIUM SERPL-MCNC: 12.4 MG/DL (ref 8.6–10.2)
CANNABINOID SCREEN URINE: NOT DETECTED
CHLORIDE BLD-SCNC: 100 MMOL/L (ref 98–107)
CHLORIDE BLD-SCNC: 98 MMOL/L (ref 98–107)
CHLORIDE URINE RANDOM: 25 MMOL/L
CHLORIDE URINE RANDOM: 25 MMOL/L
CO2: 21 MMOL/L (ref 22–29)
CO2: 23 MMOL/L (ref 22–29)
COCAINE METABOLITE SCREEN URINE: NOT DETECTED
COHB: 1.1 % (ref 0–1.5)
CREAT SERPL-MCNC: 1.4 MG/DL (ref 0.5–1)
CREAT SERPL-MCNC: 1.5 MG/DL (ref 0.5–1)
CREATININE URINE: 66 MG/DL (ref 29–226)
CRITICAL: ABNORMAL
DATE ANALYZED: ABNORMAL
DATE OF COLLECTION: ABNORMAL
EOSINOPHILS ABSOLUTE: 0.18 E9/L (ref 0.05–0.5)
EOSINOPHILS RELATIVE PERCENT: 2.2 % (ref 0–6)
ETHANOL: <10 MG/DL (ref 0–0.08)
FENTANYL SCREEN, URINE: NOT DETECTED
FIBRINOGEN: 179 MG/DL (ref 200–400)
GFR AFRICAN AMERICAN: 41
GFR AFRICAN AMERICAN: 44
GFR NON-AFRICAN AMERICAN: 34 ML/MIN/1.73
GFR NON-AFRICAN AMERICAN: 36 ML/MIN/1.73
GLUCOSE BLD-MCNC: 77 MG/DL (ref 74–99)
GLUCOSE BLD-MCNC: 82 MG/DL (ref 74–99)
HCO3: 24.7 MMOL/L (ref 22–26)
HCT VFR BLD CALC: 35.6 % (ref 34–48)
HEMOGLOBIN: 12.3 G/DL (ref 11.5–15.5)
HHB: 4.6 % (ref 0–5)
IMMATURE GRANULOCYTES #: 0.02 E9/L
IMMATURE GRANULOCYTES %: 0.2 % (ref 0–5)
INR BLD: 2.3
LAB: ABNORMAL
LYMPHOCYTES ABSOLUTE: 1.29 E9/L (ref 1.5–4)
LYMPHOCYTES RELATIVE PERCENT: 15.7 % (ref 20–42)
Lab: ABNORMAL
Lab: NORMAL
MAGNESIUM: 2.5 MG/DL (ref 1.6–2.6)
MCH RBC QN AUTO: 32.9 PG (ref 26–35)
MCHC RBC AUTO-ENTMCNC: 34.6 % (ref 32–34.5)
MCV RBC AUTO: 95.2 FL (ref 80–99.9)
METER GLUCOSE: 108 MG/DL (ref 74–99)
METHADONE SCREEN, URINE: NOT DETECTED
METHB: 0.2 % (ref 0–1.5)
MODE: ABNORMAL
MONOCYTES ABSOLUTE: 1.1 E9/L (ref 0.1–0.95)
MONOCYTES RELATIVE PERCENT: 13.4 % (ref 2–12)
NEUTROPHILS ABSOLUTE: 5.52 E9/L (ref 1.8–7.3)
NEUTROPHILS RELATIVE PERCENT: 67.4 % (ref 43–80)
O2 CONTENT: 17.1 ML/DL
O2 SATURATION: 95.3 % (ref 92–98.5)
O2HB: 94.1 % (ref 94–97)
OPERATOR ID: 467
OPIATE SCREEN URINE: NOT DETECTED
OSMOLALITY URINE: 393 MOSM/KG (ref 300–900)
OSMOLALITY: 293 MOSM/KG (ref 285–310)
OXYCODONE URINE: NOT DETECTED
PARATHYROID HORMONE INTACT: 111 PG/ML (ref 15–65)
PATIENT TEMP: 37 C
PCO2: 36 MMHG (ref 35–45)
PDW BLD-RTO: 18.8 FL (ref 11.5–15)
PH BLOOD GAS: 7.46 (ref 7.35–7.45)
PHENCYCLIDINE SCREEN URINE: NOT DETECTED
PHOSPHORUS: 2.7 MG/DL (ref 2.5–4.5)
PLATELET # BLD: 220 E9/L (ref 130–450)
PMV BLD AUTO: 10.6 FL (ref 7–12)
PO2: 75.8 MMHG (ref 75–100)
POTASSIUM SERPL-SCNC: 4.7 MMOL/L (ref 3.5–5)
POTASSIUM SERPL-SCNC: 4.7 MMOL/L (ref 3.5–5)
POTASSIUM, UR: 23.8 MMOL/L
PROTEIN PROTEIN: 10 MG/DL (ref 0–12)
PROTEIN/CREAT RATIO: 0.2
PROTEIN/CREAT RATIO: 0.2 (ref 0–0.2)
PROTHROMBIN TIME: 24.5 SEC (ref 9.3–12.4)
RBC # BLD: 3.74 E12/L (ref 3.5–5.5)
SALICYLATE, SERUM: <0.3 MG/DL (ref 0–30)
SODIUM BLD-SCNC: 134 MMOL/L (ref 132–146)
SODIUM BLD-SCNC: 135 MMOL/L (ref 132–146)
SODIUM URINE: 52 MMOL/L
SODIUM URINE: 52 MMOL/L
SOURCE, BLOOD GAS: ABNORMAL
THB: 12.9 G/DL (ref 11.5–16.5)
TIME ANALYZED: 716
TOTAL CK: 46 U/L (ref 20–180)
TOTAL PROTEIN: 8 G/DL (ref 6.4–8.3)
TRICYCLIC ANTIDEPRESSANTS SCREEN SERUM: NEGATIVE NG/ML
TSH SERPL DL<=0.05 MIU/L-ACNC: 4.14 UIU/ML (ref 0.27–4.2)
UREA NITROGEN, UR: 555 MG/DL (ref 800–1666)
VITAMIN D 25-HYDROXY: 29 NG/ML (ref 30–100)
WBC # BLD: 8.2 E9/L (ref 4.5–11.5)

## 2022-07-30 PROCEDURE — 83970 ASSAY OF PARATHORMONE: CPT

## 2022-07-30 PROCEDURE — 82330 ASSAY OF CALCIUM: CPT

## 2022-07-30 PROCEDURE — G0378 HOSPITAL OBSERVATION PER HR: HCPCS

## 2022-07-30 PROCEDURE — 83930 ASSAY OF BLOOD OSMOLALITY: CPT

## 2022-07-30 PROCEDURE — 76700 US EXAM ABDOM COMPLETE: CPT

## 2022-07-30 PROCEDURE — 82805 BLOOD GASES W/O2 SATURATION: CPT

## 2022-07-30 PROCEDURE — 82077 ASSAY SPEC XCP UR&BREATH IA: CPT

## 2022-07-30 PROCEDURE — 2580000003 HC RX 258

## 2022-07-30 PROCEDURE — 80048 BASIC METABOLIC PNL TOTAL CA: CPT

## 2022-07-30 PROCEDURE — 85025 COMPLETE CBC W/AUTO DIFF WBC: CPT

## 2022-07-30 PROCEDURE — 84300 ASSAY OF URINE SODIUM: CPT

## 2022-07-30 PROCEDURE — 83735 ASSAY OF MAGNESIUM: CPT

## 2022-07-30 PROCEDURE — 84540 ASSAY OF URINE/UREA-N: CPT

## 2022-07-30 PROCEDURE — 2580000003 HC RX 258: Performed by: INTERNAL MEDICINE

## 2022-07-30 PROCEDURE — 80053 COMPREHEN METABOLIC PANEL: CPT

## 2022-07-30 PROCEDURE — 96360 HYDRATION IV INFUSION INIT: CPT

## 2022-07-30 PROCEDURE — 85610 PROTHROMBIN TIME: CPT

## 2022-07-30 PROCEDURE — 82550 ASSAY OF CK (CPK): CPT

## 2022-07-30 PROCEDURE — 80179 DRUG ASSAY SALICYLATE: CPT

## 2022-07-30 PROCEDURE — 84590 ASSAY OF VITAMIN A: CPT

## 2022-07-30 PROCEDURE — 85384 FIBRINOGEN ACTIVITY: CPT

## 2022-07-30 PROCEDURE — 82570 ASSAY OF URINE CREATININE: CPT

## 2022-07-30 PROCEDURE — 82140 ASSAY OF AMMONIA: CPT

## 2022-07-30 PROCEDURE — 82962 GLUCOSE BLOOD TEST: CPT

## 2022-07-30 PROCEDURE — 99219 PR INITIAL OBSERVATION CARE/DAY 50 MINUTES: CPT | Performed by: FAMILY MEDICINE

## 2022-07-30 PROCEDURE — 6360000002 HC RX W HCPCS

## 2022-07-30 PROCEDURE — 82306 VITAMIN D 25 HYDROXY: CPT

## 2022-07-30 PROCEDURE — 6370000000 HC RX 637 (ALT 250 FOR IP)

## 2022-07-30 PROCEDURE — 82436 ASSAY OF URINE CHLORIDE: CPT

## 2022-07-30 PROCEDURE — 84443 ASSAY THYROID STIM HORMONE: CPT

## 2022-07-30 PROCEDURE — 36415 COLL VENOUS BLD VENIPUNCTURE: CPT

## 2022-07-30 PROCEDURE — 83935 ASSAY OF URINE OSMOLALITY: CPT

## 2022-07-30 PROCEDURE — 84156 ASSAY OF PROTEIN URINE: CPT

## 2022-07-30 PROCEDURE — 76770 US EXAM ABDO BACK WALL COMP: CPT

## 2022-07-30 PROCEDURE — 94640 AIRWAY INHALATION TREATMENT: CPT

## 2022-07-30 PROCEDURE — 84100 ASSAY OF PHOSPHORUS: CPT

## 2022-07-30 PROCEDURE — 96361 HYDRATE IV INFUSION ADD-ON: CPT

## 2022-07-30 PROCEDURE — 84133 ASSAY OF URINE POTASSIUM: CPT

## 2022-07-30 PROCEDURE — 80307 DRUG TEST PRSMV CHEM ANLYZR: CPT

## 2022-07-30 PROCEDURE — 94664 DEMO&/EVAL PT USE INHALER: CPT

## 2022-07-30 PROCEDURE — 6370000000 HC RX 637 (ALT 250 FOR IP): Performed by: INTERNAL MEDICINE

## 2022-07-30 PROCEDURE — 80143 DRUG ASSAY ACETAMINOPHEN: CPT

## 2022-07-30 PROCEDURE — 85730 THROMBOPLASTIN TIME PARTIAL: CPT

## 2022-07-30 RX ORDER — ARFORMOTEROL TARTRATE 15 UG/2ML
15 SOLUTION RESPIRATORY (INHALATION) 2 TIMES DAILY
Status: DISCONTINUED | OUTPATIENT
Start: 2022-07-30 | End: 2022-08-04 | Stop reason: HOSPADM

## 2022-07-30 RX ORDER — DEXTROSE MONOHYDRATE 100 MG/ML
INJECTION, SOLUTION INTRAVENOUS CONTINUOUS PRN
Status: DISCONTINUED | OUTPATIENT
Start: 2022-07-30 | End: 2022-08-02 | Stop reason: SDUPTHER

## 2022-07-30 RX ORDER — SODIUM CHLORIDE 0.9 % (FLUSH) 0.9 %
5-40 SYRINGE (ML) INJECTION EVERY 12 HOURS SCHEDULED
Status: DISCONTINUED | OUTPATIENT
Start: 2022-07-30 | End: 2022-08-04 | Stop reason: HOSPADM

## 2022-07-30 RX ORDER — LACTULOSE 10 G/15ML
10 SOLUTION ORAL 3 TIMES DAILY
Status: DISCONTINUED | OUTPATIENT
Start: 2022-07-30 | End: 2022-08-01

## 2022-07-30 RX ORDER — POTASSIUM CHLORIDE 20 MEQ/1
40 TABLET, EXTENDED RELEASE ORAL ONCE
Status: COMPLETED | OUTPATIENT
Start: 2022-07-30 | End: 2022-07-30

## 2022-07-30 RX ORDER — PANTOPRAZOLE SODIUM 40 MG/1
40 TABLET, DELAYED RELEASE ORAL
Status: DISCONTINUED | OUTPATIENT
Start: 2022-07-30 | End: 2022-08-04 | Stop reason: HOSPADM

## 2022-07-30 RX ORDER — BUDESONIDE 0.5 MG/2ML
500 INHALANT ORAL 2 TIMES DAILY
Status: DISCONTINUED | OUTPATIENT
Start: 2022-07-30 | End: 2022-08-04 | Stop reason: HOSPADM

## 2022-07-30 RX ORDER — VITAMIN B COMPLEX
1000 TABLET ORAL DAILY
Status: DISCONTINUED | OUTPATIENT
Start: 2022-07-30 | End: 2022-08-04 | Stop reason: HOSPADM

## 2022-07-30 RX ORDER — SENNA PLUS 8.6 MG/1
1 TABLET ORAL DAILY PRN
Status: DISCONTINUED | OUTPATIENT
Start: 2022-07-30 | End: 2022-08-04 | Stop reason: HOSPADM

## 2022-07-30 RX ORDER — DOXAZOSIN MESYLATE 1 MG/1
1 TABLET ORAL NIGHTLY
Status: DISCONTINUED | OUTPATIENT
Start: 2022-07-30 | End: 2022-08-04 | Stop reason: HOSPADM

## 2022-07-30 RX ORDER — SODIUM CHLORIDE 9 MG/ML
INJECTION, SOLUTION INTRAVENOUS PRN
Status: DISCONTINUED | OUTPATIENT
Start: 2022-07-30 | End: 2022-08-04 | Stop reason: HOSPADM

## 2022-07-30 RX ORDER — SPIRONOLACTONE 25 MG/1
100 TABLET ORAL DAILY
Status: DISCONTINUED | OUTPATIENT
Start: 2022-07-30 | End: 2022-08-01 | Stop reason: SDUPTHER

## 2022-07-30 RX ORDER — SODIUM CHLORIDE 0.9 % (FLUSH) 0.9 %
5-40 SYRINGE (ML) INJECTION PRN
Status: DISCONTINUED | OUTPATIENT
Start: 2022-07-30 | End: 2022-08-04 | Stop reason: HOSPADM

## 2022-07-30 RX ORDER — ALBUTEROL SULFATE 2.5 MG/3ML
2.5 SOLUTION RESPIRATORY (INHALATION) 4 TIMES DAILY PRN
Status: DISCONTINUED | OUTPATIENT
Start: 2022-07-30 | End: 2022-08-04 | Stop reason: HOSPADM

## 2022-07-30 RX ORDER — FUROSEMIDE 20 MG/1
40 TABLET ORAL 2 TIMES DAILY
Status: DISCONTINUED | OUTPATIENT
Start: 2022-07-30 | End: 2022-08-04 | Stop reason: HOSPADM

## 2022-07-30 RX ORDER — SODIUM CHLORIDE 9 MG/ML
INJECTION, SOLUTION INTRAVENOUS CONTINUOUS
Status: DISCONTINUED | OUTPATIENT
Start: 2022-07-30 | End: 2022-08-04 | Stop reason: HOSPADM

## 2022-07-30 RX ORDER — POLYETHYLENE GLYCOL 3350 17 G/17G
17 POWDER, FOR SOLUTION ORAL DAILY PRN
Status: DISCONTINUED | OUTPATIENT
Start: 2022-07-30 | End: 2022-08-04 | Stop reason: HOSPADM

## 2022-07-30 RX ORDER — ACETAMINOPHEN 325 MG/1
650 TABLET ORAL EVERY 6 HOURS PRN
Status: DISCONTINUED | OUTPATIENT
Start: 2022-07-30 | End: 2022-08-04 | Stop reason: HOSPADM

## 2022-07-30 RX ORDER — VENLAFAXINE HYDROCHLORIDE 75 MG/1
75 CAPSULE, EXTENDED RELEASE ORAL DAILY
Status: DISCONTINUED | OUTPATIENT
Start: 2022-07-30 | End: 2022-08-04 | Stop reason: HOSPADM

## 2022-07-30 RX ORDER — ACETAMINOPHEN 650 MG/1
650 SUPPOSITORY RECTAL EVERY 6 HOURS PRN
Status: DISCONTINUED | OUTPATIENT
Start: 2022-07-30 | End: 2022-08-04 | Stop reason: HOSPADM

## 2022-07-30 RX ORDER — HYDRALAZINE HYDROCHLORIDE 20 MG/ML
10 INJECTION INTRAMUSCULAR; INTRAVENOUS EVERY 8 HOURS PRN
Status: DISCONTINUED | OUTPATIENT
Start: 2022-07-30 | End: 2022-08-04 | Stop reason: HOSPADM

## 2022-07-30 RX ORDER — AMLODIPINE BESYLATE 2.5 MG/1
2.5 TABLET ORAL DAILY
Status: DISCONTINUED | OUTPATIENT
Start: 2022-07-30 | End: 2022-08-04 | Stop reason: HOSPADM

## 2022-07-30 RX ADMIN — LACTULOSE 10 G: 20 SOLUTION ORAL at 10:40

## 2022-07-30 RX ADMIN — SODIUM CHLORIDE, PRESERVATIVE FREE 10 ML: 5 INJECTION INTRAVENOUS at 10:40

## 2022-07-30 RX ADMIN — ARFORMOTEROL TARTRATE 15 MCG: 15 SOLUTION RESPIRATORY (INHALATION) at 09:19

## 2022-07-30 RX ADMIN — LACTULOSE 10 G: 20 SOLUTION ORAL at 13:28

## 2022-07-30 RX ADMIN — DOXAZOSIN 1 MG: 1 TABLET ORAL at 23:21

## 2022-07-30 RX ADMIN — RIFAXIMIN 550 MG: 550 TABLET ORAL at 10:47

## 2022-07-30 RX ADMIN — PANTOPRAZOLE SODIUM 40 MG: 40 TABLET, DELAYED RELEASE ORAL at 10:47

## 2022-07-30 RX ADMIN — BUDESONIDE 500 MCG: 0.5 SUSPENSION RESPIRATORY (INHALATION) at 20:11

## 2022-07-30 RX ADMIN — AMLODIPINE BESYLATE 2.5 MG: 2.5 TABLET ORAL at 11:15

## 2022-07-30 RX ADMIN — RIFAXIMIN 550 MG: 550 TABLET ORAL at 23:21

## 2022-07-30 RX ADMIN — SODIUM CHLORIDE: 9 INJECTION, SOLUTION INTRAVENOUS at 18:03

## 2022-07-30 RX ADMIN — ARFORMOTEROL TARTRATE 15 MCG: 15 SOLUTION RESPIRATORY (INHALATION) at 20:11

## 2022-07-30 RX ADMIN — POTASSIUM CHLORIDE 40 MEQ: 1500 TABLET, EXTENDED RELEASE ORAL at 22:23

## 2022-07-30 RX ADMIN — BUDESONIDE 500 MCG: 0.5 SUSPENSION RESPIRATORY (INHALATION) at 09:19

## 2022-07-30 RX ADMIN — LACTULOSE 10 G: 20 SOLUTION ORAL at 22:23

## 2022-07-30 RX ADMIN — VENLAFAXINE HYDROCHLORIDE 75 MG: 75 CAPSULE, EXTENDED RELEASE ORAL at 11:15

## 2022-07-30 ASSESSMENT — ENCOUNTER SYMPTOMS
SORE THROAT: 0
DIARRHEA: 0
ABDOMINAL PAIN: 1
ABDOMINAL DISTENTION: 1
RHINORRHEA: 0
VOMITING: 0
NAUSEA: 0
SHORTNESS OF BREATH: 0
CONSTIPATION: 0
BLOOD IN STOOL: 0
WHEEZING: 0
COUGH: 0

## 2022-07-30 NOTE — ED NOTES
Pt wet and stating she went to the bathroom.  New linens changed and pt placed on external catheter      Jessica Meeks RN  07/30/22 2609

## 2022-07-30 NOTE — ED NOTES
Report received from [facility], [address] [place] [address]  07/29/22 8449

## 2022-07-30 NOTE — H&P
[facility]  [facility] Res[facility] Program  History and Physical    Patient: Lily Nathan 68 y.o. female MRN: [id number]     Date of Service: 7/30/2022    Hospital Day: 2    History of Present Illness   The patient is a 68 y.o. 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 Patient had a fall at her nursing home ([facility]) with positive LOC and she did hit her head. Has a laceration in her right scalp as well as blood in her hair and an abrasion in her right elbow. She is taking Eliquis due to a history of unprovoked PE. Reports headaches due to the fall and chronic abdominal pain due to the liver cirrhosis. Denies alcohol, tobacco and any other drugs. Per patient, she wants to remain full code.     ED:  Patient is hemodynamically stable in room air  Labs: Na 131, K 5.3, BUN 31, Cr 1.7, Ca 11.7, Glucose 70s, Albumin 3.1, , AST 45, Ammonia 163, total bilirubin 2.2, direct bilirubin 0.5, indirect bilirubin 1.7 and troponin 12  UA positive for true hematuria and urobilinogen  EKG shows NSR with no acute ischemic changes  CXR negative for an acute process  Xray pelvis negative for fracture or dislocation  Xray right elbow negative for fracture or dislocation  CT head negative for an acute intracranial abnormality  CT cervical spine negative for an acute process  CT thoracic spine positive for left pleural effusion and negative for an acute process  CT lumbar spine negative for an acute process  Diphtheria-Tetanus toxoid vaccine and Lactulose 45 g x1 given in the ED    Patient admitted for hyperammonemia, REN and electrolytes imbalance      Medications   rifAXIMin (XIFAXAN) tablet 200 mg (200 mg Oral Given 7/29/22 2112)   dextrose 50 % IV solution (25 g IntraVENous Not Given 7/29/22 2114)   lactulose (CHRONULAC) 10 GM/15ML solution 10 g (has no administration in time range)   diptheria-tetanus toxoids [facility]) 2-2 LF/0.5ML injection 0.5 mL (0.5 mLs IntraMUSCular Given 7/29/22 1710)   lactulose (CHRONULAC) 10 GM/15ML solution 45 g (45 g Oral Given 7/29/22 2056)         Past Medical History:      Diagnosis Date    Breast cancer ([address]     Cirrhosis ([address]     Essential hypertension     Hx of blood clots     Hyperlipidemia     Osteopenia     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 ([address]     Sleep apnea     Spinal stenosis     Type 2 diabetes mellitus ([address]        Past Surgical History:        Procedure Laterality Date    BLADDER SURGERY Right 11/26/2021    CYSTOSCOPY RETROGRADE PYELOGRAM RIGHT  STENT INSERTION performed by Amanda Whitney MD at [address]      lumpectomy [address] TEST      Lexiscan stress test    CARPAL TUNNEL RELEASE      COLONOSCOPY  01/31/2017    multiple polyps; diverticula--jerod    COLONOSCOPY  04/23/2019    polyps; diverticula; hemorrhoids--jerod    COLONOSCOPY N/A 04/23/2019    COLONOSCOPY POLYPECTOMY SNARE/COLD BIOPSY performed by Matthew Hamilton MD at [address] [facility]  04/23/2019    COLONOSCOPY WITH BIOPSY performed by Matthew Hamilton MD at [address] [facility] ([facility])      LITHOTRIPSY Left 05/04/2016    C-R STENT PLACEMENT    SHOULDER ARTHROPLASTY Left 12/21/2020    LEFT REVERSE TOTAL SHOULDER  ARTHROPLASTY -- DEPUY performed by Fatimah Cardenas MD at [address] [facility]  04/23/2019    gastritis--jerod    UPPER GASTROINTESTINAL ENDOSCOPY N/A 04/23/2019    EGD BIOPSY performed by Matthew Hamilton MD at [facility]       Medications Prior to Admission:    Prior to Admission medications    Medication Sig Start Date End Date Taking? Authorizing Provider   ferrous sulfate (IRON 325) 325 (65 Fe) MG tablet Take 325 mg by mouth in the morning.    Yes Historical Provider, MD   apixaban (ELIQUIS) 5 MG TABS tablet Take 1 tablet by mouth in the morning and 1 tablet before bedtime. 7/15/22   Sami Fields MD   lidocaine 4 % external patch Place 1 patch onto the skin daily as needed (Left shoulder pain) 7/15/22   Sami Fields MD   furosemide (LASIX) 40 MG tablet Take 1 tablet by mouth in the morning and 1 tablet before bedtime.  7/15/22   Sami Fields MD   fluticasone-salmeterol (ADVAIR) 250-50 MCG/ACT AEPB diskus inhaler INHALE 1 PUFF BY MOUTH EVERY TWELVE HOURS (BULK) 7/14/22   Mukul Granados MD   venlafaxine (EFFEXOR XR) 75 MG extended release capsule Take 1 capsule by mouth daily 7/6/22   Mukul Granados MD   felodipine (PLENDIL) 2.5 MG extended release tablet TAKE 1 TABLET BY MOUTH DAILY 7/1/22 7/15/22  Mukul Granados MD   lactulose (CHRONULAC) 10 GM/15ML solution TAKE 45 MLS BY MOUTH THREE TIMES DAILY 6/29/22   Sorin Mccallum, APRN - CNP   XIFAXAN 550 MG tablet TAKE ONE TABLET BY MOUTH TWICE DAILY @ 9AM & 9PM 6/13/22   Mukul Granados MD   spironolactone (ALDACTONE) 100 MG tablet TAKE ONE TABLET BY MOUTH DAILY AT 9AM 6/13/22   Mukul Granados MD   amLODIPine (NORVASC) 2.5 MG tablet TAKE ONE TABLET BY MOUTH DAILY AT 9AM 6/13/22   Mukul Granados MD   ondansetron (ZOFRAN) 4 MG tablet TAKE 1 TABLET BY MOUTH THREE TIMES DAILY AS NEEDED FOR NAUSEA OR VOMITING 5/31/22   Mukul Granados MD   doxazosin (CARDURA) 1 MG tablet Take 1 mg by mouth nightly    Historical Provider, MD   pantoprazole (PROTONIX) 40 MG tablet Take 40 mg by mouth daily    Historical Provider, MD   albuterol sulfate HFA (VENTOLIN HFA) 108 (90 Base) MCG/ACT inhaler Inhale 2 puffs into the lungs 4 times daily as needed for Wheezing 1/20/22   Mukul Granados MD   Biotin 89000 MCG TABS Take 1,000 mcg by mouth every 7 days Given Monday    Historical Provider, MD   vitamin D3 (CHOLECALCIFEROL) 25 MCG (1000 UT) TABS tablet Take 1,000 Units by mouth daily    Historical Provider, MD       Allergies:  Lisinopril    Social History:   TOBACCO:   reports that she has never smoked. She has never used smokeless tobacco.  ETOH:   reports no history of alcohol use. Family History:       Problem Relation Age of Onset    Arthritis Mother     COPD Father     Heart Attack Father     Cirrhosis Brother         non alcoholic    Heart Disease Brother     Cancer Other 48        colon    Prostate Cancer Child     Hypertension Child     Hypertension Child        REVIEW OF SYSTEMS:    Review of Systems   Constitutional:  Negative for chills, fatigue and fever. HENT:  Negative for congestion, rhinorrhea, sneezing and sore throat. Eyes:  Negative for visual disturbance. Respiratory:  Negative for cough, shortness of breath and wheezing. Cardiovascular:  Negative for chest pain, palpitations and leg swelling. Gastrointestinal:  Positive for abdominal distention and abdominal pain. Negative for blood in stool, constipation, diarrhea, nausea and vomiting. Genitourinary:  Negative for difficulty urinating, dysuria, frequency and hematuria. Skin:  Negative for rash. Neurological:  Positive for headaches. Negative for dizziness, weakness and numbness. Psychiatric/Behavioral:  Negative for sleep disturbance. Physical Exam   Vitals: BP (!) 109/51   Pulse 92   Temp 97.1 °F (36.2 °C) (Axillary)   Resp 17   Ht 5' 2.5\" (1.588 m)   Wt 192 lb (87.1 kg)   SpO2 97%   BMI 34.56 kg/m²     Physical Exam  Vitals reviewed. Constitutional:       General: She is not in acute distress. Appearance: She is obese. HENT:      Head:      Comments: Small right scalp laceration  Blood in hair on right scalp     Nose: Nose normal. No congestion or rhinorrhea. Mouth/Throat:      Mouth: Mucous membranes are moist.      Pharynx: Oropharynx is clear. No oropharyngeal exudate or posterior oropharyngeal erythema.    Eyes:      Pupils: Pupils are equal, round, and reactive to light. Cardiovascular:      Rate and Rhythm: Normal rate and regular rhythm. Pulses: Normal pulses. Heart sounds: Normal heart sounds. Pulmonary:      Effort: Pulmonary effort is normal. No respiratory distress. Breath sounds: Normal breath sounds. Abdominal:      General: Bowel sounds are normal. There is distension (mild). Palpations: Abdomen is soft. Tenderness: There is abdominal tenderness (mild and diffuse). There is no guarding or rebound. Musculoskeletal:      Cervical back: Normal range of motion and neck supple. Right lower leg: Edema (trace) present. Left lower leg: Edema (trace) present. Skin:     General: Skin is warm and dry. Capillary Refill: Capillary refill takes less than 2 seconds. Findings: No rash. Comments: Right elbow abrasion   Neurological:      General: No focal deficit present. Mental Status: She is alert and oriented to person, place, and time. Sensory: No sensory deficit. Motor: No weakness. Deep Tendon Reflexes: Reflexes normal.   Psychiatric:         Thought Content: Thought content normal.         Judgment: Judgment normal.       Labs and Imaging Studies   Basic Labs  CBC:   Recent Labs     07/29/22  1559   WBC 7.3   RBC 3.80   HGB 12.2   HCT 36.1   MCV 95.0   MCH 32.1   MCHC 33.8   RDW 18.6*      MPV 10.5       BMP:    Recent Labs     07/29/22  1559   *   K 5.3*   CL 98   CO2 20*   BUN 31*   CREATININE 1.7*   GLUCOSE 73*   CALCIUM 11.7*   PROT 7.8   LABALBU 3.1*   BILITOT 2.2*   ALKPHOS 116*   AST 45*   ALT 16       LIVER PROFILE:   Recent Labs     07/29/22  1559   AST 45*   ALT 16   BILIDIR 0.5*   BILITOT 2.2*   ALKPHOS 116*       PT/INR:   No results for input(s): PROTIME, INR in the last 72 hours. APTT:   No results for input(s): APTT in the last 72 hours.     Fasting Lipid Panel:    Lab Results   Component Value Date/Time    CHOL 94 02/27/2021 10:45 AM    TRIG 65 02/27/2021 10:45 AM    HDL 37 02/27/2021 10:45 AM       Cardiac Enzymes:    Lab Results   Component Value Date    WTGIHNR 528 (H) 08/20/2021    CKTOTAL 397 (H) 08/19/2021    CKTOTAL 775 (H) 08/18/2021    TROPONINI <0.01 02/04/2021    TROPONINI <0.01 02/03/2021    TROPONINI <0.01 02/03/2021       Imaging Studies:     Echo Complete    Result Date: 7/14/2022  Transthoracic Echocardiography Report (TTE)  Demographics   Patient Name         Nayana Bautista Gender                Female   Medical Record       [id number]       Room Number           5382  Number   Account #            [de-identified]      Procedure Date        07/14/2022   Corporate ID                        Ordering Physician    Mary Lewis   Accession Number     [id number]     Referring Physician   Date of Birth        1945     Sonographer           Yasir Lizandro   Age                  68 year(s)     Interpreting          Ciara Langley MD                                      Physician                                       Any Other  Procedure Type of Study   TTE procedure:Echo Complete W/Doppler & Color Flow. Procedure Date Date: 07/14/2022 Start: 02:46 PM Study Location: Portable Technical Quality: Limited visualization due to lung interference. Indications:Heart murmur and Pleural effusion. Patient Status: Routine Height: 62 inches Weight: 191 pounds BSA: 1.87 m^2 BMI: 34.93 kg/m^2 BP: 112/60 mmHg  Findings   Left Ventricle  Left ventricle size is normal.  Normal left ventricular systolic function. Ejection fraction is visually estimated at > 55%. There is doppler evidence of stage I diastolic dysfunction. Right Ventricle  Normal right ventricular size and function (TAPSE 2.8 cm). Left Atrium  Mildly dilated left atrium by volume index. Right Atrium  Normal sized right atrium. Mitral Valve  Physiologic and/or trace mitral regurgitation. No evidence of hemodynamically significant mitral stenosis. Tricuspid Valve  Mild tricuspid regurgitation. PASP is estimated at 33 mmHg. Aortic Valve  No evidence of hemodynamically significant aortic regurgitation or  stenosis. Pulmonic Valve  The pulmonic valve was not well visualized. Pericardial Effusion  Small pericardial effusion. Aorta  Aortic root dimension within normal limits. The inferior vena cava is normal in size with normal respiratory  variation. Conclusions   Summary  Normal left ventricular systolic function. Ejection fraction is visually estimated at > 55%. Normal right ventricular size and function (TAPSE 2.8 cm). There is doppler evidence of stage I diastolic dysfunction. Mild tricuspid regurgitation.    Signature   ----------------------------------------------------------------  Electronically signed by Marva Bedoya MD(Interpreting  physician) on 2022 05:43 PM  ----------------------------------------------------------------  M-Mode/2D Measurements & Calculations   LV Diastolic         LV Systolic         AV Cusp Separation: 1.7 cmAO Root  Dimension: 3.9 cm    Dimension: 2.8 cm   Dimension: 3 cm  LV K.6 %  LV PW Diastolic: 0.7  cm                   LV Mass Index: 61  Septum Diastolic:    l/min*m^2           RV Diastolic Dimension: 3.2 cm  1.2 cm   LV Mass: 113.58 g    LVOT: 2 cm          LA volume/Index: 67.3 ml                                           /35.79ml/m^2                                           RA Area: 17.3 cm^2                                            IVC Expiration: 1.6 cm  Doppler Measurements & Calculations   MV Peak E-Wave: 0.76 AV Peak Velocity:     LVOT Peak Velocity: 1.09 m/s  m/s                  1.75 m/s              LVOT Mean Velocity: 0.67 m/s  MV Peak A-Wave: 1.08 AV Peak Gradient:     LVOT Peak Gradient: 4.7  m/s                  12.28 mmHg            mmHgLVOT Mean Gradient: 2.2  MV E/A Ratio: 0.7    AV Mean Velocity:     mmHg  MV Peak Gradient:    1.23 m/s              Estimated RVSP: 33.1 mmHg  5.2 mmHg             AV Mean Gradient: 7 Estimated RAP:3 mmHg  MV Mean Gradient:    mmHg  2.2 mmHg             AV VTI: 37 cm  MV Mean Velocity:    AV Area               TR Velocity:2.75 m/s  0.67 m/s             (Continuity):2.13     TR Gradient:30.14 mmHg  MV Deceleration      cm^2  Time: 194.9 msec  MV P1/2t: 58.8 msec  LVOT VTI: 25.1 cm  MVA by PHT:3.74 cm^2  MV Area              Estimated PASP: 33.14  (continuity): 2.5    mmHg  cm^2  MV E' Septal         Pulm. Vein D  Velocity: 0.06 m/s   Velocity:0.37 m/s  MV E' Lateral        Pulm. Vein S  Velocity: 11 m/s     Velocity: 0.6 m/s  [email or web address] [id number]    XR PELVIS (1-2 VIEWS)    Result Date: 7/29/2022  EXAMINATION: ONE XRAY VIEW OF THE PELVIS 7/29/2022 3:35 pm COMPARISON: The previous study performed 08/17/2021. HISTORY: ORDERING SYSTEM PROVIDED HISTORY: fall TECHNOLOGIST PROVIDED HISTORY: Reason for exam:->fall What reading provider will be dictating this exam?->CRC FINDINGS: There is no evidence of fracture or dislocation. Osteo-degenerative changes are again noted involving the visualized lumbar spine. No other significant osseous abnormality is noted. Surgical suture material is again seen in the right lower quadrant. No fracture or dislocation. Osteo-degenerative changes again noted involving the visualized lumbar spine, when compared to the previous study performed 08/17/2021. XR ELBOW RIGHT (2 VIEWS)    Result Date: 7/29/2022  EXAMINATION: TWO XRAY VIEWS OF THE RIGHT ELBOW 7/29/2022 3:35 pm COMPARISON: Correlation is made with right humeral radiographs performed 08/17/2021. HISTORY: ORDERING SYSTEM PROVIDED HISTORY: fall TECHNOLOGIST PROVIDED HISTORY: Reason for exam:->fall What reading provider will be dictating this exam?->CRC FINDINGS: On 1 of the images provided, there is a tiny osseous density noted by the inferior olecranon fossa.   This can be seen on the humeral radiographs and is a chronic finding. No acute fracture or dislocation is noted. No significant osteo-degenerative changes or other osseous abnormalities are noted. No significant surrounding soft tissue abnormality is seen. No acute fracture or dislocation. CT Head WO Contrast    Result Date: 7/29/2022  EXAMINATION: CT OF THE HEAD WITHOUT CONTRAST  7/29/2022 7:54 pm TECHNIQUE: CT of the head was performed without the administration of intravenous contrast. Automated exposure control, iterative reconstruction, and/or weight based adjustment of the mA/kV was utilized to reduce the radiation dose to as low as reasonably achievable. COMPARISON: None. HISTORY: ORDERING SYSTEM PROVIDED HISTORY: fall on thinners TECHNOLOGIST PROVIDED HISTORY: Reason for exam:->fall on thinners Has a \"code stroke\" or \"stroke alert\" been called? ->No Decision Support Exception - unselect if not a suspected or confirmed emergency medical condition->Emergency Medical Condition (MA) What reading provider will be dictating this exam?->CRC FINDINGS: BRAIN/VENTRICLES: There is no acute intracranial hemorrhage, mass effect or midline shift. No abnormal extra-axial fluid collection. The gray-white differentiation is maintained without evidence of an acute infarct. There is no evidence of hydrocephalus. ORBITS: The visualized portion of the orbits demonstrate no acute abnormality. SINUSES: The visualized paranasal sinuses and mastoid air cells demonstrate no acute abnormality. SOFT TISSUES/SKULL:  No acute abnormality of the visualized skull or soft tissues. No acute intracranial abnormality. CT Cervical Spine WO Contrast    Result Date: 7/29/2022  EXAMINATION: CT OF THE CERVICAL SPINE WITHOUT CONTRAST 7/29/2022 7:54 pm TECHNIQUE: CT of the cervical spine was performed without the administration of intravenous contrast. Multiplanar reformatted images are provided for review.  Automated exposure control, iterative reconstruction, and/or weight based adjustment of the mA/kV was utilized to reduce the radiation dose to as low as reasonably achievable. COMPARISON: None. HISTORY: ORDERING SYSTEM PROVIDED HISTORY: fall TECHNOLOGIST PROVIDED HISTORY: Reason for exam:->fall Decision Support Exception - unselect if not a suspected or confirmed emergency medical condition->Emergency Medical Condition (MA) What reading provider will be dictating this exam?->CRC FINDINGS: BONES/ALIGNMENT: There is no acute fracture or traumatic malalignment. DEGENERATIVE CHANGES: Multilevel degenerative changes. SOFT TISSUES: There is no prevertebral soft tissue swelling. No acute abnormality of the cervical spine. Multilevel degenerative changes. CT THORACIC SPINE WO CONTRAST    Result Date: 7/29/2022  EXAMINATION: CT OF THE THORACIC SPINE WITHOUT CONTRAST  7/29/2022 7:54 pm: TECHNIQUE: CT of the thoracic spine was performed without the administration of intravenous contrast. Multiplanar reformatted images are provided for review. Automated exposure control, iterative reconstruction, and/or weight based adjustment of the mA/kV was utilized to reduce the radiation dose to as low as reasonably achievable. COMPARISON: None. HISTORY: ORDERING SYSTEM PROVIDED HISTORY: fall TECHNOLOGIST PROVIDED HISTORY: Reason for exam:->fall What reading provider will be dictating this exam?->CRC FINDINGS: BONES/ALIGNMENT: There is normal alignment of the spine. Stable compression deformities T8 and T9 vertebral bodies. DEGENERATIVE CHANGES: Multilevel degenerative changes. SOFT TISSUES: No paraspinal mass is seen. MISCELLANEOUS: Left pleural effusion. No evidence of acute thoracic spine trauma. Left pleural effusion.      CT Lumbar Spine WO Contrast    Result Date: 7/29/2022  EXAMINATION: CT OF THE LUMBAR SPINE WITHOUT CONTRAST  7/29/2022 TECHNIQUE: CT of the lumbar spine was performed without the administration of intravenous contrast. Multiplanar reformatted images are provided for review. Adjustment of mA and/or kV according to patient size was utilized. Automated exposure control, iterative reconstruction, and/or weight based adjustment of the mA/kV was utilized to reduce the radiation dose to as low as reasonably achievable. COMPARISON: None HISTORY: ORDERING SYSTEM PROVIDED HISTORY: fall, tenderness TECHNOLOGIST PROVIDED HISTORY: Reason for exam:->fall, tenderness Decision Support Exception - unselect if not a suspected or confirmed emergency medical condition->Emergency Medical Condition (MA) What reading provider will be dictating this exam?->CRC FINDINGS: BONES/ALIGNMENT: There is diffuse decreased bone density. There is grade 1 anterolisthesis of L4 on L5, likely degenerative. The vertebral body heights are maintained. No osseous destructive lesion is seen. DEGENERATIVE CHANGES: There is multilevel degenerative disc disease with vacuum phenomenon, most pronounced at L3-L4 and L4-L5. SOFT TISSUES/RETROPERITONEUM: No paraspinal mass is seen. No acute fracture or traumatic malalignment of the lumbar spine. US ABDOMEN COMPLETE    Result Date: 7/12/2022  EXAMINATION: COMPLETE ABDOMINAL ULTRASOUND 7/12/2022 10:45 am COMPARISON: Ultrasound 05/27/2022, CT abdomen and pelvis 05/26/2022 HISTORY: ORDERING SYSTEM PROVIDED HISTORY: rule out ascites TECHNOLOGIST PROVIDED HISTORY: Reason for exam:->rule out ascites What reading provider will be dictating this exam?->CRC FINDINGS: Moderate amount of ascites identified within the abdomen and pelvis     Moderate ascites     XR CHEST PORTABLE    Result Date: 7/29/2022  EXAMINATION: ONE XRAY VIEW OF THE CHEST 7/29/2022 3:34 pm COMPARISON: 07/14/2022 HISTORY: ORDERING SYSTEM PROVIDED HISTORY: fall TECHNOLOGIST PROVIDED HISTORY: Reason for exam:->fall What reading provider will be dictating this exam?->CRC FINDINGS: The lungs are without acute focal process. There is no effusion or pneumothorax.  The cardiomediastinal silhouette is without acute process. The osseous structures are without acute process. No acute process. XR CHEST PORTABLE    Result Date: 7/14/2022  EXAMINATION: ONE XRAY VIEW OF THE CHEST 7/14/2022 8:06 am COMPARISON: 7/13/2022 HISTORY: ORDERING SYSTEM PROVIDED HISTORY: Pleural effusion follow-up TECHNOLOGIST PROVIDED HISTORY: Reason for exam:->Pleural effusion follow-up What reading provider will be dictating this exam?->CRC FINDINGS: Compared to yesterday, there is progressive decrease in the left pleural effusion. Left chest tube in situ. Mild bibasilar pulmonary opacities likely represent atelectasis. Small residual left pleural effusion cannot be excluded. No pneumothorax. The heart is grossly normal in size. The thoracic aorta is tortuous. Status post left shoulder arthroplasty. 1. Progressive decrease in left pleural effusion, with probable small residual effusion. 2. Small bibasilar pulmonary opacities likely representing atelectasis. XR CHEST PORTABLE    Result Date: 7/13/2022  EXAMINATION: ONE XRAY VIEW OF THE CHEST 7/13/2022 8:28 am COMPARISON: Chest radiograph 7/12/2022 HISTORY: ORDERING SYSTEM PROVIDED HISTORY: Pleural effusion TECHNOLOGIST PROVIDED HISTORY: Reason for exam:->Pleural effusion What reading provider will be dictating this exam?->CRC FINDINGS: The evaluation is limited due to low lung volumes. Interval improvement in aeration left upper lung. Residual moderate left pleural effusion identified. Airspace opacities in bilateral lungs. No pneumothorax. The cardiomediastinal silhouette is without acute process. The osseous structures are without acute process. Left pleural effusion is improved. Residual moderate left pleural effusion identified. XR CHEST PORTABLE    Result Date: 7/12/2022  EXAMINATION: ONE XRAY VIEW OF THE CHEST 7/12/2022 4:11 pm COMPARISON: The previous study performed 07/11/2022.  HISTORY: ORDERING SYSTEM PROVIDED HISTORY: Thoracentesis TECHNOLOGIST PROVIDED HISTORY: Reason for exam:->Thoracentesis What reading provider will be dictating this exam?->CRC FINDINGS: There has been interval placement of a left-sided Cm chest catheter. The tip is noted in the left lower hemithorax. No gross pneumothorax is identified. There is now complete opacification of the left hemithorax. The right lung remains clear. The cardiac silhouette and mediastinal cannot be adequately assessed secondary to silhouetting by the left hemithorax opacification. There is again buckling of the trachea to the right, secondary to a prominent aortic knob. Interval placement of a left-sided Cm chest catheter, when compared to the previous study performed 1 day earlier. No gross pneumothorax. Complete opacification of the left hemithorax now noted. XR CHEST PORTABLE    Result Date: 7/11/2022  EXAMINATION: ONE XRAY VIEW OF THE CHEST 7/11/2022 3:30 pm COMPARISON: May 26, 2022 HISTORY: ORDERING SYSTEM PROVIDED HISTORY: shortness of breath, decreased breath sounds on left TECHNOLOGIST PROVIDED HISTORY: Reason for exam:->shortness of breath, decreased breath sounds on left FINDINGS: Confluent opacities in mid and lower left lung field silhouetting left heart border and left hemidiaphragm. No evidence of pneumothorax. Interstitial prominence throughout aerated left upper lung field and throughout right lung. Confluent opacities in mid left lung field and left lung base could indicate large left pleural effusion or pneumonia. CTA PULMONARY W CONTRAST    Result Date: 7/11/2022  EXAMINATION: CTA OF THE CHEST 7/11/2022 3:30 pm TECHNIQUE: CTA of the chest was performed after the administration of intravenous contrast.  Multiplanar reformatted images are provided for review. MIP images are provided for review.  Automated exposure control, iterative reconstruction, and/or weight based adjustment of the mA/kV was utilized to reduce the radiation dose to as low as reasonably achievable. COMPARISON: CT angiography of the chest, 05/26/2022. CONTRAST: 75 cc Isovue-370 was injected intravenously. HISTORY: ORDERING SYSTEM PROVIDED HISTORY: shortness of breath, hypoxia, eval for PE TECHNOLOGIST PROVIDED HISTORY: Reason for exam:->shortness of breath, hypoxia, eval for PE Decision Support Exception - unselect if not a suspected or confirmed emergency medical condition->Emergency Medical Condition (MA) FINDINGS: Pulmonary Arteries: No embolus is seen in the main pulmonary artery, its right or left branches or the left lower lobe branch. Due to motion artifact, the right lower lobe artery, segmental and subsegmental arteries are not evaluated by this study. Mediastinum: The heart is normal in size No abnormal deviation of the interventricular septum or abnormal dilation of the right ventricle to indicate right heart strain. Lungs/pleura: The right lung is clear. There is a large left pleural effusion with compressive atelectasis of the majority of the lung. No pneumothorax. The central airway is clear. Upper Abdomen: The liver is cirrhotic with an irregular morphology. Small hypodense foci in the liver are stable at least as of 10/29/2021 and are nonspecific. The likely represent cysts. A neoplastic or pre malignant etiology cannot be excluded. Prominent varices are seen in the upper abdomen. Moderate volume ascites. Soft Tissues/Bones: No acute bone or soft tissue abnormality. Chronic mild compression fracture deformities are seen in the T4, T8 and T11 vertebral bodies. Status post left shoulder arthroplasty. 1. No central pulmonary embolism is seen. Due to prominent respiratory motion artifact, the peripheral pulmonary arteries are not evaluated on this study. 2. Large left pleural effusion with compressive atelectasis of the majority of the left lung.  3. Liver cirrhosis with prominent varices and moderate ascites in the upper abdomen included in the field of view of achieved. The needle was visualized with ultrasound in real-time in position within the peritoneum. The needle was seen within the peritoneum with ultrasound in real-time in position and ascites fluid. The catheter was removed at the end of the procedure. The patient tolerated the procedure well. There were no complications. The patient was released in stable condition. Time out occurred at 1420 hours. A total of 700 cc of colored fluid was removed. Successful paracentesis. US DUP LOWER EXTREMITIES BILATERAL VENOUS    Result Date: 2022  Patient MRN:  [id number] : 1945 Age: 68 years Gender: Female Order Date:  2022 10:45 AM EXAM: US DUP LOWER EXTREMITIES BILATERAL VENOUS NUMBER OF IMAGES:  39 INDICATION:  r/o DVT r/o DVT What reading provider will be dictating this exam?->[facility] COMPARISON: None Within the visualized vessels, there is no evidence for deep venous thrombosis There is good compressibility, there is good augmentation, there is good color flow. Within the visualized vessels there is no evidence for deep venous thrombosis       Resident's Assessment and Plan     Neptali Ch is a 68 y.o. female    Principal Problem:    Hyperammonemia ([address]  Active Problems:    Interstitial lung disease ([address]    Hyponatremia    Hypercalcemia    Type 2 diabetes mellitus (HCC)    Obstructive sleep apnea syndrome    Essential hypertension    Hyperkalemia    Cirrhosis (HCC)    REN (acute kidney injury) ([address]    Asymptomatic microscopic hematuria  Resolved Problems:    * No resolved hospital problems. *    Nonalcoholic liver cirrhosis with portal hypertension  Questionable hepatic encephalopathy  Has an outpatient on 08/10/22 with GI Dr Adeola Lowery. EGD plan for 22.   History of nonalcoholic liver cirrhosis status post paracentesis on 2022 where 4.9 L were drained and 22 where 700 mL were drained suspecting chylous ascites  CAT chest 22 showed liver cirrhosis with prominent varices  Hyperbilirubinemia and hypoalbuminemia on admission  Ammonia 163 on admission  UDS and SDS ordered  ABG ordered  US abdomen ordered  Holding home Lasix 40 mg daily and Aldactone 100 mg daily due to REN, Hyponatremia and Hyperkalemia  Continue home Rifaximin 550 mg BID and Lactulose 10 g TID  Strict I/Os  Daily weights  Neuro checks  Consider GI Dr Yamini Coffey consult  Monitor Ammonia    REN  Cr 1.7 on admission (baseline 0.9-1.1)  Holding Lasix and Aldactone  Urine studies ordered  CK ordered  Strict I/Os  Daily weights  Consider IVFs if negative for ascites  Nephro consulted  Monitor renal function and electrolytes    Hyponatremia  Na 131 on admission  Holding Lasix  Consider fluid restriction  Nephro consulted  Monitor BMP    Hyperkalemia   K 5.3 on admission  Holding Aldactone  Albuterol PRN  Consider IVFs  Nephro consulted  Monitor BMP    Hypercalcemia   Ca 11.7 on admission (Corrected Ca is 12. 4)  PTH, Vit D, Phosphorus, TSH, Ionized Ca and Vit A ordered  Holding Vit D  Consider IVFs  Nephro consulted  Monitor BMP     Chronic hematuria  UA positive for blood and 2-5 RBCs  Has had microscopic hematuria in the past  Patient had cystoscopy in 11/2021  Monitor Hb     History of PE  Holding Eliquis 5 mg BID due to fall and bleeding  Coagulation studies ordered  PCDs     Interstitial lung disease  Last PFT on 04/2021  Home meds are Albuterol and Advair. Continue Albuterol PRN.   Start Brovana and Pulmicort in the hospital     DM2  Diet controlled  Last HbA1C 07/12/22 is 4.5%  Hypoglycemia protocol     HTN  Continue home Amlodipine 2.5 mg daily     KATE  CPAP at night     MDD  Continue home Venlafaxine 75 mg daily       DVT Prophylaxis: PCDs  GI Prophylaxis: Protonix 40 mg daily  Diet: NPO, needs bed swallow evaluation  Full code  Telemetry      Nicolle Horton MD  Attending physician: Dr. Yuridia Lizarraga

## 2022-07-30 NOTE — PROGRESS NOTES
[facility] Attending    S: 68 y.o. female with fall. CTs neg. Hypercalcemia, nl CK. Lasix and aldactone held 2/2 REN. O: VS- Blood pressure 133/78, pulse 91, temperature 97.5 °F (36.4 °C), temperature source Oral, resp. rate 18, height 5' 2.5\" (1.588 m), weight 192 lb (87.1 kg), SpO2 98 %. Exam is as noted by resident with the following changes, additions or corrections:  General: NAD, appropriate affect and grooming. AOx2.5   CV:  RRR, no gallops, rubs, 3/6 IVAN LUSB   Resp: CTAB   Abd:  Soft, nontender, distended   Ext:  No edema    Impressions:   Principal Problem:    Hyperammonemia (HCC)  Active Problems:    Interstitial lung disease (HCC)    Hyponatremia    Hypercalcemia    Type 2 diabetes mellitus (HCC)    Obstructive sleep apnea syndrome    Essential hypertension    Hyperkalemia    Cirrhosis (HCC)    REN (acute kidney injury) ([facility]    Asymptomatic microscopic hematuria  Resolved Problems:    * No resolved hospital problems. *      Plan:     Fall - scans neg for injury. PT/OT  AMS - 2/2 hypercalcemia vs hyperammonemia. Improved. States she was taking lactulose and having 2 loose BMs per day at facility. Monitor BMs in hospital.   Hypercalcemia - CTM. Low calcium diet. Avoid calcium intake. DEXA as outpt, consider bisphosphonate. OOB to chair. No IVF at this time, just holding diuretiecs and vitamin D  Volume overload with ascites and pleural effusion 2/2 cirrhosis - hold diuretics 2/2 REN. CTM I/Os       Attending Physician Statement  I have reviewed the chart, including any radiology or labs, and seen the patient with the resident(s). I personally reviewed and performed key elements of the history and exam.  I agree with the assessment, plan and orders as documented by the resident. Please refer to the resident note for additional information.       Electronically signed by Jamilah Lu MD on 7/30/2022 at 9:34 AM

## 2022-07-30 NOTE — CONSULTS
[facility] MD Kana Soares MD Diedre Starch, MD Bessie Melia, MD Candace Seidel, CNP   KALYN Boyd  Consultation  Patient's Name: Dahlia Post  5:13 PM  7/30/2022    Nephrologist: Kana Zamarripa MD    Reason for Consult: Hypercalcemia  Requesting Physician:  Pito Giron MD    Chief Complaint: Acute mental status change    History Obtained From: Patient, chart    History of Present Ilness:    Ms. Francine Ruby is a 80-year-old woman with nonalcoholic steatohepatosis admitted from an ECF status post a fall and with acute mental status change. Her son at bedside notes that he has noted that she frequently does not take her lactulose and when she has not she develops delirium due to hepatic encephalopathy and that the symptoms that she is experiencing now are the same as she has experienced in the past when her nonadherence to her lactulose regimen occurs. He notes that the staff at the nursing home frequently do not supervise her taking it, and as she does not wish to take it, though she frequently misses doses. On presentation ammonia was 163. She received 3 dose of lactulose since then ammonia has come down to 70. She is unable to provide a meaningful history given her delirium. Her son offers what information he has. He has had oral intake of food and fluid recently has been quite poor. He is not aware of her bowel habits. BUN and creatinine presentation were 31 and 1.7, respectively, compares with 15 and 1.1 roughly 2 weeks ago. Test was elevated at 5.3 mmol/L, sodium 131 mmol/L. Potassium has improved to 4.7, sodium improved to 134 mmol/L as of this morning. Calcium presentation was 11.7, increasing to 12.2 mmol/L as of this morning. 2 weeks ago the calcium was but 9.1 mmol/L.     Past Medical History:   Diagnosis Date    Breast cancer ([facility]     Cirrhosis ([facility]     Essential hypertension     Hx of blood clots     Hyperlipidemia Osteopenia     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 ([facility]     Sleep apnea     Spinal stenosis     Type 2 diabetes mellitus ([facility]        Past Surgical History:   Procedure Laterality Date    BLADDER SURGERY Right 11/26/2021    CYSTOSCOPY RETROGRADE PYELOGRAM RIGHT  STENT INSERTION performed by Sandro Schaefer MD at [place] [address] LUMPECTOMY      lumpectomy [address] TEST      Lexiscan stress test    CARPAL TUNNEL RELEASE      COLONOSCOPY  01/31/2017    multiple polyps; diverticula--jerod    COLONOSCOPY  04/23/2019    polyps; diverticula; hemorrhoids--jerod    COLONOSCOPY N/A 04/23/2019    COLONOSCOPY POLYPECTOMY SNARE/COLD BIOPSY performed by Naila Singleton MD at [address]  04/23/2019    COLONOSCOPY WITH BIOPSY performed by Naila Singleton MD at [address] [facility] ([address] [facility])      LITHOTRIPSY Left 05/04/2016    C-R STENT PLACEMENT    SHOULDER ARTHROPLASTY Left 12/21/2020    LEFT REVERSE TOTAL SHOULDER  ARTHROPLASTY -- DEPUY performed by Shakir Barney MD at [address]  04/23/2019    gastritis--jerod    UPPER GASTROINTESTINAL ENDOSCOPY N/A 04/23/2019    EGD BIOPSY performed by Naila Singleton MD at [place] [address] History   Problem Relation Age of Onset    Arthritis Mother     COPD Father     Heart Attack Father     Cirrhosis Brother         non alcoholic    Heart Disease Brother     Cancer Other 48        colon    Prostate Cancer Child     Hypertension Child     Hypertension Child         reports that she has never smoked. She has never used smokeless tobacco. She reports that she does not drink alcohol and does not use drugs.     Allergies:  Lisinopril    Current Medications:    albuterol (PROVENTIL) nebulizer solution 2.5 mg, 4x Daily PRN  amLODIPine (NORVASC) tablet 2.5 mg, Daily  [Held by provider] apixaban (ELIQUIS) tablet 5 mg, BID  doxazosin (CARDURA) tablet 1 mg, Nightly  [Held by provider] furosemide (LASIX) tablet 40 mg, BID  lactulose (CHRONULAC) 10 GM/15ML solution 10 g, TID  pantoprazole (PROTONIX) tablet 40 mg, QAM AC  [Held by provider] spironolactone (ALDACTONE) tablet 100 mg, Daily  venlafaxine (EFFEXOR XR) extended release capsule 75 mg, Daily  [Held by provider] Vitamin D (CHOLECALCIFEROL) tablet 1,000 Units, Daily  rifAXIMin (XIFAXAN) tablet 550 mg, BID  sodium chloride flush 0.9 % injection 5-40 mL, 2 times per day  sodium chloride flush 0.9 % injection 5-40 mL, PRN  0.9 % sodium chloride infusion, PRN  polyethylene glycol (GLYCOLAX) packet 17 g, Daily PRN  acetaminophen (TYLENOL) tablet 650 mg, Q6H PRN   Or  acetaminophen (TYLENOL) suppository 650 mg, Q6H PRN  senna (SENOKOT) tablet 8.6 mg, Daily PRN  trimethobenzamide (TIGAN) injection 200 mg, Q6H PRN  Arformoterol Tartrate (BROVANA) nebulizer solution 15 mcg, BID  budesonide (PULMICORT) nebulizer suspension 500 mcg, BID  glucose chewable tablet 16 g, PRN  dextrose bolus 10% 125 mL, PRN   Or  dextrose bolus 10% 250 mL, PRN  glucagon (rDNA) injection 1 mg, PRN  dextrose 10 % infusion, Continuous PRN  hydrALAZINE (APRESOLINE) injection 10 mg, Q8H PRN  dextrose 50 % IV solution, Once        Review of Systems:   Review of systems not obtained due to patient factors.     Physical exam:   /73   Pulse 90   Temp 97.6 °F (36.4 °C) (Oral)   Resp 17   Ht 5' 2.5\" (1.588 m)   Wt 192 lb (87.1 kg)   SpO2 97%   BMI 34.56 kg/m²   Age-appropriate white woman in no apparent distress  NC/AT EOMI sclera conjunctive are clear and anicteric mucous membranes are dry  Neck soft supple trachea midline no JVD  CTA bilaterally with poor air entry bilateral at the bases due to poor effort unable to improve with encouragement  Regular rhythm normal S1 and S2 no murmur  Abdomen soft nontender very mildly distended normal active bowel sounds no mass no hepatosplenomegaly side distal extremities are without edema  Pulses 1+ x4  No rash or lesion on visible portions of integument  Moves all 4 extremities  Cranial 2 through 12 grossly intact  Does verbalize some answers though does not answer questions asked of her. Does not follow commands.     Data:   Labs:  CBC with Differential:    Lab Results   Component Value Date/Time    WBC 8.2 07/30/2022 06:35 AM    RBC 3.74 07/30/2022 06:35 AM    HGB 12.3 07/30/2022 06:35 AM    HCT 35.6 07/30/2022 06:35 AM     07/30/2022 06:35 AM    MCV 95.2 07/30/2022 06:35 AM    MCH 32.9 07/30/2022 06:35 AM    MCHC 34.6 07/30/2022 06:35 AM    RDW 18.8 07/30/2022 06:35 AM    METASPCT 0.9 12/17/2021 05:01 AM    LYMPHOPCT 15.7 07/30/2022 06:35 AM    PROMYELOPCT 0.9 11/23/2021 02:00 PM    MONOPCT 13.4 07/30/2022 06:35 AM    MYELOPCT 0.9 04/15/2021 12:03 PM    BASOPCT 1.1 07/30/2022 06:35 AM    MONOSABS 1.10 07/30/2022 06:35 AM    LYMPHSABS 1.29 07/30/2022 06:35 AM    EOSABS 0.18 07/30/2022 06:35 AM    BASOSABS 0.09 07/30/2022 06:35 AM     CMP:    Lab Results   Component Value Date/Time     07/30/2022 06:35 AM    K 4.7 07/30/2022 06:35 AM    K 5.3 07/29/2022 03:59 PM    CL 98 07/30/2022 06:35 AM    CO2 23 07/30/2022 06:35 AM    BUN 30 07/30/2022 06:35 AM    CREATININE 1.5 07/30/2022 06:35 AM    GFRAA 41 07/30/2022 06:35 AM    LABGLOM 34 07/30/2022 06:35 AM    GLUCOSE 82 07/30/2022 06:35 AM    PROT 8.0 07/30/2022 06:35 AM    LABALBU 3.3 07/30/2022 06:35 AM    CALCIUM 12.2 07/30/2022 06:35 AM    BILITOT 2.3 07/30/2022 06:35 AM    ALKPHOS 122 07/30/2022 06:35 AM    AST 40 07/30/2022 06:35 AM    ALT 17 07/30/2022 06:35 AM     Ionized Calcium:  No results found for: IONCA  Magnesium:    Lab Results   Component Value Date/Time    MG 2.5 07/30/2022 06:35 AM     Phosphorus:    Lab Results   Component Value Date/Time    PHOS 2.7 07/30/2022 06:35 AM     U/A:    Lab Results   Component Value Date/Time    NITRITE Large 01/18/2022 03:33 PM    COLORU Yellow 07/29/2022 10:52 PM    PHUR 5.5 07/29/2022 10:52 PM    WBCUA 0-1 07/29/2022 10:52 PM RBCUA 2-5 07/29/2022 10:52 PM    YEAST MODERATE 08/22/2017 12:00 PM    BACTERIA RARE 07/29/2022 10:52 PM    CLARITYU Clear 07/29/2022 10:52 PM    SPECGRAV 1.010 07/29/2022 10:52 PM    LEUKOCYTESUR Negative 07/29/2022 10:52 PM    UROBILINOGEN 2.0 07/29/2022 10:52 PM    BILIRUBINUR Negative 07/29/2022 10:52 PM    BILIRUBINUR Small 01/18/2022 03:33 PM    BLOODU MODERATE 07/29/2022 10:52 PM    GLUCOSEU Negative 07/29/2022 10:52 PM     Microalbumen/Creatinine ratio:  No components found for: RUCREAT  Iron Saturation:  No components found for: PERCENTFE  TIBC:    Lab Results   Component Value Date/Time    TIBC 300 11/27/2021 05:35 AM     FERRITIN:    Lab Results   Component Value Date/Time    FERRITIN 39 11/27/2021 05:35 AM        Imaging:  US ABDOMEN COMPLETE   Final Result   Ascites present right upper quadrant         CT Head WO Contrast   Final Result   No acute intracranial abnormality. CT Cervical Spine WO Contrast   Final Result   No acute abnormality of the cervical spine. Multilevel degenerative changes. CT Lumbar Spine WO Contrast   Final Result   No acute fracture or traumatic malalignment of the lumbar spine. CT THORACIC SPINE WO CONTRAST   Final Result   No evidence of acute thoracic spine trauma. Left pleural effusion. XR ELBOW RIGHT (2 VIEWS)   Final Result   No acute fracture or dislocation. XR PELVIS (1-2 VIEWS)   Final Result   No fracture or dislocation. Osteo-degenerative changes again noted involving   the visualized lumbar spine, when compared to the previous study performed   08/17/2021. XR CHEST PORTABLE   Final Result   No acute process.              Assessment  Acute kidney injury, likely prerenal azotemia due to poor intake of food and fluid  Hypercalcemia due to primary hyperparathyroidism, exacerbated by volume contraction intact   Acute mental status change due to hyperammonia/hepatic encephalopathy due to HOLDEN and noncompliance/nonadherence to lactulose regimen    Recommendations  IV normal saline drip which we will start at a conservative rate  Would not administer [place] calcitonin or bisphosphonate at this point  At some point soon evaluate for parathyroidectomy, though might in the short-term be better served with Cinacalcet (Sensipar)  Repeat BMP this early evening, adjust therapy as warranted  Follow labs, UO  Continue lactulose  Continue supportive care      Thank you for the opportunity to participate in the care of your pleasant patient. We look forward to following along with you.         Electronically signed by Maximino Lawton MD on 7/30/2022

## 2022-07-31 LAB
ALBUMIN SERPL-MCNC: 2.6 G/DL (ref 3.5–5.2)
ALP BLD-CCNC: 109 U/L (ref 35–104)
ALT SERPL-CCNC: 16 U/L (ref 0–32)
ANION GAP SERPL CALCULATED.3IONS-SCNC: 11 MMOL/L (ref 7–16)
ANION GAP SERPL CALCULATED.3IONS-SCNC: 8 MMOL/L (ref 7–16)
AST SERPL-CCNC: 39 U/L (ref 0–31)
BASOPHILS ABSOLUTE: 0.1 E9/L (ref 0–0.2)
BASOPHILS RELATIVE PERCENT: 1.7 % (ref 0–2)
BILIRUB SERPL-MCNC: 2.3 MG/DL (ref 0–1.2)
BUN BLDV-MCNC: 26 MG/DL (ref 6–23)
BUN BLDV-MCNC: 26 MG/DL (ref 6–23)
CALCIUM SERPL-MCNC: 11.3 MG/DL (ref 8.6–10.2)
CALCIUM SERPL-MCNC: 11.6 MG/DL (ref 8.6–10.2)
CHLORIDE BLD-SCNC: 104 MMOL/L (ref 98–107)
CHLORIDE BLD-SCNC: 104 MMOL/L (ref 98–107)
CO2: 17 MMOL/L (ref 22–29)
CO2: 18 MMOL/L (ref 22–29)
CREAT SERPL-MCNC: 1.3 MG/DL (ref 0.5–1)
CREAT SERPL-MCNC: 1.3 MG/DL (ref 0.5–1)
EOSINOPHILS ABSOLUTE: 0.22 E9/L (ref 0.05–0.5)
EOSINOPHILS RELATIVE PERCENT: 3.6 % (ref 0–6)
GFR AFRICAN AMERICAN: 48
GFR AFRICAN AMERICAN: 48
GFR NON-AFRICAN AMERICAN: 40 ML/MIN/1.73
GFR NON-AFRICAN AMERICAN: 40 ML/MIN/1.73
GLUCOSE BLD-MCNC: 64 MG/DL (ref 74–99)
GLUCOSE BLD-MCNC: 79 MG/DL (ref 74–99)
HCT VFR BLD CALC: 35 % (ref 34–48)
HEMOGLOBIN: 11.7 G/DL (ref 11.5–15.5)
IMMATURE GRANULOCYTES #: 0.03 E9/L
IMMATURE GRANULOCYTES %: 0.5 % (ref 0–5)
LYMPHOCYTES ABSOLUTE: 1.04 E9/L (ref 1.5–4)
LYMPHOCYTES RELATIVE PERCENT: 17.2 % (ref 20–42)
MAGNESIUM: 2.4 MG/DL (ref 1.6–2.6)
MCH RBC QN AUTO: 32.6 PG (ref 26–35)
MCHC RBC AUTO-ENTMCNC: 33.4 % (ref 32–34.5)
MCV RBC AUTO: 97.5 FL (ref 80–99.9)
MONOCYTES ABSOLUTE: 0.88 E9/L (ref 0.1–0.95)
MONOCYTES RELATIVE PERCENT: 14.5 % (ref 2–12)
NEUTROPHILS ABSOLUTE: 3.79 E9/L (ref 1.8–7.3)
NEUTROPHILS RELATIVE PERCENT: 62.5 % (ref 43–80)
PDW BLD-RTO: 19.5 FL (ref 11.5–15)
PHOSPHORUS: 2.7 MG/DL (ref 2.5–4.5)
PLATELET # BLD: 167 E9/L (ref 130–450)
PMV BLD AUTO: 10.3 FL (ref 7–12)
POTASSIUM SERPL-SCNC: 5.1 MMOL/L (ref 3.5–5)
POTASSIUM SERPL-SCNC: 5.7 MMOL/L (ref 3.5–5)
RBC # BLD: 3.59 E12/L (ref 3.5–5.5)
SODIUM BLD-SCNC: 130 MMOL/L (ref 132–146)
SODIUM BLD-SCNC: 132 MMOL/L (ref 132–146)
TOTAL PROTEIN: 6.9 G/DL (ref 6.4–8.3)
WBC # BLD: 6.1 E9/L (ref 4.5–11.5)

## 2022-07-31 PROCEDURE — G0378 HOSPITAL OBSERVATION PER HR: HCPCS

## 2022-07-31 PROCEDURE — 96361 HYDRATE IV INFUSION ADD-ON: CPT

## 2022-07-31 PROCEDURE — 99225 PR SBSQ OBSERVATION CARE/DAY 25 MINUTES: CPT | Performed by: FAMILY MEDICINE

## 2022-07-31 PROCEDURE — 6370000000 HC RX 637 (ALT 250 FOR IP)

## 2022-07-31 PROCEDURE — 36415 COLL VENOUS BLD VENIPUNCTURE: CPT

## 2022-07-31 PROCEDURE — 94640 AIRWAY INHALATION TREATMENT: CPT

## 2022-07-31 PROCEDURE — 85025 COMPLETE CBC W/AUTO DIFF WBC: CPT

## 2022-07-31 PROCEDURE — 84100 ASSAY OF PHOSPHORUS: CPT

## 2022-07-31 PROCEDURE — 83735 ASSAY OF MAGNESIUM: CPT

## 2022-07-31 PROCEDURE — 6370000000 HC RX 637 (ALT 250 FOR IP): Performed by: INTERNAL MEDICINE

## 2022-07-31 PROCEDURE — 6360000002 HC RX W HCPCS

## 2022-07-31 PROCEDURE — 80053 COMPREHEN METABOLIC PANEL: CPT

## 2022-07-31 PROCEDURE — 2580000003 HC RX 258: Performed by: INTERNAL MEDICINE

## 2022-07-31 PROCEDURE — 80048 BASIC METABOLIC PNL TOTAL CA: CPT

## 2022-07-31 RX ORDER — SODIUM BICARBONATE 650 MG/1
1300 TABLET ORAL 2 TIMES DAILY
Status: DISCONTINUED | OUTPATIENT
Start: 2022-07-31 | End: 2022-08-04 | Stop reason: HOSPADM

## 2022-07-31 RX ORDER — CINACALCET 30 MG/1
30 TABLET, FILM COATED ORAL DAILY
Status: DISCONTINUED | OUTPATIENT
Start: 2022-07-31 | End: 2022-08-04 | Stop reason: HOSPADM

## 2022-07-31 RX ADMIN — RIFAXIMIN 550 MG: 550 TABLET ORAL at 10:07

## 2022-07-31 RX ADMIN — VENLAFAXINE HYDROCHLORIDE 75 MG: 75 CAPSULE, EXTENDED RELEASE ORAL at 10:06

## 2022-07-31 RX ADMIN — ARFORMOTEROL TARTRATE 15 MCG: 15 SOLUTION RESPIRATORY (INHALATION) at 20:39

## 2022-07-31 RX ADMIN — LACTULOSE 10 G: 20 SOLUTION ORAL at 10:07

## 2022-07-31 RX ADMIN — ARFORMOTEROL TARTRATE 15 MCG: 15 SOLUTION RESPIRATORY (INHALATION) at 08:31

## 2022-07-31 RX ADMIN — LACTULOSE 10 G: 20 SOLUTION ORAL at 15:11

## 2022-07-31 RX ADMIN — CINACALCET HYDROCHLORIDE 30 MG: 30 TABLET, FILM COATED ORAL at 15:11

## 2022-07-31 RX ADMIN — SODIUM CHLORIDE: 9 INJECTION, SOLUTION INTRAVENOUS at 05:14

## 2022-07-31 RX ADMIN — LACTULOSE 10 G: 20 SOLUTION ORAL at 20:55

## 2022-07-31 RX ADMIN — AMLODIPINE BESYLATE 2.5 MG: 2.5 TABLET ORAL at 10:07

## 2022-07-31 RX ADMIN — ACETAMINOPHEN 650 MG: 325 TABLET, FILM COATED ORAL at 18:07

## 2022-07-31 RX ADMIN — BUDESONIDE 500 MCG: 0.5 SUSPENSION RESPIRATORY (INHALATION) at 20:39

## 2022-07-31 RX ADMIN — BUDESONIDE 500 MCG: 0.5 SUSPENSION RESPIRATORY (INHALATION) at 08:31

## 2022-07-31 RX ADMIN — PANTOPRAZOLE SODIUM 40 MG: 40 TABLET, DELAYED RELEASE ORAL at 05:30

## 2022-07-31 RX ADMIN — RIFAXIMIN 550 MG: 550 TABLET ORAL at 20:54

## 2022-07-31 RX ADMIN — DOXAZOSIN 1 MG: 1 TABLET ORAL at 20:54

## 2022-07-31 RX ADMIN — SODIUM BICARBONATE 1300 MG: 650 TABLET ORAL at 20:55

## 2022-07-31 ASSESSMENT — PAIN SCALES - GENERAL: PAINLEVEL_OUTOF10: 6

## 2022-07-31 ASSESSMENT — PAIN DESCRIPTION - LOCATION: LOCATION: HEAD

## 2022-07-31 ASSESSMENT — PAIN DESCRIPTION - DESCRIPTORS: DESCRIPTORS: ACHING

## 2022-07-31 NOTE — PROGRESS NOTES
[facility] Inpatient   Resident Progress Note    S:  Hospital day: 0    Brief Synopsis: Dahlia Post is a 68 y.o. 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 Patient admitted for hyperammonemia, REN and electrolytes imbalance. No acute events overnight. Patient was seen and examined this morning. Pain is improving. Not eating so much and having low glucose levels. Oral hydration and eating have been encouraged. Cont meds:    sodium chloride      dextrose      sodium chloride 100 mL/hr at 07/31/22 0515     Scheduled meds:    amLODIPine  2.5 mg Oral Daily    [Held by provider] apixaban  5 mg Oral BID    doxazosin  1 mg Oral Nightly    [Held by provider] furosemide  40 mg Oral BID    lactulose  10 g Oral TID    pantoprazole  40 mg Oral QAM AC    [Held by provider] spironolactone  100 mg Oral Daily    venlafaxine  75 mg Oral Daily    [Held by provider] Vitamin D  1,000 Units Oral Daily    rifAXIMin  550 mg Oral BID    sodium chloride flush  5-40 mL IntraVENous 2 times per day    Arformoterol Tartrate  15 mcg Nebulization BID    budesonide  500 mcg Nebulization BID    dextrose  25 g IntraVENous Once     PRN meds: albuterol, sodium chloride flush, sodium chloride, polyethylene glycol, acetaminophen **OR** acetaminophen, senna, trimethobenzamide, glucose, dextrose bolus **OR** dextrose bolus, glucagon (rDNA), dextrose, hydrALAZINE     I reviewed the patient's Past Medical and Surgical History, Medications and Allergies. O:  VS: /72   Pulse 85   Temp 98.1 °F (36.7 °C) (Oral)   Resp 18   Ht 5' 2.5\" (1.588 m)   Wt 157 lb 14.4 oz (71.6 kg)   SpO2 97%   BMI 28.42 kg/m²     Physical Exam:  Physical Exam  Vitals reviewed.    Constitutional:       General: She is not in acute distress. HENT:      Head:      Comments: No active bleeding from right scalp laceration     Nose: Nose normal. No congestion or rhinorrhea. Eyes:      Pupils: Pupils are equal, round, and reactive to light. Cardiovascular:      Rate and Rhythm: Normal rate and regular rhythm. Pulses: Normal pulses. Heart sounds: Normal heart sounds. Pulmonary:      Effort: Pulmonary effort is normal. No respiratory distress. Breath sounds: Normal breath sounds. Abdominal:      General: Bowel sounds are normal. There is distension (mild). Palpations: Abdomen is soft. Tenderness: There is abdominal tenderness (mild and diffuse). There is no guarding or rebound. Musculoskeletal:      Cervical back: Normal range of motion and neck supple. Right lower leg: Edema (trace) present. Left lower leg: Edema (trace) present. Skin:     General: Skin is warm and dry. Capillary Refill: Capillary refill takes less than 2 seconds. Findings: No rash. Comments: Right elbow abrasion    Neurological:      General: No focal deficit present. Mental Status: She is alert and oriented to person, place, and time. Sensory: No sensory deficit. Motor: No weakness. Psychiatric:         Thought Content: Thought content normal.         Judgment: Judgment normal.          Labs:  Na/K/Cl/CO2:  132/5.7/104/17 (07/31 0540)  BUN/Cr/glu/ALT/AST/amyl/lip:  26/1.3/--/16/39/--/-- (07/31 0540)  WBC/Hgb/Hct/Plts:  6.1/11.7/35.0/167 (07/31 0540)  estimated creatinine clearance is 34 mL/min (A) (based on SCr of 1.3 mg/dL (H)). Other pertinent labs as noted below    New Imaging:  US ABDOMEN COMPLETE   Final Result   Ascites present right upper quadrant         CT Head WO Contrast   Final Result   No acute intracranial abnormality. CT Cervical Spine WO Contrast   Final Result   No acute abnormality of the cervical spine. Multilevel degenerative changes.          CT Lumbar Spine WO Contrast   Final Result   No acute fracture or traumatic malalignment of the lumbar spine. CT THORACIC SPINE WO CONTRAST   Final Result   No evidence of acute thoracic spine trauma. Left pleural effusion. XR ELBOW RIGHT (2 VIEWS)   Final Result   No acute fracture or dislocation. XR PELVIS (1-2 VIEWS)   Final Result   No fracture or dislocation. Osteo-degenerative changes again noted involving   the visualized lumbar spine, when compared to the previous study performed   08/17/2021. XR CHEST PORTABLE   Final Result   No acute process. US RETROPERITONEAL COMPLETE    (Results Pending)       A/P:  Principal Problem:    Hyperammonemia ([address]  Active Problems:    Interstitial lung disease (HCC)    Hyponatremia    Hypercalcemia    Type 2 diabetes mellitus (HCC)    Obstructive sleep apnea syndrome    Essential hypertension    Hyperkalemia    Cirrhosis (HCC)    REN (acute kidney injury) ([address]    Asymptomatic microscopic hematuria  Resolved Problems:    * No resolved hospital problems. *      Nonalcoholic liver cirrhosis with portal hypertension  Questionable hepatic encephalopathy  Has an outpatient on 08/10/22 with GI Dr Edenilson Treviño. EGD plan for 11/04/22.   History of nonalcoholic liver cirrhosis status post paracentesis on 05/2022 where 4.9 L were drained and 07/13/22 where 700 mL were drained suspecting chylous ascites  CTA chest 07/11/22 showed liver cirrhosis with prominent varices  Hyperbilirubinemia and hypoalbuminemia on admission  Ammonia 163 on admission, improving  UDS and SDS are negative  ABG showed mild respiratory alkalosis  US abdomen positive for ascites in the RUQ  Holding home Lasix 40 mg daily and Aldactone 100 mg daily due to REN, Hyponatremia and Hyperkalemia  Continue home Rifaximin 550 mg BID and Lactulose 10 g TID  Consider giving Albumin  Strict I/Os  Daily weights  Neuro checks  Monitor Ammonia every other day     REN  Cr 1.7 on admission (baseline 0.9-1. 1)  CK WNL and UA positive for true hematuria --> low suspicion for Rhabdomyolysis  Holding Lasix and Aldactone. Consider restarting Lasix to avoid volume overload and will help with hypercalcemia. FeUrea shows intrinsic etiology  Renal US ordered  IVFs  mL/hour  Strict I/Os  Daily weights  Nephrology following  Monitor renal function and electrolytes     Hyponatremia  Na 131 on admission  Holding Lasix  Nephrology following  Monitor BMP     Hyperkalemia  K 5.3 on admission  Holding Aldactone  Albuterol PRN  IVFs  mL/hour  Nephrology following  Monitor BMP     Hypercalcemia  Suspect 1ry Hyperparathyroidism  Ca 11.7 on admission (Corrected Ca is 12. 4)   high, Vit D 29 low, Phosphorus 2.7 WNL, TSH 4.140 WNL, Ionized Ca 1.54 on admission  Vit A pending  Holding home Vit D replacement  IVFs  mL/hour  Nephrology following  Monitor BMP  Will need outpatient evaluation for Parathyroidectomy evaluation. Might in the short-term be better served with Cinacalcet. Consider Endocrinology and/or Nephrology outpatient follow up. Chronic hematuria  UA positive for blood and 2-5 RBCs  Has had microscopic hematuria in the past  Patient had cystoscopy in 11/2021  Monitor Hb     History of PE  Holding Eliquis 5 mg BID due to fall and right scalp bleeding  Consider restarting Eliquis if no active bleeding  PCDs     Interstitial lung disease  Last PFT on 04/2021  Home meds are Albuterol and Advair. Continue Albuterol PRN.   Start Cristian Serg and Pulmicort in the hospital     DM2  Diet controlled  Last HbA1C 07/12/22 is 4.5%  Hypoglycemia protocol     HTN  Continue home Amlodipine 2.5 mg daily     KATE  CPAP at night     MDD  Continue home Venlafaxine 75 mg daily        DVT Prophylaxis: PCDs  GI Prophylaxis: Protonix 40 mg daily  Diet: adult regular with low potassium and low calcium  Full code  Telemetry      Electronically signed by Kyra Guzman MD on 7/31/2022 at 9:47 AM  This case was discussed with attending physician Dr. Hattie Cheatham

## 2022-07-31 NOTE — PROGRESS NOTES
[facility] MD Alfredo Barnett, MD Marie Tovar, MD Mack Mckeon, CNP   Sirena Griggs, KALYN  Progress Note    7/31/2022    SUBJECTIVE:   7/31: Somewhat more awake and alert and interactive today, though still not back to baseline. Ongoing fatigue, malaise, generalized weakness. Denies sob/hinojosa/cp/palp Appetite ok    PROBLEM LIST:    Principal Problem:    Hyperammonemia ([address]  Active Problems:    Interstitial lung disease (HCC)    Hyponatremia    Hypercalcemia    Type 2 diabetes mellitus (HCC)    Obstructive sleep apnea syndrome    Essential hypertension    Hyperkalemia    Cirrhosis (HCC)    REN (acute kidney injury) ([address]    Asymptomatic microscopic hematuria  Resolved Problems:    * No resolved hospital problems. *         DIET:    ADULT DIET;  Regular; Low Potassium (Less than 3000 mg/day)     MEDS (scheduled):    cinacalcet  30 mg Oral Daily    amLODIPine  2.5 mg Oral Daily    [Held by provider] apixaban  5 mg Oral BID    doxazosin  1 mg Oral Nightly    [Held by provider] furosemide  40 mg Oral BID    lactulose  10 g Oral TID    pantoprazole  40 mg Oral QAM AC    [Held by provider] spironolactone  100 mg Oral Daily    venlafaxine  75 mg Oral Daily    [Held by provider] Vitamin D  1,000 Units Oral Daily    rifAXIMin  550 mg Oral BID    sodium chloride flush  5-40 mL IntraVENous 2 times per day    Arformoterol Tartrate  15 mcg Nebulization BID    budesonide  500 mcg Nebulization BID    dextrose  25 g IntraVENous Once       MEDS (infusions):   sodium chloride      dextrose      sodium chloride 100 mL/hr at 07/31/22 0515       MEDS (prn):  albuterol, sodium chloride flush, sodium chloride, polyethylene glycol, acetaminophen **OR** acetaminophen, senna, trimethobenzamide, glucose, dextrose bolus **OR** dextrose bolus, glucagon (rDNA), dextrose, hydrALAZINE    PHYSICAL EXAM:     Patient Vitals for the past 24 hrs:   BP Temp Temp src Pulse Resp SpO2 Weight   07/31/22 1500 106/67 98.2 °F (36.8 °C) Oral (!) 107 18 95 % --   07/31/22 1000 120/70 98.3 °F (36.8 °C) Oral 86 16 99 % --   07/31/22 0337 -- -- -- -- -- -- 157 lb 14.4 oz (71.6 kg)   07/30/22 2200 114/72 98.1 °F (36.7 °C) Oral 85 18 97 % --   @      Intake/Output Summary (Last 24 hours) at 7/31/2022 1627  Last data filed at 7/31/2022 1010  Gross per 24 hour   Intake 1008.75 ml   Output 950 ml   Net 58.75 ml         Wt Readings from Last 3 Encounters:   07/31/22 157 lb 14.4 oz (71.6 kg)   07/12/22 191 lb 3.2 oz (86.7 kg)   06/23/22 181 lb 3.2 oz (82.2 kg)       Constitutional:  in no acute distress  HEENT: NC/AT, EOMI, sclera and conjunctiva are clear and anicteric, mucus membranes moist  Neck: Trachea midline, no JVD  Cardiovascular: S1, S2 regular rhythm, no murmur,or rub  Respiratory:  No crackles, no wheeze  Gastrointestinal:  Soft, nontender, nondistended, NABS  Ext: no edema, feet warm  Skin: dry, no rash  Neuro: awake, alert, interactive      DATA:    Recent Labs     07/29/22  1559 07/30/22  0635 07/31/22  0540   WBC 7.3 8.2 6.1   HGB 12.2 12.3 11.7   HCT 36.1 35.6 35.0   MCV 95.0 95.2 97.5    220 167     Recent Labs     07/29/22  1559 07/29/22  1559 07/30/22  0635 07/30/22  1738 07/31/22  0540 07/31/22  1052   *  --  134 135 132 130*   K 5.3*   < > 4.7 4.7 5.7* 5.1*   CL 98  --  98 100 104 104   CO2 20*  --  23 21* 17* 18*   MG  --   --  2.5  --  2.4  --    PHOS  --   --  2.7  --  2.7  --    BUN 31*  --  30* 28* 26* 26*   CREATININE 1.7*  --  1.5* 1.4* 1.3* 1.3*   ALT 16  --  17  --  16  --    AST 45*  --  40*  --  39*  --    BILIDIR 0.5*  --   --   --   --   --    BILITOT 2.2*  --  2.3*  --  2.3*  --    ALKPHOS 116*  --  122*  --  109*  --     < > = values in this interval not displayed.        Lab Results   Component Value Date    LABPROT 0.2 07/30/2022    LABPROT 0.2 07/30/2022       Assessment  Acute kidney injury, likely prerenal azotemia due to poor intake of food and fluid    Hypercalcemia due to primary hyperparathyroidism, exacerbated by volume contraction, intact     Acute mental status change due to hyperammonia/hepatic encephalopathy due to HOLDEN and noncompliance/nonadherence to lactulose regimen    Hyponatremia, likely multifactorial, due to cirrhosis, diarrhea due to lactulose    Metabolic acidosis, non-anion gap, secondary to diarrhea due to lactulose, REN, CKD, probably mildly exacerbated by the NS drip    Calcium improving with conservative-rate IV fluid resuscitation    Recommendations  IV normal saline drip which we will start at a conservative rate  Would not administer [place] calcitonin or bisphosphonate at this point  At some point soon evaluate for parathyroidectomy  short-term be better served with Cinacalcet (Sensipar) --started at 30 mg daily  Follow labs, UO  Continue lactulose  Continue supportive care        Electronically signed by Shannan Henley MD on 7/31/2022

## 2022-07-31 NOTE — PROGRESS NOTES
[facility] Attending    S: 68 y.o. female with fall. CTs neg. Hypercalcemia, nl CK. Lasix and aldactone held 2/2 REN. Not eating much, glucose low. Pain improved since y'day. Nephro started IVF for hypercalcemia. Hyperkalemia - repeat now    O: VS- Blood pressure 114/72, pulse 85, temperature 98.1 °F (36.7 °C), temperature source Oral, resp. rate 18, height 5' 2.5\" (1.588 m), weight 157 lb 14.4 oz (71.6 kg), SpO2 97 %. Exam is as noted by resident with the following changes, additions or corrections:  General: NAD, appropriate affect and grooming. AOx2.5   CV:  RRR, no gallops, rubs, 3/6 IVAN LUSB   Resp: CTAB   Abd:  Soft, nontender, distended   Ext:  No edema    Impressions:   Principal Problem:    Hyperammonemia (HCC)  Active Problems:    Interstitial lung disease (HCC)    Hyponatremia    Hypercalcemia    Type 2 diabetes mellitus (HCC)    Obstructive sleep apnea syndrome    Essential hypertension    Hyperkalemia    Cirrhosis (HCC)    REN (acute kidney injury) ([facility]    Asymptomatic microscopic hematuria  Resolved Problems:    * No resolved hospital problems. *      Plan:     Fall - scans neg for injury. PT/OT. Scalp lac - healing, CTM  AMS - 2/2 hypercalcemia vs hyperammonemia. Continue lactulose. Monitor BMs in hospital.   Hypercalcemia - IVF per nephro. CTM. Low calcium diet. Avoid calcium intake. DEXA as outpt, consider bisphosphonate. OOB to chair. Holding diuretiecs and vitamin D  Volume overload with ascites and pleural effusion 2/2 cirrhosis - held diuretics 2/2 REN. CTM I/Os. D/w Nephro restarting lasix to help avoid anasarca and to help lower calcium. Attending Physician Statement  I have reviewed the chart, including any radiology or labs, and seen the patient with the resident(s). I personally reviewed and performed key elements of the history and exam.  I agree with the assessment, plan and orders as documented by the resident.   Please refer to the

## 2022-07-31 NOTE — PLAN OF CARE

## 2022-07-31 NOTE — PROGRESS NOTES
Perfect serve message sent to Dr. Luli Shafer regarding patient's BMP results.      Nabeel Rodríguez RN, BSN

## 2022-08-01 LAB
ALBUMIN SERPL-MCNC: 2.6 G/DL (ref 3.5–5.2)
ALP BLD-CCNC: 113 U/L (ref 35–104)
ALT SERPL-CCNC: 14 U/L (ref 0–32)
AMMONIA: 356 UMOL/L (ref 11–51)
ANION GAP SERPL CALCULATED.3IONS-SCNC: 10 MMOL/L (ref 7–16)
AST SERPL-CCNC: 35 U/L (ref 0–31)
BASOPHILS ABSOLUTE: 0.08 E9/L (ref 0–0.2)
BASOPHILS RELATIVE PERCENT: 1.6 % (ref 0–2)
BILIRUB SERPL-MCNC: 2.3 MG/DL (ref 0–1.2)
BUN BLDV-MCNC: 23 MG/DL (ref 6–23)
CALCIUM SERPL-MCNC: 10.5 MG/DL (ref 8.6–10.2)
CHLORIDE BLD-SCNC: 107 MMOL/L (ref 98–107)
CO2: 18 MMOL/L (ref 22–29)
CREAT SERPL-MCNC: 1.2 MG/DL (ref 0.5–1)
EKG ATRIAL RATE: 92 BPM
EKG P AXIS: 59 DEGREES
EKG P-R INTERVAL: 144 MS
EKG Q-T INTERVAL: 360 MS
EKG QRS DURATION: 80 MS
EKG QTC CALCULATION (BAZETT): 445 MS
EKG R AXIS: -9 DEGREES
EKG T AXIS: 56 DEGREES
EKG VENTRICULAR RATE: 92 BPM
EOSINOPHILS ABSOLUTE: 0.24 E9/L (ref 0.05–0.5)
EOSINOPHILS RELATIVE PERCENT: 4.8 % (ref 0–6)
GFR AFRICAN AMERICAN: 53
GFR NON-AFRICAN AMERICAN: 44 ML/MIN/1.73
GLUCOSE BLD-MCNC: 59 MG/DL (ref 74–99)
HCT VFR BLD CALC: 32.9 % (ref 34–48)
HEMOGLOBIN: 10.8 G/DL (ref 11.5–15.5)
IMMATURE GRANULOCYTES #: 0.01 E9/L
IMMATURE GRANULOCYTES %: 0.2 % (ref 0–5)
LYMPHOCYTES ABSOLUTE: 1.05 E9/L (ref 1.5–4)
LYMPHOCYTES RELATIVE PERCENT: 21.1 % (ref 20–42)
MAGNESIUM: 2.1 MG/DL (ref 1.6–2.6)
MCH RBC QN AUTO: 32.7 PG (ref 26–35)
MCHC RBC AUTO-ENTMCNC: 32.8 % (ref 32–34.5)
MCV RBC AUTO: 99.7 FL (ref 80–99.9)
METER GLUCOSE: 67 MG/DL (ref 74–99)
METER GLUCOSE: 70 MG/DL (ref 74–99)
MONOCYTES ABSOLUTE: 0.68 E9/L (ref 0.1–0.95)
MONOCYTES RELATIVE PERCENT: 13.7 % (ref 2–12)
NEUTROPHILS ABSOLUTE: 2.91 E9/L (ref 1.8–7.3)
NEUTROPHILS RELATIVE PERCENT: 58.6 % (ref 43–80)
PDW BLD-RTO: 19.8 FL (ref 11.5–15)
PHOSPHORUS: 2.5 MG/DL (ref 2.5–4.5)
PLATELET # BLD: 164 E9/L (ref 130–450)
PMV BLD AUTO: 10.8 FL (ref 7–12)
POTASSIUM SERPL-SCNC: 4.9 MMOL/L (ref 3.5–5)
RBC # BLD: 3.3 E12/L (ref 3.5–5.5)
SODIUM BLD-SCNC: 135 MMOL/L (ref 132–146)
TOTAL PROTEIN: 6.6 G/DL (ref 6.4–8.3)
WBC # BLD: 5 E9/L (ref 4.5–11.5)

## 2022-08-01 PROCEDURE — 83735 ASSAY OF MAGNESIUM: CPT

## 2022-08-01 PROCEDURE — 82140 ASSAY OF AMMONIA: CPT

## 2022-08-01 PROCEDURE — 94640 AIRWAY INHALATION TREATMENT: CPT

## 2022-08-01 PROCEDURE — 82962 GLUCOSE BLOOD TEST: CPT

## 2022-08-01 PROCEDURE — 97530 THERAPEUTIC ACTIVITIES: CPT

## 2022-08-01 PROCEDURE — 99232 SBSQ HOSP IP/OBS MODERATE 35: CPT | Performed by: FAMILY MEDICINE

## 2022-08-01 PROCEDURE — 6370000000 HC RX 637 (ALT 250 FOR IP)

## 2022-08-01 PROCEDURE — 84100 ASSAY OF PHOSPHORUS: CPT

## 2022-08-01 PROCEDURE — 2580000003 HC RX 258: Performed by: INTERNAL MEDICINE

## 2022-08-01 PROCEDURE — 6360000002 HC RX W HCPCS

## 2022-08-01 PROCEDURE — 2580000003 HC RX 258

## 2022-08-01 PROCEDURE — 1200000000 HC SEMI PRIVATE

## 2022-08-01 PROCEDURE — 96361 HYDRATE IV INFUSION ADD-ON: CPT

## 2022-08-01 PROCEDURE — 80053 COMPREHEN METABOLIC PANEL: CPT

## 2022-08-01 PROCEDURE — 6370000000 HC RX 637 (ALT 250 FOR IP): Performed by: INTERNAL MEDICINE

## 2022-08-01 PROCEDURE — 97161 PT EVAL LOW COMPLEX 20 MIN: CPT

## 2022-08-01 PROCEDURE — 36415 COLL VENOUS BLD VENIPUNCTURE: CPT

## 2022-08-01 PROCEDURE — 85025 COMPLETE CBC W/AUTO DIFF WBC: CPT

## 2022-08-01 RX ORDER — SPIRONOLACTONE 25 MG/1
100 TABLET ORAL DAILY
Status: DISCONTINUED | OUTPATIENT
Start: 2022-08-01 | End: 2022-08-04 | Stop reason: HOSPADM

## 2022-08-01 RX ORDER — FUROSEMIDE 20 MG/1
20 TABLET ORAL DAILY
Status: DISCONTINUED | OUTPATIENT
Start: 2022-08-01 | End: 2022-08-04 | Stop reason: HOSPADM

## 2022-08-01 RX ORDER — LACTULOSE 10 G/15ML
20 SOLUTION ORAL 3 TIMES DAILY
Status: DISCONTINUED | OUTPATIENT
Start: 2022-08-01 | End: 2022-08-02

## 2022-08-01 RX ADMIN — LACTULOSE 20 G: 20 SOLUTION ORAL at 20:58

## 2022-08-01 RX ADMIN — FUROSEMIDE 20 MG: 20 TABLET ORAL at 18:36

## 2022-08-01 RX ADMIN — AMLODIPINE BESYLATE 2.5 MG: 2.5 TABLET ORAL at 08:54

## 2022-08-01 RX ADMIN — SODIUM CHLORIDE: 9 INJECTION, SOLUTION INTRAVENOUS at 15:33

## 2022-08-01 RX ADMIN — DOXAZOSIN 1 MG: 1 TABLET ORAL at 21:04

## 2022-08-01 RX ADMIN — RIFAXIMIN 550 MG: 550 TABLET ORAL at 21:04

## 2022-08-01 RX ADMIN — BUDESONIDE 500 MCG: 0.5 SUSPENSION RESPIRATORY (INHALATION) at 08:14

## 2022-08-01 RX ADMIN — RIFAXIMIN 550 MG: 550 TABLET ORAL at 08:54

## 2022-08-01 RX ADMIN — PANTOPRAZOLE SODIUM 40 MG: 40 TABLET, DELAYED RELEASE ORAL at 05:42

## 2022-08-01 RX ADMIN — VENLAFAXINE HYDROCHLORIDE 75 MG: 75 CAPSULE, EXTENDED RELEASE ORAL at 08:54

## 2022-08-01 RX ADMIN — SPIRONOLACTONE 100 MG: 25 TABLET ORAL at 18:37

## 2022-08-01 RX ADMIN — SODIUM CHLORIDE, PRESERVATIVE FREE 10 ML: 5 INJECTION INTRAVENOUS at 08:55

## 2022-08-01 RX ADMIN — CINACALCET HYDROCHLORIDE 30 MG: 30 TABLET, FILM COATED ORAL at 08:54

## 2022-08-01 RX ADMIN — LACTULOSE 20 G: 20 SOLUTION ORAL at 15:34

## 2022-08-01 RX ADMIN — APIXABAN 5 MG: 5 TABLET, FILM COATED ORAL at 20:58

## 2022-08-01 RX ADMIN — SODIUM CHLORIDE: 9 INJECTION, SOLUTION INTRAVENOUS at 20:58

## 2022-08-01 RX ADMIN — SODIUM BICARBONATE 1300 MG: 650 TABLET ORAL at 08:54

## 2022-08-01 RX ADMIN — BUDESONIDE 500 MCG: 0.5 SUSPENSION RESPIRATORY (INHALATION) at 20:44

## 2022-08-01 RX ADMIN — ARFORMOTEROL TARTRATE 15 MCG: 15 SOLUTION RESPIRATORY (INHALATION) at 08:14

## 2022-08-01 RX ADMIN — LACTULOSE 10 G: 20 SOLUTION ORAL at 08:54

## 2022-08-01 RX ADMIN — ARFORMOTEROL TARTRATE 15 MCG: 15 SOLUTION RESPIRATORY (INHALATION) at 20:44

## 2022-08-01 RX ADMIN — SODIUM BICARBONATE 1300 MG: 650 TABLET ORAL at 20:58

## 2022-08-01 ASSESSMENT — PAIN SCALES - GENERAL
PAINLEVEL_OUTOF10: 0

## 2022-08-01 NOTE — PROGRESS NOTES
Physical Therapy  Physical Therapy Initial Assessment     Name: Angélica Mcclelland  : 1945  MRN: [id number]      Date of Service: 2022    Evaluating PT:  Masoud Lord, PAC[place] [address] [facility]    Room #:  5407/5407-B  Diagnosis:  Hyperammonemia ([facility] [E72.20]  REN (acute kidney injury) ([facility] [N17.9]  Fall, initial encounter [W19. XXXA]  PMHx/PSHx:   has a past medical history of Breast cancer ([facility] Cirrhosis ([facility] Essential hypertension, Hx of blood clots, Hyperlipidemia, Osteopenia, 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 ([facility] Sleep apnea, Spinal stenosis, and Type 2 diabetes mellitus ([facility] Procedure/Surgery:   has a past surgical history that includes Hysterectomy; Carpal tunnel release; Lithotripsy (Left, 2016); Colonoscopy (2017); Breast lumpectomy; Upper gastrointestinal endoscopy (2019); Colonoscopy (2019); Upper gastrointestinal endoscopy (N/A, 2019); Colonoscopy (N/A, 2019); Colonoscopy (2019); Shoulder Arthroplasty (Left, 2020); cardiovascular stress test; and Bladder surgery (Right, 2021). Precautions:  Fall risk  Equipment Needs:  TBD    SUBJECTIVE:    Prior to admission patient resided at a facility. Pt ambulated with quad cane PTA. OBJECTIVE:   Initial Evaluation  Date: 22 Treatment Short Term/ Long Term   Goals   AM-PAC 6 Clicks      Was pt agreeable to Eval/treatment? yes     Does pt have pain? Pt stated \"she's sick\"     Bed Mobility  Rolling: Chika  Supine to sit: Chika  Sit to supine: Chika  Scooting: Chika  Rolling: Ind  Supine to sit: Ind  Sit to supine: Ind  Scooting: Ind   Transfers Sit to stand: Chika  Stand to sit: Chika  Stand pivot: Chika  Sit to stand: Ind  Stand to sit: Ind  Stand pivot: Ind   Ambulation    2 side steps with FWW ModA  25+ feet with AAD Arsenio   Stair negotiation: ascended and descended  NT  3 steps with 1 rail Arsenio   ROM BUE:  See OT note. BLE:  WFL     Strength BUE:  See OT note.   BLE:  WFL     Balance Sitting EOB: required to complete task. Pt's/ family goals   1. None reported. Prognosis is good for reaching above PT goals. Patient and or family understand(s) diagnosis, prognosis, and plan of care. yes    PHYSICAL THERAPY PLAN OF CARE:    PT POC is established based on physician order and patient diagnosis     Referring provider/PT Order:    07/30/22 1000  PT eval and treat      Inna Mendosa MD     Diagnosis:  Hyperammonemia ([facility] [E72.20]  REN (acute kidney injury) ([facility] [N17.9]  Fall, initial encounter [W19. XXXA]  Specific instructions for next treatment:  Increase endurance to upright and ambulate as tolerated by patient. Current Treatment Recommendations:     [x] Strengthening to improve independence with functional mobility   [x] ROM to improve independence with functional mobility   [x] Balance Training to improve static/dynamic balance and to reduce fall risk  [x] Endurance Training to improve activity tolerance during functional mobility   [x] Transfer Training to improve safety and independence with all functional transfers   [x] Gait Training to improve gait mechanics, endurance and assess need for appropriate assistive device  [x] Stair Training in preparation for safe discharge home and/or into the community   [] Positioning to prevent skin breakdown and contractures  [x] Safety and Education Training   [x] Patient/Caregiver Education   [] HEP  [x] Other     PT long term treatment goals are located in above grid    Frequency of treatments: 2-5x/week x 1-2 weeks. Time in  1440  Time out  1500    Total Treatment Time  10 minutes     Evaluation Time includes thorough review of current medical information, gathering information on past medical history/social history and prior level of function, completion of standardized testing/informal observation of tasks, assessment of data and education on plan of care and goals.     CPT codes:  [x] Low Complexity PT evaluation 86055  [] Moderate Complexity PT evaluation [id number]  [] High Complexity PT evaluation [id number]  [] PT Re-evaluation [id number]  [] Gait training 52033 - minutes  [] Manual therapy 88141 - minutes  [x] Therapeutic activities 23687 10 minutes  [] Therapeutic exercises 70945 - minutes  [] Neuromuscular reeducation 49822 - minutes     [place] [id number]

## 2022-08-01 NOTE — PROGRESS NOTES
[facility] Attending    S: 68 y.o. female with fall. CTs neg. Hypercalcemia, nl CK. Lasix and aldactone held 2/2 REN. Ammonia high. Today, lethargic and mildly confused. Able to state location to me    O: VS- Blood pressure 112/61, pulse 85, temperature 97.5 °F (36.4 °C), temperature source Oral, resp. rate 18, height 5' 2.5\" (1.588 m), weight 157 lb 14.4 oz (71.6 kg), SpO2 100 %. Exam is as noted by resident with the following changes, additions or corrections:  General: NAD, appropriate affect and grooming. AOx2.5   CV:  RRR, no gallops, rubs, 3/6 IVAN LUSB   Resp: CTAB   Abd:  Soft, nontender, mildly distended   Ext:  No edema    Impressions:   Principal Problem:    Hyperammonemia (HCC)  Active Problems:    Interstitial lung disease (HCC)    Hyponatremia    Hypercalcemia    Type 2 diabetes mellitus (HCC)    Obstructive sleep apnea syndrome    Essential hypertension    Hyperkalemia    Cirrhosis (HCC)    REN (acute kidney injury) ([facility]    Asymptomatic microscopic hematuria  Resolved Problems:    * No resolved hospital problems. *      Plan:     Fall - scans neg for injury. PT/OT. Scalp lac - healing, CTM  AMS - 2/2 hypercalcemia vs hyperammonemia. Increase lactulose. Monitor BMs in hospital.   Hypercalcemia - IVF per nephro. Cinacalcet  Volume overload with ascites and pleural effusion 2/2 cirrhosis - held diuretics 2/2 REN. Creatinine improving. Would restart at lower dose. Lasix 20/aldactone 100 daily. Monitor lytes/BUN/creat       Attending Physician Statement  I have reviewed the chart, including any radiology or labs, and seen the patient with the resident(s). I personally reviewed and performed key elements of the history and exam.  I agree with the assessment, plan and orders as documented by the resident. Please refer to the resident note for additional information.       Electronically signed by Td Mcdaniel MD on 8/1/2022 at 8:48 AM

## 2022-08-01 NOTE — PROGRESS NOTES
5' 2.5\" (1.588 m)   Wt 157 lb 14.4 oz (71.6 kg)   SpO2 98%   BMI 28.42 kg/m²   24 hour I&O: I/O last 3 completed shifts: In: 3414.8 [I.V.:3414.8]  Out: 1850 [Urine:1850]  No intake/output data recorded. Physical Exam  Constitutional:       General: She is not in acute distress. Appearance: She is ill-appearing. She is not toxic-appearing. Comments: Significantly drowsy   HENT:      Head: Normocephalic. Comments: Healing scalp laceration     Mouth/Throat:      Mouth: Mucous membranes are moist.      Pharynx: Oropharynx is clear. Eyes:      General: No scleral icterus. Conjunctiva/sclera: Conjunctivae normal.   Cardiovascular:      Rate and Rhythm: Normal rate and regular rhythm. Pulses: Normal pulses. Heart sounds: Normal heart sounds. Pulmonary:      Effort: Pulmonary effort is normal.      Breath sounds: Normal breath sounds. Abdominal:      General: Bowel sounds are normal. There is distension (mild). Palpations: Abdomen is soft. Tenderness: There is abdominal tenderness (diffuse, mild). There is no guarding or rebound. Musculoskeletal:         General: Signs of injury (abrasion of elbow) present. No tenderness. Cervical back: Normal range of motion and neck supple. Right lower leg: Edema present. Left lower leg: Edema present. Skin:     General: Skin is warm and dry. Coloration: Skin is jaundiced (slight). Neurological:      Mental Status: She is disoriented. Motor: No weakness. Comments: A&O x2       Labs:  Na/K/Cl/CO2:  135/4.9/107/18 (08/01 2547)  BUN/Cr/glu/ALT/AST/amyl/lip:  23/1.2/--/14/35/--/-- (08/01 0443)  WBC/Hgb/Hct/Plts:  5.0/10.8/32.9/164 (08/01 0443)  estimated creatinine clearance is 37 mL/min (A) (based on SCr of 1.2 mg/dL (H)). Other pertinent labs as noted below    Radiology:  [facility] 157   Final Result   1. Left greater than right renal cortical thinning.   Bilateral intrarenal calcifications. Extrarenal pelvis on the right. No hydronephrosis. 2.  Normal appearance of the imaged bladder. US ABDOMEN COMPLETE   Final Result   Ascites present right upper quadrant         CT Head WO Contrast   Final Result   No acute intracranial abnormality. CT Cervical Spine WO Contrast   Final Result   No acute abnormality of the cervical spine. Multilevel degenerative changes. CT Lumbar Spine WO Contrast   Final Result   No acute fracture or traumatic malalignment of the lumbar spine. CT THORACIC SPINE WO CONTRAST   Final Result   No evidence of acute thoracic spine trauma. Left pleural effusion. XR ELBOW RIGHT (2 VIEWS)   Final Result   No acute fracture or dislocation. XR PELVIS (1-2 VIEWS)   Final Result   No fracture or dislocation. Osteo-degenerative changes again noted involving   the visualized lumbar spine, when compared to the previous study performed   08/17/2021. XR CHEST PORTABLE   Final Result   No acute process. Resident Assessment and Plan       Nonalcoholic liver cirrhosis with possible hepatic encephalopathy  Patient's ammonia was 356 this morning and she has not had a bowel movement since yesterday. Lactulose increased to 20 mg 3 times daily. Will titrate to the point that patient is having 2-4 bowel movements per day. Patient's diet was also adjusted to have protein restriction. Will resume Lasix and Aldactone, with 20 mg Lasix and 100 mg Aldactone. Continue with strict I's and O's, and daily weights. 2.  Acute kidney injury  Patient's creatinine was 1.7 on admission. Creatinine today was 1.2, closer to baseline of 1.1. Nephrology is following the patient. Patient is receiving continuous sodium chloride infusion for hydration. Calcium corrected for albumin today was 11.6. Parathyroid hormone is elevated.   Patient was started on Cinacalcet by nephrology  Will need outpatient evaluation for parathyroidectomy     3. Hyponatremia  Sodium today was 135, up from 130 yesterday. Restarting Lasix. Will continue to monitor. 4.  Hyperkalemia  Potassium today was 4.9 Patient receiving IVF. Restarting aldactone. Will continue to monitor     5. Hypercalcemia  Patient suspected to have primary hyperparathyroidism. Will need outpatient evaluation for parathyroidectomy    6. History of unprovoked PE  Restarting home Eliquis 5 mg twice daily  Patient has no active bleeding and CT head showed no acute abnormality    7. Type 2 diabetes  Glucose this morning was 59, likely due to poor p.o. intake as result of hyperammonemia. Will titrate lactulose to decrease ammonia levels and attempt to normalize glucose by increasing p.o. intake    8. Interstitial lung disease  Continue Brovana, Pulmicort, as needed albuterol    9. Chronic hematuria  Continue to monitor hemoglobin  Has had instances of hematuria in the past    10. Hypertension  Continue home amlodipine 2.5 mg    11. KATE  Continue CPAP at night    12.   Major depressive disorder  Continue home Effexor 75 mg daily      DVT prophylaxis: Eliquis and PCD  GI prophylaxis: Protonix  Diet: Adult regular diet with low potassium, low calcium, low protein        Electronically signed by Jean-Pierre Ahn MD on 8/1/2022 at 7:52 AM  Attending physician: Dr. Jeanette Thayer

## 2022-08-02 LAB
ALBUMIN SERPL-MCNC: 2.5 G/DL (ref 3.5–5.2)
ALP BLD-CCNC: 104 U/L (ref 35–104)
ALT SERPL-CCNC: 14 U/L (ref 0–32)
AMMONIA: 69 UMOL/L (ref 11–51)
ANION GAP SERPL CALCULATED.3IONS-SCNC: 8 MMOL/L (ref 7–16)
AST SERPL-CCNC: 33 U/L (ref 0–31)
BASOPHILS ABSOLUTE: 0.08 E9/L (ref 0–0.2)
BASOPHILS RELATIVE PERCENT: 1.7 % (ref 0–2)
BILIRUB SERPL-MCNC: 2 MG/DL (ref 0–1.2)
BUN BLDV-MCNC: 21 MG/DL (ref 6–23)
CALCIUM SERPL-MCNC: 9.8 MG/DL (ref 8.6–10.2)
CHLORIDE BLD-SCNC: 105 MMOL/L (ref 98–107)
CO2: 20 MMOL/L (ref 22–29)
CREAT SERPL-MCNC: 1.2 MG/DL (ref 0.5–1)
EOSINOPHILS ABSOLUTE: 0.24 E9/L (ref 0.05–0.5)
EOSINOPHILS RELATIVE PERCENT: 5.2 % (ref 0–6)
GFR AFRICAN AMERICAN: 53
GFR NON-AFRICAN AMERICAN: 44 ML/MIN/1.73
GLUCOSE BLD-MCNC: 59 MG/DL (ref 74–99)
HCT VFR BLD CALC: 29.1 % (ref 34–48)
HEMOGLOBIN: 9.7 G/DL (ref 11.5–15.5)
IMMATURE GRANULOCYTES #: 0 E9/L
IMMATURE GRANULOCYTES %: 0 % (ref 0–5)
LYMPHOCYTES ABSOLUTE: 0.91 E9/L (ref 1.5–4)
LYMPHOCYTES RELATIVE PERCENT: 19.6 % (ref 20–42)
MAGNESIUM: 1.8 MG/DL (ref 1.6–2.6)
MCH RBC QN AUTO: 32.7 PG (ref 26–35)
MCHC RBC AUTO-ENTMCNC: 33.3 % (ref 32–34.5)
MCV RBC AUTO: 98 FL (ref 80–99.9)
METER GLUCOSE: 150 MG/DL (ref 74–99)
METER GLUCOSE: 53 MG/DL (ref 74–99)
MONOCYTES ABSOLUTE: 0.51 E9/L (ref 0.1–0.95)
MONOCYTES RELATIVE PERCENT: 11 % (ref 2–12)
NEUTROPHILS ABSOLUTE: 2.91 E9/L (ref 1.8–7.3)
NEUTROPHILS RELATIVE PERCENT: 62.5 % (ref 43–80)
PDW BLD-RTO: 19.8 FL (ref 11.5–15)
PLATELET # BLD: 150 E9/L (ref 130–450)
PMV BLD AUTO: 11.2 FL (ref 7–12)
POTASSIUM SERPL-SCNC: 4.6 MMOL/L (ref 3.5–5)
RBC # BLD: 2.97 E12/L (ref 3.5–5.5)
RETINYL PALMITATE: <0.02 MG/L (ref 0–0.1)
SODIUM BLD-SCNC: 133 MMOL/L (ref 132–146)
TOTAL PROTEIN: 6.4 G/DL (ref 6.4–8.3)
VITAMIN A LEVEL: 0.1 MG/L (ref 0.3–1.2)
VITAMIN A, INTERP: ABNORMAL
WBC # BLD: 4.7 E9/L (ref 4.5–11.5)

## 2022-08-02 PROCEDURE — 85025 COMPLETE CBC W/AUTO DIFF WBC: CPT

## 2022-08-02 PROCEDURE — 99232 SBSQ HOSP IP/OBS MODERATE 35: CPT | Performed by: FAMILY MEDICINE

## 2022-08-02 PROCEDURE — 6370000000 HC RX 637 (ALT 250 FOR IP)

## 2022-08-02 PROCEDURE — 97535 SELF CARE MNGMENT TRAINING: CPT

## 2022-08-02 PROCEDURE — 6370000000 HC RX 637 (ALT 250 FOR IP): Performed by: INTERNAL MEDICINE

## 2022-08-02 PROCEDURE — 2580000003 HC RX 258: Performed by: FAMILY MEDICINE

## 2022-08-02 PROCEDURE — 80053 COMPREHEN METABOLIC PANEL: CPT

## 2022-08-02 PROCEDURE — 83735 ASSAY OF MAGNESIUM: CPT

## 2022-08-02 PROCEDURE — 6360000002 HC RX W HCPCS

## 2022-08-02 PROCEDURE — 82140 ASSAY OF AMMONIA: CPT

## 2022-08-02 PROCEDURE — 1200000000 HC SEMI PRIVATE

## 2022-08-02 PROCEDURE — 82962 GLUCOSE BLOOD TEST: CPT

## 2022-08-02 PROCEDURE — 97165 OT EVAL LOW COMPLEX 30 MIN: CPT

## 2022-08-02 PROCEDURE — 2580000003 HC RX 258

## 2022-08-02 PROCEDURE — 2580000003 HC RX 258: Performed by: INTERNAL MEDICINE

## 2022-08-02 PROCEDURE — 94640 AIRWAY INHALATION TREATMENT: CPT

## 2022-08-02 PROCEDURE — 36415 COLL VENOUS BLD VENIPUNCTURE: CPT

## 2022-08-02 RX ORDER — DEXTROSE MONOHYDRATE 100 MG/ML
INJECTION, SOLUTION INTRAVENOUS CONTINUOUS PRN
Status: DISCONTINUED | OUTPATIENT
Start: 2022-08-02 | End: 2022-08-04 | Stop reason: HOSPADM

## 2022-08-02 RX ORDER — LACTULOSE 10 G/15ML
20 SOLUTION ORAL 4 TIMES DAILY
Status: DISCONTINUED | OUTPATIENT
Start: 2022-08-02 | End: 2022-08-04 | Stop reason: HOSPADM

## 2022-08-02 RX ADMIN — LACTULOSE 20 G: 20 SOLUTION ORAL at 09:00

## 2022-08-02 RX ADMIN — APIXABAN 5 MG: 5 TABLET, FILM COATED ORAL at 20:42

## 2022-08-02 RX ADMIN — FUROSEMIDE 20 MG: 20 TABLET ORAL at 08:54

## 2022-08-02 RX ADMIN — ARFORMOTEROL TARTRATE 15 MCG: 15 SOLUTION RESPIRATORY (INHALATION) at 08:19

## 2022-08-02 RX ADMIN — CINACALCET HYDROCHLORIDE 30 MG: 30 TABLET, FILM COATED ORAL at 08:57

## 2022-08-02 RX ADMIN — DOXAZOSIN 1 MG: 1 TABLET ORAL at 20:42

## 2022-08-02 RX ADMIN — SODIUM CHLORIDE, PRESERVATIVE FREE 10 ML: 5 INJECTION INTRAVENOUS at 20:42

## 2022-08-02 RX ADMIN — PANTOPRAZOLE SODIUM 40 MG: 40 TABLET, DELAYED RELEASE ORAL at 05:35

## 2022-08-02 RX ADMIN — SODIUM BICARBONATE 1300 MG: 650 TABLET ORAL at 20:41

## 2022-08-02 RX ADMIN — APIXABAN 5 MG: 5 TABLET, FILM COATED ORAL at 08:58

## 2022-08-02 RX ADMIN — LACTULOSE 20 G: 20 SOLUTION ORAL at 20:42

## 2022-08-02 RX ADMIN — SODIUM BICARBONATE 1300 MG: 650 TABLET ORAL at 09:06

## 2022-08-02 RX ADMIN — BUDESONIDE 500 MCG: 0.5 SUSPENSION RESPIRATORY (INHALATION) at 08:19

## 2022-08-02 RX ADMIN — SPIRONOLACTONE 100 MG: 25 TABLET ORAL at 08:55

## 2022-08-02 RX ADMIN — TRIMETHOBENZAMIDE HYDROCHLORIDE 200 MG: 100 INJECTION INTRAMUSCULAR at 15:47

## 2022-08-02 RX ADMIN — AMLODIPINE BESYLATE 2.5 MG: 2.5 TABLET ORAL at 08:57

## 2022-08-02 RX ADMIN — SODIUM CHLORIDE: 9 INJECTION, SOLUTION INTRAVENOUS at 19:18

## 2022-08-02 RX ADMIN — LACTULOSE 20 G: 20 SOLUTION ORAL at 17:24

## 2022-08-02 RX ADMIN — LACTULOSE 20 G: 20 SOLUTION ORAL at 12:29

## 2022-08-02 RX ADMIN — RIFAXIMIN 550 MG: 550 TABLET ORAL at 20:42

## 2022-08-02 RX ADMIN — DEXTROSE MONOHYDRATE 250 ML: 100 INJECTION, SOLUTION INTRAVENOUS at 08:22

## 2022-08-02 RX ADMIN — RIFAXIMIN 550 MG: 550 TABLET ORAL at 08:59

## 2022-08-02 RX ADMIN — VENLAFAXINE HYDROCHLORIDE 75 MG: 75 CAPSULE, EXTENDED RELEASE ORAL at 08:58

## 2022-08-02 ASSESSMENT — PAIN SCALES - GENERAL: PAINLEVEL_OUTOF10: 0

## 2022-08-02 NOTE — PROGRESS NOTES
[facility] MD Olivia Bateman, MD Raelene Heimlich, MD Bella Arenas, CNP   Sirena Griggs, KALYN  Progress Note    8/2/2022    SUBJECTIVE:   7/31: Somewhat more awake and alert and interactive today, though still not back to baseline. Ongoing fatigue, malaise, generalized weakness. Denies sob/hinojosa/cp/palp Appetite ok    8/2: Resting company. Denies complaint. Ongoing poor intake. Ongoing fatigue and malaise, generalized weakness, essentially bedbound. Otherwise ROS unremarkable    PROBLEM LIST:    Principal Problem:    Hyperammonemia ([facility]  Active Problems:    Interstitial lung disease (HCC)    Hyponatremia    Hypercalcemia    Type 2 diabetes mellitus (HCC)    Obstructive sleep apnea syndrome    Essential hypertension    Hyperkalemia    Cirrhosis (HCC)    REN (acute kidney injury) ([facility]    Asymptomatic microscopic hematuria  Resolved Problems:    * No resolved hospital problems. *         DIET:    ADULT DIET; Regular; Low Potassium (Less than 3000 mg/day);  Less than 60 gm     MEDS (scheduled):    lactulose  20 g Oral 4x Daily    furosemide  20 mg Oral Daily    spironolactone  100 mg Oral Daily    cinacalcet  30 mg Oral Daily    sodium bicarbonate  1,300 mg Oral BID    amLODIPine  2.5 mg Oral Daily    apixaban  5 mg Oral BID    doxazosin  1 mg Oral Nightly    [Held by provider] furosemide  40 mg Oral BID    pantoprazole  40 mg Oral QAM AC    venlafaxine  75 mg Oral Daily    [Held by provider] Vitamin D  1,000 Units Oral Daily    rifAXIMin  550 mg Oral BID    sodium chloride flush  5-40 mL IntraVENous 2 times per day    Arformoterol Tartrate  15 mcg Nebulization BID    budesonide  500 mcg Nebulization BID    dextrose  25 g IntraVENous Once       MEDS (infusions):   dextrose      sodium chloride      sodium chloride 100 mL/hr at 08/02/22 1055       MEDS (prn):  glucose, dextrose bolus **OR** dextrose bolus, glucagon (rDNA), dextrose, albuterol, sodium chloride flush, sodium chloride, polyethylene glycol, acetaminophen **OR** acetaminophen, senna, trimethobenzamide, hydrALAZINE    PHYSICAL EXAM:     Patient Vitals for the past 24 hrs:   BP Temp Temp src Pulse Resp SpO2 Weight   08/02/22 0800 128/60 97.7 °F (36.5 °C) Temporal 96 18 98 % --   08/02/22 0049 -- -- -- -- -- -- 152 lb 9.6 oz (69.2 kg)   08/01/22 2045 120/66 98.6 °F (37 °C) Temporal 87 16 98 % --   @      Intake/Output Summary (Last 24 hours) at 8/2/2022 1832  Last data filed at 8/2/2022 1055  Gross per 24 hour   Intake 5029.81 ml   Output 1000 ml   Net 4029.81 ml         Wt Readings from Last 3 Encounters:   08/02/22 152 lb 9.6 oz (69.2 kg)   07/12/22 191 lb 3.2 oz (86.7 kg)   06/23/22 181 lb 3.2 oz (82.2 kg)       Constitutional:  in no acute distress  HEENT: NC/AT, EOMI, sclera and conjunctiva are clear and anicteric, mucus membranes moist  Neck: Trachea midline, no JVD  Cardiovascular: S1, S2 regular rhythm, no murmur,or rub  Respiratory:  No crackles, no wheeze  Gastrointestinal:  Soft, nontender, nondistended, NABS  Ext: Mild dependent edema, feet warm  Skin: dry, no rash  Neuro: awake, alert, interactive      DATA:    Recent Labs     07/31/22  0540 08/01/22  0443 08/02/22  0419   WBC 6.1 5.0 4.7   HGB 11.7 10.8* 9.7*   HCT 35.0 32.9* 29.1*   MCV 97.5 99.7 98.0    164 150     Recent Labs     07/31/22  0540 07/31/22  1052 08/01/22  0443 08/02/22  0419    130* 135 133   K 5.7* 5.1* 4.9 4.6    104 107 105   CO2 17* 18* 18* 20*   MG 2.4  --  2.1 1.8   PHOS 2.7  --  2.5  --    BUN 26* 26* 23 21   CREATININE 1.3* 1.3* 1.2* 1.2*   ALT 16  --  14 14   AST 39*  --  35* 33*   BILITOT 2.3*  --  2.3* 2.0*   ALKPHOS 109*  --  113* 104       Lab Results   Component Value Date    LABPROT 0.2 07/30/2022    LABPROT 0.2 07/30/2022       Assessment  Acute kidney injury, likely prerenal azotemia due to poor intake of food and fluid    Hypercalcemia due to primary hyperparathyroidism, exacerbated by volume contraction, intact     Acute mental status change due to hyperammonia/hepatic encephalopathy due to HOLDEN and noncompliance/nonadherence to lactulose regimen    Hyponatremia, likely multifactorial, due to cirrhosis, diarrhea due to lactulose    Metabolic acidosis, non-anion gap, secondary to diarrhea due to lactulose, REN, CKD, probably mildly exacerbated by the NS drip    Calcium improved status post IV fluid resuscitation with normal saline, as well as starting Cinacalcet    Recommendations  Stopped IV fluid  Encourage oral intake  At some point soon evaluate for parathyroidectomy  short-term be better served with Cinacalcet (Sensipar) --started at 30 mg daily  Follow labs, UO  Continue lactulose  Continue supportive care        Electronically signed by Tiffanie Perez MD on 8/2/2022

## 2022-08-02 NOTE — PROGRESS NOTES
[facility] Inpatient   Resident Progress Note    S:  Hospital day: 1   Brief Synopsis: Cayla Zarate is a 68 y.o. female PMH of nonalcoholic ascites, chylothorax, T2DM, breast cancer s/p lumpectomy, unprovoked PE who presented with fall with positive LOC. Patient's ammonia was 163 on admission. Patient was also found to be in REN with creatinine 1.7 up from baseline of approximately 1.1. Patient was also found to be hypercalcemic. Nephrology has been consulted and started the patient on Cinacalcet and IV fluids. Patient's ammonia level yesterday was found to be 356, so lactulose was increased and a protein restriction was added to her diet.     Overnight/interim:   Patient reports that she feels well today aside from mild epigastric tenderness  Patient has still not had a bowel movement  Patient's mentation appears significantly improved compared to yesterday          Cont meds:    sodium chloride      dextrose      sodium chloride 100 mL/hr at 08/01/22 2058     Scheduled meds:    lactulose  20 g Oral TID    furosemide  20 mg Oral Daily    spironolactone  100 mg Oral Daily    cinacalcet  30 mg Oral Daily    sodium bicarbonate  1,300 mg Oral BID    amLODIPine  2.5 mg Oral Daily    apixaban  5 mg Oral BID    doxazosin  1 mg Oral Nightly    [Held by provider] furosemide  40 mg Oral BID    pantoprazole  40 mg Oral QAM AC    venlafaxine  75 mg Oral Daily    [Held by provider] Vitamin D  1,000 Units Oral Daily    rifAXIMin  550 mg Oral BID    sodium chloride flush  5-40 mL IntraVENous 2 times per day    Arformoterol Tartrate  15 mcg Nebulization BID    budesonide  500 mcg Nebulization BID    dextrose  25 g IntraVENous Once     PRN meds: albuterol, sodium chloride flush, sodium chloride, polyethylene glycol, acetaminophen **OR** acetaminophen, senna, trimethobenzamide, glucose, dextrose bolus **OR** dextrose bolus, glucagon (rDNA), dextrose, hydrALAZINE     I reviewed the patient's past medical and surgical history, Medications and Allergies. O:  /66   Pulse 87   Temp 98.6 °F (37 °C) (Temporal)   Resp 16   Ht 5' 2.5\" (1.588 m)   Wt 152 lb 9.6 oz (69.2 kg)   SpO2 98%   BMI 27.47 kg/m²   24 hour I&O: I/O last 3 completed shifts: In: 2896.5 [P.O.:180; I.V.:2716.5]  Out: 1800 [Urine:1800]  I/O this shift:  In: -   Out: 1000 [Urine:1000]        Physical Exam  Constitutional:       General: She is not in acute distress. Appearance: She is not ill-appearing or toxic-appearing. HENT:      Head: Normocephalic. Comments: Healing scalp lac     Nose: Nose normal.      Mouth/Throat:      Mouth: Mucous membranes are moist.      Pharynx: Oropharynx is clear. Eyes:      Conjunctiva/sclera: Conjunctivae normal.      Pupils: Pupils are equal, round, and reactive to light. Cardiovascular:      Rate and Rhythm: Normal rate and regular rhythm. Pulses: Normal pulses. Heart sounds: Normal heart sounds. No murmur heard. No friction rub. No gallop. Pulmonary:      Effort: Pulmonary effort is normal.      Breath sounds: Normal breath sounds. Abdominal:      General: Bowel sounds are normal. There is distension. Palpations: Abdomen is soft. Tenderness: There is abdominal tenderness (mild epigastric). Musculoskeletal:         General: Normal range of motion. Cervical back: Normal range of motion and neck supple. Right lower leg: Edema present. Left lower leg: Edema present. Skin:     General: Skin is warm and dry. Coloration: Skin is jaundiced (mild). Neurological:      Mental Status: She is disoriented. Cranial Nerves: No cranial nerve deficit.       Comments: A&O x2  Improved responsiveness today               Labs:  Na/K/Cl/CO2:  133/4.6/105/20 (08/02 0419)  BUN/Cr/glu/ALT/AST/amyl/lip:  21/1.2/--/14/33/--/-- (08/02 0419)  WBC/Hgb/Hct/Plts:  4.7/9.7/29.1/150 (08/02 0419)  estimated creatinine clearance is 36 mL/min (A) (based on SCr of 1.2 mg/dL (H)).  Other pertinent labs as noted below    Radiology:  US RETROPERITONEAL COMPLETE   Final Result   1. Left greater than right renal cortical thinning. Bilateral intrarenal   calcifications. Extrarenal pelvis on the right. No hydronephrosis. 2.  Normal appearance of the imaged bladder. US ABDOMEN COMPLETE   Final Result   Ascites present right upper quadrant         CT Head WO Contrast   Final Result   No acute intracranial abnormality. CT Cervical Spine WO Contrast   Final Result   No acute abnormality of the cervical spine. Multilevel degenerative changes. CT Lumbar Spine WO Contrast   Final Result   No acute fracture or traumatic malalignment of the lumbar spine. CT THORACIC SPINE WO CONTRAST   Final Result   No evidence of acute thoracic spine trauma. Left pleural effusion. XR ELBOW RIGHT (2 VIEWS)   Final Result   No acute fracture or dislocation. XR PELVIS (1-2 VIEWS)   Final Result   No fracture or dislocation. Osteo-degenerative changes again noted involving   the visualized lumbar spine, when compared to the previous study performed   08/17/2021. XR CHEST PORTABLE   Final Result   No acute process. Resident Assessment and Plan       Nonalcoholic liver cirrhosis with possible hepatic encephalopathy  Repeat ammonia today was 69. Patient's mentation has significantly improved compared to yesterday, however she is still not had a bowel movement. Lactulose has been increased to 20 mg 4 times daily. Plan to titrate to the point that the patient is having 2-4 bowel movements per day. Lasix and Aldactone have been resumed. Continue with strict I's and O's and daily weights. 2.  Acute kidney injury  Patient's creatinine today was 1.2, which is close to her baseline. She is receiving continuous normal saline infusion for hydration. Calcium corrected for albumin today was 11.   Nephrology is following the patient and has started Cinacalcet. 3.  Hyponatremia  Sodium today was 133. We will continue to monitor. Lasix has been restarted     4. Hyperkalemia  Potassium today was 4.6. Aldactone has been restarted. We will continue to monitor     5. Hypercalcemia  Calcium corrected for albumin was 11. Primary hyperparathyroidism suspected as cause. Patient will need outpatient evaluation for parathyroidectomy    6. History of unprovoked PE  Restarting home Eliquis 5 mg twice daily. Patient has no active bleeding and head CT showed no abnormality    7. Type 2 diabetes  Glucose this morning was 53 likely due to poor p.o. intake. Per hypoglycemia protocol patient received 10% dextrose bolus. Repeat blood glucose afterwards was 150. Patient was encouraged to increase oral intake    8. Interstitial lung disease  Continue Brovana, Pulmicort, as needed albuterol     9. Chronic hematuria  Continue to monitor hemoglobin  Has had instances of hematuria in the past    10. Hypertension  Continue home amlodipine 2.5 mg     11. KATE  Continue CPAP at night    12.   Major depressive disorder  Continue home Effexor 75 mg daily         DVT prophylaxis: Eliquis and PCD's  GI prophylaxis: Protonix  Diet: Adult regular diet with low potassium, low calcium, low protein        Electronically signed by Polo Hart MD on 8/2/2022 at 2131 \[facility]\""  Attending physician: Dr. Angela Sampson

## 2022-08-02 NOTE — PROGRESS NOTES
[address] [address]                                                Patient Name: Alfreda Camp  MRN: [id number]  : 1945  Room: [address] [place] [address]     Evaluating OT:Kelsey Esparza OTR/L   License #  [id number]       Referring Provider: Shaquille Patel MD    Specific Provider Orders/Date: OT evaluation & treatment        Diagnosis:  Hyperammonemia, REN, Fall    Pertinent Medical History:  has a past medical history of Breast cancer ([facility] [address] Cirrhosis ([facility] [address] Essential hypertension, Hx of blood clots, Hyperlipidemia, Osteopenia, 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 ([facility] [address] Sleep apnea, Spinal stenosis, and Type 2 diabetes mellitus ([facility] [address] Surgery: none this admit    Past Surgical History:  has a past surgical history that includes Hysterectomy; Carpal tunnel release; Lithotripsy (Left, 2016); Colonoscopy (2017); Breast lumpectomy; Upper gastrointestinal endoscopy (2019); Colonoscopy (2019); Upper gastrointestinal endoscopy (N/A, 2019); Colonoscopy (N/A, 2019); Colonoscopy (2019); Shoulder Arthroplasty (Left, 2020); cardiovascular stress test; and Bladder surgery (Right, 2021).        Precautions:  Fall Risk, bed alarm      Assessment of current deficits    [x] Functional mobility            [x]ADLs           [x] Strength                  [x]Cognition    [x] Functional transfers          [x] IADLs         [x] Safety Awareness   [x]Endurance    [x] Fine Coordination                         [x] Balance      [] Vision/perception   [x]Sensation      []Gross Motor Coordination             [] ROM           [] Delirium                   [] Motor Control      OT PLAN OF CARE   OT POC based on physician orders, patient diagnosis and results of clinical assessment     Frequency/Duration: 2-4 days/wk for 2 weeks PRN   Specific OT Treatment Interventions to include: Instruction/training on adapted ADL techniques and AE recommendations to increase functional independence within precautions  Training on energy conservation strategies, correct breathing pattern and techniques to improve independence/tolerance for self-care routine  Functional transfer/mobility training/DME recommendations for increased independence, safety, and fall prevention  Patient/Family education to increase follow through with safety techniques and functional independence  Recommendation of environmental modifications for increased safety with functional transfers/mobility and ADLs  Cognitive retraining/development of therapeutic activities to improve problem solving, judgement, memory, and attention for increased safety/participation in ADL/IADL tasks  Therapeutic exercise to improve motor endurance, ROM, and functional strength for ADLs/functional transfers  Therapeutic activities to facilitate/challenge dynamic balance, stand tolerance for increased safety and independence with ADLs  Therapeutic activities to facilitate gross/fine motor skills for increased independence with ADLs  Positioning to improve skin integrity, interaction with environment and functional independence     Recommended Adaptive Equipment:  TBD      Home Living: Pt from facility, plans to return for [facility]  Bathroom setup: accessible   Equipment owned: at facility, q0cane     Prior Level of Function: assist with ADLs , full assist with IADLs; ambulated q-cane  Driving: NA  Occupation: none stated     Pain Level: no c/p pain  Cognition: A&O: 1-2/4 (self & grossly to place);  Follows 1 step directions              Memory:  F-              Sequencing:  F              Problem solving:  F              Judgement/safety:  F-                Functional Assessment:  AM-PAC Daily Activity Raw Score: 15/24    Initial Eval Status  Date: 8-2-22 Treatment Status  Date: STGs = LTGs  Time frame: 10-14 days Feeding Set up   Mod I/ Ind   Grooming Min A   Modified [place]    UB Dressing Min A   Supervision    LB Dressing Max A   Supervision    Bathing Max A with sim. task   Supervision    Toileting Max A with bedpan use, hygiene   Supervision    Bed Mobility  Supine to sit:   Min A  Sit to supine:   Min A   Supine to sit: Supervision   Sit to supine: Supervision    Functional Transfers Min A with sit <> stand using ww   Supervision    Functional Mobility Mod A with side steps using ww   Supervision    Balance Sitting:     Static:  SBA    Dynamic:CGA  Standing:   Min/Mod A       Activity Tolerance F-   F+/G   Visual/  Perceptual Glasses: no          Vitals spO2 & HR WFL   WFL      Hand Dominance R    AROM (PROM) Strength Additional Info:    RUE  WFL 4-/5 good  and wfl FMC/dexterity noted during ADL tasks      LUE L shld. to 60  WFL distally L shld. 3-/5  Distally 4-/5 good  and wfl FMC/dexterity noted during ADL tasks         Hearing: [facility]   Sensation:  No c/o numbness or tingling B UE  Tone: WFL B UE  Edema: none B UE     Comments: Upon arrival patient supine, agreeable to OT, cleared by Nursing. Therapist facilitated bed mobility, ADLs, functional transfer training with focus on safety, technique & precautions. Pt. Instructed RE: safe transfers/mobility, ADLs, role of OT, treatment plan, recs. , prec. At end of session, patient returned to supine, all needs met, RN notified, with call light and phone within reach, all lines and tubes intact. Overall patient demonstrated decreased strength, balance, independence & safety during completion of ADL/functional transfer/mobility tasks. Pt would benefit from continued skilled OT to increase safety and independence with completion of ADL/IADL tasks for functional independence and quality of life.      Treatment: OT treatment provided this date includes:   Instruction/training on safety and adapted techniques for completion of ADLs: to increase [place] in self care   Instruction/training on safe functional mobility/transfer techniques: with focus on safety, technique & precautions   Instruction/training on energy conservation/work simplification for completion of ADLs: techniques to increase [place] with self care ADLs & iADLs, work simplification to improve endurance   Proper Positioning/Alignment: for optimal healing, skin integrity to prevent breakdown, decrease edema  Skilled monitoring of vitals: to include BP, spO2 & HR during session  Sitting/standing Balance/Tolerance- to increased balance & activity tolerance during ADLs as well as facilitate proper posture and/or positioning. Therapeutic exercise- Instruction on B UE ROM exercises to improve strength/function for increased [place] with ADLs & iADLs     Rehab Potential: Good for established goals     Patient / Family Goal: to [facility] better. \"       Patient and/or family were instructed on functional diagnosis, prognosis/goals and OT plan of care. Demonstrated F understanding. Eval Complexity: Low     Time In: 14:20  Time Out: 14:45  Total Treatment Time: 10    Min Units   OT Eval Low 13278  x     OT Eval Medium 45232       OT Eval High 97642       OT Re-Eval [id number]       Therapeutic Ex 32499       Therapeutic Activities 61191       ADL/Self Care 94550  10  1   Orthotic Management 70519       Manual [id number]       Neuro Re-Ed 51865       Non-Billable Time          Evaluation Time additionally includes thorough review of current medical information, gathering information on past medical history/social history and prior level of function, interpretation of standardized testing/informal observation of tasks, assessment of data and development of plan of care and goals. Kelsey Esparza, OTR/L   License #  [id number]

## 2022-08-02 NOTE — PROGRESS NOTES
[facility] Attending    S: 68 y.o. female with fall. CTs neg. Hypercalcemia, nl CK. Lasix and aldactone held 2/2 REN. Ammonia high. Confused. Today, less confused. Oriented X 3 (knows year, not day). Still not much appetite    O: VS- Blood pressure 120/66, pulse 87, temperature 98.6 °F (37 °C), temperature source Temporal, resp. rate 16, height 5' 2.5\" (1.588 m), weight 152 lb 9.6 oz (69.2 kg), SpO2 98 %. Exam is as noted by resident with the following changes, additions or corrections:  General: NAD, appropriate affect and grooming. CV:  RRR, no gallops, rubs, 3/6 IVAN LUSB   Resp: CTAB   Abd:  Soft, mildly tender, mildly distended   Ext:  No edema     Ammonia now 69, sugar still low    Impressions:   Principal Problem:    Hyperammonemia (HCC)  Active Problems:    Interstitial lung disease (HCC)    Hyponatremia    Hypercalcemia    Type 2 diabetes mellitus (HCC)    Obstructive sleep apnea syndrome    Essential hypertension    Hyperkalemia    Cirrhosis (HCC)    REN (acute kidney injury) ([facility] [address]    Asymptomatic microscopic hematuria  Resolved Problems:    * No resolved hospital problems. *      Plan:     Fall - scans neg for injury. PT/OT. Scalp lac - healing, CTM  AMS - 2/2 hypercalcemia vs hyperammonemia. Increase lactulose. Monitor BMs in hospital. States no BM's since admission  Hypoglycemia protocol, encourage increased oral intake  Hypercalcemia - IVF per nephro. Cinacalcet  Volume overload with ascites and pleural effusion 2/2 cirrhosis - held diuretics 2/2 REN. Creatinine improving. Restarted lasix/aldactone at lower dose, would increase dose tomorrow if creatinine stable       Attending Physician Statement  I have reviewed the chart, including any radiology or labs, and seen the patient with the resident(s). I personally reviewed and performed key elements of the history and exam.  I agree with the assessment, plan and orders as documented by the resident. Please refer to the resident note for additional information.       Electronically signed by Angus Lundberg MD on 8/2/2022 at 7:49 AM

## 2022-08-02 NOTE — PLAN OF CARE
Problem: Chronic Conditions and Co-morbidities  Goal: Patient's chronic conditions and co-morbidity symptoms are monitored and maintained or improved  Outcome: Not Progressing     Problem: Discharge Planning  Goal: Discharge to home or other facility with appropriate resources  Outcome: Not Progressing     Problem: Skin/Tissue Integrity  Goal: Absence of new skin breakdown  Description: 1. Monitor for areas of redness and/or skin breakdown  2. Assess vascular access sites hourly  3. Every 4-6 hours minimum:  Change oxygen saturation probe site  4. Every 4-6 hours:  If on nasal continuous positive airway pressure, respiratory therapy assess nares and determine need for appliance change or resting period.   Outcome: Progressing     Problem: Safety - Adult  Goal: Free from fall injury  Outcome: Progressing     Problem: ABCDS Injury Assessment  Goal: Absence of physical injury  Outcome: Progressing     Problem: Metabolic/Fluid and Electrolytes - Adult  Goal: Electrolytes maintained within normal limits  Outcome: Not Progressing  Goal: Hemodynamic stability and optimal renal function maintained  Outcome: Not Progressing     Problem: Hematologic - Adult  Goal: Maintains hematologic stability  Outcome: Progressing

## 2022-08-02 NOTE — CARE COORDINATION
Discharge plan is to return to [facility] skilled nursing [facility] when medically stable.  Electronically signed by Jose Nair RN on 8/2/2022 at 12:50 PM

## 2022-08-03 LAB
ALBUMIN SERPL-MCNC: 2.4 G/DL (ref 3.5–5.2)
ALP BLD-CCNC: 116 U/L (ref 35–104)
ALT SERPL-CCNC: 15 U/L (ref 0–32)
AMMONIA: 52 UMOL/L (ref 11–51)
ANION GAP SERPL CALCULATED.3IONS-SCNC: 10 MMOL/L (ref 7–16)
AST SERPL-CCNC: 41 U/L (ref 0–31)
BASOPHILS ABSOLUTE: 0.07 E9/L (ref 0–0.2)
BASOPHILS RELATIVE PERCENT: 1.2 % (ref 0–2)
BILIRUB SERPL-MCNC: 2.1 MG/DL (ref 0–1.2)
BUN BLDV-MCNC: 19 MG/DL (ref 6–23)
CALCIUM SERPL-MCNC: 9.5 MG/DL (ref 8.6–10.2)
CHLORIDE BLD-SCNC: 103 MMOL/L (ref 98–107)
CO2: 17 MMOL/L (ref 22–29)
CREAT SERPL-MCNC: 1.1 MG/DL (ref 0.5–1)
EOSINOPHILS ABSOLUTE: 0.27 E9/L (ref 0.05–0.5)
EOSINOPHILS RELATIVE PERCENT: 4.5 % (ref 0–6)
GFR AFRICAN AMERICAN: 58
GFR NON-AFRICAN AMERICAN: 48 ML/MIN/1.73
GLUCOSE BLD-MCNC: 57 MG/DL (ref 74–99)
HCT VFR BLD CALC: 34.1 % (ref 34–48)
HEMOGLOBIN: 10.9 G/DL (ref 11.5–15.5)
IMMATURE GRANULOCYTES #: 0.01 E9/L
IMMATURE GRANULOCYTES %: 0.2 % (ref 0–5)
LYMPHOCYTES ABSOLUTE: 0.93 E9/L (ref 1.5–4)
LYMPHOCYTES RELATIVE PERCENT: 15.7 % (ref 20–42)
MAGNESIUM: 2 MG/DL (ref 1.6–2.6)
MCH RBC QN AUTO: 33.4 PG (ref 26–35)
MCHC RBC AUTO-ENTMCNC: 32 % (ref 32–34.5)
MCV RBC AUTO: 104.6 FL (ref 80–99.9)
METER GLUCOSE: 79 MG/DL (ref 74–99)
MONOCYTES ABSOLUTE: 0.78 E9/L (ref 0.1–0.95)
MONOCYTES RELATIVE PERCENT: 13.1 % (ref 2–12)
NEUTROPHILS ABSOLUTE: 3.88 E9/L (ref 1.8–7.3)
NEUTROPHILS RELATIVE PERCENT: 65.3 % (ref 43–80)
OVALOCYTES: ABNORMAL
PDW BLD-RTO: 20.3 FL (ref 11.5–15)
PLATELET # BLD: 140 E9/L (ref 130–450)
PMV BLD AUTO: 10.7 FL (ref 7–12)
POIKILOCYTES: ABNORMAL
POTASSIUM SERPL-SCNC: 4.6 MMOL/L (ref 3.5–5)
RBC # BLD: 3.26 E12/L (ref 3.5–5.5)
SODIUM BLD-SCNC: 130 MMOL/L (ref 132–146)
TOTAL PROTEIN: 6.5 G/DL (ref 6.4–8.3)
WBC # BLD: 5.9 E9/L (ref 4.5–11.5)

## 2022-08-03 PROCEDURE — 80053 COMPREHEN METABOLIC PANEL: CPT

## 2022-08-03 PROCEDURE — 94640 AIRWAY INHALATION TREATMENT: CPT

## 2022-08-03 PROCEDURE — 85025 COMPLETE CBC W/AUTO DIFF WBC: CPT

## 2022-08-03 PROCEDURE — 82140 ASSAY OF AMMONIA: CPT

## 2022-08-03 PROCEDURE — 82962 GLUCOSE BLOOD TEST: CPT

## 2022-08-03 PROCEDURE — 2580000003 HC RX 258: Performed by: INTERNAL MEDICINE

## 2022-08-03 PROCEDURE — 6370000000 HC RX 637 (ALT 250 FOR IP)

## 2022-08-03 PROCEDURE — 6360000002 HC RX W HCPCS

## 2022-08-03 PROCEDURE — 1200000000 HC SEMI PRIVATE

## 2022-08-03 PROCEDURE — 36415 COLL VENOUS BLD VENIPUNCTURE: CPT

## 2022-08-03 PROCEDURE — 6370000000 HC RX 637 (ALT 250 FOR IP): Performed by: INTERNAL MEDICINE

## 2022-08-03 PROCEDURE — 83735 ASSAY OF MAGNESIUM: CPT

## 2022-08-03 RX ADMIN — DOXAZOSIN 1 MG: 1 TABLET ORAL at 21:20

## 2022-08-03 RX ADMIN — LACTULOSE 20 G: 20 SOLUTION ORAL at 08:46

## 2022-08-03 RX ADMIN — FUROSEMIDE 20 MG: 20 TABLET ORAL at 08:46

## 2022-08-03 RX ADMIN — SODIUM BICARBONATE 1300 MG: 650 TABLET ORAL at 08:46

## 2022-08-03 RX ADMIN — RIFAXIMIN 550 MG: 550 TABLET ORAL at 21:20

## 2022-08-03 RX ADMIN — BUDESONIDE 500 MCG: 0.5 SUSPENSION RESPIRATORY (INHALATION) at 20:40

## 2022-08-03 RX ADMIN — SPIRONOLACTONE 100 MG: 25 TABLET ORAL at 08:46

## 2022-08-03 RX ADMIN — AMLODIPINE BESYLATE 2.5 MG: 2.5 TABLET ORAL at 08:46

## 2022-08-03 RX ADMIN — LACTULOSE 20 G: 20 SOLUTION ORAL at 21:20

## 2022-08-03 RX ADMIN — SODIUM CHLORIDE: 9 INJECTION, SOLUTION INTRAVENOUS at 15:51

## 2022-08-03 RX ADMIN — ARFORMOTEROL TARTRATE 15 MCG: 15 SOLUTION RESPIRATORY (INHALATION) at 10:19

## 2022-08-03 RX ADMIN — VENLAFAXINE HYDROCHLORIDE 75 MG: 75 CAPSULE, EXTENDED RELEASE ORAL at 08:45

## 2022-08-03 RX ADMIN — LACTULOSE 20 G: 20 SOLUTION ORAL at 16:56

## 2022-08-03 RX ADMIN — BUDESONIDE 500 MCG: 0.5 SUSPENSION RESPIRATORY (INHALATION) at 10:19

## 2022-08-03 RX ADMIN — SODIUM BICARBONATE 1300 MG: 650 TABLET ORAL at 21:20

## 2022-08-03 RX ADMIN — RIFAXIMIN 550 MG: 550 TABLET ORAL at 08:46

## 2022-08-03 RX ADMIN — PANTOPRAZOLE SODIUM 40 MG: 40 TABLET, DELAYED RELEASE ORAL at 05:57

## 2022-08-03 RX ADMIN — APIXABAN 5 MG: 5 TABLET, FILM COATED ORAL at 21:20

## 2022-08-03 RX ADMIN — LACTULOSE 20 G: 20 SOLUTION ORAL at 12:56

## 2022-08-03 RX ADMIN — CINACALCET HYDROCHLORIDE 30 MG: 30 TABLET, FILM COATED ORAL at 08:46

## 2022-08-03 RX ADMIN — ARFORMOTEROL TARTRATE 15 MCG: 15 SOLUTION RESPIRATORY (INHALATION) at 20:40

## 2022-08-03 RX ADMIN — APIXABAN 5 MG: 5 TABLET, FILM COATED ORAL at 08:46

## 2022-08-03 NOTE — CARE COORDINATION
Discharge plan is to return to [address] when medically stable.  Electronically signed by Adonis Suresh RN on 8/3/2022 at 9:10 AM

## 2022-08-03 NOTE — PROGRESS NOTES
[facility] Inpatient   Resident Progress Note    S:  Hospital day: 2   Brief Synopsis: Princess Monreal is a 68 y.o. female PMH of nonalcoholic ascites, chylothorax, T2DM, breast cancer s/p lumpectomy, unprovoked PE who presented with fall with positive LOC. Patient's ammonia was 163 on admission. Patient was also found to be in REN with creatinine 1.7 up from baseline of approximately 1.1. Patient was also found to be hypercalcemic. Nephrology has been consulted and started the patient on Cinacalcet and IV fluids. Received 10% dextrose infusion yesterday as per hypoglycemia protocol for blood glucose of 53      Overnight/interim:   Patient's mentation is improved significantly compared to yesterday. She is alert and oriented to person place and time. She also responds to questions more easily than she did yesterday. Reports having a small bowel movement last night. States she has been feeling sick to her stomach off and on since her admission.      Cont meds:    dextrose      sodium chloride      sodium chloride 100 mL/hr at 08/02/22 1918     Scheduled meds:    lactulose  20 g Oral 4x Daily    furosemide  20 mg Oral Daily    spironolactone  100 mg Oral Daily    cinacalcet  30 mg Oral Daily    sodium bicarbonate  1,300 mg Oral BID    amLODIPine  2.5 mg Oral Daily    apixaban  5 mg Oral BID    doxazosin  1 mg Oral Nightly    [Held by provider] furosemide  40 mg Oral BID    pantoprazole  40 mg Oral QAM AC    venlafaxine  75 mg Oral Daily    [Held by provider] Vitamin D  1,000 Units Oral Daily    rifAXIMin  550 mg Oral BID    sodium chloride flush  5-40 mL IntraVENous 2 times per day    Arformoterol Tartrate  15 mcg Nebulization BID    budesonide  500 mcg Nebulization BID    dextrose  25 g IntraVENous Once     PRN meds: glucose, dextrose bolus **OR** dextrose bolus, glucagon (rDNA), dextrose, albuterol, sodium chloride flush, sodium chloride, polyethylene glycol, acetaminophen **OR** acetaminophen, senna, trimethobenzamide, hydrALAZINE     I reviewed the patient's past medical and surgical history, Medications and Allergies. O:  /63   Pulse 95   Temp 98.6 °F (37 °C) (Temporal)   Resp 18   Ht 5' 2.5\" (1.588 m)   Wt 163 lb (73.9 kg)   SpO2 96%   BMI 29.34 kg/m²   24 hour I&O: I/O last 3 completed shifts: In: 5460.3 [P.O.:180; I.V.:5280.3]  Out: 1600 [Urine:1600]  I/O this shift:  In: 1859.6 [I.V.:1859.6]  Out: -         Physical Exam  Constitutional:       General: She is not in acute distress. Appearance: She is not ill-appearing or toxic-appearing. HENT:      Head: Normocephalic. Comments: Healing scalp lac     Nose: Nose normal.      Mouth/Throat:      Mouth: Mucous membranes are moist.      Pharynx: Oropharynx is clear. Eyes:      Conjunctiva/sclera: Conjunctivae normal.      Pupils: Pupils are equal, round, and reactive to light. Cardiovascular:      Rate and Rhythm: Normal rate and regular rhythm. Pulses: Normal pulses. Heart sounds: Normal heart sounds. No murmur heard. No friction rub. No gallop. Pulmonary:      Effort: Pulmonary effort is normal.      Breath sounds: Normal breath sounds. Abdominal:      General: Bowel sounds are normal. There is distension. Palpations: Abdomen is soft. Tenderness: There is abdominal tenderness (mild diffuse). Musculoskeletal:         General: Normal range of motion. Cervical back: Normal range of motion and neck supple. Right lower leg: Edema present. Left lower leg: Edema present. Skin:     General: Skin is warm and dry. Coloration: Skin is not jaundiced (mild). Neurological:      Mental Status: She is oriented to person, place, and time. Cranial Nerves: No cranial nerve deficit. Psychiatric:         Mood and Affect: Mood normal.         Behavior: Behavior normal.         Thought Content:  Thought content normal.     Labs:  Na/K/Cl/CO2:  130/4.6/103/17 (08/03 0367)  BUN/Cr/glu/ALT/AST/amyl/lip:  19/1.1/--/15/41/--/-- (08/03 7365)  WBC/Hgb/Hct/Plts:  5.9/10.9/34.1/140 (08/03 0436)  estimated creatinine clearance is 41 mL/min (A) (based on SCr of 1.1 mg/dL (H)). Other pertinent labs as noted below    Radiology:  [facility] 157   Final Result   1. Left greater than right renal cortical thinning. Bilateral intrarenal   calcifications. Extrarenal pelvis on the right. No hydronephrosis. 2.  Normal appearance of the imaged bladder. US ABDOMEN COMPLETE   Final Result   Ascites present right upper quadrant         CT Head WO Contrast   Final Result   No acute intracranial abnormality. CT Cervical Spine WO Contrast   Final Result   No acute abnormality of the cervical spine. Multilevel degenerative changes. CT Lumbar Spine WO Contrast   Final Result   No acute fracture or traumatic malalignment of the lumbar spine. CT THORACIC SPINE WO CONTRAST   Final Result   No evidence of acute thoracic spine trauma. Left pleural effusion. XR ELBOW RIGHT (2 VIEWS)   Final Result   No acute fracture or dislocation. XR PELVIS (1-2 VIEWS)   Final Result   No fracture or dislocation. Osteo-degenerative changes again noted involving   the visualized lumbar spine, when compared to the previous study performed   08/17/2021. XR CHEST PORTABLE   Final Result   No acute process. Resident Assessment and Plan       Nonalcoholic liver cirrhosis with possible hepatic encephalopathy  Repeat ammonia today was 57. Patient's mentation has significantly improved compared to yesterday. Nursing reports she had 2 small bowel movements last night. 2.  Acute kidney injury  Patient's creatinine today was 1.1, which is close to her baseline. Nephrology is following the patient and has started Cinacalcet     3. Hyponatremia  Sodium today was 130. Likely 2/2 Lasix, as well as poor PO intake.  Importance of PO

## 2022-08-03 NOTE — PROGRESS NOTES
[facility] Attending    S: 68 y.o. female with fall. CTs neg. Hypercalcemia, nl CK. Lasix and aldactone held 2/2 REN. Ammonia high. Confused. Today, less confused. Still not much appetite    O: VS- Blood pressure 114/65, pulse 98, temperature 97.8 °F (36.6 °C), temperature source Temporal, resp. rate 16, height 5' 2.5\" (1.588 m), weight 163 lb (73.9 kg), SpO2 95 %. Exam is as noted by resident with the following changes, additions or corrections:  General: NAD, appropriate affect and grooming. CV:  RRR, no gallops, rubs, 3/6 IVAN LUSB (chronic)   Resp: CTAB   Abd:  Soft, mildly tender, mildly distended   Ext:  No edema     Ammonia now 69, sugar still low    Impressions:   Principal Problem:    Hyperammonemia (HCC)  Active Problems:    Interstitial lung disease (HCC)    Hyponatremia    Hypercalcemia    Type 2 diabetes mellitus (HCC)    Obstructive sleep apnea syndrome    Essential hypertension    Hyperkalemia    Cirrhosis (HCC)    REN (acute kidney injury) ([facility] [address]    Asymptomatic microscopic hematuria  Resolved Problems:    * No resolved hospital problems. *      Plan:     Fall - scans neg for injury. PT/OT. Scalp lac - healing, CTM  Hepatic Encephalopathy - hyperammonemia. Lactulose/Rifaximin. 2small Bms in last 24hours w/ noted mental status improvement. Hypoglycemia protocol, encourage increased oral intake. Encouraged PO intake this AM.    Hypercalcemia - IVF per nephro. Cinacalcet  Volume overload with ascites and pleural effusion 2/2 cirrhosis - held diuretics 2/2 REN. Creatinine improving. Appreciate nephro recs. On diuretics. Attending Physician Statement  I have reviewed the chart, including any radiology or labs, and seen the patient with the resident(s). I personally reviewed and performed key elements of the history and exam.  I agree with the assessment, plan and orders as documented by the resident.   Please refer to the resident note for additional information.       Electronically signed by Jessy Ann MD on 8/3/2022 at 1:38 PM

## 2022-08-04 VITALS
RESPIRATION RATE: 16 BRPM | WEIGHT: 170 LBS | HEART RATE: 95 BPM | TEMPERATURE: 96.3 F | DIASTOLIC BLOOD PRESSURE: 81 MMHG | HEIGHT: 63 IN | SYSTOLIC BLOOD PRESSURE: 126 MMHG | BODY MASS INDEX: 30.12 KG/M2 | OXYGEN SATURATION: 95 %

## 2022-08-04 LAB
ALBUMIN SERPL-MCNC: 2.5 G/DL (ref 3.5–5.2)
ALP BLD-CCNC: 112 U/L (ref 35–104)
ALT SERPL-CCNC: 15 U/L (ref 0–32)
AMMONIA: 88 UMOL/L (ref 11–51)
ANION GAP SERPL CALCULATED.3IONS-SCNC: 10 MMOL/L (ref 7–16)
ANISOCYTOSIS: ABNORMAL
AST SERPL-CCNC: 34 U/L (ref 0–31)
BASOPHILS ABSOLUTE: 0.04 E9/L (ref 0–0.2)
BASOPHILS RELATIVE PERCENT: 0.8 % (ref 0–2)
BILIRUB SERPL-MCNC: 1.9 MG/DL (ref 0–1.2)
BUN BLDV-MCNC: 19 MG/DL (ref 6–23)
CALCIUM SERPL-MCNC: 9.6 MG/DL (ref 8.6–10.2)
CHLORIDE BLD-SCNC: 103 MMOL/L (ref 98–107)
CO2: 20 MMOL/L (ref 22–29)
CREAT SERPL-MCNC: 1 MG/DL (ref 0.5–1)
EOSINOPHILS ABSOLUTE: 0.25 E9/L (ref 0.05–0.5)
EOSINOPHILS RELATIVE PERCENT: 4.9 % (ref 0–6)
GFR AFRICAN AMERICAN: >60
GFR NON-AFRICAN AMERICAN: 54 ML/MIN/1.73
GLUCOSE BLD-MCNC: 57 MG/DL (ref 74–99)
HCT VFR BLD CALC: 30.6 % (ref 34–48)
HEMOGLOBIN: 10.3 G/DL (ref 11.5–15.5)
IMMATURE GRANULOCYTES #: 0.01 E9/L
IMMATURE GRANULOCYTES %: 0.2 % (ref 0–5)
LYMPHOCYTES ABSOLUTE: 0.84 E9/L (ref 1.5–4)
LYMPHOCYTES RELATIVE PERCENT: 16.5 % (ref 20–42)
MAGNESIUM: 1.7 MG/DL (ref 1.6–2.6)
MCH RBC QN AUTO: 33.1 PG (ref 26–35)
MCHC RBC AUTO-ENTMCNC: 33.7 % (ref 32–34.5)
MCV RBC AUTO: 98.4 FL (ref 80–99.9)
MONOCYTES ABSOLUTE: 0.64 E9/L (ref 0.1–0.95)
MONOCYTES RELATIVE PERCENT: 12.5 % (ref 2–12)
NEUTROPHILS ABSOLUTE: 3.32 E9/L (ref 1.8–7.3)
NEUTROPHILS RELATIVE PERCENT: 65.1 % (ref 43–80)
OVALOCYTES: ABNORMAL
PDW BLD-RTO: 20.2 FL (ref 11.5–15)
PLATELET # BLD: 129 E9/L (ref 130–450)
PMV BLD AUTO: 10.9 FL (ref 7–12)
POIKILOCYTES: ABNORMAL
POLYCHROMASIA: ABNORMAL
POTASSIUM SERPL-SCNC: 4.4 MMOL/L (ref 3.5–5)
RBC # BLD: 3.11 E12/L (ref 3.5–5.5)
SODIUM BLD-SCNC: 133 MMOL/L (ref 132–146)
TOTAL PROTEIN: 6.2 G/DL (ref 6.4–8.3)
WBC # BLD: 5.1 E9/L (ref 4.5–11.5)

## 2022-08-04 PROCEDURE — 6370000000 HC RX 637 (ALT 250 FOR IP)

## 2022-08-04 PROCEDURE — 99239 HOSP IP/OBS DSCHRG MGMT >30: CPT | Performed by: FAMILY MEDICINE

## 2022-08-04 PROCEDURE — 36415 COLL VENOUS BLD VENIPUNCTURE: CPT

## 2022-08-04 PROCEDURE — 94640 AIRWAY INHALATION TREATMENT: CPT

## 2022-08-04 PROCEDURE — 82140 ASSAY OF AMMONIA: CPT

## 2022-08-04 PROCEDURE — 83735 ASSAY OF MAGNESIUM: CPT

## 2022-08-04 PROCEDURE — 2580000003 HC RX 258

## 2022-08-04 PROCEDURE — 80053 COMPREHEN METABOLIC PANEL: CPT

## 2022-08-04 PROCEDURE — 6370000000 HC RX 637 (ALT 250 FOR IP): Performed by: INTERNAL MEDICINE

## 2022-08-04 PROCEDURE — 2580000003 HC RX 258: Performed by: INTERNAL MEDICINE

## 2022-08-04 PROCEDURE — 6360000002 HC RX W HCPCS

## 2022-08-04 PROCEDURE — 85025 COMPLETE CBC W/AUTO DIFF WBC: CPT

## 2022-08-04 RX ORDER — FUROSEMIDE 20 MG/1
20 TABLET ORAL 2 TIMES DAILY
Qty: 90 TABLET | Refills: 3 | DISCHARGE
Start: 2022-08-04 | End: 2022-08-16

## 2022-08-04 RX ORDER — CINACALCET 30 MG/1
30 TABLET, FILM COATED ORAL DAILY
Qty: 30 TABLET | Refills: 3 | DISCHARGE
Start: 2022-08-05 | End: 2022-08-16

## 2022-08-04 RX ORDER — LACTULOSE 10 G/15ML
20 SOLUTION ORAL 4 TIMES DAILY
Refills: 1 | Status: ON HOLD | DISCHARGE
Start: 2022-08-04 | End: 2022-08-16 | Stop reason: HOSPADM

## 2022-08-04 RX ADMIN — ARFORMOTEROL TARTRATE 15 MCG: 15 SOLUTION RESPIRATORY (INHALATION) at 08:43

## 2022-08-04 RX ADMIN — PANTOPRAZOLE SODIUM 40 MG: 40 TABLET, DELAYED RELEASE ORAL at 06:09

## 2022-08-04 RX ADMIN — SODIUM CHLORIDE, PRESERVATIVE FREE 10 ML: 5 INJECTION INTRAVENOUS at 09:25

## 2022-08-04 RX ADMIN — AMLODIPINE BESYLATE 2.5 MG: 2.5 TABLET ORAL at 09:25

## 2022-08-04 RX ADMIN — SODIUM BICARBONATE 1300 MG: 650 TABLET ORAL at 09:25

## 2022-08-04 RX ADMIN — CINACALCET HYDROCHLORIDE 30 MG: 30 TABLET, FILM COATED ORAL at 09:25

## 2022-08-04 RX ADMIN — LACTULOSE 20 G: 20 SOLUTION ORAL at 09:25

## 2022-08-04 RX ADMIN — SODIUM CHLORIDE: 9 INJECTION, SOLUTION INTRAVENOUS at 12:18

## 2022-08-04 RX ADMIN — APIXABAN 5 MG: 5 TABLET, FILM COATED ORAL at 09:25

## 2022-08-04 RX ADMIN — SPIRONOLACTONE 100 MG: 25 TABLET ORAL at 09:25

## 2022-08-04 RX ADMIN — FUROSEMIDE 20 MG: 20 TABLET ORAL at 09:25

## 2022-08-04 RX ADMIN — LACTULOSE 20 G: 20 SOLUTION ORAL at 12:14

## 2022-08-04 RX ADMIN — BUDESONIDE 500 MCG: 0.5 SUSPENSION RESPIRATORY (INHALATION) at 08:43

## 2022-08-04 RX ADMIN — VENLAFAXINE HYDROCHLORIDE 75 MG: 75 CAPSULE, EXTENDED RELEASE ORAL at 09:25

## 2022-08-04 RX ADMIN — RIFAXIMIN 550 MG: 550 TABLET ORAL at 12:14

## 2022-08-04 NOTE — CARE COORDINATION
Discharge order noted. [facility] for 3:30 to return to [address]. Message left for sonRuth Pontiff. Facility and nursing notified.  Electronically signed by Tomy Mora RN on 8/4/2022 at 2:12 PM

## 2022-08-04 NOTE — DISCHARGE SUMMARY
ammonia was 163, calcium 11.7, creatinine was 1.7. UA was positive for hematuria and urobilinogen. CTs of head and spine were negative for acute abnormalities. Patient was admitted for hyperammonemia and REN. She was treated with normal saline for the REN, and her lactulose was increased to 20mg QID. Patient's blood glucose averaged 50s-60s throughout hospital stay. PO intake was encouraged. Patient's lasix and aldactone were held d/t REN, and patient was restarted on home dose of aldactone and modified lasix dose of 20 mg BID. Nephrology was consulted and recommended Cinacalcet for hypercalcemia, along with outpatient evaluation for parathyroidectomy. Patient's creatinine trended downward to her baseline and fluids were discontinued. Ammonia also decreased, and patient's mentation improved back to baseline. Patient remained stable, recovered and was in a suitable condition to be discharged today. Discharge plan:   Patient to return to her SNF  Continue to encourage PO intake with suggested use of dietary supplements  Encourage adherence to [facility] regimen   Patient should always ambulate with assistance to avoid further falls   Follow up with Angela Smapson MD and make appointment with ENT for evaluation for parathyroidectomy        Discharge Medications:         Medication List        START taking these medications      cinacalcet 30 MG tablet  Commonly known as: SENSIPAR  Take 1 tablet by mouth in the morning. Start taking on: August 5, 2022            CHANGE how you take these medications      furosemide 20 MG tablet  Commonly known as: LASIX  Take 1 tablet by mouth in the morning and 1 tablet before bedtime. What changed:   medication strength  how much to take     lactulose 10 GM/15ML solution  Commonly known as: CHRONULAC  Take 30 mLs by mouth in the morning and 30 mLs at noon and 30 mLs in the evening and 30 mLs before bedtime. What changed: See the new instructions.             CONTINUE taking these medications      albuterol sulfate  (90 Base) MCG/ACT inhaler  Commonly known as: Ventolin HFA  Inhale 2 puffs into the lungs 4 times daily as needed for Wheezing     amLODIPine 2.5 MG tablet  Commonly known as: NORVASC  TAKE ONE TABLET BY MOUTH DAILY AT 9AM     apixaban 5 MG Tabs tablet  Commonly known as: ELIQUIS  Take 1 tablet by mouth in the morning and 1 tablet before bedtime.      Biotin 00101 MCG Tabs     doxazosin 1 MG tablet  Commonly known as: CARDURA     ferrous sulfate 325 (65 Fe) MG tablet  Commonly known as: IRON 325     fluticasone-salmeterol 250-50 MCG/ACT Aepb diskus inhaler  Commonly known as: ADVAIR  INHALE 1 PUFF BY MOUTH EVERY TWELVE HOURS (BULK)     lidocaine 4 % external patch  Place 1 patch onto the skin daily as needed (Left shoulder pain)     ondansetron 4 MG tablet  Commonly known as: ZOFRAN  TAKE 1 TABLET BY MOUTH THREE TIMES DAILY AS NEEDED FOR NAUSEA OR VOMITING     pantoprazole 40 MG tablet  Commonly known as: PROTONIX     spironolactone 100 MG tablet  Commonly known as: ALDACTONE  TAKE ONE TABLET BY MOUTH DAILY AT 9AM     venlafaxine 75 MG extended release capsule  Commonly known as: EFFEXOR XR  Take 1 capsule by mouth daily     Xifaxan 550 MG tablet  Generic drug: rifAXIMin  TAKE ONE TABLET BY MOUTH TWICE DAILY @ 9AM & 9PM            STOP taking these medications      felodipine 2.5 MG extended release tablet  Commonly known as: PLENDIL     vitamin D3 25 MCG (1000 UT) Tabs tablet  Commonly known as: CHOLECALCIFEROL               Where to Get Your Medications        Information about where to get these medications is not yet available    Ask your nurse or doctor about these medications  cinacalcet 30 MG tablet  furosemide 20 MG tablet  lactulose 10 GM/15ML solution         Consults:  as above    Significant Diagnostic Studies:  as above    Labs:  Na/K/Cl/CO2:  133/4.4/103/20 (08/04 0457)  BUN/Cr/glu/ALT/AST/amyl/lip:  19/1.0/--/15/34/--/-- (08/04 0457)  WBC/Hgb/Hct/Plts: 5. 1/10.3/30.6/129 (08/04 0457)  [unfilled]  estimated creatinine clearance is 46 mL/min (based on SCr of 1 mg/dL). New Imaging:  US RETROPERITONEAL COMPLETE   Final Result   1. Left greater than right renal cortical thinning. Bilateral intrarenal   calcifications. Extrarenal pelvis on the right. No hydronephrosis. 2.  Normal appearance of the imaged bladder. US ABDOMEN COMPLETE   Final Result   Ascites present right upper quadrant         CT Head WO Contrast   Final Result   No acute intracranial abnormality. CT Cervical Spine WO Contrast   Final Result   No acute abnormality of the cervical spine. Multilevel degenerative changes. CT Lumbar Spine WO Contrast   Final Result   No acute fracture or traumatic malalignment of the lumbar spine. CT THORACIC SPINE WO CONTRAST   Final Result   No evidence of acute thoracic spine trauma. Left pleural effusion. XR ELBOW RIGHT (2 VIEWS)   Final Result   No acute fracture or dislocation. XR PELVIS (1-2 VIEWS)   Final Result   No fracture or dislocation. Osteo-degenerative changes again noted involving   the visualized lumbar spine, when compared to the previous study performed   08/17/2021. XR CHEST PORTABLE   Final Result   No acute process.                Treatments:   as above    Discharge Exam:  Please see physical exam from progress note from day of discharge    Disposition:   SNF    Future Appointments   Date Time Provider Geraldine Rader   8/4/2022 12:45 MD Golden Orozco [facility]   8/10/2022  9:15 AM Aurelia Paniagua MD [facility]   9/8/2022  9:00 AM Genesis Burciaga MD [facility]   9/29/2022  2:00 PM MD lAicia Maxmatthias [facility]   11/23/2022  9:00 AM Aurelia Paniagua MD [address]       More than 30 minutes was spent in preparation of this patient's discharge including, but not limited to, examination, preparation of documents, prescription preparation, counseling and coordination.     Signed:  Gloria Hutson MD  8/4/2022, 11:34 AM

## 2022-08-04 NOTE — DISCHARGE INSTR - COC
Continuity of Care Form    Patient Name: Cayla Zarate   :  1945  MRN:  [id number]    Admit date:  2022  Discharge date:  2022    Code Status Order: Full Code   Advance Directives:     Admitting Physician:  Rebeca Fuchs MD  PCP: Rebeca Fuchs MD    Discharging Nurse: Bradford Falcon RN  [address] Unit/Room#: 1550/6600-W  Discharging Unit Phone Number: [phone]    Emergency Contact:   Extended Emergency Contact Information  Primary Emergency Contact: [address] [place] [address]  Home Phone: [phone]  Mobile Phone: [phone]  Relation: Child  Preferred language: English   needed? No  Secondary Emergency Contact: [address] Phone: [phone]  Mobile Phone: [phone]  Relation: Other  Preferred language: English   needed?  No    Past Surgical History:  Past Surgical History:   Procedure Laterality Date    BLADDER SURGERY Right 2021    CYSTOSCOPY RETROGRADE PYELOGRAM RIGHT  STENT INSERTION performed by Anamaria Stout MD at [place] [address] LUMPECTOMY      lumpectomy [address] TEST      Lexiscan stress test    CARPAL TUNNEL RELEASE      COLONOSCOPY  2017    multiple polyps; diverticula--jerod    COLONOSCOPY  2019    polyps; diverticula; hemorrhoids--jerod    COLONOSCOPY N/A 2019    COLONOSCOPY POLYPECTOMY SNARE/COLD BIOPSY performed by Shania Cerrato MD at [address] [facility]  2019    COLONOSCOPY WITH BIOPSY performed by Shania Cerrato MD at [address])      LITHOTRIPSY Left 2016    C-R STENT PLACEMENT    SHOULDER ARTHROPLASTY Left 2020    LEFT REVERSE TOTAL SHOULDER  ARTHROPLASTY -- DEPUY performed by Светлана Pierre MD at [facility] [address]  2019    gastritis--jerod    UPPER GASTROINTESTINAL ENDOSCOPY N/A 2019    EGD BIOPSY performed by Shania Cerrato MD at [facility] History:   Immunization History   Administered Date(s) Administered    COVID-19, PFIZER PURPLE top, DILUTE for use, (age 15 y+), 30mcg/0.3mL 03/26/2021, 04/16/2021    Hepatitis B Adult (Recombivax HB) 03/12/2020    Influenza A ([id number]) Vaccine PF IM 12/21/2009    Influenza Vaccine, unspecified formulation 09/27/2016    Influenza Virus Vaccine 09/28/2015    Influenza Whole 09/28/2015    Influenza, High Dose (Fluzone 65 yrs and older) 09/27/2016, 09/21/2017, 10/05/2018, 10/21/2019, 12/01/2020    Influenza, Halie Arena, IM, (6 mo and older Fluzone, Flulaval, Fluarix and 3 yrs and older Afluria) 09/27/2016    Influenza, Quadv, adjuvanted, 65 yrs +, IM, PF (Fluad) 10/07/2020, 10/19/2021    Pneumococcal Conjugate 13-valent (Xquiogl89) 09/27/2016    Pneumococcal Polysaccharide ([id number]) 11/21/2017    Td (Adult), 2 Lf Tetanus Toxoid, Pf (Td, Absorbed) 07/29/2022    Tdap (Boostrix, Adacel) 01/14/2021    Zoster Live (Zostavax) 04/01/2013    Zoster Recombinant (Shingrix) 04/12/2018, 01/14/2021       Active Problems:  Patient Active Problem List   Diagnosis Code    [facility] C50.912    Type 2 diabetes mellitus (HCC) E11.9    Obstructive sleep apnea syndrome G47.33    Essential hypertension I10    Multiple thyroid nodules E04.2    Hx of adenomatous colonic polyps Z86.010    Dyslipidemia E78.5    Hyperkalemia E87.5    Cirrhosis (HCC) K74.60    S/P reverse total shoulder arthroplasty, 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    REN (acute kidney injury) ([facility] N17.9    Anemia D64.9    Palpitations R00.2    Rhabdomyolysis M62.82    Hyperammonemia (HCC) E72.20    Closed displaced fracture of surgical neck of right humerus S42.211A    Pulmonary embolism (HCC) I26.99    Idiopathic acute pancreatitis without infection or necrosis K85.00    Asymptomatic microscopic hematuria R31.21    Breast mass, left N63.20    Anxiety disorder, unspecified F41.9    Gastro-esophageal reflux disease without Level: 0  Last Weight:   Wt Readings from Last 1 Encounters:   08/04/22 170 lb (77.1 kg)     Mental Status:  oriented and alert    IV Access:  - None    Nursing Mobility/ADLs:  Walking   Dependent  Transfer  Dependent  Bathing  Dependent  Dressing  Dependent  Toileting  Dependent  Feeding  Assisted  Med Admin  Assisted  Med Delivery   whole    Wound Care Documentation and Therapy:        Elimination:  Continence: Bowel: Yes and No  Bladder: Yes and No  Urinary Catheter: None   Colostomy/Ileostomy/Ileal Conduit: No       Date of Last BM: 08/02/22    Intake/Output Summary (Last 24 hours) at 8/4/2022 1123  Last data filed at 8/4/2022 0610  Gross per 24 hour   Intake 2532 ml   Output 1000 ml   Net 1532 ml     I/O last 3 completed shifts: In: 4571.6 [P.O.:420; I.V.:4151.6]  Out: 1000 [Urine:1000]    Safety Concerns: At Risk for Falls    Impairments/Disabilities:      Hearing    Nutrition Therapy:  Current Nutrition Therapy:   - Oral Diet:  General    Routes of Feeding: Oral  Liquids: Thin Liquids  Daily Fluid Restriction: no  Last Modified Barium Swallow with Video (Video Swallowing Test): not done    Treatments at the Time of Hospital Discharge:   Respiratory Treatments: ***  Oxygen Therapy:  is not on home oxygen therapy.   Ventilator:    - No ventilator support    Rehab Therapies: Physical Therapy and Occupational Therapy  Weight Bearing Status/Restrictions: No weight bearing restrictions  Other Medical Equipment (for information only, NOT a DME order):  walker  Other Treatments: ***    Patient's personal belongings (please select all that are sent with patient):  None    RN SIGNATURE:  Electronically signed by Joyice Nissen, RN on 8/4/22 at 2:18 PM EDT    CASE MANAGEMENT/SOCIAL WORK SECTION    Inpatient Status Date: ***    Readmission Risk Assessment Score:  Readmission Risk              Risk of Unplanned Readmission:  [id number]           Discharging to Facility/ Agency   Name: Address:  Phone:  Fax:    Dialysis Facility (if applicable)   Name:  Address:  Dialysis Schedule:  Phone:  Fax:    / signature: {Esignature:[id number]}    PHYSICIAN SECTION    Prognosis: Good    Condition at Discharge: Stable    Rehab Potential (if transferring to Rehab): Good    Recommended Labs or Other Treatments After Discharge:   Discharged on lactulose 20 mg QID. Recommend titrating as needed to obtain optimal Bms  Cinacalcet is a new medication that was started on discharge. Patient needs outpatient follow-up with PCP and ENT  Encourage PO intake of fluids and food as much as possible. Consider the addition of dietary supplements. Recommend checking blood glucose daily  Continue rifaximin  Patient's lasix dose was changed to 20 mg BID from 40 BID. Titrate up as indicated. Physician Certification: I certify the above information and transfer of Tamiko Walker  is necessary for the continuing treatment of the diagnosis listed and that she requires [facility] for greater 30 days.      Update Admission H&P: No change in H&P    PHYSICIAN SIGNATURE:  Electronically signed by Dwaine Perez MD on 8/4/22 at 11:26 AM EDT

## 2022-08-04 NOTE — PROGRESS NOTES
[facility] Attending    S: 68 y.o. female with fall. CTs neg. Hypercalcemia, nl CK. Lasix and aldactone held 2/2 REN. Confused and found to have elevated ammonia. Lactulose increased and mentation improved. Nephro following for REN. Also mild hypoglycemia each AM, not eating well. Today, alert. C/o nausea, but states that she always has it. Her morning sugars are low, but she remains asymptomatic    O: VS- Blood pressure 126/81, pulse 95, temperature (!) 96.3 °F (35.7 °C), temperature source Temporal, resp. rate 16, height 5' 2.5\" (1.588 m), weight 170 lb (77.1 kg), SpO2 95 %. Exam is as noted by resident with the following changes, additions or corrections:  General: NAD, appropriate affect and grooming. CV:  RRR, no gallops, rubs, 3/6 IVAN LUSB (chronic)   Resp: CTAB   Abd:  Soft, mildly tender, mildly distended   Ext:  No edema   Ammonia now 69, sugar still low    Impressions:   Principal Problem:    Hyperammonemia (HCC)  Active Problems:    Interstitial lung disease (HCC)    Hyponatremia    Hypercalcemia    Type 2 diabetes mellitus (HCC)    Obstructive sleep apnea syndrome    Essential hypertension    Hyperkalemia    Cirrhosis (HCC)    REN (acute kidney injury) ([facility]    Asymptomatic microscopic hematuria  Resolved Problems:    * No resolved hospital problems. *      Plan:     Fall - scans neg for injury. PT/OT. Scalp lac - healing, CTM  Hepatic Encephalopathy - hyperammonemia. Lactulose/Rifaximin. 2 small Bms in last 24hours w/ noted mental status improvement. Hypoglycemia protocol, encourage increased oral intake. Encouraged PO intake this AM.    Hypercalcemia - IVF per nephro. Cinacalcet  Volume overload with ascites and pleural effusion 2/2 cirrhosis - held diuretics 2/2 REN. Creatinine improving. Appreciate nephro recs. On diuretics.    Monitor CMP and BM's  Dispo according to nephrology recommendations       Attending Physician Statement  I have reviewed the chart, including any radiology or labs, and seen the patient with the resident(s). I personally reviewed and performed key elements of the history and exam.  I agree with the assessment, plan and orders as documented by the resident. Please refer to the resident note for additional information.       Electronically signed by Ambar Marcus MD on 8/4/2022 at 8:42 AM

## 2022-08-04 NOTE — PROGRESS NOTES
[facility] Inpatient   Resident Progress Note    S:  Hospital day: 3   Brief Synopsis: Senait Davidson is a 68 y.o. female PMH of nonalcoholic ascites, chylothorax, T2DM, breast cancer s/p lumpectomy, unprovoked PE who presented with fall with positive LOC. Patient's ammonia was 163 on admission. Patient was also found to be in REN with creatinine 1.7 up from baseline of approximately 1.1. Patient was also found to be hypercalcemic. Nephrology has been consulted and started the patient on Cinacalcet and IV fluids. Patient has been persistently hypoglycemic since admission. On 8/2, patient received 10% dextrose bolus per hypoglycemia protocol for a blood glucose of 53. Overnight/interim:   Patient states that she still feels sick to her stomach but this is a chronic issue that has persisted for the last 2 years. She states this nausea is no different than what she experienced prior to admission. Patient reports poor p.o. intake, but states that this is approximately the amount she eats whenever she is not in the hospital  Patient reports having a bowel movement yesterday.       Cont meds:    dextrose      sodium chloride      sodium chloride 100 mL/hr at 08/03/22 1551     Scheduled meds:    lactulose  20 g Oral 4x Daily    furosemide  20 mg Oral Daily    spironolactone  100 mg Oral Daily    cinacalcet  30 mg Oral Daily    sodium bicarbonate  1,300 mg Oral BID    amLODIPine  2.5 mg Oral Daily    apixaban  5 mg Oral BID    doxazosin  1 mg Oral Nightly    [Held by provider] furosemide  40 mg Oral BID    pantoprazole  40 mg Oral QAM AC    venlafaxine  75 mg Oral Daily    [Held by provider] Vitamin D  1,000 Units Oral Daily    rifAXIMin  550 mg Oral BID    sodium chloride flush  5-40 mL IntraVENous 2 times per day    Arformoterol Tartrate  15 mcg Nebulization BID    budesonide  500 mcg Nebulization BID    dextrose  25 g IntraVENous Once     PRN meds: glucose, dextrose bolus **OR** dextrose bolus, glucagon (rDNA), dextrose, albuterol, sodium chloride flush, sodium chloride, polyethylene glycol, acetaminophen **OR** acetaminophen, senna, trimethobenzamide, hydrALAZINE     I reviewed the patient's past medical and surgical history, Medications and Allergies. O:  /69   Pulse 93   Temp 97.4 °F (36.3 °C) (Temporal)   Resp 16   Ht 5' 2.5\" (1.588 m)   Wt 170 lb (77.1 kg)   SpO2 99%   BMI 30.60 kg/m²   24 hour I&O: I/O last 3 completed shifts: In: 7249.4 [P.O.:420; I.V.:6829.4]  Out: 1000 [Urine:1000]  I/O this shift:  In: 2292 [I.V.:2292]  Out: -         Physical Exam  Constitutional:       General: She is not in acute distress. Appearance: She is not ill-appearing or toxic-appearing. HENT:      Head: Normocephalic. Comments: Healing scalp lac     Nose: Nose normal.      Mouth/Throat:      Mouth: Mucous membranes are moist.      Pharynx: Oropharynx is clear. Eyes:      Conjunctiva/sclera: Conjunctivae normal.      Pupils: Pupils are equal, round, and reactive to light. Cardiovascular:      Rate and Rhythm: Normal rate and regular rhythm. Pulses: Normal pulses. Heart sounds: Normal heart sounds. No murmur heard. No friction rub. No gallop. Pulmonary:      Effort: Pulmonary effort is normal.      Breath sounds: Normal breath sounds. Abdominal:      General: Bowel sounds are normal. There is distension. Palpations: Abdomen is soft. Tenderness: There is abdominal tenderness (mild diffuse). Musculoskeletal:         General: Normal range of motion. Cervical back: Normal range of motion and neck supple. Right lower leg: Edema present. Left lower leg: Edema present. Skin:     General: Skin is warm and dry. Coloration: Skin is not jaundiced. Neurological:      Mental Status: She is oriented to person, place, and time. Cranial Nerves: No cranial nerve deficit.    Psychiatric:         Mood and Affect: Mood normal.         Behavior: Behavior normal.         Thought Content: Thought content normal.     Labs:  Na/K/Cl/CO2:  130/4.6/103/17 (08/03 0436)  BUN/Cr/glu/ALT/AST/amyl/lip:  19/1.1/--/15/41/--/-- (08/03 0436)  WBC/Hgb/Hct/Plts:  5.9/10.9/34.1/140 (08/03 0436)  estimated creatinine clearance is 42 mL/min (A) (based on SCr of 1.1 mg/dL (H)). Other pertinent labs as noted below    Radiology:  [address] [facility]   Final Result   1. Left greater than right renal cortical thinning. Bilateral intrarenal   calcifications. Extrarenal pelvis on the right. No hydronephrosis. 2.  Normal appearance of the imaged bladder. US ABDOMEN COMPLETE   Final Result   Ascites present right upper quadrant         CT Head WO Contrast   Final Result   No acute intracranial abnormality. CT Cervical Spine WO Contrast   Final Result   No acute abnormality of the cervical spine. Multilevel degenerative changes. CT Lumbar Spine WO Contrast   Final Result   No acute fracture or traumatic malalignment of the lumbar spine. CT THORACIC SPINE WO CONTRAST   Final Result   No evidence of acute thoracic spine trauma. Left pleural effusion. XR ELBOW RIGHT (2 VIEWS)   Final Result   No acute fracture or dislocation. XR PELVIS (1-2 VIEWS)   Final Result   No fracture or dislocation. Osteo-degenerative changes again noted involving   the visualized lumbar spine, when compared to the previous study performed   08/17/2021. XR CHEST PORTABLE   Final Result   No acute process. Resident Assessment and Plan       Nonalcoholic liver cirrhosis with possible hepatic encephalopathy  Patient's mentation has significantly improved and she is oriented to person, place, and time. She is still reporting mild abdominal tenderness and poor appetite. 2.  Acute kidney injury  Patient's creatinine today was 1.0, which is approximately her baseline.  Nephrology is following the patient and has started Cinacalcet     3. Hyponatremia  Sodium today was 133. Sodium intermittently low, likely 2/2 hyponatremia and poor PO intake. Will continue to monitor. 4.  Hyperkalemia  Potassium today was 4.4. Aldactone has been restarted. We will continue to monitor     5. Hypercalcemia  Calcium corrected for albumin was 10.8. Primary hyperparathyroidism suspected as cause. Patient will need outpatient evaluation for parathyroidectomy    6. History of unprovoked PE  Restarting home Eliquis 5 mg twice daily. Patient has no active bleeding and head CT showed no abnormality    7. Type 2 diabetes  Fasting glucose this morning was 57. Patient is asymptomatic, and she states her current PO intake is similar to what it was prior to admission. 8.  Interstitial lung disease  Continue Brovana, Pulmicort, as needed albuterol     9. Chronic hematuria  Continue to monitor hemoglobin  Has had instances of hematuria in the past    10. Hypertension  Continue home amlodipine 2.5 mg     11. KATE  Continue CPAP at night    12.   Major depressive disorder  Continue home Effexor 75 mg daily     DVT prophylaxis: Eliquis and PCD's  GI prophylaxis: Protonix  Diet: Adult regular diet with low potassium, low calcium, low protein        Electronically signed by Patricia Murary MD on 8/4/2022 at 6:17 AM  Attending physician: Dr. Darrel Jones

## 2022-08-04 NOTE — PROGRESS NOTES
Wound care: Skin rounds completed with nurse leader. No pressure injuries noted. Preventative care in place.  Shara Kwon RN

## 2022-08-10 ENCOUNTER — APPOINTMENT (OUTPATIENT)
Dept: GENERAL RADIOLOGY | Age: 77
DRG: 432 | End: 2022-08-10
Payer: MEDICARE

## 2022-08-10 ENCOUNTER — APPOINTMENT (OUTPATIENT)
Dept: CT IMAGING | Age: 77
DRG: 432 | End: 2022-08-10
Payer: MEDICARE

## 2022-08-10 ENCOUNTER — HOSPITAL ENCOUNTER (INPATIENT)
Age: 77
LOS: 6 days | Discharge: HOSPICE | DRG: 432 | End: 2022-08-16
Attending: EMERGENCY MEDICINE | Admitting: FAMILY MEDICINE
Payer: MEDICARE

## 2022-08-10 DIAGNOSIS — N17.9 AKI (ACUTE KIDNEY INJURY) (HCC): Primary | ICD-10-CM

## 2022-08-10 PROBLEM — K76.82 ACUTE HEPATIC ENCEPHALOPATHY: Status: ACTIVE | Noted: 2022-01-01

## 2022-08-10 LAB
ALBUMIN SERPL-MCNC: 2.9 G/DL (ref 3.5–5.2)
ALP BLD-CCNC: 131 U/L (ref 35–104)
ALT SERPL-CCNC: 21 U/L (ref 0–32)
AMMONIA: 125 UMOL/L (ref 11–51)
ANION GAP SERPL CALCULATED.3IONS-SCNC: 12 MMOL/L (ref 7–16)
ANISOCYTOSIS: ABNORMAL
AST SERPL-CCNC: 40 U/L (ref 0–31)
BACTERIA: ABNORMAL /HPF
BASOPHILS ABSOLUTE: 0.03 E9/L (ref 0–0.2)
BASOPHILS RELATIVE PERCENT: 0.5 % (ref 0–2)
BILIRUB SERPL-MCNC: 2.3 MG/DL (ref 0–1.2)
BILIRUBIN URINE: NEGATIVE
BLOOD, URINE: ABNORMAL
BUN BLDV-MCNC: 34 MG/DL (ref 6–23)
CALCIUM SERPL-MCNC: 13 MG/DL (ref 8.6–10.2)
CHLORIDE BLD-SCNC: 98 MMOL/L (ref 98–107)
CHP ED QC CHECK: NORMAL
CHP ED QC CHECK: NORMAL
CLARITY: CLEAR
CO2: 23 MMOL/L (ref 22–29)
COLOR: YELLOW
CREAT SERPL-MCNC: 1.5 MG/DL (ref 0.5–1)
EOSINOPHILS ABSOLUTE: 0.11 E9/L (ref 0.05–0.5)
EOSINOPHILS RELATIVE PERCENT: 1.8 % (ref 0–6)
GFR AFRICAN AMERICAN: 41
GFR NON-AFRICAN AMERICAN: 34 ML/MIN/1.73
GLUCOSE BLD-MCNC: 139 MG/DL (ref 74–99)
GLUCOSE BLD-MCNC: 222 MG/DL
GLUCOSE BLD-MCNC: 70 MG/DL
GLUCOSE URINE: NEGATIVE MG/DL
HCT VFR BLD CALC: 28.6 % (ref 34–48)
HEMOGLOBIN: 9.7 G/DL (ref 11.5–15.5)
IMMATURE GRANULOCYTES #: 0.02 E9/L
IMMATURE GRANULOCYTES %: 0.3 % (ref 0–5)
KETONES, URINE: NEGATIVE MG/DL
LACTIC ACID: 1.7 MMOL/L (ref 0.5–2.2)
LEUKOCYTE ESTERASE, URINE: NEGATIVE
LIPASE: 37 U/L (ref 13–60)
LYMPHOCYTES ABSOLUTE: 0.97 E9/L (ref 1.5–4)
LYMPHOCYTES RELATIVE PERCENT: 15.6 % (ref 20–42)
MCH RBC QN AUTO: 33.8 PG (ref 26–35)
MCHC RBC AUTO-ENTMCNC: 33.9 % (ref 32–34.5)
MCV RBC AUTO: 99.7 FL (ref 80–99.9)
METER GLUCOSE: 113 MG/DL (ref 74–99)
METER GLUCOSE: 173 MG/DL (ref 74–99)
METER GLUCOSE: 222 MG/DL (ref 74–99)
METER GLUCOSE: 62 MG/DL (ref 74–99)
METER GLUCOSE: 70 MG/DL (ref 74–99)
METER GLUCOSE: 70 MG/DL (ref 74–99)
METER GLUCOSE: <40 MG/DL (ref 74–99)
MONOCYTES ABSOLUTE: 0.77 E9/L (ref 0.1–0.95)
MONOCYTES RELATIVE PERCENT: 12.4 % (ref 2–12)
NEUTROPHILS ABSOLUTE: 4.32 E9/L (ref 1.8–7.3)
NEUTROPHILS RELATIVE PERCENT: 69.4 % (ref 43–80)
NITRITE, URINE: NEGATIVE
OVALOCYTES: ABNORMAL
PDW BLD-RTO: 22 FL (ref 11.5–15)
PH UA: 6.5 (ref 5–9)
PLATELET # BLD: 147 E9/L (ref 130–450)
PMV BLD AUTO: 10.6 FL (ref 7–12)
POIKILOCYTES: ABNORMAL
POLYCHROMASIA: ABNORMAL
POTASSIUM REFLEX MAGNESIUM: 3.7 MMOL/L (ref 3.5–5)
PRO-BNP: 56 PG/ML (ref 0–450)
PROTEIN UA: NEGATIVE MG/DL
RBC # BLD: 2.87 E12/L (ref 3.5–5.5)
RBC UA: ABNORMAL /HPF (ref 0–2)
SARS-COV-2, NAAT: NOT DETECTED
SODIUM BLD-SCNC: 133 MMOL/L (ref 132–146)
SPECIFIC GRAVITY UA: <=1.005 (ref 1–1.03)
TARGET CELLS: ABNORMAL
TOTAL PROTEIN: 6.8 G/DL (ref 6.4–8.3)
TROPONIN, HIGH SENSITIVITY: 17 NG/L (ref 0–9)
UROBILINOGEN, URINE: 2 E.U./DL
WBC # BLD: 6.2 E9/L (ref 4.5–11.5)
WBC UA: ABNORMAL /HPF (ref 0–5)

## 2022-08-10 PROCEDURE — 74176 CT ABD & PELVIS W/O CONTRAST: CPT

## 2022-08-10 PROCEDURE — 83880 ASSAY OF NATRIURETIC PEPTIDE: CPT

## 2022-08-10 PROCEDURE — 83970 ASSAY OF PARATHORMONE: CPT

## 2022-08-10 PROCEDURE — 87040 BLOOD CULTURE FOR BACTERIA: CPT

## 2022-08-10 PROCEDURE — 72125 CT NECK SPINE W/O DYE: CPT

## 2022-08-10 PROCEDURE — 2580000003 HC RX 258: Performed by: STUDENT IN AN ORGANIZED HEALTH CARE EDUCATION/TRAINING PROGRAM

## 2022-08-10 PROCEDURE — 82330 ASSAY OF CALCIUM: CPT

## 2022-08-10 PROCEDURE — 70450 CT HEAD/BRAIN W/O DYE: CPT

## 2022-08-10 PROCEDURE — 2060000000 HC ICU INTERMEDIATE R&B

## 2022-08-10 PROCEDURE — 84484 ASSAY OF TROPONIN QUANT: CPT

## 2022-08-10 PROCEDURE — 87088 URINE BACTERIA CULTURE: CPT

## 2022-08-10 PROCEDURE — 82962 GLUCOSE BLOOD TEST: CPT

## 2022-08-10 PROCEDURE — 99285 EMERGENCY DEPT VISIT HI MDM: CPT

## 2022-08-10 PROCEDURE — 6370000000 HC RX 637 (ALT 250 FOR IP)

## 2022-08-10 PROCEDURE — 84100 ASSAY OF PHOSPHORUS: CPT

## 2022-08-10 PROCEDURE — 87635 SARS-COV-2 COVID-19 AMP PRB: CPT

## 2022-08-10 PROCEDURE — 71045 X-RAY EXAM CHEST 1 VIEW: CPT

## 2022-08-10 PROCEDURE — 85025 COMPLETE CBC W/AUTO DIFF WBC: CPT

## 2022-08-10 PROCEDURE — 93005 ELECTROCARDIOGRAM TRACING: CPT

## 2022-08-10 PROCEDURE — 36415 COLL VENOUS BLD VENIPUNCTURE: CPT

## 2022-08-10 PROCEDURE — 81001 URINALYSIS AUTO W/SCOPE: CPT

## 2022-08-10 PROCEDURE — 82542 COL CHROMOTOGRAPHY QUAL/QUAN: CPT

## 2022-08-10 PROCEDURE — 83690 ASSAY OF LIPASE: CPT

## 2022-08-10 PROCEDURE — 2500000003 HC RX 250 WO HCPCS

## 2022-08-10 PROCEDURE — 80053 COMPREHEN METABOLIC PANEL: CPT

## 2022-08-10 PROCEDURE — 82140 ASSAY OF AMMONIA: CPT

## 2022-08-10 PROCEDURE — 83605 ASSAY OF LACTIC ACID: CPT

## 2022-08-10 PROCEDURE — 2580000003 HC RX 258

## 2022-08-10 PROCEDURE — 82306 VITAMIN D 25 HYDROXY: CPT

## 2022-08-10 RX ORDER — FLUTICASONE PROPIONATE AND SALMETEROL 250; 50 UG/1; UG/1
1 POWDER RESPIRATORY (INHALATION) EVERY 12 HOURS
Status: ON HOLD | COMMUNITY
End: 2022-08-16 | Stop reason: HOSPADM

## 2022-08-10 RX ORDER — DEXTROSE MONOHYDRATE 100 MG/ML
INJECTION, SOLUTION INTRAVENOUS CONTINUOUS PRN
Status: DISCONTINUED | OUTPATIENT
Start: 2022-08-10 | End: 2022-08-10

## 2022-08-10 RX ORDER — ACETAMINOPHEN 325 MG/1
650 TABLET ORAL EVERY 6 HOURS PRN
Status: DISCONTINUED | OUTPATIENT
Start: 2022-08-10 | End: 2022-08-16

## 2022-08-10 RX ORDER — SODIUM CHLORIDE 0.9 % (FLUSH) 0.9 %
5-40 SYRINGE (ML) INJECTION PRN
Status: DISCONTINUED | OUTPATIENT
Start: 2022-08-10 | End: 2022-08-16 | Stop reason: HOSPADM

## 2022-08-10 RX ORDER — SODIUM CHLORIDE 0.9 % (FLUSH) 0.9 %
5-40 SYRINGE (ML) INJECTION EVERY 12 HOURS SCHEDULED
Status: DISCONTINUED | OUTPATIENT
Start: 2022-08-11 | End: 2022-08-16 | Stop reason: HOSPADM

## 2022-08-10 RX ORDER — ONDANSETRON 4 MG/1
4 TABLET, ORALLY DISINTEGRATING ORAL EVERY 8 HOURS PRN
Status: DISCONTINUED | OUTPATIENT
Start: 2022-08-10 | End: 2022-08-16 | Stop reason: HOSPADM

## 2022-08-10 RX ORDER — POLYETHYLENE GLYCOL 3350 17 G/17G
17 POWDER, FOR SOLUTION ORAL DAILY PRN
Status: DISCONTINUED | OUTPATIENT
Start: 2022-08-10 | End: 2022-08-16

## 2022-08-10 RX ORDER — LACTULOSE 10 G/15ML
20 SOLUTION ORAL 4 TIMES DAILY
Status: DISCONTINUED | OUTPATIENT
Start: 2022-08-11 | End: 2022-08-11

## 2022-08-10 RX ORDER — ACETAMINOPHEN 650 MG/1
650 SUPPOSITORY RECTAL EVERY 6 HOURS PRN
Status: DISCONTINUED | OUTPATIENT
Start: 2022-08-10 | End: 2022-08-16

## 2022-08-10 RX ORDER — ACETAMINOPHEN 325 MG/1
650 TABLET ORAL EVERY 4 HOURS PRN
Status: ON HOLD | COMMUNITY
End: 2022-08-16 | Stop reason: HOSPADM

## 2022-08-10 RX ORDER — LACTULOSE 10 G/15ML
20 SOLUTION ORAL ONCE
Status: COMPLETED | OUTPATIENT
Start: 2022-08-10 | End: 2022-08-10

## 2022-08-10 RX ORDER — CINACALCET 30 MG/1
30 TABLET, FILM COATED ORAL DAILY
Status: DISCONTINUED | OUTPATIENT
Start: 2022-08-11 | End: 2022-08-11

## 2022-08-10 RX ORDER — PANTOPRAZOLE SODIUM 40 MG/1
40 TABLET, DELAYED RELEASE ORAL DAILY
Status: DISCONTINUED | OUTPATIENT
Start: 2022-08-11 | End: 2022-08-16

## 2022-08-10 RX ORDER — ONDANSETRON 2 MG/ML
4 INJECTION INTRAMUSCULAR; INTRAVENOUS EVERY 6 HOURS PRN
Status: DISCONTINUED | OUTPATIENT
Start: 2022-08-10 | End: 2022-08-16 | Stop reason: HOSPADM

## 2022-08-10 RX ORDER — SPIRONOLACTONE 100 MG/1
100 TABLET, FILM COATED ORAL DAILY
Status: ON HOLD | COMMUNITY
End: 2022-08-16 | Stop reason: HOSPADM

## 2022-08-10 RX ORDER — SODIUM CHLORIDE 9 MG/ML
INJECTION, SOLUTION INTRAVENOUS PRN
Status: DISCONTINUED | OUTPATIENT
Start: 2022-08-10 | End: 2022-08-16 | Stop reason: HOSPADM

## 2022-08-10 RX ORDER — ONDANSETRON 4 MG/1
4 TABLET, ORALLY DISINTEGRATING ORAL EVERY 8 HOURS PRN
Status: ON HOLD | COMMUNITY
End: 2022-08-16 | Stop reason: HOSPADM

## 2022-08-10 RX ORDER — BUDESONIDE 0.5 MG/2ML
0.5 INHALANT ORAL 2 TIMES DAILY
Status: DISCONTINUED | OUTPATIENT
Start: 2022-08-11 | End: 2022-08-16 | Stop reason: HOSPADM

## 2022-08-10 RX ORDER — AMLODIPINE BESYLATE 2.5 MG/1
2.5 TABLET ORAL DAILY
Status: ON HOLD | COMMUNITY
End: 2022-08-16 | Stop reason: HOSPADM

## 2022-08-10 RX ORDER — DEXTROSE AND SODIUM CHLORIDE 5; .9 G/100ML; G/100ML
INJECTION, SOLUTION INTRAVENOUS CONTINUOUS
Status: DISCONTINUED | OUTPATIENT
Start: 2022-08-11 | End: 2022-08-11

## 2022-08-10 RX ORDER — FERROUS SULFATE 325(65) MG
325 TABLET ORAL DAILY
Status: DISCONTINUED | OUTPATIENT
Start: 2022-08-11 | End: 2022-08-16 | Stop reason: HOSPADM

## 2022-08-10 RX ORDER — FUROSEMIDE 20 MG/1
20 TABLET ORAL 2 TIMES DAILY
Status: DISCONTINUED | OUTPATIENT
Start: 2022-08-11 | End: 2022-08-16

## 2022-08-10 RX ORDER — ALBUTEROL SULFATE 2.5 MG/3ML
2.5 SOLUTION RESPIRATORY (INHALATION) 4 TIMES DAILY PRN
Status: DISCONTINUED | OUTPATIENT
Start: 2022-08-10 | End: 2022-08-16 | Stop reason: HOSPADM

## 2022-08-10 RX ORDER — ARFORMOTEROL TARTRATE 15 UG/2ML
15 SOLUTION RESPIRATORY (INHALATION) 2 TIMES DAILY
Status: DISCONTINUED | OUTPATIENT
Start: 2022-08-11 | End: 2022-08-16 | Stop reason: HOSPADM

## 2022-08-10 RX ORDER — 0.9 % SODIUM CHLORIDE 0.9 %
500 INTRAVENOUS SOLUTION INTRAVENOUS ONCE
Status: COMPLETED | OUTPATIENT
Start: 2022-08-10 | End: 2022-08-10

## 2022-08-10 RX ORDER — DEXTROSE MONOHYDRATE 25 G/50ML
INJECTION, SOLUTION INTRAVENOUS
Status: COMPLETED
Start: 2022-08-10 | End: 2022-08-10

## 2022-08-10 RX ADMIN — DEXTROSE MONOHYDRATE 50 ML: 25 INJECTION, SOLUTION INTRAVENOUS at 22:33

## 2022-08-10 RX ADMIN — DEXTROSE AND SODIUM CHLORIDE: 5; 900 INJECTION, SOLUTION INTRAVENOUS at 23:44

## 2022-08-10 RX ADMIN — LACTULOSE 20 G: 20 SOLUTION ORAL at 21:17

## 2022-08-10 RX ADMIN — DEXTROSE MONOHYDRATE 250 ML: 100 INJECTION, SOLUTION INTRAVENOUS at 17:36

## 2022-08-10 RX ADMIN — SODIUM CHLORIDE 500 ML: 9 INJECTION, SOLUTION INTRAVENOUS at 18:22

## 2022-08-10 ASSESSMENT — PAIN SCALES - GENERAL: PAINLEVEL_OUTOF10: 0

## 2022-08-10 ASSESSMENT — PAIN - FUNCTIONAL ASSESSMENT: PAIN_FUNCTIONAL_ASSESSMENT: NONE - DENIES PAIN

## 2022-08-10 NOTE — LETTER
[address] [place] [address] Dr Department Medicaid  CERTIFICATION OF NECESSITY  FOR NON-EMERGENCY TRANSPORTATION   BY GROUND AMBULANCE      Individual Information   1. Name: Christian Moraes 2. [address] [place] [address] Dr Medicaid Billing Number:    3. Address: [address] [place] [address]      Transportation Provider Information   4. Provider Name:    5. [address] [place] [address]  Medicaid Provider Number:  National Provider Identifier (NPI):      Certification  7. Criteria:  During transport, this individual requires:  [x] Medical treatment or continuous     supervision by an EMT. [] The administration or regulation of oxygen by another person. [] Supervised protective restraint. 8. Period Beginning Date: 8/11/2022     9. Length  [x] Not more than 10 day(s)  [] One Year     Additional Information Relevant to Certification   10. Comments or Explanations, If Necessary or Appropriate   REN (acute kidney injury) ([facility] [address] Acute hepatic encephalopathy and DX codes: N17.9, K72.00, oriented x 1, liver failure, assist x 2 for transfers       Certifying Practitioner Information   11. Name of Practitioner: dr Shruthi Otero   12. [address] [place] [address]  Medicaid Provider Number, If Applicable:  [address] Provider Identifier (NPI):     Signature Information   14. Date of Signature: 8/11/2022   15. Name of Person Signing: Dre Langley RN     16. Signature and Professional Designation: Electronically signed by Dre Langley RN on 8/11/2022 at 11:27 AM       [facility] [id number]  Rev. 7/2015            [address] Encounter Date/Time: 8/10/2022 [address] Account: [de-identified]    MRN: [id number]    Patient: [address] [place] [address] Drive Serial #: [id number]      ENCOUNTER          Patient Class: I Private Enc?   No Unit RM BD: SEYZ 4S PICU 4506/4506-A   Hospital Service: MED   Encounter DX: REN (acute kidney injury*   ADM Provider: Bandar Macario MD   Procedure:     ATT Provider: Bandar Macario MD   REF Provider:        Admission DX: REN (acute kidney injury) ([facility] [address] Acute hepatic encephalopathy and DX codes: N17.9, K72.00      PATIENT                 Name: Ben Hernandez : 1945 (68 yrs)   Address: [address]  [address] Sex: Female   [facility] [address]         Marital Status:    Employer: RETIRED         [employer]ism: [employer]   Primary Care Provider: Adin Muse MD         Primary Phone: [phone]   EMERGENCY CONTACT   Contact Name Legal Guardian? Relationship to Patient Home Phone Work Phone   1. Stewart Rivas III  2. Debbie Sharpe No  No Child  Other [phone]              GUARANTOR            Guarantor: Ben Hernandez     : 1945   Address: [facility] [address] Sex: Female     [place] [address]     Relation to Patient: Self       Home Phone: [phone]   Guarantor ID: [id number]       Work Phone:     Guarantor Employer: [employer]         Status: RETIRED      COVERAGE        PRIMARY INSURANCE   Payor: [employer] MEDICARE Plan: [employer] GOLD PLUS HMO   Payor Address: [facility] [id number]       Group Number: [id number] Insurance Type: [id number] Name: Geovanny Liriano : 1945   Subscriber ID: [id number] Pat. Rel. to Sub: Self   SECONDARY INSURANCE   Payor: Bogdan Carroll* Plan: Damon TUCKER*   Payor Address: [facility] [address] [facility]          [id number] Type: [place]DEMNITY   Subscriber Name: Geovanny Liriano : 1945   Subscriber ID: [id number] Pat.  Rel. to Sub: SELF           CSN: [id number]

## 2022-08-11 ENCOUNTER — APPOINTMENT (OUTPATIENT)
Dept: GENERAL RADIOLOGY | Age: 77
DRG: 432 | End: 2022-08-11
Payer: MEDICARE

## 2022-08-11 PROBLEM — E11.649 HYPOGLYCEMIA DUE TO TYPE 2 DIABETES MELLITUS (HCC): Status: ACTIVE | Noted: 2022-08-11

## 2022-08-11 LAB
ALBUMIN SERPL-MCNC: 2.7 G/DL (ref 3.5–5.2)
ALP BLD-CCNC: 122 U/L (ref 35–104)
ALT SERPL-CCNC: 20 U/L (ref 0–32)
AMMONIA: 153 UMOL/L (ref 11–51)
ANION GAP SERPL CALCULATED.3IONS-SCNC: 10 MMOL/L (ref 7–16)
ANISOCYTOSIS: ABNORMAL
AST SERPL-CCNC: 37 U/L (ref 0–31)
BASOPHILS ABSOLUTE: 0.04 E9/L (ref 0–0.2)
BASOPHILS RELATIVE PERCENT: 0.7 % (ref 0–2)
BILIRUB SERPL-MCNC: 2 MG/DL (ref 0–1.2)
BUN BLDV-MCNC: 32 MG/DL (ref 6–23)
CALCIUM IONIZED: 1.8 MMOL/L (ref 1.15–1.33)
CALCIUM SERPL-MCNC: 12.5 MG/DL (ref 8.6–10.2)
CHLORIDE BLD-SCNC: 101 MMOL/L (ref 98–107)
CHLORIDE URINE RANDOM: 56 MMOL/L
CO2: 24 MMOL/L (ref 22–29)
CREAT SERPL-MCNC: 1.4 MG/DL (ref 0.5–1)
CREATININE URINE: 31 MG/DL (ref 29–226)
EKG ATRIAL RATE: 99 BPM
EKG P AXIS: 11 DEGREES
EKG P-R INTERVAL: 130 MS
EKG Q-T INTERVAL: 336 MS
EKG QRS DURATION: 84 MS
EKG QTC CALCULATION (BAZETT): 431 MS
EKG R AXIS: 31 DEGREES
EKG T AXIS: 5 DEGREES
EKG VENTRICULAR RATE: 99 BPM
EOSINOPHILS ABSOLUTE: 0.1 E9/L (ref 0.05–0.5)
EOSINOPHILS RELATIVE PERCENT: 1.9 % (ref 0–6)
GFR AFRICAN AMERICAN: 44
GFR NON-AFRICAN AMERICAN: 36 ML/MIN/1.73
GLUCOSE BLD-MCNC: 86 MG/DL (ref 74–99)
HCT VFR BLD CALC: 27.2 % (ref 34–48)
HEMOGLOBIN: 9.4 G/DL (ref 11.5–15.5)
IMMATURE GRANULOCYTES #: 0.02 E9/L
IMMATURE GRANULOCYTES %: 0.4 % (ref 0–5)
LYMPHOCYTES ABSOLUTE: 0.87 E9/L (ref 1.5–4)
LYMPHOCYTES RELATIVE PERCENT: 16.2 % (ref 20–42)
MAGNESIUM: 1.7 MG/DL (ref 1.6–2.6)
MCH RBC QN AUTO: 34.8 PG (ref 26–35)
MCHC RBC AUTO-ENTMCNC: 34.6 % (ref 32–34.5)
MCV RBC AUTO: 100.7 FL (ref 80–99.9)
METER GLUCOSE: 103 MG/DL (ref 74–99)
METER GLUCOSE: 106 MG/DL (ref 74–99)
METER GLUCOSE: 117 MG/DL (ref 74–99)
METER GLUCOSE: 71 MG/DL (ref 74–99)
METER GLUCOSE: 73 MG/DL (ref 74–99)
METER GLUCOSE: 80 MG/DL (ref 74–99)
METER GLUCOSE: 81 MG/DL (ref 74–99)
METER GLUCOSE: 87 MG/DL (ref 74–99)
METER GLUCOSE: 89 MG/DL (ref 74–99)
METER GLUCOSE: 89 MG/DL (ref 74–99)
METER GLUCOSE: 92 MG/DL (ref 74–99)
METER GLUCOSE: 93 MG/DL (ref 74–99)
MONOCYTES ABSOLUTE: 0.7 E9/L (ref 0.1–0.95)
MONOCYTES RELATIVE PERCENT: 13 % (ref 2–12)
NEUTROPHILS ABSOLUTE: 3.65 E9/L (ref 1.8–7.3)
NEUTROPHILS RELATIVE PERCENT: 67.8 % (ref 43–80)
OSMOLALITY URINE: 344 MOSM/KG (ref 300–900)
OVALOCYTES: ABNORMAL
PARATHYROID HORMONE INTACT: 154 PG/ML (ref 15–65)
PDW BLD-RTO: 22.2 FL (ref 11.5–15)
PHOSPHORUS: 1.8 MG/DL (ref 2.5–4.5)
PLATELET # BLD: 132 E9/L (ref 130–450)
PMV BLD AUTO: 10.9 FL (ref 7–12)
POIKILOCYTES: ABNORMAL
POLYCHROMASIA: ABNORMAL
POTASSIUM REFLEX MAGNESIUM: 3.3 MMOL/L (ref 3.5–5)
RBC # BLD: 2.7 E12/L (ref 3.5–5.5)
SODIUM BLD-SCNC: 135 MMOL/L (ref 132–146)
SODIUM URINE: 63 MMOL/L
TARGET CELLS: ABNORMAL
TOTAL PROTEIN: 6.5 G/DL (ref 6.4–8.3)
UREA NITROGEN, UR: 304 MG/DL (ref 800–1666)
VITAMIN D 25-HYDROXY: 27 NG/ML (ref 30–100)
WBC # BLD: 5.4 E9/L (ref 4.5–11.5)

## 2022-08-11 PROCEDURE — 6370000000 HC RX 637 (ALT 250 FOR IP)

## 2022-08-11 PROCEDURE — 80053 COMPREHEN METABOLIC PANEL: CPT

## 2022-08-11 PROCEDURE — P9047 ALBUMIN (HUMAN), 25%, 50ML: HCPCS | Performed by: STUDENT IN AN ORGANIZED HEALTH CARE EDUCATION/TRAINING PROGRAM

## 2022-08-11 PROCEDURE — 2060000000 HC ICU INTERMEDIATE R&B

## 2022-08-11 PROCEDURE — 6370000000 HC RX 637 (ALT 250 FOR IP): Performed by: STUDENT IN AN ORGANIZED HEALTH CARE EDUCATION/TRAINING PROGRAM

## 2022-08-11 PROCEDURE — 83735 ASSAY OF MAGNESIUM: CPT

## 2022-08-11 PROCEDURE — 6360000002 HC RX W HCPCS

## 2022-08-11 PROCEDURE — 74018 RADEX ABDOMEN 1 VIEW: CPT

## 2022-08-11 PROCEDURE — 87040 BLOOD CULTURE FOR BACTERIA: CPT

## 2022-08-11 PROCEDURE — 84300 ASSAY OF URINE SODIUM: CPT

## 2022-08-11 PROCEDURE — 99223 1ST HOSP IP/OBS HIGH 75: CPT | Performed by: FAMILY MEDICINE

## 2022-08-11 PROCEDURE — 2500000003 HC RX 250 WO HCPCS: Performed by: FAMILY MEDICINE

## 2022-08-11 PROCEDURE — 85025 COMPLETE CBC W/AUTO DIFF WBC: CPT

## 2022-08-11 PROCEDURE — 83935 ASSAY OF URINE OSMOLALITY: CPT

## 2022-08-11 PROCEDURE — 6370000000 HC RX 637 (ALT 250 FOR IP): Performed by: INTERNAL MEDICINE

## 2022-08-11 PROCEDURE — 94664 DEMO&/EVAL PT USE INHALER: CPT

## 2022-08-11 PROCEDURE — 82436 ASSAY OF URINE CHLORIDE: CPT

## 2022-08-11 PROCEDURE — 82570 ASSAY OF URINE CREATININE: CPT

## 2022-08-11 PROCEDURE — 36415 COLL VENOUS BLD VENIPUNCTURE: CPT

## 2022-08-11 PROCEDURE — 82962 GLUCOSE BLOOD TEST: CPT

## 2022-08-11 PROCEDURE — 2580000003 HC RX 258: Performed by: STUDENT IN AN ORGANIZED HEALTH CARE EDUCATION/TRAINING PROGRAM

## 2022-08-11 PROCEDURE — 94640 AIRWAY INHALATION TREATMENT: CPT

## 2022-08-11 PROCEDURE — 6360000002 HC RX W HCPCS: Performed by: STUDENT IN AN ORGANIZED HEALTH CARE EDUCATION/TRAINING PROGRAM

## 2022-08-11 PROCEDURE — 84540 ASSAY OF URINE/UREA-N: CPT

## 2022-08-11 PROCEDURE — 82140 ASSAY OF AMMONIA: CPT

## 2022-08-11 RX ORDER — LACTULOSE 10 G/15ML
20 SOLUTION ORAL
Status: DISCONTINUED | OUTPATIENT
Start: 2022-08-11 | End: 2022-08-16

## 2022-08-11 RX ORDER — OXYMETAZOLINE HYDROCHLORIDE 0.05 G/100ML
2 SPRAY NASAL ONCE
Status: DISPENSED | OUTPATIENT
Start: 2022-08-11 | End: 2022-08-14

## 2022-08-11 RX ORDER — MAGNESIUM SULFATE 1 G/100ML
1000 INJECTION INTRAVENOUS ONCE
Status: COMPLETED | OUTPATIENT
Start: 2022-08-11 | End: 2022-08-11

## 2022-08-11 RX ORDER — ALBUMIN (HUMAN) 12.5 G/50ML
25 SOLUTION INTRAVENOUS EVERY 8 HOURS
Status: DISCONTINUED | OUTPATIENT
Start: 2022-08-11 | End: 2022-08-16

## 2022-08-11 RX ORDER — DEXTROSE MONOHYDRATE 25 G/50ML
12.5 INJECTION, SOLUTION INTRAVENOUS ONCE
Status: COMPLETED | OUTPATIENT
Start: 2022-08-11 | End: 2022-08-11

## 2022-08-11 RX ORDER — ERGOCALCIFEROL 1.25 MG/1
50000 CAPSULE ORAL WEEKLY
Status: DISCONTINUED | OUTPATIENT
Start: 2022-08-11 | End: 2022-08-13

## 2022-08-11 RX ORDER — ENOXAPARIN SODIUM 100 MG/ML
75 INJECTION SUBCUTANEOUS DAILY
Status: DISCONTINUED | OUTPATIENT
Start: 2022-08-11 | End: 2022-08-12

## 2022-08-11 RX ORDER — LIDOCAINE HYDROCHLORIDE 20 MG/ML
JELLY TOPICAL ONCE
Status: DISCONTINUED | OUTPATIENT
Start: 2022-08-11 | End: 2022-08-16 | Stop reason: HOSPADM

## 2022-08-11 RX ORDER — POTASSIUM CHLORIDE 20 MEQ/1
20 TABLET, EXTENDED RELEASE ORAL ONCE
Status: COMPLETED | OUTPATIENT
Start: 2022-08-11 | End: 2022-08-11

## 2022-08-11 RX ORDER — BISACODYL 10 MG
10 SUPPOSITORY, RECTAL RECTAL DAILY PRN
Status: DISCONTINUED | OUTPATIENT
Start: 2022-08-11 | End: 2022-08-16 | Stop reason: HOSPADM

## 2022-08-11 RX ORDER — DEXTROSE MONOHYDRATE 100 MG/ML
INJECTION, SOLUTION INTRAVENOUS CONTINUOUS PRN
Status: DISCONTINUED | OUTPATIENT
Start: 2022-08-11 | End: 2022-08-16 | Stop reason: HOSPADM

## 2022-08-11 RX ORDER — CINACALCET 30 MG/1
30 TABLET, FILM COATED ORAL 2 TIMES DAILY
Status: DISCONTINUED | OUTPATIENT
Start: 2022-08-11 | End: 2022-08-16

## 2022-08-11 RX ADMIN — Medication 10 ML: at 20:43

## 2022-08-11 RX ADMIN — DEXTROSE AND SODIUM CHLORIDE: 5; 900 INJECTION, SOLUTION INTRAVENOUS at 08:48

## 2022-08-11 RX ADMIN — LACTULOSE 20 G: 20 SOLUTION ORAL at 21:46

## 2022-08-11 RX ADMIN — BUDESONIDE 500 MCG: 0.5 SUSPENSION RESPIRATORY (INHALATION) at 20:11

## 2022-08-11 RX ADMIN — BUDESONIDE 500 MCG: 0.5 SUSPENSION RESPIRATORY (INHALATION) at 07:56

## 2022-08-11 RX ADMIN — RIFAXIMIN 550 MG: 550 TABLET ORAL at 20:42

## 2022-08-11 RX ADMIN — MAGNESIUM SULFATE HEPTAHYDRATE 1000 MG: 1 INJECTION, SOLUTION INTRAVENOUS at 11:32

## 2022-08-11 RX ADMIN — POTASSIUM CHLORIDE 20 MEQ: 1500 TABLET, EXTENDED RELEASE ORAL at 17:50

## 2022-08-11 RX ADMIN — LACTULOSE 20 G: 20 SOLUTION ORAL at 23:57

## 2022-08-11 RX ADMIN — LACTULOSE 20 G: 20 SOLUTION ORAL at 19:52

## 2022-08-11 RX ADMIN — ARFORMOTEROL TARTRATE 15 MCG: 15 SOLUTION RESPIRATORY (INHALATION) at 20:12

## 2022-08-11 RX ADMIN — DEXTROSE MONOHYDRATE 12.5 G: 25 INJECTION, SOLUTION INTRAVENOUS at 07:40

## 2022-08-11 RX ADMIN — LACTULOSE 20 G: 20 SOLUTION ORAL at 17:50

## 2022-08-11 RX ADMIN — ALBUMIN (HUMAN) 25 G: 0.25 INJECTION, SOLUTION INTRAVENOUS at 18:07

## 2022-08-11 RX ADMIN — CINACALCET HYDROCHLORIDE 30 MG: 30 TABLET, FILM COATED ORAL at 20:43

## 2022-08-11 RX ADMIN — ENOXAPARIN SODIUM 80 MG: 100 INJECTION SUBCUTANEOUS at 11:27

## 2022-08-11 RX ADMIN — ARFORMOTEROL TARTRATE 15 MCG: 15 SOLUTION RESPIRATORY (INHALATION) at 07:56

## 2022-08-11 ASSESSMENT — PAIN SCALES - WONG BAKER
WONGBAKER_NUMERICALRESPONSE: 0
WONGBAKER_NUMERICALRESPONSE: 0

## 2022-08-11 ASSESSMENT — PAIN SCALES - GENERAL: PAINLEVEL_OUTOF10: 0

## 2022-08-11 NOTE — ED PROVIDER NOTES
68y.o. year old female presenting to the emergency room with concerns of abnormal labwork of elevated creatinine beginning today. Report given to charge nurse by EMS was that patent had neck pain, abdominal pain and fatigue reports that symptom's onset today. Worsen with nothing. Improves withnothing. Severity of severe, with no radiation . Symptoms are constant in timing. Symptoms described as increased fatigue. reports associated symptoms of none. Patient awakens, opens and closes eyes, but uncooperative with questioning. Able to state she is in the hospital.     Chief Complaint   Patient presents with    Abnormal Lab     Kidney function    Fatigue       Review of Systems   Unable to perform ROS: Mental status change      Physical Exam  Vitals reviewed. Constitutional:       Appearance: She is obese. She is ill-appearing. HENT:      Head: Normocephalic. Right Ear: External ear normal.      Left Ear: External ear normal.      Nose: Nose normal.      Mouth/Throat:      Pharynx: Oropharynx is clear. Eyes:      General:         Right eye: No discharge. Left eye: No discharge. Conjunctiva/sclera: Conjunctivae normal.   Cardiovascular:      Rate and Rhythm: Normal rate and regular rhythm. Heart sounds: No friction rub. Pulmonary:      Effort: Pulmonary effort is normal. No respiratory distress. Breath sounds: No stridor. Abdominal:      Palpations: Abdomen is soft. Tenderness: There is abdominal tenderness. There is no guarding or rebound. Musculoskeletal:         General: Tenderness present. No deformity. Cervical back: Normal range of motion. No rigidity or tenderness. Right lower leg: Edema present. Left lower leg: Edema present. Skin:     General: Skin is warm. Coloration: Skin is jaundiced. Neurological:      Mental Status: She is easily aroused. She is lethargic. Sensory: No sensory deficit. Motor: No weakness.    Psychiatric: Mood and Affect: Mood normal.         Behavior: Behavior normal.        Procedures     CT HEAD WO CONTRAST   Final Result   No acute intracranial abnormality. Cortical atrophy and periventricular leukomalacia. CT CERVICAL SPINE WO CONTRAST   Final Result   No acute abnormality of the cervical spine. Multilevel degenerative changes. Left pleural effusion in the visualized left lung apex. Consider chest CT   for better evaluation if clinically appropriate. CT ABDOMEN PELVIS WO CONTRAST Additional Contrast? None   Final Result   1. Moderate left pleural effusion with atelectatic changes involving left   lower lobe. 2. Redemonstration of findings related to chronic hepatocellular disease with   portal venous hypertension. 3. Small perihepatic ascites. Ascites has decreased compared to prior. 4. Multiple nonobstructing bilateral renal calculi. 5. Diverticulosis. XR CHEST PORTABLE   Final Result   Small left pleural effusion. Associated atelectasis or infiltrate cannot be   excluded. EKG:  This EKG is signed by emergency department physician. Rate: 99  Rhythm: Sinus  AXIS:normal  ST Changes:no ST elevations noted  Interpretation: sinus rhythm with PVC    MDM  Number of Diagnoses or Management Options  Diagnosis management comments: 40-year-old female presenting to the emergency department complaints of abnormal creatinine, fatigue. Patient reported to have elevated creatinine levels on lab work. Patient with previous history of hyperammonemia and breast cancer. Glucose on point-of-care noted to be RR long, patient started on D10, and recheck of glucose improved. D10 stopped at this time, point-of-care glucose being monitored. UA negative mild anemia noted. CT with no acute intracranial abnormality, left pleural effusion noted with ascites improved from previous.   Patient able to state that she is at the hospital but otherwise minimal response to questioning. Dr. Wallace Tran, discussed patient will plan to admit patient at this time. Spoke to family medicine resident. Ammonia 125. Will attempt bedside swallow, and give oral lactulose if able however will require lactulose enemas otherwise. Amount and/or Complexity of Data Reviewed  Decide to obtain previous medical records or to obtain history from someone other than the patient: yes                    --------------------------------------------- PAST HISTORY ---------------------------------------------  Past Medical History:  has a past medical history of Breast cancer ([facility] [address] Cirrhosis ([facility] [address] Essential hypertension, Hx of blood clots, Hyperlipidemia, Osteopenia, 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 ([facility] [address] Sleep apnea, Spinal stenosis, and Type 2 diabetes mellitus ([facility] [address] Past Surgical History:  has a past surgical history that includes Hysterectomy; Carpal tunnel release; Lithotripsy (Left, 05/04/2016); Colonoscopy (01/31/2017); Breast lumpectomy; Upper gastrointestinal endoscopy (04/23/2019); Colonoscopy (04/23/2019); Upper gastrointestinal endoscopy (N/A, 04/23/2019); Colonoscopy (N/A, 04/23/2019); Colonoscopy (04/23/2019); Shoulder Arthroplasty (Left, 12/21/2020); cardiovascular stress test; and Bladder surgery (Right, 11/26/2021). Social History:  reports that she has never smoked. She has never used smokeless tobacco. She reports that she does not drink alcohol and does not use drugs. Family History: family history includes Arthritis in her mother; COPD in her father; Cancer (age of onset: 48) in an other family member; Cirrhosis in her brother; Heart Attack in her father; Heart Disease in her brother; Hypertension in her child and child; Prostate Cancer in her child. The patients home medications have been reviewed.     Allergies: Lisinopril    -------------------------------------------------- RESULTS -------------------------------------------------    LABS:  Results for orders pg/mL   Urinalysis with Microscopic   Result Value Ref Range    Color, UA Yellow Straw/Yellow    Clarity, UA Clear Clear    Glucose, Ur Negative Negative mg/dL    Bilirubin Urine Negative Negative    Ketones, Urine Negative Negative mg/dL    Specific Gravity, UA <=1.005 1.005 - 1.030    Blood, Urine TRACE-INTACT Negative    pH, UA 6.5 5.0 - 9.0    Protein, UA Negative Negative mg/dL    Urobilinogen, Urine 2.0 (A) <2.0 E.U./dL    Nitrite, Urine Negative Negative    Leukocyte Esterase, Urine Negative Negative    WBC, UA 0-1 0 - 5 /HPF    RBC, UA 2-5 0 - 2 /HPF    Bacteria, UA NONE SEEN None Seen /HPF   Lactic Acid   Result Value Ref Range    Lactic Acid 1.7 0.5 - 2.2 mmol/L   Lipase   Result Value Ref Range    Lipase 37 13 - 60 U/L   Ammonia   Result Value Ref Range    Ammonia 125.0 (H) 11.0 - 51.0 umol/L   POCT Glucose   Result Value Ref Range    Glucose 222 mg/dL    QC OK? y    POCT Glucose   Result Value Ref Range    Meter Glucose <40 (L) 74 - 99 mg/dL   POCT Glucose   Result Value Ref Range    Meter Glucose 222 (H) 74 - 99 mg/dL   POCT Glucose   Result Value Ref Range    Glucose 70 mg/dL    QC OK? y    POCT Glucose   Result Value Ref Range    Meter Glucose 70 (L) 74 - 99 mg/dL   EKG 12 Lead   Result Value Ref Range    Ventricular Rate 90 BPM    Atrial Rate 90 BPM    P-R Interval 130 ms    QRS Duration 82 ms    Q-T Interval 358 ms    QTc Calculation (Bazett) 437 ms    P Axis 45 degrees    R Axis 32 degrees    T Axis 24 degrees       RADIOLOGY:  CT HEAD WO CONTRAST   Final Result   No acute intracranial abnormality. Cortical atrophy and periventricular leukomalacia. CT CERVICAL SPINE WO CONTRAST   Final Result   No acute abnormality of the cervical spine. Multilevel degenerative changes. Left pleural effusion in the visualized left lung apex. Consider chest CT   for better evaluation if clinically appropriate. CT ABDOMEN PELVIS WO CONTRAST Additional Contrast? None   Final Result   1. Moderate left pleural effusion with atelectatic changes involving left   lower lobe. 2. Redemonstration of findings related to chronic hepatocellular disease with   portal venous hypertension. 3. Small perihepatic ascites. Ascites has decreased compared to prior. 4. Multiple nonobstructing bilateral renal calculi. 5. Diverticulosis. XR CHEST PORTABLE   Final Result   Small left pleural effusion. Associated atelectasis or infiltrate cannot be   excluded. ------------------------- NURSING NOTES AND VITALS REVIEWED ---------------------------  Date / Time Roomed:  8/10/2022  4:49 PM  ED Bed Assignment:  20/20    The nursing notes within the ED encounter and vital signs as below have been reviewed. Patient Vitals for the past 24 hrs:   BP Temp Temp src Pulse Resp SpO2   08/10/22 2103 (!) 115/58 97.7 °F (36.5 °C) -- 90 17 97 %   08/10/22 1959 -- -- -- -- -- 99 %   08/10/22 1943 107/64 -- -- 94 17 --   08/10/22 1900 112/65 -- -- 90 12 --   08/10/22 1824 (!) 108/57 -- -- 97 16 98 %   08/10/22 1650 99/65 97.4 °F (36.3 °C) Oral 92 16 93 %       Oxygen Saturation Interpretation: Normal    ------------------------------------------ PROGRESS NOTES ------------------------------------------  Re-evaluation(s):   Patients symptoms show no change  Repeat physical examination is not changed    Counseling:  I have spoken with the patient and discussed todays results, in addition to providing specific details for the plan of care and counseling regarding the diagnosis and prognosis. Their questions are answered at this time and they are agreeable with the plan of admission.    --------------------------------- ADDITIONAL PROVIDER NOTES ---------------------------------  Consultations:  Spoke with Dr. Collette Curet. Discussed case. They will admit the patient.   This patient's ED course included: a personal history and physicial examination, re-evaluation prior to disposition, multiple bedside re-evaluations, IV medications, cardiac monitoring, and continuous pulse oximetry    This patient has remained hemodynamically stable during their ED course. Diagnosis:  1. REN (acute kidney injury) ([facility] [address]        Disposition:  Patient's disposition: Admit  Patient's condition is stable. Attending was present and available throughout encounter including all critical portions;  See Attending Note/Attestation for Final [address], DO  Resident  08/10/22 9813

## 2022-08-11 NOTE — CARE COORDINATION
Patient admitted to PICU after presenting to ED with abnormal labs from a skilled nursing facility. BP 99/65. Ammonia level 153. Bs 71. Nephrology consulted. Attempted to meet with patient at bedside and she is not responding to commands at this time. Spoke with patient's son, Dann Leroy and he has concerns regarding his mother not taking her medications at the facility. I encouraged him to take concerns to nursing at that facility. He is agreeable to have his mother return to [facility] and if he is unsatisfied with the facility he can request an internal transfer to another facility of his choice. He is also considering placement and not rehab . Will await PT/OT evaluations  when appropriate to further assist with transition of care planning. Left message with Blade Andres from [facility]. to relay patient's son's concerns. Blade Andres from [facility] returned call she will will escalate patient's son's concerns to facility administration and DON to have them reach out to him. Per Blade Andres, patient is a bedhold and does not need precert or covid test before returning. Ambulance form in envelope in soft chart . CM/SW will continue to follow.

## 2022-08-11 NOTE — PROGRESS NOTES
[address] [facility]  Family Medicine Attending    NED GREGG Course: 68 y.o. 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 She became increasingly altered and lethargic. Elevated Cr, Ca at NH. She reported neck/abdominal pain. CT Abd showed large stool burden & moderate left pleural effusion. S: Patient somnolent in bed, arousable but not answering questions appropriately. O: VS- Blood pressure 117/69, pulse 93, temperature 98.1 °F (36.7 °C), temperature source Axillary, resp. rate 16, SpO2 95 %. Gen: Ill appearing. Somnolent. Heent: NC AT Anicteric. Dry MM  CV: irreg irreg no murmurs. Resp: clear in apices, diminished at left base. Abd: +hypoactive BS, soft, nt nd. Ext: ROSAS no calf ttp. Impressions:   Principal Problem:    REN (acute kidney injury) ([facility] [address]  Active Problems:    Acute hepatic encephalopathy  Resolved Problems:    * No resolved hospital problems. *      Plan:   1) Acute Hepatic Encephalopathy - bowel regimen today. Cont lactulose, rifaximin. Rectal suppository. Consider enema. Labs pending. Do not suspect SBP currently. 2) REN - suspect pre-renal. Labs pending. D5NS @ 100cc/hr    3) Hypoglycemia - 2/2 liver disease, poor po intake - D5NS. Monitor glucose, encourage PO as she becomes more alert. 4) Hypercalcemia - IVF, consult nephro. 5) Left Pleural Effusion - worsening in the last 2 weeks. SaO2 remains stable. Low threshold to consult pulm if worsening. 6) Hx of VTE/PE - SC Lovenox 1mg/kg qDAY renally dosed. Cont to monitor Cr for further dosing. Attending Physician Statement  I have reviewed the chart and seen the patient with the resident(s).   I personally reviewed images, EKG's and similar tests, if present. I personally reviewed and performed key elements of the history and exam.  I have reviewed and confirmed student and/or resident history and exam with changes as indicated above. I agree with the assessment, plan and orders as documented by the resident. Please refer to the resident and/or student note for additional information.       Bharath Rolle MD

## 2022-08-11 NOTE — CONSULTS
[address]                  [address] [facility],  [address]                                  CONSULTATION    PATIENT NAME: Shane Garrett                     :        1945  MED REC NO:   [id number]                            ROOM:       4506  ACCOUNT NO:   [de-identified]                           ADMIT DATE: 08/10/2022  PROVIDER:     Ramona Wilks MD    CONSULT DATE:  2022    REASON FOR CONSULTATION:  Acute kidney injury. HISTORY OF PRESENT ILLNESS:  The patient is a pleasant 55-year-old  female being asked to see for acute kidney injury. The patient has a  history of primary hyperparathyroidism; hypertension; nonalcoholic liver  disease; diabetes type 2; sleep apnea; hyperlipidemia; history of _____,  on Eliquis; history of breast cancer among other medical problems. She presented to the hospital with a chief complaint of altered mental  status and she is being admitted with a diagnosis of hepatic  encephalopathy. Nephrology is asked to see for REN. I did see her in her room. She is actually sleepy when I am seeing her. She appears clinically dry. Her creatinine is 1.4, down from 1.5  yesterday. Baseline creatinine seemed to be fluctuating anywhere from 1  to 1.5 with a lot of hemodynamic alteration over the last two years. She is currently receiving normal saline at 100 mL an hour. No urine lytes available to me this admission. CT abdomen this admission, no mention of hydronephrosis. She does have  multiple nonobstructive bilateral renal calculi. Medications at home include Aldactone, Lasix, and cinacalcet. REVIEW OF SYSTEMS:  12-point done, negative except for those mentioned  above. ALLERGIES:  LISINOPRIL. PAST MEDICAL HISTORY:  Includes primary hyperparathyroidism,  hypertension, liver cirrhosis. PAST SURGICAL HISTORY:  Includes hysterectomy, carpal tunnel syndrome.     SOCIAL HISTORY:  No smoking or

## 2022-08-11 NOTE — H&P
[facility] - Family [facility] Resident Inpatient  History and Physical      CC: altered mental status     HPI: History obtained from patient, EM resident and chart. Patient is oriented to place, person. Princess Monreal is a 68 y.o. female with a PMH of primary hyperparathyroidism, HTN,  nonalcoholic liver cirrhosis with ascites, Chylothorax, T2DM, Interstitial lung disease, KATE, HLD, h/o unprovoked PE on Eliquis and h/o breast cancer s/p lumpectomy in 2013  who presents to ED for Abnormal Lab (Kidney function) and Fatigue  . She was recently discharged 8/4/22 after being treated after a fall with altered mental status. Since she returned to the nursing home, she became increasingly more altered and lethargic again. Labs at the nursing home were significant for elevated creatinine and calcium. Patient was also endorsing neck and abdominal pain. Upon my exam, she was somnolent and occasionally responding to questions. She was only able to endorse abdominal pain and nothing further. ED Course: The patient had been closely monitored. She was somnolent. POCT glc at that time <40. She received D10 bolus and glc improved. Other labs were significant for chronic anemia 9.7,  elevated cr 1.5, BUN 34, hypercalcemia 13.0, hypoalbuminemia 2.9, elevated alk phos 131, elevated AST 40, hyperammoniemia 125. COVID neg. UA negative. Urine  cultures pending. Passed bedside swallow. CXR showed small left pleural effusion with possible infiltrate vs atelectasis. CTH negative for acute abnormalities. CT C spine showed multilevel degenerative changes. CT A/P significant for moderate left pleural effusion with atelectatic changes in LLL, chronic hepatocellular dz with Portal venous HTN, small perihepatic ascites decreased from prior, b/l renal calculi, and diverticulosis. EKG: normal sinus rhythm with premature supraventricular complexes, changed from previous tracings. Patient was given d10 bolus, 500 nl NSB,and lactulose .    Pt admitted for REN and altered mental status         PMH:  has a past medical history of Breast cancer ([facility] [address] Cirrhosis ([address] Essential hypertension, Hx of blood clots, Hyperlipidemia, Osteopenia, 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 ([address] Sleep apnea, Spinal stenosis, and Type 2 diabetes mellitus ([address] PSH:  has a past surgical history that includes Hysterectomy; Carpal tunnel release; Lithotripsy (Left, 05/04/2016); Colonoscopy (01/31/2017); Breast lumpectomy; Upper gastrointestinal endoscopy (04/23/2019); Colonoscopy (04/23/2019); Upper gastrointestinal endoscopy (N/A, 04/23/2019); Colonoscopy (N/A, 04/23/2019); Colonoscopy (04/23/2019); Shoulder Arthroplasty (Left, 12/21/2020); cardiovascular stress test; and Bladder surgery (Right, 11/26/2021). FH: family history includes Arthritis in her mother; COPD in her father; Cancer (age of onset: 48) in an other family member; Cirrhosis in her brother; Heart Attack in her father; Heart Disease in her brother; Hypertension in her child and child; Prostate Cancer in her child. Social:  reports that she has never smoked. She has never used smokeless tobacco. She reports that she does not drink alcohol and does not use drugs. Allergies: Allergies   Allergen Reactions    Lisinopril         Home Medications:   No current facility-administered medications on file prior to encounter. Current Outpatient Medications on File Prior to Encounter   Medication Sig Dispense Refill    furosemide (LASIX) 20 MG tablet Take 1 tablet by mouth in the morning and 1 tablet before bedtime. 90 tablet 3    lactulose (CHRONULAC) 10 GM/15ML solution Take 30 mLs by mouth in the morning and 30 mLs at noon and 30 mLs in the evening and 30 mLs before bedtime. 1    cinacalcet (SENSIPAR) 30 MG tablet Take 1 tablet by mouth in the morning. 30 tablet 3    ferrous sulfate (IRON 325) 325 (65 Fe) MG tablet Take 325 mg by mouth in the morning.       apixaban (ELIQUIS) 5 MG TABS tablet Take 1 tablet by mouth in the morning and 1 tablet before bedtime. 60 tablet 2    lidocaine 4 % external patch Place 1 patch onto the skin daily as needed (Left shoulder pain) 7 patch 0    fluticasone-salmeterol (ADVAIR) 250-50 MCG/ACT AEPB diskus inhaler INHALE 1 PUFF BY MOUTH EVERY TWELVE HOURS (BULK) 60 each 5    venlafaxine (EFFEXOR XR) 75 MG extended release capsule Take 1 capsule by mouth daily 30 capsule 3    [DISCONTINUED] felodipine (PLENDIL) 2.5 MG extended release tablet TAKE 1 TABLET BY MOUTH DAILY 90 tablet 1    XIFAXAN 550 MG tablet TAKE ONE TABLET BY MOUTH TWICE DAILY @ 9AM & 9PM 60 tablet 5    spironolactone (ALDACTONE) 100 MG tablet TAKE ONE TABLET BY MOUTH DAILY AT 9AM 30 tablet 5    amLODIPine (NORVASC) 2.5 MG tablet TAKE ONE TABLET BY MOUTH DAILY AT 9AM 30 tablet 5    ondansetron (ZOFRAN) 4 MG tablet TAKE 1 TABLET BY MOUTH THREE TIMES DAILY AS NEEDED FOR NAUSEA OR VOMITING 15 tablet 3    doxazosin (CARDURA) 1 MG tablet Take 1 mg by mouth nightly      pantoprazole (PROTONIX) 40 MG tablet Take 40 mg by mouth daily      albuterol sulfate HFA (VENTOLIN HFA) 108 (90 Base) MCG/ACT inhaler Inhale 2 puffs into the lungs 4 times daily as needed for Wheezing 54 g 1    Biotin 65997 MCG TABS Take 1,000 mcg by mouth every 7 days Given Monday         ROS:  Could not assess due to patient's condition. PE:  Blood pressure 107/64, pulse 94, temperature 97.4 °F (36.3 °C), temperature source Oral, resp. rate 17, SpO2 99 %. General: Somnolent, but able to follow simple commands, no acute distress. HEENT: Normocephalic, atraumatic. PERRL, conjunctiva/corneas clear, no pallor or icterus. Oropharynx clear. Dry mucus membranes   Neck: Symmetrical, trachea midline, no JVD. Chest: No tenderness or deformity, full & symmetric excursion  Lung: Clear to auscultation bilaterally,  respirations unlabored. No rales/wheezing/rubs.  Decreased breath sounds on LLL field  Heart: irregular rhythm, regular rate, S1 and S2 normal, no murmur, rub or gallop. DP pulses 2/4  Abdomen: soft, nondistended, TTP of RUQ,  no masses, no organomegaly, no guarding, rebound or rigidity. Genital/Rectal: deferred  Extremities:  Extremities normal, atraumatic, no cyanosis or edema. Skin: Skin color, texture, turgor normal, no rashes or lesions  Musculoskeletal: No joint swelling, no muscle tenderness. Neurologic: Alert & Oriented to self and place; able to follow simple commands, strength 4/5 in BUE and BLE.  Remaining CN could not be assessed due to patient condition     Labs:   Results for orders placed or performed during the hospital encounter of 08/10/22   COVID-19, Rapid    Specimen: Nasopharyngeal Swab   Result Value Ref Range    SARS-CoV-2, NAAT Not Detected Not Detected   CBC with Auto Differential   Result Value Ref Range    WBC 6.2 4.5 - 11.5 E9/L    RBC 2.87 (L) 3.50 - 5.50 E12/L    Hemoglobin 9.7 (L) 11.5 - 15.5 g/dL    Hematocrit 28.6 (L) 34.0 - 48.0 %    MCV 99.7 80.0 - 99.9 fL    MCH 33.8 26.0 - 35.0 pg    MCHC 33.9 32.0 - 34.5 %    RDW 22.0 (H) 11.5 - 15.0 fL    Platelets 008 525 - 500 E9/L    MPV 10.6 7.0 - 12.0 fL    Neutrophils % 69.4 43.0 - 80.0 %    Immature Granulocytes % 0.3 0.0 - 5.0 %    Lymphocytes % 15.6 (L) 20.0 - 42.0 %    Monocytes % 12.4 (H) 2.0 - 12.0 %    Eosinophils % 1.8 0.0 - 6.0 %    Basophils % 0.5 0.0 - 2.0 %    Neutrophils Absolute 4.32 1.80 - 7.30 E9/L    Immature Granulocytes # 0.02 E9/L    Lymphocytes Absolute 0.97 (L) 1.50 - 4.00 E9/L    Monocytes Absolute 0.77 0.10 - 0.95 E9/L    Eosinophils Absolute 0.11 0.05 - 0.50 E9/L    Basophils Absolute 0.03 0.00 - 0.20 E9/L    Anisocytosis 2+     Polychromasia 3+     Poikilocytes 1+     Ovalocytes 2+     Target Cells 2+    Comprehensive Metabolic Panel w/ Reflex to MG   Result Value Ref Range    Sodium 133 132 - 146 mmol/L    Potassium reflex Magnesium 3.7 3.5 - 5.0 mmol/L    Chloride 98 98 - 107 mmol/L    CO2 23 22 - 29 mmol/L    Anion Gap 12 7 - 16 mmol/L    Glucose 139 P Axis 45 degrees    R Axis 32 degrees    T Axis 24 degrees       Imaging:  CT HEAD WO CONTRAST   Final Result   No acute intracranial abnormality. Cortical atrophy and periventricular leukomalacia. CT CERVICAL SPINE WO CONTRAST   Final Result   No acute abnormality of the cervical spine. Multilevel degenerative changes. Left pleural effusion in the visualized left lung apex. Consider chest CT   for better evaluation if clinically appropriate. CT ABDOMEN PELVIS WO CONTRAST Additional Contrast? None   Final Result   1. Moderate left pleural effusion with atelectatic changes involving left   lower lobe. 2. Redemonstration of findings related to chronic hepatocellular disease with   portal venous hypertension. 3. Small perihepatic ascites. Ascites has decreased compared to prior. 4. Multiple nonobstructing bilateral renal calculi. 5. Diverticulosis. XR CHEST PORTABLE   Final Result   Small left pleural effusion. Associated atelectasis or infiltrate cannot be   excluded. Assessment and Plan  Principal Problem:    REN (acute kidney injury) ([facility] [address]  Active Problems:    Acute hepatic encephalopathy  Resolved Problems:    * No resolved hospital problems.  *    Acute hepatic encephalopathy   - multifactorial, 2/2 hypercalcemia, cirrhosis, hypoglycemia   - Neuro check q4h   - Bedside swallow passed in ED, ok for diet   - Ammonia 125, beginning to trend back up since 8/2/22, repeat level tomorrow - Low suspicion for SBP d/t afebrile, normal white count and improvement of ascites since last admission   - on rifaximin and lactulose at home, continue   - consider consult to Dr. Ca Remy   - blood culture ordered, urine culture pending    - monitor I/O   - Monitor electrolytes   - hold home med, doxazosin and effexor due to AMS     REN   - Cr 1.5, baseline (0.9-1.1)   - Urine studies, urea   - likely prerenal due to dehydration   - D5NS 100 cc/h     Hypoglycemia   - poor PO intake   - h/o T2DM diet controlled, A1C 4.5 7/12/22  - hypoglycemia protocol  - D5NS 100 cc/h  - POCT Glc q2h x4h, then q4h     Hypercalcemia   - Ca 13.0, corrected 13.88   - 2/2 primary hyperparathyroidism, on cinacalcet   - continue home medication  - , Vit D 29, phos 2.5, TSH 4.14 7/30/22, repeat    - assess Ionized calcium level and PTHrp  - nephro consult   - IVF   - I/O   - hold lasix for now due to IVF     Left Pleural effusion  - chronic, worse compared to CXR on 7/29/22   - currently saturating well on RA  - Continue to monitor   - Consider pulm consult if respiratory status worsens    Iron deficiency anemia   - Hb 9.7, no signs of active bleeding  - continue home med, ferrous sulfate     H/o HTN  - currently hypotensive  - on amlodipine and spironolactone at home, hold for now      H/O ILD    - on advair, abuterol at home   - start dulera, Albuterol PRN     H/O PE   - on eliquis, continue         DVT / GI prophylaxis:  eliquis  and Protonix    Dispo - admit to telemetry       Electronically signed by Allison Seaman MD on 8/10/22 at 8:33 PM EDT  This case was discussed with attending physician: Dr. Janki Hardy

## 2022-08-11 NOTE — CONSULTS
History and Physical      ASSESSMENT AND PLAN:    77y/F history of HOLDEN cirrhosis who presents w/ AMS and REN. PLAN:  HOLDEN Cirrhosis:  -CBC, CMP, and INR daily    2. REN:  -Most likely pre-renal from new administration of diuretics as well as poor PO intake  -Hold diuretics for now  -Albumin 25g q 8 hrs  -Hold crystalloids as patient will become significantly fluid overloaded. -Monitor Cr daily.  -After correction of REN, patient will need to be restarted on diuretics with Cr monitored closely. 3. AMS:  -Consistent with hepatic encephalopathy.  -Patient unable to swallow safely.  -Please place NG tube and FMS. -Begin 20g lactulose q 2 hours until mental status improves. -Continue rifaximin. 4. Hepatic Lesions:  -Patient will require 3 or 4 phase MRI or CT Liver to ensure not [address]    5. Esophageal Varices:  -Variceal screen scheduled for 11/4     I will follow. Thank you for including us in the care of this patient. Please do not hesitate to contact us with any additional questions or concerns. Padilla Wheeler MD  Gastroenterology/Hepatology  Advanced Endoscopy          HISTORY OF PRESENT ILLNESS:      Ms. Cristiano Miller is a 77y/F w/ history of HOLDEN cirrhosis c/b recurrent chylothorax and chylous ascites w/ recent improvement after being started on diuretic therapy who presents w/ AMS and REN. Vitals on admission show HDS and afebrile. Labs on admission showed Cr 1.5. UA was negative for infection. Imaging included a CT Head which showed no acute process, CT A/P which showed moderate L pleural effusion, small ascites, and several hypodense lesions which had been appreciated on previous imaging. In the room, the patient moaned to movement but was unarousable to verbal stimuli.       Past Medical History:        Diagnosis Date    Breast cancer ([address]     Cirrhosis ([address]     Essential hypertension     Hx of blood clots     Hyperlipidemia     Osteopenia     Primary hyperparathyroidism ([address] 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 ([facility] [address]     Sleep apnea     Spinal stenosis     Type 2 diabetes mellitus ([facility] [address]      Past Surgical History:        Procedure Laterality Date    BLADDER SURGERY Right 11/26/2021    CYSTOSCOPY RETROGRADE PYELOGRAM RIGHT  STENT INSERTION performed by Meir Farmer MD at [facility] LUMPECTOMY      lumpectomy [address] TEST      Lexiscan stress test    CARPAL TUNNEL RELEASE      COLONOSCOPY  01/31/2017    multiple polyps; diverticula--jerod    COLONOSCOPY  04/23/2019    polyps; diverticula; hemorrhoids--jerod    COLONOSCOPY N/A 04/23/2019    COLONOSCOPY POLYPECTOMY SNARE/COLD BIOPSY performed by Izetta Carrel, MD at [address] [facility]  04/23/2019    COLONOSCOPY WITH BIOPSY performed by Izetta Carrel, MD at [address] ([facility])      LITHOTRIPSY Left 05/04/2016    C-R STENT PLACEMENT    SHOULDER ARTHROPLASTY Left 12/21/2020    LEFT REVERSE TOTAL SHOULDER  ARTHROPLASTY -- DEPUY performed by Kirk Pruitt MD at [address]  04/23/2019    gastritis--jerod    UPPER GASTROINTESTINAL ENDOSCOPY N/A 04/23/2019    EGD BIOPSY performed by Iz[place] Carrel, [place] [facility] [place]:    TOBACCO:   reports that she has never smoked. She has never used smokeless tobacco.  ETOH:   reports no history of alcohol use. DRUGS:   reports no history of drug use.   Family History:       Problem Relation Age of Onset    Arthritis Mother     COPD Father     Heart Attack Father     Cirrhosis Brother         non alcoholic    Heart Disease Brother     Cancer Other 48        colon    Prostate Cancer Child     Hypertension Child     Hypertension Child          Current Facility-Administered Medications:     glucose chewable tablet 16 g, 4 tablet, Oral, PRN, Nitin Jerez MD    dextrose bolus 10% 125 mL, 125 mL, IntraVENous, PRN **OR** dextrose bolus 10% 250 mL, 250 mL, IntraVENous, PRN, Nicolle Avalos MD    glucagon (rDNA) injection 1 mg, 1 mg, SubCUTAneous, PRN, Nicolle Avalos MD    dextrose 10 % infusion, , IntraVENous, Continuous PRN, Nicolle Avalos MD    potassium chloride (KLOR-CON M) extended release tablet 20 mEq, 20 mEq, Oral, Once, Alton Broody, DO    bisacodyl (DULCOLAX) suppository 10 mg, 10 mg, Rectal, Daily PRN, Alton Broody, DO    enoxaparin (LOVENOX) injection 80 mg, 80 mg, SubCUTAneous, Daily, Alton Broody, DO, 80 mg at 08/11/22 1127    cinacalcet (SENSIPAR) tablet 30 mg, 30 mg, Oral, BID, Master Rivas MD    vitamin D (ERGOCALCIFEROL) capsule 50,000 Units, 50,000 Units, Oral, Weekly, Master Rivas MD    oxymetazoline (AFRIN) 0.05 % nasal spray 2 spray, 2 spray, Each Nostril, Once, Brenna Nunez MD    lidocaine (XYLOCAINE) 2 % jelly, , Topical, Once, Brenna Nunez MD    lactulose (CHRONULAC) 10 GM/15ML solution 20 g, 20 g, Oral, Q2H, Brenna Nunez MD    albuterol (PROVENTIL) nebulizer solution 2.5 mg, 2.5 mg, Nebulization, 4x Daily PRN, Cristina Grider MD    [facility] by provider] ferrous sulfate (IRON 325) tablet 325 mg, 325 mg, Oral, Daily, Cristina Grider MD    [facility] by provider] furosemide (LASIX) tablet 20 mg, 20 mg, Oral, BID, Cristina Grider MD    pantoprazole (PROTONIX) tablet 40 mg, 40 mg, Oral, Daily, Cristina Grider MD    rifAXIMin Simonne Balaton) tablet 550 mg, 550 mg, Oral, BID, Cristina Grider MD    sodium chloride flush 0.9 % injection 5-40 mL, 5-40 mL, IntraVENous, 2 times per day, Cristina Grider MD    sodium chloride flush 0.9 % injection 5-40 mL, 5-40 mL, IntraVENous, PRN, Cristina Grider MD    0.9 % sodium chloride infusion, , IntraVENous, PRN, Cristina Grider MD    ondansetron (ZOFRAN-ODT) disintegrating tablet 4 mg, 4 mg, Oral, Q8H PRN **OR** ondansetron (ZOFRAN) injection 4 mg, 4 mg, IntraVENous, Q6H PRN, Cristina Grider MD    polyethylene glycol (GLYCOLAX) packet 17 g, 17 g, Oral, Daily PRN, Cristina Grider MD    acetaminophen (TYLENOL) tablet 650 mg, 650 mg, Oral, Q6H PRN **OR** acetaminophen (TYLENOL) suppository 650 mg, 650 mg, Rectal, Q6H PRN, Babatunde Garnica MD    dextrose 5 % and 0.9 % sodium chloride infusion, , IntraVENous, Continuous, Babatunde Garnica MD, Last Rate: 100 mL/hr at 08/11/22 0848, New Bag at 08/11/22 0848    budesonide (PULMICORT) nebulizer suspension 500 mcg, 0.5 mg, Nebulization, BID, 500 mcg at 08/11/22 0756 **AND** Arformoterol Tartrate (BROVANA) nebulizer solution 15 mcg, 15 mcg, Nebulization, BID, Babatunde Garnica MD, 15 mcg at 08/11/22 0756     Allergies:  Lisinopril    ROS:  Unable to obtain due to AMS. PHYSICAL EXAM:  BP (!) 148/65   Pulse (!) 101   Temp 98.3 °F (36.8 °C) (Oral)   Resp 24   SpO2 99%   Physical Exam:  General: Chronically ill-appearing, NAD  HEENT: Dry membranes, temporal wasting  Cards: RRR, no LE edema  Resp: Breathing comfortably on room air, good air movement, no use of accessory muscles, no audible wheezing  Abdomen: soft, NT, ND, no tense ascites  Extremities: No effusions or bruising. Skin: No rashes. Jaundice. Neuro: Moans to certain movements, unable to arouse w/ verbal stimuli.                Electronically signed by Valeria Carvajal MD on 8/11/2022 at 3:56 PM

## 2022-08-11 NOTE — PROGRESS NOTES
[facility] Inpatient   Resident Progress Note    S:  Hospital day: 1   Brief Synopsis:  Maria Luisa Inman is a 68 y.o. 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 She was recently discharged 8/4/22 after being treated after a fall with altered mental status. Since she returned to the nursing home, she became increasingly more altered and lethargic again. Labs at the nursing home were significant for elevated creatinine and calcium. Patient was also endorsing neck and abdominal pain. Initial POCT glc at that time <40. She received D10 bolus and glc improved. Other labs were significant for chronic anemia 9.7,  elevated cr 1.5, BUN 34, hypercalcemia 13.0, hypoalbuminemia 2.9, elevated alk phos 131, elevated AST 40, hyperammoniemia 125. COVID neg. UA negative. Urine  cultures pending. Passed bedside swallow. CXR showed small left pleural effusion with possible infiltrate vs atelectasis. CT A/P significant for moderate left pleural effusion with atelectatic changes in LLL, chronic hepatocellular dz with Portal venous HTN, small perihepatic ascites decreased from prior, b/l renal calculi, and diverticulosis. EKG: normal sinus rhythm with premature supraventricular complexes, changed from previous tracings. Overnight/interim:   Overnight glucose 66-70. Patient continued on D5NS 100cc/hr. Somnolent but arousable. Unable to answer questions appropriately. No bowel movements.          Cont meds:    dextrose      sodium chloride      dextrose 5 % and 0.9 % NaCl 100 mL/hr at 08/11/22 0848     Scheduled meds:    dextrose  12.5 g IntraVENous Once    apixaban  5 mg Oral BID    cinacalcet  30 mg Oral Daily    ferrous sulfate  325 mg Oral Daily    [Held by provider] furosemide  20 mg Oral BID    lactulose  20 g Oral 4x Daily    pantoprazole  40 mg Oral Daily    rifAXIMin  550 mg Oral BID    sodium chloride flush  5-40 mL IntraVENous 2 times per day    budesonide  0.5 mg Nebulization BID    And    Arformoterol Tartrate  15 mcg Nebulization BID     PRN meds: glucose, dextrose bolus **OR** dextrose bolus, glucagon (rDNA), dextrose, albuterol, sodium chloride flush, sodium chloride, ondansetron **OR** ondansetron, polyethylene glycol, acetaminophen **OR** acetaminophen     I reviewed the patient's past medical and surgical history, Medications and Allergies. O:  /69   Pulse 93   Temp 98.1 °F (36.7 °C) (Axillary)   Resp 16   SpO2 95%   24 hour I&O: I/O last 3 completed shifts: In: 1006.3 [I.V.:1006.3]  Out: 500 [Urine:500]  No intake/output data recorded. General Appearance: Somnolent bur arousable by sternal rub  Skin: warm and dry, no rash or erythema or jaundice  Head: normocephalic and atraumatic  Eyes: pupils equal, round, and reactive to light, conjunctivae normal  Neck: supple and non-tender without mass, no thyromegaly or thyroid nodules, no cervical lymphadenopathy  Pulmonary/Chest: Decreased breath sounds LLL  Cardiovascular: normal rate, regular rhythm, normal S1 and S2, no murmurs, rubs, clicks, or gallops, distal pulses intact, no carotid bruits  Abdomen: soft, non-tender, non-distended, no masses or organomegaly; Negative fluid-wave test  Extremities: no cyanosis, clubbing or edema  Musculoskeletal: normal range of motion, no joint swelling, deformity or tenderness  Neurologic: Withdraws to pain. Somnolent but arousable. Babinski normal and downgoing. Unable to follow commands. Unable to elicit DTRs   Physical Exam            Labs:  Na/K/Cl/CO2:  135/3.3/101/24 (08/11 0459)  BUN/Cr/glu/ALT/AST/amyl/lip:  32/1.4/--/20/37/--/-- (08/11 0459)  WBC/Hgb/Hct/Plts:  5.4/9.4/27.2/132 (08/11 0459)  CrCl cannot be calculated (Unknown ideal weight.).   Other pertinent labs as noted below    Radiology:  CT HEAD WO CONTRAST   Final Result   No acute intracranial abnormality. Cortical atrophy and periventricular leukomalacia. CT CERVICAL SPINE WO CONTRAST   Final Result   No acute abnormality of the cervical spine. Multilevel degenerative changes. Left pleural effusion in the visualized left lung apex. Consider chest CT   for better evaluation if clinically appropriate. CT ABDOMEN PELVIS WO CONTRAST Additional Contrast? None   Final Result   1. Moderate left pleural effusion with atelectatic changes involving left   lower lobe. 2. Redemonstration of findings related to chronic hepatocellular disease with   portal venous hypertension. 3. Small perihepatic ascites. Ascites has decreased compared to prior. 4. Multiple nonobstructing bilateral renal calculi. 5. Diverticulosis. XR CHEST PORTABLE   Final Result   Small left pleural effusion. Associated atelectasis or infiltrate cannot be   excluded. Resident Assessment and Plan       Acute hepatic encephalopathy  - multifactorial, 2/2 hypercalcemia, cirrhosis, hypoglycemia  -CT A/P significant for moderate left pleural effusion with atelectatic changes in LLL, chronic hepatocellular dz with Portal venous HTN, small perihepatic ascites decreased from prior, b/l renal calculi, and diverticulosis.   - Neuro check q4h  - Bedside swallow passed in ED, ok for diet  - Ammonia 125, beginning to trend back up since 8/2/22, repeat level tomorrow - Low suspicion for SBP d/t afebrile, normal white count and improvement of ascites since last admission  -Ammonia on 8/11 = 153  - on rifaximin and lactulose at home, continue  - consider consult to Dr. Spenser Duffy  - blood culture ordered, urine culture pending    - monitor I/O  - Monitor electrolytes  - hold home med, doxazosin and effexor due to AMS  -Ferrous sulfate discontinued  -Dulcolax suppository prn  -Goal: 3+ bowel movements daily       Hypoglycemia  - poor PO intake  - h/o T2DM diet controlled, A1C 4.5 7/12/22  - hypoglycemia protocol  - D5NS 100 cc/h  -1 D10 bolus given in ED  -POCT Glc q2h x4h, then q4h  -POC Glucose 8/11: 71-->117--> 93 --> 89     Hypokalemia/Hypomagnesemia  -Potassium 3.3: Replete appropriate; Given 20mEq PO  -Mg 1.7: Replete as appropriate; Given 1g IV    REN  - Cr 1.4, baseline (0.9-1.1)  - Urine studies, urea  - Intrinsic etiology   - D5NS 100 cc/h    -Hold lasix for now due to IVF  -Nephrology consulted    Hypercalcemia  - Ca 13.0, corrected 13.88  - 2/2 primary hyperparathyroidism, on cinacalcet  - continue home medication  - , Vit D 29, phos 2.5, TSH 4.14 7/30/22, repeat    - assess Ionized calcium level and PTHrp  - nephro consult  - IVF   - I/O  - hold lasix for now due to IVF     Left Pleural effusion  - chronic, worse compared to CXR on 7/29/22  - currently saturating well on RA  - Continue to monitor  - Consider pulm consult if respiratory status worsens     Iron deficiency anemia  - Hb 9.4, no signs of active bleeding  -Discontinue ferrous sulfate due to constipation  -Monitor hemoglobin and consider IV replacement if needed     H/o HTN  - currently hypotensive  - on amlodipine and spironolactone at home, hold for now       H/O ILD    - on advair, abuterol at home  - start dulera, Albuterol PRN     H/O PE    -Discontinue eliquis  -Begin therapeutic dose of Lovenox due to history of unprovoked PE (Creatinine clearance 41; GFR>30)  -PCDs       PT/OT evaluation:  DVT prophylaxis:  GI prophylaxis:   Disposition:  Diet: Sodium and protein-restricted        Electronically signed by Alton Dang DO on 8/11/2022 at 9:00 AM  Attending physician: Dr. Hattie Rodriguez

## 2022-08-12 PROBLEM — Z51.5 PALLIATIVE CARE BY SPECIALIST: Status: ACTIVE | Noted: 2022-08-12

## 2022-08-12 LAB
ALBUMIN SERPL-MCNC: 3.6 G/DL (ref 3.5–5.2)
ALP BLD-CCNC: 118 U/L (ref 35–104)
ALT SERPL-CCNC: 20 U/L (ref 0–32)
ANION GAP SERPL CALCULATED.3IONS-SCNC: 13 MMOL/L (ref 7–16)
ANISOCYTOSIS: ABNORMAL
AST SERPL-CCNC: 41 U/L (ref 0–31)
BASOPHILS ABSOLUTE: 0.11 E9/L (ref 0–0.2)
BASOPHILS RELATIVE PERCENT: 1.8 % (ref 0–2)
BILIRUB SERPL-MCNC: 2.6 MG/DL (ref 0–1.2)
BUN BLDV-MCNC: 25 MG/DL (ref 6–23)
CALCIUM SERPL-MCNC: 12.9 MG/DL (ref 8.6–10.2)
CHLORIDE BLD-SCNC: 107 MMOL/L (ref 98–107)
CO2: 23 MMOL/L (ref 22–29)
CREAT SERPL-MCNC: 1.2 MG/DL (ref 0.5–1)
EOSINOPHILS ABSOLUTE: 0.16 E9/L (ref 0.05–0.5)
EOSINOPHILS RELATIVE PERCENT: 2.6 % (ref 0–6)
GFR AFRICAN AMERICAN: 53
GFR NON-AFRICAN AMERICAN: 44 ML/MIN/1.73
GLUCOSE BLD-MCNC: 82 MG/DL (ref 74–99)
HCT VFR BLD CALC: 26.8 % (ref 34–48)
HEMOGLOBIN: 9 G/DL (ref 11.5–15.5)
INR BLD: 4.3
LYMPHOCYTES ABSOLUTE: 1.01 E9/L (ref 1.5–4)
LYMPHOCYTES RELATIVE PERCENT: 15.8 % (ref 20–42)
MAGNESIUM: 2 MG/DL (ref 1.6–2.6)
MCH RBC QN AUTO: 34.2 PG (ref 26–35)
MCHC RBC AUTO-ENTMCNC: 33.6 % (ref 32–34.5)
MCV RBC AUTO: 101.9 FL (ref 80–99.9)
METER GLUCOSE: 134 MG/DL (ref 74–99)
METER GLUCOSE: 65 MG/DL (ref 74–99)
METER GLUCOSE: 73 MG/DL (ref 74–99)
METER GLUCOSE: 83 MG/DL (ref 74–99)
METER GLUCOSE: 83 MG/DL (ref 74–99)
METER GLUCOSE: 87 MG/DL (ref 74–99)
METER GLUCOSE: 87 MG/DL (ref 74–99)
METER GLUCOSE: 93 MG/DL (ref 74–99)
METER GLUCOSE: 94 MG/DL (ref 74–99)
METER GLUCOSE: 98 MG/DL (ref 74–99)
MONOCYTES ABSOLUTE: 0.38 E9/L (ref 0.1–0.95)
MONOCYTES RELATIVE PERCENT: 6.1 % (ref 2–12)
NEUTROPHILS ABSOLUTE: 4.66 E9/L (ref 1.8–7.3)
NEUTROPHILS RELATIVE PERCENT: 73.7 % (ref 43–80)
OVALOCYTES: ABNORMAL
PDW BLD-RTO: 23.1 FL (ref 11.5–15)
PLATELET # BLD: 117 E9/L (ref 130–450)
PMV BLD AUTO: 9.9 FL (ref 7–12)
POIKILOCYTES: ABNORMAL
POLYCHROMASIA: ABNORMAL
POTASSIUM REFLEX MAGNESIUM: 3.3 MMOL/L (ref 3.5–5)
PROTHROMBIN TIME: 48.2 SEC (ref 9.3–12.4)
RBC # BLD: 2.63 E12/L (ref 3.5–5.5)
SODIUM BLD-SCNC: 143 MMOL/L (ref 132–146)
TARGET CELLS: ABNORMAL
TOTAL PROTEIN: 6.8 G/DL (ref 6.4–8.3)
URINE CULTURE, ROUTINE: NORMAL
WBC # BLD: 6.3 E9/L (ref 4.5–11.5)

## 2022-08-12 PROCEDURE — 94640 AIRWAY INHALATION TREATMENT: CPT

## 2022-08-12 PROCEDURE — 99232 SBSQ HOSP IP/OBS MODERATE 35: CPT | Performed by: FAMILY MEDICINE

## 2022-08-12 PROCEDURE — 6370000000 HC RX 637 (ALT 250 FOR IP): Performed by: STUDENT IN AN ORGANIZED HEALTH CARE EDUCATION/TRAINING PROGRAM

## 2022-08-12 PROCEDURE — 83735 ASSAY OF MAGNESIUM: CPT

## 2022-08-12 PROCEDURE — 99222 1ST HOSP IP/OBS MODERATE 55: CPT

## 2022-08-12 PROCEDURE — 6370000000 HC RX 637 (ALT 250 FOR IP): Performed by: INTERNAL MEDICINE

## 2022-08-12 PROCEDURE — 36415 COLL VENOUS BLD VENIPUNCTURE: CPT

## 2022-08-12 PROCEDURE — 2580000003 HC RX 258: Performed by: FAMILY MEDICINE

## 2022-08-12 PROCEDURE — 6360000002 HC RX W HCPCS: Performed by: STUDENT IN AN ORGANIZED HEALTH CARE EDUCATION/TRAINING PROGRAM

## 2022-08-12 PROCEDURE — 2580000003 HC RX 258: Performed by: STUDENT IN AN ORGANIZED HEALTH CARE EDUCATION/TRAINING PROGRAM

## 2022-08-12 PROCEDURE — 6360000002 HC RX W HCPCS

## 2022-08-12 PROCEDURE — P9047 ALBUMIN (HUMAN), 25%, 50ML: HCPCS | Performed by: STUDENT IN AN ORGANIZED HEALTH CARE EDUCATION/TRAINING PROGRAM

## 2022-08-12 PROCEDURE — 85025 COMPLETE CBC W/AUTO DIFF WBC: CPT

## 2022-08-12 PROCEDURE — 85610 PROTHROMBIN TIME: CPT

## 2022-08-12 PROCEDURE — 82962 GLUCOSE BLOOD TEST: CPT

## 2022-08-12 PROCEDURE — 2060000000 HC ICU INTERMEDIATE R&B

## 2022-08-12 PROCEDURE — 80053 COMPREHEN METABOLIC PANEL: CPT

## 2022-08-12 RX ORDER — ENOXAPARIN SODIUM 100 MG/ML
75 INJECTION SUBCUTANEOUS 2 TIMES DAILY
Status: DISCONTINUED | OUTPATIENT
Start: 2022-08-12 | End: 2022-08-16

## 2022-08-12 RX ORDER — POTASSIUM CHLORIDE 7.45 MG/ML
10 INJECTION INTRAVENOUS
Status: ACTIVE | OUTPATIENT
Start: 2022-08-12 | End: 2022-08-12

## 2022-08-12 RX ORDER — POTASSIUM CHLORIDE 7.45 MG/ML
10 INJECTION INTRAVENOUS
Status: DISPENSED | OUTPATIENT
Start: 2022-08-12 | End: 2022-08-12

## 2022-08-12 RX ADMIN — LACTULOSE 20 G: 20 SOLUTION ORAL at 16:23

## 2022-08-12 RX ADMIN — DEXTROSE MONOHYDRATE 125 ML: 100 INJECTION, SOLUTION INTRAVENOUS at 23:41

## 2022-08-12 RX ADMIN — BUDESONIDE 500 MCG: 0.5 SUSPENSION RESPIRATORY (INHALATION) at 20:14

## 2022-08-12 RX ADMIN — PANTOPRAZOLE SODIUM 40 MG: 40 TABLET, DELAYED RELEASE ORAL at 08:51

## 2022-08-12 RX ADMIN — ENOXAPARIN SODIUM 80 MG: 100 INJECTION SUBCUTANEOUS at 21:11

## 2022-08-12 RX ADMIN — LACTULOSE 20 G: 20 SOLUTION ORAL at 23:36

## 2022-08-12 RX ADMIN — LACTULOSE 20 G: 20 SOLUTION ORAL at 18:13

## 2022-08-12 RX ADMIN — RIFAXIMIN 550 MG: 550 TABLET ORAL at 08:51

## 2022-08-12 RX ADMIN — POTASSIUM CHLORIDE 10 MEQ: 7.46 INJECTION, SOLUTION INTRAVENOUS at 12:02

## 2022-08-12 RX ADMIN — ENOXAPARIN SODIUM 80 MG: 100 INJECTION SUBCUTANEOUS at 08:51

## 2022-08-12 RX ADMIN — RIFAXIMIN 550 MG: 550 TABLET ORAL at 21:10

## 2022-08-12 RX ADMIN — ALBUMIN (HUMAN) 25 G: 0.25 INJECTION, SOLUTION INTRAVENOUS at 09:13

## 2022-08-12 RX ADMIN — BUDESONIDE 500 MCG: 0.5 SUSPENSION RESPIRATORY (INHALATION) at 07:58

## 2022-08-12 RX ADMIN — LACTULOSE 20 G: 20 SOLUTION ORAL at 14:31

## 2022-08-12 RX ADMIN — LACTULOSE 20 G: 20 SOLUTION ORAL at 05:58

## 2022-08-12 RX ADMIN — ARFORMOTEROL TARTRATE 15 MCG: 15 SOLUTION RESPIRATORY (INHALATION) at 07:58

## 2022-08-12 RX ADMIN — LACTULOSE 20 G: 20 SOLUTION ORAL at 10:49

## 2022-08-12 RX ADMIN — LACTULOSE 20 G: 20 SOLUTION ORAL at 01:51

## 2022-08-12 RX ADMIN — POTASSIUM CHLORIDE 10 MEQ: 7.46 INJECTION, SOLUTION INTRAVENOUS at 10:58

## 2022-08-12 RX ADMIN — Medication 10 ML: at 21:10

## 2022-08-12 RX ADMIN — LACTULOSE 20 G: 20 SOLUTION ORAL at 12:23

## 2022-08-12 RX ADMIN — ARFORMOTEROL TARTRATE 15 MCG: 15 SOLUTION RESPIRATORY (INHALATION) at 20:13

## 2022-08-12 RX ADMIN — LACTULOSE 20 G: 20 SOLUTION ORAL at 04:03

## 2022-08-12 RX ADMIN — ALBUMIN (HUMAN) 25 G: 0.25 INJECTION, SOLUTION INTRAVENOUS at 16:27

## 2022-08-12 RX ADMIN — ALBUMIN (HUMAN) 25 G: 0.25 INJECTION, SOLUTION INTRAVENOUS at 00:17

## 2022-08-12 RX ADMIN — LACTULOSE 20 G: 20 SOLUTION ORAL at 08:51

## 2022-08-12 RX ADMIN — Medication 10 ML: at 12:24

## 2022-08-12 RX ADMIN — CINACALCET HYDROCHLORIDE 30 MG: 30 TABLET, FILM COATED ORAL at 08:51

## 2022-08-12 RX ADMIN — CINACALCET HYDROCHLORIDE 30 MG: 30 TABLET, FILM COATED ORAL at 21:10

## 2022-08-12 RX ADMIN — LACTULOSE 20 G: 20 SOLUTION ORAL at 21:09

## 2022-08-12 NOTE — PROGRESS NOTES
[facility] MD Justa Carpenter MD. Windy Flynn MD   Progress Note    8/12/2022    SUBJECTIVE:   On RA stable vitals . PROBLEM LIST:    Principal Problem:    REN (acute kidney injury) ([address]  Active Problems:    Acute hepatic encephalopathy    Hypoglycemia due to type 2 diabetes mellitus ([address]    Palliative care by specialist  Resolved Problems:    * No resolved hospital problems. *       DIET:    ADULT DIET; Regular; Low Sodium (2 gm);  Less than 60 gm       Allergies : Lisinopril    Past Medical History:   Diagnosis Date    Breast cancer ([address]     Cirrhosis ([address]     Essential hypertension     Hx of blood clots     Hyperlipidemia     Osteopenia     Primary hyperparathyroidism ([address]     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 ([address]     Sleep apnea     Spinal stenosis     Type 2 diabetes mellitus ([address]        Past Surgical History:   Procedure Laterality Date    BLADDER SURGERY Right 11/26/2021    CYSTOSCOPY RETROGRADE PYELOGRAM RIGHT  STENT INSERTION performed by Bryon Higgins MD at [facility]      lumpectomy [address] [facility] TEST      Lexiscan stress test    CARPAL TUNNEL RELEASE      COLONOSCOPY  01/31/2017    multiple polyps; diverticula--jerod    COLONOSCOPY  04/23/2019    polyps; diverticula; hemorrhoids--jerod    COLONOSCOPY N/A 04/23/2019    COLONOSCOPY POLYPECTOMY SNARE/COLD BIOPSY performed by Yasmani Posey MD at [address] [address]  04/23/2019    COLONOSCOPY WITH BIOPSY performed by Yasmani Posey MD at [facility] ([address] [place] [address])      LITHOTRIPSY Left 05/04/2016    C-R STENT PLACEMENT    SHOULDER ARTHROPLASTY Left 12/21/2020    LEFT REVERSE TOTAL SHOULDER  ARTHROPLASTY -- DEPUY performed by Crystal Jenkins MD at [address] [place] [address]  04/23/2019    gastritis--jerod    UPPER GASTROINTESTINAL ENDOSCOPY N/A 04/23/2019    EGD BIOPSY performed by Yasmani Posey MD at [facility] ENDOSCOPY       Family History   Problem Relation Age of Onset    Arthritis Mother     COPD Father     Heart Attack Father     Cirrhosis Brother         non alcoholic    Heart Disease Brother     Cancer Other 48        colon    Prostate Cancer Child     Hypertension Child     Hypertension Child         reports that she has never smoked. She has never used smokeless tobacco. She reports that she does not drink alcohol and does not use drugs. Review of Systems:   Constitutional: no fevers , no chills , feels ok   Eyes: no eye pain , no itching , no drainage  Ears, nose, mouth, throat, and face: no ear ,nose pain , hearing is ok ,no nasal drainage   Respiratory: no sob ,no cough ,no wheezing . Cardiovascular: no chest pain , no palpitation ,no sob . Gastrointestinal: no nausea, vomiting , constipation , no abdominal pain . Genitourinary:no urinary retention , no burning , dysuria . No polyuria   Hematologic/lymphatic: no bleeding , no cougulation issues . Musculoskeletal:no joint pain , no swelling . Neurological: no headaches ,no weakness , no numbness . Endocrine: no thirst , no weight issues .      MEDS (scheduled):    enoxaparin  80 mg SubCUTAneous BID    cinacalcet  30 mg Oral BID    vitamin D  50,000 Units Oral Weekly    oxymetazoline  2 spray Each Nostril Once    lidocaine   Topical Once    lactulose  20 g Oral Q2H    albumin human  25 g IntraVENous Q8H    [Held by provider] ferrous sulfate  325 mg Oral Daily    [Held by provider] furosemide  20 mg Oral BID    pantoprazole  40 mg Oral Daily    rifAXIMin  550 mg Oral BID    sodium chloride flush  5-40 mL IntraVENous 2 times per day    budesonide  0.5 mg Nebulization BID    And    Arformoterol Tartrate  15 mcg Nebulization BID       MEDS (infusions):   dextrose      sodium chloride         MEDS (prn):  glucose, dextrose bolus **OR** dextrose bolus, glucagon (rDNA), dextrose, bisacodyl, albuterol, sodium chloride flush, sodium chloride, ondansetron **OR** ondansetron, polyethylene glycol, acetaminophen **OR** acetaminophen    PHYSICAL EXAM:     Patient Vitals for the past 24 hrs:   BP Temp Temp src Pulse Resp SpO2   08/12/22 0830 108/60 97.8 °F (36.6 °C) Axillary (!) 104 15 97 %   08/12/22 0758 -- -- -- -- -- 96 %   08/11/22 2019 138/75 97.7 °F (36.5 °C) Axillary (!) 106 14 96 %   08/11/22 2012 -- -- -- (!) 104 24 --   08/11/22 2011 -- -- -- (!) 104 24 99 %   08/11/22 1422 (!) 148/65 98.3 °F (36.8 °C) Oral (!) 101 24 99 %   @      Intake/Output Summary (Last 24 hours) at 8/12/2022 1419  Last data filed at 8/12/2022 1202  Gross per 24 hour   Intake 1022.8 ml   Output 500 ml   Net 522.8 ml         Wt Readings from Last 3 Encounters:   08/04/22 170 lb (77.1 kg)   07/12/22 191 lb 3.2 oz (86.7 kg)   06/23/22 181 lb 3.2 oz (82.2 kg)       Constitutional:  in no acute distress  Oral: mucus membranes moist  Neck: no JVD  Cardiovascular: S1, S2 regular rhythm, no murmur,or rub  Respiratory:  No crackles, no wheeze  Gastrointestinal:  Soft, nontender, nondistended, NABS  Ext: no edema, feet warm  Skin: dry, no rash  Neuro: awake, alert, interactive      DATA:    Recent Labs     08/10/22  1734 08/11/22  0459 08/12/22  0510   WBC 6.2 5.4 6.3   HGB 9.7* 9.4* 9.0*   HCT 28.6* 27.2* 26.8*   MCV 99.7 100.7* 101.9*    132 117*     Recent Labs     08/10/22  1734 08/10/22  2358 08/11/22  0459 08/12/22  0510     --  135 143   K 3.7  --  3.3* 3.3*   CL 98  --  101 107   CO2 23  --  24 23   MG  --   --  1.7 2.0   PHOS  --  1.8*  --   --    BUN 34*  --  32* 25*   CREATININE 1.5*  --  1.4* 1.2*   ALT 21  --  20 20   AST 40*  --  37* 41*   BILITOT 2.3*  --  2.0* 2.6*   ALKPHOS 131*  --  122* 118*       Lab Results   Component Value Date    LABPROT 0.2 07/30/2022    LABPROT 0.2 07/30/2022       ASSESSMENT / RECOMMENDATIONS:      1.   Acute kidney injury appeared to be related to decreased effective  volume in the setting of dehydration, chronic kidney disease stage III.  Baseline creatinine 1.1 to 1.4 with bland urine sediment. Etiology due  to diabetic nephropathy, nephrosclerosis in the context of aging. 2.  Hepatic encephalopathy. 3.  Primary hyperparathyroidism per report with hypercalcemia in the  face of nonsuppressed, intact PTH. 4.  Liver cirrhosis. 5.  History of breast cancer. PLAN:    1. Continue IV fluid infusion in the form of normal saline with D5W.    3.  Increase cinacalcet to 30 mg b.i.d. consider increasing further in am if ca stay high     4. Followup serial basic metabolic panel, urine output. 5.  Hold Aldactone, hold Lasix. 6.  We will follow along with you.       Electronically signed by Buzz Dumont MD on 8/12/2022 at 2:19 PM

## 2022-08-12 NOTE — CONSULTS
Palliative Care Department  [phone]  Palliative Care Initial Consult  Provider Carlita Guardado, APRN - CNP      PATIENT: Johnny Alas  : 1945  MRN: [id number]  ADMISSION DATE: 8/10/2022  4:49 PM  Referring Provider: Keny Winter MD      Palliative Medicine was consulted on hospital day 2 for assistance with Goals of care, Code Status Discussion, Family support. HPI:     José Stinson is a 68 y.o. y/o female with a history of hypothyroidism, hypertension, nonalcoholic liver cirrhosis with ascites, diabetes mellitus, interstitial lung disease, nausea, PE, on Eliquis, breast cancer with lumpectomy in , who presented to [facility] on 8/10/2022 with abnormal labs and fatigue. Patient had a admission and discharged on 2022 for where she was treated for a fall and altered mental status. ASSESSMENT/PLAN:     Pertinent Hospital Diagnoses     Acute hepatic encephalopathy  Acute kidney injury  Hypoglycemia  Hypercalcemia  Left pleural effusion      Palliative Care Encounter / Counseling Regarding Goals of Care  Please see detailed goals of care discussion as below  At this time, Johnny Alas, Does Not have capacity for medical decision-making. Capacity is time limited and situation/question specific  During encounter Keith Diaz  was surrogate medical decision-maker  Outcome of goals of care meeting:  Continue current medical treatment  CODE STATUS was discussed, comfort care route was discussed, Keith Diaz wants to discuss with his 2 brothers before making decisions    Code status Full Code  I reviewed with the  Keith Diaz  that given multiple medical co-morbidities and advanced age, in the event of cardiac arrest, the chances of surviving may likely be low. It was also reviewed that if they survived a cardiac arrest, a return to prior level of function also may be low.     Advanced Directives: no POA or living will in [facility]  Surrogate/Legal NOK:  Stephanie Ricketts (Child/primary decision maker) [phone]  Terell Victoria (Child) [phone]    Spiritual assessment: no spiritual distress identified  Bereavement and grief: to be determined  Referrals to: none today    Thank you for the opportunity to participate in the care of Kole Gamma. Pacheco Moe, XEN[facility]  Palliative Medicine     SUBJECTIVE:     Details of Conversation:    Chart reviewed. Saw patient at the bedside. Patient was lethargic, with minimal response to sternal rub, moaning.:  Spoke with patient's son Rigo Bashir over the phone. Introduced myself and the role of palliative medicine. Rigo Bashir does not believe his mother had made a living will or a healthcare power of , but he has been he primary decision maker for her, he has 2 brothers, who have been kept updated, and Rigo Bashir has been discussing with them before making decisions for his mother. We had a lengthy discussion regarding his mother's current medical presentation due to complication from her cirrhosis of the liver, recurrent pleural effusion, and acute kidney injury most likely related to hepatorenal syndrome. Rigo Bashir feels his mother is suffering, and after having a long discussion with care team this morning, he is asking for options to manage his mother's condition in a more conservative approach. We discussed hospice and CODE STATUS, especially DNR CCA, I have answered all his questions. Rigo Bashir expressed interest on de-escalating care, however he wanted to speak with his siblings first and discussed with them about hospice and CODE STATUS, before making a final decision. Comfort support was provided. Bedside nurse notified. We will continue to follow.     Prognosis: Poor    OBJECTIVE:     /60   Pulse (!) 104   Temp 97.8 °F (36.6 °C) (Axillary)   Resp 15   SpO2 97%     Physical Examination:  Gen: elderly, thin, NAD, lethargic, ill-appearing, moans with sternal rub  HEENT: normocephalic, atraumatic, PERRL, EOMI,   Neck: trachea midline, no

## 2022-08-12 NOTE — CARE COORDINATION
Patient from [facility], she is a bed hold there and does not need a precert to return. Calcium today 12.9, K 3.3, lactulose Q2 hours, ammonia on 8/11 153. Ambulance on soft chart. For questions I can be reached at [phone].  [place]

## 2022-08-12 NOTE — PROGRESS NOTES
[address] [facility]  Family Medicine Attending    NED GREGG Course: 68 y.o. 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 She became increasingly altered and lethargic. Elevated Cr, Ca at NH. She reported neck/abdominal pain. CT Abd showed large stool burden & moderate left pleural effusion. S: Patient somnolent in bed, more easily arousable still not able to answer questions. Large BM overnight. O: VS- Blood pressure 108/60, pulse (!) 104, temperature 97.8 °F (36.6 °C), temperature source Axillary, resp. rate 15, SpO2 97 %. Gen: Ill appearing. Somnolent. Heent: NC AT Anicteric. Dry MM  CV: irreg irreg no murmurs. Resp: clear in apices, diminished at left base. Basilar crackles bilat  Abd: +BS, soft, nt nd. Ext: ROSAS no calf ttp. 1+ LE edema. Impressions:   Principal Problem:    REN (acute kidney injury) ([facility]  Active Problems:    Acute hepatic encephalopathy    Hypoglycemia due to type 2 diabetes mellitus ([address]  Resolved Problems:    * No resolved hospital problems. *      Plan:   1) Acute Hepatic Encephalopathy - Cont lactulose, rifaximin. Bowel regimen. S/p 1x BM. 2) REN - Nephro, GI following. Prerenal vs Hepatorenal syndrome. Albumin IVF. 3) Hypoglycemia - 2/2 liver disease, poor po intake -  Monitor glucose, encourage PO as she becomes more alert. 4) Hypercalcemia - IVF, consult nephro. 5) Left Pleural Effusion - worsening in the last 2 weeks. SaO2 remains stable. Low threshold to consult pulm if worsening. 6) Hx of VTE/PE - SC Lovenox 1mg/kg BID. Cont to monitor Cr for further dosing. Team to discuss prognosis/long term  w/ Son.             Attending Physician Statement  I have reviewed the chart and seen the patient with the resident(s). I personally reviewed images, EKG's and similar tests, if present. I personally reviewed and performed key elements of the history and exam.  I have reviewed and confirmed student and/or resident history and exam with changes as indicated above. I agree with the assessment, plan and orders as documented by the resident. Please refer to the resident and/or student note for additional information.       Hina Matthews MD

## 2022-08-12 NOTE — PROGRESS NOTES
Occupational Therapy  Received OT order, Reviewed Chart. Spoke with RN who requested therapy be held today d/t pt's poor level of alertness and inability to follow commands. Will attempt OT assessment as appropriate on a later date.   RACIEL Cramer, OTR/L  # [id number]

## 2022-08-12 NOTE — PROGRESS NOTES
08/11/22 2012   Treatment   Treatment Type N   $Treatment Type $Inhaled Therapy/Meds   Medications (S)  Budesonide  (rinsed after use with a green swab)   Pre-Tx Pulse 104   Pre-Tx Resps 15   Breath Sounds Pre-Tx DUANE Diminished   Breath Sounds Pre-Tx LLL Diminished   Breath Sounds Pre-Tx RUL Diminished   Breath Sounds Pre-Tx RML Diminished   Breath Sounds Pre-Tx RLL Diminished   Mid-Tx Pulse 104   Mid-Tx Resps 15   Breath Sounds Post-Tx DUANE Diminished   Breath Sounds Post-Tx LLL Diminished   Breath Sounds Post-Tx RUL Diminished   Breath Sounds Post-Tx RML Diminished   Breath Sounds Post-Tx RLL Diminished   Post-Tx Pulse 104   Post-Tx Resps 15   Delivery Source Oxygen;Mask   Position Semi-Matt's   Treatment Tolerance Well   Duration 10   Is patient on O2? N   Oxygen Therapy/Pulse Ox   O2 Therapy Room air   O2 Device Aerosol mask   Heart Rate (!) 104   Resp 24   Blood Gas  Performed?  No   Patient placed on Room air after and mouth was rinsed with green swab

## 2022-08-12 NOTE — PROGRESS NOTES
[facility] - [facility] Inpatient   Resident Progress Note    S:  Hospital day: 2   Brief Synopsis:  Dahlia Post is a 68 y.o. 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 She was recently discharged 8/4/22 after being treated after a fall with altered mental status. Since she returned to the nursing home, she became increasingly more altered and lethargic again. Labs at the nursing home were significant for elevated creatinine and calcium. Patient was also endorsing neck and abdominal pain. Initial POCT glc at that time <40. She received D10 bolus and glc improved. Other labs were significant for chronic anemia 9.7,  elevated cr 1.5, BUN 34, hypercalcemia 13.0, hypoalbuminemia 2.9, elevated alk phos 131, elevated AST 40, hyperammoniemia 125. COVID neg. UA negative. Urine  cultures pending. Passed bedside swallow. CXR showed small left pleural effusion with possible infiltrate vs atelectasis. CT A/P significant for moderate left pleural effusion with atelectatic changes in LLL, chronic hepatocellular dz with Portal venous HTN, small perihepatic ascites decreased from prior, b/l renal calculi, and diverticulosis. EKG: normal sinus rhythm with premature supraventricular complexes, changed from previous tracings. Overnight/interim:   Glucose improved. D5NS discontinued. NG tube placed. Patient still somnolent but arousable.          Cont meds:    dextrose      sodium chloride       Scheduled meds:    enoxaparin  80 mg SubCUTAneous Daily    cinacalcet  30 mg Oral BID    vitamin D  50,000 Units Oral Weekly    oxymetazoline  2 spray Each Nostril Once    lidocaine   Topical Once    lactulose  20 g Oral Q2H    albumin human  25 g IntraVENous Q8H    [Held by provider] ferrous sulfate  325 mg Oral Daily    [Held by provider] furosemide  20 mg Oral BID    pantoprazole  40 mg Oral Daily    rifAXIMin  550 mg Oral BID    sodium chloride flush  5-40 mL IntraVENous 2 times per day    budesonide  0.5 mg Nebulization BID    And    Arformoterol Tartrate  15 mcg Nebulization BID     PRN meds: glucose, dextrose bolus **OR** dextrose bolus, glucagon (rDNA), dextrose, bisacodyl, albuterol, sodium chloride flush, sodium chloride, ondansetron **OR** ondansetron, polyethylene glycol, acetaminophen **OR** acetaminophen     I reviewed the patient's past medical and surgical history, Medications and Allergies. O:  /75   Pulse (!) 106   Temp 97.7 °F (36.5 °C) (Axillary)   Resp 14   SpO2 96%   24 hour I&O: I/O last 3 completed shifts: In: 2008.4 [I.V.:1768.4; NG/GT:240]  Out: 1300 [Urine:1300]  No intake/output data recorded. General Appearance: Somnolent bur arousable by sternal rub  Skin: warm and dry, no rash or erythema or jaundice  Head: normocephalic and atraumatic  Eyes: pupils equal, round, and reactive to light, conjunctivae normal  Neck: supple and non-tender without mass, no thyromegaly or thyroid nodules, no cervical lymphadenopathy  Pulmonary/Chest: Decreased breath sounds LLL  Cardiovascular: normal rate, regular rhythm, normal S1 and S2, no murmurs, rubs, clicks, or gallops, distal pulses intact, no carotid bruits  Abdomen: soft, non-tender, non-distended, no masses or organomegaly; Negative fluid-wave test  Extremities: no cyanosis, clubbing or edema  Musculoskeletal: normal range of motion, no joint swelling, deformity or tenderness  Neurologic: Withdraws to pain. Somnolent but arousable. Babinski normal and downgoing. Unable to follow commands.   Unable to elicit DTRs   Physical Exam            Labs:  Na/K/Cl/CO2:  143/3.3/107/23 (08/12 0510)  BUN/Cr/glu/ALT/AST/amyl/lip:  25/1.2/--/20/41/--/-- (08/12 0510)  WBC/Hgb/Hct/Plts:  6.3/9.0/26.8/117 (08/12 0510)  CrCl cannot be calculated (Unknown ideal weight.). Other pertinent labs as noted below    Radiology:  XR ABDOMEN FOR NG/OG/NE TUBE PLACEMENT   Final Result   Satisfactory position of the enteric tube. CT HEAD WO CONTRAST   Final Result   No acute intracranial abnormality. Cortical atrophy and periventricular leukomalacia. CT CERVICAL SPINE WO CONTRAST   Final Result   No acute abnormality of the cervical spine. Multilevel degenerative changes. Left pleural effusion in the visualized left lung apex. Consider chest CT   for better evaluation if clinically appropriate. CT ABDOMEN PELVIS WO CONTRAST Additional Contrast? None   Final Result   1. Moderate left pleural effusion with atelectatic changes involving left   lower lobe. 2. Redemonstration of findings related to chronic hepatocellular disease with   portal venous hypertension. 3. Small perihepatic ascites. Ascites has decreased compared to prior. 4. Multiple nonobstructing bilateral renal calculi. 5. Diverticulosis. XR CHEST PORTABLE   Final Result   Small left pleural effusion. Associated atelectasis or infiltrate cannot be   excluded. Resident Assessment and Plan       Acute hepatic encephalopathy  - multifactorial, 2/2 hypercalcemia, cirrhosis, hypoglycemia  -CT A/P significant for moderate left pleural effusion with atelectatic changes in LLL, chronic hepatocellular dz with Portal venous HTN, small perihepatic ascites decreased from prior, b/l renal calculi, and diverticulosis.   - Neuro check q4h  - Bedside swallow passed in ED - Patient unable to take anything PO  - Ammonia 125, beginning to trend back up since 8/2/22, repeat level tomorrow - Low suspicion for SBP d/t afebrile, normal white count and improvement of ascites since last admission  -Ammonia on 8/11 = 153  - On rifaximin and lactulose at home, continue  - Blood cultures no growth so far   - monitor I/O  - Monitor electrolytes  - AM  Attending physician: Dr. Derek Rice

## 2022-08-13 PROBLEM — E44.0 MODERATE PROTEIN-CALORIE MALNUTRITION (HCC): Status: ACTIVE | Noted: 2022-01-01

## 2022-08-13 PROBLEM — E44.0 MODERATE PROTEIN-CALORIE MALNUTRITION (HCC): Chronic | Status: ACTIVE | Noted: 2022-01-01

## 2022-08-13 LAB
ALBUMIN SERPL-MCNC: 4.3 G/DL (ref 3.5–5.2)
ALP BLD-CCNC: 115 U/L (ref 35–104)
ALT SERPL-CCNC: 25 U/L (ref 0–32)
AMMONIA: 52 UMOL/L (ref 11–51)
ANION GAP SERPL CALCULATED.3IONS-SCNC: 11 MMOL/L (ref 7–16)
ANION GAP SERPL CALCULATED.3IONS-SCNC: 12 MMOL/L (ref 7–16)
ANISOCYTOSIS: ABNORMAL
AST SERPL-CCNC: 50 U/L (ref 0–31)
BASOPHILS ABSOLUTE: 0.15 E9/L (ref 0–0.2)
BASOPHILS RELATIVE PERCENT: 2.6 % (ref 0–2)
BILIRUB SERPL-MCNC: 3.3 MG/DL (ref 0–1.2)
BUN BLDV-MCNC: 22 MG/DL (ref 6–23)
BUN BLDV-MCNC: 22 MG/DL (ref 6–23)
BURR CELLS: ABNORMAL
CALCIUM IONIZED: 1.97 MMOL/L (ref 1.15–1.33)
CALCIUM SERPL-MCNC: 14.4 MG/DL (ref 8.6–10.2)
CALCIUM SERPL-MCNC: 15.2 MG/DL (ref 8.6–10.2)
CHLORIDE BLD-SCNC: 118 MMOL/L (ref 98–107)
CHLORIDE BLD-SCNC: 120 MMOL/L (ref 98–107)
CO2: 22 MMOL/L (ref 22–29)
CO2: 22 MMOL/L (ref 22–29)
CREAT SERPL-MCNC: 1.2 MG/DL (ref 0.5–1)
CREAT SERPL-MCNC: 1.2 MG/DL (ref 0.5–1)
EOSINOPHILS ABSOLUTE: 0 E9/L (ref 0.05–0.5)
EOSINOPHILS RELATIVE PERCENT: 1.4 % (ref 0–6)
GFR AFRICAN AMERICAN: 53
GFR AFRICAN AMERICAN: 53
GFR NON-AFRICAN AMERICAN: 44 ML/MIN/1.73
GFR NON-AFRICAN AMERICAN: 44 ML/MIN/1.73
GLUCOSE BLD-MCNC: 101 MG/DL (ref 74–99)
GLUCOSE BLD-MCNC: 88 MG/DL (ref 74–99)
HCT VFR BLD CALC: 25.7 % (ref 34–48)
HEMOGLOBIN: 8.4 G/DL (ref 11.5–15.5)
INR BLD: 3.1
LYMPHOCYTES ABSOLUTE: 0.46 E9/L (ref 1.5–4)
LYMPHOCYTES RELATIVE PERCENT: 7.7 % (ref 20–42)
MAGNESIUM: 2 MG/DL (ref 1.6–2.6)
MCH RBC QN AUTO: 34.7 PG (ref 26–35)
MCHC RBC AUTO-ENTMCNC: 32.7 % (ref 32–34.5)
MCV RBC AUTO: 106.2 FL (ref 80–99.9)
METER GLUCOSE: 65 MG/DL (ref 74–99)
METER GLUCOSE: 77 MG/DL (ref 74–99)
METER GLUCOSE: 77 MG/DL (ref 74–99)
METER GLUCOSE: 82 MG/DL (ref 74–99)
METER GLUCOSE: 96 MG/DL (ref 74–99)
MONOCYTES ABSOLUTE: 0.29 E9/L (ref 0.1–0.95)
MONOCYTES RELATIVE PERCENT: 5.2 % (ref 2–12)
NEUTROPHILS ABSOLUTE: 4.93 E9/L (ref 1.8–7.3)
NEUTROPHILS RELATIVE PERCENT: 84.5 % (ref 43–80)
OVALOCYTES: ABNORMAL
PDW BLD-RTO: 23.7 FL (ref 11.5–15)
PHOSPHORUS: 1.2 MG/DL (ref 2.5–4.5)
PLATELET # BLD: 117 E9/L (ref 130–450)
PMV BLD AUTO: 10.9 FL (ref 7–12)
POIKILOCYTES: ABNORMAL
POLYCHROMASIA: ABNORMAL
POTASSIUM REFLEX MAGNESIUM: 3.1 MMOL/L (ref 3.5–5)
POTASSIUM SERPL-SCNC: 3 MMOL/L (ref 3.5–5)
PROTHROMBIN TIME: 33.4 SEC (ref 9.3–12.4)
RBC # BLD: 2.42 E12/L (ref 3.5–5.5)
SCHISTOCYTES: ABNORMAL
SODIUM BLD-SCNC: 151 MMOL/L (ref 132–146)
SODIUM BLD-SCNC: 154 MMOL/L (ref 132–146)
TARGET CELLS: ABNORMAL
TOTAL PROTEIN: 7.4 G/DL (ref 6.4–8.3)
WBC # BLD: 5.8 E9/L (ref 4.5–11.5)

## 2022-08-13 PROCEDURE — 2580000003 HC RX 258: Performed by: STUDENT IN AN ORGANIZED HEALTH CARE EDUCATION/TRAINING PROGRAM

## 2022-08-13 PROCEDURE — 82330 ASSAY OF CALCIUM: CPT

## 2022-08-13 PROCEDURE — 82962 GLUCOSE BLOOD TEST: CPT

## 2022-08-13 PROCEDURE — 99232 SBSQ HOSP IP/OBS MODERATE 35: CPT | Performed by: FAMILY MEDICINE

## 2022-08-13 PROCEDURE — 82140 ASSAY OF AMMONIA: CPT

## 2022-08-13 PROCEDURE — 80053 COMPREHEN METABOLIC PANEL: CPT

## 2022-08-13 PROCEDURE — 6360000002 HC RX W HCPCS

## 2022-08-13 PROCEDURE — 94640 AIRWAY INHALATION TREATMENT: CPT

## 2022-08-13 PROCEDURE — 84100 ASSAY OF PHOSPHORUS: CPT

## 2022-08-13 PROCEDURE — 6370000000 HC RX 637 (ALT 250 FOR IP): Performed by: INTERNAL MEDICINE

## 2022-08-13 PROCEDURE — 2580000003 HC RX 258: Performed by: INTERNAL MEDICINE

## 2022-08-13 PROCEDURE — 85025 COMPLETE CBC W/AUTO DIFF WBC: CPT

## 2022-08-13 PROCEDURE — 36415 COLL VENOUS BLD VENIPUNCTURE: CPT

## 2022-08-13 PROCEDURE — 6360000002 HC RX W HCPCS: Performed by: STUDENT IN AN ORGANIZED HEALTH CARE EDUCATION/TRAINING PROGRAM

## 2022-08-13 PROCEDURE — 6370000000 HC RX 637 (ALT 250 FOR IP): Performed by: STUDENT IN AN ORGANIZED HEALTH CARE EDUCATION/TRAINING PROGRAM

## 2022-08-13 PROCEDURE — 2060000000 HC ICU INTERMEDIATE R&B

## 2022-08-13 PROCEDURE — 83735 ASSAY OF MAGNESIUM: CPT

## 2022-08-13 PROCEDURE — 2500000003 HC RX 250 WO HCPCS: Performed by: INTERNAL MEDICINE

## 2022-08-13 PROCEDURE — 85610 PROTHROMBIN TIME: CPT

## 2022-08-13 PROCEDURE — 6360000002 HC RX W HCPCS: Performed by: INTERNAL MEDICINE

## 2022-08-13 PROCEDURE — 2580000003 HC RX 258: Performed by: FAMILY MEDICINE

## 2022-08-13 PROCEDURE — 80048 BASIC METABOLIC PNL TOTAL CA: CPT

## 2022-08-13 PROCEDURE — P9047 ALBUMIN (HUMAN), 25%, 50ML: HCPCS | Performed by: STUDENT IN AN ORGANIZED HEALTH CARE EDUCATION/TRAINING PROGRAM

## 2022-08-13 RX ORDER — CALCITONIN SALMON 200 [USP'U]/ML
300 INJECTION, SOLUTION INTRAMUSCULAR; SUBCUTANEOUS EVERY 12 HOURS
Status: COMPLETED | OUTPATIENT
Start: 2022-08-13 | End: 2022-08-15

## 2022-08-13 RX ADMIN — PHYTONADIONE 10 MG: 10 INJECTION, EMULSION INTRAMUSCULAR; INTRAVENOUS; SUBCUTANEOUS at 10:22

## 2022-08-13 RX ADMIN — POTASSIUM PHOSPHATE, MONOBASIC AND POTASSIUM PHOSPHATE, DIBASIC 30 MMOL: 224; 236 INJECTION, SOLUTION, CONCENTRATE INTRAVENOUS at 18:12

## 2022-08-13 RX ADMIN — BUDESONIDE 500 MCG: 0.5 SUSPENSION RESPIRATORY (INHALATION) at 09:43

## 2022-08-13 RX ADMIN — PANTOPRAZOLE SODIUM 40 MG: 40 TABLET, DELAYED RELEASE ORAL at 09:13

## 2022-08-13 RX ADMIN — BUDESONIDE 500 MCG: 0.5 SUSPENSION RESPIRATORY (INHALATION) at 20:44

## 2022-08-13 RX ADMIN — LACTULOSE 20 G: 20 SOLUTION ORAL at 10:22

## 2022-08-13 RX ADMIN — ALBUMIN (HUMAN) 25 G: 0.25 INJECTION, SOLUTION INTRAVENOUS at 16:52

## 2022-08-13 RX ADMIN — LACTULOSE 20 G: 20 SOLUTION ORAL at 15:09

## 2022-08-13 RX ADMIN — LACTULOSE 20 G: 20 SOLUTION ORAL at 01:30

## 2022-08-13 RX ADMIN — LACTULOSE 20 G: 20 SOLUTION ORAL at 18:06

## 2022-08-13 RX ADMIN — LACTULOSE 20 G: 20 SOLUTION ORAL at 09:13

## 2022-08-13 RX ADMIN — LACTULOSE 20 G: 20 SOLUTION ORAL at 04:42

## 2022-08-13 RX ADMIN — LACTULOSE 20 G: 20 SOLUTION ORAL at 12:15

## 2022-08-13 RX ADMIN — RIFAXIMIN 550 MG: 550 TABLET ORAL at 20:31

## 2022-08-13 RX ADMIN — LACTULOSE 20 G: 20 SOLUTION ORAL at 06:24

## 2022-08-13 RX ADMIN — CINACALCET HYDROCHLORIDE 30 MG: 30 TABLET, FILM COATED ORAL at 09:13

## 2022-08-13 RX ADMIN — ARFORMOTEROL TARTRATE 15 MCG: 15 SOLUTION RESPIRATORY (INHALATION) at 09:43

## 2022-08-13 RX ADMIN — ENOXAPARIN SODIUM 80 MG: 100 INJECTION SUBCUTANEOUS at 20:31

## 2022-08-13 RX ADMIN — LACTULOSE 20 G: 20 SOLUTION ORAL at 20:31

## 2022-08-13 RX ADMIN — ARFORMOTEROL TARTRATE 15 MCG: 15 SOLUTION RESPIRATORY (INHALATION) at 20:44

## 2022-08-13 RX ADMIN — ALBUMIN (HUMAN) 25 G: 0.25 INJECTION, SOLUTION INTRAVENOUS at 01:30

## 2022-08-13 RX ADMIN — LACTULOSE 20 G: 20 SOLUTION ORAL at 16:47

## 2022-08-13 RX ADMIN — CALCITONIN SALMON 300 UNITS: 200 INJECTION, SOLUTION INTRAMUSCULAR; SUBCUTANEOUS at 17:08

## 2022-08-13 RX ADMIN — ENOXAPARIN SODIUM 80 MG: 100 INJECTION SUBCUTANEOUS at 09:13

## 2022-08-13 RX ADMIN — Medication 10 ML: at 09:12

## 2022-08-13 RX ADMIN — ALBUMIN (HUMAN) 25 G: 0.25 INJECTION, SOLUTION INTRAVENOUS at 09:12

## 2022-08-13 RX ADMIN — RIFAXIMIN 550 MG: 550 TABLET ORAL at 09:13

## 2022-08-13 RX ADMIN — DEXTROSE MONOHYDRATE 125 ML: 100 INJECTION, SOLUTION INTRAVENOUS at 04:45

## 2022-08-13 RX ADMIN — Medication 10 ML: at 20:31

## 2022-08-13 RX ADMIN — CINACALCET HYDROCHLORIDE 30 MG: 30 TABLET, FILM COATED ORAL at 20:31

## 2022-08-13 ASSESSMENT — PAIN SCALES - GENERAL
PAINLEVEL_OUTOF10: 0
PAINLEVEL_OUTOF10: 2

## 2022-08-13 NOTE — PROGRESS NOTES
Hepatology Progress Note      SUBJECTIVE:      Mental status improved to the point of the patient responding to her name and nodding yes/no. Remains encephalopathic. Denies abdominal pain. NG in place. OBJECTIVE    Physical    VITALS:  /69   Pulse (!) 107   Temp 98.4 °F (36.9 °C) (Axillary)   Resp 18   Ht 5' 2.5\" (1.588 m)   Wt 170 lb (77.1 kg)   SpO2 95%   BMI 30.60 kg/m²   Physical Exam:  General: Overall well-appearing, NAD, lethargic  HEENT: PERRLA, EOMI, Mild scleral icterus, MMM, no rhinorrhea. NG in place. Cards: RRR, no LE edema  Resp: Breathing comfortably on room air, good air movement, no use of accessory muscles, no audible wheezing  Abdomen: soft, NT, ND. No tense ascites. Extremities: Moves all extremities, no effusions or bruising. Skin: No rashes or jaundice  Neuro: Awakens to name, nods yes/no. Remains lethargic and encephalopathic. ASSESSMENT AND PLAN      77y/F history of HOLDEN cirrhosis who presents w/ AMS and REN. MELD: 25     PLAN:  HOLDEN Cirrhosis:  -CBC, CMP, and INR daily    2. AMS:  -Consistent with hepatic encephalopathy.  -NG in place. Recommend FMS. -Lactulose 20g q 2 hours until mental status at baseline.   -Continue rifaximin. 3. REN:  -Most likely pre-renal from new administration of diuretics as well as poor PO intake  -Hold diuretics for now  -Albumin 25g q 8 hrs  -Hold crystalloids as patient will become significantly fluid overloaded. -Monitor Cr daily.  -After correction of REN, patient will need to be restarted on diuretics with Cr monitored closely.  -Will consider restarting diuretics tomorrow. 4. Hepatic Lesions:  -Patient will require 3 or 4 phase MRI or CT Liver to ensure not [address].     5. Esophageal Varices:  -Variceal screen scheduled for 11/4    6. Coagulopathy:  -INR elevated, likely a nutritional component.   -Will correct nutritional aspect w/ 3 days of 10mg IV Vit K   -Otherwise monitor.  No need for other correction unless evidence of bleeding. 7. Hypernatremia:  -Correction w/ free water per primary. 8. Nutrition:  -Begin Tfs through NG until patient is awake enough to safely tolerate PO intake. I will follow. Thank you for including us in the care of this patient. Please do not hesitate to contact us with any additional questions or concerns.      Nitin Guillen MD  Gastroenterology/Hepatology  [facility]

## 2022-08-13 NOTE — PROGRESS NOTES
Comprehensive Nutrition Assessment    Type and Reason for Visit:  Initial, Consult (TF order/MNG)   **note updated at 1:24 PM after pt approved for TF. Original note was posted at 11:28 AM.    Nutrition Recommendations/Plan:   Continue current diet  Start Glucerna BID and boost once daily to promote adequate oral intake; note pt approved for TF per discussion w/ family medicine  Modified current TF order to better meet pt needs; note pt w/ elevated bili and ammonia levels; ordered,     Diabetic (Glucerna 1.5) @ 35ml/hr to provide 840ml TV, 1260kcals, 69g pro, 638ml free water. Flush of 90ml q 4 = 640ml (1178ml total water) ; as tolerated w/ REN    Noted elevated bili/ammonia & hx of DM (hypoglycemia most likely 2/2 very poor PO intake)- monitor labs. This meets 100% pt's est kcal and pro needs; once ammonia and bili levels are WNL, recommend increasing PRO. Will monitor; appreciate the discussion w/ family medicine for TF recs. Malnutrition Assessment:  Malnutrition Status: Moderate malnutrition (08/13/22 1124)    Context:  Chronic Illness     Findings of the 6 clinical characteristics of malnutrition:  Energy Intake:  75% or less estimated energy requirements for 1 month or longer  Weight Loss:  Unable to assess (d/t wt flux r/t cirrhosis)     Body Fat Loss:  Mild body fat loss Orbital   Muscle Mass Loss:  Mild muscle mass loss Temples (temporalis), Clavicles (pectoralis & deltoids)  Fluid Accumulation:  No significant fluid accumulation     Strength:  Not Performed    Nutrition Assessment:    pt adm d/t abnormal labs/AMS ; pt w/ REN; pleural effusion noted; pt w/ hepatic encephalopathy; PMhx of cirrhosis, breast CA, DM, Chylothorax; pt w/ poor PO intake, will start ONS and continue to monitor. Call w/ Family medicine that pt approved for TF; will modify current TF order and monitor.     Nutrition Related Findings:    +I/O; ammonia elevated; glucose WNL; hypernatremia; hypokalemia; low phos; elevated bili; lipase WNL; responds to voice; MAYNOR orientation level; poor dentition; active BS; dirrhea; NG tube w/ TF; no edema; MAP WNL Wound Type: None       Current Nutrition Intake & Therapies:    Average Meal Intake: 0%, 1-25%  Average Supplements Intake: None Ordered  ADULT ORAL NUTRITION SUPPLEMENT; Breakfast, Dinner; Diabetic Oral Supplement  ADULT ORAL NUTRITION SUPPLEMENT; Lunch; Fortified Pudding Oral Supplement  ADULT TUBE FEEDING; Nasogastric; Diabetic; Continuous; 10; Yes; 10; Q 8 hours; 35; 90; Q 4 hours    Anthropometric Measures:  Height: 5' 2.5\" (158.8 cm)  Ideal Body Weight (IBW): 113 lbs (51 kg)    Admission Body Weight: 170 lb (77.1 kg) (8/13-estimated)  Current Body Weight: 170 lb (77.1 kg) (8/13-estimated), 150.4 % IBW. Current BMI (kg/m2): 30.6  Usual Body Weight:  (MAYNOR d/t wt flux most likely d/t cirrhosis)     Weight Adjustment For: No Adjustment                 BMI Categories: Obese Class 1 (BMI 30.0-34. 9)    Estimated Daily Nutrient Needs:  Energy Requirements Based On: Kcal/kg  Weight Used for Energy Requirements: Current  Energy (kcal/day): 16-18kcal/wyrNJH=4313-1046  Weight Used for Protein Requirements: Ideal  Protein (g/day): 1.0-1.2g/kgxIBW=50-60g ; elevated ammonia and bili  Method Used for Fluid Requirements: 1 ml/kcal  Fluid (ml/day): 6349-1583 ; as tolerated w/ REN    Nutrition Diagnosis:   Inadequate oral intake related to cognitive or neurological impairment (AMS) as evidenced by intake 0-25%, poor intake prior to admission    Nutrition Interventions:   Food and/or Nutrient Delivery: Continue Current Diet, Start Oral Nutrition Supplement, Modify Tube Feeding (Glucerna BID ; boost once daily ; modify TF to Diabetic (Glucerna 1.5) @ 35ml/hr to provide 840ml TV, 1260kcals, 69g pro, 638ml free water; flush of 90ml q 4 = 640ml (1178ml total water); noted elevated bili/ammonia, hx of DM; monitor labs.)  Nutrition Education/Counseling: Education not appropriate (pt w/ AMS)  Coordination of Nutrition Care: Continue to monitor while inpatient  Plan of Care discussed with: Family medicine    Goals:     Goals:  (PO intake >20% of meals/ONS and tolerates EN support at goal rate.)       Nutrition Monitoring and Evaluation:   Behavioral-Environmental Outcomes: None Identified  Food/Nutrient Intake Outcomes: Food and Nutrient Intake, Supplement Intake, Enteral Nutrition Intake/Tolerance  Physical Signs/Symptoms Outcomes: Biochemical Data, Nutrition Focused Physical Findings, Skin, Chewing or Swallowing, Weight, GI Status, Fluid Status or Edema, Hemodynamic Status, Diarrhea    Discharge Planning:     Too soon to determine     Pat Starr RD  Contact: [phone]

## 2022-08-13 NOTE — PROGRESS NOTES
Attempted to feed pt small bite of sherbet. Pt opened mouth but could/would not follow directions to swallow. Ended up having to suction sherbet out of mouth.

## 2022-08-13 NOTE — PLAN OF CARE
Problem: Discharge Planning  Goal: Discharge to home or other facility with appropriate resources  Outcome: Progressing     Problem: Pain  Goal: Verbalizes/displays adequate comfort level or baseline comfort level  Outcome: Progressing     Problem: ABCDS Injury Assessment  Goal: Absence of physical injury  Outcome: Progressing     Problem: Skin/Tissue Integrity  Goal: Absence of new skin breakdown  Description: 1. Monitor for areas of redness and/or skin breakdown  2. Assess vascular access sites hourly  3. Every 4-6 hours minimum:  Change oxygen saturation probe site  4. Every 4-6 hours:  If on nasal continuous positive airway pressure, respiratory therapy assess nares and determine need for appliance change or resting period.   Outcome: Progressing     Problem: Safety - Adult  Goal: Free from fall injury  Outcome: Progressing

## 2022-08-13 NOTE — PROGRESS NOTES
08/12/22 2015   Treatment   Treatment Type N   $Treatment Type $Inhaled Therapy/Meds   Medications (S)  Budesonide  (Rinsed after use)   Pre-Tx Pulse 106   Pre-Tx Resps 13   Breath Sounds Pre-Tx DUANE Diminished   Breath Sounds Pre-Tx LLL Diminished   Breath Sounds Pre-Tx RUL Diminished   Breath Sounds Pre-Tx RML Diminished   Breath Sounds Pre-Tx RLL Diminished   Mid-Tx Pulse 106   Mid-Tx Resps 13   Breath Sounds Post-Tx DUANE Diminished   Breath Sounds Post-Tx LLL Diminished   Breath Sounds Post-Tx RUL Diminished   Breath Sounds Post-Tx RML Diminished   Breath Sounds Post-Tx RLL Diminished   Post-Tx Pulse 106   Post-Tx Resps 14   Delivery Source Mask;Oxygen   Position Sitting   Treatment Tolerance Well   Duration 10   Is patient on O2?  N   Oxygen Therapy/Pulse Ox   O2 Therapy Room air   Resp 14   SpO2 94 %   Rinsed with Green Swab patient returned to room air

## 2022-08-13 NOTE — PROGRESS NOTES
[facility] - [facility] Inpatient   Resident Progress Note    S:  Hospital day: 3   Brief Synopsis:  Senait Davidson is a 68 y.o. 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 She was recently discharged 8/4/22 after being treated after a fall with altered mental status. Since she returned to the nursing home, she became increasingly more altered and lethargic again. Labs at the nursing home were significant for elevated creatinine and calcium. Patient was also endorsing neck and abdominal pain. Initial POCT glc at that time <40. She received D10 bolus and glc improved. Other labs were significant for chronic anemia 9.7,  elevated cr 1.5, BUN 34, hypercalcemia 13.0, hypoalbuminemia 2.9, elevated alk phos 131, elevated AST 40, hyperammoniemia 125. COVID neg. UA negative. Urine  cultures pending. Passed bedside swallow. CXR showed small left pleural effusion with possible infiltrate vs atelectasis. CT A/P significant for moderate left pleural effusion with atelectatic changes in LLL, chronic hepatocellular dz with Portal venous HTN, small perihepatic ascites decreased from prior, b/l renal calculi, and diverticulosis. EKG: normal sinus rhythm with premature supraventricular complexes, changed from previous tracings. Overnight/interim:   Continues to have low BS episodes. D5NS was dc'd Friday. NG tube in place. Patient has passed swallow evaluation though too somnolent to eat. Vitals remains stable.      Cont meds:    dextrose      sodium chloride       Scheduled meds:    enoxaparin  80 mg SubCUTAneous BID    cinacalcet  30 mg Oral BID    vitamin D  50,000 Units Oral Weekly    oxymetazoline  2 spray Each Nostril Once    lidocaine   Topical Once    lactulose  20 g 52 this AM , much improved. Low suspicion for SBP d/t afebrile, normal white count and improvement of ascites since last admission  - GI following ; appreciate recs - continue 20 g lactulose q2 hours, continue rifaximin.  - Blood cultures no growth so far   - monitor I/Os ; patient to have FMS in place  - Monitor electrolytes : repeat CMP this morning with elevated Na and Cl , elevated Calcium as well, otherwise electrolytes and creatinine at baseline. Concern for blood draw near IV line. Patient has been receiving albumin , corrected Ca 14.2 . Repeat BMP and ionized calcium ordered.   - hold home med, doxazosin and effexor due to AMS  - Ferrous sulfate discontinued  - Dulcolax suppository prn  - Goal: 3+ bowel movements daily   - has been having watery bowel movements all night     Hypoglycemia  - 2/2 poor PO intake  - h/o T2DM diet controlled, A1C 4.5 7/12/22  - hypoglycemia protocol  - D5NS 100 cc/h DCd  - Required 1 D10 bolus given in ED  - POCT BGL q4h  - POC Glucose 8/11: 71-->117--> 93 --> 89  - POC Glucose on 8/12: 87 , dropped to low of 65 overnight, increased to 130s with treatment     Hypokalemia/Hypomagnesemia  -Potassium 3.3: Replete appropriate; Given 20mEq PO on 8/11  -Mg 1.7: Replete as appropriate; Given 1g IV on 8/11  -Potassium 3.3: Given 10mEq x 2 on 8/12    REN  Likely 2/2 decreased PO intake, dehydration  CKD Stage 3 , Cr baseline 0.9- 1.1 , improved to 1.2   - FeUrea :42.9%  - Nephrology following: Continue D5NS  - Continue to hold aldactone and lasix  - Albumin 25% IV q 8 hours    Left Pleural effusion  - chronic, worse compared to CXR on 7/29/22  - currently saturating well on RA  - Continue to monitor  - Consider pulm consult if respiratory status worsens    HOLDEN Cirrhosis  -INR 4.3    Hypercalcemia  - Ca 13.0, corrected 13.88 ,  elevated on 8/13 to 14.4, corrected 14.2  - 2/2 primary hyperparathyroidism, on cinacalcet  - continue home medication  - , Vit D 29, phos 2.5, TSH 4.14 7/30/22, repeat    - assess Ionized calcium level and PTHrp  - nephro consult  - IVF   - I/O  - hold lasix for now due to IVF     Iron deficiency anemia  - Hb 9.0, no signs of active bleeding  -Discontinued ferrous sulfate due to constipation  -Monitor hemoglobin and consider IV replacement if needed     H/o HTN  - currently hypotensive  - on amlodipine and spironolactone at home, hold for now       H/O ILD    - on advair, abuterol at home  - start dulera, Albuterol PRN     H/O PE  -Discontinue eliquis  -Begin therapeutic dose of Lovenox due to history of unprovoked PE (Creatinine clearance 41; GFR>30)  -PCDs     PT/OT evaluation: pending  DVT prophylaxis: lovenox therapeutic dose  GI prophylaxis: protonix  Disposition: continue to monitor  Diet: NG    Electronically signed by Marin Colindres MD on 8/13/2022 at 6:25 AM  Attending physician: Dr. Margarito Carnes

## 2022-08-13 NOTE — PROGRESS NOTES
[facility] MD Olivier Garcia MD. Dayna Walker MD   Progress Note    8/13/2022    SUBJECTIVE:   8/12: On RA stable vitals   8/13: Appears comfortable on room air. Does not answer questions or follow commands. Bedbound. Debilitated. PROBLEM LIST:    Principal Problem:    REN (acute kidney injury) ([facility] [address]  Active Problems: Moderate protein-calorie malnutrition ([facility] [address]    Acute hepatic encephalopathy    Hypoglycemia due to type 2 diabetes mellitus ([facility] [address]    Palliative care by specialist  Resolved Problems:    * No resolved hospital problems. *       DIET:    ADULT ORAL NUTRITION SUPPLEMENT; Breakfast, Dinner; Diabetic Oral Supplement  ADULT ORAL NUTRITION SUPPLEMENT; Lunch;  Fortified Pudding Oral Supplement  ADULT TUBE FEEDING; Nasogastric; Diabetic; Continuous; 10; Yes; 10; Q 8 hours; 35; 350; Q 4 hours       Allergies : Lisinopril    Review of Systems:   Unable to obtain due to patient's mental status    MEDS (scheduled):    phytonadione (VITAMIN K)  IVPB  10 mg IntraVENous Daily    potassium phosphate IVPB  30 mmol IntraVENous Once    calcitonin  300 Units IntraMUSCular Q12H    enoxaparin  80 mg SubCUTAneous BID    cinacalcet  30 mg Oral BID    oxymetazoline  2 spray Each Nostril Once    lidocaine   Topical Once    lactulose  20 g Oral Q2H    albumin human  25 g IntraVENous Q8H    [Held by provider] ferrous sulfate  325 mg Oral Daily    [Held by provider] furosemide  20 mg Oral BID    pantoprazole  40 mg Oral Daily    rifAXIMin  550 mg Oral BID    sodium chloride flush  5-40 mL IntraVENous 2 times per day    budesonide  0.5 mg Nebulization BID    And    Arformoterol Tartrate  15 mcg Nebulization BID       MEDS (infusions):   dextrose      sodium chloride         MEDS (prn):  glucose, dextrose bolus **OR** dextrose bolus, glucagon (rDNA), dextrose, bisacodyl, albuterol, sodium chloride flush, sodium chloride, ondansetron **OR** ondansetron, polyethylene glycol, acetaminophen **OR** acetaminophen    PHYSICAL EXAM:     Patient Vitals for the past 24 hrs:   BP Temp Temp src Pulse Resp SpO2 Height Weight   08/13/22 1531 127/68 98.9 °F (37.2 °C) Axillary (!) 109 18 97 % -- --   08/13/22 1113 -- -- -- -- -- -- 5' 2.5\" (1.588 m) --   08/13/22 1100 -- -- -- -- -- -- 5' 2.5\" (1.588 m) 170 lb (77.1 kg)   08/13/22 0907 124/69 98.4 °F (36.9 °C) Axillary (!) 107 18 95 % -- --   08/12/22 2115 121/65 97.6 °F (36.4 °C) Axillary (!) 130 14 92 % -- --   08/12/22 2015 -- -- -- -- -- 94 % -- --   08/12/22 2014 -- -- -- (!) 106 13 97 % -- --   @      Intake/Output Summary (Last 24 hours) at 8/13/2022 1830  Last data filed at 8/13/2022 1714  Gross per 24 hour   Intake 250 ml   Output 525 ml   Net -275 ml         Wt Readings from Last 3 Encounters:   08/13/22 170 lb (77.1 kg)   08/04/22 170 lb (77.1 kg)   07/12/22 191 lb 3.2 oz (86.7 kg)       Constitutional:  in no acute distress  HEENT: NC/AT, EOMI, anicteric, mucus membranes dry  Neck: Soft and supple, trachea midline no JVD  Cardiovascular: S1, S2 regular rhythm, no murmur,or rub  Respiratory:  No crackles, no wheeze poor AE due to poor inspiratory effort  Gastrointestinal:  Soft, nontender, nondistended, NABS  Ext: no edema, feet warm  Skin: dry, no rash  Neuro: awake, minimally alert, does not respond to questions or follow commands though does follow the interviewer around the room. Moves all 4 extremities.   Cranial nerves II through XII grossly intact      DATA:    Recent Labs     08/11/22  0459 08/12/22  0510 08/13/22  0840   WBC 5.4 6.3 5.8   HGB 9.4* 9.0* 8.4*   HCT 27.2* 26.8* 25.7*   .7* 101.9* 106.2*    117* 117*     Recent Labs     08/10/22  2358 08/11/22  0459 08/11/22  0459 08/12/22  0510 08/13/22  0840 08/13/22  1134   NA  --  135   < > 143 154* 151*   K  --  3.3*   < > 3.3* 3.1* 3.0*   CL  --  101   < > 107 120* 118*   CO2  --  24   < > 23 22 22   MG  --  1.7  --  2.0 2.0  --    PHOS 1.8*  --   --   --   -- 1.2*   BUN  --  32*   < > 25* 22 22   CREATININE  --  1.4*   < > 1.2* 1.2* 1.2*   ALT  --  20  --  20 25  --    AST  --  37*  --  41* 50*  --    BILITOT  --  2.0*  --  2.6* 3.3*  --    ALKPHOS  --  122*  --  118* 115*  --     < > = values in this interval not displayed. Lab Results   Component Value Date    LABPROT 0.2 07/30/2022    LABPROT 0.2 07/30/2022       ASSESSMENT    1. Acute kidney injury superimposed on CKD due to to decreased effective volume due to dehydration associated with poor intake, hypercalcemia     1.5. Chronic kidney disease stage III. Baseline creatinine 1.1 to 1.4 with bland urine sediment. Etiology due to diabetic nephropathy, nephrosclerosis in the context of aging. 2.  Hepatic encephalopathy. 3.  Hypercalcemia, severe, secondary to primary hyperparathyroidism as diagnosed on her last admission, though it is extremely unusual that the calcium has risen so high. Repeat intake PTH has actually risen since initiating Cinacalcet therapy. Doubt that vitamin D supplementation is playing much of a role, though should still be discontinued as it would increase intestinal blood from calcium. 4.  cirrhosis. 5.  History of breast cancer. 6.  Hypophosphatemia, severe, due to poor intake, and the primary hyperparathyroidism      RECOMMENDATIONS  1. Cinacalcet was increased yesterday, consider increasing again as warranted  2. Given the severity of the hypercalcemia, will administer 4 doses of [place] calcitonin every 12 hours at 4 units/kg  3. Supplement phosphorus IV  4. Resume hypotonic IV fluid  5. Recheck BMP and phosphorus this evening, supplement phosphorus again and or adjust IV fluid as warranted  6. Follow labs, UO  7.   Continue supportive care      Electronically signed by Sona Rice MD on 8/13/2022 at 6:30 PM

## 2022-08-13 NOTE — PROGRESS NOTES
[address] [facility]  Family Medicine Attending    NED GREGG Course: 68 y.o. 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 She became increasingly altered and lethargic. Elevated Cr, Ca at NH. She reported neck/abdominal pain. CT Abd showed large stool burden & moderate left pleural effusion. S: Patient somnolent in bed, awakens briefly, responds to yes/no questions, follows limited commands. Denies pain. Has had several BMs overnight. O: VS- Blood pressure 124/69, pulse (!) 107, temperature 98.4 °F (36.9 °C), temperature source Axillary, resp. rate 18, SpO2 95 %. Gen: Ill appearing. Somnolent. Awakens to voice and follows limited commands. Heent: NC AT Anicteric. Dry MM  CV: irreg irreg, sys murmur today   Resp: clear in apices, diminished at left base. Abd: +BS, soft, nt nd. Ext: ROSAS no calf ttp. 1+ LE edema. Impressions:   Principal Problem:    REN (acute kidney injury) ([facility] [address]  Active Problems:    Acute hepatic encephalopathy    Hypoglycemia due to type 2 diabetes mellitus ([facility] [address]    Palliative care by specialist  Resolved Problems:    * No resolved hospital problems. *      Plan:   1) Acute Hepatic Encephalopathy - Cont lactulose, rifaximin. Bowel regimen. S/p 8 BM yesterday/overnight. GI management appreciated. 2) REN - Nephro, GI following. Prerenal vs Hepatorenal syndrome. Albumin IVF. 3) Hypoglycemia - 2/2 liver disease, poor po intake -  Monitor glucose, encourage PO as she becomes more alert. 4) Hypercalcemia - IVF, nephrology management appreciated, awaiting AM labs. 5) Left Pleural Effusion - worsening in the last 2 weeks. SaO2 remains stable. Low threshold to consult pulm if worsening.      6) Hx of VTE/PE - SC Lovenox 1mg/kg BID. Cont to monitor Cr for further dosing. Nutrition consult and discuss tube feeds if mentation does not improve to allow oral nutrition. Team to discuss prognosis/long term  w/ Son. Attending Physician Statement  I have reviewed the chart and seen the patient with the resident(s). I personally reviewed images, EKG's and similar tests, if present. I personally reviewed and performed key elements of the history and exam.  I have reviewed and confirmed student and/or resident history and exam with changes as indicated above. I agree with the assessment, plan and orders as documented by the resident. Please refer to the resident and/or student note for additional information.       Wong Martinez, DO

## 2022-08-14 ENCOUNTER — APPOINTMENT (OUTPATIENT)
Dept: GENERAL RADIOLOGY | Age: 77
DRG: 432 | End: 2022-08-14
Payer: MEDICARE

## 2022-08-14 LAB
ALBUMIN SERPL-MCNC: 4.8 G/DL (ref 3.5–5.2)
ALP BLD-CCNC: 107 U/L (ref 35–104)
ALT SERPL-CCNC: 23 U/L (ref 0–32)
AMMONIA: 38 UMOL/L (ref 11–51)
ANION GAP SERPL CALCULATED.3IONS-SCNC: 15 MMOL/L (ref 7–16)
ANION GAP SERPL CALCULATED.3IONS-SCNC: 15 MMOL/L (ref 7–16)
ANION GAP SERPL CALCULATED.3IONS-SCNC: 16 MMOL/L (ref 7–16)
ANISOCYTOSIS: ABNORMAL
AST SERPL-CCNC: 44 U/L (ref 0–31)
BASOPHILS ABSOLUTE: 0.06 E9/L (ref 0–0.2)
BASOPHILS RELATIVE PERCENT: 0.8 % (ref 0–2)
BILIRUB SERPL-MCNC: 3.5 MG/DL (ref 0–1.2)
BUN BLDV-MCNC: 21 MG/DL (ref 6–23)
BURR CELLS: ABNORMAL
CALCIUM SERPL-MCNC: 12.4 MG/DL (ref 8.6–10.2)
CALCIUM SERPL-MCNC: 12.8 MG/DL (ref 8.6–10.2)
CALCIUM SERPL-MCNC: 13.3 MG/DL (ref 8.6–10.2)
CHLORIDE BLD-SCNC: 119 MMOL/L (ref 98–107)
CHLORIDE BLD-SCNC: 119 MMOL/L (ref 98–107)
CHLORIDE BLD-SCNC: 123 MMOL/L (ref 98–107)
CO2: 16 MMOL/L (ref 22–29)
CO2: 16 MMOL/L (ref 22–29)
CO2: 21 MMOL/L (ref 22–29)
CREAT SERPL-MCNC: 1.1 MG/DL (ref 0.5–1)
CREAT SERPL-MCNC: 1.2 MG/DL (ref 0.5–1)
CREAT SERPL-MCNC: 1.2 MG/DL (ref 0.5–1)
EOSINOPHILS ABSOLUTE: 0.09 E9/L (ref 0.05–0.5)
EOSINOPHILS RELATIVE PERCENT: 1.3 % (ref 0–6)
GFR AFRICAN AMERICAN: 53
GFR AFRICAN AMERICAN: 53
GFR AFRICAN AMERICAN: 58
GFR NON-AFRICAN AMERICAN: 44 ML/MIN/1.73
GFR NON-AFRICAN AMERICAN: 44 ML/MIN/1.73
GFR NON-AFRICAN AMERICAN: 48 ML/MIN/1.73
GLUCOSE BLD-MCNC: 76 MG/DL (ref 74–99)
GLUCOSE BLD-MCNC: 76 MG/DL (ref 74–99)
GLUCOSE BLD-MCNC: 88 MG/DL (ref 74–99)
HCT VFR BLD CALC: 25.7 % (ref 34–48)
HEMOGLOBIN: 8.2 G/DL (ref 11.5–15.5)
IMMATURE GRANULOCYTES #: 0.03 E9/L
IMMATURE GRANULOCYTES %: 0.4 % (ref 0–5)
INR BLD: 2.5
LYMPHOCYTES ABSOLUTE: 0.85 E9/L (ref 1.5–4)
LYMPHOCYTES RELATIVE PERCENT: 12 % (ref 20–42)
MAGNESIUM: 1.7 MG/DL (ref 1.6–2.6)
MCH RBC QN AUTO: 35.7 PG (ref 26–35)
MCHC RBC AUTO-ENTMCNC: 31.9 % (ref 32–34.5)
MCV RBC AUTO: 111.7 FL (ref 80–99.9)
METER GLUCOSE: 101 MG/DL (ref 74–99)
METER GLUCOSE: 112 MG/DL (ref 74–99)
METER GLUCOSE: 74 MG/DL (ref 74–99)
METER GLUCOSE: 74 MG/DL (ref 74–99)
METER GLUCOSE: 77 MG/DL (ref 74–99)
METER GLUCOSE: 81 MG/DL (ref 74–99)
METER GLUCOSE: 89 MG/DL (ref 74–99)
MONOCYTES ABSOLUTE: 0.72 E9/L (ref 0.1–0.95)
MONOCYTES RELATIVE PERCENT: 10.2 % (ref 2–12)
NEUTROPHILS ABSOLUTE: 5.32 E9/L (ref 1.8–7.3)
NEUTROPHILS RELATIVE PERCENT: 75.3 % (ref 43–80)
OVALOCYTES: ABNORMAL
PDW BLD-RTO: 24.7 FL (ref 11.5–15)
PHOSPHORUS: 2.6 MG/DL (ref 2.5–4.5)
PLATELET # BLD: 114 E9/L (ref 130–450)
PMV BLD AUTO: 11.2 FL (ref 7–12)
POIKILOCYTES: ABNORMAL
POLYCHROMASIA: ABNORMAL
POTASSIUM REFLEX MAGNESIUM: 3.3 MMOL/L (ref 3.5–5)
POTASSIUM SERPL-SCNC: 3.9 MMOL/L (ref 3.5–5)
POTASSIUM SERPL-SCNC: 4.7 MMOL/L (ref 3.5–5)
PROTHROMBIN TIME: 27 SEC (ref 9.3–12.4)
RBC # BLD: 2.3 E12/L (ref 3.5–5.5)
SCHISTOCYTES: ABNORMAL
SODIUM BLD-SCNC: 150 MMOL/L (ref 132–146)
SODIUM BLD-SCNC: 155 MMOL/L (ref 132–146)
SODIUM BLD-SCNC: 155 MMOL/L (ref 132–146)
TOTAL PROTEIN: 7.6 G/DL (ref 6.4–8.3)
TRIGL SERPL-MCNC: 67 MG/DL (ref 0–149)
WBC # BLD: 7.1 E9/L (ref 4.5–11.5)

## 2022-08-14 PROCEDURE — 82140 ASSAY OF AMMONIA: CPT

## 2022-08-14 PROCEDURE — 36415 COLL VENOUS BLD VENIPUNCTURE: CPT

## 2022-08-14 PROCEDURE — 6360000002 HC RX W HCPCS: Performed by: INTERNAL MEDICINE

## 2022-08-14 PROCEDURE — 6370000000 HC RX 637 (ALT 250 FOR IP): Performed by: STUDENT IN AN ORGANIZED HEALTH CARE EDUCATION/TRAINING PROGRAM

## 2022-08-14 PROCEDURE — 2580000003 HC RX 258: Performed by: STUDENT IN AN ORGANIZED HEALTH CARE EDUCATION/TRAINING PROGRAM

## 2022-08-14 PROCEDURE — 85025 COMPLETE CBC W/AUTO DIFF WBC: CPT

## 2022-08-14 PROCEDURE — P9047 ALBUMIN (HUMAN), 25%, 50ML: HCPCS | Performed by: STUDENT IN AN ORGANIZED HEALTH CARE EDUCATION/TRAINING PROGRAM

## 2022-08-14 PROCEDURE — 2580000003 HC RX 258: Performed by: FAMILY MEDICINE

## 2022-08-14 PROCEDURE — 80053 COMPREHEN METABOLIC PANEL: CPT

## 2022-08-14 PROCEDURE — 94640 AIRWAY INHALATION TREATMENT: CPT

## 2022-08-14 PROCEDURE — 6370000000 HC RX 637 (ALT 250 FOR IP): Performed by: INTERNAL MEDICINE

## 2022-08-14 PROCEDURE — 6360000002 HC RX W HCPCS

## 2022-08-14 PROCEDURE — 84478 ASSAY OF TRIGLYCERIDES: CPT

## 2022-08-14 PROCEDURE — 84100 ASSAY OF PHOSPHORUS: CPT

## 2022-08-14 PROCEDURE — 6360000002 HC RX W HCPCS: Performed by: STUDENT IN AN ORGANIZED HEALTH CARE EDUCATION/TRAINING PROGRAM

## 2022-08-14 PROCEDURE — 82962 GLUCOSE BLOOD TEST: CPT

## 2022-08-14 PROCEDURE — 80048 BASIC METABOLIC PNL TOTAL CA: CPT

## 2022-08-14 PROCEDURE — 2060000000 HC ICU INTERMEDIATE R&B

## 2022-08-14 PROCEDURE — 83735 ASSAY OF MAGNESIUM: CPT

## 2022-08-14 PROCEDURE — 85610 PROTHROMBIN TIME: CPT

## 2022-08-14 PROCEDURE — 71045 X-RAY EXAM CHEST 1 VIEW: CPT

## 2022-08-14 PROCEDURE — 99232 SBSQ HOSP IP/OBS MODERATE 35: CPT | Performed by: FAMILY MEDICINE

## 2022-08-14 RX ORDER — POTASSIUM CHLORIDE 20 MEQ/1
40 TABLET, EXTENDED RELEASE ORAL ONCE
Status: COMPLETED | OUTPATIENT
Start: 2022-08-14 | End: 2022-08-14

## 2022-08-14 RX ORDER — POTASSIUM CHLORIDE 20 MEQ/1
40 TABLET, EXTENDED RELEASE ORAL ONCE
Status: DISCONTINUED | OUTPATIENT
Start: 2022-08-14 | End: 2022-08-14 | Stop reason: SDUPTHER

## 2022-08-14 RX ORDER — SODIUM CHLORIDE 450 MG/100ML
INJECTION, SOLUTION INTRAVENOUS CONTINUOUS
Status: DISCONTINUED | OUTPATIENT
Start: 2022-08-14 | End: 2022-08-16

## 2022-08-14 RX ORDER — MAGNESIUM SULFATE IN WATER 40 MG/ML
2000 INJECTION, SOLUTION INTRAVENOUS ONCE
Status: COMPLETED | OUTPATIENT
Start: 2022-08-14 | End: 2022-08-14

## 2022-08-14 RX ADMIN — LACTULOSE 20 G: 20 SOLUTION ORAL at 10:05

## 2022-08-14 RX ADMIN — LACTULOSE 20 G: 20 SOLUTION ORAL at 17:19

## 2022-08-14 RX ADMIN — ALBUMIN (HUMAN) 25 G: 0.25 INJECTION, SOLUTION INTRAVENOUS at 16:09

## 2022-08-14 RX ADMIN — LACTULOSE 20 G: 20 SOLUTION ORAL at 00:07

## 2022-08-14 RX ADMIN — CALCITONIN SALMON 300 UNITS: 200 INJECTION, SOLUTION INTRAMUSCULAR; SUBCUTANEOUS at 10:06

## 2022-08-14 RX ADMIN — LACTULOSE 20 G: 20 SOLUTION ORAL at 20:54

## 2022-08-14 RX ADMIN — LACTULOSE 20 G: 20 SOLUTION ORAL at 06:17

## 2022-08-14 RX ADMIN — SODIUM CHLORIDE: 4.5 INJECTION, SOLUTION INTRAVENOUS at 10:28

## 2022-08-14 RX ADMIN — CINACALCET HYDROCHLORIDE 30 MG: 30 TABLET, FILM COATED ORAL at 09:06

## 2022-08-14 RX ADMIN — ALBUMIN (HUMAN) 25 G: 0.25 INJECTION, SOLUTION INTRAVENOUS at 08:48

## 2022-08-14 RX ADMIN — BUDESONIDE 500 MCG: 0.5 SUSPENSION RESPIRATORY (INHALATION) at 09:29

## 2022-08-14 RX ADMIN — ARFORMOTEROL TARTRATE 15 MCG: 15 SOLUTION RESPIRATORY (INHALATION) at 20:13

## 2022-08-14 RX ADMIN — LACTULOSE 20 G: 20 SOLUTION ORAL at 16:09

## 2022-08-14 RX ADMIN — BUDESONIDE 500 MCG: 0.5 SUSPENSION RESPIRATORY (INHALATION) at 20:12

## 2022-08-14 RX ADMIN — CALCITONIN SALMON 300 UNITS: 200 INJECTION, SOLUTION INTRAMUSCULAR; SUBCUTANEOUS at 20:55

## 2022-08-14 RX ADMIN — LACTULOSE 20 G: 20 SOLUTION ORAL at 23:20

## 2022-08-14 RX ADMIN — ALBUMIN (HUMAN) 25 G: 0.25 INJECTION, SOLUTION INTRAVENOUS at 00:40

## 2022-08-14 RX ADMIN — LACTULOSE 20 G: 20 SOLUTION ORAL at 09:05

## 2022-08-14 RX ADMIN — Medication 10 ML: at 09:05

## 2022-08-14 RX ADMIN — CINACALCET HYDROCHLORIDE 30 MG: 30 TABLET, FILM COATED ORAL at 20:55

## 2022-08-14 RX ADMIN — PHYTONADIONE 10 MG: 10 INJECTION, EMULSION INTRAMUSCULAR; INTRAVENOUS; SUBCUTANEOUS at 09:25

## 2022-08-14 RX ADMIN — LACTULOSE 20 G: 20 SOLUTION ORAL at 14:44

## 2022-08-14 RX ADMIN — MAGNESIUM SULFATE HEPTAHYDRATE 2000 MG: 40 INJECTION, SOLUTION INTRAVENOUS at 08:47

## 2022-08-14 RX ADMIN — POTASSIUM CHLORIDE 40 MEQ: 1500 TABLET, EXTENDED RELEASE ORAL at 09:06

## 2022-08-14 RX ADMIN — ARFORMOTEROL TARTRATE 15 MCG: 15 SOLUTION RESPIRATORY (INHALATION) at 09:29

## 2022-08-14 RX ADMIN — PANTOPRAZOLE SODIUM 40 MG: 40 TABLET, DELAYED RELEASE ORAL at 09:06

## 2022-08-14 RX ADMIN — RIFAXIMIN 550 MG: 550 TABLET ORAL at 09:06

## 2022-08-14 RX ADMIN — RIFAXIMIN 550 MG: 550 TABLET ORAL at 20:55

## 2022-08-14 ASSESSMENT — PAIN SCALES - WONG BAKER: WONGBAKER_NUMERICALRESPONSE: 0

## 2022-08-14 ASSESSMENT — PAIN SCALES - GENERAL: PAINLEVEL_OUTOF10: 0

## 2022-08-14 NOTE — PROGRESS NOTES
[address] [facility]  Family Medicine Attending    NED GREGG Course: 68 y.o. 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 She became increasingly altered and lethargic. Elevated Cr, Ca at NH. She reported neck/abdominal pain. CT Abd showed large stool burden & moderate left pleural effusion. S: Today, patient remains more alert, responding to limited questions. Was not able to tolerate orals; receiving tube feeds and water flushes. Calcium improving on miacalcin. O: VS- Blood pressure (!) 107/53, pulse (!) 118, temperature 97.9 °F (36.6 °C), temperature source Oral, resp. rate 16, height 5' 2.5\" (1.588 m), weight 170 lb (77.1 kg), SpO2 99 %. Gen: Ill appearing. Awake, and follows limited commands. Heent: NC AT Anicteric. Dry MM  CV: irreg irreg, sys murmur   Resp: clear in apices, diminished at left base, stable, unlabored at rest.   Abd: +BS, soft, nt nd. Ext: ROSAS, No edema     Impressions:   Principal Problem:    REN (acute kidney injury) ([facility] [address]  Active Problems: Moderate protein-calorie malnutrition ([address]    Acute hepatic encephalopathy    Hypoglycemia due to type 2 diabetes mellitus ([address]    Palliative care by specialist  Resolved Problems:    * No resolved hospital problems. *      Plan:   1) Acute Hepatic Encephalopathy - lactulose, rifaximin. GI management appreciated. Ammonia and mentation improving. 2) REN - Nephro, GI following. Prerenal vs Hepatorenal syndrome. Albumin IVF. Nephrology management appreciated. 3) Hypoglycemia - 2/2 liver disease, poor po intake -  Monitor glucose, encourage PO as she becomes more alert. 4) Hypercalcemia - nephrology management appreciated, miacalcin.   Given small amount of IVF today, follow closely. 5) Left Pleural Effusion - worsening in the last 2 weeks. SaO2 remains stable. Low threshold to consult pulm if worsening. 6) Hx of VTE/PE - SC Lovenox 1mg/kg BID. Cont to monitor Cr for further dosing. Nutrition consult appreciated, tube feeds started, tolerating. Increased free water flushes with hypernatremia. Follow mentation does not improve to allow oral nutrition. Currently unable to manage oral feeds. Continue to discuss prognosis/long term  w/ Son. Attending Physician Statement  I have reviewed the chart and seen the patient with the resident(s). I personally reviewed images, EKG's and similar tests, if present. I personally reviewed and performed key elements of the history and exam.  I have reviewed and confirmed student and/or resident history and exam with changes as indicated above. I agree with the assessment, plan and orders as documented by the resident. Please refer to the resident and/or student note for additional information.       Brennan Larios, DO

## 2022-08-14 NOTE — PROGRESS NOTES
Hepatology Progress Note    SUBJECTIVE:      Patient mental status improving significantly but remains encephalopathic. She denies abdominal pain. OBJECTIVE    Physical    VITALS:  /70   Pulse (!) 128   Temp (!) 95.9 °F (35.5 °C) (Oral)   Resp 18   Ht 5' 2.5\" (1.588 m)   Wt 170 lb (77.1 kg)   SpO2 100%   BMI 30.60 kg/m²   Physical Exam:  General: Ill-appearing, NAD  HEENT: PERRLA, EOMI, Anicteric sclera, MMM, no rhinorrhea, NG in place. Cards: RRR, no LE edema  Resp: Breathing comfortably on room air, good air movement, no use of accessory muscles, no audible wheezing  Abdomen: soft, NT, ND. Extremities: Moves all extremities, no effusions or bruising. Skin: No rashes or jaundice  Neuro: Nods yes/no to questions, remains with slowed and delayed responses, + encephalopathy    ASSESSMENT AND PLAN      77y/F history of HOLDEN cirrhosis who presents w/ AMS and REN. MELD: 23     PLAN:  HOLDEN Cirrhosis:  -CBC, CMP, and INR daily     2. AMS:  -Consistent with hepatic encephalopathy.  -NG in place. Recommend FMS. -Lactulose 20g q 2 hours until mental status at baseline.   -Continue rifaximin. 3. REN:  -Most likely pre-renal from new administration of diuretics as well as poor PO intake  -Hold diuretics for now  -Albumin 25g q 8 hrs  -Hold crystalloids as patient will become significantly fluid overloaded. -Monitor Cr daily.  -After correction of REN, patient will need to be restarted on diuretics with Cr monitored closely. 4. Hepatic Lesions:  -Patient will require 3 or 4 phase MRI or CT Liver to ensure not [address]     5. Esophageal Varices:  -Variceal screen scheduled for 11/4     6. Coagulopathy:  -INR elevated, likely a nutritional component.  -Will correct nutritional aspect w/ 3 days of 10mg IV Vit K  -Otherwise monitor. No need for other correction unless evidence of bleeding. 7. Hypernatremia:  -Correction w/ free water per primary.      8. Nutrition:  -Begin Tfs through NG until patient is awake enough to safely tolerate PO intake. I will follow. Thank you for including us in the care of this patient. Please do not hesitate to contact us with any additional questions or concerns.      Denisse Clement MD  Gastroenterology/Hepatology  [facility]

## 2022-08-14 NOTE — PROGRESS NOTES
Pt removed NG from nose and had IV tubing in hand and was about to pull out next. Also had legs hanging out side of bed all while family was in visiting. Telesitter was ordered and placed in room. NG reinserted and verified with air bolus and chest xray, which are still pending. Turned & repositioned.

## 2022-08-14 NOTE — PROGRESS NOTES
[facility] In[facility]   Resident Progress Note    S:  Hospital day: 4   Brief Synopsis:  Rubi Lucia is a 68 y.o. 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 She was recently discharged 8/4/22 after being treated after a fall with altered mental status. Since she returned to the nursing home, she became increasingly more altered and lethargic again. Labs at the nursing home were significant for elevated creatinine and calcium. Patient was also endorsing neck and abdominal pain. Initial POCT glc at that time <40. She received D10 bolus and glc improved. Other labs were significant for chronic anemia 9.7,  elevated cr 1.5, BUN 34, hypercalcemia 13.0, hypoalbuminemia 2.9, elevated alk phos 131, elevated AST 40, hyperammoniemia 125. COVID neg. UA negative. Urine  cultures pending. Passed bedside swallow. CXR showed small left pleural effusion with possible infiltrate vs atelectasis. CT A/P significant for moderate left pleural effusion with atelectatic changes in LLL, chronic hepatocellular dz with Portal venous HTN, small perihepatic ascites decreased from prior, b/l renal calculi, and diverticulosis. EKG: normal sinus rhythm with premature supraventricular complexes, changed from previous tracings. Overnight/interim:     Patient still somnolent but arousable. She is able to squeeze hands on command and respond to yes/no questions with a head nod or by verbalizing \"yes/no. \" Tube feeds started yesterday. Patient still having multiple watery bowel movements. FMS in place. Vitals are stable.         dextrose      sodium chloride       Scheduled meds:    phytonadione (VITAMIN K)  IVPB  10 mg IntraVENous Daily    calcitonin  300 Units IntraMUSCular Q12H AM when alert. Squeezed hands on command. Labs:  Na/K/Cl/CO2:  155/3.3/119/21 (08/14 3129)  BUN/Cr/glu/ALT/AST/amyl/lip:  21/1.2/--/23/44/--/-- (08/14 0534)  WBC/Hgb/Hct/Plts:  7.1/8.2/25.7/114 (08/14 0534)  estimated creatinine clearance is 38 mL/min (A) (based on SCr of 1.2 mg/dL (H)). Other pertinent labs as noted below    Radiology:  XR ABDOMEN FOR NG/OG/NE TUBE PLACEMENT   Final Result   Satisfactory position of the enteric tube. CT HEAD WO CONTRAST   Final Result   No acute intracranial abnormality. Cortical atrophy and periventricular leukomalacia. CT CERVICAL SPINE WO CONTRAST   Final Result   No acute abnormality of the cervical spine. Multilevel degenerative changes. Left pleural effusion in the visualized left lung apex. Consider chest CT   for better evaluation if clinically appropriate. CT ABDOMEN PELVIS WO CONTRAST Additional Contrast? None   Final Result   1. Moderate left pleural effusion with atelectatic changes involving left   lower lobe. 2. Redemonstration of findings related to chronic hepatocellular disease with   portal venous hypertension. 3. Small perihepatic ascites. Ascites has decreased compared to prior. 4. Multiple nonobstructing bilateral renal calculi. 5. Diverticulosis. XR CHEST PORTABLE   Final Result   Small left pleural effusion. Associated atelectasis or infiltrate cannot be   excluded. Resident Assessment and Plan     Acute hepatic encephalopathy  - multifactorial, 2/2 hypercalcemia, cirrhosis (hyperammonemia), hypoglycemia  -CT A/P significant for moderate left pleural effusion with atelectatic changes in LLL, chronic hepatocellular dz with Portal venous HTN, small perihepatic ascites decreased from prior, b/l renal calculi, and diverticulosis.   - Neuro check q4h  - Bedside swallow passed in ED - Patient unable to take anything PO , was given D5NS though dc'd due to normalized BS + limit on colloid due to concern for volume overload  - Ammonia 125 -> 153 -> 52 -> 38  Low suspicion for SBP d/t afebrile, normal white count and improvement of ascites since last admission  - GI following ; appreciate recs - continue 20 g lactulose q2 hours, continue rifaximin.  - Blood cultures no growth so far   - monitor I/Os; FMS in place   - Monitor electrolytes : Elevated Na and Cl , elevated Calcium as well, otherwise electrolytes and creatinine at baseline. Concern for blood draw near IV line. Patient has been receiving albumin, corrected Ca 14.2. Repeat BMP and ionized calcium ordered.   - hold home med, doxazosin and effexor due to AMS  - Ferrous sulfate discontinued  - Dulcolax suppository prn  - Goal: 3+ bowel movements daily   -Continue Rifaximin and Lactulose  - Has been having watery bowel movements all night     Hypernatremia  -Na+ 155 today  -Free water deficit = 3.7L  -Started on 0.45 NaCl @100ml/hr    Hypoglycemia  - 2/2 poor PO intake  - h/o T2DM diet controlled, A1C 4.5 7/12/22  - hypoglycemia protocol  - D5NS 100 cc/h discontinued   - Required 1 D10 bolus given in ED  - POCT BGL q4h  - POC Glucose 8/14: 101 -> 74     Hypokalemia/Hypomagnesemia  -Potassium 3.3: Replete as appropriate; 40meq today  -Mg 1.7: Replete as appropriate; 2g/1hr today    REN  Likely 2/2 decreased PO intake, dehydration  CKD Stage 3 , Cr baseline 0.9- 1.1, improved to 1.2   - FeUrea :42.9%  - Nephrology following  - Continue to hold aldactone and lasix  - Albumin 25% IV q 8 hours    Left Pleural effusion  - chronic, worse compared to CXR on 7/29/22  - currently saturating well on RA  - Continue to monitor  - Consider pulm consult if respiratory status worsens    HOLDEN Cirrhosis  -INR 2.5    Hypercalcemia  - Ca 13.0, corrected 13.88 ,  elevated on 8/14 to 13.3  - 2/2 primary hyperparathyroidism, on cinacalcet 30mg BID per Nephrology  -Started on 4 doses calcitonin every 12 hrs at 4 units/kg per Nephrology  - Vitamin D discontinued per nephrology  - , Vit D 29, phos 2.5, TSH 4.14 7/30/22, repeat    - assess Ionized calcium level and PTHrp  - Nephro managing   - IVF   - I/O  - hold lasix for now due to IVF     Iron deficiency anemia  - Hb 8.2, no signs of active bleeding  -Discontinued ferrous sulfate due to constipation  -Monitor hemoglobin and consider IV replacement if needed     H/o HTN  - currently hypotensive  - on amlodipine and spironolactone at home, hold for now       H/O ILD    - on advair, abuterol at home  - start dulera, Albuterol PRN     H/O PE  -Discontinue eliquis  -Begin therapeutic dose of Lovenox due to history of unprovoked PE (Creatinine clearance 41; GFR>30)  -PCDs     PT/OT evaluation: pending  DVT prophylaxis: lovenox therapeutic dose  GI prophylaxis: protonix  Disposition: continue to monitor  Diet: NG    Electronically signed by Daksha Layton DO on 8/14/2022 at 6:52 AM  Attending physician: Dr. Cindy Mcconnell

## 2022-08-14 NOTE — PROGRESS NOTES
[facility] MD Candace Odell MD. Johnson Meraz MD   Progress Note    8/14/2022    SUBJECTIVE:   8/12: On RA stable vitals     8/13: Appears comfortable on room air. Does not answer questions or follow commands. Bedbound. Debilitated. 8/14: Remains confused. Pulled out her NG tube, so tube feed and free water flushes have been suspended temporarily. NG tube has been replaced. Awaiting confirmation chest x-ray. Half-normal saline started at 100 cc/h. Appears comfortable. Denies complaint. PROBLEM LIST:    Principal Problem:    REN (acute kidney injury) ([facility]  Active Problems: Moderate protein-calorie malnutrition ([facility]    Acute hepatic encephalopathy    Hypoglycemia due to type 2 diabetes mellitus ([facility]    Palliative care by specialist  Resolved Problems:    * No resolved hospital problems. *       DIET:    ADULT ORAL NUTRITION SUPPLEMENT; Breakfast, Dinner; Diabetic Oral Supplement  ADULT ORAL NUTRITION SUPPLEMENT; Lunch;  Fortified Pudding Oral Supplement  ADULT TUBE FEEDING; Nasogastric; Diabetic; Continuous; 10; Yes; 10; Q 8 hours; 35; 90; Q 4 hours       Allergies : Lisinopril    Review of Systems:   Unable to obtain due to patient's mental status    MEDS (scheduled):    phytonadione (VITAMIN K)  IVPB  10 mg IntraVENous Daily    calcitonin  300 Units IntraMUSCular Q12H    enoxaparin  80 mg SubCUTAneous BID    cinacalcet  30 mg Oral BID    oxymetazoline  2 spray Each Nostril Once    lidocaine   Topical Once    lactulose  20 g Oral Q2H    albumin human  25 g IntraVENous Q8H    [Held by provider] ferrous sulfate  325 mg Oral Daily    [Held by provider] furosemide  20 mg Oral BID    pantoprazole  40 mg Oral Daily    rifAXIMin  550 mg Oral BID    sodium chloride flush  5-40 mL IntraVENous 2 times per day    budesonide  0.5 mg Nebulization BID    And    Arformoterol Tartrate  15 mcg Nebulization BID       MEDS (infusions):   sodium chloride 100 mL/hr at 08/14/22 1028    dextrose      sodium chloride         MEDS (prn):  glucose, dextrose bolus **OR** dextrose bolus, glucagon (rDNA), dextrose, bisacodyl, albuterol, sodium chloride flush, sodium chloride, ondansetron **OR** ondansetron, polyethylene glycol, acetaminophen **OR** acetaminophen    PHYSICAL EXAM:     Patient Vitals for the past 24 hrs:   BP Temp Temp src Pulse Resp SpO2   08/14/22 1200 135/70 (!) 95.9 °F (35.5 °C) Oral (!) 128 18 100 %   08/14/22 0815 (!) 107/53 97.9 °F (36.6 °C) Oral (!) 118 16 99 %   08/14/22 0615 106/60 99.3 °F (37.4 °C) Axillary (!) 106 16 94 %   08/13/22 2030 (!) 119/59 98.3 °F (36.8 °C) Axillary (!) 116 18 --   08/13/22 1531 127/68 98.9 °F (37.2 °C) Axillary (!) 109 18 97 %   @      Intake/Output Summary (Last 24 hours) at 8/14/2022 1355  Last data filed at 8/14/2022 0905  Gross per 24 hour   Intake 110 ml   Output 1225 ml   Net -1115 ml         Wt Readings from Last 3 Encounters:   08/13/22 170 lb (77.1 kg)   08/04/22 170 lb (77.1 kg)   07/12/22 191 lb 3.2 oz (86.7 kg)       Constitutional:  in no acute distress  HEENT: NC/AT, EOMI, anicteric, mucus membranes dry  Neck: Soft and supple, trachea midline no JVD  Cardiovascular: S1, S2 regular rhythm, no murmur,or rub  Respiratory:  No crackles, no wheeze poor AE due to poor inspiratory effort  Gastrointestinal:  Soft, nontender, nondistended, NABS  Ext: no edema, feet warm  Skin: dry, no rash  Neuro: awake, minimally alert, does not respond to questions or follow commands though does follow the interviewer around the room. Moves all 4 extremities.   Cranial nerves II through XII grossly intact      DATA:    Recent Labs     08/12/22  0510 08/13/22  0840 08/14/22  0534   WBC 6.3 5.8 7.1   HGB 9.0* 8.4* 8.2*   HCT 26.8* 25.7* 25.7*   .9* 106.2* 111.7*   * 117* 114*     Recent Labs     08/12/22  0510 08/13/22  0840 08/13/22  1134 08/14/22  0534 08/14/22  1207    154* 151* 155* 150*   K 3.3* 3.1* 3.0* 3.3* 4.7   CL 107 120* 118* 119* 119*   CO2 23 22 22 21* 16*   MG 2.0 2.0  --  1.7  --    PHOS  --   --  1.2* 2.6  --    BUN 25* 22 22 21 21   CREATININE 1.2* 1.2* 1.2* 1.2* 1.1*   ALT 20 25  --  23  --    AST 41* 50*  --  44*  --    BILITOT 2.6* 3.3*  --  3.5*  --    ALKPHOS 118* 115*  --  107*  --        Lab Results   Component Value Date    LABPROT 0.2 07/30/2022    LABPROT 0.2 07/30/2022       ASSESSMENT    1. Acute kidney injury superimposed on CKD due to to decreased effective volume due to dehydration associated with poor intake, hypercalcemia     1.5. Chronic kidney disease stage III. Baseline creatinine 1.1 to 1.4 with bland urine sediment. Etiology due to diabetic nephropathy, nephrosclerosis in the context of aging. 2.  Hepatic encephalopathy. 3.  Hypercalcemia, severe, secondary to primary hyperparathyroidism as diagnosed on her last admission, though it is extremely unusual that the calcium has risen so high. Repeat intake PTH has actually risen since initiating Cinacalcet therapy. Doubt that vitamin D supplementation is playing much of a role, though should still be discontinued as it would increase intestinal blood from calcium. 4.  cirrhosis. 5.  History of breast cancer. 6.  Hypophosphatemia, severe, due to poor intake, and the primary hyperparathyroidism. Supplemented to normal    7. Hypernatremia due to combination of nephrogenic DI induced by the severe hypercalcemia, poor intake, loose stools. Had been on hypotonic IV fluid yesterday morning, though it was discontinued    [Ca2+] improving status post [place] calcitonin and increase in Cinacalcet dose. RECOMMENDATIONS  1. Cinacalcet was increased 8/12; will consider increasing again as warranted  2. Given the severity of the hypercalcemia, will administer 4 doses of [place] calcitonin every 12 hours at 4 units/kg  4. Resume hypotonic IV fluid (done)  5. For now, every 6 hour BMP until [Na+] improving  6. Follow labs, UO  7. Continue supportive care      Electronically signed by Breanne Calderon MD on 8/14/2022     Addendum  14:07  Noon [Na+] = 150 mmol/L, improving briskly on 1/2 NS and free water flushes, which given the rapidity with which her hypernatremia developed, is acceptable.   Will check next BMP at 6 PM.  Will adjust IVF rate and content as warranted, and depending on this result, decrease the frequency with which we will check a BMP    MD PARAG

## 2022-08-15 DIAGNOSIS — R06.02 SOB (SHORTNESS OF BREATH): ICD-10-CM

## 2022-08-15 LAB
ALBUMIN SERPL-MCNC: 5.1 G/DL (ref 3.5–5.2)
ALP BLD-CCNC: 106 U/L (ref 35–104)
ALT SERPL-CCNC: 23 U/L (ref 0–32)
AMMONIA: 44 UMOL/L (ref 11–51)
ANION GAP SERPL CALCULATED.3IONS-SCNC: 11 MMOL/L (ref 7–16)
ANION GAP SERPL CALCULATED.3IONS-SCNC: 12 MMOL/L (ref 7–16)
ANION GAP SERPL CALCULATED.3IONS-SCNC: 14 MMOL/L (ref 7–16)
ANION GAP SERPL CALCULATED.3IONS-SCNC: 15 MMOL/L (ref 7–16)
ANISOCYTOSIS: ABNORMAL
AST SERPL-CCNC: 48 U/L (ref 0–31)
BASOPHILS ABSOLUTE: 0.03 E9/L (ref 0–0.2)
BASOPHILS RELATIVE PERCENT: 0.5 % (ref 0–2)
BILIRUB SERPL-MCNC: 2.7 MG/DL (ref 0–1.2)
BUN BLDV-MCNC: 22 MG/DL (ref 6–23)
BUN BLDV-MCNC: 24 MG/DL (ref 6–23)
BUN BLDV-MCNC: 24 MG/DL (ref 6–23)
BUN BLDV-MCNC: 25 MG/DL (ref 6–23)
CALCIUM SERPL-MCNC: 11.2 MG/DL (ref 8.6–10.2)
CALCIUM SERPL-MCNC: 11.8 MG/DL (ref 8.6–10.2)
CALCIUM SERPL-MCNC: 11.9 MG/DL (ref 8.6–10.2)
CALCIUM SERPL-MCNC: 12.4 MG/DL (ref 8.6–10.2)
CHLORIDE BLD-SCNC: 121 MMOL/L (ref 98–107)
CHLORIDE BLD-SCNC: 124 MMOL/L (ref 98–107)
CO2: 18 MMOL/L (ref 22–29)
CO2: 19 MMOL/L (ref 22–29)
CO2: 19 MMOL/L (ref 22–29)
CO2: 22 MMOL/L (ref 22–29)
CREAT SERPL-MCNC: 1.1 MG/DL (ref 0.5–1)
EOSINOPHILS ABSOLUTE: 0.13 E9/L (ref 0.05–0.5)
EOSINOPHILS RELATIVE PERCENT: 2.1 % (ref 0–6)
FUNGUS (MYCOLOGY) CULTURE: NORMAL
FUNGUS STAIN: NORMAL
GFR AFRICAN AMERICAN: 58
GFR NON-AFRICAN AMERICAN: 48 ML/MIN/1.73
GLUCOSE BLD-MCNC: 81 MG/DL (ref 74–99)
GLUCOSE BLD-MCNC: 88 MG/DL (ref 74–99)
GLUCOSE BLD-MCNC: 91 MG/DL (ref 74–99)
GLUCOSE BLD-MCNC: 95 MG/DL (ref 74–99)
HCT VFR BLD CALC: 23.1 % (ref 34–48)
HEMOGLOBIN: 7.4 G/DL (ref 11.5–15.5)
IMMATURE GRANULOCYTES #: 0.04 E9/L
IMMATURE GRANULOCYTES %: 0.7 % (ref 0–5)
LYMPHOCYTES ABSOLUTE: 0.86 E9/L (ref 1.5–4)
LYMPHOCYTES RELATIVE PERCENT: 14.1 % (ref 20–42)
MAGNESIUM: 2.4 MG/DL (ref 1.6–2.6)
MCH RBC QN AUTO: 34.3 PG (ref 26–35)
MCHC RBC AUTO-ENTMCNC: 32 % (ref 32–34.5)
MCV RBC AUTO: 106.9 FL (ref 80–99.9)
METER GLUCOSE: 84 MG/DL (ref 74–99)
METER GLUCOSE: 99 MG/DL (ref 74–99)
MONOCYTES ABSOLUTE: 0.67 E9/L (ref 0.1–0.95)
MONOCYTES RELATIVE PERCENT: 11 % (ref 2–12)
NEUTROPHILS ABSOLUTE: 4.35 E9/L (ref 1.8–7.3)
NEUTROPHILS RELATIVE PERCENT: 71.6 % (ref 43–80)
OVALOCYTES: ABNORMAL
PDW BLD-RTO: 24.6 FL (ref 11.5–15)
PHOSPHORUS: 1 MG/DL (ref 2.5–4.5)
PLATELET # BLD: 86 E9/L (ref 130–450)
PLATELET CONFIRMATION: NORMAL
PMV BLD AUTO: 10.7 FL (ref 7–12)
POIKILOCYTES: ABNORMAL
POLYCHROMASIA: ABNORMAL
POTASSIUM REFLEX MAGNESIUM: 3.9 MMOL/L (ref 3.5–5)
POTASSIUM SERPL-SCNC: 3.8 MMOL/L (ref 3.5–5)
POTASSIUM SERPL-SCNC: 3.9 MMOL/L (ref 3.5–5)
POTASSIUM SERPL-SCNC: 3.9 MMOL/L (ref 3.5–5)
POTASSIUM SERPL-SCNC: 4.1 MMOL/L (ref 3.5–5)
RBC # BLD: 2.16 E12/L (ref 3.5–5.5)
SODIUM BLD-SCNC: 154 MMOL/L (ref 132–146)
SODIUM BLD-SCNC: 155 MMOL/L (ref 132–146)
SODIUM BLD-SCNC: 156 MMOL/L (ref 132–146)
SODIUM BLD-SCNC: 158 MMOL/L (ref 132–146)
TARGET CELLS: ABNORMAL
TEAR DROP CELLS: ABNORMAL
TOTAL PROTEIN: 7.2 G/DL (ref 6.4–8.3)
WBC # BLD: 6.1 E9/L (ref 4.5–11.5)

## 2022-08-15 PROCEDURE — 2580000003 HC RX 258: Performed by: STUDENT IN AN ORGANIZED HEALTH CARE EDUCATION/TRAINING PROGRAM

## 2022-08-15 PROCEDURE — 2060000000 HC ICU INTERMEDIATE R&B

## 2022-08-15 PROCEDURE — 80053 COMPREHEN METABOLIC PANEL: CPT

## 2022-08-15 PROCEDURE — 99232 SBSQ HOSP IP/OBS MODERATE 35: CPT

## 2022-08-15 PROCEDURE — 6370000000 HC RX 637 (ALT 250 FOR IP): Performed by: INTERNAL MEDICINE

## 2022-08-15 PROCEDURE — 84100 ASSAY OF PHOSPHORUS: CPT

## 2022-08-15 PROCEDURE — 6360000002 HC RX W HCPCS: Performed by: STUDENT IN AN ORGANIZED HEALTH CARE EDUCATION/TRAINING PROGRAM

## 2022-08-15 PROCEDURE — 85025 COMPLETE CBC W/AUTO DIFF WBC: CPT

## 2022-08-15 PROCEDURE — 83735 ASSAY OF MAGNESIUM: CPT

## 2022-08-15 PROCEDURE — 82140 ASSAY OF AMMONIA: CPT

## 2022-08-15 PROCEDURE — 80048 BASIC METABOLIC PNL TOTAL CA: CPT

## 2022-08-15 PROCEDURE — 6370000000 HC RX 637 (ALT 250 FOR IP): Performed by: STUDENT IN AN ORGANIZED HEALTH CARE EDUCATION/TRAINING PROGRAM

## 2022-08-15 PROCEDURE — 82962 GLUCOSE BLOOD TEST: CPT

## 2022-08-15 PROCEDURE — 6360000002 HC RX W HCPCS: Performed by: INTERNAL MEDICINE

## 2022-08-15 PROCEDURE — 99232 SBSQ HOSP IP/OBS MODERATE 35: CPT | Performed by: FAMILY MEDICINE

## 2022-08-15 PROCEDURE — P9047 ALBUMIN (HUMAN), 25%, 50ML: HCPCS | Performed by: STUDENT IN AN ORGANIZED HEALTH CARE EDUCATION/TRAINING PROGRAM

## 2022-08-15 PROCEDURE — 94640 AIRWAY INHALATION TREATMENT: CPT

## 2022-08-15 PROCEDURE — 36415 COLL VENOUS BLD VENIPUNCTURE: CPT

## 2022-08-15 RX ORDER — ALBUTEROL SULFATE 90 UG/1
AEROSOL, METERED RESPIRATORY (INHALATION)
Qty: 8.5 G | Refills: 5
Start: 2022-08-15 | End: 2022-08-16

## 2022-08-15 RX ORDER — DOXAZOSIN MESYLATE 1 MG/1
TABLET ORAL
Qty: 30 TABLET | Refills: 5
Start: 2022-08-15 | End: 2022-08-16

## 2022-08-15 RX ADMIN — LACTULOSE 20 G: 20 SOLUTION ORAL at 06:11

## 2022-08-15 RX ADMIN — LACTULOSE 20 G: 20 SOLUTION ORAL at 15:35

## 2022-08-15 RX ADMIN — LACTULOSE 20 G: 20 SOLUTION ORAL at 22:41

## 2022-08-15 RX ADMIN — ALBUMIN (HUMAN) 25 G: 0.25 INJECTION, SOLUTION INTRAVENOUS at 09:28

## 2022-08-15 RX ADMIN — ALBUMIN (HUMAN) 25 G: 0.25 INJECTION, SOLUTION INTRAVENOUS at 15:35

## 2022-08-15 RX ADMIN — Medication 10 ML: at 08:55

## 2022-08-15 RX ADMIN — LACTULOSE 20 G: 20 SOLUTION ORAL at 20:54

## 2022-08-15 RX ADMIN — LACTULOSE 20 G: 20 SOLUTION ORAL at 16:54

## 2022-08-15 RX ADMIN — BUDESONIDE 500 MCG: 0.5 SUSPENSION RESPIRATORY (INHALATION) at 20:45

## 2022-08-15 RX ADMIN — PANTOPRAZOLE SODIUM 40 MG: 40 TABLET, DELAYED RELEASE ORAL at 08:56

## 2022-08-15 RX ADMIN — ARFORMOTEROL TARTRATE 15 MCG: 15 SOLUTION RESPIRATORY (INHALATION) at 09:44

## 2022-08-15 RX ADMIN — CALCITONIN SALMON 300 UNITS: 200 INJECTION, SOLUTION INTRAMUSCULAR; SUBCUTANEOUS at 09:31

## 2022-08-15 RX ADMIN — LACTULOSE 20 G: 20 SOLUTION ORAL at 01:04

## 2022-08-15 RX ADMIN — ALBUMIN (HUMAN) 25 G: 0.25 INJECTION, SOLUTION INTRAVENOUS at 01:11

## 2022-08-15 RX ADMIN — RIFAXIMIN 550 MG: 550 TABLET ORAL at 08:56

## 2022-08-15 RX ADMIN — LACTULOSE 20 G: 20 SOLUTION ORAL at 10:04

## 2022-08-15 RX ADMIN — Medication 5 ML: at 21:34

## 2022-08-15 RX ADMIN — CINACALCET HYDROCHLORIDE 30 MG: 30 TABLET, FILM COATED ORAL at 20:54

## 2022-08-15 RX ADMIN — RIFAXIMIN 550 MG: 550 TABLET ORAL at 20:54

## 2022-08-15 RX ADMIN — LACTULOSE 20 G: 20 SOLUTION ORAL at 11:50

## 2022-08-15 RX ADMIN — LACTULOSE 20 G: 20 SOLUTION ORAL at 13:47

## 2022-08-15 RX ADMIN — BUDESONIDE 500 MCG: 0.5 SUSPENSION RESPIRATORY (INHALATION) at 09:44

## 2022-08-15 RX ADMIN — PHYTONADIONE 10 MG: 10 INJECTION, EMULSION INTRAMUSCULAR; INTRAVENOUS; SUBCUTANEOUS at 10:02

## 2022-08-15 RX ADMIN — ARFORMOTEROL TARTRATE 15 MCG: 15 SOLUTION RESPIRATORY (INHALATION) at 20:45

## 2022-08-15 RX ADMIN — CINACALCET HYDROCHLORIDE 30 MG: 30 TABLET, FILM COATED ORAL at 08:55

## 2022-08-15 RX ADMIN — LACTULOSE 20 G: 20 SOLUTION ORAL at 08:56

## 2022-08-15 ASSESSMENT — PAIN SCALES - GENERAL: PAINLEVEL_OUTOF10: 0

## 2022-08-15 NOTE — PROGRESS NOTES
[address] [facility]  Family Medicine Attending    NED GREGG Course: 68 y.o. 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 She became increasingly altered and lethargic. Elevated Cr, Ca at NH. She reported neck/abdominal pain. CT Abd showed large stool burden & moderate left pleural effusion. S: Today, patient Was not able to tolerate orals; receiving tube feeds and water flushes. Calcium improving on miacalcin. O: VS- Blood pressure 126/70, pulse (!) 114, temperature 98.6 °F (37 °C), temperature source Axillary, resp. rate 18, height 5' 2.5\" (1.588 m), weight 170 lb (77.1 kg), SpO2 98 %. Gen: Ill appearing. Awake, and follows limited commands. Heent: NC AT Anicteric. Dry MM  CV: irreg irreg, sys murmur   Resp: clear in apices, diminished at left base, stable, unlabored at rest.   Abd: +BS, soft, nt nd. Ext: ROSAS, No edema     Impressions:   Principal Problem:    REN (acute kidney injury) ([address]  Active Problems: Moderate protein-calorie malnutrition ([address]    Acute hepatic encephalopathy    Hypoglycemia due to type 2 diabetes mellitus ([address]    Palliative care by specialist  Resolved Problems:    * No resolved hospital problems. *      Plan:     1) Acute Hepatic Encephalopathy - lactulose, rifaximin. GI management appreciated. Ammonia and mentation slowly improving. 2) REN - Nephro, GI following. Prerenal vs Hepatorenal syndrome. Nephrology management appreciated. 3) Hypoglycemia - 2/2 liver disease, poor po intake -  Monitor glucose, encourage PO as she becomes more alert. 4) Hypercalcemia - nephrology management appreciated, miacalcin. 5) Left Pleural Effusion - worsening in the last 2 weeks. SaO2 remains stable.  Low threshold to

## 2022-08-15 NOTE — PROGRESS NOTES
Order requested to initiate bilateral soft wrist restraints for patient due to pulling of lines and tubes  in order to  maintain safety and continue medical plan of care. Pt is now alert to self, but remains confused, restless and pulling at equipment, IV, and tubes. Telesitter remains in place at this time but the patient does not respond well to verbal redirection as her attention span is very limited. I spoke with patient's son Patrice John, regarding need for soft restraints. He verbalized understanding to maintain plan of care and prevent aspiration from pulling of NG tube while infusing tube feeds. Primary RN will continue to monitor patient closely and assess continued need for restraints.

## 2022-08-15 NOTE — PROGRESS NOTES
Patient reevaluated following AM rounds. 3 staples noted on the right side of her scalp. Staples were placed secondary to admitting complaint of fall with laceration. Staples removed at bedside, x3 removed. Patient tolerated the procedure well without any difficulty. Site was irrigated before and after removal.  No bleeding was noted during or after the staple removal.  Appropriate wound care addressed.     Guillermina Kang MD

## 2022-08-15 NOTE — PROGRESS NOTES
Occupational Therapy    Date:8/15/2022  Patient Name: Glenna Lee  MRN: [id number]  : 1945  Room: [address] [place], [place] [address]     OT orders received and chart reviewed. OT eval on hold at this time per RN, d/t pt not responding to commands at this time. OT will follow and re-attempt eval as appropriate, at a later time/date.     Licha Souza, [facility] Kalia Griffin OTR/L #[id number]

## 2022-08-15 NOTE — PROGRESS NOTES
Continued bilateral soft wrist restraints due to pulling of lines and tubes  in order to  maintain safety and continue medical plan of care. Pt remains alert to self, but remains confused, restless and pulling at equipment, IV, and tubes. Telesitter remains in place at this time but the patient does not respond well to verbal redirection as her attention span is very limited. RN will continue to assess need for continued use restraints.

## 2022-08-15 NOTE — PROGRESS NOTES
Spoke with son Tiara Vera who agreed to Hospice.     Electronically signed by Denys Salomon MD on 8/15/2022 at 4:09 PM

## 2022-08-15 NOTE — PLAN OF CARE
Problem: Discharge Planning  Goal: Discharge to home or other facility with appropriate resources  Outcome: Progressing     Problem: Pain  Goal: Verbalizes/displays adequate comfort level or baseline comfort level  Outcome: Progressing     Problem: ABCDS Injury Assessment  Goal: Absence of physical injury  Outcome: Progressing     Problem: Skin/Tissue Integrity  Goal: Absence of new skin breakdown  Outcome: Progressing     Problem: Safety - Adult  Goal: Free from fall injury  Outcome: Progressing     Problem: Nutrition Deficit:  Goal: Optimize nutritional status  Outcome: Progressing     Problem: Safety - Medical Restraint  Goal: Remains free of injury from restraints (Restraint for Interference with Medical Device)  8/15/2022 0701 by Bronwyn Alvarez RN  Outcome: Progressing  Flowsheets (Taken 8/15/2022 0230 by Sofia Chiu RN)  Remains free of injury from restraints (restraint for interference with medical device):   Every 2 hours: Monitor safety, psychosocial status, comfort, nutrition and hydration   Inform patient/family regarding the reason for restraint   Evaluate the patient's condition at the time of restraint application   Determine that other, less restrictive measures have been tried or would not be effective before applying the restraint  8/14/2022 2233 by Sofia Chiu RN  Outcome: Progressing  Flowsheets (Taken 8/14/2022 2153)  Remains free of injury from restraints (restraint for interference with medical device):   Determine that other, less restrictive measures have been tried or would not be effective before applying the restraint   Evaluate the patient's condition at the time of restraint application   Inform patient/family regarding the reason for restraint   Every 2 hours: Monitor safety, psychosocial status, comfort, nutrition and hydration

## 2022-08-15 NOTE — PROGRESS NOTES
Palliative Care Department  [phone]  Palliative Care Progress Note  Provider XENIA Gross - CNP      PATIENT: Lily Nathan  : 1945  MRN: [id number]  ADMISSION DATE: 8/10/2022  4:49 PM  Referring Provider: Leo Alarcon MD      Palliative Medicine was consulted on hospital day 5 for assistance with Goals of care, Code Status Discussion, Family support. HPI:     Joaquín Franklin is a 68 y.o. y/o female with a history of hypothyroidism, hypertension, nonalcoholic liver cirrhosis with ascites, diabetes mellitus, interstitial lung disease, nausea, PE, on Eliquis, breast cancer with lumpectomy in , who presented to [address] [place] [address] on 8/10/2022 with abnormal labs and fatigue. Patient had a admission and discharged on 2022 for where she was treated for a fall and altered mental status. ASSESSMENT/PLAN:     Pertinent Hospital Diagnoses     Acute hepatic encephalopathy  Acute kidney injury  Hypoglycemia  Hypercalcemia  Left pleural effusion      Palliative Care Encounter / Counseling Regarding Goals of Care  Please see detailed goals of care discussion as below  At this time, Lily Nathan, Does Not have capacity for medical decision-making. Capacity is time limited and situation/question specific  During encounter Roxana Hagan  was surrogate medical decision-maker  Outcome of goals of care meeting:  Continue current medical treatment  CODE STATUS was discussed, comfort care route was discussed, Roxana Hagna wants to discuss with his 2 brothers before making decisions    Code status DNR-CCA  Advanced Directives: no POA or living will in epic  Surrogate/Legal NOK:  Andrew Richard (Child/primary decision [phone]  Joy Watson (Child) [phone]    Spiritual assessment: no spiritual distress identified  Bereavement and grief: to be determined  Referrals to: none today    Thank you for the opportunity to participate in the care of Lily Nathan.      XENIA Mcneal CNP  Palliative Medicine     SUBJECTIVE:     Details of Conversation:    Chart reviewed. With patient's son Margy Herrera at the bedside. He is state of having spoken to the care team this morning, and wanted CODE STATUS to be changed to DNR CCA, however he does not feel that he is ready for hospice, he hopes his mother is able to regain some of her mentation and be able to have a discussion with him about her goal of care. We will continue to follow. Prognosis: Poor    OBJECTIVE:     /70   Pulse (!) 114   Temp 98.6 °F (37 °C) (Axillary)   Resp 18   Ht 5' 2.5\" (1.588 m)   Wt 170 lb (77.1 kg)   SpO2 98%   BMI 30.60 kg/m²     Physical Examination:  Gen: elderly, thin, NAD, lethargic, ill-appearing, moans with sternal rub  HEENT: normocephalic, atraumatic, PERRL, EOMI,   Neck: trachea midline, no JVD  Lungs: respirations easy and not labored,   Heart: regular rate and rhythm, distant heart tones,   Abdomen: soft, non-tender  Extremities: no clubbing, cyanosis or edema,  Skin: warm, dry without rashes, lesions, bruising  Neuro: Lethargic, does not follows commands,    Objective data reviewed: labs, images, records, medication use, vitals, and chart    Time/Communication  Greater than 50% of time spent, total 25 minutes in counseling and coordination of care at the bedside regarding  CODE STATUS discussion, family support and goals of care. Thank you for allowing Palliative Medicine to participate in the care of Gaurav Petit. Note: This report was completed using computerHapBoo voiced recognition software. Every effort has been made to ensure accuracy; however, inadvertent computerized transcription errors may be present.

## 2022-08-15 NOTE — PROGRESS NOTES
[facility] Inpatient   Resident Progress Note    S:  Hospital day: 5   Brief Synopsis:  Neptali Ch is a 68 y.o. 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 She was recently discharged 8/4/22 after being treated after a fall with altered mental status. Since she returned to the nursing home, she became increasingly more altered and lethargic again. Labs at the nursing home were significant for elevated creatinine and calcium. Patient was also endorsing neck and abdominal pain. Initial POCT glc at that time <40. She received D10 bolus and glc improved. Other labs were significant for chronic anemia 9.7,  elevated cr 1.5, BUN 34, hypercalcemia 13.0, hypoalbuminemia 2.9, elevated alk phos 131, elevated AST 40, hyperammoniemia 125. COVID neg. UA negative. Urine  cultures pending. Passed bedside swallow. CXR showed small left pleural effusion with possible infiltrate vs atelectasis. CT A/P significant for moderate left pleural effusion with atelectatic changes in LLL, chronic hepatocellular dz with Portal venous HTN, small perihepatic ascites decreased from prior, b/l renal calculi, and diverticulosis. EKG: normal sinus rhythm with premature supraventricular complexes, changed from previous tracings. Overnight/interim:     Patient alert. She is able to squeeze hands on command. On tube feeds. Patient still having multiple watery bowel movements. FMS in place. Vitals are stable.         sodium chloride 180 mL/hr at 08/15/22 0220    dextrose      sodium chloride       Scheduled meds:    phytonadione (VITAMIN K)  IVPB  10 mg IntraVENous Daily    calcitonin  300 Units IntraMUSCular Q12H    enoxaparin  80 mg SubCUTAneous BID    cinacalcet  30 mg Oral BID lidocaine   Topical Once    lactulose  20 g Oral Q2H    albumin human  25 g IntraVENous Q8H    [Held by provider] ferrous sulfate  325 mg Oral Daily    [Held by provider] furosemide  20 mg Oral BID    pantoprazole  40 mg Oral Daily    rifAXIMin  550 mg Oral BID    sodium chloride flush  5-40 mL IntraVENous 2 times per day    budesonide  0.5 mg Nebulization BID    And    Arformoterol Tartrate  15 mcg Nebulization BID     PRN meds: glucose, dextrose bolus **OR** dextrose bolus, glucagon (rDNA), dextrose, bisacodyl, albuterol, sodium chloride flush, sodium chloride, ondansetron **OR** ondansetron, polyethylene glycol, acetaminophen **OR** acetaminophen     I reviewed the patient's past medical and surgical history, Medications and Allergies. O:  /70   Pulse (!) 106   Temp 97.9 °F (36.6 °C) (Axillary)   Resp 20   Ht 5' 2.5\" (1.588 m)   Wt 170 lb (77.1 kg)   SpO2 100%   BMI 30.60 kg/m²   24 hour I&O: I/O last 3 completed shifts: In: 1503.3 [I.V.:773.3; NG/GT:480; IV Piggyback:250]  Out: 8697 [Urine:1525; Stool:1800]  No intake/output data recorded. General Appearance: Alert. Restraints bilateral wrists  Skin: warm and dry, No jaundice; Spider angiomas  Head: normocephalic and atraumatic  Eyes: pupils equal, round, and reactive to light, conjunctivae normal  Nose/Mouth: NG tube in place   Neck: supple and non-tender without mass, no cervical lymphadenopathy  Pulmonary/Chest: Difficult to appreciate due to patient's condition  Cardiovascular: Irregularly irregular, no murmurs, rubs, clicks, or gallops, distal pulses intact  Abdomen: Soft, non-distended, no masses or organomegaly; Negative fluid-wave test. Hyperactive bowel sounds noted. Extremities: no cyanosis, clubbing or edema , DP 2+ bilateral lower extremities.    Musculoskeletal: normal range of motion, no joint swelling, deformity or tenderness  Neurologic: Following some command this AM. Squeezed hands on command and closed/open eyes on command. Labs:  Na/K/Cl/CO2:  154/3.9, 3.9/121/22 (08/15 5374)  BUN/Cr/glu/ALT/AST/amyl/lip:  24/1.1/--/23/48/--/-- (08/15 9134)  WBC/Hgb/Hct/Plts:  6.1/7.4/23.1/86 (08/15 9564)  estimated creatinine clearance is 42 mL/min (A) (based on SCr of 1.1 mg/dL (H)). Other pertinent labs as noted below    Radiology:  XR CHEST ABDOMEN NG PLACEMENT   Final Result   1. The tip of the NG tube is located 9 cm inferior to the gastroesophageal   junction. XR ABDOMEN FOR NG/OG/NE TUBE PLACEMENT   Final Result   Satisfactory position of the enteric tube. CT HEAD WO CONTRAST   Final Result   No acute intracranial abnormality. Cortical atrophy and periventricular leukomalacia. CT CERVICAL SPINE WO CONTRAST   Final Result   No acute abnormality of the cervical spine. Multilevel degenerative changes. Left pleural effusion in the visualized left lung apex. Consider chest CT   for better evaluation if clinically appropriate. CT ABDOMEN PELVIS WO CONTRAST Additional Contrast? None   Final Result   1. Moderate left pleural effusion with atelectatic changes involving left   lower lobe. 2. Redemonstration of findings related to chronic hepatocellular disease with   portal venous hypertension. 3. Small perihepatic ascites. Ascites has decreased compared to prior. 4. Multiple nonobstructing bilateral renal calculi. 5. Diverticulosis. XR CHEST PORTABLE   Final Result   Small left pleural effusion. Associated atelectasis or infiltrate cannot be   excluded. Resident Assessment and Plan     Acute hepatic encephalopathy  - multifactorial, 2/2 hypercalcemia, cirrhosis (hyperammonemia), hypoglycemia  -CT A/P significant for moderate left pleural effusion with atelectatic changes in LLL, chronic hepatocellular dz with Portal venous HTN, small perihepatic ascites decreased from prior, b/l renal calculi, and diverticulosis.   - Neuro check q4h  - Bedside swallow passed in ED - Patient unable to take anything PO , was given D5NS though dc'd due to normalized BS + limit on colloid due to concern for volume overload  - Ammonia 125 -> 153 -> 52 -> 38 -> 44  Low suspicion for SBP d/t afebrile, normal white count and improvement of ascites since last admission  - GI following ; appreciate recs - continue 20 g lactulose q2 hours, continue rifaximin.  - Blood cultures no growth so far   - monitor I/Os; FMS in place   - Monitor electrolytes : Elevated Na and Cl , elevated Calcium as well  - hold home med, doxazosin and effexor due to AMS  - Ferrous sulfate discontinued  - Dulcolax suppository prn  - Goal: 3+ bowel movements daily   -Continue Rifaximin and Lactulose  - Has been having watery bowel movements all night     Hypernatremia  -Na+ 154 today  -On 0.45 NaCl @180ml/hr  -Saline flushes    Hypoglycemia  - 2/2 poor PO intake  - h/o T2DM diet controlled, A1C 4.5 7/12/22  - hypoglycemia protocol  - D5NS 100 cc/h discontinued   - Required 1 D10 bolus given in ED  - POCT BGL q4h  - POC Glucose 8/15: 88-> 81     Hypokalemia/Hypomagnesemia  -Potassium 3.9: Replete as appropriate  -Mg 2.4: Replete as appropriate    REN  Likely 2/2 decreased PO intake, dehydration  CKD Stage 3 , Cr baseline 0.9- 1.1, 1.1  - Nephrology following  - Continue to hold aldactone and lasix  - Albumin 25% IV q 8 hours    Left Pleural effusion  - chronic, worse compared to CXR on 7/29/22  - currently saturating well on RA  - Continue to monitor  - Consider pulm consult if respiratory status worsens    HOLDEN Cirrhosis  -INR 2.5    Hypercalcemia  - Ca 13.0, corrected 13.88 ,  elevated on 8/15 to 11.9  - 2/2 primary hyperparathyroidism, on cinacalcet 30mg BID per Nephrology  -Started on 4 doses calcitonin every 12 hrs at 4 units/kg per Nephrology  - Vitamin D discontinued per nephrology  - , Vit D 29, phos 2.5, TSH 4.14 7/30/22, repeat    - Assess Ionized calcium level and PTHrp  - Nephro managing   - IVF   - I/O  - hold lasix for now due to IVF     Iron deficiency anemia  - Hb 7.4, no signs of active bleeding  -Discontinued ferrous sulfate due to constipation  -Monitor hemoglobin and consider IV replacement if needed     H/o HTN  - currently hypotensive  - on amlodipine and spironolactone at home, hold for now       H/O ILD    - on advair, abuterol at home  - start dulera, Albuterol PRN     H/O PE  -Discontinue eliquis  -Begin therapeutic dose of Lovenox due to history of unprovoked PE (Creatinine clearance 41; GFR>30)  -PCDs     PT/OT evaluation: pending  DVT prophylaxis: lovenox therapeutic dose  GI prophylaxis: protonix  Disposition: continue to monitor  Diet: NG    Electronically signed by Baron Kourtney DO on 8/15/2022 at 7:52 AM  Attending physician: Dr. Zaid Landaverde

## 2022-08-15 NOTE — PROGRESS NOTES
[facility] MD Kana Soares MD. Charo Galeana MD   Progress Note    8/15/2022    SUBJECTIVE:   8/12: On RA stable vitals     8/13: Appears comfortable on room air. Does not answer questions or follow commands. Bedbound. Debilitated. 8/14: Remains confused. Pulled out her NG tube, so tube feed and free water flushes have been suspended temporarily. NG tube has been replaced. Awaiting confirmation chest x-ray. Half-normal saline started at 100 cc/h. Appears comfortable. Denies complaint. 8/15: Remains confused. Voluminous watery diarrhea output and FMS. PROBLEM LIST:    Principal Problem:    REN (acute kidney injury) ([facility] [address]  Active Problems: Moderate protein-calorie malnutrition ([facility] [address]    Acute hepatic encephalopathy    Hypoglycemia due to type 2 diabetes mellitus ([facility] [address]    Palliative care by specialist  Resolved Problems:    * No resolved hospital problems. *       DIET:    ADULT ORAL NUTRITION SUPPLEMENT; Breakfast, Dinner; Diabetic Oral Supplement  ADULT ORAL NUTRITION SUPPLEMENT; Lunch;  Fortified Pudding Oral Supplement  ADULT TUBE FEEDING; Nasogastric; Diabetic; Continuous; 10; Yes; 10; Q 8 hours; 35; 90; Q 4 hours       Allergies : Lisinopril    Review of Systems:   Unable to obtain due to patient's mental status    MEDS (scheduled):    enoxaparin  80 mg SubCUTAneous BID    cinacalcet  30 mg Oral BID    lidocaine   Topical Once    lactulose  20 g Oral Q2H    albumin human  25 g IntraVENous Q8H    [Held by provider] ferrous sulfate  325 mg Oral Daily    [Held by provider] furosemide  20 mg Oral BID    pantoprazole  40 mg Oral Daily    rifAXIMin  550 mg Oral BID    sodium chloride flush  5-40 mL IntraVENous 2 times per day    budesonide  0.5 mg Nebulization BID    And    Arformoterol Tartrate  15 mcg Nebulization BID       MEDS (infusions):   sodium chloride 210 mL/hr at 08/15/22 1002    dextrose      sodium chloride         MEDS (prn):  glucose, dextrose bolus **OR** dextrose bolus, glucagon (rDNA), dextrose, bisacodyl, albuterol, sodium chloride flush, sodium chloride, ondansetron **OR** ondansetron, polyethylene glycol, acetaminophen **OR** acetaminophen    PHYSICAL EXAM:     Patient Vitals for the past 24 hrs:   BP Temp Temp src Pulse Resp SpO2   08/15/22 1149 126/70 98.6 °F (37 °C) Axillary (!) 114 18 98 %   08/15/22 0944 -- -- -- (!) 102 20 100 %   08/15/22 0730 (!) 147/94 98.4 °F (36.9 °C) Axillary 96 16 93 %   08/14/22 2045 121/70 97.9 °F (36.6 °C) Axillary (!) 106 20 --   @      Intake/Output Summary (Last 24 hours) at 8/15/2022 1929  Last data filed at 8/15/2022 1658  Gross per 24 hour   Intake 180 ml   Output 3250 ml   Net -3070 ml         Wt Readings from Last 3 Encounters:   08/13/22 170 lb (77.1 kg)   08/04/22 170 lb (77.1 kg)   07/12/22 191 lb 3.2 oz (86.7 kg)       Constitutional:  in no acute distress  HEENT: NC/AT, EOMI, anicteric, mucus membranes dry  Neck: Soft and supple, trachea midline no JVD  Cardiovascular: S1, S2 regular rhythm, no murmur,or rub  Respiratory:  No crackles, no wheeze poor AE due to poor inspiratory effort  Gastrointestinal:  Soft, nontender, nondistended, NABS  Ext: no edema, feet warm  Skin: dry, no rash  Neuro: awake, minimally alert, does not respond to questions or follow commands though does follow the interviewer around the room. Moves all 4 extremities.   Cranial nerves II through XII grossly intact      DATA:    Recent Labs     08/13/22  0840 08/14/22  0534 08/15/22  0516   WBC 5.8 7.1 6.1   HGB 8.4* 8.2* 7.4*   HCT 25.7* 25.7* 23.1*   .2* 111.7* 106.9*   * 114* 86*     Recent Labs     08/13/22  0840 08/13/22  1134 08/14/22  0534 08/14/22  1207 08/15/22  0516 08/15/22  1156 08/15/22  1807   * 151* 155*   < > 154* 156* 155*   K 3.1* 3.0* 3.3*   < > 3.9  3.9 3.9 3.8   * 118* 119*   < > 121* 124* 124*   CO2 22 22 21*   < > 22 18* 19*   MG 2.0  --  1.7  --  2.4  --   --    PHOS  -- 1.2* 2.6  --  1.0*  --   --    BUN 22 22 21   < > 24* 25* 24*   CREATININE 1.2* 1.2* 1.2*   < > 1.1* 1.1* 1.1*   ALT 25  --  23  --  23  --   --    AST 50*  --  44*  --  48*  --   --    BILITOT 3.3*  --  3.5*  --  2.7*  --   --    ALKPHOS 115*  --  107*  --  106*  --   --     < > = values in this interval not displayed. Lab Results   Component Value Date    LABPROT 0.2 07/30/2022    LABPROT 0.2 07/30/2022       ASSESSMENT    1. Acute kidney injury superimposed on CKD due to to decreased effective volume due to dehydration associated with poor intake, hypercalcemia     1.5. Chronic kidney disease stage III. Baseline creatinine 1.1 to 1.4 with bland urine sediment. Etiology due to diabetic nephropathy, nephrosclerosis in the context of aging. 2.  Hepatic encephalopathy. 3.  Hypercalcemia, severe, secondary to primary hyperparathyroidism as diagnosed on her last admission, though it is extremely unusual that the calcium has risen so high. Repeat intake PTH has actually risen since initiating Cinacalcet therapy. Doubt that vitamin D supplementation is playing much of a role, though should still be discontinued as it would increase intestinal blood from calcium. 4.  cirrhosis. 5.  History of breast cancer. 6.  Hypophosphatemia, severe, due to poor intake, and the primary hyperparathyroidism. Supplemented to normal    7. Hypernatremia due to combination of nephrogenic DI induced by the severe hypercalcemia, poor intake, loose stools. Had been on hypotonic IV fluid yesterday morning, though it was discontinued    [Ca2+] improving status post [place] calcitonin and increase in Cinacalcet dose, and IV fluid resuscitation. Hyponatremia persisting due to voluminous watery diarrhea, more than 2 L yesterday. Still encephalopathic, though hyponatremia may be contributing    RECOMMENDATIONS  1. Cinacalcet was increased 8/12; will consider increasing again as warranted  2.   Increased rate of IV fluid to match stool output  3. For now, every 6 hour BMP until [Na+] improving  4. Follow labs, UO  5.   Continue supportive care      Electronically signed by Velasquez Boo MD on 8/15/2022

## 2022-08-15 NOTE — PLAN OF CARE
Problem: Discharge Planning  Goal: Discharge to home or other facility with appropriate resources  Recent Flowsheet Documentation  Taken 8/15/2022 0730 by Kristina Mullen RN  Discharge to home or other facility with appropriate resources: Identify barriers to discharge with patient and caregiver  8/15/2022 0701 by Myriam Escobar RN  Outcome: Progressing     Problem: Pain  Goal: Verbalizes/displays adequate comfort level or baseline comfort level  8/15/2022 1046 by Kristina Mullen RN  Outcome: Progressing  8/15/2022 0701 by Myriam Escobar RN  Outcome: Progressing     Problem: ABCDS Injury Assessment  Goal: Absence of physical injury  8/15/2022 0701 by Myriam Escobar RN  Outcome: Progressing     Problem: Skin/Tissue Integrity  Goal: Absence of new skin breakdown  Description: 1. Monitor for areas of redness and/or skin breakdown  2. Assess vascular access sites hourly  3. Every 4-6 hours minimum:  Change oxygen saturation probe site  4. Every 4-6 hours:  If on nasal continuous positive airway pressure, respiratory therapy assess nares and determine need for appliance change or resting period. 8/15/2022 0701 by Myriam Escobar RN  Outcome: Progressing     Problem: Safety - Adult  Goal: Free from fall injury  8/15/2022 0701 by Myriam Escobar RN  Outcome: Progressing     Problem: Nutrition Deficit:  Goal: Optimize nutritional status  8/15/2022 0701 by Myriam Escobar RN  Outcome: Progressing     Problem: Safety - Medical Restraint  Goal: Remains free of injury from restraints (Restraint for Interference with Medical Device)  Description: INTERVENTIONS:  1. Determine that other, less restrictive measures have been tried or would not be effective before applying the restraint  2. Evaluate the patient's condition at the time of restraint application  3. Inform patient/family regarding the reason for restraint  4.  Q2H: Monitor safety, psychosocial status, comfort, nutrition and hydration  Recent Flowsheet Documentation  Taken 8/15/2022 0703 by Marycarmen Mcwilliams RN  Remains free of injury from restraints (restraint for interference with medical device):   Every 2 hours: Monitor safety, psychosocial status, comfort, nutrition and hydration   Inform patient/family regarding the reason for restraint   Evaluate the patient's condition at the time of restraint application   Determine that other, less restrictive measures have been tried or would not be effective before applying the restraint  8/15/2022 0701 by Andrei Zambrano RN  Outcome: Progressing  Flowsheets (Taken 8/15/2022 0230 by Marycarmen Mcwilliams RN)  Remains free of injury from restraints (restraint for interference with medical device):   Every 2 hours: Monitor safety, psychosocial status, comfort, nutrition and hydration   Inform patient/family regarding the reason for restraint   Evaluate the patient's condition at the time of restraint application   Determine that other, less restrictive measures have been tried or would not be effective before applying the restraint  8/14/2022 2233 by Marycarmen Mcwilliams RN  Outcome: Progressing  Flowsheets (Taken 8/14/2022 2153)  Remains free of injury from restraints (restraint for interference with medical device):   Determine that other, less restrictive measures have been tried or would not be effective before applying the restraint   Evaluate the patient's condition at the time of restraint application   Inform patient/family regarding the reason for restraint   Every 2 hours: Monitor safety, psychosocial status, comfort, nutrition and hydration

## 2022-08-16 VITALS
RESPIRATION RATE: 18 BRPM | DIASTOLIC BLOOD PRESSURE: 88 MMHG | OXYGEN SATURATION: 98 % | WEIGHT: 170 LBS | HEART RATE: 84 BPM | BODY MASS INDEX: 30.12 KG/M2 | HEIGHT: 63 IN | SYSTOLIC BLOOD PRESSURE: 137 MMHG | TEMPERATURE: 97.7 F

## 2022-08-16 LAB
ALBUMIN SERPL-MCNC: 4.9 G/DL (ref 3.5–5.2)
ALP BLD-CCNC: 88 U/L (ref 35–104)
ALT SERPL-CCNC: 19 U/L (ref 0–32)
AMMONIA: 65 UMOL/L (ref 11–51)
ANION GAP SERPL CALCULATED.3IONS-SCNC: 12 MMOL/L (ref 7–16)
ANION GAP SERPL CALCULATED.3IONS-SCNC: 13 MMOL/L (ref 7–16)
ANION GAP SERPL CALCULATED.3IONS-SCNC: 13 MMOL/L (ref 7–16)
ANISOCYTOSIS: ABNORMAL
AST SERPL-CCNC: 41 U/L (ref 0–31)
BASOPHILS ABSOLUTE: 0.02 E9/L (ref 0–0.2)
BASOPHILS RELATIVE PERCENT: 0.3 % (ref 0–2)
BILIRUB SERPL-MCNC: 2.3 MG/DL (ref 0–1.2)
BLOOD CULTURE, ROUTINE: NORMAL
BUN BLDV-MCNC: 23 MG/DL (ref 6–23)
BUN BLDV-MCNC: 24 MG/DL (ref 6–23)
BUN BLDV-MCNC: 25 MG/DL (ref 6–23)
BURR CELLS: ABNORMAL
CALCIUM SERPL-MCNC: 10.8 MG/DL (ref 8.6–10.2)
CALCIUM SERPL-MCNC: 10.8 MG/DL (ref 8.6–10.2)
CALCIUM SERPL-MCNC: 11.2 MG/DL (ref 8.6–10.2)
CHLORIDE BLD-SCNC: 120 MMOL/L (ref 98–107)
CHLORIDE BLD-SCNC: 123 MMOL/L (ref 98–107)
CHLORIDE BLD-SCNC: 124 MMOL/L (ref 98–107)
CO2: 18 MMOL/L (ref 22–29)
CO2: 18 MMOL/L (ref 22–29)
CO2: 19 MMOL/L (ref 22–29)
CREAT SERPL-MCNC: 1 MG/DL (ref 0.5–1)
CREAT SERPL-MCNC: 1 MG/DL (ref 0.5–1)
CREAT SERPL-MCNC: 1.1 MG/DL (ref 0.5–1)
CULTURE, BLOOD 2: NORMAL
EOSINOPHILS ABSOLUTE: 0.14 E9/L (ref 0.05–0.5)
EOSINOPHILS RELATIVE PERCENT: 2.4 % (ref 0–6)
GFR AFRICAN AMERICAN: 58
GFR AFRICAN AMERICAN: >60
GFR AFRICAN AMERICAN: >60
GFR NON-AFRICAN AMERICAN: 48 ML/MIN/1.73
GFR NON-AFRICAN AMERICAN: 54 ML/MIN/1.73
GFR NON-AFRICAN AMERICAN: 54 ML/MIN/1.73
GLUCOSE BLD-MCNC: 83 MG/DL (ref 74–99)
GLUCOSE BLD-MCNC: 84 MG/DL (ref 74–99)
GLUCOSE BLD-MCNC: 89 MG/DL (ref 74–99)
HCT VFR BLD CALC: 20.4 % (ref 34–48)
HEMOGLOBIN: 6.6 G/DL (ref 11.5–15.5)
HYPOCHROMIA: ABNORMAL
IMMATURE GRANULOCYTES #: 0.03 E9/L
IMMATURE GRANULOCYTES %: 0.5 % (ref 0–5)
LYMPHOCYTES ABSOLUTE: 0.82 E9/L (ref 1.5–4)
LYMPHOCYTES RELATIVE PERCENT: 14.3 % (ref 20–42)
MAGNESIUM: 1.9 MG/DL (ref 1.6–2.6)
MCH RBC QN AUTO: 34.2 PG (ref 26–35)
MCHC RBC AUTO-ENTMCNC: 32.4 % (ref 32–34.5)
MCV RBC AUTO: 105.7 FL (ref 80–99.9)
MONOCYTES ABSOLUTE: 0.65 E9/L (ref 0.1–0.95)
MONOCYTES RELATIVE PERCENT: 11.3 % (ref 2–12)
NEUTROPHILS ABSOLUTE: 4.08 E9/L (ref 1.8–7.3)
NEUTROPHILS RELATIVE PERCENT: 71.2 % (ref 43–80)
OVALOCYTES: ABNORMAL
PDW BLD-RTO: 24.9 FL (ref 11.5–15)
PHOSPHORUS: 1 MG/DL (ref 2.5–4.5)
PLATELET # BLD: 70 E9/L (ref 130–450)
PLATELET CONFIRMATION: NORMAL
PMV BLD AUTO: 11.1 FL (ref 7–12)
POIKILOCYTES: ABNORMAL
POLYCHROMASIA: ABNORMAL
POTASSIUM REFLEX MAGNESIUM: 3.6 MMOL/L (ref 3.5–5)
POTASSIUM SERPL-SCNC: 3.4 MMOL/L (ref 3.5–5)
POTASSIUM SERPL-SCNC: 3.4 MMOL/L (ref 3.5–5)
POTASSIUM SERPL-SCNC: 3.6 MMOL/L (ref 3.5–5)
RBC # BLD: 1.93 E12/L (ref 3.5–5.5)
SCHISTOCYTES: ABNORMAL
SODIUM BLD-SCNC: 152 MMOL/L (ref 132–146)
SODIUM BLD-SCNC: 154 MMOL/L (ref 132–146)
SODIUM BLD-SCNC: 154 MMOL/L (ref 132–146)
TOTAL PROTEIN: 6.8 G/DL (ref 6.4–8.3)
WBC # BLD: 5.7 E9/L (ref 4.5–11.5)

## 2022-08-16 PROCEDURE — 80048 BASIC METABOLIC PNL TOTAL CA: CPT

## 2022-08-16 PROCEDURE — 83735 ASSAY OF MAGNESIUM: CPT

## 2022-08-16 PROCEDURE — 36415 COLL VENOUS BLD VENIPUNCTURE: CPT

## 2022-08-16 PROCEDURE — 6360000002 HC RX W HCPCS: Performed by: STUDENT IN AN ORGANIZED HEALTH CARE EDUCATION/TRAINING PROGRAM

## 2022-08-16 PROCEDURE — 99239 HOSP IP/OBS DSCHRG MGMT >30: CPT | Performed by: FAMILY MEDICINE

## 2022-08-16 PROCEDURE — 6370000000 HC RX 637 (ALT 250 FOR IP): Performed by: STUDENT IN AN ORGANIZED HEALTH CARE EDUCATION/TRAINING PROGRAM

## 2022-08-16 PROCEDURE — 94640 AIRWAY INHALATION TREATMENT: CPT

## 2022-08-16 PROCEDURE — P9047 ALBUMIN (HUMAN), 25%, 50ML: HCPCS | Performed by: STUDENT IN AN ORGANIZED HEALTH CARE EDUCATION/TRAINING PROGRAM

## 2022-08-16 PROCEDURE — 84100 ASSAY OF PHOSPHORUS: CPT

## 2022-08-16 PROCEDURE — 99232 SBSQ HOSP IP/OBS MODERATE 35: CPT

## 2022-08-16 PROCEDURE — 80053 COMPREHEN METABOLIC PANEL: CPT

## 2022-08-16 PROCEDURE — 2580000003 HC RX 258: Performed by: STUDENT IN AN ORGANIZED HEALTH CARE EDUCATION/TRAINING PROGRAM

## 2022-08-16 PROCEDURE — 6370000000 HC RX 637 (ALT 250 FOR IP): Performed by: INTERNAL MEDICINE

## 2022-08-16 PROCEDURE — 85025 COMPLETE CBC W/AUTO DIFF WBC: CPT

## 2022-08-16 PROCEDURE — 82140 ASSAY OF AMMONIA: CPT

## 2022-08-16 RX ORDER — DIAZEPAM 5 MG/ML
2 INJECTION, SOLUTION INTRAMUSCULAR; INTRAVENOUS EVERY 4 HOURS PRN
Status: DISCONTINUED | OUTPATIENT
Start: 2022-08-16 | End: 2022-08-16

## 2022-08-16 RX ORDER — POTASSIUM CHLORIDE 20 MEQ/1
40 TABLET, EXTENDED RELEASE ORAL ONCE
Status: COMPLETED | OUTPATIENT
Start: 2022-08-16 | End: 2022-08-16

## 2022-08-16 RX ORDER — DIAZEPAM 5 MG/ML
2 INJECTION, SOLUTION INTRAMUSCULAR; INTRAVENOUS EVERY 6 HOURS PRN
Status: DISCONTINUED | OUTPATIENT
Start: 2022-08-16 | End: 2022-08-16 | Stop reason: HOSPADM

## 2022-08-16 RX ORDER — DEXTROSE AND SODIUM CHLORIDE 5; .2 G/100ML; G/100ML
INJECTION, SOLUTION INTRAVENOUS CONTINUOUS
Status: DISCONTINUED | OUTPATIENT
Start: 2022-08-16 | End: 2022-08-16

## 2022-08-16 RX ADMIN — CINACALCET HYDROCHLORIDE 30 MG: 30 TABLET, FILM COATED ORAL at 08:33

## 2022-08-16 RX ADMIN — LACTULOSE 20 G: 20 SOLUTION ORAL at 02:56

## 2022-08-16 RX ADMIN — LACTULOSE 20 G: 20 SOLUTION ORAL at 11:44

## 2022-08-16 RX ADMIN — LACTULOSE 20 G: 20 SOLUTION ORAL at 08:33

## 2022-08-16 RX ADMIN — ALBUMIN (HUMAN) 25 G: 0.25 INJECTION, SOLUTION INTRAVENOUS at 00:52

## 2022-08-16 RX ADMIN — LACTULOSE 20 G: 20 SOLUTION ORAL at 00:52

## 2022-08-16 RX ADMIN — LACTULOSE 20 G: 20 SOLUTION ORAL at 04:42

## 2022-08-16 RX ADMIN — LACTULOSE 20 G: 20 SOLUTION ORAL at 06:17

## 2022-08-16 RX ADMIN — Medication 10 ML: at 08:39

## 2022-08-16 RX ADMIN — BUDESONIDE 500 MCG: 0.5 SUSPENSION RESPIRATORY (INHALATION) at 09:35

## 2022-08-16 RX ADMIN — POTASSIUM CHLORIDE 40 MEQ: 1500 TABLET, EXTENDED RELEASE ORAL at 08:33

## 2022-08-16 RX ADMIN — ARFORMOTEROL TARTRATE 15 MCG: 15 SOLUTION RESPIRATORY (INHALATION) at 09:35

## 2022-08-16 RX ADMIN — PANTOPRAZOLE SODIUM 40 MG: 40 TABLET, DELAYED RELEASE ORAL at 08:33

## 2022-08-16 RX ADMIN — RIFAXIMIN 550 MG: 550 TABLET ORAL at 08:33

## 2022-08-16 RX ADMIN — ALBUMIN (HUMAN) 25 G: 0.25 INJECTION, SOLUTION INTRAVENOUS at 08:41

## 2022-08-16 ASSESSMENT — PAIN SCALES - WONG BAKER: WONGBAKER_NUMERICALRESPONSE: 0

## 2022-08-16 NOTE — PROGRESS NOTES
Palliative Care Department  [phone]  Palliative Care Progress Note  Provider XENIA Sheikh - PUNE[place]      PATIENT: Christian Moraes  : 1945  MRN: [id number]  ADMISSION DATE: 8/10/2022  4:49 PM  Referring Provider: Jorge A Whelan MD      Palliative Medicine was consulted on hospital day 6 for assistance with Goals of care, Code Status Discussion, Family support. HPI:     Melissa Remy is a 68 y.o. y/o female with a history of hypothyroidism, hypertension, nonalcoholic liver cirrhosis with ascites, diabetes mellitus, interstitial lung disease, nausea, PE, on Eliquis, breast cancer with lumpectomy in , who presented to [facility] on 8/10/2022 with abnormal labs and fatigue. Patient had a admission and discharged on 2022 for where she was treated for a fall and altered mental status. ASSESSMENT/PLAN:     Pertinent Hospital Diagnoses     Acute hepatic encephalopathy  Acute kidney injury  Hypoglycemia  Hypercalcemia  Left pleural effusion      Palliative Care Encounter / Counseling Regarding Goals of Care  Please see detailed goals of care discussion as below  At this time, Christian Moraes, Does Not have capacity for medical decision-making. Capacity is time limited and situation/question specific  During encounter Isabelle Kan  was surrogate medical decision-maker  Outcome of goals of care meeting:  Continue with current medical treatment  Hospice consult was noted  Family choice hospice [facility]    Code status DNR-CCA  Advanced Directives: no POA or living will in [facility]  Surrogate/Legal NOK:  Vickie Wood (Child/primary decision [id number]  Betzaida Herron (Child) [phone]    Spiritual assessment: no spiritual distress identified  Bereavement and grief: to be determined  Referrals to: none today    Thank you for the opportunity to participate in the care of Christian Moraes.      XENIA Perez CNP  Palliative Medicine     SUBJECTIVE:     Details of Conversation:    Reviewed. Spoke with patient's son brother over the phone. He has decided to move forward with hospice yesterday, hospice consult was noted, today he has choice hospice of the [place], with plan for patient to be transferred back to [address] under hospice care. We will continue to follow    Prognosis: Poor    OBJECTIVE:     /70   Pulse (!) 110   Temp 98.2 °F (36.8 °C) (Oral)   Resp 18   Ht 5' 2.5\" (1.588 m)   Wt 170 lb (77.1 kg)   SpO2 98%   BMI 30.60 kg/m²     Physical Examination:  Gen: elderly, thin, NAD, lethargic, ill-appearing, moans with sternal rub  HEENT: normocephalic, atraumatic, PERRL, EOMI,   Neck: trachea midline, no JVD  Lungs: respirations easy and not labored,   Heart: regular rate and rhythm, distant heart tones,   Abdomen: soft, non-tender  Extremities: no clubbing, cyanosis or edema,  Skin: warm, dry without rashes, lesions, bruising  Neuro: Lethargic, does not follows commands,    Objective data reviewed: labs, images, records, medication use, vitals, and chart    Time/Communication  Greater than 50% of time spent, total 25 minutes in counseling and coordination of care at the bedside regarding  CODE STATUS discussion, family support and goals of care. Thank you for allowing Palliative Medicine to participate in the care of Elaina Rivera. Note: This report was completed using computerNativeflow voiced recognition software. Every effort has been made to ensure accuracy; however, inadvertent computerized transcription errors may be present.

## 2022-08-16 NOTE — DISCHARGE INSTR - COC
Continuity of Care Form    Patient Name: Neptali Ch   :  1945  MRN:  [id number]    Admit date:  8/10/2022  Discharge date:  2022    Code Status Order: DNR-CCA   Advance Directives:     Admitting Physician:  Mela Coyle MD  PCP: Unique Dill MD    Discharging Nurse: [facility] Unit/Room#: 7921/8122-A  Discharging Unit Phone Number: ***    Emergency Contact:   Extended Emergency Contact Information  Primary Emergency Contact: [address] [place] [address]  Home Phone: [phone]  Mobile Phone: [phone]  Relation: Child  Preferred language: English   needed? No  Secondary Emergency Contact: [address] Phone: [phone]  Mobile Phone: [phone]  Relation: Other  Preferred language: English   needed?  No    Past Surgical History:  Past Surgical History:   Procedure Laterality Date    BLADDER SURGERY Right 2021    CYSTOSCOPY RETROGRADE PYELOGRAM RIGHT  STENT INSERTION performed by Bird Nguyen MD at [place] [address] LUMPECTOMY      lumpectomy [address] TEST      Lexiscan stress test    CARPAL TUNNEL RELEASE      COLONOSCOPY  2017    multiple polyps; diverticula--jerod    COLONOSCOPY  2019    polyps; diverticula; hemorrhoids--jerod    COLONOSCOPY N/A 2019    COLONOSCOPY POLYPECTOMY SNARE/COLD BIOPSY performed by Leon Robin MD at [address] [facility] [facility]  2019    COLONOSCOPY WITH BIOPSY performed by Leon Robin MD at [facility] ([address] [place] [address])      LITHOTRIPSY Left 2016    C-R STENT PLACEMENT    SHOULDER ARTHROPLASTY Left 2020    LEFT REVERSE TOTAL SHOULDER  ARTHROPLASTY -- DEPUY performed by Jacqueline Jacques MD at [address]  2019    gastritis--jerod    UPPER GASTROINTESTINAL ENDOSCOPY N/A 2019    EGD BIOPSY performed by Leon Robin MD at [address] [place] [address] Immunization History:   Immunization History   Administered Date(s) Administered    COVID-19, PFIZER PURPLE top, DILUTE for use, (age 15 y+), 30mcg/0.3mL 03/26/2021, 04/16/2021    Hepatitis B Adult (Recombivax HB) 03/12/2020    Influenza A ([id number]) Vaccine PF IM 12/21/2009    Influenza Vaccine, unspecified formulation 09/27/2016    Influenza Virus Vaccine 09/28/2015    Influenza Whole 09/28/2015    Influenza, High Dose (Fluzone 65 yrs and older) 09/27/2016, 09/21/2017, 10/05/2018, 10/21/2019, 12/01/2020    Influenza, Radha Horn, IM, (6 mo and older Fluzone, Flulaval, Fluarix and 3 yrs and older Afluria) 09/27/2016    Influenza, Quadv, adjuvanted, 65 yrs +, IM, PF (Fluad) 10/07/2020, 10/19/2021    Pneumococcal Conjugate 13-valent (Gmqptmk01) 09/27/2016    Pneumococcal Polysaccharide (Glcebeyxa32) 11/21/2017    Td (Adult), 2 Lf Tetanus Toxoid, Pf (Td, Absorbed) 07/29/2022    Tdap (Boostrix, Adacel) 01/14/2021    Zoster Live (Zostavax) 04/01/2013    Zoster Recombinant (Shingrix) 04/12/2018, 01/14/2021       Active Problems:  Patient Active Problem List   Diagnosis Code    Malignant neoplasm of left breast ([facility] C50.912    Type 2 diabetes mellitus (HCC) E11.9    Obstructive sleep apnea syndrome G47.33    Essential hypertension I10    Multiple thyroid nodules E04.2    Hx of adenomatous colonic polyps Z86.010    Dyslipidemia E78.5    Hyperkalemia E87.5    Cirrhosis (HCC) K74.60    S/P reverse total shoulder arthroplasty, 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    REN (acute kidney injury) ([facility] N17.9    Anemia D64.9    Palpitations R00.2    Rhabdomyolysis M62.82    Hyperammonemia (HCC) E72.20    Closed displaced fracture of surgical neck of right humerus S42.211A    Pulmonary embolism (HCC) I26.99    Idiopathic acute pancreatitis without infection or necrosis K85.00    Asymptomatic microscopic hematuria R31.21    Breast mass, left N63.20    Anxiety disorder, unspecified F41.9    Gastro-esophageal reflux disease without esophagitis K21.9    Major depressive disorder, recurrent severe without psychotic features ([facility] F33.2    Muscle weakness (generalized) M62.81    Need for assistance with personal care Z74.1    Other abnormalities of gait and mobility R26.89    Unspecified physeal fracture of upper end of humerus, left arm, subsequent encounter for fracture with routine healing S49.002D    Difficulty in walking, not elsewhere classified R26.2    Acute renal failure (ARF) (HCC) N17.9    Pyelonephritis N12    Stage 3 chronic kidney disease, unspecified whether stage 3a or 3b CKD ([facility] N18.30    Interstitial lung disease ([facility] J84.9    Moderate protein-calorie malnutrition ([facility] E44.0    Hyponatremia E87.1    Hypercalcemia E83.52    Acute hepatic encephalopathy K72.00    Hypoglycemia due to type 2 diabetes mellitus ([facility] E11.649    Palliative care by specialist Z51.5       Isolation/Infection:   Isolation            No Isolation          Patient Infection Status       Infection Onset Added Last Indicated Last Indicated By Review Planned Expiration Resolved Resolved By    None active    Resolved    COVID-19 (Rule Out) 08/10/22 08/10/22 08/10/22 COVID-19, Rapid (Ordered)   08/10/22 Rule-Out Test Resulted    COVID-19 (Rule Out) 07/11/22 07/11/22 07/11/22 COVID-19, Rapid (Ordered)   07/11/22 Rule-Out Test Resulted    COVID-19 (Rule Out) 11/24/21 11/24/21 11/24/21 COVID-19, Rapid (Ordered)   11/24/21 Rule-Out Test Resulted    COVID-19 (Rule Out) 10/29/21 10/29/21 10/30/21 COVID-19, Rapid (Ordered)   10/30/21 Rule-Out Test Resulted    COVID-19 (Rule Out) 01/31/21 01/31/21 01/31/21 Respiratory Panel, Molecular, with COVID-19 (Restricted: peds pts or suitable admitted adults) (Ordered)   01/31/21 Rule-Out Test Resulted    COVID-19 (Rule Out) 12/29/20 12/29/20 12/29/20 COVID-19 (Ordered)   12/29/20 Rule-Out Test Resulted    COVID-19 (Rule Out) 12/14/20 12/14/20 12/14/20 COVID-19 Ambulatory (Ordered)   12/15/20 Rule-Out Test Resulted            Nurse Assessment:  Last Vital Signs: /74   Pulse 98   Temp 97.7 °F (36.5 °C)   Resp 18   Ht 5' 2.5\" (1.588 m)   Wt 170 lb (77.1 kg)   SpO2 98%   BMI 30.60 kg/m²     Last documented pain score (0-10 scale): Pain Level: 0  Last Weight:   Wt Readings from Last 1 Encounters:   08/13/22 170 lb (77.1 kg)     Mental Status:  disoriented    IV Access:  - None    Nursing Mobility/ADLs:  Walking   Dependent  Transfer  Dependent  Bathing  Dependent  Dressing  Dependent  Toileting  {CHP DME [id number]}  Feeding  Dependent  Med Admin  Dependent  Med Delivery   none    Wound Care Documentation and Therapy:        Elimination:  Continence: Bowel: Yes  Bladder: Yes  Urinary Catheter:  yes    Colostomy/Ileostomy/Ileal Conduit: No  Fecal Management System 08/13/22-Stool Appearance: Loose, Watery  Fecal Management System 08/13/22-Stool Color: Tan (Comment)  Fecal Management System 08/13/22-Stool Amount: Medium    Date of Last BM: 8/16/22    Intake/Output Summary (Last 24 hours) at 8/16/2022 1340  Last data filed at 8/16/2022 0509  Gross per 24 hour   Intake 966.02 ml   Output 3550 ml   Net -2583.98 ml     I/O last 3 completed shifts: In: 1855 [NG/GT:845; IV Piggyback:301]  Out: 0 [Urine:2150; Stool:2800]    Safety Concerns: At Risk for Falls    Impairments/Disabilities:      Speech and Hearing    Nutrition Therapy:  Current Nutrition Therapy:   - Tube Feedings:  none    Routes of Feeding: pleasure feeds  Liquids: pleasure feeds  Daily Fluid Restriction: pleasure feeds  Last Modified Barium Swallow with Video (Video Swallowing Test): not done    Treatments at the Time of Hospital Discharge:   Respiratory Treatments: none    Oxygen Therapy:  is not on home oxygen therapy.   Ventilator:    - No ventilator support    Rehab Therapies: NA  Weight Bearing Status/Restrictions:  non weight bearing  Other Medical Equipment (for information only, NOT a DME order):  NA  Other Treatments: ***    Patient's personal belongings (please select all that are sent with patient):  None    RN SIGNATURE:  Electronically signed by Omar Burris RN on 8/16/22 at 2:58 PM EDT    CASE MANAGEMENT/SOCIAL WORK SECTION    Inpatient Status Date: ***    Readmission Risk Assessment Score:  Readmission Risk              Risk of Unplanned Readmission:  50           Discharging to Facility/ Agency   Name:   Address:  Phone:  Fax:    Dialysis Facility (if applicable)   Name:  Address:  Dialysis Schedule:  Phone:  Fax:    / signature: {Esignature:[id number]}    PHYSICIAN SECTION    Prognosis: Fair    Condition at Discharge: Stable    Rehab Potential (if transferring to Rehab): Fair    Recommended Labs or Other Treatments After Discharge:   Hospice Orders  Wound care as indicated to affected areas  Discontinue all home mediations including eliquis as patient is admitting to hospice. Provide medications required for comfort as indicated. Physician Certification: I certify the above information and transfer of Iraida Perrin  is necessary for the continuing treatment of the diagnosis listed and that she requires Hospice for greater 30 days.      Update Admission H&P: No change in H&P    PHYSICIAN SIGNATURE:  Electronically signed by Luis E Lou MD on 8/16/22 at 1:40 PM EDT

## 2022-08-16 NOTE — PLAN OF CARE
Problem: Pain  Goal: Verbalizes/displays adequate comfort level or baseline comfort level  8/15/2022 2317 by Clay Ohara RN  Outcome: Not Progressing  8/15/2022 1046 by Aby Casillas RN  Outcome: Progressing     Problem: Discharge Planning  Goal: Discharge to home or other facility with appropriate resources  Outcome: Progressing  Flowsheets (Taken 8/15/2022 2100 by Nina Bernstein RN)  Discharge to home or other facility with appropriate resources: Identify barriers to discharge with patient and caregiver     Problem: ABCDS Injury Assessment  Goal: Absence of physical injury  Outcome: Progressing     Problem: Skin/Tissue Integrity  Goal: Absence of new skin breakdown  Outcome: Progressing     Problem: Safety - Adult  Goal: Free from fall injury  Outcome: Progressing     Problem: Safety - Medical Restraint  Goal: Remains free of injury from restraints (Restraint for Interference with Medical Device)  Outcome: Progressing  Flowsheets  Taken 8/15/2022 2200 by Nina Bernstein RN  Remains free of injury from restraints (restraint for interference with medical device): Every 2 hours: Monitor safety, psychosocial status, comfort, nutrition and hydration  Taken 8/15/2022 2000 by Nina Bernstein RN  Remains free of injury from restraints (restraint for interference with medical device): Every 2 hours: Monitor safety, psychosocial status, comfort, nutrition and hydration  Taken 8/15/2022 1530 by Aby Casillas RN  Remains free of injury from restraints (restraint for interference with medical device): Every 2 hours: Monitor safety, psychosocial status, comfort, nutrition and hydration  Taken 8/15/2022 1330 by Aby Casillas RN  Remains free of injury from restraints (restraint for interference with medical device): Every 2 hours: Monitor safety, psychosocial status, comfort, nutrition and hydration  Taken 8/15/2022 1130 by Aby Casillas RN  Remains free of injury from restraints (restraint for interference with medical device): Every 2 hours: Monitor safety, psychosocial status, comfort, nutrition and hydration  Taken 8/15/2022 0930 by Martha Phelps RN  Remains free of injury from restraints (restraint for interference with medical device): Every 2 hours: Monitor safety, psychosocial status, comfort, nutrition and hydration     Problem: Pain  Goal: Verbalizes/displays adequate comfort level or baseline comfort level  8/15/2022 2317 by Flower Encarnacion RN  Outcome: Not Progressing  8/15/2022 1046 by Martha Phelps RN  Outcome: Progressing

## 2022-08-16 NOTE — PLAN OF CARE
Device)  Description: INTERVENTIONS:  1. Determine that other, less restrictive measures have been tried or would not be effective before applying the restraint  2. Evaluate the patient's condition at the time of restraint application  3. Inform patient/family regarding the reason for restraint  4.  Q2H: Monitor safety, psychosocial status, comfort, nutrition and hydration  8/16/2022 0249 by Andrew Ortez RN  Outcome: Progressing  Flowsheets  Taken 8/16/2022 0200  Remains free of injury from restraints (restraint for interference with medical device): Every 2 hours: Monitor safety, psychosocial status, comfort, nutrition and hydration  Taken 8/16/2022 0000  Remains free of injury from restraints (restraint for interference with medical device): Every 2 hours: Monitor safety, psychosocial status, comfort, nutrition and hydration  8/15/2022 2317 by Kimberlee Clement RN  Outcome: Progressing  Flowsheets  Taken 8/15/2022 2200 by Andrew Ortez RN  Remains free of injury from restraints (restraint for interference with medical device): Every 2 hours: Monitor safety, psychosocial status, comfort, nutrition and hydration  Taken 8/15/2022 2000 by Andrew Ortez RN  Remains free of injury from restraints (restraint for interference with medical device): Every 2 hours: Monitor safety, psychosocial status, comfort, nutrition and hydration  Taken 8/15/2022 1530 by Costa Salazar RN  Remains free of injury from restraints (restraint for interference with medical device): Every 2 hours: Monitor safety, psychosocial status, comfort, nutrition and hydration  Taken 8/15/2022 1330 by Costa Salazar RN  Remains free of injury from restraints (restraint for interference with medical device): Every 2 hours: Monitor safety, psychosocial status, comfort, nutrition and hydration  Taken 8/15/2022 1130 by Costa Salazar RN  Remains free of injury from restraints (restraint for interference with medical device): Every 2 hours: Monitor safety, psychosocial status, comfort, nutrition and hydration  Taken 8/15/2022 0930 by Marti Cabral RN  Remains free of injury from restraints (restraint for interference with medical device): Every 2 hours: Monitor safety, psychosocial status, comfort, nutrition and hydration     Problem: Chronic Conditions and Co-morbidities  Goal: Patient's chronic conditions and co-morbidity symptoms are monitored and maintained or improved  Outcome: Progressing  Flowsheets (Taken 8/15/2022 2100)  Care Plan - Patient's Chronic Conditions and Co-Morbidity Symptoms are Monitored and Maintained or Improved: Monitor and assess patient's chronic conditions and comorbid symptoms for stability, deterioration, or improvement     Problem: Pain  Goal: Verbalizes/displays adequate comfort level or baseline comfort level  8/16/2022 0249 by Ella Parker RN  Outcome: Progressing  8/15/2022 2317 by Lashawn Marcus RN  Outcome: Not Progressing

## 2022-08-16 NOTE — PLAN OF CARE
Problem: Discharge Planning  Goal: Discharge to home or other facility with appropriate resources  8/16/2022 1453 by Fernanda Campbell RN  Outcome: Completed  8/16/2022 0249 by Marcus Bullard RN  Outcome: Progressing     Problem: Pain  Goal: Verbalizes/displays adequate comfort level or baseline comfort level  8/16/2022 1453 by Fernanda Campbell RN  Outcome: Completed  8/16/2022 0249 by Marcus Bullard RN  Outcome: Progressing     Problem: ABCDS Injury Assessment  Goal: Absence of physical injury  8/16/2022 1453 by Fernanda Campbell RN  Outcome: Completed  8/16/2022 0249 by Marcus Bullard RN  Outcome: Progressing  Flowsheets (Taken 8/16/2022 0248)  Absence of Physical Injury: Implement safety measures based on patient assessment     Problem: Skin/Tissue Integrity  Goal: Absence of new skin breakdown  Description: 1. Monitor for areas of redness and/or skin breakdown  2. Assess vascular access sites hourly  3. Every 4-6 hours minimum:  Change oxygen saturation probe site  4. Every 4-6 hours:  If on nasal continuous positive airway pressure, respiratory therapy assess nares and determine need for appliance change or resting period. 8/16/2022 1453 by Fernanda Campbell RN  Outcome: Completed  8/16/2022 0249 by Marcus Bullard RN  Outcome: Progressing     Problem: Safety - Adult  Goal: Free from fall injury  8/16/2022 1453 by Fernanda Campbell RN  Outcome: Completed  8/16/2022 0249 by Marcus Bullard RN  Outcome: Progressing  Flowsheets (Taken 8/16/2022 0248)  Free From Fall Injury: Instruct family/caregiver on patient safety     Problem: Nutrition Deficit:  Goal: Optimize nutritional status  8/16/2022 1453 by Fernanda Campbell RN  Outcome: Completed  8/16/2022 0249 by Marcus Bullard RN  Outcome: Progressing     Problem: Safety - Medical Restraint  Goal: Remains free of injury from restraints (Restraint for Interference with Medical Device)  Description: INTERVENTIONS:  1.  Determine that other, less restrictive measures have been tried or would not be effective before applying the restraint  2. Evaluate the patient's condition at the time of restraint application  3. Inform patient/family regarding the reason for restraint  4.  Q2H: Monitor safety, psychosocial status, comfort, nutrition and hydration  8/16/2022 1453 by Shabana Ibanez RN  Outcome: Completed  Flowsheets  Taken 8/16/2022 1000 by Shabana Ibanez RN  Remains free of injury from restraints (restraint for interference with medical device): Every 2 hours: Monitor safety, psychosocial status, comfort, nutrition and hydration  Taken 8/16/2022 0800 by Shabana Ibanez RN  Remains free of injury from restraints (restraint for interference with medical device): Every 2 hours: Monitor safety, psychosocial status, comfort, nutrition and hydration  Taken 8/16/2022 0625 by Colin Kim RN  Remains free of injury from restraints (restraint for interference with medical device): Every 2 hours: Monitor safety, psychosocial status, comfort, nutrition and hydration  8/16/2022 0249 by Colin Kim RN  Outcome: Progressing  Flowsheets  Taken 8/16/2022 0200  Remains free of injury from restraints (restraint for interference with medical device): Every 2 hours: Monitor safety, psychosocial status, comfort, nutrition and hydration  Taken 8/16/2022 0000  Remains free of injury from restraints (restraint for interference with medical device): Every 2 hours: Monitor safety, psychosocial status, comfort, nutrition and hydration

## 2022-08-16 NOTE — DISCHARGE INSTRUCTIONS
=====================================  [facility], [address]  =====================================    Take your medications as directed in this summary    Future scheduled appointments are listed below or recommended appointments are mentioned above. Future Appointments   Date Time Provider Geraldine Rader   9/8/2022  9:00 AM Ravindra Gaitan MD [facility]   9/29/2022  2:00 PM [facility]   11/23/2022  9:00 AM Kanchan Shrestha MD [facility]       Call @[facility] to confirm or schedule your appointment with Dr. Allyn Sharma,  as soon as possible and/or if there are any appointment changes or other issues. It is important that you follow up with  @[facility] for better monitoring of the reason of your hospitalization. Follow up with the specialists that saw you during your stay as well. If you have any questions call your PCP at [phone]. Once discharged from [address] [place] [address], you can :     Return to work : Yes, you may return to work  Activity : As tolerated  Stairs : As tolerated  Exercise : As tolerated  Lifting : As tolerated   Sexual activity : Yes  Driving : With seat belt on. NO driving on narcotic pain medication if prescribed   Medications : Always take your medications as prescribed  Wound Care: none needed  Diet : You are asked to make an attempt to improve diet and exercise patterns to aid in medical management of your medical condition/problem. Call [facility] with any further questions. Return to Emergency Department with any worsening of your condition and/or fever greater than 101 degrees, new weakness, shortness of breath or chest pain.

## 2022-08-16 NOTE — CARE COORDINATION
Spoke with Clarissa houser, no preference on hospice agency, he would prefer that patient return to [facility], not interested in the hospice house. I discussed with him that [facility]. prefers [facility] and he is agreeable to this agency. Referral to [facility]. They do not come to hospital but will admit to hospice today at [facility] if discharged. Message sent to family medicine. Patient will need NGT removed and fecal management system removed prior to return to facility. She can keep bar for comfort with hospice. Will notify Clarissa houser if released. He will wait to visit in the event she is discharged and follow up instead at the facility. Spoke with Dr. Gabi Knowles with family medicine, they deferred to Dr. Edu Morales for removal of NGT. Message sent to Dr. Edu Morales explaining patient is transition to hospice, can we remove. Patient for discharge to [facility] with [facility] today at 1600. Attempted to arranged with physicians, they were out until 8pm, outsourced to [address] [place] [address] for 1600. Facility notified of discharge time. Hospice notified of discharge arrangements. Clarissa Houser notified of discharge arrangement. For questions I can be reached at [phone]. [place], [place]    The Plan for Transition of Care is related to the following treatment goals: discharge planning when stable     The Patient and/or patient representative Stacy Scruggs was provided with a choice of provider and agrees   with the discharge plan. [x] Yes [] No    Freedom of choice list was provided with basic dialogue that supports the patient's individualized plan of care/goals, treatment preferences and shares the quality data associated with the providers.  [x] Yes [] No

## 2022-08-16 NOTE — DISCHARGE SUMMARY
Discharge Summary    Socorro Ruby  :  1945  MRN:  [id number]    Admit date:  8/10/2022  Discharge date:      Admitting Physician:  Lucy Ca MD    Discharge Diagnoses:    Patient Active Problem List   Diagnosis    Malignant neoplasm of left breast ([address]    Type 2 diabetes mellitus ([address]    Obstructive sleep apnea syndrome    Essential hypertension    Multiple thyroid nodules    Hx of adenomatous colonic polyps    Dyslipidemia    Hyperkalemia    Cirrhosis ([address]    S/P reverse total shoulder arthroplasty, left    REN (acute kidney injury) ([address]    Anemia    Palpitations    Rhabdomyolysis    Hyperammonemia (HCC)    Closed displaced fracture of surgical neck of right humerus    Pulmonary embolism (HCC)    Idiopathic acute pancreatitis without infection or necrosis    Asymptomatic microscopic hematuria    Breast mass, left    Anxiety disorder, unspecified    Gastro-esophageal reflux disease without esophagitis    Major depressive disorder, recurrent severe without psychotic features ([address]    Muscle weakness (generalized)    Need for assistance with personal care    Other abnormalities of gait and mobility    Unspecified physeal fracture of upper end of humerus, left arm, subsequent encounter for fracture with routine healing    Difficulty in walking, not elsewhere classified    Acute renal failure (ARF) ([address]    Pyelonephritis    Stage 3 chronic kidney disease, unspecified whether stage 3a or 3b CKD ([address]    Interstitial lung disease ([address]    Moderate protein-calorie malnutrition ([address]    Hyponatremia    Hypercalcemia    Acute hepatic encephalopathy    Hypoglycemia due to type 2 diabetes mellitus ([address]    Palliative care by specialist       Admission Condition:  poor    Discharged Condition: stable    Hospital Course:   Socorro Ruby is a 68 y.o. 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 Significant labwork demonstrating elevated BUN/Cr, calcium, ammonia and LFTs. Other labwork notable for anemia, hypoalbuminemia and hypoglycemia. CXR showed small left pleural effusion with possible infiltrate vs atelectasis. CT A/P significant for moderate left pleural effusion with atelectatic changes in LLL, chronic hepatocellular dz with Portal venous HTN, small perihepatic ascites decreased from prior, b/l renal calculi, and diverticulosis. EKG showed normal sinus rhythm with premature supraventricular complexes. Patient was admitted for an REN and acute hepatic encephalopathy. Patient was followed by GI for acute hepatic encephalopathy and HOLDEN cirrhosis. Her altered mental status was consistent with clinical picture. Her home Rifaximin was continued and home Lactulose was increased to 20g q2. NG tube was placed due to patient being unable to take anything PO. Nephrology followed patient for her REN and hypercalcemia. She was placed on Albumin 25% IV, Cinacalcet and Calcitonin. Her Vitamin D was discontinued and her Lasix were held. Electrolyte abnormalities were corrected as needed. Patient was placed on 0.45% NaCl to manage hypernatremia. Hypoglycemia was corrected as needed with D5NS. Patient did not have any O2 requirements during this stay. Mentation slowly improved. Due to patient's diagnosis and multiple hospital visits over the last several months, her son made the decision to move forward with hospice. She was stable the day of discharge.          Discharge plan:   Patient being discharged to hospice  Hospice to manage medications  Hospital medications discontinued  NG tube removed on 8/16  FMS and bar removed upon discharge       Discharge Medications:         Medication List        STOP taking these medications      acetaminophen 325 MG tablet  Commonly known as: TYLENOL     Advair Diskus 250-50 MCG/ACT Aepb diskus inhaler  Generic drug: fluticasone-salmeterol     albuterol sulfate  (90 Base) MCG/ACT inhaler  Commonly known as: Ventolin HFA     amLODIPine 2.5 MG tablet  Commonly known as: NORVASC     apixaban 5 MG Tabs tablet  Commonly known as: ELIQUIS     cinacalcet 30 MG tablet  Commonly known as: SENSIPAR     doxazosin 1 MG tablet  Commonly known as: CARDURA     felodipine 2.5 MG extended release tablet  Commonly known as: PLENDIL     ferrous sulfate 325 (65 Fe) MG tablet  Commonly known as: IRON 325     furosemide 20 MG tablet  Commonly known as: LASIX     lactulose 10 GM/15ML solution  Commonly known as: CHRONULAC     lidocaine 4 % external patch     ondansetron 4 MG disintegrating tablet  Commonly known as: ZOFRAN-ODT     pantoprazole 40 MG tablet  Commonly known as: PROTONIX     rifAXIMin 550 MG tablet  Commonly known as: XIFAXAN     spironolactone 100 MG tablet  Commonly known as: ALDACTONE     venlafaxine 75 MG extended release capsule  Commonly known as: EFFEXOR XR              Consults:  Pulmonology, GI, Nephrology, Palliative Care    Significant Diagnostic Studies:  See below    Labs:  Na/K/Cl/CO2:  154/3.4/124/18 (08/16 1127)  BUN/Cr/glu/ALT/AST/amyl/lip:  23/1.0/--/--/--/--/-- (08/16 1127)  WBC/Hgb/Hct/Plts:  5.7/6.6/20.4/70 (08/16 0457)  [unfilled]  estimated creatinine clearance is 46 mL/min (based on SCr of 1 mg/dL). New Imaging:  XR CHEST ABDOMEN NG PLACEMENT   Final Result   1. The tip of the NG tube is located 9 cm inferior to the gastroesophageal   junction. XR ABDOMEN FOR NG/OG/NE TUBE PLACEMENT   Final Result   Satisfactory position of the enteric tube. CT HEAD WO CONTRAST   Final Result   No acute intracranial abnormality. Cortical atrophy and periventricular leukomalacia. CT CERVICAL SPINE WO CONTRAST   Final Result   No acute abnormality of the cervical spine. Multilevel degenerative changes. Left pleural effusion in the visualized left lung apex. Consider chest CT   for better evaluation if clinically appropriate. CT ABDOMEN PELVIS WO CONTRAST Additional Contrast? None   Final Result   1. Moderate left pleural effusion with atelectatic changes involving left   lower lobe. 2. Redemonstration of findings related to chronic hepatocellular disease with   portal venous hypertension. 3. Small perihepatic ascites. Ascites has decreased compared to prior. 4. Multiple nonobstructing bilateral renal calculi. 5. Diverticulosis. XR CHEST PORTABLE   Final Result   Small left pleural effusion. Associated atelectasis or infiltrate cannot be   excluded. Treatments:   Medications managed per hospice     Discharge Exam:  Refer to physical exam from progress note on 8/16    Disposition:   Hospice    Future Appointments   Date Time Provider Geraldine Dior   9/8/2022  9:00 AM Rambo Marcus MD [facility]   9/29/2022  2:00 PM [facility]   11/23/2022  9:00 AM Jacquelyn Garcia MD [facility]       More than 30 minutes was spent in preparation of this patient's discharge including, but not limited to, examination, preparation of documents, prescription preparation, counseling and coordination.     Trisha Garnica DO  8/16/2022, 1:48 PM

## 2022-08-16 NOTE — PROGRESS NOTES
human  25 g IntraVENous Q8H    [Held by provider] ferrous sulfate  325 mg Oral Daily    [Held by provider] furosemide  20 mg Oral BID    pantoprazole  40 mg Oral Daily    rifAXIMin  550 mg Oral BID    sodium chloride flush  5-40 mL IntraVENous 2 times per day    budesonide  0.5 mg Nebulization BID    And    Arformoterol Tartrate  15 mcg Nebulization BID     PRN meds: glucose, dextrose bolus **OR** dextrose bolus, glucagon (rDNA), dextrose, bisacodyl, albuterol, sodium chloride flush, sodium chloride, ondansetron **OR** ondansetron, polyethylene glycol, acetaminophen **OR** acetaminophen     I reviewed the patient's past medical and surgical history, Medications and Allergies. O:  /70   Pulse (!) 110   Temp 98.2 °F (36.8 °C) (Oral)   Resp 18   Ht 5' 2.5\" (1.588 m)   Wt 170 lb (77.1 kg)   SpO2 98%   BMI 30.60 kg/m²   24 hour I&O: I/O last 3 completed shifts: In: 1146 [NG/GT:845; IV Piggyback:301]  Out: 4950 [Urine:2150; Stool:2800]  No intake/output data recorded. General Appearance: Alert. Restraints bilateral wrists  Skin: warm and dry, No jaundice; Spider angiomas  Head: normocephalic and atraumatic  Eyes: pupils equal, round, and reactive to light, conjunctivae normal  Nose/Mouth: NG tube in place   Neck: supple and non-tender without mass, no cervical lymphadenopathy  Pulmonary/Chest: Difficult to appreciate due to patient's condition  Cardiovascular: Irregularly irregular, no murmurs, rubs, clicks, or gallops, distal pulses intact  Abdomen: Soft, non-distended, no masses or organomegaly; Negative fluid-wave test. Hyperactive bowel sounds noted. Extremities: no cyanosis, clubbing or edema , DP 2+ bilateral lower extremities. Musculoskeletal: normal range of motion, no joint swelling, deformity or tenderness  Neurologic: Patient alert and oriented to person and place. She is answering in some complete sentences this AM.  : Engel and FMS in place.       Labs:  Na/K/Cl/CO2:  154/3.6, 3.6/123/18 (08/16 0587)  BUN/Cr/glu/ALT/AST/amyl/lip:  24/1.0/--/19/41/--/-- (08/16 0457)  WBC/Hgb/Hct/Plts:  5.7/6.6/20.4/70 (08/16 0457)  estimated creatinine clearance is 46 mL/min (based on SCr of 1 mg/dL). Other pertinent labs as noted below    Radiology:  XR CHEST ABDOMEN NG PLACEMENT   Final Result   1. The tip of the NG tube is located 9 cm inferior to the gastroesophageal   junction. XR ABDOMEN FOR NG/OG/NE TUBE PLACEMENT   Final Result   Satisfactory position of the enteric tube. CT HEAD WO CONTRAST   Final Result   No acute intracranial abnormality. Cortical atrophy and periventricular leukomalacia. CT CERVICAL SPINE WO CONTRAST   Final Result   No acute abnormality of the cervical spine. Multilevel degenerative changes. Left pleural effusion in the visualized left lung apex. Consider chest CT   for better evaluation if clinically appropriate. CT ABDOMEN PELVIS WO CONTRAST Additional Contrast? None   Final Result   1. Moderate left pleural effusion with atelectatic changes involving left   lower lobe. 2. Redemonstration of findings related to chronic hepatocellular disease with   portal venous hypertension. 3. Small perihepatic ascites. Ascites has decreased compared to prior. 4. Multiple nonobstructing bilateral renal calculi. 5. Diverticulosis. XR CHEST PORTABLE   Final Result   Small left pleural effusion. Associated atelectasis or infiltrate cannot be   excluded. Resident Assessment and Plan     Acute hepatic encephalopathy  - multifactorial, 2/2 hypercalcemia, cirrhosis (hyperammonemia), hypoglycemia  -CT A/P significant for moderate left pleural effusion with atelectatic changes in LLL, chronic hepatocellular dz with Portal venous HTN, small perihepatic ascites decreased from prior, b/l renal calculi, and diverticulosis.   - Neuro check q4h  - Bedside swallow passed in ED - Patient unable to take anything PO , was given D5NS though dc'd due to normalized BS + limit on colloid due to concern for volume overload  - Ammonia 125 -> 153 -> 52 -> 38 -> 44 -> 65  Low suspicion for SBP d/t afebrile, normal white count and improvement of ascites since last admission  - Cultures with no growth  - monitor I/Os; FMS in place, NG per GI  - Monitor electrolytes : Elevated Na and Cl , elevated Calcium as well  - Hold home med, doxazosin and effexor due to AMS  - Ferrous sulfate discontinued  - Dulcolax suppository prn  - Goal: 3+ bowel movements daily   -Continue Rifaximin and 20g Lactulose per GI  - Has been having watery bowel movements    Hypernatremia  -Fluids discontinued    Hypoglycemia  - 2/2 poor PO intake  - h/o T2DM diet controlled, A1C 4.5 7/12/22  - hypoglycemia protocol  - D5NS 100 cc/h discontinued   - Required 1 D10 bolus given in ED  - POCT BGL q4h       Hypokalemia/Hypomagnesemia  -Potassium = 3.6  -Mg = 1.9    REN  Likely 2/2 decreased PO intake, dehydration  CKD Stage 3 , Cr baseline 0.9- 1.1  - Nephrology following  - Continue to hold aldactone and lasix  - Albumin 25% IV discontinued     Left Pleural effusion  - Chronic, worse compared to CXR on 7/29/22  - Currently saturating well on RA    HOLDEN Cirrhosis  -INR  on 8/14 = 2.5    Hypercalcemia  - 10.8, trending down  - 2/2 primary hyperparathyroidism, on cinacalcet 30mg BID per Nephrology  -Started on 4 doses calcitonin every 12 hrs at 4 units/kg per Nephrology  - Vitamin D discontinued per nephrology  - Assess Ionized calcium level and PTHrp  - Nephro managing   - I/O  - hold lasix for now due to IVF     Iron deficiency anemia  - Hb 6.6, no signs of active bleeding  -Discontinued ferrous sulfate due to constipation       H/o HTN  - currently hypotensive  - on amlodipine and spironolactone at home, hold for now       H/O ILD    - on advair, abuterol at home  - On dulera, Albuterol PRN     H/O PE  -Discontinue eliquis  -Lovenox Discontinued   -PCDs     PT/OT evaluation: N/A  DVT prophylaxis: Lovenox dcd  GI prophylaxis: protonix  Disposition: continue to monitor  Diet: NG    Electronically signed by Guillermina Henry DO on 8/16/2022 at 7:52 AM  Attending physician: Dr. Migdalia Agrawal

## 2022-08-16 NOTE — PROGRESS NOTES
[address] [facility]  Family Medicine Attending    NED GREGG Course: 68 y.o. 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 She became increasingly altered and lethargic. Elevated Cr, Ca at NH. She reported neck/abdominal pain. CT Abd showed large stool burden & moderate left pleural effusion. S: Today, patient was still only oriented to person. She is very confused. No other new concerns. POA, patient's son,  has decided on hospice care for Ola Scheuermann. Consult placed for hospice yesterday. Son has chosen hospice of [facility]. Plans for discharge back to [facility] later today once patient seen by hospice. O: VS- Blood pressure 138/70, pulse (!) 110, temperature 98.2 °F (36.8 °C), temperature source Oral, resp. rate 18, height 5' 2.5\" (1.588 m), weight 170 lb (77.1 kg), SpO2 98 %. Gen: Ill appearing. Awake, and follows limited commands. Heent: NC AT Anicteric. Dry MM  CV: irreg irreg, sys murmur   Resp: clear in apices, diminished at left base, stable, unlabored at rest.   Abd: +BS, soft, nt nd. Ext: ROSAS, No edema     Impressions:   Principal Problem:    REN (acute kidney injury) ([address]  Active Problems: Moderate protein-calorie malnutrition ([address]    Acute hepatic encephalopathy    Hypoglycemia due to type 2 diabetes mellitus ([address]    Palliative care by specialist  Resolved Problems:    * No resolved hospital problems. *      Plan:     1) Acute Hepatic Encephalopathy - lactulose Q2 presently, rifaximin. GI management appreciated. Ammonia and mentation slowly improving. 2) REN - Nephro, GI following. Prerenal vs Hepatorenal syndrome. Nephrology management appreciated.       3) Hypoglycemia - 2/2 liver disease, poor po intake -  Monitor glucose, encourage PO as she becomes more alert. 4) Hypercalcemia - nephrology management appreciated, miacalcin. 5) Left Pleural Effusion - worsening in the last 2 weeks. SaO2 remains stable. Low threshold to consult pulm if worsening. 6) Hx of VTE/PE - SC Lovenox 1mg/kg BID. Cont to monitor Cr for further dosing. Also noted to have worsening thrombocytopenia; consider discontinuing the lovenox if platelets drop any further. 7.)  Worsening anemia and thrombocytopenia- discontinued the lovenox    8.)  End stage liver disease-with hepatic encephalopathy that is not clearly responding to rifaximin and lactulose Q2    Dispo:  plan for discharge back to [facility]. this afternoon once patient/POA talks with Jordon Pathak. At that time, patient will be made DNR CC and other medications discontinued. Attending Physician Statement  I have reviewed the chart and seen the patient with the resident(s). I personally reviewed images, EKG's and similar tests, if present. I personally reviewed and performed key elements of the history and exam.  I have reviewed and confirmed student and/or resident history and exam with changes as indicated above. I agree with the assessment, plan and orders as documented by the resident. Please refer to the resident and/or student note for additional information.       Makayla Pisano MD

## 2022-08-30 LAB
AFB CULTURE (MYCOBACTERIA): NORMAL
AFB SMEAR: NORMAL

## 2022-08-31 LAB — PTH RELATED PEPTIDE: <2 PMOL/L (ref 0–3.4)

## 2022-09-02 DIAGNOSIS — F32.A DEPRESSION, UNSPECIFIED DEPRESSION TYPE: ICD-10-CM

## 2022-09-02 DIAGNOSIS — F41.9 ANXIETY: ICD-10-CM

## 2022-09-02 DIAGNOSIS — F32.9 MAJOR DEPRESSIVE DISORDER, REMISSION STATUS UNSPECIFIED, UNSPECIFIED WHETHER RECURRENT: ICD-10-CM

## 2022-09-07 PROBLEM — J94.0: Status: ACTIVE | Noted: 2022-09-07

## 2022-09-07 RX ORDER — VENLAFAXINE HYDROCHLORIDE 75 MG/1
CAPSULE, EXTENDED RELEASE ORAL
Qty: 30 CAPSULE | Refills: 5 | Status: SHIPPED | OUTPATIENT
Start: 2022-09-07

## 2022-09-21 DIAGNOSIS — R18.8 OTHER ASCITES: Primary | ICD-10-CM

## 2022-09-22 ENCOUNTER — TELEPHONE (OUTPATIENT)
Dept: GASTROENTEROLOGY | Age: 77
End: 2022-09-22

## 2022-09-22 NOTE — TELEPHONE ENCOUNTER
Patient is Currently at [facility] she has been there since 8/17 within a month and a half she's gained 13 pounds they would like an order for paracentesis for palliative comfort.    I advised Marek Scott that that the order was in the system and that scheduling will contact her personally     Called Interventional Radiology and explained to them the urgency of this procedure and they stated that they would look for closest availability

## 2022-09-23 ENCOUNTER — HOSPITAL ENCOUNTER (OUTPATIENT)
Dept: INTERVENTIONAL RADIOLOGY/VASCULAR | Age: 77
Discharge: HOME | End: 2022-09-25
Payer: MEDICARE

## 2022-09-23 VITALS
DIASTOLIC BLOOD PRESSURE: 55 MMHG | SYSTOLIC BLOOD PRESSURE: 120 MMHG | HEART RATE: 99 BPM | OXYGEN SATURATION: 97 % | RESPIRATION RATE: 16 BRPM

## 2022-09-23 DIAGNOSIS — R18.8 OTHER ASCITES: ICD-10-CM

## 2022-09-23 LAB
BASOPHILS ABSOLUTE: 0.05 E9/L (ref 0–0.2)
BASOPHILS RELATIVE PERCENT: 0.6 % (ref 0–2)
CHOLESTEROL FLUID: 11 MG/DL
EOSINOPHILS ABSOLUTE: 0.29 E9/L (ref 0.05–0.5)
EOSINOPHILS RELATIVE PERCENT: 3.5 % (ref 0–6)
FLUID TYPE: NORMAL
HCT VFR BLD CALC: 27 % (ref 34–48)
HEMOGLOBIN: 8.4 G/DL (ref 11.5–15.5)
IMMATURE GRANULOCYTES #: 0.02 E9/L
IMMATURE GRANULOCYTES %: 0.2 % (ref 0–5)
INR BLD: 1.6
LIPASE BODY FLUID: 20 U/L
LYMPHOCYTES ABSOLUTE: 0.77 E9/L (ref 1.5–4)
LYMPHOCYTES RELATIVE PERCENT: 9.4 % (ref 20–42)
MCH RBC QN AUTO: 32.1 PG (ref 26–35)
MCHC RBC AUTO-ENTMCNC: 31.1 % (ref 32–34.5)
MCV RBC AUTO: 103.1 FL (ref 80–99.9)
MONOCYTES ABSOLUTE: 0.91 E9/L (ref 0.1–0.95)
MONOCYTES RELATIVE PERCENT: 11.1 % (ref 2–12)
NEUTROPHILS ABSOLUTE: 6.15 E9/L (ref 1.8–7.3)
NEUTROPHILS RELATIVE PERCENT: 75.2 % (ref 43–80)
PDW BLD-RTO: 17.4 FL (ref 11.5–15)
PLATELET # BLD: 217 E9/L (ref 130–450)
PMV BLD AUTO: 10.6 FL (ref 7–12)
PROTHROMBIN TIME: 17.6 SEC (ref 9.3–12.4)
RBC # BLD: 2.62 E12/L (ref 3.5–5.5)
TRIGLYCERIDES FLUID: 217 MG/DL
WBC # BLD: 8.2 E9/L (ref 4.5–11.5)

## 2022-09-23 PROCEDURE — 2500000003 HC RX 250 WO HCPCS: Performed by: RADIOLOGY

## 2022-09-23 PROCEDURE — 84478 ASSAY OF TRIGLYCERIDES: CPT

## 2022-09-23 PROCEDURE — 88112 CYTOPATH CELL ENHANCE TECH: CPT

## 2022-09-23 PROCEDURE — 36415 COLL VENOUS BLD VENIPUNCTURE: CPT

## 2022-09-23 PROCEDURE — 88305 TISSUE EXAM BY PATHOLOGIST: CPT

## 2022-09-23 PROCEDURE — 87205 SMEAR GRAM STAIN: CPT

## 2022-09-23 PROCEDURE — 85025 COMPLETE CBC W/AUTO DIFF WBC: CPT

## 2022-09-23 PROCEDURE — 83690 ASSAY OF LIPASE: CPT

## 2022-09-23 PROCEDURE — 85610 PROTHROMBIN TIME: CPT

## 2022-09-23 PROCEDURE — C1729 CATH, DRAINAGE: HCPCS

## 2022-09-23 PROCEDURE — 87070 CULTURE OTHR SPECIMN AEROBIC: CPT

## 2022-09-23 PROCEDURE — 84999 UNLISTED CHEMISTRY PROCEDURE: CPT

## 2022-09-23 PROCEDURE — 49083 ABD PARACENTESIS W/IMAGING: CPT

## 2022-09-23 RX ORDER — LIDOCAINE HYDROCHLORIDE AND EPINEPHRINE BITARTRATE 20; .01 MG/ML; MG/ML
INJECTION, SOLUTION SUBCUTANEOUS
Status: COMPLETED | OUTPATIENT
Start: 2022-09-23 | End: 2022-09-23

## 2022-09-23 RX ORDER — LIDOCAINE HYDROCHLORIDE 20 MG/ML
INJECTION, SOLUTION INFILTRATION; PERINEURAL
Status: COMPLETED | OUTPATIENT
Start: 2022-09-23 | End: 2022-09-23

## 2022-09-23 RX ADMIN — LIDOCAINE HYDROCHLORIDE 9 ML: 20 INJECTION, SOLUTION INFILTRATION; PERINEURAL at 13:32

## 2022-09-23 RX ADMIN — LIDOCAINE HYDROCHLORIDE,EPINEPHRINE BITARTRATE 7 ML: 20; .01 INJECTION, SOLUTION INFILTRATION; PERINEURAL at 13:32

## 2022-09-23 NOTE — BRIEF OP NOTE
Brief Postoperative Note      Patient: [employer]  YOB: 1945  MRN: [id number]    Date of Procedure: 9/23/2022    Pre-Op Diagnosis: * Chylous ascites    Post-Op Diagnosis: Same       US paracentesis    MARÍA Gould    Assistant:  * No surgical staff found *    Anesthesia: * No anesthesia type entered *    Estimated Blood Loss (mL): Minimal    Complications: None    Specimens:   Ascites fluid    Implants:  * No implants in log *      Drains: * No LDAs found *    Findings: Chylous ascites removed    Electronically signed by Bethany Breen MD on 9/23/2022 at 2:35 PM

## 2022-09-23 NOTE — PRE SEDATION
Sedation Pre-Procedure Note    Patient Name: Iraida Perrin   YOB: 1945  Room/Bed: Room/bed info not found  Medical Record Number: [id number]  Date: 9/23/2022   Time: 2:34 PM       Indication:  ascites    Consent: I have discussed with the patient and/or the patient representative the indication, alternatives, and the possible risks and/or complications of the planned procedure and the anesthesia methods. The patient and/or patient representative appear to understand and agree to proceed. Vital Signs:   Vitals:    09/23/22 1400   BP: (!) 120/55   Pulse: 99   Resp: 16   SpO2: 97%       Past Medical History:   has a past medical history of Breast cancer ([address] Cirrhosis ([address] Essential hypertension, Hx of blood clots, Hyperlipidemia, Osteopenia, Primary hyperparathyroidism ([address] 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 ([address] Sleep apnea, Spinal stenosis, and Type 2 diabetes mellitus ([address] Past Surgical History:   has a past surgical history that includes Hysterectomy; Carpal tunnel release; Lithotripsy (Left, 05/04/2016); Colonoscopy (01/31/2017); Breast lumpectomy; Upper gastrointestinal endoscopy (04/23/2019); Colonoscopy (04/23/2019); Upper gastrointestinal endoscopy (N/A, 04/23/2019); Colonoscopy (N/A, 04/23/2019); Colonoscopy (04/23/2019); Shoulder Arthroplasty (Left, 12/21/2020); cardiovascular stress test; and Bladder surgery (Right, 11/26/2021). Medications:   Scheduled Meds:   Continuous Infusions:   PRN Meds:   Home Meds:   Prior to Admission medications    Medication Sig Start Date End Date Taking?  Authorizing Provider   venlafaxine (EFFEXOR XR) 75 MG extended release capsule TAKE ONE CAPSULE BY MOUTH DAILY AT 9AM 9/7/22   Francisca Lewis MD   albuterol sulfate HFA (PROVENTIL;VENTOLIN;PROAIR) 108 (90 Base) MCG/ACT inhaler INHALE TWO PUFFS BY MOUTH FOUR TIMES DAILY AS NEEDED FOR WHEEZING (BULK) 8/15/22 8/16/22  Chris Miranda MD   doxazosin (CARDURA) 1 MG tablet TAKE ONE TABLET BY MOUTH DAILY AT 9AM 8/15/22 8/16/22  Gely Bacon MD   furosemide (LASIX) 20 MG tablet Take 1 tablet by mouth in the morning and 1 tablet before bedtime. 8/4/22 8/16/22  Vanessa Dhaliwal MD   cinacalcet (SENSIPAR) 30 MG tablet Take 1 tablet by mouth in the morning. 8/5/22 8/16/22  Vanessa Dhaliwal MD   apixaban (ELIQUIS) 5 MG TABS tablet Take 1 tablet by mouth in the morning and 1 tablet before bedtime. 7/15/22 8/16/22  Martha Haskins MD   felodipine (PLENDIL) 2.5 MG extended release tablet TAKE 1 TABLET BY MOUTH DAILY 7/1/22 7/15/22  Adin Muse MD     Coumadin Use Last 7 Days:  no  Antiplatelet drug therapy use last 7 days: no  Other anticoagulant use last 7 days: no  Additional Medication Information:  none      Pre-Sedation Documentation and Exam:   I have personally completed a history, physical exam & review of systems for this patient (see notes).     Mallampati Airway Assessment:  normal neck range of motion    Prior History of Anesthesia Complications:   none    ASA Classification:  Class 2 - A normal healthy patient with mild systemic disease    Sedation/ Anesthesia Plan:   intravenous sedation    Medications Planned:Local    Patient is an appropriate candidate for plan of sedation: no    Electronically signed by Olivier Villegas MD on 9/23/2022 at 2:34 PM

## 2022-09-23 NOTE — OR NURSING
Pt came from hospice to IR room for paracentesis. Pt is alert and oriented, denies any pain prior to procedure. Ultrasound images taken prior to procedure. Procedure is explained to patient, including possible risks. Pt verbalizes understanding and consent from signed. Procedure done under sterile technique and guidance of ultrasound imaging. 1% Lidocaine is used during procedure for comfort measures. A total of 2050 ml's of cloudy red/pink colored ascitic fluid drained during procedure. Catheter removed and op-site dressing applied to site with no bleeding noted. Pt tolerated procedure well and denies any pain after. Pt given discharge instructions and pt verbalizes understanding of post procedure care/follow up. Pt went by stretcher out of department with no difficulties. Sent specimen to lab.    LAB # NO. [id number]

## 2022-09-27 LAB
BODY FLUID CULTURE, STERILE: NORMAL
GRAM STAIN RESULT: NORMAL

## 2022-10-07 ENCOUNTER — TELEPHONE (OUTPATIENT)
Dept: GASTROENTEROLOGY | Age: 77
End: 2022-10-07

## 2022-10-07 DIAGNOSIS — R18.8 OTHER ASCITES: Primary | ICD-10-CM

## 2022-10-07 NOTE — TELEPHONE ENCOUNTER
Spoke with Ana Cary at the [address] States that Sarita Granda if very uncomfortable due to ascites. Hospice feels the patient would benefit from a PleurX drain so the ascites can be drained at the bedside. The patient will have to be removed from Hospice to get the drain placed for billing purposes. Patient will be placed back on Hospice once the drain is placed.

## 2022-10-14 ENCOUNTER — HOSPITAL ENCOUNTER (OUTPATIENT)
Dept: INTERVENTIONAL RADIOLOGY/VASCULAR | Age: 77
Discharge: HOME | End: 2022-10-16
Payer: MEDICARE

## 2022-10-14 VITALS
OXYGEN SATURATION: 99 % | RESPIRATION RATE: 20 BRPM | TEMPERATURE: 97.9 F | BODY MASS INDEX: 31.28 KG/M2 | WEIGHT: 170 LBS | DIASTOLIC BLOOD PRESSURE: 80 MMHG | HEIGHT: 62 IN | SYSTOLIC BLOOD PRESSURE: 137 MMHG | HEART RATE: 103 BPM

## 2022-10-14 DIAGNOSIS — R18.8 OTHER ASCITES: ICD-10-CM

## 2022-10-14 PROCEDURE — 6360000002 HC RX W HCPCS: Performed by: RADIOLOGY

## 2022-10-14 PROCEDURE — 36415 COLL VENOUS BLD VENIPUNCTURE: CPT

## 2022-10-14 PROCEDURE — C1729 CATH, DRAINAGE: HCPCS

## 2022-10-14 PROCEDURE — 7100000011 HC PHASE II RECOVERY - ADDTL 15 MIN

## 2022-10-14 PROCEDURE — 7100000010 HC PHASE II RECOVERY - FIRST 15 MIN

## 2022-10-14 PROCEDURE — 2500000003 HC RX 250 WO HCPCS: Performed by: RADIOLOGY

## 2022-10-14 PROCEDURE — 49418 INSERT TUN IP CATH PERC: CPT

## 2022-10-14 PROCEDURE — 2580000003 HC RX 258: Performed by: RADIOLOGY

## 2022-10-14 RX ORDER — LIDOCAINE HYDROCHLORIDE 20 MG/ML
INJECTION, SOLUTION INFILTRATION; PERINEURAL
Status: COMPLETED | OUTPATIENT
Start: 2022-10-14 | End: 2022-10-14

## 2022-10-14 RX ADMIN — CEFAZOLIN 2000 MG: 2 INJECTION, POWDER, FOR SOLUTION INTRAMUSCULAR; INTRAVENOUS at 11:08

## 2022-10-14 RX ADMIN — LIDOCAINE HYDROCHLORIDE 8 ML: 20 INJECTION, SOLUTION INFILTRATION; PERINEURAL at 11:40

## 2022-10-14 ASSESSMENT — PAIN - FUNCTIONAL ASSESSMENT: PAIN_FUNCTIONAL_ASSESSMENT: NONE - DENIES PAIN

## 2022-10-14 NOTE — PROCEDURES
1215 Pt returned from procedure. No sedation was admin. Pt A&Ox3 , VS WNL , dressing clean, dry, and intact. will continue to monitor. 1230 Pt eating/drink w/o issue. Pt A&Ox3 , VS WNL , dressing clean, dry, and intact. Report and transport request was called to [facility]. will continue to monitor. 1245 Pt A&Ox3 , VS WNL , dressing clean, dry, and intact. will continue to monitor. 1300 Pt A&Ox3 , VS WNL , dressing clean, dry, and intact. Pt meets d/c requirements, awaiting [facility] transport.

## 2022-10-14 NOTE — PRE SEDATION
Sedation Pre-Procedure Note    Patient Name: Cole Herring   YOB: 1945  Room/Bed: Room/bed info not found  Medical Record Number: [id number]  Date: 10/14/2022   Time: 10:59 AM       Indication:  Ascites    Consent: I have discussed with the patient and/or the patient representative the indication, alternatives, and the possible risks and/or complications of the planned procedure and the anesthesia methods. The patient and/or patient representative appear to understand and agree to proceed. Vital Signs:   Vitals:    10/14/22 1043   BP: 124/73   Pulse: (!) 104   Resp: 16   Temp: 97.9 °F (36.6 °C)   SpO2: 99%       Past Medical History:   has a past medical history of Breast cancer ([address] Cirrhosis ([address] Essential hypertension, Hx of blood clots, Hyperlipidemia, Osteopenia, Primary hyperparathyroidism ([address] 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 ([address] Sleep apnea, Spinal stenosis, and Type 2 diabetes mellitus ([address] Past Surgical History:   has a past surgical history that includes Hysterectomy; Carpal tunnel release; Lithotripsy (Left, 05/04/2016); Colonoscopy (01/31/2017); Breast lumpectomy; Upper gastrointestinal endoscopy (04/23/2019); Colonoscopy (04/23/2019); Upper gastrointestinal endoscopy (N/A, 04/23/2019); Colonoscopy (N/A, 04/23/2019); Colonoscopy (04/23/2019); Shoulder Arthroplasty (Left, 12/21/2020); cardiovascular stress test; and Bladder surgery (Right, 11/26/2021). Medications:   Scheduled Meds:    ceFAZolin  2,000 mg IntraVENous Once     Continuous Infusions:   PRN Meds:   Home Meds:   Prior to Admission medications    Medication Sig Start Date End Date Taking?  Authorizing Provider   venlafaxine (EFFEXOR XR) 75 MG extended release capsule TAKE ONE CAPSULE BY MOUTH DAILY AT 9AM 9/7/22   Mariam Montoya MD   albuterol sulfate HFA (PROVENTIL;VENTOLIN;PROAIR) 108 (90 Base) MCG/ACT inhaler INHALE TWO PUFFS BY MOUTH FOUR TIMES DAILY AS NEEDED FOR WHEEZING (BULK) 8/15/22 8/16/22  Geno Chris Dylan Gomez MD   doxazosin (CARDURA) 1 MG tablet TAKE ONE TABLET BY MOUTH DAILY AT 9AM 8/15/22 8/16/22  Miguelangel Hastings MD   furosemide (LASIX) 20 MG tablet Take 1 tablet by mouth in the morning and 1 tablet before bedtime. 8/4/22 8/16/22  Ashok Lees MD   cinacalcet (SENSIPAR) 30 MG tablet Take 1 tablet by mouth in the morning. 8/5/22 8/16/22  Ashok Lees MD   apixaban (ELIQUIS) 5 MG TABS tablet Take 1 tablet by mouth in the morning and 1 tablet before bedtime. 7/15/22 8/16/22  Serge Gupta MD   felodipine (PLENDIL) 2.5 MG extended release tablet TAKE 1 TABLET BY MOUTH DAILY 7/1/22 7/15/22  Yana Pagan MD     Coumadin Use Last 7 Days:  no  Antiplatelet drug therapy use last 7 days: no  Other anticoagulant use last 7 days: no        Pre-Sedation Documentation and Exam:   I have reviewed the patient's history and review of systems.     Mallampati Airway Assessment:  Mallampati Class II - (soft palate, fauces & uvula are visible)    Prior History of Anesthesia Complications:   none    ASA Classification:  Class 2 - A normal healthy patient with mild systemic disease    Sedation/ Anesthesia Plan:   intravenous sedation    Medications Planned:   midazolam (Versed)  intravenously and fentanyl intravenously    Patient is an appropriate candidate for plan of sedation: yes    Electronically signed by Ricardo Prieto MD on 10/14/2022 at 10:59 AM

## 2022-12-14 NOTE — BRIEF OP NOTE
Brief Postoperative Note    Qian Crook  YOB: 1945  [id number]    Pre-operative Diagnosis: ascites plan paracentesis      Post-operative Diagnosis: Same    Procedure:paracentesis    Anesthesia: Local    Estimated Blood Loss: < 10 cc    Surgeon: Michelle GONZALEZ     Complications: none    Specimen obtained: Yes, fluid, serous    Findings: fluid, drained with catheter drainage      Sarah Aquino II, MD   5/27/2022 3:08 PM Detail Level: Generalized Detail Level: Zone Detail Level: Detailed

## 2023-06-14 NOTE — CARE COORDINATION
[facility] Transitions Follow Up Call    2021    Patient: Shubham Wyatt  Patient : 1945   MRN: [id number]  Reason for Admission: frank   Discharge Date: 21 RARS: Readmission Risk Score: 25 ( )         Spoke with: [address] [place] [address] Transitions Subsequent and Final Call    Subsequent and Final Calls  Do you have any ongoing symptoms?: Yes  Onset of Patient-reported symptoms: Today  Patient-reported symptoms: Other  Have your medications changed?: No  Do you have any questions related to your medications?: No  Do you currently have any active services?: Yes  Are you currently active with any services?: Home Health  Do you have any needs or concerns that I can assist you with?: No  Identified Barriers: None  Care Transitions Interventions  No Identified Needs  Other Interventions:       Spoke with Nadeen Casillas for follow up care transition call. She reports that she will be going to the ED when her family gets off work. She stated that her urine in the catheter bag is \"pure blood\". She has not noticed clots in the bag. She stated yesterday her low back also started to hurt. She has been nauseated, however, she states she is taking a \"nausea pill\" and cannot remember the name of it, so she has not vomited. Nadeen Casillas denies any needs, questions, or concerns at this time. [facility] will route a message notifying Dr. Hattie Cheatham.      Follow Up  Future Appointments   Date Time Provider Geraldine Rader   2022 10:15 AM Radhames Barnett MD [facility]   2022  2:00 PM [facility]   3/15/2022 10:00 AM XENIA Modi - [facility]       Karie Rodríguez RN Tranexamic Acid Pregnancy And Lactation Text: It is unknown if this medication is safe during pregnancy or breast feeding.

## (undated) DEVICE — TOWEL,OR,DSP,ST,BLUE,DLX,10/PK,8PK/CS: Brand: MEDLINE

## (undated) DEVICE — DRAINBAG,ANTI-REFLUX TOWER,L/F,2000ML,LL: Brand: MEDLINE

## (undated) DEVICE — CANNULA NSL ORAL AD FOR CAPNOFLEX CO2 O2 AIRLFE

## (undated) DEVICE — CONTAINER SPEC 60ML PH 7NEUTRAL BUFF FRMLN RDY TO USE

## (undated) DEVICE — SYRINGE MED 20ML STD CLR PLAS LUERSLIP TIP N CTRL DISP

## (undated) DEVICE — SET ORTHO STD STORTSTD1

## (undated) DEVICE — SOLUTION IRRIG 1000ML STRL H2O USP PLAS POUR BTL

## (undated) DEVICE — CAMERA STRYKER 1488 HD GEN

## (undated) DEVICE — GOWN,BREATHABLE SLV,AURORA,XLG,STRL: Brand: MEDLINE

## (undated) DEVICE — DRAPE,U/ SHT,SPLIT,PLAS,STERIL: Brand: MEDLINE

## (undated) DEVICE — 3M™ IOBAN™ 2 ANTIMICROBIAL INCISE DRAPE 6650EZ: Brand: IOBAN™ 2

## (undated) DEVICE — CATHETER URET 5FR L70CM OPN END SGL LUMN INJ HUB FLEXIMA

## (undated) DEVICE — BAG DRNGE COMB PK

## (undated) DEVICE — PATIENT RETURN ELECTRODE, SINGLE-USE, CONTACT QUALITY MONITORING, ADULT, WITH 9FT CORD, FOR PATIENTS WEIGING OVER 33LBS. (15KG): Brand: MEGADYNE

## (undated) DEVICE — CATH URETERAL 5FR CONE TIP OPEN END 70CM

## (undated) DEVICE — GLOVE SURG SZ 8 L12IN FNGR THK79MIL GRN LTX FREE

## (undated) DEVICE — DRESSING,GAUZE,XEROFORM,CURAD,5"X9",ST: Brand: CURAD

## (undated) DEVICE — GARMENT,MEDLINE,DVT,INT,CALF,MED, GEN2: Brand: MEDLINE

## (undated) DEVICE — DRILL SYSTEM 7

## (undated) DEVICE — CONVERTORS STOCKINETTE: Brand: CONVERTORS

## (undated) DEVICE — INTENDED FOR TISSUE SEPARATION, AND OTHER PROCEDURES THAT REQUIRE A SHARP SURGICAL BLADE TO PUNCTURE OR CUT.: Brand: BARD-PARKER ® STAINLESS STEEL BLADES

## (undated) DEVICE — KIT EVAC 400CC DIA1/8IN H PAT 12.5IN 3 SPR RND SHP PVC DRN

## (undated) DEVICE — GUIDEWIRE VASC STR 3 CM 0.035 INX150 CM STD NIT ZIPWIRE

## (undated) DEVICE — GAUZE,SPONGE,POST-OP,4X3,STRL,LF: Brand: MEDLINE

## (undated) DEVICE — 3M™ STERI-DRAPE™ U-DRAPE 1015: Brand: STERI-DRAPE™

## (undated) DEVICE — APPLICATOR MEDICATED 26 CC SOLUTION HI LT ORNG CHLORAPREP

## (undated) DEVICE — SNARE POLYP M L240CM LOOP W27MM SHTH DIA1.9MM OVL FLX DISP

## (undated) DEVICE — BAG DRAINAGE CONTAINER 15 LT FLUID COLLCTN

## (undated) DEVICE — FORCEPS BX L160CM JAW DIA2.4MM YEL L CAP W/ NDL DISP RAD

## (undated) DEVICE — GOWN,BREATHABLE,IMP SLV 3XL AURORA: Brand: MEDLINE

## (undated) DEVICE — GOWN,SIRUS,FABRNF,XL,20/CS: Brand: MEDLINE

## (undated) DEVICE — SLING ARM L L165IN D75IN WHT POLY MESH ENVELOP MTL SIDE

## (undated) DEVICE — SURGICAL PROCEDURE PACK BASIC

## (undated) DEVICE — GLOVE SURG SIGNATURE LTX ESS LTX PF 7.5

## (undated) DEVICE — READY WET SKIN SCRUB TRAY-LF: Brand: MEDLINE INDUSTRIES, INC.

## (undated) DEVICE — TUBING SUCT 12FR MAL ALUM SHFT FN CAP VENT UNIV CONN W/ OBT

## (undated) DEVICE — 3M™ COBAN™ NL STERILE NON-LATEX SELF-ADHERENT WRAP, 2084S, 4 IN X 5 YD (10 CM X 4,5 M), 18 ROLLS/CASE: Brand: 3M™ COBAN™

## (undated) DEVICE — NEEDLE HYPO 21GA L1.5IN GRN POLYPR HUB S STL REG BVL STR

## (undated) DEVICE — TRAP POLYP ETRAP

## (undated) DEVICE — GRADUATE

## (undated) DEVICE — CATHETER F BLLN 5CC 14FR 2 W HYDRGEL COAT LESS TRAUM LUB

## (undated) DEVICE — SHEET,DRAPE,53X77,STERILE: Brand: MEDLINE

## (undated) DEVICE — HEWSON SUTURE RETRIEVER: Brand: HEWSON SUTURE RETRIEVER

## (undated) DEVICE — SOLUTION IRRIG 3000ML STRL H2O USP UROMATIC PLAS CONT

## (undated) DEVICE — MEDIA CONTRAST ISOVUE  300 10X50ML

## (undated) DEVICE — DRAPE C ARM W41XL74IN UNIV MOB W RUBBERBAND CLP

## (undated) DEVICE — DRIP REDUCTION MANIFOLD

## (undated) DEVICE — GOWN,AURORA,BRTHSLV,2XL,18/CS: Brand: MEDLINE

## (undated) DEVICE — BLADE CLIPPER GEN PURP NS

## (undated) DEVICE — BLOCK BITE 60FR RUBBER ADLT DENTAL

## (undated) DEVICE — 1000 S-DRAPE TOWEL DRAPE 10/BX: Brand: STERI-DRAPE™

## (undated) DEVICE — DEFENDO AIR WATER SUCTION AND BIOPSY VALVE KIT FOR  OLYMPUS: Brand: DEFENDO AIR/WATER/SUCTION AND BIOPSY VALVE

## (undated) DEVICE — CYSTO PACK: Brand: MEDLINE INDUSTRIES, INC.

## (undated) DEVICE — CONNECTOR TBNG AUX H2O JET DISP FOR OLY 160/180 SER

## (undated) DEVICE — PACK,SHOULDER SPLIT: Brand: MEDLINE

## (undated) DEVICE — 3M™ IOBAN™ 2 ANTIMICROBIAL INCISE DRAPE 6651EZ: Brand: IOBAN™ 2

## (undated) DEVICE — Z DISCONTINUED USE 2275686 GLOVE SURG SZ 8 L12IN FNGR THK13MIL WHT ISOLEX POLYISOPRENE

## (undated) DEVICE — SET ORTHO STD STORTSTD2

## (undated) DEVICE — Device